# Patient Record
Sex: MALE | Race: WHITE | NOT HISPANIC OR LATINO | URBAN - METROPOLITAN AREA
[De-identification: names, ages, dates, MRNs, and addresses within clinical notes are randomized per-mention and may not be internally consistent; named-entity substitution may affect disease eponyms.]

---

## 2017-03-24 ENCOUNTER — INPATIENT (INPATIENT)
Facility: HOSPITAL | Age: 47
LOS: 3 days | Discharge: HOME | End: 2017-03-28
Attending: INTERNAL MEDICINE

## 2017-06-27 DIAGNOSIS — Z98.818 OTHER DENTAL PROCEDURE STATUS: ICD-10-CM

## 2017-06-27 DIAGNOSIS — I95.9 HYPOTENSION, UNSPECIFIED: ICD-10-CM

## 2017-06-27 DIAGNOSIS — N17.9 ACUTE KIDNEY FAILURE, UNSPECIFIED: ICD-10-CM

## 2017-06-27 DIAGNOSIS — Z79.84 LONG TERM (CURRENT) USE OF ORAL HYPOGLYCEMIC DRUGS: ICD-10-CM

## 2017-06-27 DIAGNOSIS — I10 ESSENTIAL (PRIMARY) HYPERTENSION: ICD-10-CM

## 2017-06-27 DIAGNOSIS — Z88.0 ALLERGY STATUS TO PENICILLIN: ICD-10-CM

## 2017-06-27 DIAGNOSIS — K04.7 PERIAPICAL ABSCESS WITHOUT SINUS: ICD-10-CM

## 2017-06-27 DIAGNOSIS — F41.9 ANXIETY DISORDER, UNSPECIFIED: ICD-10-CM

## 2017-06-27 DIAGNOSIS — I25.10 ATHEROSCLEROTIC HEART DISEASE OF NATIVE CORONARY ARTERY WITHOUT ANGINA PECTORIS: ICD-10-CM

## 2017-06-27 DIAGNOSIS — G61.81 CHRONIC INFLAMMATORY DEMYELINATING POLYNEURITIS: ICD-10-CM

## 2017-06-27 DIAGNOSIS — E11.9 TYPE 2 DIABETES MELLITUS WITHOUT COMPLICATIONS: ICD-10-CM

## 2017-06-27 DIAGNOSIS — G93.41 METABOLIC ENCEPHALOPATHY: ICD-10-CM

## 2017-06-27 DIAGNOSIS — R50.82 POSTPROCEDURAL FEVER: ICD-10-CM

## 2017-06-27 DIAGNOSIS — D80.2 SELECTIVE DEFICIENCY OF IMMUNOGLOBULIN A [IGA]: ICD-10-CM

## 2017-06-27 DIAGNOSIS — Z79.899 OTHER LONG TERM (CURRENT) DRUG THERAPY: ICD-10-CM

## 2017-06-27 DIAGNOSIS — Z53.29 PROCEDURE AND TREATMENT NOT CARRIED OUT BECAUSE OF PATIENT'S DECISION FOR OTHER REASONS: ICD-10-CM

## 2017-06-27 DIAGNOSIS — Z74.01 BED CONFINEMENT STATUS: ICD-10-CM

## 2017-06-27 DIAGNOSIS — Z96.643 PRESENCE OF ARTIFICIAL HIP JOINT, BILATERAL: ICD-10-CM

## 2017-06-27 DIAGNOSIS — Z95.1 PRESENCE OF AORTOCORONARY BYPASS GRAFT: ICD-10-CM

## 2017-06-27 DIAGNOSIS — R65.10 SYSTEMIC INFLAMMATORY RESPONSE SYNDROME (SIRS) OF NON-INFECTIOUS ORIGIN WITHOUT ACUTE ORGAN DYSFUNCTION: ICD-10-CM

## 2018-05-14 ENCOUNTER — OUTPATIENT (OUTPATIENT)
Dept: OUTPATIENT SERVICES | Facility: HOSPITAL | Age: 48
LOS: 1 days | Discharge: HOME | End: 2018-05-14

## 2018-05-14 DIAGNOSIS — E11.65 TYPE 2 DIABETES MELLITUS WITH HYPERGLYCEMIA: ICD-10-CM

## 2018-05-14 DIAGNOSIS — E03.9 HYPOTHYROIDISM, UNSPECIFIED: ICD-10-CM

## 2018-05-14 DIAGNOSIS — D64.9 ANEMIA, UNSPECIFIED: ICD-10-CM

## 2018-05-14 DIAGNOSIS — R79.89 OTHER SPECIFIED ABNORMAL FINDINGS OF BLOOD CHEMISTRY: ICD-10-CM

## 2018-05-14 DIAGNOSIS — E78.2 MIXED HYPERLIPIDEMIA: ICD-10-CM

## 2018-05-14 DIAGNOSIS — E55.9 VITAMIN D DEFICIENCY, UNSPECIFIED: ICD-10-CM

## 2018-09-14 ENCOUNTER — APPOINTMENT (OUTPATIENT)
Dept: GERIATRICS | Facility: HOME HEALTH | Age: 48
End: 2018-09-14

## 2018-10-10 ENCOUNTER — RX RENEWAL (OUTPATIENT)
Age: 48
End: 2018-10-10

## 2018-10-19 ENCOUNTER — APPOINTMENT (OUTPATIENT)
Dept: GERIATRICS | Facility: HOME HEALTH | Age: 48
End: 2018-10-19

## 2018-10-24 VITALS — HEART RATE: 88 BPM | DIASTOLIC BLOOD PRESSURE: 80 MMHG | RESPIRATION RATE: 12 BRPM | SYSTOLIC BLOOD PRESSURE: 130 MMHG

## 2018-10-29 ENCOUNTER — LABORATORY RESULT (OUTPATIENT)
Age: 48
End: 2018-10-29

## 2018-10-29 ENCOUNTER — OUTPATIENT (OUTPATIENT)
Dept: OUTPATIENT SERVICES | Facility: HOSPITAL | Age: 48
LOS: 1 days | Discharge: HOME | End: 2018-10-29

## 2018-10-29 DIAGNOSIS — G47.00 INSOMNIA, UNSPECIFIED: ICD-10-CM

## 2018-10-29 DIAGNOSIS — Z00.00 ENCOUNTER FOR GENERAL ADULT MEDICAL EXAMINATION WITHOUT ABNORMAL FINDINGS: ICD-10-CM

## 2018-10-29 DIAGNOSIS — K59.09 OTHER CONSTIPATION: ICD-10-CM

## 2018-10-29 DIAGNOSIS — G61.81 CHRONIC INFLAMMATORY DEMYELINATING POLYNEURITIS: ICD-10-CM

## 2018-10-29 DIAGNOSIS — E11.9 TYPE 2 DIABETES MELLITUS WITHOUT COMPLICATIONS: ICD-10-CM

## 2018-10-30 ENCOUNTER — RX RENEWAL (OUTPATIENT)
Age: 48
End: 2018-10-30

## 2018-10-30 RX ORDER — ALPRAZOLAM 0.25 MG
1 TABLET ORAL
Qty: 90 | Refills: 0
Start: 2018-10-30 | End: 2018-11-28

## 2018-10-30 RX ORDER — HYDROMORPHONE HYDROCHLORIDE 2 MG/ML
1 INJECTION INTRAMUSCULAR; INTRAVENOUS; SUBCUTANEOUS
Qty: 60 | Refills: 0
Start: 2018-10-30 | End: 2018-11-28

## 2018-10-30 RX ORDER — HYDROMORPHONE HYDROCHLORIDE 2 MG/ML
1 INJECTION INTRAMUSCULAR; INTRAVENOUS; SUBCUTANEOUS
Qty: 120 | Refills: 0
Start: 2018-10-30 | End: 2018-11-28

## 2018-10-31 ENCOUNTER — RESULT REVIEW (OUTPATIENT)
Age: 48
End: 2018-10-31

## 2018-11-21 ENCOUNTER — RX RENEWAL (OUTPATIENT)
Age: 48
End: 2018-11-21

## 2019-01-01 ENCOUNTER — LABORATORY RESULT (OUTPATIENT)
Age: 49
End: 2019-01-01

## 2019-01-01 ENCOUNTER — OTHER (OUTPATIENT)
Age: 49
End: 2019-01-01

## 2019-01-01 ENCOUNTER — OUTPATIENT (OUTPATIENT)
Dept: OUTPATIENT SERVICES | Facility: HOSPITAL | Age: 49
LOS: 1 days | Discharge: HOME | End: 2019-01-01

## 2019-01-01 ENCOUNTER — MOBILE ON CALL (OUTPATIENT)
Age: 49
End: 2019-01-01

## 2019-01-01 ENCOUNTER — TRANSCRIPTION ENCOUNTER (OUTPATIENT)
Age: 49
End: 2019-01-01

## 2019-01-01 ENCOUNTER — APPOINTMENT (OUTPATIENT)
Dept: GERIATRICS | Facility: HOME HEALTH | Age: 49
End: 2019-01-01
Payer: MEDICARE

## 2019-01-01 ENCOUNTER — FORM ENCOUNTER (OUTPATIENT)
Age: 49
End: 2019-01-01

## 2019-01-01 ENCOUNTER — OUTPATIENT (OUTPATIENT)
Dept: OUTPATIENT SERVICES | Facility: HOSPITAL | Age: 49
LOS: 1 days | Discharge: HOME | End: 2019-01-01
Payer: COMMERCIAL

## 2019-01-01 ENCOUNTER — OUTPATIENT (OUTPATIENT)
Dept: OUTPATIENT SERVICES | Facility: HOSPITAL | Age: 49
LOS: 1 days | Discharge: HOME | End: 2019-01-01
Payer: MEDICARE

## 2019-01-01 ENCOUNTER — APPOINTMENT (OUTPATIENT)
Dept: CARDIOLOGY | Facility: CLINIC | Age: 49
End: 2019-01-01

## 2019-01-01 ENCOUNTER — APPOINTMENT (OUTPATIENT)
Dept: NEUROLOGY | Facility: CLINIC | Age: 49
End: 2019-01-01
Payer: MEDICARE

## 2019-01-01 ENCOUNTER — INPATIENT (INPATIENT)
Facility: HOSPITAL | Age: 49
LOS: 8 days | Discharge: NURSING, REHAB OR LONG-TERM CARE | End: 2019-11-21
Attending: INTERNAL MEDICINE | Admitting: INTERNAL MEDICINE
Payer: MEDICARE

## 2019-01-01 ENCOUNTER — RX RENEWAL (OUTPATIENT)
Age: 49
End: 2019-01-01

## 2019-01-01 VITALS
TEMPERATURE: 99 F | RESPIRATION RATE: 20 BRPM | DIASTOLIC BLOOD PRESSURE: 82 MMHG | HEART RATE: 102 BPM | SYSTOLIC BLOOD PRESSURE: 119 MMHG | WEIGHT: 214.95 LBS | HEIGHT: 69 IN

## 2019-01-01 VITALS
DIASTOLIC BLOOD PRESSURE: 56 MMHG | RESPIRATION RATE: 18 BRPM | SYSTOLIC BLOOD PRESSURE: 114 MMHG | HEART RATE: 92 BPM | TEMPERATURE: 99 F

## 2019-01-01 VITALS — RESPIRATION RATE: 18 BRPM | HEART RATE: 83 BPM | DIASTOLIC BLOOD PRESSURE: 69 MMHG | SYSTOLIC BLOOD PRESSURE: 145 MMHG

## 2019-01-01 VITALS
TEMPERATURE: 97.6 F | SYSTOLIC BLOOD PRESSURE: 110 MMHG | OXYGEN SATURATION: 97 % | HEART RATE: 78 BPM | DIASTOLIC BLOOD PRESSURE: 64 MMHG | RESPIRATION RATE: 16 BRPM

## 2019-01-01 VITALS
HEART RATE: 70 BPM | OXYGEN SATURATION: 98 % | DIASTOLIC BLOOD PRESSURE: 64 MMHG | RESPIRATION RATE: 16 BRPM | SYSTOLIC BLOOD PRESSURE: 158 MMHG | TEMPERATURE: 99 F

## 2019-01-01 VITALS
DIASTOLIC BLOOD PRESSURE: 73 MMHG | HEIGHT: 71 IN | TEMPERATURE: 98 F | RESPIRATION RATE: 18 BRPM | WEIGHT: 199.96 LBS | SYSTOLIC BLOOD PRESSURE: 133 MMHG | HEART RATE: 80 BPM

## 2019-01-01 VITALS
DIASTOLIC BLOOD PRESSURE: 70 MMHG | RESPIRATION RATE: 18 BRPM | HEART RATE: 80 BPM | SYSTOLIC BLOOD PRESSURE: 110 MMHG | TEMPERATURE: 98.2 F

## 2019-01-01 VITALS
SYSTOLIC BLOOD PRESSURE: 140 MMHG | TEMPERATURE: 98 F | HEART RATE: 80 BPM | HEIGHT: 71 IN | WEIGHT: 195.99 LBS | RESPIRATION RATE: 17 BRPM | OXYGEN SATURATION: 96 % | DIASTOLIC BLOOD PRESSURE: 86 MMHG

## 2019-01-01 VITALS — HEART RATE: 74 BPM | SYSTOLIC BLOOD PRESSURE: 120 MMHG | RESPIRATION RATE: 17 BRPM | DIASTOLIC BLOOD PRESSURE: 76 MMHG

## 2019-01-01 VITALS
TEMPERATURE: 99 F | DIASTOLIC BLOOD PRESSURE: 77 MMHG | HEART RATE: 77 BPM | SYSTOLIC BLOOD PRESSURE: 135 MMHG | RESPIRATION RATE: 18 BRPM

## 2019-01-01 VITALS — DIASTOLIC BLOOD PRESSURE: 74 MMHG | RESPIRATION RATE: 18 BRPM | HEART RATE: 81 BPM | SYSTOLIC BLOOD PRESSURE: 116 MMHG

## 2019-01-01 VITALS — SYSTOLIC BLOOD PRESSURE: 128 MMHG | RESPIRATION RATE: 18 BRPM | DIASTOLIC BLOOD PRESSURE: 64 MMHG | HEART RATE: 80 BPM

## 2019-01-01 VITALS — HEIGHT: 71 IN

## 2019-01-01 VITALS — SYSTOLIC BLOOD PRESSURE: 97 MMHG | HEART RATE: 80 BPM | DIASTOLIC BLOOD PRESSURE: 67 MMHG

## 2019-01-01 DIAGNOSIS — S82.409A UNSPECIFIED FRACTURE OF SHAFT OF UNSPECIFIED FIBULA, INITIAL ENCOUNTER FOR CLOSED FRACTURE: ICD-10-CM

## 2019-01-01 DIAGNOSIS — Z96.642 PRESENCE OF LEFT ARTIFICIAL HIP JOINT: Chronic | ICD-10-CM

## 2019-01-01 DIAGNOSIS — B96.89 OTHER SPECIFIED BACTERIAL AGENTS AS THE CAUSE OF DISEASES CLASSIFIED ELSEWHERE: ICD-10-CM

## 2019-01-01 DIAGNOSIS — Z90.49 ACQUIRED ABSENCE OF OTHER SPECIFIED PARTS OF DIGESTIVE TRACT: Chronic | ICD-10-CM

## 2019-01-01 DIAGNOSIS — T50.Z15A ADVERSE EFFECT OF IMMUNOGLOBULIN, INITIAL ENCOUNTER: ICD-10-CM

## 2019-01-01 DIAGNOSIS — S72.402A UNSPECIFIED FRACTURE OF LOWER END OF LEFT FEMUR, INITIAL ENCOUNTER FOR CLOSED FRACTURE: ICD-10-CM

## 2019-01-01 DIAGNOSIS — Z96.641 PRESENCE OF RIGHT ARTIFICIAL HIP JOINT: Chronic | ICD-10-CM

## 2019-01-01 DIAGNOSIS — G61.81 CHRONIC INFLAMMATORY DEMYELINATING POLYNEURITIS: ICD-10-CM

## 2019-01-01 DIAGNOSIS — I25.10 ATHEROSCLEROTIC HEART DISEASE OF NATIVE CORONARY ARTERY WITHOUT ANGINA PECTORIS: ICD-10-CM
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
## 2019-01-01 DIAGNOSIS — M00.9 PYOGENIC ARTHRITIS, UNSPECIFIED: ICD-10-CM

## 2019-01-01 DIAGNOSIS — S82.202A UNSPECIFIED FRACTURE OF SHAFT OF LEFT TIBIA, INITIAL ENCOUNTER FOR CLOSED FRACTURE: ICD-10-CM

## 2019-01-01 DIAGNOSIS — R82.998 OTHER ABNORMAL FINDINGS IN URINE: ICD-10-CM

## 2019-01-01 DIAGNOSIS — Z00.00 ENCOUNTER FOR GENERAL ADULT MEDICAL EXAMINATION W/OUT ABNORMAL FINDINGS: ICD-10-CM

## 2019-01-01 DIAGNOSIS — Z96.643 PRESENCE OF ARTIFICIAL HIP JOINT, BILATERAL: ICD-10-CM

## 2019-01-01 DIAGNOSIS — L03.114 CELLULITIS OF LEFT UPPER LIMB: ICD-10-CM

## 2019-01-01 DIAGNOSIS — R79.89 OTHER SPECIFIED ABNORMAL FINDINGS OF BLOOD CHEMISTRY: ICD-10-CM

## 2019-01-01 DIAGNOSIS — M00.852 ARTHRITIS DUE TO OTHER BACTERIA, LEFT HIP: ICD-10-CM

## 2019-01-01 DIAGNOSIS — Z95.1 PRESENCE OF AORTOCORONARY BYPASS GRAFT: Chronic | ICD-10-CM

## 2019-01-01 DIAGNOSIS — K21.9 GASTRO-ESOPHAGEAL REFLUX DISEASE W/OUT ESOPHAGITIS: ICD-10-CM

## 2019-01-01 DIAGNOSIS — Z91.018 ALLERGY TO OTHER FOODS: ICD-10-CM

## 2019-01-01 DIAGNOSIS — I10 ESSENTIAL (PRIMARY) HYPERTENSION: ICD-10-CM

## 2019-01-01 DIAGNOSIS — F41.9 ANXIETY DISORDER, UNSPECIFIED: ICD-10-CM

## 2019-01-01 DIAGNOSIS — M48.54XA COLLAPSED VERTEBRA, NOT ELSEWHERE CLASSIFIED, THORACIC REGION, INITIAL ENCOUNTER FOR FRACTURE: ICD-10-CM

## 2019-01-01 DIAGNOSIS — E55.9 VITAMIN D DEFICIENCY, UNSPECIFIED: ICD-10-CM

## 2019-01-01 DIAGNOSIS — M01.X0 DIRECT INFECTION OF UNSPECIFIED JOINT IN INFECTIOUS AND PARASITIC DISEASES CLASSIFIED ELSEWHERE: ICD-10-CM

## 2019-01-01 DIAGNOSIS — K21.9 GASTRO-ESOPHAGEAL REFLUX DISEASE WITHOUT ESOPHAGITIS: ICD-10-CM

## 2019-01-01 DIAGNOSIS — M79.605 PAIN IN LEFT LEG: ICD-10-CM

## 2019-01-01 DIAGNOSIS — I25.10 ATHEROSCLEROTIC HEART DISEASE OF NATIVE CORONARY ARTERY W/OUT ANGINA PECTORIS: ICD-10-CM

## 2019-01-01 DIAGNOSIS — Z79.82 LONG TERM (CURRENT) USE OF ASPIRIN: ICD-10-CM

## 2019-01-01 DIAGNOSIS — Z45.2 ENCOUNTER FOR ADJUSTMENT AND MANAGEMENT OF VASCULAR ACCESS DEVICE: ICD-10-CM

## 2019-01-01 DIAGNOSIS — Z95.1 PRESENCE OF AORTOCORONARY BYPASS GRAFT: ICD-10-CM

## 2019-01-01 DIAGNOSIS — E11.9 TYPE 2 DIABETES MELLITUS WITHOUT COMPLICATIONS: ICD-10-CM

## 2019-01-01 DIAGNOSIS — T84.52XA INFECTION AND INFLAMMATORY REACTION DUE TO INTERNAL LEFT HIP PROSTHESIS, INITIAL ENCOUNTER: ICD-10-CM

## 2019-01-01 DIAGNOSIS — F32.9 MAJOR DEPRESSIVE DISORDER, SINGLE EPISODE, UNSPECIFIED: ICD-10-CM

## 2019-01-01 DIAGNOSIS — D62 ACUTE POSTHEMORRHAGIC ANEMIA: ICD-10-CM

## 2019-01-01 DIAGNOSIS — M25.552 PAIN IN LEFT HIP: ICD-10-CM

## 2019-01-01 DIAGNOSIS — K59.09 OTHER CONSTIPATION: ICD-10-CM

## 2019-01-01 DIAGNOSIS — E03.9 HYPOTHYROIDISM, UNSPECIFIED: ICD-10-CM

## 2019-01-01 DIAGNOSIS — I67.82 CEREBRAL ISCHEMIA: ICD-10-CM

## 2019-01-01 DIAGNOSIS — M86.60 OTHER CHRONIC OSTEOMYELITIS, UNSPECIFIED SITE: ICD-10-CM
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
## 2019-01-01 DIAGNOSIS — E11.9 TYPE 2 DIABETES MELLITUS W/OUT COMPLICATIONS: ICD-10-CM

## 2019-01-01 DIAGNOSIS — S82.402G: ICD-10-CM

## 2019-01-01 DIAGNOSIS — Z02.9 ENCOUNTER FOR ADMINISTRATIVE EXAMINATIONS, UNSPECIFIED: ICD-10-CM

## 2019-01-01 DIAGNOSIS — I25.2 OLD MYOCARDIAL INFARCTION: ICD-10-CM

## 2019-01-01 DIAGNOSIS — Z87.891 PERSONAL HISTORY OF NICOTINE DEPENDENCE: ICD-10-CM

## 2019-01-01 DIAGNOSIS — M21.371 FOOT DROP, RIGHT FOOT: ICD-10-CM

## 2019-01-01 DIAGNOSIS — N40.0 BENIGN PROSTATIC HYPERPLASIA WITHOUT LOWER URINARY TRACT SYMPTOMS: ICD-10-CM

## 2019-01-01 DIAGNOSIS — I21.A1 MYOCARDIAL INFARCTION TYPE 2: ICD-10-CM

## 2019-01-01 DIAGNOSIS — K59.00 CONSTIPATION, UNSPECIFIED: ICD-10-CM

## 2019-01-01 DIAGNOSIS — Z96.641 PRESENCE OF RIGHT ARTIFICIAL HIP JOINT: ICD-10-CM

## 2019-01-01 DIAGNOSIS — E11.51 TYPE 2 DIABETES MELLITUS WITH DIABETIC PERIPHERAL ANGIOPATHY WITHOUT GANGRENE: ICD-10-CM

## 2019-01-01 DIAGNOSIS — M85.88 OTHER SPECIFIED DISORDERS OF BONE DENSITY AND STRUCTURE, OTHER SITE: ICD-10-CM

## 2019-01-01 DIAGNOSIS — G82.50 QUADRIPLEGIA, UNSPECIFIED: ICD-10-CM

## 2019-01-01 DIAGNOSIS — Z74.01 BED CONFINEMENT STATUS: ICD-10-CM

## 2019-01-01 DIAGNOSIS — M21.372 FOOT DROP, LEFT FOOT: ICD-10-CM
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
## 2019-01-01 DIAGNOSIS — Z88.0 ALLERGY STATUS TO PENICILLIN: ICD-10-CM

## 2019-01-01 DIAGNOSIS — F32.9 ANXIETY DISORDER, UNSPECIFIED: ICD-10-CM

## 2019-01-01 DIAGNOSIS — M96.662 FRACTURE OF FEMUR FOLLOWING INSERTION OF ORTHOPEDIC IMPLANT, JOINT PROSTHESIS, OR BONE PLATE, LEFT LEG: ICD-10-CM

## 2019-01-01 DIAGNOSIS — I70.201 UNSPECIFIED ATHEROSCLEROSIS OF NATIVE ARTERIES OF EXTREMITIES, RIGHT LEG: ICD-10-CM

## 2019-01-01 DIAGNOSIS — E87.1 HYPO-OSMOLALITY AND HYPONATREMIA: ICD-10-CM

## 2019-01-01 DIAGNOSIS — R79.82 ELEVATED C-REACTIVE PROTEIN (CRP): ICD-10-CM

## 2019-01-01 DIAGNOSIS — S73.005A UNSPECIFIED DISLOCATION OF LEFT HIP, INITIAL ENCOUNTER: ICD-10-CM

## 2019-01-01 DIAGNOSIS — M00.859: ICD-10-CM

## 2019-01-01 DIAGNOSIS — Z13.29 ENCOUNTER FOR SCREENING FOR OTHER SUSPECTED ENDOCRINE DISORDER: ICD-10-CM

## 2019-01-01 DIAGNOSIS — R79.9 ABNORMAL FINDING OF BLOOD CHEMISTRY, UNSPECIFIED: ICD-10-CM

## 2019-01-01 DIAGNOSIS — Z96.642 PRESENCE OF LEFT ARTIFICIAL HIP JOINT: ICD-10-CM

## 2019-01-01 DIAGNOSIS — A41.9 SEPSIS, UNSPECIFIED ORGANISM: ICD-10-CM

## 2019-01-01 DIAGNOSIS — Z51.81 ENCOUNTER FOR THERAPEUTIC DRUG LEVEL MONITORING: ICD-10-CM

## 2019-01-01 DIAGNOSIS — R33.9 RETENTION OF URINE, UNSPECIFIED: ICD-10-CM

## 2019-01-01 DIAGNOSIS — B96.5 PSEUDOMONAS (AERUGINOSA) (MALLEI) (PSEUDOMALLEI) AS THE CAUSE OF DISEASES CLASSIFIED ELSEWHERE: ICD-10-CM

## 2019-01-01 DIAGNOSIS — E78.00 PURE HYPERCHOLESTEROLEMIA, UNSPECIFIED: ICD-10-CM

## 2019-01-01 DIAGNOSIS — G47.00 INSOMNIA, UNSPECIFIED: ICD-10-CM

## 2019-01-01 DIAGNOSIS — T84.091A OTHER MECHANICAL COMPLICATION OF INTERNAL LEFT HIP PROSTHESIS, INITIAL ENCOUNTER: ICD-10-CM

## 2019-01-01 DIAGNOSIS — R05 COUGH: ICD-10-CM

## 2019-01-01 LAB
-  AMIKACIN: SIGNIFICANT CHANGE UP
-  AMPICILLIN/SULBACTAM: SIGNIFICANT CHANGE UP
-  AMPICILLIN/SULBACTAM: SIGNIFICANT CHANGE UP
-  AZTREONAM: SIGNIFICANT CHANGE UP
-  CEFAZOLIN: SIGNIFICANT CHANGE UP
-  CEFAZOLIN: SIGNIFICANT CHANGE UP
-  CEFEPIME: SIGNIFICANT CHANGE UP
-  CEFTAZIDIME: SIGNIFICANT CHANGE UP
-  CIPROFLOXACIN: SIGNIFICANT CHANGE UP
-  CLINDAMYCIN: SIGNIFICANT CHANGE UP
-  CLINDAMYCIN: SIGNIFICANT CHANGE UP
-  ERYTHROMYCIN: SIGNIFICANT CHANGE UP
-  ERYTHROMYCIN: SIGNIFICANT CHANGE UP
-  GENTAMICIN: SIGNIFICANT CHANGE UP
-  IMIPENEM: SIGNIFICANT CHANGE UP
-  LEVOFLOXACIN: SIGNIFICANT CHANGE UP
-  MEROPENEM: SIGNIFICANT CHANGE UP
-  OXACILLIN: SIGNIFICANT CHANGE UP
-  OXACILLIN: SIGNIFICANT CHANGE UP
-  PENICILLIN: SIGNIFICANT CHANGE UP
-  PENICILLIN: SIGNIFICANT CHANGE UP
-  PIPERACILLIN/TAZOBACTAM: SIGNIFICANT CHANGE UP
-  RIFAMPIN: SIGNIFICANT CHANGE UP
-  RIFAMPIN: SIGNIFICANT CHANGE UP
-  TETRACYCLINE: SIGNIFICANT CHANGE UP
-  TETRACYCLINE: SIGNIFICANT CHANGE UP
-  TOBRAMYCIN: SIGNIFICANT CHANGE UP
-  TRIMETHOPRIM/SULFAMETHOXAZOLE: SIGNIFICANT CHANGE UP
-  VANCOMYCIN: SIGNIFICANT CHANGE UP
-  VANCOMYCIN: SIGNIFICANT CHANGE UP
24R-OH-CALCIDIOL SERPL-MCNC: 19 NG/ML — LOW (ref 30–80)
ALBUMIN SERPL ELPH-MCNC: 3.1 G/DL — LOW (ref 3.5–5.2)
ALBUMIN SERPL ELPH-MCNC: 3.4 G/DL — LOW (ref 3.5–5.2)
ALBUMIN SERPL ELPH-MCNC: 3.4 G/DL — LOW (ref 3.5–5.2)
ALBUMIN SERPL ELPH-MCNC: 3.7 G/DL — SIGNIFICANT CHANGE UP (ref 3.5–5.2)
ALBUMIN SERPL ELPH-MCNC: 3.8 G/DL — SIGNIFICANT CHANGE UP (ref 3.5–5.2)
ALBUMIN SERPL ELPH-MCNC: 4 G/DL — SIGNIFICANT CHANGE UP (ref 3.5–5.2)
ALP SERPL-CCNC: 111 U/L — SIGNIFICANT CHANGE UP (ref 30–115)
ALP SERPL-CCNC: 118 U/L — HIGH (ref 30–115)
ALP SERPL-CCNC: 121 U/L — HIGH (ref 30–115)
ALP SERPL-CCNC: 221 U/L — HIGH (ref 30–115)
ALP SERPL-CCNC: 291 U/L — HIGH (ref 30–115)
ALP SERPL-CCNC: 314 U/L — HIGH (ref 30–115)
ALT FLD-CCNC: 13 U/L — SIGNIFICANT CHANGE UP (ref 0–41)
ALT FLD-CCNC: 14 U/L — SIGNIFICANT CHANGE UP (ref 0–41)
ALT FLD-CCNC: 16 U/L — SIGNIFICANT CHANGE UP (ref 0–41)
ALT FLD-CCNC: 22 U/L — SIGNIFICANT CHANGE UP (ref 0–41)
ALT FLD-CCNC: 24 U/L — SIGNIFICANT CHANGE UP (ref 0–41)
ALT FLD-CCNC: 24 U/L — SIGNIFICANT CHANGE UP (ref 0–41)
ANION GAP SERPL CALC-SCNC: 10 MMOL/L — SIGNIFICANT CHANGE UP (ref 7–14)
ANION GAP SERPL CALC-SCNC: 11 MMOL/L — SIGNIFICANT CHANGE UP (ref 7–14)
ANION GAP SERPL CALC-SCNC: 11 MMOL/L — SIGNIFICANT CHANGE UP (ref 7–14)
ANION GAP SERPL CALC-SCNC: 12 MMOL/L — SIGNIFICANT CHANGE UP (ref 7–14)
ANION GAP SERPL CALC-SCNC: 13 MMOL/L — SIGNIFICANT CHANGE UP (ref 7–14)
ANION GAP SERPL CALC-SCNC: 13 MMOL/L — SIGNIFICANT CHANGE UP (ref 7–14)
ANION GAP SERPL CALC-SCNC: 14 MMOL/L — SIGNIFICANT CHANGE UP (ref 7–14)
ANION GAP SERPL CALC-SCNC: 15 MMOL/L — HIGH (ref 7–14)
ANION GAP SERPL CALC-SCNC: 7 MMOL/L — SIGNIFICANT CHANGE UP (ref 7–14)
ANION GAP SERPL CALC-SCNC: 8 MMOL/L — SIGNIFICANT CHANGE UP (ref 7–14)
ANION GAP SERPL CALC-SCNC: 8 MMOL/L — SIGNIFICANT CHANGE UP (ref 7–14)
ANION GAP SERPL CALC-SCNC: 9 MMOL/L — SIGNIFICANT CHANGE UP (ref 7–14)
APTT BLD: 34.8 SEC — SIGNIFICANT CHANGE UP (ref 27–39.2)
APTT BLD: 57.4 SEC — HIGH (ref 27–39.2)
APTT BLD: 60.4 SEC — HIGH (ref 27–39.2)
AST SERPL-CCNC: 24 U/L — SIGNIFICANT CHANGE UP (ref 0–41)
AST SERPL-CCNC: 25 U/L — SIGNIFICANT CHANGE UP (ref 0–41)
AST SERPL-CCNC: 25 U/L — SIGNIFICANT CHANGE UP (ref 0–41)
AST SERPL-CCNC: 26 U/L — SIGNIFICANT CHANGE UP (ref 0–41)
AST SERPL-CCNC: 30 U/L — SIGNIFICANT CHANGE UP (ref 0–41)
AST SERPL-CCNC: 45 U/L — HIGH (ref 0–41)
B BURGDOR C6 AB SER-ACNC: NEGATIVE — SIGNIFICANT CHANGE UP
B BURGDOR IGG+IGM SER-ACNC: 0.26 INDEX — SIGNIFICANT CHANGE UP (ref 0.01–0.89)
B PERT IGG+IGM PNL SER: ABNORMAL
BASOPHILS # BLD AUTO: 0.03 K/UL — SIGNIFICANT CHANGE UP (ref 0–0.2)
BASOPHILS # BLD AUTO: 0.04 K/UL — SIGNIFICANT CHANGE UP (ref 0–0.2)
BASOPHILS # BLD AUTO: 0.05 K/UL — SIGNIFICANT CHANGE UP (ref 0–0.2)
BASOPHILS # BLD AUTO: 0.05 K/UL — SIGNIFICANT CHANGE UP (ref 0–0.2)
BASOPHILS # BLD AUTO: 0.06 K/UL — SIGNIFICANT CHANGE UP (ref 0–0.2)
BASOPHILS # BLD AUTO: 0.06 K/UL — SIGNIFICANT CHANGE UP (ref 0–0.2)
BASOPHILS # BLD AUTO: 0.07 K/UL — SIGNIFICANT CHANGE UP (ref 0–0.2)
BASOPHILS # BLD AUTO: 0.07 K/UL — SIGNIFICANT CHANGE UP (ref 0–0.2)
BASOPHILS # BLD AUTO: 0.08 K/UL — SIGNIFICANT CHANGE UP (ref 0–0.2)
BASOPHILS # BLD AUTO: 0.09 K/UL — SIGNIFICANT CHANGE UP (ref 0–0.2)
BASOPHILS NFR BLD AUTO: 0.5 % — SIGNIFICANT CHANGE UP (ref 0–1)
BASOPHILS NFR BLD AUTO: 0.6 % — SIGNIFICANT CHANGE UP (ref 0–1)
BASOPHILS NFR BLD AUTO: 0.7 % — SIGNIFICANT CHANGE UP (ref 0–1)
BASOPHILS NFR BLD AUTO: 0.7 % — SIGNIFICANT CHANGE UP (ref 0–1)
BASOPHILS NFR BLD AUTO: 0.8 % — SIGNIFICANT CHANGE UP (ref 0–1)
BASOPHILS NFR BLD AUTO: 0.8 % — SIGNIFICANT CHANGE UP (ref 0–1)
BASOPHILS NFR BLD AUTO: 0.9 % — SIGNIFICANT CHANGE UP (ref 0–1)
BASOPHILS NFR BLD AUTO: 1 % — SIGNIFICANT CHANGE UP (ref 0–1)
BASOPHILS NFR BLD AUTO: 1 % — SIGNIFICANT CHANGE UP (ref 0–1)
BASOPHILS NFR BLD AUTO: 1.1 % — HIGH (ref 0–1)
BASOPHILS NFR BLD AUTO: 1.2 % — HIGH (ref 0–1)
BASOPHILS NFR BLD AUTO: 1.3 % — HIGH (ref 0–1)
BILIRUB SERPL-MCNC: 0.3 MG/DL — SIGNIFICANT CHANGE UP (ref 0.2–1.2)
BILIRUB SERPL-MCNC: 0.6 MG/DL — SIGNIFICANT CHANGE UP (ref 0.2–1.2)
BLD GP AB SCN SERPL QL: SIGNIFICANT CHANGE UP
BUN SERPL-MCNC: 10 MG/DL — SIGNIFICANT CHANGE UP (ref 10–20)
BUN SERPL-MCNC: 11 MG/DL — SIGNIFICANT CHANGE UP (ref 10–20)
BUN SERPL-MCNC: 11 MG/DL — SIGNIFICANT CHANGE UP (ref 10–20)
BUN SERPL-MCNC: 13 MG/DL — SIGNIFICANT CHANGE UP (ref 10–20)
BUN SERPL-MCNC: 14 MG/DL — SIGNIFICANT CHANGE UP (ref 10–20)
BUN SERPL-MCNC: 15 MG/DL — SIGNIFICANT CHANGE UP (ref 10–20)
BUN SERPL-MCNC: 15 MG/DL — SIGNIFICANT CHANGE UP (ref 10–20)
BUN SERPL-MCNC: 17 MG/DL — SIGNIFICANT CHANGE UP (ref 10–20)
BUN SERPL-MCNC: 17 MG/DL — SIGNIFICANT CHANGE UP (ref 10–20)
BUN SERPL-MCNC: 19 MG/DL — SIGNIFICANT CHANGE UP (ref 10–20)
BUN SERPL-MCNC: 20 MG/DL — SIGNIFICANT CHANGE UP (ref 10–20)
BUN SERPL-MCNC: 22 MG/DL — HIGH (ref 10–20)
BUN SERPL-MCNC: 23 MG/DL — HIGH (ref 10–20)
BUN SERPL-MCNC: 24 MG/DL — HIGH (ref 10–20)
BUN SERPL-MCNC: 26 MG/DL — HIGH (ref 10–20)
BUN SERPL-MCNC: 7 MG/DL — LOW (ref 10–20)
BUN SERPL-MCNC: 8 MG/DL — LOW (ref 10–20)
BUN SERPL-MCNC: 9 MG/DL — LOW (ref 10–20)
BUN SERPL-MCNC: 9 MG/DL — LOW (ref 10–20)
CALCIUM SERPL-MCNC: 7.9 MG/DL — LOW (ref 8.5–10.1)
CALCIUM SERPL-MCNC: 8.2 MG/DL — LOW (ref 8.5–10.1)
CALCIUM SERPL-MCNC: 8.2 MG/DL — LOW (ref 8.5–10.1)
CALCIUM SERPL-MCNC: 8.4 MG/DL — LOW (ref 8.5–10.1)
CALCIUM SERPL-MCNC: 8.5 MG/DL — SIGNIFICANT CHANGE UP (ref 8.5–10.1)
CALCIUM SERPL-MCNC: 8.6 MG/DL — SIGNIFICANT CHANGE UP (ref 8.5–10.1)
CALCIUM SERPL-MCNC: 8.7 MG/DL — SIGNIFICANT CHANGE UP (ref 8.5–10.1)
CALCIUM SERPL-MCNC: 8.8 MG/DL — SIGNIFICANT CHANGE UP (ref 8.5–10.1)
CALCIUM SERPL-MCNC: 8.9 MG/DL — SIGNIFICANT CHANGE UP (ref 8.5–10.1)
CALCIUM SERPL-MCNC: 9 MG/DL — SIGNIFICANT CHANGE UP (ref 8.5–10.1)
CALCIUM SERPL-MCNC: 9.1 MG/DL — SIGNIFICANT CHANGE UP (ref 8.5–10.1)
CALCIUM SERPL-MCNC: 9.2 MG/DL — SIGNIFICANT CHANGE UP (ref 8.5–10.1)
CALCIUM SERPL-MCNC: 9.5 MG/DL — SIGNIFICANT CHANGE UP (ref 8.5–10.1)
CHLORIDE SERPL-SCNC: 100 MMOL/L — SIGNIFICANT CHANGE UP (ref 98–110)
CHLORIDE SERPL-SCNC: 101 MMOL/L — SIGNIFICANT CHANGE UP (ref 98–110)
CHLORIDE SERPL-SCNC: 91 MMOL/L — LOW (ref 98–110)
CHLORIDE SERPL-SCNC: 92 MMOL/L — LOW (ref 98–110)
CHLORIDE SERPL-SCNC: 93 MMOL/L — LOW (ref 98–110)
CHLORIDE SERPL-SCNC: 94 MMOL/L — LOW (ref 98–110)
CHLORIDE SERPL-SCNC: 95 MMOL/L — LOW (ref 98–110)
CHLORIDE SERPL-SCNC: 96 MMOL/L — LOW (ref 98–110)
CHLORIDE SERPL-SCNC: 97 MMOL/L — LOW (ref 98–110)
CHLORIDE SERPL-SCNC: 97 MMOL/L — LOW (ref 98–110)
CHLORIDE SERPL-SCNC: 98 MMOL/L — SIGNIFICANT CHANGE UP (ref 98–110)
CHLORIDE SERPL-SCNC: 98 MMOL/L — SIGNIFICANT CHANGE UP (ref 98–110)
CHLORIDE SERPL-SCNC: 99 MMOL/L — SIGNIFICANT CHANGE UP (ref 98–110)
CHOLEST SERPL-MCNC: 117 MG/DL — SIGNIFICANT CHANGE UP (ref 100–200)
CO2 SERPL-SCNC: 24 MMOL/L — SIGNIFICANT CHANGE UP (ref 17–32)
CO2 SERPL-SCNC: 26 MMOL/L — SIGNIFICANT CHANGE UP (ref 17–32)
CO2 SERPL-SCNC: 27 MMOL/L — SIGNIFICANT CHANGE UP (ref 17–32)
CO2 SERPL-SCNC: 27 MMOL/L — SIGNIFICANT CHANGE UP (ref 17–32)
CO2 SERPL-SCNC: 28 MMOL/L — SIGNIFICANT CHANGE UP (ref 17–32)
CO2 SERPL-SCNC: 28 MMOL/L — SIGNIFICANT CHANGE UP (ref 17–32)
CO2 SERPL-SCNC: 29 MMOL/L — SIGNIFICANT CHANGE UP (ref 17–32)
CO2 SERPL-SCNC: 30 MMOL/L — SIGNIFICANT CHANGE UP (ref 17–32)
CO2 SERPL-SCNC: 31 MMOL/L — SIGNIFICANT CHANGE UP (ref 17–32)
CO2 SERPL-SCNC: 32 MMOL/L — SIGNIFICANT CHANGE UP (ref 17–32)
CO2 SERPL-SCNC: 32 MMOL/L — SIGNIFICANT CHANGE UP (ref 17–32)
CO2 SERPL-SCNC: 34 MMOL/L — HIGH (ref 17–32)
COLOR FLD: YELLOW — SIGNIFICANT CHANGE UP
CREAT SERPL-MCNC: 0.7 MG/DL — SIGNIFICANT CHANGE UP (ref 0.7–1.5)
CREAT SERPL-MCNC: 0.8 MG/DL — SIGNIFICANT CHANGE UP (ref 0.7–1.5)
CREAT SERPL-MCNC: 0.9 MG/DL — SIGNIFICANT CHANGE UP (ref 0.7–1.5)
CREAT SERPL-MCNC: 0.9 MG/DL — SIGNIFICANT CHANGE UP (ref 0.7–1.5)
CRP SERPL-MCNC: 0.96 MG/DL — HIGH (ref 0–0.4)
CRP SERPL-MCNC: 1.29 MG/DL — HIGH (ref 0–0.4)
CRP SERPL-MCNC: 2.15 MG/DL — HIGH (ref 0–0.4)
CULTURE RESULTS: NO GROWTH — SIGNIFICANT CHANGE UP
CULTURE RESULTS: SIGNIFICANT CHANGE UP
EOSINOPHIL # BLD AUTO: 0.29 K/UL — SIGNIFICANT CHANGE UP (ref 0–0.7)
EOSINOPHIL # BLD AUTO: 0.3 K/UL — SIGNIFICANT CHANGE UP (ref 0–0.7)
EOSINOPHIL # BLD AUTO: 0.3 K/UL — SIGNIFICANT CHANGE UP (ref 0–0.7)
EOSINOPHIL # BLD AUTO: 0.33 K/UL — SIGNIFICANT CHANGE UP (ref 0–0.7)
EOSINOPHIL # BLD AUTO: 0.36 K/UL — SIGNIFICANT CHANGE UP (ref 0–0.7)
EOSINOPHIL # BLD AUTO: 0.36 K/UL — SIGNIFICANT CHANGE UP (ref 0–0.7)
EOSINOPHIL # BLD AUTO: 0.37 K/UL — SIGNIFICANT CHANGE UP (ref 0–0.7)
EOSINOPHIL # BLD AUTO: 0.44 K/UL — SIGNIFICANT CHANGE UP (ref 0–0.7)
EOSINOPHIL # BLD AUTO: 0.45 K/UL — SIGNIFICANT CHANGE UP (ref 0–0.7)
EOSINOPHIL # BLD AUTO: 0.46 K/UL — SIGNIFICANT CHANGE UP (ref 0–0.7)
EOSINOPHIL # BLD AUTO: 0.55 K/UL — SIGNIFICANT CHANGE UP (ref 0–0.7)
EOSINOPHIL # BLD AUTO: 0.86 K/UL — HIGH (ref 0–0.7)
EOSINOPHIL NFR BLD AUTO: 3.5 % — SIGNIFICANT CHANGE UP (ref 0–8)
EOSINOPHIL NFR BLD AUTO: 3.8 % — SIGNIFICANT CHANGE UP (ref 0–8)
EOSINOPHIL NFR BLD AUTO: 4.3 % — SIGNIFICANT CHANGE UP (ref 0–8)
EOSINOPHIL NFR BLD AUTO: 4.7 % — SIGNIFICANT CHANGE UP (ref 0–8)
EOSINOPHIL NFR BLD AUTO: 4.7 % — SIGNIFICANT CHANGE UP (ref 0–8)
EOSINOPHIL NFR BLD AUTO: 5.7 % — SIGNIFICANT CHANGE UP (ref 0–8)
EOSINOPHIL NFR BLD AUTO: 6 % — SIGNIFICANT CHANGE UP (ref 0–8)
EOSINOPHIL NFR BLD AUTO: 6.4 % — SIGNIFICANT CHANGE UP (ref 0–8)
EOSINOPHIL NFR BLD AUTO: 6.5 % — SIGNIFICANT CHANGE UP (ref 0–8)
EOSINOPHIL NFR BLD AUTO: 6.7 % — SIGNIFICANT CHANGE UP (ref 0–8)
EOSINOPHIL NFR BLD AUTO: 7.4 % — SIGNIFICANT CHANGE UP (ref 0–8)
EOSINOPHIL NFR BLD AUTO: 8.5 % — HIGH (ref 0–8)
ERYTHROCYTE [SEDIMENTATION RATE] IN BLOOD: 60 MM/HR — HIGH (ref 0–10)
ERYTHROCYTE [SEDIMENTATION RATE] IN BLOOD: 64 MM/HR — HIGH (ref 0–10)
ERYTHROCYTE [SEDIMENTATION RATE] IN BLOOD: 93 MM/HR — HIGH (ref 0–10)
ESTIMATED AVERAGE GLUCOSE: 137 MG/DL — HIGH (ref 68–114)
FLUID INTAKE SUBSTANCE CLASS: SIGNIFICANT CHANGE UP
FLUID SEGMENTED GRANULOCYTES: 1 % — SIGNIFICANT CHANGE UP
GAS PNL BLDA: SIGNIFICANT CHANGE UP
GLUCOSE BLDC GLUCOMTR-MCNC: 103 MG/DL — HIGH (ref 70–99)
GLUCOSE BLDC GLUCOMTR-MCNC: 104 MG/DL — HIGH (ref 70–99)
GLUCOSE BLDC GLUCOMTR-MCNC: 105 MG/DL — HIGH (ref 70–99)
GLUCOSE BLDC GLUCOMTR-MCNC: 107 MG/DL — HIGH (ref 70–99)
GLUCOSE BLDC GLUCOMTR-MCNC: 108 MG/DL — HIGH (ref 70–99)
GLUCOSE BLDC GLUCOMTR-MCNC: 109 MG/DL — HIGH (ref 70–99)
GLUCOSE BLDC GLUCOMTR-MCNC: 110 MG/DL — HIGH (ref 70–99)
GLUCOSE BLDC GLUCOMTR-MCNC: 112 MG/DL — HIGH (ref 70–99)
GLUCOSE BLDC GLUCOMTR-MCNC: 113 MG/DL — HIGH (ref 70–99)
GLUCOSE BLDC GLUCOMTR-MCNC: 115 MG/DL — HIGH (ref 70–99)
GLUCOSE BLDC GLUCOMTR-MCNC: 115 MG/DL — HIGH (ref 70–99)
GLUCOSE BLDC GLUCOMTR-MCNC: 119 MG/DL — HIGH (ref 70–99)
GLUCOSE BLDC GLUCOMTR-MCNC: 119 MG/DL — HIGH (ref 70–99)
GLUCOSE BLDC GLUCOMTR-MCNC: 120 MG/DL — HIGH (ref 70–99)
GLUCOSE BLDC GLUCOMTR-MCNC: 121 MG/DL — HIGH (ref 70–99)
GLUCOSE BLDC GLUCOMTR-MCNC: 122 MG/DL — HIGH (ref 70–99)
GLUCOSE BLDC GLUCOMTR-MCNC: 123 MG/DL — HIGH (ref 70–99)
GLUCOSE BLDC GLUCOMTR-MCNC: 125 MG/DL — HIGH (ref 70–99)
GLUCOSE BLDC GLUCOMTR-MCNC: 125 MG/DL — HIGH (ref 70–99)
GLUCOSE BLDC GLUCOMTR-MCNC: 128 MG/DL — HIGH (ref 70–99)
GLUCOSE BLDC GLUCOMTR-MCNC: 128 MG/DL — HIGH (ref 70–99)
GLUCOSE BLDC GLUCOMTR-MCNC: 130 MG/DL — HIGH (ref 70–99)
GLUCOSE BLDC GLUCOMTR-MCNC: 131 MG/DL — HIGH (ref 70–99)
GLUCOSE BLDC GLUCOMTR-MCNC: 131 MG/DL — HIGH (ref 70–99)
GLUCOSE BLDC GLUCOMTR-MCNC: 132 MG/DL — HIGH (ref 70–99)
GLUCOSE BLDC GLUCOMTR-MCNC: 134 MG/DL — HIGH (ref 70–99)
GLUCOSE BLDC GLUCOMTR-MCNC: 135 MG/DL — HIGH (ref 70–99)
GLUCOSE BLDC GLUCOMTR-MCNC: 136 MG/DL — HIGH (ref 70–99)
GLUCOSE BLDC GLUCOMTR-MCNC: 137 MG/DL — HIGH (ref 70–99)
GLUCOSE BLDC GLUCOMTR-MCNC: 141 MG/DL — HIGH (ref 70–99)
GLUCOSE BLDC GLUCOMTR-MCNC: 141 MG/DL — HIGH (ref 70–99)
GLUCOSE BLDC GLUCOMTR-MCNC: 143 MG/DL — HIGH (ref 70–99)
GLUCOSE BLDC GLUCOMTR-MCNC: 145 MG/DL — HIGH (ref 70–99)
GLUCOSE BLDC GLUCOMTR-MCNC: 147 MG/DL — HIGH (ref 70–99)
GLUCOSE BLDC GLUCOMTR-MCNC: 149 MG/DL — HIGH (ref 70–99)
GLUCOSE BLDC GLUCOMTR-MCNC: 150 MG/DL — HIGH (ref 70–99)
GLUCOSE BLDC GLUCOMTR-MCNC: 152 MG/DL — HIGH (ref 70–99)
GLUCOSE BLDC GLUCOMTR-MCNC: 153 MG/DL — HIGH (ref 70–99)
GLUCOSE BLDC GLUCOMTR-MCNC: 154 MG/DL — HIGH (ref 70–99)
GLUCOSE BLDC GLUCOMTR-MCNC: 154 MG/DL — HIGH (ref 70–99)
GLUCOSE BLDC GLUCOMTR-MCNC: 155 MG/DL — HIGH (ref 70–99)
GLUCOSE BLDC GLUCOMTR-MCNC: 155 MG/DL — HIGH (ref 70–99)
GLUCOSE BLDC GLUCOMTR-MCNC: 157 MG/DL — HIGH (ref 70–99)
GLUCOSE BLDC GLUCOMTR-MCNC: 159 MG/DL — HIGH (ref 70–99)
GLUCOSE BLDC GLUCOMTR-MCNC: 160 MG/DL — HIGH (ref 70–99)
GLUCOSE BLDC GLUCOMTR-MCNC: 162 MG/DL — HIGH (ref 70–99)
GLUCOSE BLDC GLUCOMTR-MCNC: 166 MG/DL — HIGH (ref 70–99)
GLUCOSE BLDC GLUCOMTR-MCNC: 167 MG/DL — HIGH (ref 70–99)
GLUCOSE BLDC GLUCOMTR-MCNC: 167 MG/DL — HIGH (ref 70–99)
GLUCOSE BLDC GLUCOMTR-MCNC: 168 MG/DL — HIGH (ref 70–99)
GLUCOSE BLDC GLUCOMTR-MCNC: 169 MG/DL — HIGH (ref 70–99)
GLUCOSE BLDC GLUCOMTR-MCNC: 170 MG/DL — HIGH (ref 70–99)
GLUCOSE BLDC GLUCOMTR-MCNC: 172 MG/DL — HIGH (ref 70–99)
GLUCOSE BLDC GLUCOMTR-MCNC: 174 MG/DL — HIGH (ref 70–99)
GLUCOSE BLDC GLUCOMTR-MCNC: 174 MG/DL — HIGH (ref 70–99)
GLUCOSE BLDC GLUCOMTR-MCNC: 176 MG/DL — HIGH (ref 70–99)
GLUCOSE BLDC GLUCOMTR-MCNC: 179 MG/DL — HIGH (ref 70–99)
GLUCOSE BLDC GLUCOMTR-MCNC: 182 MG/DL — HIGH (ref 70–99)
GLUCOSE BLDC GLUCOMTR-MCNC: 183 MG/DL — HIGH (ref 70–99)
GLUCOSE BLDC GLUCOMTR-MCNC: 187 MG/DL — HIGH (ref 70–99)
GLUCOSE BLDC GLUCOMTR-MCNC: 188 MG/DL — HIGH (ref 70–99)
GLUCOSE BLDC GLUCOMTR-MCNC: 190 MG/DL — HIGH (ref 70–99)
GLUCOSE BLDC GLUCOMTR-MCNC: 191 MG/DL — HIGH (ref 70–99)
GLUCOSE BLDC GLUCOMTR-MCNC: 195 MG/DL — HIGH (ref 70–99)
GLUCOSE BLDC GLUCOMTR-MCNC: 195 MG/DL — HIGH (ref 70–99)
GLUCOSE BLDC GLUCOMTR-MCNC: 199 MG/DL — HIGH (ref 70–99)
GLUCOSE BLDC GLUCOMTR-MCNC: 203 MG/DL — HIGH (ref 70–99)
GLUCOSE BLDC GLUCOMTR-MCNC: 207 MG/DL — HIGH (ref 70–99)
GLUCOSE BLDC GLUCOMTR-MCNC: 208 MG/DL — HIGH (ref 70–99)
GLUCOSE BLDC GLUCOMTR-MCNC: 209 MG/DL — HIGH (ref 70–99)
GLUCOSE BLDC GLUCOMTR-MCNC: 214 MG/DL — HIGH (ref 70–99)
GLUCOSE BLDC GLUCOMTR-MCNC: 214 MG/DL — HIGH (ref 70–99)
GLUCOSE BLDC GLUCOMTR-MCNC: 219 MG/DL — HIGH (ref 70–99)
GLUCOSE BLDC GLUCOMTR-MCNC: 228 MG/DL — HIGH (ref 70–99)
GLUCOSE BLDC GLUCOMTR-MCNC: 235 MG/DL — HIGH (ref 70–99)
GLUCOSE BLDC GLUCOMTR-MCNC: 235 MG/DL — HIGH (ref 70–99)
GLUCOSE BLDC GLUCOMTR-MCNC: 238 MG/DL — HIGH (ref 70–99)
GLUCOSE BLDC GLUCOMTR-MCNC: 238 MG/DL — HIGH (ref 70–99)
GLUCOSE BLDC GLUCOMTR-MCNC: 241 MG/DL — HIGH (ref 70–99)
GLUCOSE BLDC GLUCOMTR-MCNC: 249 MG/DL — HIGH (ref 70–99)
GLUCOSE BLDC GLUCOMTR-MCNC: 255 MG/DL — HIGH (ref 70–99)
GLUCOSE BLDC GLUCOMTR-MCNC: 308 MG/DL — HIGH (ref 70–99)
GLUCOSE BLDC GLUCOMTR-MCNC: 308 MG/DL — HIGH (ref 70–99)
GLUCOSE BLDC GLUCOMTR-MCNC: 65 MG/DL — LOW (ref 70–99)
GLUCOSE BLDC GLUCOMTR-MCNC: 71 MG/DL — SIGNIFICANT CHANGE UP (ref 70–99)
GLUCOSE BLDC GLUCOMTR-MCNC: 79 MG/DL — SIGNIFICANT CHANGE UP (ref 70–99)
GLUCOSE BLDC GLUCOMTR-MCNC: 85 MG/DL — SIGNIFICANT CHANGE UP (ref 70–99)
GLUCOSE BLDC GLUCOMTR-MCNC: 87 MG/DL — SIGNIFICANT CHANGE UP (ref 70–99)
GLUCOSE BLDC GLUCOMTR-MCNC: 90 MG/DL — SIGNIFICANT CHANGE UP (ref 70–99)
GLUCOSE BLDC GLUCOMTR-MCNC: 91 MG/DL — SIGNIFICANT CHANGE UP (ref 70–99)
GLUCOSE BLDC GLUCOMTR-MCNC: 92 MG/DL — SIGNIFICANT CHANGE UP (ref 70–99)
GLUCOSE BLDC GLUCOMTR-MCNC: 95 MG/DL — SIGNIFICANT CHANGE UP (ref 70–99)
GLUCOSE BLDC GLUCOMTR-MCNC: 97 MG/DL — SIGNIFICANT CHANGE UP (ref 70–99)
GLUCOSE FLD-MCNC: 171 MG/DL — SIGNIFICANT CHANGE UP
GLUCOSE SERPL-MCNC: 105 MG/DL — HIGH (ref 70–99)
GLUCOSE SERPL-MCNC: 111 MG/DL — HIGH (ref 70–99)
GLUCOSE SERPL-MCNC: 120 MG/DL — HIGH (ref 70–99)
GLUCOSE SERPL-MCNC: 137 MG/DL — HIGH (ref 70–99)
GLUCOSE SERPL-MCNC: 149 MG/DL — HIGH (ref 70–99)
GLUCOSE SERPL-MCNC: 150 MG/DL — HIGH (ref 70–99)
GLUCOSE SERPL-MCNC: 151 MG/DL — HIGH (ref 70–99)
GLUCOSE SERPL-MCNC: 154 MG/DL — HIGH (ref 70–99)
GLUCOSE SERPL-MCNC: 161 MG/DL — HIGH (ref 70–99)
GLUCOSE SERPL-MCNC: 169 MG/DL — HIGH (ref 70–99)
GLUCOSE SERPL-MCNC: 171 MG/DL — HIGH (ref 70–99)
GLUCOSE SERPL-MCNC: 171 MG/DL — HIGH (ref 70–99)
GLUCOSE SERPL-MCNC: 178 MG/DL — HIGH (ref 70–99)
GLUCOSE SERPL-MCNC: 178 MG/DL — HIGH (ref 70–99)
GLUCOSE SERPL-MCNC: 184 MG/DL — HIGH (ref 70–99)
GLUCOSE SERPL-MCNC: 219 MG/DL — HIGH (ref 70–99)
GLUCOSE SERPL-MCNC: 224 MG/DL — HIGH (ref 70–99)
GLUCOSE SERPL-MCNC: 79 MG/DL — SIGNIFICANT CHANGE UP (ref 70–99)
GLUCOSE SERPL-MCNC: 92 MG/DL — SIGNIFICANT CHANGE UP (ref 70–99)
GRAM STN FLD: SIGNIFICANT CHANGE UP
HBA1C BLD-MCNC: 6.4 % — HIGH (ref 4–5.6)
HCT VFR BLD CALC: 19.8 % — LOW (ref 42–52)
HCT VFR BLD CALC: 26.6 % — LOW (ref 42–52)
HCT VFR BLD CALC: 28.3 % — LOW (ref 42–52)
HCT VFR BLD CALC: 28.4 % — LOW (ref 42–52)
HCT VFR BLD CALC: 29.2 % — LOW (ref 42–52)
HCT VFR BLD CALC: 30.5 % — LOW (ref 42–52)
HCT VFR BLD CALC: 31.3 % — LOW (ref 42–52)
HCT VFR BLD CALC: 32.2 % — LOW (ref 42–52)
HCT VFR BLD CALC: 32.3 % — LOW (ref 42–52)
HCT VFR BLD CALC: 32.5 % — LOW (ref 42–52)
HCT VFR BLD CALC: 33.2 % — LOW (ref 42–52)
HCT VFR BLD CALC: 33.4 % — LOW (ref 42–52)
HCT VFR BLD CALC: 33.5 % — LOW (ref 42–52)
HCT VFR BLD CALC: 33.7 % — LOW (ref 42–52)
HCT VFR BLD CALC: 33.7 % — LOW (ref 42–52)
HCT VFR BLD CALC: 35 % — LOW (ref 42–52)
HCT VFR BLD CALC: 35.1 % — LOW (ref 42–52)
HCT VFR BLD CALC: 35.8 % — LOW (ref 42–52)
HCT VFR BLD CALC: 37.6 % — LOW (ref 42–52)
HDLC SERPL-MCNC: 33 MG/DL — LOW
HGB BLD-MCNC: 10.1 G/DL — LOW (ref 14–18)
HGB BLD-MCNC: 10.1 G/DL — LOW (ref 14–18)
HGB BLD-MCNC: 10.3 G/DL — LOW (ref 14–18)
HGB BLD-MCNC: 10.4 G/DL — LOW (ref 14–18)
HGB BLD-MCNC: 10.7 G/DL — LOW (ref 14–18)
HGB BLD-MCNC: 10.8 G/DL — LOW (ref 14–18)
HGB BLD-MCNC: 11 G/DL — LOW (ref 14–18)
HGB BLD-MCNC: 11 G/DL — LOW (ref 14–18)
HGB BLD-MCNC: 11.1 G/DL — LOW (ref 14–18)
HGB BLD-MCNC: 11.1 G/DL — LOW (ref 14–18)
HGB BLD-MCNC: 11.3 G/DL — LOW (ref 14–18)
HGB BLD-MCNC: 11.3 G/DL — LOW (ref 14–18)
HGB BLD-MCNC: 12.2 G/DL — LOW (ref 14–18)
HGB BLD-MCNC: 6.4 G/DL — CRITICAL LOW (ref 14–18)
HGB BLD-MCNC: 8.5 G/DL — LOW (ref 14–18)
HGB BLD-MCNC: 9.1 G/DL — LOW (ref 14–18)
HGB BLD-MCNC: 9.2 G/DL — LOW (ref 14–18)
HGB BLD-MCNC: 9.6 G/DL — LOW (ref 14–18)
HGB BLD-MCNC: 9.9 G/DL — LOW (ref 14–18)
IMM GRANULOCYTES NFR BLD AUTO: 0.3 % — SIGNIFICANT CHANGE UP (ref 0.1–0.3)
IMM GRANULOCYTES NFR BLD AUTO: 0.4 % — HIGH (ref 0.1–0.3)
IMM GRANULOCYTES NFR BLD AUTO: 0.5 % — HIGH (ref 0.1–0.3)
IMM GRANULOCYTES NFR BLD AUTO: 0.5 % — HIGH (ref 0.1–0.3)
IMM GRANULOCYTES NFR BLD AUTO: 0.7 % — HIGH (ref 0.1–0.3)
IMM GRANULOCYTES NFR BLD AUTO: 0.9 % — HIGH (ref 0.1–0.3)
INR BLD: 1.12 RATIO — SIGNIFICANT CHANGE UP (ref 0.65–1.3)
INR BLD: 1.18 RATIO — SIGNIFICANT CHANGE UP (ref 0.65–1.3)
INR BLD: 1.18 RATIO — SIGNIFICANT CHANGE UP (ref 0.65–1.3)
LACTATE SERPL-SCNC: 1.3 MMOL/L — SIGNIFICANT CHANGE UP (ref 0.5–2.2)
LACTATE SERPL-SCNC: 2.8 MMOL/L — HIGH (ref 0.5–2.2)
LIPID PNL WITH DIRECT LDL SERPL: 62 MG/DL — SIGNIFICANT CHANGE UP (ref 4–129)
LYMPHOCYTES # BLD AUTO: 1.07 K/UL — LOW (ref 1.2–3.4)
LYMPHOCYTES # BLD AUTO: 1.26 K/UL — SIGNIFICANT CHANGE UP (ref 1.2–3.4)
LYMPHOCYTES # BLD AUTO: 1.27 K/UL — SIGNIFICANT CHANGE UP (ref 1.2–3.4)
LYMPHOCYTES # BLD AUTO: 1.45 K/UL — SIGNIFICANT CHANGE UP (ref 1.2–3.4)
LYMPHOCYTES # BLD AUTO: 1.53 K/UL — SIGNIFICANT CHANGE UP (ref 1.2–3.4)
LYMPHOCYTES # BLD AUTO: 1.57 K/UL — SIGNIFICANT CHANGE UP (ref 1.2–3.4)
LYMPHOCYTES # BLD AUTO: 1.71 K/UL — SIGNIFICANT CHANGE UP (ref 1.2–3.4)
LYMPHOCYTES # BLD AUTO: 1.95 K/UL — SIGNIFICANT CHANGE UP (ref 1.2–3.4)
LYMPHOCYTES # BLD AUTO: 17.7 % — LOW (ref 20.5–51.1)
LYMPHOCYTES # BLD AUTO: 19.5 % — LOW (ref 20.5–51.1)
LYMPHOCYTES # BLD AUTO: 19.6 % — LOW (ref 20.5–51.1)
LYMPHOCYTES # BLD AUTO: 2.05 K/UL — SIGNIFICANT CHANGE UP (ref 1.2–3.4)
LYMPHOCYTES # BLD AUTO: 2.07 K/UL — SIGNIFICANT CHANGE UP (ref 1.2–3.4)
LYMPHOCYTES # BLD AUTO: 2.39 K/UL — SIGNIFICANT CHANGE UP (ref 1.2–3.4)
LYMPHOCYTES # BLD AUTO: 2.59 K/UL — SIGNIFICANT CHANGE UP (ref 1.2–3.4)
LYMPHOCYTES # BLD AUTO: 20 % — LOW (ref 20.5–51.1)
LYMPHOCYTES # BLD AUTO: 20.2 % — LOW (ref 20.5–51.1)
LYMPHOCYTES # BLD AUTO: 21.5 % — SIGNIFICANT CHANGE UP (ref 20.5–51.1)
LYMPHOCYTES # BLD AUTO: 21.7 % — SIGNIFICANT CHANGE UP (ref 20.5–51.1)
LYMPHOCYTES # BLD AUTO: 22.7 % — SIGNIFICANT CHANGE UP (ref 20.5–51.1)
LYMPHOCYTES # BLD AUTO: 27.6 % — SIGNIFICANT CHANGE UP (ref 20.5–51.1)
LYMPHOCYTES # BLD AUTO: 29.3 % — SIGNIFICANT CHANGE UP (ref 20.5–51.1)
LYMPHOCYTES # BLD AUTO: 31 % — SIGNIFICANT CHANGE UP (ref 20.5–51.1)
LYMPHOCYTES # BLD AUTO: 31.4 % — SIGNIFICANT CHANGE UP (ref 20.5–51.1)
LYMPHOCYTES # FLD: 96 — SIGNIFICANT CHANGE UP
MAGNESIUM SERPL-MCNC: 1.6 MG/DL — LOW (ref 1.8–2.4)
MAGNESIUM SERPL-MCNC: 1.7 MG/DL — LOW (ref 1.8–2.4)
MAGNESIUM SERPL-MCNC: 1.7 MG/DL — LOW (ref 1.8–2.4)
MAGNESIUM SERPL-MCNC: 1.8 MG/DL — SIGNIFICANT CHANGE UP (ref 1.8–2.4)
MAGNESIUM SERPL-MCNC: 1.9 MG/DL — SIGNIFICANT CHANGE UP (ref 1.8–2.4)
MAGNESIUM SERPL-MCNC: 1.9 MG/DL — SIGNIFICANT CHANGE UP (ref 1.8–2.4)
MAGNESIUM SERPL-MCNC: 2 MG/DL — SIGNIFICANT CHANGE UP (ref 1.8–2.4)
MCHC RBC-ENTMCNC: 25.9 PG — LOW (ref 27–31)
MCHC RBC-ENTMCNC: 26.1 PG — LOW (ref 27–31)
MCHC RBC-ENTMCNC: 26.2 PG — LOW (ref 27–31)
MCHC RBC-ENTMCNC: 26.3 PG — LOW (ref 27–31)
MCHC RBC-ENTMCNC: 26.4 PG — LOW (ref 27–31)
MCHC RBC-ENTMCNC: 26.4 PG — LOW (ref 27–31)
MCHC RBC-ENTMCNC: 26.5 PG — LOW (ref 27–31)
MCHC RBC-ENTMCNC: 26.6 PG — LOW (ref 27–31)
MCHC RBC-ENTMCNC: 26.7 PG — LOW (ref 27–31)
MCHC RBC-ENTMCNC: 26.7 PG — LOW (ref 27–31)
MCHC RBC-ENTMCNC: 26.8 PG — LOW (ref 27–31)
MCHC RBC-ENTMCNC: 26.9 PG — LOW (ref 27–31)
MCHC RBC-ENTMCNC: 26.9 PG — LOW (ref 27–31)
MCHC RBC-ENTMCNC: 27 PG — SIGNIFICANT CHANGE UP (ref 27–31)
MCHC RBC-ENTMCNC: 31.4 G/DL — LOW (ref 32–37)
MCHC RBC-ENTMCNC: 31.6 G/DL — LOW (ref 32–37)
MCHC RBC-ENTMCNC: 31.7 G/DL — LOW (ref 32–37)
MCHC RBC-ENTMCNC: 31.7 G/DL — LOW (ref 32–37)
MCHC RBC-ENTMCNC: 32 G/DL — SIGNIFICANT CHANGE UP (ref 32–37)
MCHC RBC-ENTMCNC: 32 G/DL — SIGNIFICANT CHANGE UP (ref 32–37)
MCHC RBC-ENTMCNC: 32.2 G/DL — SIGNIFICANT CHANGE UP (ref 32–37)
MCHC RBC-ENTMCNC: 32.3 G/DL — SIGNIFICANT CHANGE UP (ref 32–37)
MCHC RBC-ENTMCNC: 32.3 G/DL — SIGNIFICANT CHANGE UP (ref 32–37)
MCHC RBC-ENTMCNC: 32.4 G/DL — SIGNIFICANT CHANGE UP (ref 32–37)
MCHC RBC-ENTMCNC: 32.4 G/DL — SIGNIFICANT CHANGE UP (ref 32–37)
MCHC RBC-ENTMCNC: 32.5 G/DL — SIGNIFICANT CHANGE UP (ref 32–37)
MCHC RBC-ENTMCNC: 32.8 G/DL — SIGNIFICANT CHANGE UP (ref 32–37)
MCHC RBC-ENTMCNC: 32.9 G/DL — SIGNIFICANT CHANGE UP (ref 32–37)
MCV RBC AUTO: 79.9 FL — LOW (ref 80–94)
MCV RBC AUTO: 81.1 FL — SIGNIFICANT CHANGE UP (ref 80–94)
MCV RBC AUTO: 81.3 FL — SIGNIFICANT CHANGE UP (ref 80–94)
MCV RBC AUTO: 81.6 FL — SIGNIFICANT CHANGE UP (ref 80–94)
MCV RBC AUTO: 81.7 FL — SIGNIFICANT CHANGE UP (ref 80–94)
MCV RBC AUTO: 81.8 FL — SIGNIFICANT CHANGE UP (ref 80–94)
MCV RBC AUTO: 82.2 FL — SIGNIFICANT CHANGE UP (ref 80–94)
MCV RBC AUTO: 82.3 FL — SIGNIFICANT CHANGE UP (ref 80–94)
MCV RBC AUTO: 82.3 FL — SIGNIFICANT CHANGE UP (ref 80–94)
MCV RBC AUTO: 82.4 FL — SIGNIFICANT CHANGE UP (ref 80–94)
MCV RBC AUTO: 82.4 FL — SIGNIFICANT CHANGE UP (ref 80–94)
MCV RBC AUTO: 82.5 FL — SIGNIFICANT CHANGE UP (ref 80–94)
MCV RBC AUTO: 82.9 FL — SIGNIFICANT CHANGE UP (ref 80–94)
MCV RBC AUTO: 84 FL — SIGNIFICANT CHANGE UP (ref 80–94)
MCV RBC AUTO: 84.3 FL — SIGNIFICANT CHANGE UP (ref 80–94)
MCV RBC AUTO: 84.6 FL — SIGNIFICANT CHANGE UP (ref 80–94)
METHOD TYPE: SIGNIFICANT CHANGE UP
MONOCYTES # BLD AUTO: 0.35 K/UL — SIGNIFICANT CHANGE UP (ref 0.1–0.6)
MONOCYTES # BLD AUTO: 0.39 K/UL — SIGNIFICANT CHANGE UP (ref 0.1–0.6)
MONOCYTES # BLD AUTO: 0.53 K/UL — SIGNIFICANT CHANGE UP (ref 0.1–0.6)
MONOCYTES # BLD AUTO: 0.56 K/UL — SIGNIFICANT CHANGE UP (ref 0.1–0.6)
MONOCYTES # BLD AUTO: 0.56 K/UL — SIGNIFICANT CHANGE UP (ref 0.1–0.6)
MONOCYTES # BLD AUTO: 0.57 K/UL — SIGNIFICANT CHANGE UP (ref 0.1–0.6)
MONOCYTES # BLD AUTO: 0.6 K/UL — SIGNIFICANT CHANGE UP (ref 0.1–0.6)
MONOCYTES # BLD AUTO: 0.63 K/UL — HIGH (ref 0.1–0.6)
MONOCYTES # BLD AUTO: 0.64 K/UL — HIGH (ref 0.1–0.6)
MONOCYTES # BLD AUTO: 0.64 K/UL — HIGH (ref 0.1–0.6)
MONOCYTES # BLD AUTO: 0.66 K/UL — HIGH (ref 0.1–0.6)
MONOCYTES # BLD AUTO: 0.68 K/UL — HIGH (ref 0.1–0.6)
MONOCYTES NFR BLD AUTO: 6.4 % — SIGNIFICANT CHANGE UP (ref 1.7–9.3)
MONOCYTES NFR BLD AUTO: 6.6 % — SIGNIFICANT CHANGE UP (ref 1.7–9.3)
MONOCYTES NFR BLD AUTO: 6.7 % — SIGNIFICANT CHANGE UP (ref 1.7–9.3)
MONOCYTES NFR BLD AUTO: 7 % — SIGNIFICANT CHANGE UP (ref 1.7–9.3)
MONOCYTES NFR BLD AUTO: 7.3 % — SIGNIFICANT CHANGE UP (ref 1.7–9.3)
MONOCYTES NFR BLD AUTO: 7.7 % — SIGNIFICANT CHANGE UP (ref 1.7–9.3)
MONOCYTES NFR BLD AUTO: 8 % — SIGNIFICANT CHANGE UP (ref 1.7–9.3)
MONOCYTES NFR BLD AUTO: 8.1 % — SIGNIFICANT CHANGE UP (ref 1.7–9.3)
MONOCYTES NFR BLD AUTO: 8.1 % — SIGNIFICANT CHANGE UP (ref 1.7–9.3)
MONOCYTES NFR BLD AUTO: 8.4 % — SIGNIFICANT CHANGE UP (ref 1.7–9.3)
MONOCYTES NFR BLD AUTO: 8.9 % — SIGNIFICANT CHANGE UP (ref 1.7–9.3)
MONOCYTES NFR BLD AUTO: 9.1 % — SIGNIFICANT CHANGE UP (ref 1.7–9.3)
MONOS+MACROS # FLD: 3 % — SIGNIFICANT CHANGE UP
NEUTROPHILS # BLD AUTO: 3.66 K/UL — SIGNIFICANT CHANGE UP (ref 1.4–6.5)
NEUTROPHILS # BLD AUTO: 3.76 K/UL — SIGNIFICANT CHANGE UP (ref 1.4–6.5)
NEUTROPHILS # BLD AUTO: 3.82 K/UL — SIGNIFICANT CHANGE UP (ref 1.4–6.5)
NEUTROPHILS # BLD AUTO: 3.92 K/UL — SIGNIFICANT CHANGE UP (ref 1.4–6.5)
NEUTROPHILS # BLD AUTO: 4.04 K/UL — SIGNIFICANT CHANGE UP (ref 1.4–6.5)
NEUTROPHILS # BLD AUTO: 4.21 K/UL — SIGNIFICANT CHANGE UP (ref 1.4–6.5)
NEUTROPHILS # BLD AUTO: 4.42 K/UL — SIGNIFICANT CHANGE UP (ref 1.4–6.5)
NEUTROPHILS # BLD AUTO: 4.7 K/UL — SIGNIFICANT CHANGE UP (ref 1.4–6.5)
NEUTROPHILS # BLD AUTO: 5.07 K/UL — SIGNIFICANT CHANGE UP (ref 1.4–6.5)
NEUTROPHILS # BLD AUTO: 5.32 K/UL — SIGNIFICANT CHANGE UP (ref 1.4–6.5)
NEUTROPHILS # BLD AUTO: 5.49 K/UL — SIGNIFICANT CHANGE UP (ref 1.4–6.5)
NEUTROPHILS # BLD AUTO: 6.4 K/UL — SIGNIFICANT CHANGE UP (ref 1.4–6.5)
NEUTROPHILS NFR BLD AUTO: 54.1 % — SIGNIFICANT CHANGE UP (ref 42.2–75.2)
NEUTROPHILS NFR BLD AUTO: 55.2 % — SIGNIFICANT CHANGE UP (ref 42.2–75.2)
NEUTROPHILS NFR BLD AUTO: 55.5 % — SIGNIFICANT CHANGE UP (ref 42.2–75.2)
NEUTROPHILS NFR BLD AUTO: 56.2 % — SIGNIFICANT CHANGE UP (ref 42.2–75.2)
NEUTROPHILS NFR BLD AUTO: 62.9 % — SIGNIFICANT CHANGE UP (ref 42.2–75.2)
NEUTROPHILS NFR BLD AUTO: 63.5 % — SIGNIFICANT CHANGE UP (ref 42.2–75.2)
NEUTROPHILS NFR BLD AUTO: 63.9 % — SIGNIFICANT CHANGE UP (ref 42.2–75.2)
NEUTROPHILS NFR BLD AUTO: 64.1 % — SIGNIFICANT CHANGE UP (ref 42.2–75.2)
NEUTROPHILS NFR BLD AUTO: 64.1 % — SIGNIFICANT CHANGE UP (ref 42.2–75.2)
NEUTROPHILS NFR BLD AUTO: 67 % — SIGNIFICANT CHANGE UP (ref 42.2–75.2)
NEUTROPHILS NFR BLD AUTO: 67.1 % — SIGNIFICANT CHANGE UP (ref 42.2–75.2)
NEUTROPHILS NFR BLD AUTO: 67.7 % — SIGNIFICANT CHANGE UP (ref 42.2–75.2)
NRBC # BLD: 0 /100 WBCS — SIGNIFICANT CHANGE UP (ref 0–0)
ORGANISM # SPEC MICROSCOPIC CNT: SIGNIFICANT CHANGE UP
PHOSPHATE SERPL-MCNC: 3.3 MG/DL — SIGNIFICANT CHANGE UP (ref 2.1–4.9)
PHOSPHATE SERPL-MCNC: 3.9 MG/DL — SIGNIFICANT CHANGE UP (ref 2.1–4.9)
PHOSPHATE SERPL-MCNC: 3.9 MG/DL — SIGNIFICANT CHANGE UP (ref 2.1–4.9)
PLATELET # BLD AUTO: 109 K/UL — LOW (ref 130–400)
PLATELET # BLD AUTO: 158 K/UL — SIGNIFICANT CHANGE UP (ref 130–400)
PLATELET # BLD AUTO: 166 K/UL — SIGNIFICANT CHANGE UP (ref 130–400)
PLATELET # BLD AUTO: 174 K/UL — SIGNIFICANT CHANGE UP (ref 130–400)
PLATELET # BLD AUTO: 175 K/UL — SIGNIFICANT CHANGE UP (ref 130–400)
PLATELET # BLD AUTO: 176 K/UL — SIGNIFICANT CHANGE UP (ref 130–400)
PLATELET # BLD AUTO: 178 K/UL — SIGNIFICANT CHANGE UP (ref 130–400)
PLATELET # BLD AUTO: 180 K/UL — SIGNIFICANT CHANGE UP (ref 130–400)
PLATELET # BLD AUTO: 184 K/UL — SIGNIFICANT CHANGE UP (ref 130–400)
PLATELET # BLD AUTO: 186 K/UL — SIGNIFICANT CHANGE UP (ref 130–400)
PLATELET # BLD AUTO: 207 K/UL — SIGNIFICANT CHANGE UP (ref 130–400)
PLATELET # BLD AUTO: 212 K/UL — SIGNIFICANT CHANGE UP (ref 130–400)
PLATELET # BLD AUTO: 217 K/UL — SIGNIFICANT CHANGE UP (ref 130–400)
PLATELET # BLD AUTO: 221 K/UL — SIGNIFICANT CHANGE UP (ref 130–400)
PLATELET # BLD AUTO: 230 K/UL — SIGNIFICANT CHANGE UP (ref 130–400)
PLATELET # BLD AUTO: 242 K/UL — SIGNIFICANT CHANGE UP (ref 130–400)
PLATELET # BLD AUTO: 252 K/UL — SIGNIFICANT CHANGE UP (ref 130–400)
PLATELET # BLD AUTO: 255 K/UL — SIGNIFICANT CHANGE UP (ref 130–400)
PLATELET # BLD AUTO: 313 K/UL — SIGNIFICANT CHANGE UP (ref 130–400)
POTASSIUM SERPL-MCNC: 3.7 MMOL/L — SIGNIFICANT CHANGE UP (ref 3.5–5)
POTASSIUM SERPL-MCNC: 3.7 MMOL/L — SIGNIFICANT CHANGE UP (ref 3.5–5)
POTASSIUM SERPL-MCNC: 4 MMOL/L — SIGNIFICANT CHANGE UP (ref 3.5–5)
POTASSIUM SERPL-MCNC: 4 MMOL/L — SIGNIFICANT CHANGE UP (ref 3.5–5)
POTASSIUM SERPL-MCNC: 4.1 MMOL/L — SIGNIFICANT CHANGE UP (ref 3.5–5)
POTASSIUM SERPL-MCNC: 4.2 MMOL/L — SIGNIFICANT CHANGE UP (ref 3.5–5)
POTASSIUM SERPL-MCNC: 4.3 MMOL/L — SIGNIFICANT CHANGE UP (ref 3.5–5)
POTASSIUM SERPL-MCNC: 4.3 MMOL/L — SIGNIFICANT CHANGE UP (ref 3.5–5)
POTASSIUM SERPL-MCNC: 4.5 MMOL/L — SIGNIFICANT CHANGE UP (ref 3.5–5)
POTASSIUM SERPL-MCNC: 4.6 MMOL/L — SIGNIFICANT CHANGE UP (ref 3.5–5)
POTASSIUM SERPL-MCNC: 4.7 MMOL/L — SIGNIFICANT CHANGE UP (ref 3.5–5)
POTASSIUM SERPL-MCNC: 4.8 MMOL/L — SIGNIFICANT CHANGE UP (ref 3.5–5)
POTASSIUM SERPL-MCNC: 4.8 MMOL/L — SIGNIFICANT CHANGE UP (ref 3.5–5)
POTASSIUM SERPL-SCNC: 3.7 MMOL/L — SIGNIFICANT CHANGE UP (ref 3.5–5)
POTASSIUM SERPL-SCNC: 3.7 MMOL/L — SIGNIFICANT CHANGE UP (ref 3.5–5)
POTASSIUM SERPL-SCNC: 4 MMOL/L — SIGNIFICANT CHANGE UP (ref 3.5–5)
POTASSIUM SERPL-SCNC: 4 MMOL/L — SIGNIFICANT CHANGE UP (ref 3.5–5)
POTASSIUM SERPL-SCNC: 4.1 MMOL/L — SIGNIFICANT CHANGE UP (ref 3.5–5)
POTASSIUM SERPL-SCNC: 4.2 MMOL/L — SIGNIFICANT CHANGE UP (ref 3.5–5)
POTASSIUM SERPL-SCNC: 4.3 MMOL/L — SIGNIFICANT CHANGE UP (ref 3.5–5)
POTASSIUM SERPL-SCNC: 4.3 MMOL/L — SIGNIFICANT CHANGE UP (ref 3.5–5)
POTASSIUM SERPL-SCNC: 4.5 MMOL/L — SIGNIFICANT CHANGE UP (ref 3.5–5)
POTASSIUM SERPL-SCNC: 4.6 MMOL/L — SIGNIFICANT CHANGE UP (ref 3.5–5)
POTASSIUM SERPL-SCNC: 4.7 MMOL/L — SIGNIFICANT CHANGE UP (ref 3.5–5)
POTASSIUM SERPL-SCNC: 4.8 MMOL/L — SIGNIFICANT CHANGE UP (ref 3.5–5)
POTASSIUM SERPL-SCNC: 4.8 MMOL/L — SIGNIFICANT CHANGE UP (ref 3.5–5)
PROT FLD-MCNC: 4.6 G/DL — SIGNIFICANT CHANGE UP
PROT SERPL-MCNC: 6.5 G/DL — SIGNIFICANT CHANGE UP (ref 6–8)
PROT SERPL-MCNC: 6.8 G/DL — SIGNIFICANT CHANGE UP (ref 6–8)
PROT SERPL-MCNC: 7 G/DL — SIGNIFICANT CHANGE UP (ref 6–8)
PROT SERPL-MCNC: 7.1 G/DL — SIGNIFICANT CHANGE UP (ref 6–8)
PROTHROM AB SERPL-ACNC: 12.9 SEC — HIGH (ref 9.95–12.87)
PROTHROM AB SERPL-ACNC: 13.5 SEC — HIGH (ref 9.95–12.87)
PROTHROM AB SERPL-ACNC: 13.6 SEC — HIGH (ref 9.95–12.87)
RBC # BLD: 2.42 M/UL — LOW (ref 4.7–6.1)
RBC # BLD: 3.23 M/UL — LOW (ref 4.7–6.1)
RBC # BLD: 3.43 M/UL — LOW (ref 4.7–6.1)
RBC # BLD: 3.45 M/UL — LOW (ref 4.7–6.1)
RBC # BLD: 3.55 M/UL — LOW (ref 4.7–6.1)
RBC # BLD: 3.68 M/UL — LOW (ref 4.7–6.1)
RBC # BLD: 3.8 M/UL — LOW (ref 4.7–6.1)
RBC # BLD: 3.82 M/UL — LOW (ref 4.7–6.1)
RBC # BLD: 3.84 M/UL — LOW (ref 4.7–6.1)
RBC # BLD: 3.95 M/UL — LOW (ref 4.7–6.1)
RBC # BLD: 4.04 M/UL — LOW (ref 4.7–6.1)
RBC # BLD: 4.12 M/UL — LOW (ref 4.7–6.1)
RBC # BLD: 4.12 M/UL — LOW (ref 4.7–6.1)
RBC # BLD: 4.13 M/UL — LOW (ref 4.7–6.1)
RBC # BLD: 4.18 M/UL — LOW (ref 4.7–6.1)
RBC # BLD: 4.26 M/UL — LOW (ref 4.7–6.1)
RBC # BLD: 4.28 M/UL — LOW (ref 4.7–6.1)
RBC # BLD: 4.33 M/UL — LOW (ref 4.7–6.1)
RBC # BLD: 4.6 M/UL — LOW (ref 4.7–6.1)
RBC # FLD: 13.3 % — SIGNIFICANT CHANGE UP (ref 11.5–14.5)
RBC # FLD: 13.5 % — SIGNIFICANT CHANGE UP (ref 11.5–14.5)
RBC # FLD: 13.5 % — SIGNIFICANT CHANGE UP (ref 11.5–14.5)
RBC # FLD: 13.7 % — SIGNIFICANT CHANGE UP (ref 11.5–14.5)
RBC # FLD: 13.8 % — SIGNIFICANT CHANGE UP (ref 11.5–14.5)
RBC # FLD: 13.8 % — SIGNIFICANT CHANGE UP (ref 11.5–14.5)
RBC # FLD: 13.9 % — SIGNIFICANT CHANGE UP (ref 11.5–14.5)
RBC # FLD: 14.2 % — SIGNIFICANT CHANGE UP (ref 11.5–14.5)
RBC # FLD: 14.3 % — SIGNIFICANT CHANGE UP (ref 11.5–14.5)
RBC # FLD: 14.4 % — SIGNIFICANT CHANGE UP (ref 11.5–14.5)
RBC # FLD: 14.6 % — HIGH (ref 11.5–14.5)
RBC # FLD: 14.6 % — HIGH (ref 11.5–14.5)
RBC # FLD: 14.7 % — HIGH (ref 11.5–14.5)
RBC # FLD: 15 % — HIGH (ref 11.5–14.5)
RBC # FLD: 15.5 % — HIGH (ref 11.5–14.5)
RBC # FLD: 15.8 % — HIGH (ref 11.5–14.5)
RBC # FLD: 15.9 % — HIGH (ref 11.5–14.5)
RCV VOL RI: 2000 /UL — HIGH (ref 0–5)
SODIUM SERPL-SCNC: 133 MMOL/L — LOW (ref 135–146)
SODIUM SERPL-SCNC: 135 MMOL/L — SIGNIFICANT CHANGE UP (ref 135–146)
SODIUM SERPL-SCNC: 136 MMOL/L — SIGNIFICANT CHANGE UP (ref 135–146)
SODIUM SERPL-SCNC: 136 MMOL/L — SIGNIFICANT CHANGE UP (ref 135–146)
SODIUM SERPL-SCNC: 137 MMOL/L — SIGNIFICANT CHANGE UP (ref 135–146)
SODIUM SERPL-SCNC: 138 MMOL/L — SIGNIFICANT CHANGE UP (ref 135–146)
SODIUM SERPL-SCNC: 138 MMOL/L — SIGNIFICANT CHANGE UP (ref 135–146)
SODIUM SERPL-SCNC: 139 MMOL/L — SIGNIFICANT CHANGE UP (ref 135–146)
SODIUM SERPL-SCNC: 140 MMOL/L — SIGNIFICANT CHANGE UP (ref 135–146)
SODIUM SERPL-SCNC: 140 MMOL/L — SIGNIFICANT CHANGE UP (ref 135–146)
SPECIMEN SOURCE: SIGNIFICANT CHANGE UP
TOTAL CHOLESTEROL/HDL RATIO MEASUREMENT: 3.5 RATIO — LOW (ref 4–5.5)
TOTAL NUCLEATED CELL COUNT, BODY FLUID: 342 /UL — HIGH (ref 0–5)
TRIGL SERPL-MCNC: 124 MG/DL — SIGNIFICANT CHANGE UP (ref 10–149)
TUBE TYPE: SIGNIFICANT CHANGE UP
VANCOMYCIN TROUGH SERPL-MCNC: 18.9 UG/ML — HIGH (ref 5–10)
WBC # BLD: 10.09 K/UL — SIGNIFICANT CHANGE UP (ref 4.8–10.8)
WBC # BLD: 10.15 K/UL — SIGNIFICANT CHANGE UP (ref 4.8–10.8)
WBC # BLD: 10.16 K/UL — SIGNIFICANT CHANGE UP (ref 4.8–10.8)
WBC # BLD: 13.43 K/UL — HIGH (ref 4.8–10.8)
WBC # BLD: 5.46 K/UL — SIGNIFICANT CHANGE UP (ref 4.8–10.8)
WBC # BLD: 5.92 K/UL — SIGNIFICANT CHANGE UP (ref 4.8–10.8)
WBC # BLD: 6.3 K/UL — SIGNIFICANT CHANGE UP (ref 4.8–10.8)
WBC # BLD: 6.92 K/UL — SIGNIFICANT CHANGE UP (ref 4.8–10.8)
WBC # BLD: 6.95 K/UL — SIGNIFICANT CHANGE UP (ref 4.8–10.8)
WBC # BLD: 7.06 K/UL — SIGNIFICANT CHANGE UP (ref 4.8–10.8)
WBC # BLD: 7.06 K/UL — SIGNIFICANT CHANGE UP (ref 4.8–10.8)
WBC # BLD: 7.5 K/UL — SIGNIFICANT CHANGE UP (ref 4.8–10.8)
WBC # BLD: 7.62 K/UL — SIGNIFICANT CHANGE UP (ref 4.8–10.8)
WBC # BLD: 7.73 K/UL — SIGNIFICANT CHANGE UP (ref 4.8–10.8)
WBC # BLD: 7.86 K/UL — SIGNIFICANT CHANGE UP (ref 4.8–10.8)
WBC # BLD: 7.89 K/UL — SIGNIFICANT CHANGE UP (ref 4.8–10.8)
WBC # BLD: 8.19 K/UL — SIGNIFICANT CHANGE UP (ref 4.8–10.8)
WBC # BLD: 8.28 K/UL — SIGNIFICANT CHANGE UP (ref 4.8–10.8)
WBC # BLD: 8.35 K/UL — SIGNIFICANT CHANGE UP (ref 4.8–10.8)
WBC # FLD AUTO: 10.09 K/UL — SIGNIFICANT CHANGE UP (ref 4.8–10.8)
WBC # FLD AUTO: 10.15 K/UL — SIGNIFICANT CHANGE UP (ref 4.8–10.8)
WBC # FLD AUTO: 10.16 K/UL — SIGNIFICANT CHANGE UP (ref 4.8–10.8)
WBC # FLD AUTO: 13.43 K/UL — HIGH (ref 4.8–10.8)
WBC # FLD AUTO: 5.46 K/UL — SIGNIFICANT CHANGE UP (ref 4.8–10.8)
WBC # FLD AUTO: 5.92 K/UL — SIGNIFICANT CHANGE UP (ref 4.8–10.8)
WBC # FLD AUTO: 6.3 K/UL — SIGNIFICANT CHANGE UP (ref 4.8–10.8)
WBC # FLD AUTO: 6.92 K/UL — SIGNIFICANT CHANGE UP (ref 4.8–10.8)
WBC # FLD AUTO: 6.95 K/UL — SIGNIFICANT CHANGE UP (ref 4.8–10.8)
WBC # FLD AUTO: 7.06 K/UL — SIGNIFICANT CHANGE UP (ref 4.8–10.8)
WBC # FLD AUTO: 7.06 K/UL — SIGNIFICANT CHANGE UP (ref 4.8–10.8)
WBC # FLD AUTO: 7.5 K/UL — SIGNIFICANT CHANGE UP (ref 4.8–10.8)
WBC # FLD AUTO: 7.62 K/UL — SIGNIFICANT CHANGE UP (ref 4.8–10.8)
WBC # FLD AUTO: 7.73 K/UL — SIGNIFICANT CHANGE UP (ref 4.8–10.8)
WBC # FLD AUTO: 7.86 K/UL — SIGNIFICANT CHANGE UP (ref 4.8–10.8)
WBC # FLD AUTO: 7.89 K/UL — SIGNIFICANT CHANGE UP (ref 4.8–10.8)
WBC # FLD AUTO: 8.19 K/UL — SIGNIFICANT CHANGE UP (ref 4.8–10.8)
WBC # FLD AUTO: 8.28 K/UL — SIGNIFICANT CHANGE UP (ref 4.8–10.8)
WBC # FLD AUTO: 8.35 K/UL — SIGNIFICANT CHANGE UP (ref 4.8–10.8)

## 2019-01-01 PROCEDURE — 99239 HOSP IP/OBS DSCHRG MGMT >30: CPT

## 2019-01-01 PROCEDURE — 93970 EXTREMITY STUDY: CPT | Mod: 26

## 2019-01-01 PROCEDURE — 99233 SBSQ HOSP IP/OBS HIGH 50: CPT

## 2019-01-01 PROCEDURE — 99213 OFFICE O/P EST LOW 20 MIN: CPT

## 2019-01-01 PROCEDURE — 99495 TRANSJ CARE MGMT MOD F2F 14D: CPT

## 2019-01-01 PROCEDURE — 99221 1ST HOSP IP/OBS SF/LOW 40: CPT

## 2019-01-01 PROCEDURE — 73552 X-RAY EXAM OF FEMUR 2/>: CPT | Mod: 26,50

## 2019-01-01 PROCEDURE — 20611 DRAIN/INJ JOINT/BURSA W/US: CPT | Mod: LT

## 2019-01-01 PROCEDURE — 71045 X-RAY EXAM CHEST 1 VIEW: CPT | Mod: 26

## 2019-01-01 PROCEDURE — 93010 ELECTROCARDIOGRAM REPORT: CPT

## 2019-01-01 PROCEDURE — 99232 SBSQ HOSP IP/OBS MODERATE 35: CPT

## 2019-01-01 PROCEDURE — 73723 MRI JOINT LWR EXTR W/O&W/DYE: CPT | Mod: 26,LT

## 2019-01-01 PROCEDURE — 99223 1ST HOSP IP/OBS HIGH 75: CPT | Mod: AI

## 2019-01-01 PROCEDURE — 73552 X-RAY EXAM OF FEMUR 2/>: CPT | Mod: 26,LT

## 2019-01-01 PROCEDURE — 99215 OFFICE O/P EST HI 40 MIN: CPT

## 2019-01-01 PROCEDURE — 73560 X-RAY EXAM OF KNEE 1 OR 2: CPT | Mod: 26,RT

## 2019-01-01 PROCEDURE — 73600 X-RAY EXAM OF ANKLE: CPT | Mod: 26,RT

## 2019-01-01 PROCEDURE — 73590 X-RAY EXAM OF LOWER LEG: CPT | Mod: 26,LT

## 2019-01-01 PROCEDURE — 99285 EMERGENCY DEPT VISIT HI MDM: CPT | Mod: GC

## 2019-01-01 PROCEDURE — 36598 INJ W/FLUOR EVAL CV DEVICE: CPT

## 2019-01-01 PROCEDURE — 73590 X-RAY EXAM OF LOWER LEG: CPT | Mod: 26,50

## 2019-01-01 PROCEDURE — 99231 SBSQ HOSP IP/OBS SF/LOW 25: CPT

## 2019-01-01 PROCEDURE — 73700 CT LOWER EXTREMITY W/O DYE: CPT | Mod: 26,LT

## 2019-01-01 PROCEDURE — 73700 CT LOWER EXTREMITY W/O DYE: CPT | Mod: 26,50

## 2019-01-01 PROCEDURE — 73560 X-RAY EXAM OF KNEE 1 OR 2: CPT | Mod: 26,50

## 2019-01-01 PROCEDURE — 36573 INSJ PICC RS&I 5 YR+: CPT

## 2019-01-01 PROCEDURE — 73560 X-RAY EXAM OF KNEE 1 OR 2: CPT | Mod: 26,LT

## 2019-01-01 PROCEDURE — 74176 CT ABD & PELVIS W/O CONTRAST: CPT | Mod: 26

## 2019-01-01 PROCEDURE — 73502 X-RAY EXAM HIP UNI 2-3 VIEWS: CPT | Mod: 26,LT

## 2019-01-01 PROCEDURE — 99349 HOME/RES VST EST MOD MDM 40: CPT

## 2019-01-01 RX ORDER — VANCOMYCIN HCL 1 G
1 VIAL (EA) INTRAVENOUS
Qty: 0 | Refills: 0 | DISCHARGE
Start: 2019-01-01

## 2019-01-01 RX ORDER — GABAPENTIN 400 MG/1
800 CAPSULE ORAL ONCE
Refills: 0 | Status: COMPLETED | OUTPATIENT
Start: 2019-01-01 | End: 2019-01-01

## 2019-01-01 RX ORDER — HYDROXYZINE HCL 10 MG
50 TABLET ORAL EVERY 6 HOURS
Refills: 0 | Status: DISCONTINUED | OUTPATIENT
Start: 2019-01-01 | End: 2019-01-01

## 2019-01-01 RX ORDER — VORTIOXETINE 5 MG/1
0.5 TABLET, FILM COATED ORAL
Qty: 0 | Refills: 0 | DISCHARGE

## 2019-01-01 RX ORDER — PRAMIPEXOLE DIHYDROCHLORIDE 0.12 MG/1
0.5 TABLET ORAL EVERY 12 HOURS
Refills: 0 | Status: DISCONTINUED | OUTPATIENT
Start: 2019-01-01 | End: 2019-01-01

## 2019-01-01 RX ORDER — DULOXETINE HYDROCHLORIDE 30 MG/1
1 CAPSULE, DELAYED RELEASE ORAL
Qty: 0 | Refills: 0 | DISCHARGE

## 2019-01-01 RX ORDER — IMMUNE GLOBULIN (HUMAN) 10 G/100ML
90 INJECTION INTRAVENOUS; SUBCUTANEOUS ONCE
Refills: 0 | Status: DISCONTINUED | OUTPATIENT
Start: 2019-01-01 | End: 2019-01-01

## 2019-01-01 RX ORDER — ACETAMINOPHEN 500 MG
2 TABLET ORAL
Qty: 0 | Refills: 0 | DISCHARGE
Start: 2019-01-01

## 2019-01-01 RX ORDER — CEFEPIME 1 G/1
2 INJECTION, POWDER, FOR SOLUTION INTRAMUSCULAR; INTRAVENOUS
Qty: 0 | Refills: 0 | DISCHARGE
Start: 2019-01-01 | End: 2019-01-01

## 2019-01-01 RX ORDER — MAGNESIUM SULFATE 500 MG/ML
2 VIAL (ML) INJECTION ONCE
Refills: 0 | Status: COMPLETED | OUTPATIENT
Start: 2019-01-01 | End: 2019-01-01

## 2019-01-01 RX ORDER — MORPHINE SULFATE 50 MG/1
60 CAPSULE, EXTENDED RELEASE ORAL
Refills: 0 | Status: DISCONTINUED | OUTPATIENT
Start: 2019-01-01 | End: 2019-01-01

## 2019-01-01 RX ORDER — GABAPENTIN 400 MG/1
400 CAPSULE ORAL 3 TIMES DAILY
Qty: 270 | Refills: 3 | Status: DISCONTINUED | COMMUNITY
Start: 2018-08-27 | End: 2019-01-01

## 2019-01-01 RX ORDER — DULOXETINE HYDROCHLORIDE 30 MG/1
30 CAPSULE, DELAYED RELEASE PELLETS ORAL
Qty: 30 | Refills: 5 | Status: DISCONTINUED | COMMUNITY
Start: 2018-10-10 | End: 2019-01-01

## 2019-01-01 RX ORDER — DEXTROSE 50 % IN WATER 50 %
12.5 SYRINGE (ML) INTRAVENOUS ONCE
Refills: 0 | Status: DISCONTINUED | OUTPATIENT
Start: 2019-01-01 | End: 2019-01-01

## 2019-01-01 RX ORDER — LACTULOSE 10 G/15ML
15 SOLUTION ORAL EVERY 12 HOURS
Refills: 0 | Status: DISCONTINUED | OUTPATIENT
Start: 2019-01-01 | End: 2019-01-01

## 2019-01-01 RX ORDER — ALPRAZOLAM 0.25 MG
1 TABLET ORAL
Qty: 0 | Refills: 0 | DISCHARGE
Start: 2019-01-01

## 2019-01-01 RX ORDER — HYDROXYZINE HCL 10 MG
1 TABLET ORAL
Qty: 0 | Refills: 0 | DISCHARGE
Start: 2019-01-01

## 2019-01-01 RX ORDER — MORPHINE SULFATE 50 MG/1
45 CAPSULE, EXTENDED RELEASE ORAL EVERY 12 HOURS
Refills: 0 | Status: DISCONTINUED | OUTPATIENT
Start: 2019-01-01 | End: 2019-01-01

## 2019-01-01 RX ORDER — QUETIAPINE FUMARATE 200 MG/1
3 TABLET, FILM COATED ORAL
Qty: 0 | Refills: 0 | DISCHARGE
Start: 2019-01-01

## 2019-01-01 RX ORDER — CLONAZEPAM 1 MG
1 TABLET ORAL
Qty: 0 | Refills: 0 | DISCHARGE
Start: 2019-01-01

## 2019-01-01 RX ORDER — OXYBUTYNIN CHLORIDE 5 MG
5 TABLET ORAL ONCE
Refills: 0 | Status: DISCONTINUED | OUTPATIENT
Start: 2019-01-01 | End: 2019-01-01

## 2019-01-01 RX ORDER — MAGNESIUM OXIDE 400 MG ORAL TABLET 241.3 MG
400 TABLET ORAL
Refills: 0 | Status: COMPLETED | OUTPATIENT
Start: 2019-01-01 | End: 2019-01-01

## 2019-01-01 RX ORDER — LACTULOSE 10 G/15ML
15 SOLUTION ORAL
Refills: 0 | Status: DISCONTINUED | OUTPATIENT
Start: 2019-01-01 | End: 2019-01-01

## 2019-01-01 RX ORDER — DEXTROSE 50 % IN WATER 50 %
15 SYRINGE (ML) INTRAVENOUS ONCE
Refills: 0 | Status: DISCONTINUED | OUTPATIENT
Start: 2019-01-01 | End: 2019-01-01

## 2019-01-01 RX ORDER — MORPHINE SULFATE 50 MG/1
15 CAPSULE, EXTENDED RELEASE ORAL ONCE
Refills: 0 | Status: DISCONTINUED | OUTPATIENT
Start: 2019-01-01 | End: 2019-01-01

## 2019-01-01 RX ORDER — GABAPENTIN 400 MG/1
800 CAPSULE ORAL THREE TIMES A DAY
Refills: 0 | Status: DISCONTINUED | OUTPATIENT
Start: 2019-01-01 | End: 2019-01-01

## 2019-01-01 RX ORDER — POLYETHYLENE GLYCOL 3350 17 G/17G
17 POWDER, FOR SOLUTION ORAL ONCE
Refills: 0 | Status: COMPLETED | OUTPATIENT
Start: 2019-01-01 | End: 2019-01-01

## 2019-01-01 RX ORDER — MORPHINE SULFATE 15 MG/1
15 TABLET ORAL EVERY 4 HOURS
Refills: 0 | Status: ACTIVE | COMMUNITY
Start: 2019-01-01

## 2019-01-01 RX ORDER — ONDANSETRON 8 MG/1
4 TABLET, FILM COATED ORAL ONCE
Refills: 0 | Status: COMPLETED | OUTPATIENT
Start: 2019-01-01 | End: 2019-01-01

## 2019-01-01 RX ORDER — ATORVASTATIN CALCIUM 80 MG/1
80 TABLET, FILM COATED ORAL AT BEDTIME
Refills: 0 | Status: DISCONTINUED | OUTPATIENT
Start: 2019-01-01 | End: 2019-01-01

## 2019-01-01 RX ORDER — DULOXETINE HYDROCHLORIDE 30 MG/1
3 CAPSULE, DELAYED RELEASE ORAL
Qty: 0 | Refills: 0 | DISCHARGE
Start: 2019-01-01

## 2019-01-01 RX ORDER — MORPHINE SULFATE 50 MG/1
2 CAPSULE, EXTENDED RELEASE ORAL EVERY 4 HOURS
Refills: 0 | Status: DISCONTINUED | OUTPATIENT
Start: 2019-01-01 | End: 2019-01-01

## 2019-01-01 RX ORDER — MORPHINE SULFATE 50 MG/1
1 CAPSULE, EXTENDED RELEASE ORAL
Qty: 0 | Refills: 0 | DISCHARGE

## 2019-01-01 RX ORDER — IMMUNE GLOBULIN (HUMAN) 10 G/100ML
90 INJECTION INTRAVENOUS; SUBCUTANEOUS ONCE
Refills: 0 | Status: COMPLETED | OUTPATIENT
Start: 2019-01-01 | End: 2019-01-01

## 2019-01-01 RX ORDER — OXYCODONE AND ACETAMINOPHEN 5; 325 MG/1; MG/1
2 TABLET ORAL ONCE
Refills: 0 | Status: DISCONTINUED | OUTPATIENT
Start: 2019-01-01 | End: 2019-01-01

## 2019-01-01 RX ORDER — ACETAMINOPHEN 500 MG
650 TABLET ORAL EVERY 6 HOURS
Refills: 0 | Status: DISCONTINUED | OUTPATIENT
Start: 2019-01-01 | End: 2019-01-01

## 2019-01-01 RX ORDER — POLYETHYLENE GLYCOL 3350 17 G/17G
17 POWDER, FOR SOLUTION ORAL DAILY
Refills: 0 | Status: DISCONTINUED | OUTPATIENT
Start: 2019-01-01 | End: 2019-01-01

## 2019-01-01 RX ORDER — INSULIN GLARGINE 100 [IU]/ML
27 INJECTION, SOLUTION SUBCUTANEOUS AT BEDTIME
Refills: 0 | Status: DISCONTINUED | OUTPATIENT
Start: 2019-01-01 | End: 2019-01-01

## 2019-01-01 RX ORDER — CLOPIDOGREL BISULFATE 75 MG/1
75 TABLET, FILM COATED ORAL DAILY
Refills: 0 | Status: DISCONTINUED | OUTPATIENT
Start: 2019-01-01 | End: 2019-01-01

## 2019-01-01 RX ORDER — DOCUSATE SODIUM 100 MG
1 CAPSULE ORAL
Qty: 0 | Refills: 0 | DISCHARGE
Start: 2019-01-01

## 2019-01-01 RX ORDER — CLONAZEPAM 1 MG
1 TABLET ORAL THREE TIMES A DAY
Refills: 0 | Status: DISCONTINUED | OUTPATIENT
Start: 2019-01-01 | End: 2019-01-01

## 2019-01-01 RX ORDER — BACLOFEN 100 %
10 POWDER (GRAM) MISCELLANEOUS
Refills: 0 | Status: DISCONTINUED | OUTPATIENT
Start: 2019-01-01 | End: 2019-01-01

## 2019-01-01 RX ORDER — DIPHENHYDRAMINE HCL 50 MG
25 CAPSULE ORAL ONCE
Refills: 0 | Status: COMPLETED | OUTPATIENT
Start: 2019-01-01 | End: 2019-01-01

## 2019-01-01 RX ORDER — INSULIN GLARGINE 100 [IU]/ML
27 INJECTION, SOLUTION SUBCUTANEOUS EVERY MORNING
Refills: 0 | Status: DISCONTINUED | OUTPATIENT
Start: 2019-01-01 | End: 2019-01-01

## 2019-01-01 RX ORDER — MORPHINE SULFATE 45 MG/1
45 CAPSULE, EXTENDED RELEASE ORAL
Qty: 60 | Refills: 0 | Status: DISCONTINUED | COMMUNITY
Start: 2019-05-28 | End: 2019-01-01

## 2019-01-01 RX ORDER — GLUCAGON INJECTION, SOLUTION 0.5 MG/.1ML
1 INJECTION, SOLUTION SUBCUTANEOUS ONCE
Refills: 0 | Status: DISCONTINUED | OUTPATIENT
Start: 2019-01-01 | End: 2019-01-01

## 2019-01-01 RX ORDER — PRAMIPEXOLE DIHYDROCHLORIDE 0.12 MG/1
1 TABLET ORAL
Qty: 0 | Refills: 0 | DISCHARGE
Start: 2019-01-01

## 2019-01-01 RX ORDER — OXYCODONE HYDROCHLORIDE 5 MG/1
15 TABLET ORAL EVERY 12 HOURS
Refills: 0 | Status: DISCONTINUED | OUTPATIENT
Start: 2019-01-01 | End: 2019-01-01

## 2019-01-01 RX ORDER — INSULIN LISPRO 100/ML
9 VIAL (ML) SUBCUTANEOUS
Qty: 0 | Refills: 0 | DISCHARGE

## 2019-01-01 RX ORDER — ENOXAPARIN SODIUM 100 MG/ML
0 INJECTION SUBCUTANEOUS
Qty: 0 | Refills: 0 | DISCHARGE
Start: 2019-01-01

## 2019-01-01 RX ORDER — CLOPIDOGREL BISULFATE 75 MG/1
1 TABLET, FILM COATED ORAL
Qty: 0 | Refills: 0 | DISCHARGE

## 2019-01-01 RX ORDER — CLONAZEPAM 1 MG
1 TABLET ORAL
Refills: 0 | Status: DISCONTINUED | OUTPATIENT
Start: 2019-01-01 | End: 2019-01-01

## 2019-01-01 RX ORDER — LANOLIN ALCOHOL/MO/W.PET/CERES
5 CREAM (GRAM) TOPICAL AT BEDTIME
Refills: 0 | Status: DISCONTINUED | OUTPATIENT
Start: 2019-01-01 | End: 2019-01-01

## 2019-01-01 RX ORDER — BACLOFEN 100 %
1 POWDER (GRAM) MISCELLANEOUS
Qty: 0 | Refills: 0 | DISCHARGE

## 2019-01-01 RX ORDER — BACLOFEN 100 %
1 POWDER (GRAM) MISCELLANEOUS
Qty: 0 | Refills: 0 | DISCHARGE
Start: 2019-01-01

## 2019-01-01 RX ORDER — PANTOPRAZOLE SODIUM 20 MG/1
40 TABLET, DELAYED RELEASE ORAL
Refills: 0 | Status: DISCONTINUED | OUTPATIENT
Start: 2019-01-01 | End: 2019-01-01

## 2019-01-01 RX ORDER — NALOXONE HYDROCHLORIDE 4 MG/.1ML
0.4 SPRAY NASAL ONCE
Refills: 0 | Status: COMPLETED | OUTPATIENT
Start: 2019-01-01 | End: 2019-01-01

## 2019-01-01 RX ORDER — MEROPENEM 1 G/30ML
1000 INJECTION INTRAVENOUS EVERY 8 HOURS
Refills: 0 | Status: COMPLETED | OUTPATIENT
Start: 2019-01-01 | End: 2019-01-01

## 2019-01-01 RX ORDER — OXYCODONE HYDROCHLORIDE 5 MG/1
1 TABLET ORAL
Qty: 0 | Refills: 0 | DISCHARGE
Start: 2019-01-01

## 2019-01-01 RX ORDER — INSULIN LISPRO 100/ML
VIAL (ML) SUBCUTANEOUS
Refills: 0 | Status: DISCONTINUED | OUTPATIENT
Start: 2019-01-01 | End: 2019-01-01

## 2019-01-01 RX ORDER — ALPRAZOLAM 0.25 MG
1 TABLET ORAL EVERY 6 HOURS
Refills: 0 | Status: DISCONTINUED | OUTPATIENT
Start: 2019-01-01 | End: 2019-01-01

## 2019-01-01 RX ORDER — AMLODIPINE BESYLATE 10 MG/1
10 TABLET ORAL DAILY
Qty: 90 | Refills: 3 | Status: DISCONTINUED | COMMUNITY
Start: 2018-08-27 | End: 2019-01-01

## 2019-01-01 RX ORDER — DIPHENHYDRAMINE HCL 50 MG
1 CAPSULE ORAL
Qty: 0 | Refills: 0 | DISCHARGE
Start: 2019-01-01

## 2019-01-01 RX ORDER — ERGOCALCIFEROL 1.25 MG/1
1 CAPSULE ORAL
Qty: 4 | Refills: 0
Start: 2019-01-01

## 2019-01-01 RX ORDER — TAMSULOSIN HYDROCHLORIDE 0.4 MG/1
0.4 CAPSULE ORAL AT BEDTIME
Refills: 0 | Status: DISCONTINUED | OUTPATIENT
Start: 2019-01-01 | End: 2019-01-01

## 2019-01-01 RX ORDER — MORPHINE SULFATE 50 MG/1
5 CAPSULE, EXTENDED RELEASE ORAL
Qty: 0 | Refills: 0 | DISCHARGE

## 2019-01-01 RX ORDER — ENOXAPARIN SODIUM 100 MG/ML
40 INJECTION SUBCUTANEOUS
Qty: 0 | Refills: 0 | DISCHARGE
Start: 2019-01-01

## 2019-01-01 RX ORDER — MORPHINE SULFATE 50 MG/1
30 CAPSULE, EXTENDED RELEASE ORAL EVERY 12 HOURS
Refills: 0 | Status: DISCONTINUED | OUTPATIENT
Start: 2019-01-01 | End: 2019-01-01

## 2019-01-01 RX ORDER — QUETIAPINE FUMARATE 100 MG/1
100 TABLET ORAL
Refills: 0 | Status: ACTIVE | COMMUNITY

## 2019-01-01 RX ORDER — METFORMIN HYDROCHLORIDE 1000 MG/1
1000 TABLET, COATED ORAL
Qty: 180 | Refills: 3 | Status: DISCONTINUED | COMMUNITY
Start: 2018-08-27 | End: 2019-01-01

## 2019-01-01 RX ORDER — SENNA PLUS 8.6 MG/1
2 TABLET ORAL AT BEDTIME
Refills: 0 | Status: DISCONTINUED | OUTPATIENT
Start: 2019-01-01 | End: 2019-01-01

## 2019-01-01 RX ORDER — INSULIN LISPRO 100/ML
9 VIAL (ML) SUBCUTANEOUS
Refills: 0 | Status: DISCONTINUED | OUTPATIENT
Start: 2019-01-01 | End: 2019-01-01

## 2019-01-01 RX ORDER — MORPHINE SULFATE 15 MG/1
15 TABLET, FILM COATED, EXTENDED RELEASE ORAL
Refills: 0 | Status: ACTIVE | COMMUNITY
Start: 2019-01-01

## 2019-01-01 RX ORDER — HYDROXYZINE HCL 10 MG
50 TABLET ORAL AT BEDTIME
Refills: 0 | Status: DISCONTINUED | OUTPATIENT
Start: 2019-01-01 | End: 2019-01-01

## 2019-01-01 RX ORDER — ALPRAZOLAM 0.25 MG
1 TABLET ORAL
Qty: 0 | Refills: 0 | DISCHARGE

## 2019-01-01 RX ORDER — CEFEPIME 1 G/1
2000 INJECTION, POWDER, FOR SOLUTION INTRAMUSCULAR; INTRAVENOUS EVERY 8 HOURS
Refills: 0 | Status: DISCONTINUED | OUTPATIENT
Start: 2019-01-01 | End: 2019-01-01

## 2019-01-01 RX ORDER — CARVEDILOL PHOSPHATE 80 MG/1
1 CAPSULE, EXTENDED RELEASE ORAL
Qty: 0 | Refills: 0 | DISCHARGE
Start: 2019-01-01

## 2019-01-01 RX ORDER — QUETIAPINE FUMARATE 25 MG/1
25 TABLET ORAL
Qty: 30 | Refills: 5 | Status: DISCONTINUED | COMMUNITY
Start: 2018-10-10 | End: 2019-01-01

## 2019-01-01 RX ORDER — MODAFINIL 200 MG/1
200 TABLET ORAL
Qty: 14 | Refills: 0 | Status: DISCONTINUED | COMMUNITY
Start: 2019-05-03 | End: 2019-01-01

## 2019-01-01 RX ORDER — DULOXETINE HYDROCHLORIDE 30 MG/1
60 CAPSULE, DELAYED RELEASE ORAL
Refills: 0 | Status: DISCONTINUED | OUTPATIENT
Start: 2019-01-01 | End: 2019-01-01

## 2019-01-01 RX ORDER — CHLORHEXIDINE GLUCONATE 213 G/1000ML
1 SOLUTION TOPICAL
Refills: 0 | Status: DISCONTINUED | OUTPATIENT
Start: 2019-01-01 | End: 2019-01-01

## 2019-01-01 RX ORDER — SODIUM CHLORIDE 9 MG/ML
1000 INJECTION, SOLUTION INTRAVENOUS
Refills: 0 | Status: DISCONTINUED | OUTPATIENT
Start: 2019-01-01 | End: 2019-01-01

## 2019-01-01 RX ORDER — CARVEDILOL PHOSPHATE 80 MG/1
3.12 CAPSULE, EXTENDED RELEASE ORAL EVERY 12 HOURS
Refills: 0 | Status: DISCONTINUED | OUTPATIENT
Start: 2019-01-01 | End: 2019-01-01

## 2019-01-01 RX ORDER — OXYCODONE HYDROCHLORIDE 5 MG/1
10 TABLET ORAL EVERY 4 HOURS
Refills: 0 | Status: DISCONTINUED | OUTPATIENT
Start: 2019-01-01 | End: 2019-01-01

## 2019-01-01 RX ORDER — DEXTROSE 50 % IN WATER 50 %
25 SYRINGE (ML) INTRAVENOUS ONCE
Refills: 0 | Status: DISCONTINUED | OUTPATIENT
Start: 2019-01-01 | End: 2019-01-01

## 2019-01-01 RX ORDER — MORPHINE SULFATE 50 MG/1
2 CAPSULE, EXTENDED RELEASE ORAL ONCE
Refills: 0 | Status: DISCONTINUED | OUTPATIENT
Start: 2019-01-01 | End: 2019-01-01

## 2019-01-01 RX ORDER — LACTULOSE 10 G/15ML
15 SOLUTION ORAL EVERY 4 HOURS
Refills: 0 | Status: DISCONTINUED | OUTPATIENT
Start: 2019-01-01 | End: 2019-01-01

## 2019-01-01 RX ORDER — LACTULOSE 10 G/15ML
22.5 SOLUTION ORAL
Qty: 0 | Refills: 0 | DISCHARGE
Start: 2019-01-01

## 2019-01-01 RX ORDER — NYSTATIN CREAM 100000 [USP'U]/G
1 CREAM TOPICAL
Refills: 0 | Status: DISCONTINUED | OUTPATIENT
Start: 2019-01-01 | End: 2019-01-01

## 2019-01-01 RX ORDER — HYDROXYZINE HCL 10 MG
1 TABLET ORAL
Qty: 0 | Refills: 0 | DISCHARGE

## 2019-01-01 RX ORDER — TRAZODONE HCL 50 MG
50 TABLET ORAL AT BEDTIME
Refills: 0 | Status: DISCONTINUED | OUTPATIENT
Start: 2019-01-01 | End: 2019-01-01

## 2019-01-01 RX ORDER — ACETAMINOPHEN 500 MG
650 TABLET ORAL ONCE
Refills: 0 | Status: COMPLETED | OUTPATIENT
Start: 2019-01-01 | End: 2019-01-01

## 2019-01-01 RX ORDER — DOCUSATE SODIUM 100 MG
100 CAPSULE ORAL THREE TIMES A DAY
Refills: 0 | Status: DISCONTINUED | OUTPATIENT
Start: 2019-01-01 | End: 2019-01-01

## 2019-01-01 RX ORDER — GLUCOSAMINE/MSM/CHONDROIT SULF 500-166.6
10 TABLET ORAL AT BEDTIME
Refills: 0 | Status: ACTIVE | COMMUNITY
Start: 2019-01-01

## 2019-01-01 RX ORDER — INSULIN GLARGINE 100 [IU]/ML
27 INJECTION, SOLUTION SUBCUTANEOUS
Qty: 0 | Refills: 0 | DISCHARGE
Start: 2019-01-01

## 2019-01-01 RX ORDER — GABAPENTIN 400 MG/1
1 CAPSULE ORAL
Qty: 0 | Refills: 0 | DISCHARGE

## 2019-01-01 RX ORDER — QUETIAPINE FUMARATE 200 MG/1
1 TABLET, FILM COATED ORAL
Qty: 0 | Refills: 0 | DISCHARGE

## 2019-01-01 RX ORDER — POLYETHYLENE GLYCOL 3350 17 G/17G
17 POWDER, FOR SOLUTION ORAL
Qty: 0 | Refills: 0 | DISCHARGE
Start: 2019-01-01

## 2019-01-01 RX ORDER — INSULIN GLARGINE 100 [IU]/ML
30 INJECTION, SOLUTION SUBCUTANEOUS
Qty: 0 | Refills: 0 | DISCHARGE
Start: 2019-01-01

## 2019-01-01 RX ORDER — MORPHINE SULFATE 50 MG/1
5 CAPSULE, EXTENDED RELEASE ORAL EVERY 4 HOURS
Refills: 0 | Status: DISCONTINUED | OUTPATIENT
Start: 2019-01-01 | End: 2019-01-01

## 2019-01-01 RX ORDER — ENOXAPARIN SODIUM 100 MG/ML
40 INJECTION SUBCUTANEOUS DAILY
Refills: 0 | Status: DISCONTINUED | OUTPATIENT
Start: 2019-01-01 | End: 2019-01-01

## 2019-01-01 RX ORDER — FAMOTIDINE 40 MG/1
40 TABLET, FILM COATED ORAL TWICE DAILY
Qty: 180 | Refills: 3 | Status: DISCONTINUED | COMMUNITY
Start: 2018-08-27 | End: 2019-01-01

## 2019-01-01 RX ORDER — PANTOPRAZOLE 40 MG/1
40 TABLET, DELAYED RELEASE ORAL
Qty: 90 | Refills: 3 | Status: ACTIVE | COMMUNITY
Start: 2019-01-01

## 2019-01-01 RX ORDER — ASPIRIN/CALCIUM CARB/MAGNESIUM 324 MG
1 TABLET ORAL
Qty: 0 | Refills: 0 | DISCHARGE
Start: 2019-01-01

## 2019-01-01 RX ORDER — MORPHINE SULFATE 50 MG/1
3 CAPSULE, EXTENDED RELEASE ORAL
Qty: 0 | Refills: 0 | DISCHARGE
Start: 2019-01-01

## 2019-01-01 RX ORDER — BENZOCAINE AND MENTHOL 5; 1 G/100ML; G/100ML
0 LIQUID ORAL
Qty: 0 | Refills: 0 | DISCHARGE
Start: 2019-01-01

## 2019-01-01 RX ORDER — NYSTATIN 100000 [USP'U]/G
100000 CREAM TOPICAL 3 TIMES DAILY
Qty: 2 | Refills: 3 | Status: ACTIVE | COMMUNITY
Start: 2019-01-01 | End: 1900-01-01

## 2019-01-01 RX ORDER — MEROPENEM 1 G/30ML
1000 INJECTION INTRAVENOUS ONCE
Refills: 0 | Status: COMPLETED | OUTPATIENT
Start: 2019-01-01 | End: 2019-01-01

## 2019-01-01 RX ORDER — SODIUM CHLORIDE 9 MG/ML
500 INJECTION, SOLUTION INTRAVENOUS ONCE
Refills: 0 | Status: COMPLETED | OUTPATIENT
Start: 2019-01-01 | End: 2019-01-01

## 2019-01-01 RX ORDER — MEROPENEM 1 G/30ML
1000 INJECTION INTRAVENOUS
Qty: 0 | Refills: 0 | DISCHARGE
Start: 2019-01-01

## 2019-01-01 RX ORDER — ALPRAZOLAM 0.25 MG
0.5 TABLET ORAL ONCE
Refills: 0 | Status: DISCONTINUED | OUTPATIENT
Start: 2019-01-01 | End: 2019-01-01

## 2019-01-01 RX ORDER — QUETIAPINE FUMARATE 100 MG/1
100 TABLET ORAL
Qty: 90 | Refills: 3 | Status: DISCONTINUED | COMMUNITY
Start: 2018-08-27 | End: 2019-01-01

## 2019-01-01 RX ORDER — OXYCODONE HYDROCHLORIDE 5 MG/1
10 TABLET ORAL EVERY 6 HOURS
Refills: 0 | Status: DISCONTINUED | OUTPATIENT
Start: 2019-01-01 | End: 2019-01-01

## 2019-01-01 RX ORDER — NYSTATIN CREAM 100000 [USP'U]/G
0 CREAM TOPICAL
Qty: 0 | Refills: 0 | DISCHARGE
Start: 2019-01-01

## 2019-01-01 RX ORDER — MEROPENEM 1 G/30ML
INJECTION INTRAVENOUS
Refills: 0 | Status: DISCONTINUED | OUTPATIENT
Start: 2019-01-01 | End: 2019-01-01

## 2019-01-01 RX ORDER — VANCOMYCIN HCL 1 G
2000 VIAL (EA) INTRAVENOUS ONCE
Refills: 0 | Status: COMPLETED | OUTPATIENT
Start: 2019-01-01 | End: 2019-01-01

## 2019-01-01 RX ORDER — DULOXETINE HYDROCHLORIDE 30 MG/1
2 CAPSULE, DELAYED RELEASE ORAL
Qty: 0 | Refills: 0 | DISCHARGE
Start: 2019-01-01

## 2019-01-01 RX ORDER — LACTULOSE 10 G/15ML
10 SOLUTION ORAL; RECTAL 3 TIMES DAILY
Refills: 0 | Status: DISCONTINUED | COMMUNITY
Start: 2019-01-01 | End: 2019-01-01

## 2019-01-01 RX ORDER — MORPHINE SULFATE 50 MG/1
15 CAPSULE, EXTENDED RELEASE ORAL EVERY 6 HOURS
Refills: 0 | Status: DISCONTINUED | OUTPATIENT
Start: 2019-01-01 | End: 2019-01-01

## 2019-01-01 RX ORDER — NALOXEGOL OXALATE 25 MG/1
25 TABLET, FILM COATED ORAL
Refills: 0 | Status: ACTIVE | COMMUNITY

## 2019-01-01 RX ORDER — BISACODYL 5 MG/1
5 TABLET ORAL
Refills: 0 | Status: ACTIVE | COMMUNITY
Start: 2019-01-01

## 2019-01-01 RX ORDER — DIPHENHYDRAMINE HCL 50 MG
25 CAPSULE ORAL EVERY 4 HOURS
Refills: 0 | Status: DISCONTINUED | OUTPATIENT
Start: 2019-01-01 | End: 2020-01-01

## 2019-01-01 RX ORDER — BENZOCAINE AND MENTHOL 5; 1 G/100ML; G/100ML
1 LIQUID ORAL
Refills: 0 | Status: DISCONTINUED | OUTPATIENT
Start: 2019-01-01 | End: 2019-01-01

## 2019-01-01 RX ORDER — MEROPENEM 1 G/30ML
1000 INJECTION INTRAVENOUS EVERY 8 HOURS
Refills: 0 | Status: DISCONTINUED | OUTPATIENT
Start: 2019-01-01 | End: 2019-01-01

## 2019-01-01 RX ORDER — VANCOMYCIN HCL 1 G
1 VIAL (EA) INTRAVENOUS
Qty: 0 | Refills: 0 | DISCHARGE

## 2019-01-01 RX ORDER — NALOXONE HYDROCHLORIDE 4 MG/.1ML
0.8 SPRAY NASAL ONCE
Refills: 0 | Status: COMPLETED | OUTPATIENT
Start: 2019-01-01 | End: 2019-01-01

## 2019-01-01 RX ORDER — POTASSIUM CHLORIDE 20 MEQ
20 PACKET (EA) ORAL
Refills: 0 | Status: DISCONTINUED | OUTPATIENT
Start: 2019-01-01 | End: 2019-01-01

## 2019-01-01 RX ORDER — ONDANSETRON 8 MG/1
4 TABLET, FILM COATED ORAL DAILY
Refills: 0 | Status: DISCONTINUED | OUTPATIENT
Start: 2019-01-01 | End: 2019-01-01

## 2019-01-01 RX ORDER — ATORVASTATIN CALCIUM 80 MG/1
1 TABLET, FILM COATED ORAL
Qty: 0 | Refills: 0 | DISCHARGE
Start: 2019-01-01

## 2019-01-01 RX ORDER — INSULIN LISPRO 100 U/ML
100 INJECTION, SOLUTION INTRAVENOUS; SUBCUTANEOUS
Refills: 0 | Status: ACTIVE | COMMUNITY

## 2019-01-01 RX ORDER — MORPHINE SULFATE 50 MG/1
15 CAPSULE, EXTENDED RELEASE ORAL ONCE
Refills: 0 | Status: COMPLETED | OUTPATIENT
Start: 2019-01-01 | End: 2019-01-01

## 2019-01-01 RX ORDER — NALOXONE HYDROCHLORIDE 4 MG/.1ML
2 SPRAY NASAL ONCE
Refills: 0 | Status: DISCONTINUED | OUTPATIENT
Start: 2019-01-01 | End: 2019-01-01

## 2019-01-01 RX ORDER — SENNA PLUS 8.6 MG/1
2 TABLET ORAL
Qty: 0 | Refills: 0 | DISCHARGE
Start: 2019-01-01

## 2019-01-01 RX ORDER — CEFEPIME 1 G/1
2000 INJECTION, POWDER, FOR SOLUTION INTRAMUSCULAR; INTRAVENOUS ONCE
Refills: 0 | Status: COMPLETED | OUTPATIENT
Start: 2019-01-01 | End: 2019-01-01

## 2019-01-01 RX ORDER — SOD,AMMONIUM,POTASSIUM LACTATE
1 CREAM (GRAM) TOPICAL
Refills: 0 | Status: DISCONTINUED | OUTPATIENT
Start: 2019-01-01 | End: 2019-01-01

## 2019-01-01 RX ORDER — CLOPIDOGREL BISULFATE 75 MG/1
1 TABLET, FILM COATED ORAL
Qty: 0 | Refills: 0 | DISCHARGE
Start: 2019-01-01

## 2019-01-01 RX ORDER — MORPHINE SULFATE 50 MG/1
6 CAPSULE, EXTENDED RELEASE ORAL ONCE
Refills: 0 | Status: DISCONTINUED | OUTPATIENT
Start: 2019-01-01 | End: 2019-01-01

## 2019-01-01 RX ORDER — LACTULOSE 10 G/15ML
20 SOLUTION ORAL
Refills: 0 | Status: DISCONTINUED | OUTPATIENT
Start: 2019-01-01 | End: 2019-01-01

## 2019-01-01 RX ORDER — PANTOPRAZOLE SODIUM 20 MG/1
1 TABLET, DELAYED RELEASE ORAL
Qty: 0 | Refills: 0 | DISCHARGE
Start: 2019-01-01

## 2019-01-01 RX ORDER — DICLOFENAC SODIUM 75 MG/1
75 TABLET, DELAYED RELEASE ORAL
Refills: 0 | Status: DISCONTINUED | OUTPATIENT
Start: 2019-01-01 | End: 2019-01-01

## 2019-01-01 RX ORDER — GABAPENTIN 400 MG/1
1 CAPSULE ORAL
Qty: 0 | Refills: 0 | DISCHARGE
Start: 2019-01-01

## 2019-01-01 RX ORDER — HYDROXYZINE HCL 10 MG
25 TABLET ORAL EVERY 6 HOURS
Refills: 0 | Status: DISCONTINUED | OUTPATIENT
Start: 2019-01-01 | End: 2019-01-01

## 2019-01-01 RX ORDER — ASPIRIN/CALCIUM CARB/MAGNESIUM 324 MG
81 TABLET ORAL DAILY
Refills: 0 | Status: DISCONTINUED | OUTPATIENT
Start: 2019-01-01 | End: 2019-01-01

## 2019-01-01 RX ORDER — LACTULOSE 10 G/15ML
15 SOLUTION ORAL EVERY 6 HOURS
Refills: 0 | Status: DISCONTINUED | OUTPATIENT
Start: 2019-01-01 | End: 2019-01-01

## 2019-01-01 RX ORDER — TUBERCULIN PURIFIED PROTEIN DERIVATIVE 5 [IU]/.1ML
0 INJECTION, SOLUTION INTRADERMAL
Qty: 0 | Refills: 0 | DISCHARGE

## 2019-01-01 RX ORDER — VANCOMYCIN HCL 1 G
1000 VIAL (EA) INTRAVENOUS EVERY 12 HOURS
Refills: 0 | Status: DISCONTINUED | OUTPATIENT
Start: 2019-01-01 | End: 2019-01-01

## 2019-01-01 RX ORDER — VORTIOXETINE 5 MG/1
1 TABLET, FILM COATED ORAL
Qty: 30 | Refills: 0
Start: 2019-01-01 | End: 2019-01-01

## 2019-01-01 RX ORDER — ERGOCALCIFEROL 1.25 MG/1
50000 CAPSULE ORAL
Refills: 0 | Status: DISCONTINUED | OUTPATIENT
Start: 2019-01-01 | End: 2019-01-01

## 2019-01-01 RX ORDER — DICLOFENAC SODIUM 75 MG/1
1 TABLET, DELAYED RELEASE ORAL
Qty: 0 | Refills: 0 | DISCHARGE
Start: 2019-01-01

## 2019-01-01 RX ORDER — CEFEPIME 1 G/1
INJECTION, POWDER, FOR SOLUTION INTRAMUSCULAR; INTRAVENOUS
Refills: 0 | Status: DISCONTINUED | OUTPATIENT
Start: 2019-01-01 | End: 2019-01-01

## 2019-01-01 RX ORDER — DULOXETINE HYDROCHLORIDE 60 MG/1
60 CAPSULE, DELAYED RELEASE PELLETS ORAL
Qty: 90 | Refills: 3 | Status: DISCONTINUED | COMMUNITY
Start: 2018-08-27 | End: 2019-01-01

## 2019-01-01 RX ORDER — INSULIN GLARGINE 100 [IU]/ML
100 INJECTION, SOLUTION SUBCUTANEOUS AT BEDTIME
Refills: 0 | Status: ACTIVE | COMMUNITY
Start: 2019-01-01

## 2019-01-01 RX ADMIN — CEFEPIME 100 MILLIGRAM(S): 1 INJECTION, POWDER, FOR SOLUTION INTRAMUSCULAR; INTRAVENOUS at 21:56

## 2019-01-01 RX ADMIN — SENNA PLUS 2 TABLET(S): 8.6 TABLET ORAL at 21:25

## 2019-01-01 RX ADMIN — DULOXETINE HYDROCHLORIDE 90 MILLIGRAM(S): 30 CAPSULE, DELAYED RELEASE ORAL at 12:30

## 2019-01-01 RX ADMIN — CHLORHEXIDINE GLUCONATE 1 APPLICATION(S): 213 SOLUTION TOPICAL at 05:42

## 2019-01-01 RX ADMIN — Medication 5 MILLIGRAM(S): at 05:33

## 2019-01-01 RX ADMIN — Medication 650 MILLIGRAM(S): at 06:34

## 2019-01-01 RX ADMIN — GABAPENTIN 800 MILLIGRAM(S): 400 CAPSULE ORAL at 11:37

## 2019-01-01 RX ADMIN — INSULIN GLARGINE 36 UNIT(S): 100 INJECTION, SOLUTION SUBCUTANEOUS at 21:41

## 2019-01-01 RX ADMIN — GABAPENTIN 800 MILLIGRAM(S): 400 CAPSULE ORAL at 13:16

## 2019-01-01 RX ADMIN — Medication 9 UNIT(S): at 12:01

## 2019-01-01 RX ADMIN — Medication 10 MILLIGRAM(S): at 05:28

## 2019-01-01 RX ADMIN — QUETIAPINE FUMARATE 150 MILLIGRAM(S): 200 TABLET, FILM COATED ORAL at 22:42

## 2019-01-01 RX ADMIN — Medication 5 MILLIGRAM(S): at 22:02

## 2019-01-01 RX ADMIN — PANTOPRAZOLE SODIUM 40 MILLIGRAM(S): 20 TABLET, DELAYED RELEASE ORAL at 06:46

## 2019-01-01 RX ADMIN — Medication 9 UNIT(S): at 16:56

## 2019-01-01 RX ADMIN — Medication 1: at 16:56

## 2019-01-01 RX ADMIN — Medication 250 MILLIGRAM(S): at 06:04

## 2019-01-01 RX ADMIN — SENNA PLUS 2 TABLET(S): 8.6 TABLET ORAL at 22:29

## 2019-01-01 RX ADMIN — Medication 10 MILLIGRAM(S): at 17:16

## 2019-01-01 RX ADMIN — Medication 5: at 17:13

## 2019-01-01 RX ADMIN — OXYCODONE HYDROCHLORIDE 10 MILLIGRAM(S): 5 TABLET ORAL at 22:41

## 2019-01-01 RX ADMIN — Medication 5 MILLIGRAM(S): at 17:49

## 2019-01-01 RX ADMIN — MORPHINE SULFATE 60 MILLIGRAM(S): 50 CAPSULE, EXTENDED RELEASE ORAL at 18:30

## 2019-01-01 RX ADMIN — MEROPENEM 100 MILLIGRAM(S): 1 INJECTION INTRAVENOUS at 14:24

## 2019-01-01 RX ADMIN — Medication 100 MILLIGRAM(S): at 06:46

## 2019-01-01 RX ADMIN — MEROPENEM 100 MILLIGRAM(S): 1 INJECTION INTRAVENOUS at 21:58

## 2019-01-01 RX ADMIN — QUETIAPINE FUMARATE 150 MILLIGRAM(S): 200 TABLET, FILM COATED ORAL at 21:48

## 2019-01-01 RX ADMIN — Medication 12 UNIT(S): at 07:52

## 2019-01-01 RX ADMIN — OXYCODONE HYDROCHLORIDE 10 MILLIGRAM(S): 5 TABLET ORAL at 09:01

## 2019-01-01 RX ADMIN — Medication 10 MILLIGRAM(S): at 05:07

## 2019-01-01 RX ADMIN — GABAPENTIN 800 MILLIGRAM(S): 400 CAPSULE ORAL at 13:15

## 2019-01-01 RX ADMIN — Medication 650 MILLIGRAM(S): at 13:21

## 2019-01-01 RX ADMIN — QUETIAPINE FUMARATE 150 MILLIGRAM(S): 200 TABLET, FILM COATED ORAL at 21:51

## 2019-01-01 RX ADMIN — GABAPENTIN 800 MILLIGRAM(S): 400 CAPSULE ORAL at 13:55

## 2019-01-01 RX ADMIN — PRAMIPEXOLE DIHYDROCHLORIDE 0.5 MILLIGRAM(S): 0.12 TABLET ORAL at 17:30

## 2019-01-01 RX ADMIN — GABAPENTIN 800 MILLIGRAM(S): 400 CAPSULE ORAL at 07:25

## 2019-01-01 RX ADMIN — PRAMIPEXOLE DIHYDROCHLORIDE 0.5 MILLIGRAM(S): 0.12 TABLET ORAL at 06:19

## 2019-01-01 RX ADMIN — CEFEPIME 100 MILLIGRAM(S): 1 INJECTION, POWDER, FOR SOLUTION INTRAMUSCULAR; INTRAVENOUS at 13:51

## 2019-01-01 RX ADMIN — Medication 10 MILLIGRAM(S): at 05:33

## 2019-01-01 RX ADMIN — ERGOCALCIFEROL 50000 UNIT(S): 1.25 CAPSULE ORAL at 07:51

## 2019-01-01 RX ADMIN — GABAPENTIN 800 MILLIGRAM(S): 400 CAPSULE ORAL at 06:35

## 2019-01-01 RX ADMIN — GABAPENTIN 800 MILLIGRAM(S): 400 CAPSULE ORAL at 06:20

## 2019-01-01 RX ADMIN — Medication 5 MILLIGRAM(S): at 21:32

## 2019-01-01 RX ADMIN — MEROPENEM 100 MILLIGRAM(S): 1 INJECTION INTRAVENOUS at 13:32

## 2019-01-01 RX ADMIN — Medication 100 MILLIGRAM(S): at 21:19

## 2019-01-01 RX ADMIN — Medication 5 MILLIGRAM(S): at 17:25

## 2019-01-01 RX ADMIN — Medication 1 MILLIGRAM(S): at 22:41

## 2019-01-01 RX ADMIN — CARVEDILOL PHOSPHATE 3.12 MILLIGRAM(S): 80 CAPSULE, EXTENDED RELEASE ORAL at 18:35

## 2019-01-01 RX ADMIN — Medication 5 MILLIGRAM(S): at 21:41

## 2019-01-01 RX ADMIN — CEFEPIME 100 MILLIGRAM(S): 1 INJECTION, POWDER, FOR SOLUTION INTRAMUSCULAR; INTRAVENOUS at 21:42

## 2019-01-01 RX ADMIN — Medication 81 MILLIGRAM(S): at 11:27

## 2019-01-01 RX ADMIN — Medication 9 UNIT(S): at 08:01

## 2019-01-01 RX ADMIN — OXYCODONE HYDROCHLORIDE 10 MILLIGRAM(S): 5 TABLET ORAL at 03:41

## 2019-01-01 RX ADMIN — Medication 5 MILLIGRAM(S): at 05:30

## 2019-01-01 RX ADMIN — Medication 5 MILLIGRAM(S): at 18:36

## 2019-01-01 RX ADMIN — INSULIN GLARGINE 36 UNIT(S): 100 INJECTION, SOLUTION SUBCUTANEOUS at 21:57

## 2019-01-01 RX ADMIN — PRAMIPEXOLE DIHYDROCHLORIDE 0.5 MILLIGRAM(S): 0.12 TABLET ORAL at 17:06

## 2019-01-01 RX ADMIN — Medication 10 MILLIGRAM(S): at 17:49

## 2019-01-01 RX ADMIN — Medication 9 UNIT(S): at 16:33

## 2019-01-01 RX ADMIN — Medication 650 MILLIGRAM(S): at 05:37

## 2019-01-01 RX ADMIN — BENZOCAINE AND MENTHOL 1 LOZENGE: 5; 1 LIQUID ORAL at 13:49

## 2019-01-01 RX ADMIN — Medication 9 UNIT(S): at 08:22

## 2019-01-01 RX ADMIN — INSULIN GLARGINE 27 UNIT(S): 100 INJECTION, SOLUTION SUBCUTANEOUS at 21:20

## 2019-01-01 RX ADMIN — CARVEDILOL PHOSPHATE 3.12 MILLIGRAM(S): 80 CAPSULE, EXTENDED RELEASE ORAL at 05:07

## 2019-01-01 RX ADMIN — MORPHINE SULFATE 15 MILLIGRAM(S): 50 CAPSULE, EXTENDED RELEASE ORAL at 12:17

## 2019-01-01 RX ADMIN — MUPIROCIN 1 APPLICATION(S): 20 OINTMENT TOPICAL at 05:17

## 2019-01-01 RX ADMIN — MORPHINE SULFATE 60 MILLIGRAM(S): 50 CAPSULE, EXTENDED RELEASE ORAL at 06:02

## 2019-01-01 RX ADMIN — Medication 50 MILLIGRAM(S): at 21:44

## 2019-01-01 RX ADMIN — PANTOPRAZOLE SODIUM 40 MILLIGRAM(S): 20 TABLET, DELAYED RELEASE ORAL at 06:54

## 2019-01-01 RX ADMIN — MUPIROCIN 1 APPLICATION(S): 20 OINTMENT TOPICAL at 06:13

## 2019-01-01 RX ADMIN — CEFEPIME 100 MILLIGRAM(S): 1 INJECTION, POWDER, FOR SOLUTION INTRAMUSCULAR; INTRAVENOUS at 05:51

## 2019-01-01 RX ADMIN — Medication 0.5 MILLIGRAM(S): at 15:13

## 2019-01-01 RX ADMIN — Medication 100 MILLIGRAM(S): at 06:06

## 2019-01-01 RX ADMIN — HYDROMORPHONE HYDROCHLORIDE 1 MILLIGRAM(S): 2 INJECTION INTRAMUSCULAR; INTRAVENOUS; SUBCUTANEOUS at 04:51

## 2019-01-01 RX ADMIN — SENNA PLUS 2 TABLET(S): 8.6 TABLET ORAL at 21:31

## 2019-01-01 RX ADMIN — Medication 0.5 MILLIGRAM(S): at 16:44

## 2019-01-01 RX ADMIN — CHLORHEXIDINE GLUCONATE 1 APPLICATION(S): 213 SOLUTION TOPICAL at 06:43

## 2019-01-01 RX ADMIN — LACTULOSE 15 GRAM(S): 10 SOLUTION ORAL at 23:27

## 2019-01-01 RX ADMIN — PRAMIPEXOLE DIHYDROCHLORIDE 0.5 MILLIGRAM(S): 0.12 TABLET ORAL at 06:30

## 2019-01-01 RX ADMIN — GABAPENTIN 800 MILLIGRAM(S): 400 CAPSULE ORAL at 21:18

## 2019-01-01 RX ADMIN — Medication 3: at 17:51

## 2019-01-01 RX ADMIN — ERGOCALCIFEROL 50000 UNIT(S): 1.25 CAPSULE ORAL at 18:18

## 2019-01-01 RX ADMIN — GABAPENTIN 800 MILLIGRAM(S): 400 CAPSULE ORAL at 21:57

## 2019-01-01 RX ADMIN — ATORVASTATIN CALCIUM 80 MILLIGRAM(S): 80 TABLET, FILM COATED ORAL at 21:44

## 2019-01-01 RX ADMIN — Medication 1 APPLICATION(S): at 17:26

## 2019-01-01 RX ADMIN — HYDROMORPHONE HYDROCHLORIDE 0.5 MILLIGRAM(S): 2 INJECTION INTRAMUSCULAR; INTRAVENOUS; SUBCUTANEOUS at 02:42

## 2019-01-01 RX ADMIN — CEFEPIME 100 MILLIGRAM(S): 1 INJECTION, POWDER, FOR SOLUTION INTRAMUSCULAR; INTRAVENOUS at 05:37

## 2019-01-01 RX ADMIN — Medication 10 MILLIGRAM(S): at 05:40

## 2019-01-01 RX ADMIN — OXYCODONE HYDROCHLORIDE 10 MILLIGRAM(S): 5 TABLET ORAL at 08:59

## 2019-01-01 RX ADMIN — Medication 100 MILLIGRAM(S): at 05:40

## 2019-01-01 RX ADMIN — BENZOCAINE AND MENTHOL 1 LOZENGE: 5; 1 LIQUID ORAL at 21:28

## 2019-01-01 RX ADMIN — CEFEPIME 100 MILLIGRAM(S): 1 INJECTION, POWDER, FOR SOLUTION INTRAMUSCULAR; INTRAVENOUS at 13:28

## 2019-01-01 RX ADMIN — DULOXETINE HYDROCHLORIDE 90 MILLIGRAM(S): 30 CAPSULE, DELAYED RELEASE ORAL at 11:33

## 2019-01-01 RX ADMIN — Medication 1 MILLIGRAM(S): at 21:56

## 2019-01-01 RX ADMIN — SENNA PLUS 2 TABLET(S): 8.6 TABLET ORAL at 22:43

## 2019-01-01 RX ADMIN — Medication 9 UNIT(S): at 07:50

## 2019-01-01 RX ADMIN — MORPHINE SULFATE 2 MILLIGRAM(S): 50 CAPSULE, EXTENDED RELEASE ORAL at 00:30

## 2019-01-01 RX ADMIN — Medication 5 MILLIGRAM(S): at 21:43

## 2019-01-01 RX ADMIN — MEROPENEM 100 MILLIGRAM(S): 1 INJECTION INTRAVENOUS at 14:39

## 2019-01-01 RX ADMIN — MORPHINE SULFATE 45 MILLIGRAM(S): 50 CAPSULE, EXTENDED RELEASE ORAL at 05:33

## 2019-01-01 RX ADMIN — GABAPENTIN 800 MILLIGRAM(S): 400 CAPSULE ORAL at 14:18

## 2019-01-01 RX ADMIN — MORPHINE SULFATE 60 MILLIGRAM(S): 50 CAPSULE, EXTENDED RELEASE ORAL at 09:15

## 2019-01-01 RX ADMIN — OXYCODONE HYDROCHLORIDE 10 MILLIGRAM(S): 5 TABLET ORAL at 01:34

## 2019-01-01 RX ADMIN — Medication 250 MILLIGRAM(S): at 17:35

## 2019-01-01 RX ADMIN — MORPHINE SULFATE 60 MILLIGRAM(S): 50 CAPSULE, EXTENDED RELEASE ORAL at 17:25

## 2019-01-01 RX ADMIN — INSULIN GLARGINE 27 UNIT(S): 100 INJECTION, SOLUTION SUBCUTANEOUS at 08:33

## 2019-01-01 RX ADMIN — HYDROMORPHONE HYDROCHLORIDE 1 MILLIGRAM(S): 2 INJECTION INTRAMUSCULAR; INTRAVENOUS; SUBCUTANEOUS at 16:00

## 2019-01-01 RX ADMIN — MORPHINE SULFATE 60 MILLIGRAM(S): 50 CAPSULE, EXTENDED RELEASE ORAL at 06:34

## 2019-01-01 RX ADMIN — BENZOCAINE AND MENTHOL 1 LOZENGE: 5; 1 LIQUID ORAL at 13:23

## 2019-01-01 RX ADMIN — Medication 1 APPLICATION(S): at 06:31

## 2019-01-01 RX ADMIN — Medication 5 MILLIGRAM(S): at 17:18

## 2019-01-01 RX ADMIN — Medication 250 MILLIGRAM(S): at 17:25

## 2019-01-01 RX ADMIN — Medication 10 MILLIGRAM(S): at 17:54

## 2019-01-01 RX ADMIN — MORPHINE SULFATE 60 MILLIGRAM(S): 50 CAPSULE, EXTENDED RELEASE ORAL at 18:43

## 2019-01-01 RX ADMIN — Medication 1: at 07:56

## 2019-01-01 RX ADMIN — Medication 50 MILLIGRAM(S): at 21:57

## 2019-01-01 RX ADMIN — CEFEPIME 100 MILLIGRAM(S): 1 INJECTION, POWDER, FOR SOLUTION INTRAMUSCULAR; INTRAVENOUS at 13:09

## 2019-01-01 RX ADMIN — CARVEDILOL PHOSPHATE 3.12 MILLIGRAM(S): 80 CAPSULE, EXTENDED RELEASE ORAL at 17:26

## 2019-01-01 RX ADMIN — MORPHINE SULFATE 6 MILLIGRAM(S): 50 CAPSULE, EXTENDED RELEASE ORAL at 22:45

## 2019-01-01 RX ADMIN — Medication 250 MILLIGRAM(S): at 06:43

## 2019-01-01 RX ADMIN — Medication 650 MILLIGRAM(S): at 05:33

## 2019-01-01 RX ADMIN — OXYCODONE HYDROCHLORIDE 10 MILLIGRAM(S): 5 TABLET ORAL at 12:26

## 2019-01-01 RX ADMIN — Medication 3: at 06:09

## 2019-01-01 RX ADMIN — MORPHINE SULFATE 30 MILLIGRAM(S): 50 CAPSULE, EXTENDED RELEASE ORAL at 05:35

## 2019-01-01 RX ADMIN — CARVEDILOL PHOSPHATE 3.12 MILLIGRAM(S): 80 CAPSULE, EXTENDED RELEASE ORAL at 17:36

## 2019-01-01 RX ADMIN — Medication 650 MILLIGRAM(S): at 11:42

## 2019-01-01 RX ADMIN — Medication 1 MILLIGRAM(S): at 12:02

## 2019-01-01 RX ADMIN — Medication 100 MILLIGRAM(S): at 22:57

## 2019-01-01 RX ADMIN — OXYCODONE HYDROCHLORIDE 10 MILLIGRAM(S): 5 TABLET ORAL at 04:54

## 2019-01-01 RX ADMIN — Medication 1 APPLICATION(S): at 05:39

## 2019-01-01 RX ADMIN — GABAPENTIN 800 MILLIGRAM(S): 400 CAPSULE ORAL at 05:40

## 2019-01-01 RX ADMIN — INSULIN GLARGINE 27 UNIT(S): 100 INJECTION, SOLUTION SUBCUTANEOUS at 21:22

## 2019-01-01 RX ADMIN — Medication 100 MILLIGRAM(S): at 21:57

## 2019-01-01 RX ADMIN — Medication 10 MILLIGRAM(S): at 17:18

## 2019-01-01 RX ADMIN — CEFEPIME 100 MILLIGRAM(S): 1 INJECTION, POWDER, FOR SOLUTION INTRAMUSCULAR; INTRAVENOUS at 06:34

## 2019-01-01 RX ADMIN — TAMSULOSIN HYDROCHLORIDE 0.4 MILLIGRAM(S): 0.4 CAPSULE ORAL at 22:59

## 2019-01-01 RX ADMIN — Medication 50 MILLIGRAM(S): at 22:23

## 2019-01-01 RX ADMIN — Medication 100 MILLIGRAM(S): at 21:51

## 2019-01-01 RX ADMIN — CARVEDILOL PHOSPHATE 3.12 MILLIGRAM(S): 80 CAPSULE, EXTENDED RELEASE ORAL at 05:51

## 2019-01-01 RX ADMIN — PRAMIPEXOLE DIHYDROCHLORIDE 0.5 MILLIGRAM(S): 0.12 TABLET ORAL at 06:05

## 2019-01-01 RX ADMIN — PANTOPRAZOLE SODIUM 40 MILLIGRAM(S): 20 TABLET, DELAYED RELEASE ORAL at 07:18

## 2019-01-01 RX ADMIN — Medication 5 MILLIGRAM(S): at 22:41

## 2019-01-01 RX ADMIN — Medication 100 MILLIGRAM(S): at 21:32

## 2019-01-01 RX ADMIN — GABAPENTIN 800 MILLIGRAM(S): 400 CAPSULE ORAL at 05:34

## 2019-01-01 RX ADMIN — BENZOCAINE AND MENTHOL 1 LOZENGE: 5; 1 LIQUID ORAL at 21:09

## 2019-01-01 RX ADMIN — Medication 5 MILLIGRAM(S): at 21:26

## 2019-01-01 RX ADMIN — PRAMIPEXOLE DIHYDROCHLORIDE 0.5 MILLIGRAM(S): 0.12 TABLET ORAL at 17:36

## 2019-01-01 RX ADMIN — Medication 50 MILLIGRAM(S): at 21:40

## 2019-01-01 RX ADMIN — MUPIROCIN 1 APPLICATION(S): 20 OINTMENT TOPICAL at 19:30

## 2019-01-01 RX ADMIN — SENNA PLUS 2 TABLET(S): 8.6 TABLET ORAL at 21:58

## 2019-01-01 RX ADMIN — PANTOPRAZOLE SODIUM 40 MILLIGRAM(S): 20 TABLET, DELAYED RELEASE ORAL at 06:13

## 2019-01-01 RX ADMIN — Medication 81 MILLIGRAM(S): at 11:30

## 2019-01-01 RX ADMIN — Medication 81 MILLIGRAM(S): at 11:36

## 2019-01-01 RX ADMIN — MORPHINE SULFATE 60 MILLIGRAM(S): 50 CAPSULE, EXTENDED RELEASE ORAL at 18:45

## 2019-01-01 RX ADMIN — TAMSULOSIN HYDROCHLORIDE 0.4 MILLIGRAM(S): 0.4 CAPSULE ORAL at 22:42

## 2019-01-01 RX ADMIN — NYSTATIN CREAM 1 APPLICATION(S): 100000 CREAM TOPICAL at 18:28

## 2019-01-01 RX ADMIN — CEFEPIME 100 MILLIGRAM(S): 1 INJECTION, POWDER, FOR SOLUTION INTRAMUSCULAR; INTRAVENOUS at 21:58

## 2019-01-01 RX ADMIN — QUETIAPINE FUMARATE 150 MILLIGRAM(S): 200 TABLET, FILM COATED ORAL at 21:42

## 2019-01-01 RX ADMIN — BENZOCAINE AND MENTHOL 1 LOZENGE: 5; 1 LIQUID ORAL at 15:46

## 2019-01-01 RX ADMIN — MORPHINE SULFATE 45 MILLIGRAM(S): 50 CAPSULE, EXTENDED RELEASE ORAL at 17:55

## 2019-01-01 RX ADMIN — Medication 12 UNIT(S): at 11:53

## 2019-01-01 RX ADMIN — GABAPENTIN 800 MILLIGRAM(S): 400 CAPSULE ORAL at 22:49

## 2019-01-01 RX ADMIN — Medication 650 MILLIGRAM(S): at 23:07

## 2019-01-01 RX ADMIN — GABAPENTIN 800 MILLIGRAM(S): 400 CAPSULE ORAL at 05:07

## 2019-01-01 RX ADMIN — Medication 5: at 16:11

## 2019-01-01 RX ADMIN — BENZOCAINE AND MENTHOL 1 LOZENGE: 5; 1 LIQUID ORAL at 05:31

## 2019-01-01 RX ADMIN — CARVEDILOL PHOSPHATE 3.12 MILLIGRAM(S): 80 CAPSULE, EXTENDED RELEASE ORAL at 17:23

## 2019-01-01 RX ADMIN — CEFEPIME 100 MILLIGRAM(S): 1 INJECTION, POWDER, FOR SOLUTION INTRAMUSCULAR; INTRAVENOUS at 06:47

## 2019-01-01 RX ADMIN — Medication 650 MILLIGRAM(S): at 11:40

## 2019-01-01 RX ADMIN — ATORVASTATIN CALCIUM 80 MILLIGRAM(S): 80 TABLET, FILM COATED ORAL at 22:57

## 2019-01-01 RX ADMIN — Medication 1 MILLIGRAM(S): at 17:28

## 2019-01-01 RX ADMIN — PANTOPRAZOLE SODIUM 40 MILLIGRAM(S): 20 TABLET, DELAYED RELEASE ORAL at 08:12

## 2019-01-01 RX ADMIN — Medication 9 UNIT(S): at 17:24

## 2019-01-01 RX ADMIN — MEROPENEM 100 MILLIGRAM(S): 1 INJECTION INTRAVENOUS at 05:11

## 2019-01-01 RX ADMIN — MEROPENEM 100 MILLIGRAM(S): 1 INJECTION INTRAVENOUS at 06:43

## 2019-01-01 RX ADMIN — OXYCODONE HYDROCHLORIDE 10 MILLIGRAM(S): 5 TABLET ORAL at 21:56

## 2019-01-01 RX ADMIN — Medication 650 MILLIGRAM(S): at 11:18

## 2019-01-01 RX ADMIN — OXYCODONE HYDROCHLORIDE 10 MILLIGRAM(S): 5 TABLET ORAL at 12:33

## 2019-01-01 RX ADMIN — SENNA PLUS 2 TABLET(S): 8.6 TABLET ORAL at 22:59

## 2019-01-01 RX ADMIN — Medication 650 MILLIGRAM(S): at 11:47

## 2019-01-01 RX ADMIN — INSULIN GLARGINE 27 UNIT(S): 100 INJECTION, SOLUTION SUBCUTANEOUS at 07:56

## 2019-01-01 RX ADMIN — Medication 650 MILLIGRAM(S): at 17:48

## 2019-01-01 RX ADMIN — MORPHINE SULFATE 60 MILLIGRAM(S): 50 CAPSULE, EXTENDED RELEASE ORAL at 05:28

## 2019-01-01 RX ADMIN — Medication 650 MILLIGRAM(S): at 17:15

## 2019-01-01 RX ADMIN — NYSTATIN CREAM 1 APPLICATION(S): 100000 CREAM TOPICAL at 17:28

## 2019-01-01 RX ADMIN — CARVEDILOL PHOSPHATE 3.12 MILLIGRAM(S): 80 CAPSULE, EXTENDED RELEASE ORAL at 06:20

## 2019-01-01 RX ADMIN — CEFEPIME 100 MILLIGRAM(S): 1 INJECTION, POWDER, FOR SOLUTION INTRAMUSCULAR; INTRAVENOUS at 05:29

## 2019-01-01 RX ADMIN — MEROPENEM 100 MILLIGRAM(S): 1 INJECTION INTRAVENOUS at 05:27

## 2019-01-01 RX ADMIN — PRAMIPEXOLE DIHYDROCHLORIDE 0.5 MILLIGRAM(S): 0.12 TABLET ORAL at 05:52

## 2019-01-01 RX ADMIN — DULOXETINE HYDROCHLORIDE 60 MILLIGRAM(S): 30 CAPSULE, DELAYED RELEASE ORAL at 17:22

## 2019-01-01 RX ADMIN — Medication 1 MILLIGRAM(S): at 21:25

## 2019-01-01 RX ADMIN — MORPHINE SULFATE 45 MILLIGRAM(S): 50 CAPSULE, EXTENDED RELEASE ORAL at 05:11

## 2019-01-01 RX ADMIN — MORPHINE SULFATE 15 MILLIGRAM(S): 50 CAPSULE, EXTENDED RELEASE ORAL at 13:32

## 2019-01-01 RX ADMIN — MEROPENEM 100 MILLIGRAM(S): 1 INJECTION INTRAVENOUS at 21:39

## 2019-01-01 RX ADMIN — Medication 9 UNIT(S): at 17:20

## 2019-01-01 RX ADMIN — IMMUNE GLOBULIN (HUMAN) 150 GRAM(S): 10 INJECTION INTRAVENOUS; SUBCUTANEOUS at 11:10

## 2019-01-01 RX ADMIN — MORPHINE SULFATE 60 MILLIGRAM(S): 50 CAPSULE, EXTENDED RELEASE ORAL at 17:46

## 2019-01-01 RX ADMIN — CHLORHEXIDINE GLUCONATE 1 APPLICATION(S): 213 SOLUTION TOPICAL at 05:07

## 2019-01-01 RX ADMIN — NALOXONE HYDROCHLORIDE 0.8 MILLIGRAM(S): 4 SPRAY NASAL at 17:48

## 2019-01-01 RX ADMIN — CARVEDILOL PHOSPHATE 3.12 MILLIGRAM(S): 80 CAPSULE, EXTENDED RELEASE ORAL at 17:18

## 2019-01-01 RX ADMIN — MORPHINE SULFATE 60 MILLIGRAM(S): 50 CAPSULE, EXTENDED RELEASE ORAL at 06:03

## 2019-01-01 RX ADMIN — CARVEDILOL PHOSPHATE 3.12 MILLIGRAM(S): 80 CAPSULE, EXTENDED RELEASE ORAL at 06:49

## 2019-01-01 RX ADMIN — PANTOPRAZOLE SODIUM 40 MILLIGRAM(S): 20 TABLET, DELAYED RELEASE ORAL at 06:30

## 2019-01-01 RX ADMIN — ENOXAPARIN SODIUM 40 MILLIGRAM(S): 100 INJECTION SUBCUTANEOUS at 11:35

## 2019-01-01 RX ADMIN — TAMSULOSIN HYDROCHLORIDE 0.4 MILLIGRAM(S): 0.4 CAPSULE ORAL at 22:23

## 2019-01-01 RX ADMIN — Medication 9 UNIT(S): at 11:54

## 2019-01-01 RX ADMIN — Medication 10 MILLIGRAM(S): at 06:06

## 2019-01-01 RX ADMIN — OXYCODONE HYDROCHLORIDE 10 MILLIGRAM(S): 5 TABLET ORAL at 22:34

## 2019-01-01 RX ADMIN — Medication 1 APPLICATION(S): at 06:05

## 2019-01-01 RX ADMIN — Medication 9: at 17:03

## 2019-01-01 RX ADMIN — ATORVASTATIN CALCIUM 80 MILLIGRAM(S): 80 TABLET, FILM COATED ORAL at 21:09

## 2019-01-01 RX ADMIN — Medication 650 MILLIGRAM(S): at 05:35

## 2019-01-01 RX ADMIN — GABAPENTIN 800 MILLIGRAM(S): 400 CAPSULE ORAL at 13:07

## 2019-01-01 RX ADMIN — MORPHINE SULFATE 60 MILLIGRAM(S): 50 CAPSULE, EXTENDED RELEASE ORAL at 06:42

## 2019-01-01 RX ADMIN — MORPHINE SULFATE 2 MILLIGRAM(S): 50 CAPSULE, EXTENDED RELEASE ORAL at 03:39

## 2019-01-01 RX ADMIN — MORPHINE SULFATE 30 MILLIGRAM(S): 50 CAPSULE, EXTENDED RELEASE ORAL at 06:30

## 2019-01-01 RX ADMIN — INSULIN GLARGINE 27 UNIT(S): 100 INJECTION, SOLUTION SUBCUTANEOUS at 21:50

## 2019-01-01 RX ADMIN — Medication 81 MILLIGRAM(S): at 12:20

## 2019-01-01 RX ADMIN — Medication 650 MILLIGRAM(S): at 17:25

## 2019-01-01 RX ADMIN — CLOPIDOGREL BISULFATE 75 MILLIGRAM(S): 75 TABLET, FILM COATED ORAL at 11:36

## 2019-01-01 RX ADMIN — NYSTATIN CREAM 1 APPLICATION(S): 100000 CREAM TOPICAL at 17:54

## 2019-01-01 RX ADMIN — NALOXONE HYDROCHLORIDE 0.4 MILLIGRAM(S): 4 SPRAY NASAL at 14:51

## 2019-01-01 RX ADMIN — PANTOPRAZOLE SODIUM 40 MILLIGRAM(S): 20 TABLET, DELAYED RELEASE ORAL at 07:54

## 2019-01-01 RX ADMIN — MORPHINE SULFATE 60 MILLIGRAM(S): 50 CAPSULE, EXTENDED RELEASE ORAL at 06:08

## 2019-01-01 RX ADMIN — CARVEDILOL PHOSPHATE 3.12 MILLIGRAM(S): 80 CAPSULE, EXTENDED RELEASE ORAL at 17:09

## 2019-01-01 RX ADMIN — Medication 5 MILLIGRAM(S): at 17:23

## 2019-01-01 RX ADMIN — Medication 1 TABLET(S): at 12:03

## 2019-01-01 RX ADMIN — PRAMIPEXOLE DIHYDROCHLORIDE 0.5 MILLIGRAM(S): 0.12 TABLET ORAL at 05:40

## 2019-01-01 RX ADMIN — CLOPIDOGREL BISULFATE 75 MILLIGRAM(S): 75 TABLET, FILM COATED ORAL at 11:30

## 2019-01-01 RX ADMIN — Medication 100 MILLIGRAM(S): at 21:09

## 2019-01-01 RX ADMIN — Medication 650 MILLIGRAM(S): at 17:49

## 2019-01-01 RX ADMIN — ATORVASTATIN CALCIUM 80 MILLIGRAM(S): 80 TABLET, FILM COATED ORAL at 21:57

## 2019-01-01 RX ADMIN — PANTOPRAZOLE SODIUM 40 MILLIGRAM(S): 20 TABLET, DELAYED RELEASE ORAL at 05:07

## 2019-01-01 RX ADMIN — Medication 166.67 MILLIGRAM(S): at 05:16

## 2019-01-01 RX ADMIN — Medication 1 MILLIGRAM(S): at 22:02

## 2019-01-01 RX ADMIN — GABAPENTIN 800 MILLIGRAM(S): 400 CAPSULE ORAL at 05:31

## 2019-01-01 RX ADMIN — CARVEDILOL PHOSPHATE 3.12 MILLIGRAM(S): 80 CAPSULE, EXTENDED RELEASE ORAL at 05:12

## 2019-01-01 RX ADMIN — PRAMIPEXOLE DIHYDROCHLORIDE 0.5 MILLIGRAM(S): 0.12 TABLET ORAL at 17:49

## 2019-01-01 RX ADMIN — GABAPENTIN 800 MILLIGRAM(S): 400 CAPSULE ORAL at 05:41

## 2019-01-01 RX ADMIN — MORPHINE SULFATE 45 MILLIGRAM(S): 50 CAPSULE, EXTENDED RELEASE ORAL at 18:25

## 2019-01-01 RX ADMIN — Medication 1: at 12:04

## 2019-01-01 RX ADMIN — Medication 1: at 11:55

## 2019-01-01 RX ADMIN — Medication 10 MILLIGRAM(S): at 06:09

## 2019-01-01 RX ADMIN — Medication 10 MILLIGRAM(S): at 17:25

## 2019-01-01 RX ADMIN — ATORVASTATIN CALCIUM 80 MILLIGRAM(S): 80 TABLET, FILM COATED ORAL at 21:25

## 2019-01-01 RX ADMIN — MORPHINE SULFATE 15 MILLIGRAM(S): 50 CAPSULE, EXTENDED RELEASE ORAL at 11:15

## 2019-01-01 RX ADMIN — Medication 10 MILLIGRAM(S): at 17:57

## 2019-01-01 RX ADMIN — Medication 50 MILLIGRAM(S): at 23:12

## 2019-01-01 RX ADMIN — Medication 250 MILLIGRAM(S): at 17:31

## 2019-01-01 RX ADMIN — CHLORHEXIDINE GLUCONATE 1 APPLICATION(S): 213 SOLUTION TOPICAL at 05:06

## 2019-01-01 RX ADMIN — MEROPENEM 100 MILLIGRAM(S): 1 INJECTION INTRAVENOUS at 21:49

## 2019-01-01 RX ADMIN — MUPIROCIN 1 APPLICATION(S): 20 OINTMENT TOPICAL at 06:46

## 2019-01-01 RX ADMIN — OXYCODONE HYDROCHLORIDE 10 MILLIGRAM(S): 5 TABLET ORAL at 09:00

## 2019-01-01 RX ADMIN — Medication 5 MILLIGRAM(S): at 21:48

## 2019-01-01 RX ADMIN — Medication 81 MILLIGRAM(S): at 11:57

## 2019-01-01 RX ADMIN — Medication 81 MILLIGRAM(S): at 12:21

## 2019-01-01 RX ADMIN — Medication 5: at 16:39

## 2019-01-01 RX ADMIN — MEROPENEM 100 MILLIGRAM(S): 1 INJECTION INTRAVENOUS at 21:43

## 2019-01-01 RX ADMIN — ATORVASTATIN CALCIUM 80 MILLIGRAM(S): 80 TABLET, FILM COATED ORAL at 21:16

## 2019-01-01 RX ADMIN — OXYCODONE HYDROCHLORIDE 10 MILLIGRAM(S): 5 TABLET ORAL at 15:36

## 2019-01-01 RX ADMIN — OXYCODONE HYDROCHLORIDE 10 MILLIGRAM(S): 5 TABLET ORAL at 10:30

## 2019-01-01 RX ADMIN — LACTULOSE 15 GRAM(S): 10 SOLUTION ORAL at 06:07

## 2019-01-01 RX ADMIN — Medication 100 MILLIGRAM(S): at 05:36

## 2019-01-01 RX ADMIN — OXYCODONE HYDROCHLORIDE 10 MILLIGRAM(S): 5 TABLET ORAL at 20:36

## 2019-01-01 RX ADMIN — MEROPENEM 100 MILLIGRAM(S): 1 INJECTION INTRAVENOUS at 05:40

## 2019-01-01 RX ADMIN — CARVEDILOL PHOSPHATE 3.12 MILLIGRAM(S): 80 CAPSULE, EXTENDED RELEASE ORAL at 18:07

## 2019-01-01 RX ADMIN — INSULIN GLARGINE 27 UNIT(S): 100 INJECTION, SOLUTION SUBCUTANEOUS at 23:28

## 2019-01-01 RX ADMIN — Medication 25 MILLIGRAM(S): at 11:08

## 2019-01-01 RX ADMIN — Medication 9 UNIT(S): at 16:57

## 2019-01-01 RX ADMIN — Medication 81 MILLIGRAM(S): at 11:47

## 2019-01-01 RX ADMIN — Medication 1 MILLIGRAM(S): at 12:13

## 2019-01-01 RX ADMIN — DULOXETINE HYDROCHLORIDE 90 MILLIGRAM(S): 30 CAPSULE, DELAYED RELEASE ORAL at 13:21

## 2019-01-01 RX ADMIN — DULOXETINE HYDROCHLORIDE 90 MILLIGRAM(S): 30 CAPSULE, DELAYED RELEASE ORAL at 11:36

## 2019-01-01 RX ADMIN — MORPHINE SULFATE 60 MILLIGRAM(S): 50 CAPSULE, EXTENDED RELEASE ORAL at 01:12

## 2019-01-01 RX ADMIN — INSULIN GLARGINE 36 UNIT(S): 100 INJECTION, SOLUTION SUBCUTANEOUS at 23:11

## 2019-01-01 RX ADMIN — Medication 9 UNIT(S): at 08:06

## 2019-01-01 RX ADMIN — MORPHINE SULFATE 45 MILLIGRAM(S): 50 CAPSULE, EXTENDED RELEASE ORAL at 06:50

## 2019-01-01 RX ADMIN — Medication 5 MILLIGRAM(S): at 22:38

## 2019-01-01 RX ADMIN — Medication 50 MILLIGRAM(S): at 01:13

## 2019-01-01 RX ADMIN — GABAPENTIN 800 MILLIGRAM(S): 400 CAPSULE ORAL at 14:31

## 2019-01-01 RX ADMIN — MORPHINE SULFATE 45 MILLIGRAM(S): 50 CAPSULE, EXTENDED RELEASE ORAL at 18:22

## 2019-01-01 RX ADMIN — MEROPENEM 100 MILLIGRAM(S): 1 INJECTION INTRAVENOUS at 06:53

## 2019-01-01 RX ADMIN — Medication 10 MILLIGRAM(S): at 17:36

## 2019-01-01 RX ADMIN — Medication 50 MILLIGRAM(S): at 21:51

## 2019-01-01 RX ADMIN — CHLORHEXIDINE GLUCONATE 1 APPLICATION(S): 213 SOLUTION TOPICAL at 06:50

## 2019-01-01 RX ADMIN — TAMSULOSIN HYDROCHLORIDE 0.4 MILLIGRAM(S): 0.4 CAPSULE ORAL at 21:31

## 2019-01-01 RX ADMIN — Medication 5 MILLIGRAM(S): at 21:20

## 2019-01-01 RX ADMIN — MORPHINE SULFATE 60 MILLIGRAM(S): 50 CAPSULE, EXTENDED RELEASE ORAL at 19:58

## 2019-01-01 RX ADMIN — CHLORHEXIDINE GLUCONATE 1 APPLICATION(S): 213 SOLUTION TOPICAL at 02:01

## 2019-01-01 RX ADMIN — Medication 9 UNIT(S): at 17:03

## 2019-01-01 RX ADMIN — Medication 12 UNIT(S): at 08:11

## 2019-01-01 RX ADMIN — Medication 5 MILLIGRAM(S): at 21:19

## 2019-01-01 RX ADMIN — Medication 5 MILLIGRAM(S): at 21:58

## 2019-01-01 RX ADMIN — Medication 1 TABLET(S): at 12:02

## 2019-01-01 RX ADMIN — GABAPENTIN 800 MILLIGRAM(S): 400 CAPSULE ORAL at 13:28

## 2019-01-01 RX ADMIN — Medication 9 UNIT(S): at 12:25

## 2019-01-01 RX ADMIN — MEROPENEM 100 MILLIGRAM(S): 1 INJECTION INTRAVENOUS at 21:44

## 2019-01-01 RX ADMIN — INSULIN GLARGINE 36 UNIT(S): 100 INJECTION, SOLUTION SUBCUTANEOUS at 21:15

## 2019-01-01 RX ADMIN — GABAPENTIN 800 MILLIGRAM(S): 400 CAPSULE ORAL at 21:44

## 2019-01-01 RX ADMIN — Medication 1: at 17:24

## 2019-01-01 RX ADMIN — Medication 5 MILLIGRAM(S): at 19:29

## 2019-01-01 RX ADMIN — Medication 0.5 MILLIGRAM(S): at 12:31

## 2019-01-01 RX ADMIN — Medication 12 UNIT(S): at 06:09

## 2019-01-01 RX ADMIN — POLYETHYLENE GLYCOL 3350 17 GRAM(S): 17 POWDER, FOR SOLUTION ORAL at 02:19

## 2019-01-01 RX ADMIN — CARVEDILOL PHOSPHATE 3.12 MILLIGRAM(S): 80 CAPSULE, EXTENDED RELEASE ORAL at 17:25

## 2019-01-01 RX ADMIN — MORPHINE SULFATE 15 MILLIGRAM(S): 50 CAPSULE, EXTENDED RELEASE ORAL at 13:00

## 2019-01-01 RX ADMIN — DULOXETINE HYDROCHLORIDE 90 MILLIGRAM(S): 30 CAPSULE, DELAYED RELEASE ORAL at 13:11

## 2019-01-01 RX ADMIN — MORPHINE SULFATE 15 MILLIGRAM(S): 50 CAPSULE, EXTENDED RELEASE ORAL at 07:10

## 2019-01-01 RX ADMIN — Medication 2 MILLIGRAM(S): at 07:36

## 2019-01-01 RX ADMIN — Medication 1 APPLICATION(S): at 05:12

## 2019-01-01 RX ADMIN — LACTULOSE 15 GRAM(S): 10 SOLUTION ORAL at 06:17

## 2019-01-01 RX ADMIN — Medication 1: at 08:31

## 2019-01-01 RX ADMIN — Medication 10 MILLIGRAM(S): at 06:04

## 2019-01-01 RX ADMIN — Medication 10 MILLIGRAM(S): at 18:35

## 2019-01-01 RX ADMIN — TAMSULOSIN HYDROCHLORIDE 0.4 MILLIGRAM(S): 0.4 CAPSULE ORAL at 21:51

## 2019-01-01 RX ADMIN — CLOPIDOGREL BISULFATE 75 MILLIGRAM(S): 75 TABLET, FILM COATED ORAL at 12:29

## 2019-01-01 RX ADMIN — Medication 12 UNIT(S): at 07:59

## 2019-01-01 RX ADMIN — Medication 81 MILLIGRAM(S): at 11:35

## 2019-01-01 RX ADMIN — Medication 50 MILLIGRAM(S): at 21:48

## 2019-01-01 RX ADMIN — CEFEPIME 100 MILLIGRAM(S): 1 INJECTION, POWDER, FOR SOLUTION INTRAMUSCULAR; INTRAVENOUS at 13:13

## 2019-01-01 RX ADMIN — CEFEPIME 100 MILLIGRAM(S): 1 INJECTION, POWDER, FOR SOLUTION INTRAMUSCULAR; INTRAVENOUS at 14:17

## 2019-01-01 RX ADMIN — Medication 5 MILLIGRAM(S): at 05:40

## 2019-01-01 RX ADMIN — MORPHINE SULFATE 60 MILLIGRAM(S): 50 CAPSULE, EXTENDED RELEASE ORAL at 06:46

## 2019-01-01 RX ADMIN — Medication 1 MILLIGRAM(S): at 13:21

## 2019-01-01 RX ADMIN — GABAPENTIN 800 MILLIGRAM(S): 400 CAPSULE ORAL at 21:48

## 2019-01-01 RX ADMIN — Medication 1: at 07:49

## 2019-01-01 RX ADMIN — TAMSULOSIN HYDROCHLORIDE 0.4 MILLIGRAM(S): 0.4 CAPSULE ORAL at 21:09

## 2019-01-01 RX ADMIN — HYDROMORPHONE HYDROCHLORIDE 1 MILLIGRAM(S): 2 INJECTION INTRAMUSCULAR; INTRAVENOUS; SUBCUTANEOUS at 04:52

## 2019-01-01 RX ADMIN — Medication 650 MILLIGRAM(S): at 17:14

## 2019-01-01 RX ADMIN — MORPHINE SULFATE 60 MILLIGRAM(S): 50 CAPSULE, EXTENDED RELEASE ORAL at 06:56

## 2019-01-01 RX ADMIN — PRAMIPEXOLE DIHYDROCHLORIDE 0.5 MILLIGRAM(S): 0.12 TABLET ORAL at 17:26

## 2019-01-01 RX ADMIN — Medication 650 MILLIGRAM(S): at 17:19

## 2019-01-01 RX ADMIN — CLOPIDOGREL BISULFATE 75 MILLIGRAM(S): 75 TABLET, FILM COATED ORAL at 12:19

## 2019-01-01 RX ADMIN — Medication 9 UNIT(S): at 12:17

## 2019-01-01 RX ADMIN — TAMSULOSIN HYDROCHLORIDE 0.4 MILLIGRAM(S): 0.4 CAPSULE ORAL at 22:27

## 2019-01-01 RX ADMIN — CARVEDILOL PHOSPHATE 3.12 MILLIGRAM(S): 80 CAPSULE, EXTENDED RELEASE ORAL at 17:06

## 2019-01-01 RX ADMIN — Medication 1 MILLIGRAM(S): at 05:12

## 2019-01-01 RX ADMIN — GABAPENTIN 800 MILLIGRAM(S): 400 CAPSULE ORAL at 06:04

## 2019-01-01 RX ADMIN — IMMUNE GLOBULIN (HUMAN) 150 GRAM(S): 10 INJECTION INTRAVENOUS; SUBCUTANEOUS at 09:29

## 2019-01-01 RX ADMIN — Medication 9 UNIT(S): at 11:52

## 2019-01-01 RX ADMIN — CARVEDILOL PHOSPHATE 3.12 MILLIGRAM(S): 80 CAPSULE, EXTENDED RELEASE ORAL at 05:33

## 2019-01-01 RX ADMIN — MORPHINE SULFATE 60 MILLIGRAM(S): 50 CAPSULE, EXTENDED RELEASE ORAL at 07:25

## 2019-01-01 RX ADMIN — DULOXETINE HYDROCHLORIDE 90 MILLIGRAM(S): 30 CAPSULE, DELAYED RELEASE ORAL at 11:40

## 2019-01-01 RX ADMIN — CEFEPIME 100 MILLIGRAM(S): 1 INJECTION, POWDER, FOR SOLUTION INTRAMUSCULAR; INTRAVENOUS at 21:07

## 2019-01-01 RX ADMIN — Medication 25 MILLIGRAM(S): at 06:19

## 2019-01-01 RX ADMIN — Medication 1 MILLIGRAM(S): at 13:09

## 2019-01-01 RX ADMIN — Medication 100 MILLIGRAM(S): at 21:48

## 2019-01-01 RX ADMIN — MORPHINE SULFATE 60 MILLIGRAM(S): 50 CAPSULE, EXTENDED RELEASE ORAL at 12:56

## 2019-01-01 RX ADMIN — Medication 9 UNIT(S): at 11:34

## 2019-01-01 RX ADMIN — Medication 50 MILLIGRAM(S): at 21:43

## 2019-01-01 RX ADMIN — PRAMIPEXOLE DIHYDROCHLORIDE 0.5 MILLIGRAM(S): 0.12 TABLET ORAL at 05:30

## 2019-01-01 RX ADMIN — MEROPENEM 100 MILLIGRAM(S): 1 INJECTION INTRAVENOUS at 05:05

## 2019-01-01 RX ADMIN — LACTULOSE 15 GRAM(S): 10 SOLUTION ORAL at 05:33

## 2019-01-01 RX ADMIN — DULOXETINE HYDROCHLORIDE 60 MILLIGRAM(S): 30 CAPSULE, DELAYED RELEASE ORAL at 17:03

## 2019-01-01 RX ADMIN — INSULIN GLARGINE 36 UNIT(S): 100 INJECTION, SOLUTION SUBCUTANEOUS at 22:02

## 2019-01-01 RX ADMIN — Medication 1 APPLICATION(S): at 06:49

## 2019-01-01 RX ADMIN — MEROPENEM 100 MILLIGRAM(S): 1 INJECTION INTRAVENOUS at 06:04

## 2019-01-01 RX ADMIN — Medication 1 TABLET(S): at 11:29

## 2019-01-01 RX ADMIN — CARVEDILOL PHOSPHATE 3.12 MILLIGRAM(S): 80 CAPSULE, EXTENDED RELEASE ORAL at 17:16

## 2019-01-01 RX ADMIN — MORPHINE SULFATE 15 MILLIGRAM(S): 50 CAPSULE, EXTENDED RELEASE ORAL at 21:36

## 2019-01-01 RX ADMIN — CHLORHEXIDINE GLUCONATE 1 APPLICATION(S): 213 SOLUTION TOPICAL at 05:32

## 2019-01-01 RX ADMIN — Medication 650 MILLIGRAM(S): at 12:50

## 2019-01-01 RX ADMIN — Medication 50 MILLIGRAM(S): at 21:19

## 2019-01-01 RX ADMIN — CEFEPIME 100 MILLIGRAM(S): 1 INJECTION, POWDER, FOR SOLUTION INTRAMUSCULAR; INTRAVENOUS at 22:01

## 2019-01-01 RX ADMIN — INSULIN GLARGINE 27 UNIT(S): 100 INJECTION, SOLUTION SUBCUTANEOUS at 08:31

## 2019-01-01 RX ADMIN — PANTOPRAZOLE SODIUM 40 MILLIGRAM(S): 20 TABLET, DELAYED RELEASE ORAL at 06:06

## 2019-01-01 RX ADMIN — Medication 50 MILLIGRAM(S): at 21:55

## 2019-01-01 RX ADMIN — Medication 1 MILLIGRAM(S): at 21:18

## 2019-01-01 RX ADMIN — Medication 10 MILLIGRAM(S): at 06:46

## 2019-01-01 RX ADMIN — OXYCODONE HYDROCHLORIDE 10 MILLIGRAM(S): 5 TABLET ORAL at 16:13

## 2019-01-01 RX ADMIN — MORPHINE SULFATE 30 MILLIGRAM(S): 50 CAPSULE, EXTENDED RELEASE ORAL at 17:36

## 2019-01-01 RX ADMIN — Medication 5 MILLIGRAM(S): at 17:16

## 2019-01-01 RX ADMIN — DULOXETINE HYDROCHLORIDE 90 MILLIGRAM(S): 30 CAPSULE, DELAYED RELEASE ORAL at 11:17

## 2019-01-01 RX ADMIN — PANTOPRAZOLE SODIUM 40 MILLIGRAM(S): 20 TABLET, DELAYED RELEASE ORAL at 09:01

## 2019-01-01 RX ADMIN — Medication 650 MILLIGRAM(S): at 00:07

## 2019-01-01 RX ADMIN — CARVEDILOL PHOSPHATE 3.12 MILLIGRAM(S): 80 CAPSULE, EXTENDED RELEASE ORAL at 17:57

## 2019-01-01 RX ADMIN — OXYCODONE HYDROCHLORIDE 10 MILLIGRAM(S): 5 TABLET ORAL at 08:36

## 2019-01-01 RX ADMIN — GABAPENTIN 800 MILLIGRAM(S): 400 CAPSULE ORAL at 13:53

## 2019-01-01 RX ADMIN — DULOXETINE HYDROCHLORIDE 90 MILLIGRAM(S): 30 CAPSULE, DELAYED RELEASE ORAL at 12:19

## 2019-01-01 RX ADMIN — Medication 1 MILLIGRAM(S): at 23:11

## 2019-01-01 RX ADMIN — QUETIAPINE FUMARATE 150 MILLIGRAM(S): 200 TABLET, FILM COATED ORAL at 21:09

## 2019-01-01 RX ADMIN — CARVEDILOL PHOSPHATE 3.12 MILLIGRAM(S): 80 CAPSULE, EXTENDED RELEASE ORAL at 06:13

## 2019-01-01 RX ADMIN — MORPHINE SULFATE 2 MILLIGRAM(S): 50 CAPSULE, EXTENDED RELEASE ORAL at 17:17

## 2019-01-01 RX ADMIN — DULOXETINE HYDROCHLORIDE 60 MILLIGRAM(S): 30 CAPSULE, DELAYED RELEASE ORAL at 17:56

## 2019-01-01 RX ADMIN — Medication 5 MILLIGRAM(S): at 06:30

## 2019-01-01 RX ADMIN — PRAMIPEXOLE DIHYDROCHLORIDE 0.5 MILLIGRAM(S): 0.12 TABLET ORAL at 05:29

## 2019-01-01 RX ADMIN — MORPHINE SULFATE 60 MILLIGRAM(S): 50 CAPSULE, EXTENDED RELEASE ORAL at 05:06

## 2019-01-01 RX ADMIN — NYSTATIN CREAM 1 APPLICATION(S): 100000 CREAM TOPICAL at 05:35

## 2019-01-01 RX ADMIN — CEFEPIME 100 MILLIGRAM(S): 1 INJECTION, POWDER, FOR SOLUTION INTRAMUSCULAR; INTRAVENOUS at 21:32

## 2019-01-01 RX ADMIN — Medication 9 UNIT(S): at 11:40

## 2019-01-01 RX ADMIN — OXYCODONE HYDROCHLORIDE 10 MILLIGRAM(S): 5 TABLET ORAL at 11:41

## 2019-01-01 RX ADMIN — Medication 1 MILLIGRAM(S): at 18:39

## 2019-01-01 RX ADMIN — Medication 100 MILLIGRAM(S): at 12:19

## 2019-01-01 RX ADMIN — MEROPENEM 100 MILLIGRAM(S): 1 INJECTION INTRAVENOUS at 13:16

## 2019-01-01 RX ADMIN — OXYCODONE HYDROCHLORIDE 10 MILLIGRAM(S): 5 TABLET ORAL at 21:20

## 2019-01-01 RX ADMIN — Medication 1 TABLET(S): at 12:00

## 2019-01-01 RX ADMIN — Medication 1 MILLIGRAM(S): at 11:34

## 2019-01-01 RX ADMIN — INSULIN GLARGINE 27 UNIT(S): 100 INJECTION, SOLUTION SUBCUTANEOUS at 21:58

## 2019-01-01 RX ADMIN — GABAPENTIN 800 MILLIGRAM(S): 400 CAPSULE ORAL at 21:43

## 2019-01-01 RX ADMIN — MORPHINE SULFATE 15 MILLIGRAM(S): 50 CAPSULE, EXTENDED RELEASE ORAL at 14:48

## 2019-01-01 RX ADMIN — MORPHINE SULFATE 45 MILLIGRAM(S): 50 CAPSULE, EXTENDED RELEASE ORAL at 17:28

## 2019-01-01 RX ADMIN — Medication 5 MILLIGRAM(S): at 21:40

## 2019-01-01 RX ADMIN — MORPHINE SULFATE 15 MILLIGRAM(S): 50 CAPSULE, EXTENDED RELEASE ORAL at 06:18

## 2019-01-01 RX ADMIN — Medication 100 MILLIGRAM(S): at 05:34

## 2019-01-01 RX ADMIN — Medication 9 UNIT(S): at 12:30

## 2019-01-01 RX ADMIN — GABAPENTIN 800 MILLIGRAM(S): 400 CAPSULE ORAL at 13:40

## 2019-01-01 RX ADMIN — Medication 5 MILLIGRAM(S): at 06:06

## 2019-01-01 RX ADMIN — Medication 650 MILLIGRAM(S): at 00:28

## 2019-01-01 RX ADMIN — SENNA PLUS 2 TABLET(S): 8.6 TABLET ORAL at 21:43

## 2019-01-01 RX ADMIN — PRAMIPEXOLE DIHYDROCHLORIDE 0.5 MILLIGRAM(S): 0.12 TABLET ORAL at 17:21

## 2019-01-01 RX ADMIN — MORPHINE SULFATE 45 MILLIGRAM(S): 50 CAPSULE, EXTENDED RELEASE ORAL at 17:15

## 2019-01-01 RX ADMIN — Medication 1 APPLICATION(S): at 05:08

## 2019-01-01 RX ADMIN — Medication 100 MILLIGRAM(S): at 06:35

## 2019-01-01 RX ADMIN — LACTULOSE 15 GRAM(S): 10 SOLUTION ORAL at 05:30

## 2019-01-01 RX ADMIN — LACTULOSE 15 GRAM(S): 10 SOLUTION ORAL at 19:29

## 2019-01-01 RX ADMIN — NYSTATIN CREAM 1 APPLICATION(S): 100000 CREAM TOPICAL at 17:27

## 2019-01-01 RX ADMIN — Medication 5 MILLIGRAM(S): at 05:36

## 2019-01-01 RX ADMIN — MEROPENEM 100 MILLIGRAM(S): 1 INJECTION INTRAVENOUS at 14:18

## 2019-01-01 RX ADMIN — GABAPENTIN 800 MILLIGRAM(S): 400 CAPSULE ORAL at 21:47

## 2019-01-01 RX ADMIN — Medication 5 MILLIGRAM(S): at 21:44

## 2019-01-01 RX ADMIN — Medication 1 MILLIGRAM(S): at 21:11

## 2019-01-01 RX ADMIN — Medication 650 MILLIGRAM(S): at 05:31

## 2019-01-01 RX ADMIN — PRAMIPEXOLE DIHYDROCHLORIDE 0.5 MILLIGRAM(S): 0.12 TABLET ORAL at 05:37

## 2019-01-01 RX ADMIN — GABAPENTIN 800 MILLIGRAM(S): 400 CAPSULE ORAL at 21:32

## 2019-01-01 RX ADMIN — NYSTATIN CREAM 1 APPLICATION(S): 100000 CREAM TOPICAL at 05:42

## 2019-01-01 RX ADMIN — TAMSULOSIN HYDROCHLORIDE 0.4 MILLIGRAM(S): 0.4 CAPSULE ORAL at 21:43

## 2019-01-01 RX ADMIN — DULOXETINE HYDROCHLORIDE 90 MILLIGRAM(S): 30 CAPSULE, DELAYED RELEASE ORAL at 12:20

## 2019-01-01 RX ADMIN — Medication 650 MILLIGRAM(S): at 17:06

## 2019-01-01 RX ADMIN — MORPHINE SULFATE 60 MILLIGRAM(S): 50 CAPSULE, EXTENDED RELEASE ORAL at 18:24

## 2019-01-01 RX ADMIN — Medication 100 MILLIGRAM(S): at 05:30

## 2019-01-01 RX ADMIN — GABAPENTIN 800 MILLIGRAM(S): 400 CAPSULE ORAL at 21:40

## 2019-01-01 RX ADMIN — MEROPENEM 100 MILLIGRAM(S): 1 INJECTION INTRAVENOUS at 14:21

## 2019-01-01 RX ADMIN — Medication 5: at 11:33

## 2019-01-01 RX ADMIN — CARVEDILOL PHOSPHATE 3.12 MILLIGRAM(S): 80 CAPSULE, EXTENDED RELEASE ORAL at 17:53

## 2019-01-01 RX ADMIN — CHLORHEXIDINE GLUCONATE 1 APPLICATION(S): 213 SOLUTION TOPICAL at 05:30

## 2019-01-01 RX ADMIN — INSULIN GLARGINE 27 UNIT(S): 100 INJECTION, SOLUTION SUBCUTANEOUS at 07:53

## 2019-01-01 RX ADMIN — MEROPENEM 100 MILLIGRAM(S): 1 INJECTION INTRAVENOUS at 06:11

## 2019-01-01 RX ADMIN — Medication 650 MILLIGRAM(S): at 12:40

## 2019-01-01 RX ADMIN — ATORVASTATIN CALCIUM 80 MILLIGRAM(S): 80 TABLET, FILM COATED ORAL at 21:43

## 2019-01-01 RX ADMIN — CHLORHEXIDINE GLUCONATE 1 APPLICATION(S): 213 SOLUTION TOPICAL at 05:18

## 2019-01-01 RX ADMIN — LACTULOSE 15 GRAM(S): 10 SOLUTION ORAL at 19:00

## 2019-01-01 RX ADMIN — Medication 50 MILLIGRAM(S): at 21:59

## 2019-01-01 RX ADMIN — DULOXETINE HYDROCHLORIDE 60 MILLIGRAM(S): 30 CAPSULE, DELAYED RELEASE ORAL at 05:12

## 2019-01-01 RX ADMIN — GABAPENTIN 800 MILLIGRAM(S): 400 CAPSULE ORAL at 06:47

## 2019-01-01 RX ADMIN — SENNA PLUS 2 TABLET(S): 8.6 TABLET ORAL at 21:51

## 2019-01-01 RX ADMIN — Medication 10 MILLIGRAM(S): at 05:41

## 2019-01-01 RX ADMIN — OXYCODONE HYDROCHLORIDE 10 MILLIGRAM(S): 5 TABLET ORAL at 15:13

## 2019-01-01 RX ADMIN — PRAMIPEXOLE DIHYDROCHLORIDE 0.5 MILLIGRAM(S): 0.12 TABLET ORAL at 17:16

## 2019-01-01 RX ADMIN — MORPHINE SULFATE 2 MILLIGRAM(S): 50 CAPSULE, EXTENDED RELEASE ORAL at 09:10

## 2019-01-01 RX ADMIN — MORPHINE SULFATE 2 MILLIGRAM(S): 50 CAPSULE, EXTENDED RELEASE ORAL at 09:21

## 2019-01-01 RX ADMIN — MORPHINE SULFATE 60 MILLIGRAM(S): 50 CAPSULE, EXTENDED RELEASE ORAL at 06:28

## 2019-01-01 RX ADMIN — BENZOCAINE AND MENTHOL 1 LOZENGE: 5; 1 LIQUID ORAL at 22:03

## 2019-01-01 RX ADMIN — Medication 50 MILLIGRAM(S): at 21:24

## 2019-01-01 RX ADMIN — Medication 650 MILLIGRAM(S): at 06:35

## 2019-01-01 RX ADMIN — OXYCODONE HYDROCHLORIDE 10 MILLIGRAM(S): 5 TABLET ORAL at 09:30

## 2019-01-01 RX ADMIN — OXYCODONE HYDROCHLORIDE 10 MILLIGRAM(S): 5 TABLET ORAL at 05:43

## 2019-01-01 RX ADMIN — Medication 9 UNIT(S): at 16:38

## 2019-01-01 RX ADMIN — Medication 1 MILLIGRAM(S): at 21:48

## 2019-01-01 RX ADMIN — SENNA PLUS 2 TABLET(S): 8.6 TABLET ORAL at 22:02

## 2019-01-01 RX ADMIN — MORPHINE SULFATE 60 MILLIGRAM(S): 50 CAPSULE, EXTENDED RELEASE ORAL at 21:49

## 2019-01-01 RX ADMIN — MORPHINE SULFATE 2 MILLIGRAM(S): 50 CAPSULE, EXTENDED RELEASE ORAL at 04:24

## 2019-01-01 RX ADMIN — LACTULOSE 15 GRAM(S): 10 SOLUTION ORAL at 22:58

## 2019-01-01 RX ADMIN — Medication 5 MILLIGRAM(S): at 17:06

## 2019-01-01 RX ADMIN — SENNA PLUS 2 TABLET(S): 8.6 TABLET ORAL at 22:23

## 2019-01-01 RX ADMIN — Medication 12 UNIT(S): at 11:34

## 2019-01-01 RX ADMIN — Medication 1: at 08:22

## 2019-01-01 RX ADMIN — Medication 1: at 12:11

## 2019-01-01 RX ADMIN — CARVEDILOL PHOSPHATE 3.12 MILLIGRAM(S): 80 CAPSULE, EXTENDED RELEASE ORAL at 06:08

## 2019-01-01 RX ADMIN — MEROPENEM 100 MILLIGRAM(S): 1 INJECTION INTRAVENOUS at 06:20

## 2019-01-01 RX ADMIN — ATORVASTATIN CALCIUM 80 MILLIGRAM(S): 80 TABLET, FILM COATED ORAL at 22:28

## 2019-01-01 RX ADMIN — ATORVASTATIN CALCIUM 80 MILLIGRAM(S): 80 TABLET, FILM COATED ORAL at 22:02

## 2019-01-01 RX ADMIN — NYSTATIN CREAM 1 APPLICATION(S): 100000 CREAM TOPICAL at 17:03

## 2019-01-01 RX ADMIN — ATORVASTATIN CALCIUM 80 MILLIGRAM(S): 80 TABLET, FILM COATED ORAL at 21:48

## 2019-01-01 RX ADMIN — Medication 1 MILLIGRAM(S): at 05:33

## 2019-01-01 RX ADMIN — CEFEPIME 100 MILLIGRAM(S): 1 INJECTION, POWDER, FOR SOLUTION INTRAMUSCULAR; INTRAVENOUS at 13:18

## 2019-01-01 RX ADMIN — GABAPENTIN 800 MILLIGRAM(S): 400 CAPSULE ORAL at 13:19

## 2019-01-01 RX ADMIN — BENZOCAINE AND MENTHOL 1 LOZENGE: 5; 1 LIQUID ORAL at 05:36

## 2019-01-01 RX ADMIN — CEFEPIME 100 MILLIGRAM(S): 1 INJECTION, POWDER, FOR SOLUTION INTRAMUSCULAR; INTRAVENOUS at 12:58

## 2019-01-01 RX ADMIN — Medication 50 MILLIGRAM(S): at 21:31

## 2019-01-01 RX ADMIN — GABAPENTIN 800 MILLIGRAM(S): 400 CAPSULE ORAL at 13:32

## 2019-01-01 RX ADMIN — Medication 250 MILLIGRAM(S): at 17:03

## 2019-01-01 RX ADMIN — Medication 100 MILLIGRAM(S): at 21:25

## 2019-01-01 RX ADMIN — CEFEPIME 100 MILLIGRAM(S): 1 INJECTION, POWDER, FOR SOLUTION INTRAMUSCULAR; INTRAVENOUS at 13:19

## 2019-01-01 RX ADMIN — TAMSULOSIN HYDROCHLORIDE 0.4 MILLIGRAM(S): 0.4 CAPSULE ORAL at 21:48

## 2019-01-01 RX ADMIN — CARVEDILOL PHOSPHATE 3.12 MILLIGRAM(S): 80 CAPSULE, EXTENDED RELEASE ORAL at 17:49

## 2019-01-01 RX ADMIN — GABAPENTIN 800 MILLIGRAM(S): 400 CAPSULE ORAL at 22:38

## 2019-01-01 RX ADMIN — DULOXETINE HYDROCHLORIDE 90 MILLIGRAM(S): 30 CAPSULE, DELAYED RELEASE ORAL at 13:47

## 2019-01-01 RX ADMIN — CARVEDILOL PHOSPHATE 3.12 MILLIGRAM(S): 80 CAPSULE, EXTENDED RELEASE ORAL at 05:40

## 2019-01-01 RX ADMIN — MORPHINE SULFATE 2 MILLIGRAM(S): 50 CAPSULE, EXTENDED RELEASE ORAL at 22:51

## 2019-01-01 RX ADMIN — ONDANSETRON 4 MILLIGRAM(S): 8 TABLET, FILM COATED ORAL at 16:23

## 2019-01-01 RX ADMIN — OXYCODONE HYDROCHLORIDE 10 MILLIGRAM(S): 5 TABLET ORAL at 22:26

## 2019-01-01 RX ADMIN — Medication 9 UNIT(S): at 07:56

## 2019-01-01 RX ADMIN — GABAPENTIN 800 MILLIGRAM(S): 400 CAPSULE ORAL at 06:13

## 2019-01-01 RX ADMIN — Medication 250 MILLIGRAM(S): at 05:28

## 2019-01-01 RX ADMIN — Medication 2: at 07:53

## 2019-01-01 RX ADMIN — Medication 1 TABLET(S): at 12:19

## 2019-01-01 RX ADMIN — MUPIROCIN 1 APPLICATION(S): 20 OINTMENT TOPICAL at 18:37

## 2019-01-01 RX ADMIN — INSULIN GLARGINE 27 UNIT(S): 100 INJECTION, SOLUTION SUBCUTANEOUS at 08:05

## 2019-01-01 RX ADMIN — CLOPIDOGREL BISULFATE 75 MILLIGRAM(S): 75 TABLET, FILM COATED ORAL at 12:03

## 2019-01-01 RX ADMIN — Medication 81 MILLIGRAM(S): at 12:03

## 2019-01-01 RX ADMIN — Medication 5 MILLIGRAM(S): at 07:03

## 2019-01-01 RX ADMIN — PRAMIPEXOLE DIHYDROCHLORIDE 0.5 MILLIGRAM(S): 0.12 TABLET ORAL at 05:31

## 2019-01-01 RX ADMIN — MORPHINE SULFATE 15 MILLIGRAM(S): 50 CAPSULE, EXTENDED RELEASE ORAL at 21:30

## 2019-01-01 RX ADMIN — Medication 81 MILLIGRAM(S): at 11:33

## 2019-01-01 RX ADMIN — Medication 650 MILLIGRAM(S): at 05:54

## 2019-01-01 RX ADMIN — ATORVASTATIN CALCIUM 80 MILLIGRAM(S): 80 TABLET, FILM COATED ORAL at 22:41

## 2019-01-01 RX ADMIN — Medication 5 MILLIGRAM(S): at 17:30

## 2019-01-01 RX ADMIN — Medication 10 MILLIGRAM(S): at 05:34

## 2019-01-01 RX ADMIN — BENZOCAINE AND MENTHOL 1 LOZENGE: 5; 1 LIQUID ORAL at 13:54

## 2019-01-01 RX ADMIN — Medication 9 UNIT(S): at 17:19

## 2019-01-01 RX ADMIN — Medication 100 MILLIGRAM(S): at 13:40

## 2019-01-01 RX ADMIN — BENZOCAINE AND MENTHOL 1 LOZENGE: 5; 1 LIQUID ORAL at 05:44

## 2019-01-01 RX ADMIN — Medication 3: at 11:39

## 2019-01-01 RX ADMIN — Medication 650 MILLIGRAM(S): at 11:24

## 2019-01-01 RX ADMIN — HYDROMORPHONE HYDROCHLORIDE 1 MILLIGRAM(S): 2 INJECTION INTRAMUSCULAR; INTRAVENOUS; SUBCUTANEOUS at 04:38

## 2019-01-01 RX ADMIN — LACTULOSE 15 GRAM(S): 10 SOLUTION ORAL at 13:49

## 2019-01-01 RX ADMIN — Medication 5 MILLIGRAM(S): at 06:04

## 2019-01-01 RX ADMIN — Medication 100 MILLIGRAM(S): at 13:06

## 2019-01-01 RX ADMIN — OXYCODONE HYDROCHLORIDE 10 MILLIGRAM(S): 5 TABLET ORAL at 13:15

## 2019-01-01 RX ADMIN — OXYCODONE HYDROCHLORIDE 10 MILLIGRAM(S): 5 TABLET ORAL at 15:54

## 2019-01-01 RX ADMIN — MAGNESIUM OXIDE 400 MG ORAL TABLET 400 MILLIGRAM(S): 241.3 TABLET ORAL at 17:28

## 2019-01-01 RX ADMIN — CARVEDILOL PHOSPHATE 3.12 MILLIGRAM(S): 80 CAPSULE, EXTENDED RELEASE ORAL at 06:35

## 2019-01-01 RX ADMIN — QUETIAPINE FUMARATE 150 MILLIGRAM(S): 200 TABLET, FILM COATED ORAL at 22:00

## 2019-01-01 RX ADMIN — Medication 1 APPLICATION(S): at 17:34

## 2019-01-01 RX ADMIN — MORPHINE SULFATE 15 MILLIGRAM(S): 50 CAPSULE, EXTENDED RELEASE ORAL at 16:22

## 2019-01-01 RX ADMIN — NYSTATIN CREAM 1 APPLICATION(S): 100000 CREAM TOPICAL at 06:53

## 2019-01-01 RX ADMIN — POLYETHYLENE GLYCOL 3350 17 GRAM(S): 17 POWDER, FOR SOLUTION ORAL at 11:36

## 2019-01-01 RX ADMIN — SENNA PLUS 2 TABLET(S): 8.6 TABLET ORAL at 22:37

## 2019-01-01 RX ADMIN — PRAMIPEXOLE DIHYDROCHLORIDE 0.5 MILLIGRAM(S): 0.12 TABLET ORAL at 17:08

## 2019-01-01 RX ADMIN — QUETIAPINE FUMARATE 150 MILLIGRAM(S): 200 TABLET, FILM COATED ORAL at 22:27

## 2019-01-01 RX ADMIN — PRAMIPEXOLE DIHYDROCHLORIDE 0.5 MILLIGRAM(S): 0.12 TABLET ORAL at 17:25

## 2019-01-01 RX ADMIN — NYSTATIN CREAM 1 APPLICATION(S): 100000 CREAM TOPICAL at 17:19

## 2019-01-01 RX ADMIN — CEFEPIME 100 MILLIGRAM(S): 1 INJECTION, POWDER, FOR SOLUTION INTRAMUSCULAR; INTRAVENOUS at 05:45

## 2019-01-01 RX ADMIN — GABAPENTIN 800 MILLIGRAM(S): 400 CAPSULE ORAL at 06:16

## 2019-01-01 RX ADMIN — MORPHINE SULFATE 5 MILLIGRAM(S): 50 CAPSULE, EXTENDED RELEASE ORAL at 01:00

## 2019-01-01 RX ADMIN — Medication 81 MILLIGRAM(S): at 13:11

## 2019-01-01 RX ADMIN — OXYCODONE HYDROCHLORIDE 10 MILLIGRAM(S): 5 TABLET ORAL at 07:58

## 2019-01-01 RX ADMIN — OXYCODONE HYDROCHLORIDE 10 MILLIGRAM(S): 5 TABLET ORAL at 11:07

## 2019-01-01 RX ADMIN — CARVEDILOL PHOSPHATE 3.12 MILLIGRAM(S): 80 CAPSULE, EXTENDED RELEASE ORAL at 06:04

## 2019-01-01 RX ADMIN — Medication 1 MILLIGRAM(S): at 14:17

## 2019-01-01 RX ADMIN — MORPHINE SULFATE 45 MILLIGRAM(S): 50 CAPSULE, EXTENDED RELEASE ORAL at 17:05

## 2019-01-01 RX ADMIN — Medication 81 MILLIGRAM(S): at 12:56

## 2019-01-01 RX ADMIN — Medication 10 MILLIGRAM(S): at 17:22

## 2019-01-01 RX ADMIN — Medication 250 MILLIGRAM(S): at 05:54

## 2019-01-01 RX ADMIN — Medication 10 MILLIGRAM(S): at 17:33

## 2019-01-01 RX ADMIN — MORPHINE SULFATE 2 MILLIGRAM(S): 50 CAPSULE, EXTENDED RELEASE ORAL at 00:07

## 2019-01-01 RX ADMIN — QUETIAPINE FUMARATE 150 MILLIGRAM(S): 200 TABLET, FILM COATED ORAL at 21:19

## 2019-01-01 RX ADMIN — MORPHINE SULFATE 2 MILLIGRAM(S): 50 CAPSULE, EXTENDED RELEASE ORAL at 11:29

## 2019-01-01 RX ADMIN — DULOXETINE HYDROCHLORIDE 60 MILLIGRAM(S): 30 CAPSULE, DELAYED RELEASE ORAL at 17:26

## 2019-01-01 RX ADMIN — CHLORHEXIDINE GLUCONATE 1 APPLICATION(S): 213 SOLUTION TOPICAL at 05:56

## 2019-01-01 RX ADMIN — INSULIN GLARGINE 27 UNIT(S): 100 INJECTION, SOLUTION SUBCUTANEOUS at 07:49

## 2019-01-01 RX ADMIN — Medication 12 UNIT(S): at 12:25

## 2019-01-01 RX ADMIN — INSULIN GLARGINE 36 UNIT(S): 100 INJECTION, SOLUTION SUBCUTANEOUS at 22:56

## 2019-01-01 RX ADMIN — BENZOCAINE AND MENTHOL 1 LOZENGE: 5; 1 LIQUID ORAL at 21:22

## 2019-01-01 RX ADMIN — Medication 250 MILLIGRAM(S): at 17:21

## 2019-01-01 RX ADMIN — MORPHINE SULFATE 15 MILLIGRAM(S): 50 CAPSULE, EXTENDED RELEASE ORAL at 20:23

## 2019-01-01 RX ADMIN — MEROPENEM 100 MILLIGRAM(S): 1 INJECTION INTRAVENOUS at 13:53

## 2019-01-01 RX ADMIN — Medication 10 MILLIGRAM(S): at 17:09

## 2019-01-01 RX ADMIN — MORPHINE SULFATE 30 MILLIGRAM(S): 50 CAPSULE, EXTENDED RELEASE ORAL at 18:29

## 2019-01-01 RX ADMIN — Medication 250 MILLIGRAM(S): at 06:53

## 2019-01-01 RX ADMIN — Medication 650 MILLIGRAM(S): at 05:38

## 2019-01-01 RX ADMIN — BENZOCAINE AND MENTHOL 1 LOZENGE: 5; 1 LIQUID ORAL at 22:00

## 2019-01-01 RX ADMIN — ATORVASTATIN CALCIUM 80 MILLIGRAM(S): 80 TABLET, FILM COATED ORAL at 22:23

## 2019-01-01 RX ADMIN — GABAPENTIN 800 MILLIGRAM(S): 400 CAPSULE ORAL at 22:27

## 2019-01-01 RX ADMIN — Medication 81 MILLIGRAM(S): at 12:13

## 2019-01-01 RX ADMIN — PRAMIPEXOLE DIHYDROCHLORIDE 0.5 MILLIGRAM(S): 0.12 TABLET ORAL at 05:41

## 2019-01-01 RX ADMIN — MORPHINE SULFATE 45 MILLIGRAM(S): 50 CAPSULE, EXTENDED RELEASE ORAL at 05:37

## 2019-01-01 RX ADMIN — PRAMIPEXOLE DIHYDROCHLORIDE 0.5 MILLIGRAM(S): 0.12 TABLET ORAL at 06:35

## 2019-01-01 RX ADMIN — Medication 1 MILLIGRAM(S): at 15:21

## 2019-01-01 RX ADMIN — Medication 1 MILLIGRAM(S): at 06:08

## 2019-01-01 RX ADMIN — Medication 650 MILLIGRAM(S): at 05:48

## 2019-01-01 RX ADMIN — Medication 650 MILLIGRAM(S): at 09:36

## 2019-01-01 RX ADMIN — NYSTATIN CREAM 1 APPLICATION(S): 100000 CREAM TOPICAL at 05:07

## 2019-01-01 RX ADMIN — ATORVASTATIN CALCIUM 80 MILLIGRAM(S): 80 TABLET, FILM COATED ORAL at 21:40

## 2019-01-01 RX ADMIN — MORPHINE SULFATE 2 MILLIGRAM(S): 50 CAPSULE, EXTENDED RELEASE ORAL at 13:42

## 2019-01-01 RX ADMIN — MEROPENEM 100 MILLIGRAM(S): 1 INJECTION INTRAVENOUS at 21:40

## 2019-01-01 RX ADMIN — Medication 10 MILLIGRAM(S): at 18:36

## 2019-01-01 RX ADMIN — Medication 10 MILLIGRAM(S): at 06:20

## 2019-01-01 RX ADMIN — Medication 1: at 16:34

## 2019-01-01 RX ADMIN — Medication 5 MILLIGRAM(S): at 22:27

## 2019-01-01 RX ADMIN — Medication 650 MILLIGRAM(S): at 11:33

## 2019-01-01 RX ADMIN — CLOPIDOGREL BISULFATE 75 MILLIGRAM(S): 75 TABLET, FILM COATED ORAL at 12:13

## 2019-01-01 RX ADMIN — CLOPIDOGREL BISULFATE 75 MILLIGRAM(S): 75 TABLET, FILM COATED ORAL at 11:35

## 2019-01-01 RX ADMIN — OXYCODONE HYDROCHLORIDE 10 MILLIGRAM(S): 5 TABLET ORAL at 04:15

## 2019-01-01 RX ADMIN — Medication 10 MILLIGRAM(S): at 05:12

## 2019-01-01 RX ADMIN — GABAPENTIN 800 MILLIGRAM(S): 400 CAPSULE ORAL at 21:27

## 2019-01-01 RX ADMIN — Medication 650 MILLIGRAM(S): at 12:34

## 2019-01-01 RX ADMIN — MORPHINE SULFATE 60 MILLIGRAM(S): 50 CAPSULE, EXTENDED RELEASE ORAL at 06:17

## 2019-01-01 RX ADMIN — DULOXETINE HYDROCHLORIDE 90 MILLIGRAM(S): 30 CAPSULE, DELAYED RELEASE ORAL at 11:47

## 2019-01-01 RX ADMIN — Medication 1: at 12:24

## 2019-01-01 RX ADMIN — BENZOCAINE AND MENTHOL 1 LOZENGE: 5; 1 LIQUID ORAL at 13:06

## 2019-01-01 RX ADMIN — Medication 25 MILLIGRAM(S): at 18:39

## 2019-01-01 RX ADMIN — PRAMIPEXOLE DIHYDROCHLORIDE 0.5 MILLIGRAM(S): 0.12 TABLET ORAL at 07:25

## 2019-01-01 RX ADMIN — CEFEPIME 100 MILLIGRAM(S): 1 INJECTION, POWDER, FOR SOLUTION INTRAMUSCULAR; INTRAVENOUS at 22:41

## 2019-01-01 RX ADMIN — Medication 12 UNIT(S): at 17:50

## 2019-01-01 RX ADMIN — Medication 9 UNIT(S): at 12:20

## 2019-01-01 RX ADMIN — CEFEPIME 100 MILLIGRAM(S): 1 INJECTION, POWDER, FOR SOLUTION INTRAMUSCULAR; INTRAVENOUS at 21:49

## 2019-01-01 RX ADMIN — TAMSULOSIN HYDROCHLORIDE 0.4 MILLIGRAM(S): 0.4 CAPSULE ORAL at 21:57

## 2019-01-01 RX ADMIN — MORPHINE SULFATE 45 MILLIGRAM(S): 50 CAPSULE, EXTENDED RELEASE ORAL at 18:19

## 2019-01-01 RX ADMIN — CEFEPIME 100 MILLIGRAM(S): 1 INJECTION, POWDER, FOR SOLUTION INTRAMUSCULAR; INTRAVENOUS at 14:32

## 2019-01-01 RX ADMIN — Medication 50 MILLIGRAM(S): at 21:27

## 2019-01-01 RX ADMIN — Medication 50 MILLIGRAM(S): at 14:38

## 2019-01-01 RX ADMIN — MORPHINE SULFATE 60 MILLIGRAM(S): 50 CAPSULE, EXTENDED RELEASE ORAL at 18:07

## 2019-01-01 RX ADMIN — OXYCODONE HYDROCHLORIDE 10 MILLIGRAM(S): 5 TABLET ORAL at 18:36

## 2019-01-01 RX ADMIN — Medication 5 MILLIGRAM(S): at 05:12

## 2019-01-01 RX ADMIN — CHLORHEXIDINE GLUCONATE 1 APPLICATION(S): 213 SOLUTION TOPICAL at 05:44

## 2019-01-01 RX ADMIN — Medication 0.5 MILLIGRAM(S): at 21:36

## 2019-01-01 RX ADMIN — GABAPENTIN 800 MILLIGRAM(S): 400 CAPSULE ORAL at 21:58

## 2019-01-01 RX ADMIN — DICLOFENAC SODIUM 75 MILLIGRAM(S): 75 TABLET, DELAYED RELEASE ORAL at 10:30

## 2019-01-01 RX ADMIN — MORPHINE SULFATE 45 MILLIGRAM(S): 50 CAPSULE, EXTENDED RELEASE ORAL at 17:47

## 2019-01-01 RX ADMIN — MORPHINE SULFATE 60 MILLIGRAM(S): 50 CAPSULE, EXTENDED RELEASE ORAL at 20:26

## 2019-01-01 RX ADMIN — BENZOCAINE AND MENTHOL 1 LOZENGE: 5; 1 LIQUID ORAL at 22:30

## 2019-01-01 RX ADMIN — MAGNESIUM OXIDE 400 MG ORAL TABLET 400 MILLIGRAM(S): 241.3 TABLET ORAL at 17:24

## 2019-01-01 RX ADMIN — Medication 10 MILLIGRAM(S): at 05:51

## 2019-01-01 RX ADMIN — Medication 650 MILLIGRAM(S): at 17:51

## 2019-01-01 RX ADMIN — MORPHINE SULFATE 15 MILLIGRAM(S): 50 CAPSULE, EXTENDED RELEASE ORAL at 10:32

## 2019-01-01 RX ADMIN — Medication 650 MILLIGRAM(S): at 11:35

## 2019-01-01 RX ADMIN — Medication 10 MILLIGRAM(S): at 06:30

## 2019-01-01 RX ADMIN — NYSTATIN CREAM 1 APPLICATION(S): 100000 CREAM TOPICAL at 05:13

## 2019-01-01 RX ADMIN — Medication 100 MILLIGRAM(S): at 13:48

## 2019-01-01 RX ADMIN — MORPHINE SULFATE 15 MILLIGRAM(S): 50 CAPSULE, EXTENDED RELEASE ORAL at 19:55

## 2019-01-01 RX ADMIN — DULOXETINE HYDROCHLORIDE 60 MILLIGRAM(S): 30 CAPSULE, DELAYED RELEASE ORAL at 05:06

## 2019-01-01 RX ADMIN — Medication 1 MILLIGRAM(S): at 13:15

## 2019-01-01 RX ADMIN — PANTOPRAZOLE SODIUM 40 MILLIGRAM(S): 20 TABLET, DELAYED RELEASE ORAL at 05:33

## 2019-01-01 RX ADMIN — MORPHINE SULFATE 45 MILLIGRAM(S): 50 CAPSULE, EXTENDED RELEASE ORAL at 05:39

## 2019-01-01 RX ADMIN — Medication 12 UNIT(S): at 09:11

## 2019-01-01 RX ADMIN — MORPHINE SULFATE 60 MILLIGRAM(S): 50 CAPSULE, EXTENDED RELEASE ORAL at 19:15

## 2019-01-01 RX ADMIN — ATORVASTATIN CALCIUM 80 MILLIGRAM(S): 80 TABLET, FILM COATED ORAL at 22:38

## 2019-01-01 RX ADMIN — Medication 5 MILLIGRAM(S): at 17:09

## 2019-01-01 RX ADMIN — Medication 5 MILLIGRAM(S): at 06:09

## 2019-01-01 RX ADMIN — DULOXETINE HYDROCHLORIDE 60 MILLIGRAM(S): 30 CAPSULE, DELAYED RELEASE ORAL at 06:20

## 2019-01-01 RX ADMIN — GABAPENTIN 800 MILLIGRAM(S): 400 CAPSULE ORAL at 06:09

## 2019-01-01 RX ADMIN — Medication 5 MILLIGRAM(S): at 05:07

## 2019-01-01 RX ADMIN — CARVEDILOL PHOSPHATE 3.12 MILLIGRAM(S): 80 CAPSULE, EXTENDED RELEASE ORAL at 18:36

## 2019-01-01 RX ADMIN — CARVEDILOL PHOSPHATE 3.12 MILLIGRAM(S): 80 CAPSULE, EXTENDED RELEASE ORAL at 05:13

## 2019-01-01 RX ADMIN — GABAPENTIN 800 MILLIGRAM(S): 400 CAPSULE ORAL at 06:00

## 2019-01-01 RX ADMIN — MORPHINE SULFATE 15 MILLIGRAM(S): 50 CAPSULE, EXTENDED RELEASE ORAL at 10:05

## 2019-01-01 RX ADMIN — Medication 650 MILLIGRAM(S): at 11:57

## 2019-01-01 RX ADMIN — Medication 650 MILLIGRAM(S): at 06:46

## 2019-01-01 RX ADMIN — SENNA PLUS 2 TABLET(S): 8.6 TABLET ORAL at 21:57

## 2019-01-01 RX ADMIN — OXYCODONE HYDROCHLORIDE 10 MILLIGRAM(S): 5 TABLET ORAL at 11:30

## 2019-01-01 RX ADMIN — Medication 25 MILLIGRAM(S): at 09:08

## 2019-01-01 RX ADMIN — NALOXONE HYDROCHLORIDE 0.4 MILLIGRAM(S): 4 SPRAY NASAL at 14:57

## 2019-01-01 RX ADMIN — MORPHINE SULFATE 6 MILLIGRAM(S): 50 CAPSULE, EXTENDED RELEASE ORAL at 21:36

## 2019-01-01 RX ADMIN — Medication 250 MILLIGRAM(S): at 05:05

## 2019-01-01 RX ADMIN — NYSTATIN CREAM 1 APPLICATION(S): 100000 CREAM TOPICAL at 05:41

## 2019-01-01 RX ADMIN — Medication 10 MILLIGRAM(S): at 17:03

## 2019-01-01 RX ADMIN — GABAPENTIN 800 MILLIGRAM(S): 400 CAPSULE ORAL at 05:33

## 2019-01-01 RX ADMIN — MORPHINE SULFATE 15 MILLIGRAM(S): 50 CAPSULE, EXTENDED RELEASE ORAL at 06:19

## 2019-01-01 RX ADMIN — Medication 10 MILLIGRAM(S): at 06:49

## 2019-01-01 RX ADMIN — Medication 100 MILLIGRAM(S): at 05:12

## 2019-01-01 RX ADMIN — Medication 10 MILLIGRAM(S): at 06:13

## 2019-01-01 RX ADMIN — PANTOPRAZOLE SODIUM 40 MILLIGRAM(S): 20 TABLET, DELAYED RELEASE ORAL at 06:56

## 2019-01-01 RX ADMIN — HYDROMORPHONE HYDROCHLORIDE 1 MILLIGRAM(S): 2 INJECTION INTRAMUSCULAR; INTRAVENOUS; SUBCUTANEOUS at 10:52

## 2019-01-01 RX ADMIN — MEROPENEM 100 MILLIGRAM(S): 1 INJECTION INTRAVENOUS at 13:55

## 2019-01-01 RX ADMIN — OXYCODONE HYDROCHLORIDE 10 MILLIGRAM(S): 5 TABLET ORAL at 13:09

## 2019-01-01 RX ADMIN — GABAPENTIN 800 MILLIGRAM(S): 400 CAPSULE ORAL at 06:06

## 2019-01-01 RX ADMIN — Medication 50 MILLIGRAM(S): at 21:16

## 2019-01-01 RX ADMIN — CEFEPIME 100 MILLIGRAM(S): 1 INJECTION, POWDER, FOR SOLUTION INTRAMUSCULAR; INTRAVENOUS at 15:19

## 2019-01-01 RX ADMIN — MORPHINE SULFATE 60 MILLIGRAM(S): 50 CAPSULE, EXTENDED RELEASE ORAL at 20:00

## 2019-01-01 RX ADMIN — BENZOCAINE AND MENTHOL 1 LOZENGE: 5; 1 LIQUID ORAL at 22:28

## 2019-01-01 RX ADMIN — Medication 100 MILLIGRAM(S): at 13:12

## 2019-01-01 RX ADMIN — CEFEPIME 100 MILLIGRAM(S): 1 INJECTION, POWDER, FOR SOLUTION INTRAMUSCULAR; INTRAVENOUS at 05:31

## 2019-01-01 RX ADMIN — DULOXETINE HYDROCHLORIDE 60 MILLIGRAM(S): 30 CAPSULE, DELAYED RELEASE ORAL at 17:19

## 2019-01-01 RX ADMIN — PANTOPRAZOLE SODIUM 40 MILLIGRAM(S): 20 TABLET, DELAYED RELEASE ORAL at 06:20

## 2019-01-01 RX ADMIN — Medication 2: at 12:17

## 2019-01-01 RX ADMIN — Medication 1 MILLIGRAM(S): at 14:01

## 2019-01-01 RX ADMIN — HYDROMORPHONE HYDROCHLORIDE 0.5 MILLIGRAM(S): 2 INJECTION INTRAMUSCULAR; INTRAVENOUS; SUBCUTANEOUS at 22:07

## 2019-01-01 RX ADMIN — INSULIN GLARGINE 36 UNIT(S): 100 INJECTION, SOLUTION SUBCUTANEOUS at 21:31

## 2019-01-01 RX ADMIN — SENNA PLUS 2 TABLET(S): 8.6 TABLET ORAL at 21:40

## 2019-01-01 RX ADMIN — Medication 100 MILLIGRAM(S): at 14:18

## 2019-01-01 RX ADMIN — Medication 1 MILLIGRAM(S): at 05:32

## 2019-01-01 RX ADMIN — Medication 50 GRAM(S): at 15:23

## 2019-01-01 RX ADMIN — Medication 1 TABLET(S): at 11:06

## 2019-01-01 RX ADMIN — MAGNESIUM OXIDE 400 MG ORAL TABLET 400 MILLIGRAM(S): 241.3 TABLET ORAL at 07:52

## 2019-01-01 RX ADMIN — ATORVASTATIN CALCIUM 80 MILLIGRAM(S): 80 TABLET, FILM COATED ORAL at 21:51

## 2019-01-01 RX ADMIN — MUPIROCIN 1 APPLICATION(S): 20 OINTMENT TOPICAL at 18:36

## 2019-01-01 RX ADMIN — Medication 9 UNIT(S): at 12:02

## 2019-01-01 RX ADMIN — TAMSULOSIN HYDROCHLORIDE 0.4 MILLIGRAM(S): 0.4 CAPSULE ORAL at 22:38

## 2019-01-01 RX ADMIN — Medication 10 MILLIGRAM(S): at 06:15

## 2019-01-01 RX ADMIN — MAGNESIUM OXIDE 400 MG ORAL TABLET 400 MILLIGRAM(S): 241.3 TABLET ORAL at 12:00

## 2019-01-01 RX ADMIN — Medication 5 MILLIGRAM(S): at 05:06

## 2019-01-01 RX ADMIN — MORPHINE SULFATE 45 MILLIGRAM(S): 50 CAPSULE, EXTENDED RELEASE ORAL at 06:46

## 2019-01-01 RX ADMIN — Medication 3: at 07:49

## 2019-01-01 RX ADMIN — Medication 50 MILLIGRAM(S): at 22:27

## 2019-01-01 RX ADMIN — TAMSULOSIN HYDROCHLORIDE 0.4 MILLIGRAM(S): 0.4 CAPSULE ORAL at 21:44

## 2019-01-01 RX ADMIN — Medication 5 MILLIGRAM(S): at 06:46

## 2019-01-01 RX ADMIN — Medication 650 MILLIGRAM(S): at 18:19

## 2019-01-01 RX ADMIN — Medication 10 MILLIGRAM(S): at 19:29

## 2019-01-01 RX ADMIN — Medication 1 MILLIGRAM(S): at 21:46

## 2019-01-01 RX ADMIN — SENNA PLUS 2 TABLET(S): 8.6 TABLET ORAL at 21:18

## 2019-01-01 RX ADMIN — Medication 100 MILLIGRAM(S): at 05:55

## 2019-01-01 RX ADMIN — Medication 10 MILLIGRAM(S): at 17:06

## 2019-01-01 RX ADMIN — GABAPENTIN 800 MILLIGRAM(S): 400 CAPSULE ORAL at 21:25

## 2019-01-01 RX ADMIN — Medication 10 MILLIGRAM(S): at 06:35

## 2019-01-01 RX ADMIN — GABAPENTIN 800 MILLIGRAM(S): 400 CAPSULE ORAL at 09:08

## 2019-01-01 RX ADMIN — PRAMIPEXOLE DIHYDROCHLORIDE 0.5 MILLIGRAM(S): 0.12 TABLET ORAL at 18:07

## 2019-01-01 RX ADMIN — GABAPENTIN 800 MILLIGRAM(S): 400 CAPSULE ORAL at 14:21

## 2019-01-01 RX ADMIN — NYSTATIN CREAM 1 APPLICATION(S): 100000 CREAM TOPICAL at 17:34

## 2019-01-01 RX ADMIN — Medication 1 MILLIGRAM(S): at 17:55

## 2019-01-01 RX ADMIN — OXYCODONE HYDROCHLORIDE 10 MILLIGRAM(S): 5 TABLET ORAL at 13:45

## 2019-01-01 RX ADMIN — OXYCODONE HYDROCHLORIDE 10 MILLIGRAM(S): 5 TABLET ORAL at 02:28

## 2019-01-01 RX ADMIN — MORPHINE SULFATE 2 MILLIGRAM(S): 50 CAPSULE, EXTENDED RELEASE ORAL at 09:34

## 2019-01-01 RX ADMIN — QUETIAPINE FUMARATE 150 MILLIGRAM(S): 200 TABLET, FILM COATED ORAL at 21:24

## 2019-01-01 RX ADMIN — Medication 5 MILLIGRAM(S): at 17:53

## 2019-01-01 RX ADMIN — Medication 12 UNIT(S): at 07:44

## 2019-01-01 RX ADMIN — GABAPENTIN 800 MILLIGRAM(S): 400 CAPSULE ORAL at 21:19

## 2019-01-01 RX ADMIN — Medication 1: at 18:03

## 2019-01-01 RX ADMIN — PANTOPRAZOLE SODIUM 40 MILLIGRAM(S): 20 TABLET, DELAYED RELEASE ORAL at 05:55

## 2019-01-01 RX ADMIN — CARVEDILOL PHOSPHATE 3.12 MILLIGRAM(S): 80 CAPSULE, EXTENDED RELEASE ORAL at 06:06

## 2019-01-01 RX ADMIN — Medication 50 MILLIGRAM(S): at 22:02

## 2019-01-01 RX ADMIN — LACTULOSE 15 GRAM(S): 10 SOLUTION ORAL at 13:13

## 2019-01-01 RX ADMIN — CLOPIDOGREL BISULFATE 75 MILLIGRAM(S): 75 TABLET, FILM COATED ORAL at 11:24

## 2019-01-01 RX ADMIN — Medication 12 UNIT(S): at 12:54

## 2019-01-01 RX ADMIN — Medication 9 UNIT(S): at 08:30

## 2019-01-01 RX ADMIN — Medication 10 MILLIGRAM(S): at 18:19

## 2019-01-01 RX ADMIN — OXYCODONE HYDROCHLORIDE 10 MILLIGRAM(S): 5 TABLET ORAL at 20:35

## 2019-01-01 RX ADMIN — MORPHINE SULFATE 15 MILLIGRAM(S): 50 CAPSULE, EXTENDED RELEASE ORAL at 22:36

## 2019-01-01 RX ADMIN — Medication 50 MILLIGRAM(S): at 22:56

## 2019-01-01 RX ADMIN — DULOXETINE HYDROCHLORIDE 60 MILLIGRAM(S): 30 CAPSULE, DELAYED RELEASE ORAL at 05:41

## 2019-01-01 RX ADMIN — CARVEDILOL PHOSPHATE 3.12 MILLIGRAM(S): 80 CAPSULE, EXTENDED RELEASE ORAL at 17:30

## 2019-01-01 RX ADMIN — TAMSULOSIN HYDROCHLORIDE 0.4 MILLIGRAM(S): 0.4 CAPSULE ORAL at 22:28

## 2019-01-01 RX ADMIN — Medication 650 MILLIGRAM(S): at 09:10

## 2019-01-01 RX ADMIN — MORPHINE SULFATE 2 MILLIGRAM(S): 50 CAPSULE, EXTENDED RELEASE ORAL at 02:57

## 2019-01-01 RX ADMIN — CEFEPIME 100 MILLIGRAM(S): 1 INJECTION, POWDER, FOR SOLUTION INTRAMUSCULAR; INTRAVENOUS at 19:28

## 2019-01-01 RX ADMIN — Medication 5 MILLIGRAM(S): at 22:23

## 2019-01-01 RX ADMIN — Medication 650 MILLIGRAM(S): at 23:08

## 2019-01-01 RX ADMIN — Medication 5 MILLIGRAM(S): at 06:49

## 2019-01-01 RX ADMIN — Medication 25 MILLIGRAM(S): at 09:36

## 2019-01-01 RX ADMIN — Medication 9 UNIT(S): at 16:11

## 2019-01-01 RX ADMIN — NYSTATIN CREAM 1 APPLICATION(S): 100000 CREAM TOPICAL at 05:08

## 2019-01-01 RX ADMIN — DICLOFENAC SODIUM 75 MILLIGRAM(S): 75 TABLET, DELAYED RELEASE ORAL at 09:30

## 2019-01-01 RX ADMIN — OXYCODONE HYDROCHLORIDE 10 MILLIGRAM(S): 5 TABLET ORAL at 16:14

## 2019-01-01 RX ADMIN — GABAPENTIN 800 MILLIGRAM(S): 400 CAPSULE ORAL at 15:02

## 2019-01-01 RX ADMIN — ENOXAPARIN SODIUM 40 MILLIGRAM(S): 100 INJECTION SUBCUTANEOUS at 12:20

## 2019-01-01 RX ADMIN — Medication 5 MILLIGRAM(S): at 06:13

## 2019-01-01 RX ADMIN — Medication 9 UNIT(S): at 07:42

## 2019-01-01 RX ADMIN — Medication 1 MILLIGRAM(S): at 06:46

## 2019-01-01 RX ADMIN — GABAPENTIN 800 MILLIGRAM(S): 400 CAPSULE ORAL at 22:03

## 2019-01-01 RX ADMIN — Medication 10 MILLIGRAM(S): at 17:48

## 2019-01-01 RX ADMIN — MORPHINE SULFATE 15 MILLIGRAM(S): 50 CAPSULE, EXTENDED RELEASE ORAL at 12:06

## 2019-01-01 RX ADMIN — NYSTATIN CREAM 1 APPLICATION(S): 100000 CREAM TOPICAL at 06:52

## 2019-01-01 RX ADMIN — Medication 250 MILLIGRAM(S): at 17:29

## 2019-01-01 RX ADMIN — Medication 10 MILLIGRAM(S): at 05:37

## 2019-01-01 RX ADMIN — DULOXETINE HYDROCHLORIDE 90 MILLIGRAM(S): 30 CAPSULE, DELAYED RELEASE ORAL at 11:29

## 2019-01-01 RX ADMIN — OXYCODONE HYDROCHLORIDE 10 MILLIGRAM(S): 5 TABLET ORAL at 21:00

## 2019-01-01 RX ADMIN — Medication 1: at 16:58

## 2019-01-01 RX ADMIN — GABAPENTIN 800 MILLIGRAM(S): 400 CAPSULE ORAL at 15:20

## 2019-01-01 RX ADMIN — DULOXETINE HYDROCHLORIDE 60 MILLIGRAM(S): 30 CAPSULE, DELAYED RELEASE ORAL at 06:30

## 2019-01-01 RX ADMIN — Medication 50 MILLIGRAM(S): at 21:28

## 2019-01-01 RX ADMIN — HYDROMORPHONE HYDROCHLORIDE 1 MILLIGRAM(S): 2 INJECTION INTRAMUSCULAR; INTRAVENOUS; SUBCUTANEOUS at 04:01

## 2019-01-01 RX ADMIN — ENOXAPARIN SODIUM 40 MILLIGRAM(S): 100 INJECTION SUBCUTANEOUS at 13:49

## 2019-01-01 RX ADMIN — NYSTATIN CREAM 1 APPLICATION(S): 100000 CREAM TOPICAL at 05:36

## 2019-01-01 RX ADMIN — DULOXETINE HYDROCHLORIDE 60 MILLIGRAM(S): 30 CAPSULE, DELAYED RELEASE ORAL at 17:30

## 2019-01-01 RX ADMIN — Medication 5 MILLIGRAM(S): at 17:03

## 2019-01-01 RX ADMIN — DULOXETINE HYDROCHLORIDE 60 MILLIGRAM(S): 30 CAPSULE, DELAYED RELEASE ORAL at 05:07

## 2019-01-01 RX ADMIN — Medication 5 MILLIGRAM(S): at 06:20

## 2019-01-01 RX ADMIN — ATORVASTATIN CALCIUM 80 MILLIGRAM(S): 80 TABLET, FILM COATED ORAL at 21:32

## 2019-01-01 RX ADMIN — Medication 1 MILLIGRAM(S): at 17:25

## 2019-01-01 RX ADMIN — Medication 1 MILLIGRAM(S): at 13:23

## 2019-01-01 RX ADMIN — HYDROMORPHONE HYDROCHLORIDE 1 MILLIGRAM(S): 2 INJECTION INTRAMUSCULAR; INTRAVENOUS; SUBCUTANEOUS at 15:17

## 2019-01-01 RX ADMIN — CARVEDILOL PHOSPHATE 3.12 MILLIGRAM(S): 80 CAPSULE, EXTENDED RELEASE ORAL at 18:21

## 2019-01-01 RX ADMIN — GABAPENTIN 800 MILLIGRAM(S): 400 CAPSULE ORAL at 12:59

## 2019-01-01 RX ADMIN — DULOXETINE HYDROCHLORIDE 90 MILLIGRAM(S): 30 CAPSULE, DELAYED RELEASE ORAL at 12:12

## 2019-01-01 RX ADMIN — Medication 5 MILLIGRAM(S): at 05:29

## 2019-01-01 RX ADMIN — CARVEDILOL PHOSPHATE 3.12 MILLIGRAM(S): 80 CAPSULE, EXTENDED RELEASE ORAL at 17:15

## 2019-01-01 RX ADMIN — OXYCODONE HYDROCHLORIDE 10 MILLIGRAM(S): 5 TABLET ORAL at 07:59

## 2019-01-01 RX ADMIN — PANTOPRAZOLE SODIUM 40 MILLIGRAM(S): 20 TABLET, DELAYED RELEASE ORAL at 05:06

## 2019-01-01 RX ADMIN — CARVEDILOL PHOSPHATE 3.12 MILLIGRAM(S): 80 CAPSULE, EXTENDED RELEASE ORAL at 06:46

## 2019-01-01 RX ADMIN — Medication 5: at 12:01

## 2019-01-01 RX ADMIN — OXYCODONE HYDROCHLORIDE 10 MILLIGRAM(S): 5 TABLET ORAL at 20:04

## 2019-01-01 RX ADMIN — SENNA PLUS 2 TABLET(S): 8.6 TABLET ORAL at 21:48

## 2019-01-01 RX ADMIN — QUETIAPINE FUMARATE 150 MILLIGRAM(S): 200 TABLET, FILM COATED ORAL at 22:48

## 2019-01-01 RX ADMIN — HYDROMORPHONE HYDROCHLORIDE 1 MILLIGRAM(S): 2 INJECTION INTRAMUSCULAR; INTRAVENOUS; SUBCUTANEOUS at 04:42

## 2019-01-01 RX ADMIN — NYSTATIN CREAM 1 APPLICATION(S): 100000 CREAM TOPICAL at 05:29

## 2019-01-01 RX ADMIN — DULOXETINE HYDROCHLORIDE 90 MILLIGRAM(S): 30 CAPSULE, DELAYED RELEASE ORAL at 12:56

## 2019-01-01 RX ADMIN — PRAMIPEXOLE DIHYDROCHLORIDE 0.5 MILLIGRAM(S): 0.12 TABLET ORAL at 17:02

## 2019-01-01 RX ADMIN — MORPHINE SULFATE 60 MILLIGRAM(S): 50 CAPSULE, EXTENDED RELEASE ORAL at 17:29

## 2019-01-01 RX ADMIN — GABAPENTIN 800 MILLIGRAM(S): 400 CAPSULE ORAL at 05:12

## 2019-01-01 RX ADMIN — GABAPENTIN 800 MILLIGRAM(S): 400 CAPSULE ORAL at 05:36

## 2019-01-01 RX ADMIN — CEFEPIME 100 MILLIGRAM(S): 1 INJECTION, POWDER, FOR SOLUTION INTRAMUSCULAR; INTRAVENOUS at 18:21

## 2019-01-01 RX ADMIN — LACTULOSE 15 GRAM(S): 10 SOLUTION ORAL at 06:13

## 2019-01-01 RX ADMIN — OXYCODONE HYDROCHLORIDE 10 MILLIGRAM(S): 5 TABLET ORAL at 13:13

## 2019-01-01 RX ADMIN — GABAPENTIN 800 MILLIGRAM(S): 400 CAPSULE ORAL at 14:37

## 2019-01-01 RX ADMIN — MORPHINE SULFATE 5 MILLIGRAM(S): 50 CAPSULE, EXTENDED RELEASE ORAL at 12:00

## 2019-01-01 RX ADMIN — Medication 5 MILLIGRAM(S): at 21:57

## 2019-01-01 RX ADMIN — Medication 1 MILLIGRAM(S): at 06:19

## 2019-01-01 RX ADMIN — DULOXETINE HYDROCHLORIDE 90 MILLIGRAM(S): 30 CAPSULE, DELAYED RELEASE ORAL at 11:27

## 2019-01-01 RX ADMIN — CARVEDILOL PHOSPHATE 3.12 MILLIGRAM(S): 80 CAPSULE, EXTENDED RELEASE ORAL at 05:31

## 2019-01-01 RX ADMIN — Medication 81 MILLIGRAM(S): at 11:06

## 2019-01-01 RX ADMIN — SENNA PLUS 1 TABLET(S): 8.6 TABLET ORAL at 11:29

## 2019-01-01 RX ADMIN — MORPHINE SULFATE 45 MILLIGRAM(S): 50 CAPSULE, EXTENDED RELEASE ORAL at 05:31

## 2019-01-01 RX ADMIN — Medication 3: at 08:02

## 2019-01-01 RX ADMIN — CARVEDILOL PHOSPHATE 3.12 MILLIGRAM(S): 80 CAPSULE, EXTENDED RELEASE ORAL at 05:30

## 2019-01-01 RX ADMIN — Medication 1: at 13:10

## 2019-01-01 RX ADMIN — GABAPENTIN 800 MILLIGRAM(S): 400 CAPSULE ORAL at 09:31

## 2019-01-01 RX ADMIN — CARVEDILOL PHOSPHATE 3.12 MILLIGRAM(S): 80 CAPSULE, EXTENDED RELEASE ORAL at 06:31

## 2019-01-01 RX ADMIN — DULOXETINE HYDROCHLORIDE 60 MILLIGRAM(S): 30 CAPSULE, DELAYED RELEASE ORAL at 17:25

## 2019-01-01 RX ADMIN — OXYCODONE HYDROCHLORIDE 10 MILLIGRAM(S): 5 TABLET ORAL at 21:25

## 2019-01-01 RX ADMIN — Medication 12 UNIT(S): at 11:52

## 2019-01-01 RX ADMIN — Medication 1 MILLIGRAM(S): at 05:37

## 2019-01-01 RX ADMIN — Medication 650 MILLIGRAM(S): at 17:08

## 2019-01-01 RX ADMIN — CHLORHEXIDINE GLUCONATE 1 APPLICATION(S): 213 SOLUTION TOPICAL at 06:06

## 2019-01-01 RX ADMIN — Medication 1 MILLIGRAM(S): at 23:01

## 2019-01-01 RX ADMIN — QUETIAPINE FUMARATE 150 MILLIGRAM(S): 200 TABLET, FILM COATED ORAL at 21:16

## 2019-01-01 RX ADMIN — Medication 10 MILLIGRAM(S): at 18:07

## 2019-01-01 RX ADMIN — OXYCODONE HYDROCHLORIDE 10 MILLIGRAM(S): 5 TABLET ORAL at 12:31

## 2019-01-01 RX ADMIN — Medication 5 MILLIGRAM(S): at 05:34

## 2019-01-01 RX ADMIN — CARVEDILOL PHOSPHATE 3.12 MILLIGRAM(S): 80 CAPSULE, EXTENDED RELEASE ORAL at 17:03

## 2019-01-01 RX ADMIN — MEROPENEM 100 MILLIGRAM(S): 1 INJECTION INTRAVENOUS at 21:30

## 2019-01-01 RX ADMIN — ATORVASTATIN CALCIUM 80 MILLIGRAM(S): 80 TABLET, FILM COATED ORAL at 22:27

## 2019-01-01 RX ADMIN — CEFEPIME 100 MILLIGRAM(S): 1 INJECTION, POWDER, FOR SOLUTION INTRAMUSCULAR; INTRAVENOUS at 06:46

## 2019-01-01 RX ADMIN — IMMUNE GLOBULIN (HUMAN) 150 GRAM(S): 10 INJECTION INTRAVENOUS; SUBCUTANEOUS at 09:30

## 2019-01-01 RX ADMIN — Medication 1: at 08:30

## 2019-01-01 RX ADMIN — Medication 650 MILLIGRAM(S): at 12:19

## 2019-01-01 RX ADMIN — Medication 5 MILLIGRAM(S): at 05:55

## 2019-01-01 RX ADMIN — CARVEDILOL PHOSPHATE 3.12 MILLIGRAM(S): 80 CAPSULE, EXTENDED RELEASE ORAL at 05:34

## 2019-01-01 RX ADMIN — Medication 1 MILLIGRAM(S): at 21:57

## 2019-01-01 RX ADMIN — Medication 650 MILLIGRAM(S): at 11:08

## 2019-01-01 RX ADMIN — PRAMIPEXOLE DIHYDROCHLORIDE 0.5 MILLIGRAM(S): 0.12 TABLET ORAL at 18:19

## 2019-01-01 RX ADMIN — SENNA PLUS 2 TABLET(S): 8.6 TABLET ORAL at 21:08

## 2019-01-01 RX ADMIN — Medication 9 UNIT(S): at 16:52

## 2019-01-01 RX ADMIN — Medication 25 MILLIGRAM(S): at 06:22

## 2019-01-01 RX ADMIN — Medication 100 MILLIGRAM(S): at 22:29

## 2019-01-01 RX ADMIN — Medication 100 MILLIGRAM(S): at 12:59

## 2019-01-01 RX ADMIN — Medication 1 TABLET(S): at 12:13

## 2019-01-01 RX ADMIN — INSULIN GLARGINE 36 UNIT(S): 100 INJECTION, SOLUTION SUBCUTANEOUS at 22:43

## 2019-01-01 RX ADMIN — Medication 1 MILLIGRAM(S): at 06:12

## 2019-01-01 RX ADMIN — GABAPENTIN 800 MILLIGRAM(S): 400 CAPSULE ORAL at 06:30

## 2019-01-01 RX ADMIN — MORPHINE SULFATE 45 MILLIGRAM(S): 50 CAPSULE, EXTENDED RELEASE ORAL at 05:51

## 2019-01-01 RX ADMIN — Medication 9 UNIT(S): at 07:49

## 2019-01-01 RX ADMIN — Medication 5: at 11:34

## 2019-01-01 RX ADMIN — NYSTATIN CREAM 1 APPLICATION(S): 100000 CREAM TOPICAL at 06:31

## 2019-01-01 RX ADMIN — MORPHINE SULFATE 15 MILLIGRAM(S): 50 CAPSULE, EXTENDED RELEASE ORAL at 17:04

## 2019-01-01 RX ADMIN — Medication 3: at 07:41

## 2019-01-01 RX ADMIN — CEFEPIME 100 MILLIGRAM(S): 1 INJECTION, POWDER, FOR SOLUTION INTRAMUSCULAR; INTRAVENOUS at 05:33

## 2019-01-01 RX ADMIN — OXYCODONE HYDROCHLORIDE 10 MILLIGRAM(S): 5 TABLET ORAL at 15:35

## 2019-01-01 RX ADMIN — Medication 25 MILLIGRAM(S): at 14:31

## 2019-01-01 RX ADMIN — CEFEPIME 100 MILLIGRAM(S): 1 INJECTION, POWDER, FOR SOLUTION INTRAMUSCULAR; INTRAVENOUS at 13:27

## 2019-01-01 RX ADMIN — Medication 50 GRAM(S): at 01:45

## 2019-01-01 RX ADMIN — Medication 1 MILLIGRAM(S): at 06:30

## 2019-01-01 RX ADMIN — Medication 5 MILLIGRAM(S): at 18:07

## 2019-01-01 RX ADMIN — NYSTATIN CREAM 1 APPLICATION(S): 100000 CREAM TOPICAL at 06:21

## 2019-01-01 RX ADMIN — ATORVASTATIN CALCIUM 80 MILLIGRAM(S): 80 TABLET, FILM COATED ORAL at 21:27

## 2019-01-01 RX ADMIN — Medication 1 MILLIGRAM(S): at 22:26

## 2019-01-01 RX ADMIN — Medication 10 MILLIGRAM(S): at 05:31

## 2019-01-01 RX ADMIN — Medication 1 MILLIGRAM(S): at 06:50

## 2019-01-01 RX ADMIN — CLOPIDOGREL BISULFATE 75 MILLIGRAM(S): 75 TABLET, FILM COATED ORAL at 13:21

## 2019-01-01 RX ADMIN — Medication 650 MILLIGRAM(S): at 18:07

## 2019-01-01 RX ADMIN — Medication 81 MILLIGRAM(S): at 11:16

## 2019-01-01 RX ADMIN — CLOPIDOGREL BISULFATE 75 MILLIGRAM(S): 75 TABLET, FILM COATED ORAL at 12:00

## 2019-01-01 RX ADMIN — Medication 650 MILLIGRAM(S): at 12:35

## 2019-01-01 RX ADMIN — MORPHINE SULFATE 45 MILLIGRAM(S): 50 CAPSULE, EXTENDED RELEASE ORAL at 17:04

## 2019-01-01 RX ADMIN — GABAPENTIN 800 MILLIGRAM(S): 400 CAPSULE ORAL at 05:30

## 2019-01-01 RX ADMIN — Medication 3: at 07:56

## 2019-01-01 RX ADMIN — Medication 12 UNIT(S): at 16:56

## 2019-01-01 RX ADMIN — CEFEPIME 100 MILLIGRAM(S): 1 INJECTION, POWDER, FOR SOLUTION INTRAMUSCULAR; INTRAVENOUS at 22:23

## 2019-01-01 RX ADMIN — Medication 1: at 12:01

## 2019-01-01 RX ADMIN — Medication 50 MILLIGRAM(S): at 22:41

## 2019-01-01 RX ADMIN — Medication 1 MILLIGRAM(S): at 05:40

## 2019-01-01 RX ADMIN — MORPHINE SULFATE 5 MILLIGRAM(S): 50 CAPSULE, EXTENDED RELEASE ORAL at 10:51

## 2019-01-01 RX ADMIN — MORPHINE SULFATE 15 MILLIGRAM(S): 50 CAPSULE, EXTENDED RELEASE ORAL at 12:13

## 2019-01-01 RX ADMIN — MORPHINE SULFATE 60 MILLIGRAM(S): 50 CAPSULE, EXTENDED RELEASE ORAL at 06:10

## 2019-01-01 RX ADMIN — Medication 9 UNIT(S): at 13:10

## 2019-01-01 RX ADMIN — CEFEPIME 100 MILLIGRAM(S): 1 INJECTION, POWDER, FOR SOLUTION INTRAMUSCULAR; INTRAVENOUS at 07:12

## 2019-01-01 RX ADMIN — HYDROMORPHONE HYDROCHLORIDE 1 MILLIGRAM(S): 2 INJECTION INTRAMUSCULAR; INTRAVENOUS; SUBCUTANEOUS at 04:30

## 2019-01-01 RX ADMIN — Medication 3: at 17:19

## 2019-01-01 RX ADMIN — Medication 650 MILLIGRAM(S): at 12:11

## 2019-01-01 RX ADMIN — HYDROMORPHONE HYDROCHLORIDE 1 MILLIGRAM(S): 2 INJECTION INTRAMUSCULAR; INTRAVENOUS; SUBCUTANEOUS at 11:32

## 2019-01-01 RX ADMIN — SENNA PLUS 2 TABLET(S): 8.6 TABLET ORAL at 22:27

## 2019-01-01 RX ADMIN — MORPHINE SULFATE 60 MILLIGRAM(S): 50 CAPSULE, EXTENDED RELEASE ORAL at 07:57

## 2019-01-01 RX ADMIN — CARVEDILOL PHOSPHATE 3.12 MILLIGRAM(S): 80 CAPSULE, EXTENDED RELEASE ORAL at 05:28

## 2019-01-01 RX ADMIN — OXYCODONE HYDROCHLORIDE 10 MILLIGRAM(S): 5 TABLET ORAL at 00:04

## 2019-01-01 RX ADMIN — MEROPENEM 100 MILLIGRAM(S): 1 INJECTION INTRAVENOUS at 12:27

## 2019-01-01 RX ADMIN — Medication 9 UNIT(S): at 08:31

## 2019-01-01 RX ADMIN — CEFEPIME 100 MILLIGRAM(S): 1 INJECTION, POWDER, FOR SOLUTION INTRAMUSCULAR; INTRAVENOUS at 21:17

## 2019-01-01 RX ADMIN — Medication 81 MILLIGRAM(S): at 13:21

## 2019-01-01 RX ADMIN — Medication 1 MILLIGRAM(S): at 13:56

## 2019-01-01 RX ADMIN — CEFEPIME 100 MILLIGRAM(S): 1 INJECTION, POWDER, FOR SOLUTION INTRAMUSCULAR; INTRAVENOUS at 05:11

## 2019-01-01 RX ADMIN — Medication 1 APPLICATION(S): at 05:06

## 2019-01-01 RX ADMIN — Medication 81 MILLIGRAM(S): at 12:02

## 2019-01-01 RX ADMIN — NYSTATIN CREAM 1 APPLICATION(S): 100000 CREAM TOPICAL at 17:57

## 2019-01-01 RX ADMIN — Medication 5 MILLIGRAM(S): at 17:57

## 2019-01-01 RX ADMIN — HYDROMORPHONE HYDROCHLORIDE 0.5 MILLIGRAM(S): 2 INJECTION INTRAMUSCULAR; INTRAVENOUS; SUBCUTANEOUS at 21:49

## 2019-01-01 RX ADMIN — OXYCODONE HYDROCHLORIDE 10 MILLIGRAM(S): 5 TABLET ORAL at 21:28

## 2019-01-01 RX ADMIN — MAGNESIUM OXIDE 400 MG ORAL TABLET 400 MILLIGRAM(S): 241.3 TABLET ORAL at 08:33

## 2019-01-01 RX ADMIN — Medication 250 MILLIGRAM(S): at 05:40

## 2019-01-01 RX ADMIN — DULOXETINE HYDROCHLORIDE 60 MILLIGRAM(S): 30 CAPSULE, DELAYED RELEASE ORAL at 06:04

## 2019-01-01 RX ADMIN — GABAPENTIN 800 MILLIGRAM(S): 400 CAPSULE ORAL at 05:29

## 2019-01-01 RX ADMIN — MAGNESIUM OXIDE 400 MG ORAL TABLET 400 MILLIGRAM(S): 241.3 TABLET ORAL at 12:19

## 2019-01-01 RX ADMIN — Medication 9 UNIT(S): at 18:03

## 2019-01-01 RX ADMIN — TAMSULOSIN HYDROCHLORIDE 0.4 MILLIGRAM(S): 0.4 CAPSULE ORAL at 21:58

## 2019-01-01 RX ADMIN — PRAMIPEXOLE DIHYDROCHLORIDE 0.5 MILLIGRAM(S): 0.12 TABLET ORAL at 17:57

## 2019-01-01 RX ADMIN — Medication 3: at 11:52

## 2019-01-01 RX ADMIN — MORPHINE SULFATE 60 MILLIGRAM(S): 50 CAPSULE, EXTENDED RELEASE ORAL at 18:39

## 2019-01-01 RX ADMIN — Medication 1 APPLICATION(S): at 17:55

## 2019-01-01 RX ADMIN — Medication 5 MILLIGRAM(S): at 17:36

## 2019-01-01 RX ADMIN — OXYCODONE HYDROCHLORIDE 10 MILLIGRAM(S): 5 TABLET ORAL at 12:34

## 2019-01-01 RX ADMIN — Medication 1 MILLIGRAM(S): at 06:09

## 2019-01-01 RX ADMIN — QUETIAPINE FUMARATE 150 MILLIGRAM(S): 200 TABLET, FILM COATED ORAL at 22:59

## 2019-01-01 RX ADMIN — MORPHINE SULFATE 15 MILLIGRAM(S): 50 CAPSULE, EXTENDED RELEASE ORAL at 11:07

## 2019-01-01 RX ADMIN — Medication 9 UNIT(S): at 12:04

## 2019-01-01 RX ADMIN — QUETIAPINE FUMARATE 150 MILLIGRAM(S): 200 TABLET, FILM COATED ORAL at 21:57

## 2019-01-01 RX ADMIN — SENNA PLUS 1 TABLET(S): 8.6 TABLET ORAL at 12:21

## 2019-01-01 RX ADMIN — Medication 650 MILLIGRAM(S): at 23:55

## 2019-01-01 RX ADMIN — MORPHINE SULFATE 60 MILLIGRAM(S): 50 CAPSULE, EXTENDED RELEASE ORAL at 11:34

## 2019-01-01 RX ADMIN — Medication 100 MILLIGRAM(S): at 13:21

## 2019-01-01 RX ADMIN — Medication 1 MILLIGRAM(S): at 15:02

## 2019-01-01 RX ADMIN — Medication 5 MILLIGRAM(S): at 18:35

## 2019-01-01 RX ADMIN — LACTULOSE 15 GRAM(S): 10 SOLUTION ORAL at 18:36

## 2019-01-01 RX ADMIN — Medication 81 MILLIGRAM(S): at 11:41

## 2019-01-01 RX ADMIN — Medication 650 MILLIGRAM(S): at 11:53

## 2019-01-01 RX ADMIN — PRAMIPEXOLE DIHYDROCHLORIDE 0.5 MILLIGRAM(S): 0.12 TABLET ORAL at 17:53

## 2019-01-01 RX ADMIN — Medication 10 MILLIGRAM(S): at 05:36

## 2019-01-01 RX ADMIN — CLOPIDOGREL BISULFATE 75 MILLIGRAM(S): 75 TABLET, FILM COATED ORAL at 12:02

## 2019-01-01 RX ADMIN — GABAPENTIN 800 MILLIGRAM(S): 400 CAPSULE ORAL at 21:51

## 2019-01-01 RX ADMIN — CEFEPIME 100 MILLIGRAM(S): 1 INJECTION, POWDER, FOR SOLUTION INTRAMUSCULAR; INTRAVENOUS at 21:55

## 2019-01-01 RX ADMIN — ONDANSETRON 4 MILLIGRAM(S): 8 TABLET, FILM COATED ORAL at 16:29

## 2019-01-01 RX ADMIN — PANTOPRAZOLE SODIUM 40 MILLIGRAM(S): 20 TABLET, DELAYED RELEASE ORAL at 05:38

## 2019-01-01 RX ADMIN — Medication 100 MILLIGRAM(S): at 11:30

## 2019-01-01 RX ADMIN — MORPHINE SULFATE 60 MILLIGRAM(S): 50 CAPSULE, EXTENDED RELEASE ORAL at 02:10

## 2019-01-01 RX ADMIN — Medication 1 MILLIGRAM(S): at 01:12

## 2019-01-01 RX ADMIN — TAMSULOSIN HYDROCHLORIDE 0.4 MILLIGRAM(S): 0.4 CAPSULE ORAL at 21:27

## 2019-01-01 RX ADMIN — CEFEPIME 100 MILLIGRAM(S): 1 INJECTION, POWDER, FOR SOLUTION INTRAMUSCULAR; INTRAVENOUS at 21:26

## 2019-01-01 RX ADMIN — Medication 1 APPLICATION(S): at 05:29

## 2019-01-01 RX ADMIN — Medication 650 MILLIGRAM(S): at 05:36

## 2019-01-01 RX ADMIN — Medication 1 APPLICATION(S): at 17:16

## 2019-01-01 RX ADMIN — Medication 325 MILLIGRAM(S): at 11:30

## 2019-01-01 RX ADMIN — MORPHINE SULFATE 45 MILLIGRAM(S): 50 CAPSULE, EXTENDED RELEASE ORAL at 05:32

## 2019-01-01 RX ADMIN — Medication 5 MILLIGRAM(S): at 01:12

## 2019-01-01 RX ADMIN — MORPHINE SULFATE 60 MILLIGRAM(S): 50 CAPSULE, EXTENDED RELEASE ORAL at 18:46

## 2019-01-01 RX ADMIN — Medication 81 MILLIGRAM(S): at 15:01

## 2019-01-01 RX ADMIN — BENZOCAINE AND MENTHOL 1 LOZENGE: 5; 1 LIQUID ORAL at 13:41

## 2019-01-01 RX ADMIN — PANTOPRAZOLE SODIUM 40 MILLIGRAM(S): 20 TABLET, DELAYED RELEASE ORAL at 06:35

## 2019-01-01 RX ADMIN — Medication 3: at 12:54

## 2019-01-01 RX ADMIN — Medication 1 APPLICATION(S): at 06:20

## 2019-01-01 RX ADMIN — OXYCODONE HYDROCHLORIDE 10 MILLIGRAM(S): 5 TABLET ORAL at 18:14

## 2019-01-01 RX ADMIN — Medication 9 UNIT(S): at 17:14

## 2019-01-01 RX ADMIN — HYDROMORPHONE HYDROCHLORIDE 1 MILLIGRAM(S): 2 INJECTION INTRAMUSCULAR; INTRAVENOUS; SUBCUTANEOUS at 12:30

## 2019-01-01 RX ADMIN — Medication 250 MILLIGRAM(S): at 17:55

## 2019-01-01 RX ADMIN — QUETIAPINE FUMARATE 150 MILLIGRAM(S): 200 TABLET, FILM COATED ORAL at 22:02

## 2019-01-01 RX ADMIN — HYDROMORPHONE HYDROCHLORIDE 1 MILLIGRAM(S): 2 INJECTION INTRAMUSCULAR; INTRAVENOUS; SUBCUTANEOUS at 14:01

## 2019-01-01 RX ADMIN — MORPHINE SULFATE 60 MILLIGRAM(S): 50 CAPSULE, EXTENDED RELEASE ORAL at 17:52

## 2019-01-01 RX ADMIN — CLOPIDOGREL BISULFATE 75 MILLIGRAM(S): 75 TABLET, FILM COATED ORAL at 11:01

## 2019-01-01 RX ADMIN — MORPHINE SULFATE 15 MILLIGRAM(S): 50 CAPSULE, EXTENDED RELEASE ORAL at 12:03

## 2019-01-01 RX ADMIN — CEFEPIME 100 MILLIGRAM(S): 1 INJECTION, POWDER, FOR SOLUTION INTRAMUSCULAR; INTRAVENOUS at 23:12

## 2019-01-01 RX ADMIN — CEFEPIME 100 MILLIGRAM(S): 1 INJECTION, POWDER, FOR SOLUTION INTRAMUSCULAR; INTRAVENOUS at 21:18

## 2019-01-01 RX ADMIN — PANTOPRAZOLE SODIUM 40 MILLIGRAM(S): 20 TABLET, DELAYED RELEASE ORAL at 07:51

## 2019-01-01 RX ADMIN — CEFEPIME 100 MILLIGRAM(S): 1 INJECTION, POWDER, FOR SOLUTION INTRAMUSCULAR; INTRAVENOUS at 13:40

## 2019-01-01 RX ADMIN — Medication 1 MILLIGRAM(S): at 13:18

## 2019-01-01 RX ADMIN — NYSTATIN CREAM 1 APPLICATION(S): 100000 CREAM TOPICAL at 06:05

## 2019-01-01 RX ADMIN — MEROPENEM 100 MILLIGRAM(S): 1 INJECTION INTRAVENOUS at 22:38

## 2019-01-01 RX ADMIN — Medication 5 MILLIGRAM(S): at 21:27

## 2019-01-01 RX ADMIN — Medication 650 MILLIGRAM(S): at 05:12

## 2019-01-01 RX ADMIN — CEFEPIME 100 MILLIGRAM(S): 1 INJECTION, POWDER, FOR SOLUTION INTRAMUSCULAR; INTRAVENOUS at 06:28

## 2019-01-01 RX ADMIN — Medication 1 TABLET(S): at 11:36

## 2019-01-01 RX ADMIN — Medication 100 MILLIGRAM(S): at 22:02

## 2019-01-01 RX ADMIN — HYDROMORPHONE HYDROCHLORIDE 1 MILLIGRAM(S): 2 INJECTION INTRAMUSCULAR; INTRAVENOUS; SUBCUTANEOUS at 12:00

## 2019-01-01 RX ADMIN — Medication 7: at 11:52

## 2019-01-01 RX ADMIN — MEROPENEM 100 MILLIGRAM(S): 1 INJECTION INTRAVENOUS at 13:58

## 2019-01-01 RX ADMIN — MORPHINE SULFATE 60 MILLIGRAM(S): 50 CAPSULE, EXTENDED RELEASE ORAL at 06:52

## 2019-01-01 RX ADMIN — NYSTATIN CREAM 1 APPLICATION(S): 100000 CREAM TOPICAL at 17:17

## 2019-01-01 RX ADMIN — DULOXETINE HYDROCHLORIDE 60 MILLIGRAM(S): 30 CAPSULE, DELAYED RELEASE ORAL at 07:25

## 2019-01-01 RX ADMIN — MORPHINE SULFATE 60 MILLIGRAM(S): 50 CAPSULE, EXTENDED RELEASE ORAL at 18:15

## 2019-01-01 RX ADMIN — MORPHINE SULFATE 60 MILLIGRAM(S): 50 CAPSULE, EXTENDED RELEASE ORAL at 07:18

## 2019-01-01 RX ADMIN — BENZOCAINE AND MENTHOL 1 LOZENGE: 5; 1 LIQUID ORAL at 21:18

## 2019-01-01 RX ADMIN — IMMUNE GLOBULIN (HUMAN) 150 GRAM(S): 10 INJECTION INTRAVENOUS; SUBCUTANEOUS at 09:11

## 2019-01-01 RX ADMIN — PRAMIPEXOLE DIHYDROCHLORIDE 0.5 MILLIGRAM(S): 0.12 TABLET ORAL at 06:46

## 2019-01-01 RX ADMIN — DULOXETINE HYDROCHLORIDE 90 MILLIGRAM(S): 30 CAPSULE, DELAYED RELEASE ORAL at 11:24

## 2019-01-01 RX ADMIN — Medication 0: at 12:21

## 2019-01-01 RX ADMIN — BENZOCAINE AND MENTHOL 1 LOZENGE: 5; 1 LIQUID ORAL at 06:47

## 2019-01-01 RX ADMIN — TAMSULOSIN HYDROCHLORIDE 0.4 MILLIGRAM(S): 0.4 CAPSULE ORAL at 21:25

## 2019-01-01 RX ADMIN — IMMUNE GLOBULIN (HUMAN) 150 GRAM(S): 10 INJECTION INTRAVENOUS; SUBCUTANEOUS at 17:24

## 2019-01-01 RX ADMIN — BENZOCAINE AND MENTHOL 1 LOZENGE: 5; 1 LIQUID ORAL at 05:12

## 2019-01-01 RX ADMIN — MORPHINE SULFATE 15 MILLIGRAM(S): 50 CAPSULE, EXTENDED RELEASE ORAL at 18:20

## 2019-01-01 RX ADMIN — MORPHINE SULFATE 45 MILLIGRAM(S): 50 CAPSULE, EXTENDED RELEASE ORAL at 05:36

## 2019-01-01 RX ADMIN — Medication 100 MILLIGRAM(S): at 05:33

## 2019-01-01 RX ADMIN — MORPHINE SULFATE 2 MILLIGRAM(S): 50 CAPSULE, EXTENDED RELEASE ORAL at 05:00

## 2019-01-01 RX ADMIN — Medication 50 MILLIGRAM(S): at 22:38

## 2019-01-01 RX ADMIN — Medication 1 MILLIGRAM(S): at 05:36

## 2019-01-01 RX ADMIN — SENNA PLUS 2 TABLET(S): 8.6 TABLET ORAL at 21:27

## 2019-01-01 RX ADMIN — Medication 100 MILLIGRAM(S): at 22:23

## 2019-01-01 RX ADMIN — BENZOCAINE AND MENTHOL 1 LOZENGE: 5; 1 LIQUID ORAL at 06:16

## 2019-01-01 RX ADMIN — OXYCODONE AND ACETAMINOPHEN 2 TABLET(S): 5; 325 TABLET ORAL at 14:33

## 2019-01-01 RX ADMIN — MORPHINE SULFATE 15 MILLIGRAM(S): 50 CAPSULE, EXTENDED RELEASE ORAL at 14:37

## 2019-01-01 RX ADMIN — CHLORHEXIDINE GLUCONATE 1 APPLICATION(S): 213 SOLUTION TOPICAL at 06:47

## 2019-01-01 RX ADMIN — ATORVASTATIN CALCIUM 80 MILLIGRAM(S): 80 TABLET, FILM COATED ORAL at 21:58

## 2019-01-01 RX ADMIN — MEROPENEM 100 MILLIGRAM(S): 1 INJECTION INTRAVENOUS at 22:27

## 2019-01-01 RX ADMIN — BENZOCAINE AND MENTHOL 1 LOZENGE: 5; 1 LIQUID ORAL at 21:47

## 2019-01-01 RX ADMIN — OXYCODONE HYDROCHLORIDE 10 MILLIGRAM(S): 5 TABLET ORAL at 17:47

## 2019-01-01 RX ADMIN — MORPHINE SULFATE 15 MILLIGRAM(S): 50 CAPSULE, EXTENDED RELEASE ORAL at 09:23

## 2019-01-01 RX ADMIN — CLOPIDOGREL BISULFATE 75 MILLIGRAM(S): 75 TABLET, FILM COATED ORAL at 11:41

## 2019-01-01 RX ADMIN — GABAPENTIN 800 MILLIGRAM(S): 400 CAPSULE ORAL at 13:21

## 2019-01-01 RX ADMIN — MORPHINE SULFATE 60 MILLIGRAM(S): 50 CAPSULE, EXTENDED RELEASE ORAL at 06:11

## 2019-01-01 RX ADMIN — Medication 10 MILLIGRAM(S): at 17:30

## 2019-01-01 RX ADMIN — GABAPENTIN 800 MILLIGRAM(S): 400 CAPSULE ORAL at 21:08

## 2019-01-01 RX ADMIN — CHLORHEXIDINE GLUCONATE 1 APPLICATION(S): 213 SOLUTION TOPICAL at 06:21

## 2019-01-01 RX ADMIN — MORPHINE SULFATE 45 MILLIGRAM(S): 50 CAPSULE, EXTENDED RELEASE ORAL at 17:18

## 2019-01-01 RX ADMIN — CEFEPIME 100 MILLIGRAM(S): 1 INJECTION, POWDER, FOR SOLUTION INTRAMUSCULAR; INTRAVENOUS at 06:13

## 2019-01-01 RX ADMIN — SODIUM CHLORIDE 500 MILLILITER(S): 9 INJECTION, SOLUTION INTRAVENOUS at 21:36

## 2019-01-01 RX ADMIN — TAMSULOSIN HYDROCHLORIDE 0.4 MILLIGRAM(S): 0.4 CAPSULE ORAL at 21:16

## 2019-01-01 RX ADMIN — Medication 9 UNIT(S): at 12:12

## 2019-01-01 RX ADMIN — SENNA PLUS 2 TABLET(S): 8.6 TABLET ORAL at 21:44

## 2019-01-01 RX ADMIN — Medication 81 MILLIGRAM(S): at 12:19

## 2019-01-01 RX ADMIN — PRAMIPEXOLE DIHYDROCHLORIDE 0.5 MILLIGRAM(S): 0.12 TABLET ORAL at 17:23

## 2019-01-01 RX ADMIN — Medication 250 MILLIGRAM(S): at 20:06

## 2019-01-01 RX ADMIN — CLOPIDOGREL BISULFATE 75 MILLIGRAM(S): 75 TABLET, FILM COATED ORAL at 11:16

## 2019-01-01 RX ADMIN — MORPHINE SULFATE 2 MILLIGRAM(S): 50 CAPSULE, EXTENDED RELEASE ORAL at 23:15

## 2019-01-01 RX ADMIN — Medication 650 MILLIGRAM(S): at 05:40

## 2019-01-01 RX ADMIN — QUETIAPINE FUMARATE 150 MILLIGRAM(S): 200 TABLET, FILM COATED ORAL at 21:31

## 2019-01-01 RX ADMIN — PRAMIPEXOLE DIHYDROCHLORIDE 0.5 MILLIGRAM(S): 0.12 TABLET ORAL at 05:07

## 2019-01-01 RX ADMIN — Medication 1 MILLIGRAM(S): at 06:47

## 2019-01-01 RX ADMIN — PRAMIPEXOLE DIHYDROCHLORIDE 0.5 MILLIGRAM(S): 0.12 TABLET ORAL at 05:36

## 2019-01-01 RX ADMIN — Medication 100 MILLIGRAM(S): at 22:41

## 2019-01-01 RX ADMIN — MORPHINE SULFATE 45 MILLIGRAM(S): 50 CAPSULE, EXTENDED RELEASE ORAL at 06:34

## 2019-01-01 RX ADMIN — DULOXETINE HYDROCHLORIDE 60 MILLIGRAM(S): 30 CAPSULE, DELAYED RELEASE ORAL at 05:28

## 2019-01-01 RX ADMIN — PANTOPRAZOLE SODIUM 40 MILLIGRAM(S): 20 TABLET, DELAYED RELEASE ORAL at 06:09

## 2019-01-01 RX ADMIN — Medication 50 MILLIGRAM(S): at 21:49

## 2019-01-01 RX ADMIN — CLOPIDOGREL BISULFATE 75 MILLIGRAM(S): 75 TABLET, FILM COATED ORAL at 15:13

## 2019-01-01 RX ADMIN — OXYCODONE HYDROCHLORIDE 10 MILLIGRAM(S): 5 TABLET ORAL at 17:56

## 2019-01-01 RX ADMIN — OXYCODONE HYDROCHLORIDE 10 MILLIGRAM(S): 5 TABLET ORAL at 13:40

## 2019-01-01 RX ADMIN — Medication 250 MILLIGRAM(S): at 21:43

## 2019-01-01 RX ADMIN — PRAMIPEXOLE DIHYDROCHLORIDE 0.5 MILLIGRAM(S): 0.12 TABLET ORAL at 05:12

## 2019-01-01 RX ADMIN — GABAPENTIN 800 MILLIGRAM(S): 400 CAPSULE ORAL at 13:56

## 2019-01-01 RX ADMIN — MUPIROCIN 1 APPLICATION(S): 20 OINTMENT TOPICAL at 17:50

## 2019-01-01 RX ADMIN — Medication 12 UNIT(S): at 11:27

## 2019-01-01 RX ADMIN — Medication 100 MILLIGRAM(S): at 06:13

## 2019-01-01 RX ADMIN — Medication 9 UNIT(S): at 11:15

## 2019-01-01 RX ADMIN — HYDROMORPHONE HYDROCHLORIDE 1 MILLIGRAM(S): 2 INJECTION INTRAMUSCULAR; INTRAVENOUS; SUBCUTANEOUS at 13:37

## 2019-01-01 RX ADMIN — TAMSULOSIN HYDROCHLORIDE 0.4 MILLIGRAM(S): 0.4 CAPSULE ORAL at 22:02

## 2019-01-01 RX ADMIN — Medication 300 MILLIGRAM(S): at 17:48

## 2019-01-01 RX ADMIN — Medication 81 MILLIGRAM(S): at 13:48

## 2019-01-01 RX ADMIN — Medication 5 MILLIGRAM(S): at 06:35

## 2019-01-01 RX ADMIN — GABAPENTIN 800 MILLIGRAM(S): 400 CAPSULE ORAL at 14:22

## 2019-01-01 RX ADMIN — Medication 50 MILLIGRAM(S): at 21:08

## 2019-01-01 RX ADMIN — OXYCODONE HYDROCHLORIDE 10 MILLIGRAM(S): 5 TABLET ORAL at 09:59

## 2019-01-01 RX ADMIN — Medication 650 MILLIGRAM(S): at 23:27

## 2019-01-01 RX ADMIN — Medication 81 MILLIGRAM(S): at 12:29

## 2019-01-01 RX ADMIN — OXYCODONE HYDROCHLORIDE 10 MILLIGRAM(S): 5 TABLET ORAL at 17:13

## 2019-01-01 RX ADMIN — Medication 5: at 11:15

## 2019-01-01 RX ADMIN — TAMSULOSIN HYDROCHLORIDE 0.4 MILLIGRAM(S): 0.4 CAPSULE ORAL at 21:40

## 2019-01-01 RX ADMIN — Medication 12 UNIT(S): at 18:36

## 2019-01-01 RX ADMIN — PANTOPRAZOLE SODIUM 40 MILLIGRAM(S): 20 TABLET, DELAYED RELEASE ORAL at 05:41

## 2019-01-01 RX ADMIN — Medication 5 MILLIGRAM(S): at 18:19

## 2019-01-01 RX ADMIN — PRAMIPEXOLE DIHYDROCHLORIDE 0.5 MILLIGRAM(S): 0.12 TABLET ORAL at 05:08

## 2019-01-01 RX ADMIN — MORPHINE SULFATE 15 MILLIGRAM(S): 50 CAPSULE, EXTENDED RELEASE ORAL at 11:36

## 2019-01-01 RX ADMIN — ATORVASTATIN CALCIUM 80 MILLIGRAM(S): 80 TABLET, FILM COATED ORAL at 21:19

## 2019-01-01 RX ADMIN — MORPHINE SULFATE 15 MILLIGRAM(S): 50 CAPSULE, EXTENDED RELEASE ORAL at 07:58

## 2019-01-01 RX ADMIN — MORPHINE SULFATE 5 MILLIGRAM(S): 50 CAPSULE, EXTENDED RELEASE ORAL at 00:24

## 2019-01-01 RX ADMIN — Medication 9 UNIT(S): at 07:53

## 2019-01-01 RX ADMIN — GABAPENTIN 800 MILLIGRAM(S): 400 CAPSULE ORAL at 13:49

## 2019-01-01 RX ADMIN — CEFEPIME 100 MILLIGRAM(S): 1 INJECTION, POWDER, FOR SOLUTION INTRAMUSCULAR; INTRAVENOUS at 22:56

## 2019-01-01 RX ADMIN — PRAMIPEXOLE DIHYDROCHLORIDE 0.5 MILLIGRAM(S): 0.12 TABLET ORAL at 05:33

## 2019-01-01 RX ADMIN — MORPHINE SULFATE 2 MILLIGRAM(S): 50 CAPSULE, EXTENDED RELEASE ORAL at 20:15

## 2019-01-01 RX ADMIN — Medication 5 MILLIGRAM(S): at 22:57

## 2019-01-01 RX ADMIN — GABAPENTIN 800 MILLIGRAM(S): 400 CAPSULE ORAL at 22:28

## 2019-01-01 RX ADMIN — CARVEDILOL PHOSPHATE 3.12 MILLIGRAM(S): 80 CAPSULE, EXTENDED RELEASE ORAL at 05:41

## 2019-01-01 RX ADMIN — Medication 1 TABLET(S): at 11:35

## 2019-01-01 RX ADMIN — MORPHINE SULFATE 15 MILLIGRAM(S): 50 CAPSULE, EXTENDED RELEASE ORAL at 06:47

## 2019-01-01 RX ADMIN — Medication 25 MILLIGRAM(S): at 12:02

## 2019-01-01 RX ADMIN — GABAPENTIN 800 MILLIGRAM(S): 400 CAPSULE ORAL at 13:20

## 2019-01-01 RX ADMIN — Medication 50 MILLIGRAM(S): at 21:58

## 2019-01-01 RX ADMIN — MORPHINE SULFATE 2 MILLIGRAM(S): 50 CAPSULE, EXTENDED RELEASE ORAL at 19:46

## 2019-01-01 RX ADMIN — TAMSULOSIN HYDROCHLORIDE 0.4 MILLIGRAM(S): 0.4 CAPSULE ORAL at 21:19

## 2019-01-01 RX ADMIN — OXYCODONE HYDROCHLORIDE 10 MILLIGRAM(S): 5 TABLET ORAL at 16:51

## 2019-01-01 RX ADMIN — INSULIN GLARGINE 27 UNIT(S): 100 INJECTION, SOLUTION SUBCUTANEOUS at 08:23

## 2019-01-01 RX ADMIN — CHLORHEXIDINE GLUCONATE 1 APPLICATION(S): 213 SOLUTION TOPICAL at 05:38

## 2019-01-01 RX ADMIN — Medication 2 MILLIGRAM(S): at 08:15

## 2019-01-01 RX ADMIN — Medication 250 MILLIGRAM(S): at 06:20

## 2019-01-01 RX ADMIN — Medication 5 MILLIGRAM(S): at 05:41

## 2019-01-01 RX ADMIN — GABAPENTIN 800 MILLIGRAM(S): 400 CAPSULE ORAL at 06:46

## 2019-01-01 RX ADMIN — MORPHINE SULFATE 15 MILLIGRAM(S): 50 CAPSULE, EXTENDED RELEASE ORAL at 17:28

## 2019-01-01 RX ADMIN — Medication 250 MILLIGRAM(S): at 05:08

## 2019-01-01 RX ADMIN — NYSTATIN CREAM 1 APPLICATION(S): 100000 CREAM TOPICAL at 05:45

## 2019-01-01 RX ADMIN — OXYCODONE HYDROCHLORIDE 10 MILLIGRAM(S): 5 TABLET ORAL at 17:17

## 2019-01-01 RX ADMIN — Medication 650 MILLIGRAM(S): at 12:05

## 2019-01-01 RX ADMIN — Medication 650 MILLIGRAM(S): at 09:31

## 2019-01-01 RX ADMIN — GABAPENTIN 800 MILLIGRAM(S): 400 CAPSULE ORAL at 22:57

## 2019-01-01 RX ADMIN — PRAMIPEXOLE DIHYDROCHLORIDE 0.5 MILLIGRAM(S): 0.12 TABLET ORAL at 06:04

## 2019-01-01 RX ADMIN — MEROPENEM 100 MILLIGRAM(S): 1 INJECTION INTRAVENOUS at 13:10

## 2019-01-01 RX ADMIN — Medication 5 MILLIGRAM(S): at 05:28

## 2019-01-01 RX ADMIN — MUPIROCIN 1 APPLICATION(S): 20 OINTMENT TOPICAL at 06:17

## 2019-01-01 RX ADMIN — MORPHINE SULFATE 15 MILLIGRAM(S): 50 CAPSULE, EXTENDED RELEASE ORAL at 18:39

## 2019-02-09 ENCOUNTER — APPOINTMENT (OUTPATIENT)
Dept: GERIATRICS | Facility: HOME HEALTH | Age: 49
End: 2019-02-09

## 2019-02-09 NOTE — REVIEW OF SYSTEMS
[Feeling Tired] : feeling tired [Constipation] : constipation [Joint Pain] : joint pain [Limb Weakness] : limb weakness [Anxiety] : anxiety [Emotional Problems] : emotional problems [Negative] : Integumentary [FreeTextEntry2] : generalized body aches

## 2019-02-09 NOTE — PHYSICAL EXAM
[Sclera] : the sclera and conjunctiva were normal [PERRL With Normal Accommodation] : pupils were equal in size, round, and reactive to light [Extraocular Movements] : extraocular movements were intact [Outer Ear] : the ears and nose were normal in appearance [Oropharynx] : the oropharynx was normal [Neck Appearance] : the appearance of the neck was normal [Neck Cervical Mass (___cm)] : no neck mass was observed [Jugular Venous Distention Increased] : there was no jugular-venous distention [Thyroid Diffuse Enlargement] : the thyroid was not enlarged [Thyroid Nodule] : there were no palpable thyroid nodules [Auscultation Breath Sounds / Voice Sounds] : lungs were clear to auscultation bilaterally [Heart Rate And Rhythm] : heart rate was normal and rhythm regular [Heart Sounds] : normal S1 and S2 [Heart Sounds Gallop] : no gallops [Murmurs] : no murmurs [Heart Sounds Pericardial Friction Rub] : no pericardial rub [Bowel Sounds] : normal bowel sounds [Abdomen Soft] : soft [Abdomen Tenderness] : non-tender [Abdomen Mass (___ Cm)] : no abdominal mass palpated [Skin Color & Pigmentation] : normal skin color and pigmentation [Skin Turgor] : normal skin turgor [] : no rash [FreeTextEntry1] : fross motor and sensory deficit

## 2019-02-09 NOTE — HISTORY OF PRESENT ILLNESS
[FreeTextEntry1] : pt c/o persistent pain while on pain meds. also states of increasing anxiety and shaking of hands.>Requests to increase pain meds [1] : 2) Feeling down, depressed, or hopeless for several days

## 2019-05-03 ENCOUNTER — APPOINTMENT (OUTPATIENT)
Dept: GERIATRICS | Facility: HOME HEALTH | Age: 49
End: 2019-05-03

## 2019-05-03 ENCOUNTER — RX CHANGE (OUTPATIENT)
Age: 49
End: 2019-05-03

## 2019-07-19 ENCOUNTER — RX RENEWAL (OUTPATIENT)
Age: 49
End: 2019-07-19

## 2019-07-20 VITALS
TEMPERATURE: 98.6 F | HEART RATE: 78 BPM | SYSTOLIC BLOOD PRESSURE: 132 MMHG | RESPIRATION RATE: 18 BRPM | DIASTOLIC BLOOD PRESSURE: 88 MMHG | OXYGEN SATURATION: 98 %

## 2019-07-20 NOTE — ASSESSMENT
[FreeTextEntry1] : Plan as per attedning as follows:\par \par - trial of Provigil 200 mg PO q am to aim to revers the sleep/wake cycle, with Seroquel to be given about 3 pm instead of 9 pm, reduce amlodipine to 5 mg with aim of SBP of 120-130mmHg\par - will attempt to set up psychiatry consultation via telemedicine, possible careful increase in buspirone and duloxetine dose if delay in obtaining the consult, patient reports intermittent and still infrequent (last episode last week) tremors of UEs,usually associated with anxiety, possibly due to either developing tolerance to alprazolam or Seroquel side effect, if recur patient instructed to take alprazolam\par -will contact [facility] to clarify issue with Hydromorphone ER12 mg\par - shin ulceration: unable to stage, possibly underlying abrasions rather than pressure induced: will send for Medihoney dressing to enhance healing\par -patient awaits PT/OT/Speech therapy\par

## 2019-07-20 NOTE — HISTORY OF PRESENT ILLNESS
[FreeTextEntry1] : Patient seen and examined at home.  Patient is homebound.  Wife present during visit.  Telemedicine visit took place.  Patient with reports of continued pain, having difficulty getting medications.  Also with complaints of daytime lethargy and insomnia with possible day/night reversal.  No complaints of CP/SOB.  No abdominal complaints of pain.  No urinary complaints.

## 2019-07-20 NOTE — PHYSICAL EXAM
[General Appearance - Alert] : alert [General Appearance - In No Acute Distress] : in no acute distress [General Appearance - Well Nourished] : well nourished [General Appearance - Well Developed] : well developed [Sclera] : the sclera and conjunctiva were normal [Normal Oral Mucosa] : normal oral mucosa [No Oral Pallor] : no oral pallor [No Oral Cyanosis] : no oral cyanosis [Outer Ear] : the ears and nose were normal in appearance [Hearing Threshold Finger Rub Not Cottle] : hearing was normal [Examination Of The Oral Cavity] : the lips and gums were normal [] : no respiratory distress [Respiration, Rhythm And Depth] : normal respiratory rhythm and effort [Exaggerated Use Of Accessory Muscles For Inspiration] : no accessory muscle use [Auscultation Breath Sounds / Voice Sounds] : lungs were clear to auscultation bilaterally [Heart Rate And Rhythm] : heart rate was normal and rhythm regular [Heart Sounds] : normal S1 and S2 [Edema] : there was no peripheral edema [Abdomen Soft] : soft [Abdomen Tenderness] : non-tender [FreeTextEntry1] : abrasion noted to LLE, no erythema/discharge [Cranial Nerves] : cranial nerves 2-12 were intact [No Focal Deficits] : no focal deficits [Oriented To Time, Place, And Person] : oriented to person, place, and time

## 2019-08-09 ENCOUNTER — CLINICAL ADVICE (OUTPATIENT)
Age: 49
End: 2019-08-09

## 2019-08-12 ENCOUNTER — LABORATORY RESULT (OUTPATIENT)
Age: 49
End: 2019-08-12

## 2019-08-12 ENCOUNTER — APPOINTMENT (OUTPATIENT)
Dept: GERIATRICS | Facility: HOME HEALTH | Age: 49
End: 2019-08-12
Payer: MEDICARE

## 2019-08-12 ENCOUNTER — OUTPATIENT (OUTPATIENT)
Dept: OUTPATIENT SERVICES | Facility: HOSPITAL | Age: 49
LOS: 1 days | Discharge: HOME | End: 2019-08-12

## 2019-08-12 DIAGNOSIS — I25.10 ATHEROSCLEROTIC HEART DISEASE OF NATIVE CORONARY ARTERY WITHOUT ANGINA PECTORIS: ICD-10-CM

## 2019-08-12 DIAGNOSIS — R33.9 RETENTION OF URINE, UNSPECIFIED: ICD-10-CM

## 2019-08-12 DIAGNOSIS — G61.81 CHRONIC INFLAMMATORY DEMYELINATING POLYNEURITIS: ICD-10-CM

## 2019-08-16 ENCOUNTER — APPOINTMENT (OUTPATIENT)
Dept: GERIATRICS | Facility: HOME HEALTH | Age: 49
End: 2019-08-16
Payer: MEDICARE

## 2019-08-16 PROCEDURE — ZZZZZ: CPT

## 2019-08-27 ENCOUNTER — APPOINTMENT (OUTPATIENT)
Dept: GERIATRICS | Facility: HOME HEALTH | Age: 49
End: 2019-08-27
Payer: MEDICARE

## 2019-08-27 PROCEDURE — ZZZZZ: CPT

## 2019-09-03 ENCOUNTER — INPATIENT (INPATIENT)
Facility: HOSPITAL | Age: 49
LOS: 27 days | Discharge: NURSING, REHAB OR LONG-TERM CARE | End: 2019-10-01
Attending: INTERNAL MEDICINE | Admitting: INTERNAL MEDICINE
Payer: MEDICARE

## 2019-09-03 VITALS
TEMPERATURE: 99 F | DIASTOLIC BLOOD PRESSURE: 84 MMHG | HEART RATE: 98 BPM | RESPIRATION RATE: 20 BRPM | SYSTOLIC BLOOD PRESSURE: 160 MMHG | OXYGEN SATURATION: 99 %

## 2019-09-03 DIAGNOSIS — Z96.642 PRESENCE OF LEFT ARTIFICIAL HIP JOINT: Chronic | ICD-10-CM

## 2019-09-03 DIAGNOSIS — F32.9 MAJOR DEPRESSIVE DISORDER, SINGLE EPISODE, UNSPECIFIED: ICD-10-CM

## 2019-09-03 DIAGNOSIS — F41.9 ANXIETY DISORDER, UNSPECIFIED: ICD-10-CM

## 2019-09-03 DIAGNOSIS — Z90.49 ACQUIRED ABSENCE OF OTHER SPECIFIED PARTS OF DIGESTIVE TRACT: Chronic | ICD-10-CM

## 2019-09-03 DIAGNOSIS — Z95.1 PRESENCE OF AORTOCORONARY BYPASS GRAFT: Chronic | ICD-10-CM

## 2019-09-03 DIAGNOSIS — Z96.641 PRESENCE OF RIGHT ARTIFICIAL HIP JOINT: Chronic | ICD-10-CM

## 2019-09-03 LAB
ALBUMIN SERPL ELPH-MCNC: 4.2 G/DL — SIGNIFICANT CHANGE UP (ref 3.5–5.2)
ALP SERPL-CCNC: 109 U/L — SIGNIFICANT CHANGE UP (ref 30–115)
ALT FLD-CCNC: 17 U/L — SIGNIFICANT CHANGE UP (ref 0–41)
ANION GAP SERPL CALC-SCNC: 10 MMOL/L — SIGNIFICANT CHANGE UP (ref 7–14)
AST SERPL-CCNC: 15 U/L — SIGNIFICANT CHANGE UP (ref 0–41)
BASOPHILS # BLD AUTO: 0.06 K/UL — SIGNIFICANT CHANGE UP (ref 0–0.2)
BASOPHILS NFR BLD AUTO: 0.7 % — SIGNIFICANT CHANGE UP (ref 0–1)
BILIRUB SERPL-MCNC: 0.4 MG/DL — SIGNIFICANT CHANGE UP (ref 0.2–1.2)
BUN SERPL-MCNC: 15 MG/DL — SIGNIFICANT CHANGE UP (ref 10–20)
CALCIUM SERPL-MCNC: 9.2 MG/DL — SIGNIFICANT CHANGE UP (ref 8.5–10.1)
CHLORIDE SERPL-SCNC: 90 MMOL/L — LOW (ref 98–110)
CO2 SERPL-SCNC: 32 MMOL/L — SIGNIFICANT CHANGE UP (ref 17–32)
CREAT SERPL-MCNC: 0.9 MG/DL — SIGNIFICANT CHANGE UP (ref 0.7–1.5)
EOSINOPHIL # BLD AUTO: 0.13 K/UL — SIGNIFICANT CHANGE UP (ref 0–0.7)
EOSINOPHIL NFR BLD AUTO: 1.6 % — SIGNIFICANT CHANGE UP (ref 0–8)
GLUCOSE SERPL-MCNC: 388 MG/DL — HIGH (ref 70–99)
HCT VFR BLD CALC: 39.4 % — LOW (ref 42–52)
HGB BLD-MCNC: 12.9 G/DL — LOW (ref 14–18)
IMM GRANULOCYTES NFR BLD AUTO: 0.6 % — HIGH (ref 0.1–0.3)
LYMPHOCYTES # BLD AUTO: 1.9 K/UL — SIGNIFICANT CHANGE UP (ref 1.2–3.4)
LYMPHOCYTES # BLD AUTO: 23.1 % — SIGNIFICANT CHANGE UP (ref 20.5–51.1)
MCHC RBC-ENTMCNC: 25.4 PG — LOW (ref 27–31)
MCHC RBC-ENTMCNC: 32.7 G/DL — SIGNIFICANT CHANGE UP (ref 32–37)
MCV RBC AUTO: 77.6 FL — LOW (ref 80–94)
MONOCYTES # BLD AUTO: 0.49 K/UL — SIGNIFICANT CHANGE UP (ref 0.1–0.6)
MONOCYTES NFR BLD AUTO: 5.9 % — SIGNIFICANT CHANGE UP (ref 1.7–9.3)
NEUTROPHILS # BLD AUTO: 5.61 K/UL — SIGNIFICANT CHANGE UP (ref 1.4–6.5)
NEUTROPHILS NFR BLD AUTO: 68.1 % — SIGNIFICANT CHANGE UP (ref 42.2–75.2)
NRBC # BLD: 0 /100 WBCS — SIGNIFICANT CHANGE UP (ref 0–0)
NT-PROBNP SERPL-SCNC: 71 PG/ML — SIGNIFICANT CHANGE UP (ref 0–300)
PLATELET # BLD AUTO: 170 K/UL — SIGNIFICANT CHANGE UP (ref 130–400)
POTASSIUM SERPL-MCNC: 4.5 MMOL/L — SIGNIFICANT CHANGE UP (ref 3.5–5)
POTASSIUM SERPL-SCNC: 4.5 MMOL/L — SIGNIFICANT CHANGE UP (ref 3.5–5)
PROT SERPL-MCNC: 6.6 G/DL — SIGNIFICANT CHANGE UP (ref 6–8)
RBC # BLD: 5.08 M/UL — SIGNIFICANT CHANGE UP (ref 4.7–6.1)
RBC # FLD: 13 % — SIGNIFICANT CHANGE UP (ref 11.5–14.5)
SODIUM SERPL-SCNC: 132 MMOL/L — LOW (ref 135–146)
TROPONIN T SERPL-MCNC: <0.01 NG/ML — SIGNIFICANT CHANGE UP
WBC # BLD: 8.24 K/UL — SIGNIFICANT CHANGE UP (ref 4.8–10.8)
WBC # FLD AUTO: 8.24 K/UL — SIGNIFICANT CHANGE UP (ref 4.8–10.8)

## 2019-09-03 PROCEDURE — 70450 CT HEAD/BRAIN W/O DYE: CPT | Mod: 26

## 2019-09-03 PROCEDURE — 71045 X-RAY EXAM CHEST 1 VIEW: CPT | Mod: 26

## 2019-09-03 PROCEDURE — 73522 X-RAY EXAM HIPS BI 3-4 VIEWS: CPT | Mod: 26

## 2019-09-03 PROCEDURE — 99285 EMERGENCY DEPT VISIT HI MDM: CPT | Mod: GC

## 2019-09-03 PROCEDURE — 93925 LOWER EXTREMITY STUDY: CPT | Mod: 26

## 2019-09-03 PROCEDURE — 93010 ELECTROCARDIOGRAM REPORT: CPT

## 2019-09-03 RX ORDER — DULOXETINE HYDROCHLORIDE 30 MG/1
60 CAPSULE, DELAYED RELEASE ORAL DAILY
Refills: 0 | Status: DISCONTINUED | OUTPATIENT
Start: 2019-09-03 | End: 2019-09-13

## 2019-09-03 RX ORDER — MORPHINE SULFATE 50 MG/1
5 CAPSULE, EXTENDED RELEASE ORAL
Refills: 0 | Status: DISCONTINUED | OUTPATIENT
Start: 2019-09-03 | End: 2019-09-03

## 2019-09-03 RX ORDER — QUETIAPINE FUMARATE 200 MG/1
50 TABLET, FILM COATED ORAL AT BEDTIME
Refills: 0 | Status: DISCONTINUED | OUTPATIENT
Start: 2019-09-03 | End: 2019-09-08

## 2019-09-03 RX ORDER — CLOPIDOGREL BISULFATE 75 MG/1
75 TABLET, FILM COATED ORAL DAILY
Refills: 0 | Status: DISCONTINUED | OUTPATIENT
Start: 2019-09-03 | End: 2019-09-05

## 2019-09-03 RX ORDER — GABAPENTIN 400 MG/1
400 CAPSULE ORAL THREE TIMES A DAY
Refills: 0 | Status: DISCONTINUED | OUTPATIENT
Start: 2019-09-03 | End: 2019-09-03

## 2019-09-03 RX ORDER — MORPHINE SULFATE 50 MG/1
10 CAPSULE, EXTENDED RELEASE ORAL ONCE
Refills: 0 | Status: DISCONTINUED | OUTPATIENT
Start: 2019-09-03 | End: 2019-09-03

## 2019-09-03 RX ORDER — GABAPENTIN 400 MG/1
400 CAPSULE ORAL EVERY 8 HOURS
Refills: 0 | Status: DISCONTINUED | OUTPATIENT
Start: 2019-09-03 | End: 2019-09-05

## 2019-09-03 RX ORDER — CHLORHEXIDINE GLUCONATE 213 G/1000ML
1 SOLUTION TOPICAL
Refills: 0 | Status: DISCONTINUED | OUTPATIENT
Start: 2019-09-03 | End: 2019-09-13

## 2019-09-03 RX ORDER — ALPRAZOLAM 0.25 MG
1 TABLET ORAL EVERY 8 HOURS
Refills: 0 | Status: DISCONTINUED | OUTPATIENT
Start: 2019-09-03 | End: 2019-09-08

## 2019-09-03 RX ORDER — ALPRAZOLAM 0.25 MG
1 TABLET ORAL THREE TIMES A DAY
Refills: 0 | Status: DISCONTINUED | OUTPATIENT
Start: 2019-09-03 | End: 2019-09-03

## 2019-09-03 RX ORDER — MORPHINE SULFATE 50 MG/1
10 CAPSULE, EXTENDED RELEASE ORAL EVERY 4 HOURS
Refills: 0 | Status: DISCONTINUED | OUTPATIENT
Start: 2019-09-03 | End: 2019-09-04

## 2019-09-03 RX ORDER — HYDROXYZINE HCL 10 MG
50 TABLET ORAL AT BEDTIME
Refills: 0 | Status: DISCONTINUED | OUTPATIENT
Start: 2019-09-03 | End: 2019-09-10

## 2019-09-03 RX ORDER — TAMSULOSIN HYDROCHLORIDE 0.4 MG/1
0.4 CAPSULE ORAL AT BEDTIME
Refills: 0 | Status: DISCONTINUED | OUTPATIENT
Start: 2019-09-03 | End: 2019-09-13

## 2019-09-03 RX ORDER — PANTOPRAZOLE SODIUM 20 MG/1
40 TABLET, DELAYED RELEASE ORAL
Refills: 0 | Status: DISCONTINUED | OUTPATIENT
Start: 2019-09-03 | End: 2019-09-13

## 2019-09-03 RX ORDER — MORPHINE SULFATE 50 MG/1
60 CAPSULE, EXTENDED RELEASE ORAL EVERY 12 HOURS
Refills: 0 | Status: DISCONTINUED | OUTPATIENT
Start: 2019-09-03 | End: 2019-09-10

## 2019-09-03 RX ORDER — ALPRAZOLAM 0.25 MG
1 TABLET ORAL ONCE
Refills: 0 | Status: DISCONTINUED | OUTPATIENT
Start: 2019-09-03 | End: 2019-09-03

## 2019-09-03 RX ORDER — ACETAMINOPHEN 500 MG
650 TABLET ORAL EVERY 6 HOURS
Refills: 0 | Status: DISCONTINUED | OUTPATIENT
Start: 2019-09-03 | End: 2019-09-04

## 2019-09-03 RX ORDER — BACLOFEN 100 %
10 POWDER (GRAM) MISCELLANEOUS
Refills: 0 | Status: DISCONTINUED | OUTPATIENT
Start: 2019-09-03 | End: 2019-09-13

## 2019-09-03 RX ORDER — ENOXAPARIN SODIUM 100 MG/ML
40 INJECTION SUBCUTANEOUS DAILY
Refills: 0 | Status: DISCONTINUED | OUTPATIENT
Start: 2019-09-03 | End: 2019-09-13

## 2019-09-03 RX ADMIN — MORPHINE SULFATE 10 MILLIGRAM(S): 50 CAPSULE, EXTENDED RELEASE ORAL at 22:12

## 2019-09-03 RX ADMIN — QUETIAPINE FUMARATE 50 MILLIGRAM(S): 200 TABLET, FILM COATED ORAL at 22:49

## 2019-09-03 RX ADMIN — Medication 1 MILLIGRAM(S): at 12:56

## 2019-09-03 RX ADMIN — Medication 10 MILLIGRAM(S): at 18:33

## 2019-09-03 RX ADMIN — MORPHINE SULFATE 10 MILLIGRAM(S): 50 CAPSULE, EXTENDED RELEASE ORAL at 12:05

## 2019-09-03 RX ADMIN — MORPHINE SULFATE 5 MILLIGRAM(S): 50 CAPSULE, EXTENDED RELEASE ORAL at 17:22

## 2019-09-03 RX ADMIN — GABAPENTIN 400 MILLIGRAM(S): 400 CAPSULE ORAL at 22:49

## 2019-09-03 RX ADMIN — Medication 5 MILLIGRAM(S): at 18:33

## 2019-09-03 RX ADMIN — MORPHINE SULFATE 10 MILLIGRAM(S): 50 CAPSULE, EXTENDED RELEASE ORAL at 11:35

## 2019-09-03 RX ADMIN — MORPHINE SULFATE 60 MILLIGRAM(S): 50 CAPSULE, EXTENDED RELEASE ORAL at 18:32

## 2019-09-03 RX ADMIN — Medication 1 MILLIGRAM(S): at 17:05

## 2019-09-03 RX ADMIN — TAMSULOSIN HYDROCHLORIDE 0.4 MILLIGRAM(S): 0.4 CAPSULE ORAL at 22:49

## 2019-09-03 RX ADMIN — CLOPIDOGREL BISULFATE 75 MILLIGRAM(S): 75 TABLET, FILM COATED ORAL at 17:06

## 2019-09-03 NOTE — BEHAVIORAL HEALTH ASSESSMENT NOTE - PAST PSYCHOTROPIC MEDICATION
Xanax 1mg TID  Duloxetine 60mg Q Daily  Buspar 5mg BID  Gabapentin 400mg TID  Seroquel 150mg Q daily

## 2019-09-03 NOTE — ED PROVIDER NOTE - CLINICAL SUMMARY MEDICAL DECISION MAKING FREE TEXT BOX
pw pains worsening throughout his body, worsening CIDP vs TIA symptoms, and inability to seek f/u as need, sent by Dr. Camarena for multispecialty evaluation and admission for pain control and social work evaluation.

## 2019-09-03 NOTE — H&P ADULT - NSHPLABSRESULTS_GEN_ALL_CORE
12.9   8.24  )-----------( 170      ( 03 Sep 2019 10:33 )             39.4       09-03    132<L>  |  90<L>  |  15  ----------------------------<  388<H>  4.5   |  32  |  0.9    Ca    9.2      03 Sep 2019 10:33    TPro  6.6  /  Alb  4.2  /  TBili  0.4  /  DBili  x   /  AST  15  /  ALT  17  /  AlkPhos  109  09-03                      Lactate Trend      CARDIAC MARKERS ( 03 Sep 2019 10:33 )  x     / <0.01 ng/mL / x     / x     / x            CAPILLARY BLOOD GLUCOSE

## 2019-09-03 NOTE — ED PROVIDER NOTE - CARE PLAN
Principal Discharge DX:	Pain  Secondary Diagnosis:	H/O bilateral hip replacements  Secondary Diagnosis:	CIDP (chronic inflammatory demyelinating polyneuropathy)  Secondary Diagnosis:	Slurred speech

## 2019-09-03 NOTE — ED ADULT NURSE REASSESSMENT NOTE - NS ED NURSE REASSESS COMMENT FT1
pt assessed A/O times 4 Vs stable comfort provide on the bed , dinners tray provide did eat 75% C/O generalized pain Morphine Sulfate 60 mg po ER is given ,assistance provide safetty precaution on progress on going nursing observation .

## 2019-09-03 NOTE — BEHAVIORAL HEALTH ASSESSMENT NOTE - NSBHCONSULTFOLLOWAFTERCARE_PSY_A_CORE FT
Please refer patient to [facility] for oupatient psychotherapy: [phone] Please refer patient to [facility] for outpatient psychotherapy: [phone]

## 2019-09-03 NOTE — BEHAVIORAL HEALTH ASSESSMENT NOTE - NSBHCHARTREVIEWVS_PSY_A_CORE FT
Vital Signs Last 24 Hrs  T(C): 37.1 (03 Sep 2019 09:37), Max: 37.1 (03 Sep 2019 09:37)  T(F): 98.7 (03 Sep 2019 09:37), Max: 98.7 (03 Sep 2019 09:37)  HR: 98 (03 Sep 2019 09:37) (98 - 98)  BP: 160/84 (03 Sep 2019 09:37) (160/84 - 160/84)  BP(mean): --  RR: 20 (03 Sep 2019 09:37) (20 - 20)  SpO2: 99% (03 Sep 2019 09:37) (99% - 99%)

## 2019-09-03 NOTE — BEHAVIORAL HEALTH ASSESSMENT NOTE - SUMMARY
50 y/o M , domiciled at home with family, unemployed on disability, with PMHx of CAD s/p CABG, BPH, CIDP with significant motor and sensory deficits and extreme pain, b/l total hip replacement, with pphx of anxiety and depression without IPP admissions presents to the ED for extreme left hip pain and medication management. He mentions that this has been going since 2013 and it was well managed through telemedicine, but it has been harder to control the pain recently. Patient is bedbound and is unable to visit doctors and his condition has been managed by telemetry. Psychiatry was consulted for evaluation of worsening depression and anxiety.  On exam patient expresses symtpoms of depression - hopelessness, loss of interest, guilt, low energy, inability to concentrate, and loss of appetite - that he attributes to his uncontrolled pain and feelings of demoralization. His symptoms of anxiety is also worsened by his uncontrolled pain, that appears to be well-controlled with administration of Xanax as needed. Pt meets criteria for Major depressive disorder with anxiety and will likely benefit from outpatient therapy to help develop coping mechanisms for his current debilitating illness and pain.   It is also recommended that continued medical care be optimized for pain control as this will likely help with decreasing the pt's depressive and anxiety symptoms. He is currently taking Xanax for his anxiety and he and wife were educated on the effects of xanax with morphine - not limited to respiratory depression.  At this time, it is recommended that Xanax be administered cautiously for anxiety, with emphasis on patient engaging in psychotherapy following discharge. Further psychotropic medication recommendations may be considered when patient's pain is better controlled.

## 2019-09-03 NOTE — BEHAVIORAL HEALTH ASSESSMENT NOTE - HPI (INCLUDE ILLNESS QUALITY, SEVERITY, DURATION, TIMING, CONTEXT, MODIFYING FACTORS, ASSOCIATED SIGNS AND SYMPTOMS)
48 y/o M , domiciled at home with family, unemployed on disability, with PMHx of CAD s/p CABG, BPH, CIDP with significant motor and sensory deficits and extreme pain, b/l total hip replacement, with pphx of anxiety and depression without IPP admissions, suicide attempts or current substance use. Pt presents to the ED for extreme left hip pain and medication management. He mentions that this has been going since 2013 and it was well managed through telemedicine, but it has been harder to control the pain recently. Patient is bedbound and is unable to visit doctors and his condition has been managed by telemetry. Psychiatry was consulted for evaluation of worsening depression and anxiety.    On interview pt was calm, cooperative, and AOx4. 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 He expresses loss of interest of traveling due to his inability to ambulate. He states that his energy has been much lower lately, and has not been able to concentrate enough to read a newspaper. He expresses guilt for his inability to help his daughter move to college 2 weeks ago as well as his inability to attend to other functions and also endorses shame related to the financial burden that his medical care may have on his family. The pt also complains of a loss of appetite, but no recent weight loss. He also complains of hopelessness and wanting to "not wake up", but adamantly denies any suicidal ideation. Pt also reports situations of worsening anxiety, for example "moving down the stairs to come to the hospital", and stated he took a xanax to help calm his nerves with wife reporting that she administers the Xanax once or twice a day as needed. He currently attends therapy with a  (Jono De Los Santos) once per week for 6 months. Pt states he last spoke to a psychiatrist via telepsych for therapy 1 week ago, and states that psychotropic medications were not altered due to his plan of coming into the hospital for medication management. Pt denies suicidal ideation, suicidal attempts,  homicidal ideation, auditory hallucinations, or manic episodes.    Collateral information taken from wife (Ekta Nicole [phone]). She states that she is takes care of dispensing the pt's medication and follows instructions as prescribed. She expresses no acute concerns for pt's safety.

## 2019-09-03 NOTE — CONSULT NOTE ADULT - SUBJECTIVE AND OBJECTIVE BOX
Pt Name: NAPOLEON CAST  MRN: [id number]      HPI: 49yMale presents with chronic pain in his L hip. Patient is nonambulatory for >5 years. The patient states that due to worsening of his demyelinating polyneuropathy and worsening in his L hip pain the patient has been nonambulatory. He denies any recent trauma. He states that his LLE has continued to migrate and externally rotate over the last several years. Due to his condition he has been unable to follow up with his orthopedic doctors for several years. Patient has positive paresthesias to his bilateral upper and lower extremities related to his CIDP. Patient also has a foot drop on his bilateral LE with equinus contractures. The patient states that his quality of life is poor in this condition and he wants to ambulate and sit up in a chair. Denies bowel or bladder incontinence.       PAST MEDICAL & SURGICAL HISTORY:  CIDP (chronic inflammatory demyelinating polyneuropathy)  History of cholecystectomy  History of hip replacement: bilateral  S/P CABG x 5      Allergies: penicillins (Unknown)      Medications: acetaminophen   Tablet .. 650 milliGRAM(s) Oral every 6 hours  ALPRAZolam 1 milliGRAM(s) Oral every 8 hours PRN  baclofen 10 milliGRAM(s) Oral two times a day  busPIRone 5 milliGRAM(s) Oral two times a day  chlorhexidine 4% Liquid 1 Application(s) Topical <User Schedule>  clopidogrel Tablet 75 milliGRAM(s) Oral daily  DULoxetine 60 milliGRAM(s) Oral daily  enoxaparin Injectable 40 milliGRAM(s) SubCutaneous daily  gabapentin 400 milliGRAM(s) Oral every 8 hours  hydrOXYzine  Oral Tab/Cap - Peds 50 milliGRAM(s) Oral at bedtime PRN  morphine Concentrate 10 milliGRAM(s) Oral every 4 hours PRN  morphine ER Tablet 60 milliGRAM(s) Oral every 12 hours  pantoprazole    Tablet 40 milliGRAM(s) Oral before breakfast  QUEtiapine 50 milliGRAM(s) Oral at bedtime  tamsulosin 0.4 milliGRAM(s) Oral at bedtime        PHYSICAL EXAM:    Vital Signs Last 24 Hrs  T(C): 35.9 (03 Sep 2019 21:26), Max: 37.1 (03 Sep 2019 09:37)  T(F): 96.6 (03 Sep 2019 21:26), Max: 98.7 (03 Sep 2019 09:37)  HR: 68 (03 Sep 2019 21:26) (68 - 98)  BP: 120/65 (03 Sep 2019 21:26) (120/65 - 160/84)  BP(mean): --  RR: 18 (03 Sep 2019 21:26) (18 - 20)  SpO2: 98% (03 Sep 2019 21:26) (97% - 99%)    Physical Exam:  General: NAD, Alert, Awake and oriented  Neurologic: No gross deficits, moving all 4s.  Head: NCAT without abrasions, lacerations, or ecchymosis to head, face, or scalp  Respiratory: Unlabored breathing   Abdomen: Soft, Nontender, no guarding.  Musculoskeletal:      PELVIS:No open skin or wounds. Pelvis stable to AP and Lat compression. Able to actively SLR bilaterally.     RUE:  No open skin or wounds  NTTP shoulder, upper arm, elbow, forearm, wrist or hand  Full baseline painless ROM at shoulder, elbow, wrist, and   SILT in axillary, musculocutaneous,  radial, median, and ulnar distributions.   AIN/PIN/U motor intact  2+ radial pulse with brisk cap refill at distal finger tips.   Compartments soft and compressible.    LUE:  No open skin or wounds  NTTP shoulder, upper arm, elbow, forearm, wrist or hand  Full baseline painless ROM at shoulder, elbow, wrist, and   SILT in axillary, musculocutaneous,  radial, median, and ulnar distributions.   AIN/PIN/U motor intact  2+ radial pulse with brisk cap refill at distal finger tips.   Compartments soft and compressible.    RLE:  No open skin or wounds  NTTP hip, thigh, knee, leg, ankle or foot.   Full baseline painless ROM at hip, knee, ankle and toes   No pain with log-roll or axial compression  Able to actively SLR.  SILT DP/SP/T/Borja/Sa.   EHL/FHL/TA/Gs motor intact.  2+ DP/PT pulses with brisk cap refill distally.  Compartments soft and compressible.   No pain on passive stretch.    LLE:   No open skin or wounds  NTTP hip, thigh, knee, leg, ankle or foot.   Full baseline painless ROM at hip, knee, ankle and toes   No pain with log-roll or axial compression  Able to actively SLR.  SILT DP/SP/T/Borja/Sa.   EHL/FHL/TA/Gs motor intact.  2+ DP/PT pulses with brisk cap refill distally.  Compartments soft and compressible.   No pain on passive stretch.    Labs:                        12.9   8.24  )-----------( 170      ( 03 Sep 2019 10:33 )             39.4     09-03    132<L>  |  90<L>  |  15  ----------------------------<  388<H>  4.5   |  32  |  0.9    Ca    9.2      03 Sep 2019 10:33    TPro  6.6  /  Alb  4.2  /  TBili  0.4  /  DBili  x   /  AST  15  /  ALT  17  /  AlkPhos  109  09-03        Imaging: No obvious fracture or dislocation.     A/P:    49y Male with     - Procedure: Under sterile conditions, patient was given a hematoma block. Patient was closed reduced and casted. Patient tolerated procedure well. Post reduction XR shows improved alignment. Post reduction exam unchanged from prio   -Pain control  -*** NWB/WBAT/TTWB  -Keep Cast C/D/I. Cast care explained  -Strict Extremity icing/elevation  -Patient instructed to return to ED if any worsening pain, numbness, tingling, fevers, chills or any other concerning symptoms  - F/U with  *** in 1 week. Please call [phone] Pt Name: NAPOLEON CAST  MRN: [id number]      HPI: 49yMale presents with chronic pain in his L hip. Patient is nonambulatory for >5 years. The patient states that due to worsening of his demyelinating polyneuropathy and worsening in his L hip pain the patient has been nonambulatory. He denies any recent trauma. He states that his LLE has continued to migrate and externally rotate over the last several years. Due to his condition he has been unable to follow up with his orthopedic doctors for several years. Patient has positive paresthesias to his bilateral upper and lower extremities related to his CIDP. Patient also has a foot drop on his bilateral LE with equinus contractures. The patient states that his quality of life is poor in this condition and he wants to ambulate and sit up in a chair. Denies bowel or bladder incontinence.       PAST MEDICAL & SURGICAL HISTORY:  CIDP (chronic inflammatory demyelinating polyneuropathy)  History of cholecystectomy  History of hip replacement: bilateral  S/P CABG x 5      Allergies: penicillins (Unknown)      Medications: acetaminophen   Tablet .. 650 milliGRAM(s) Oral every 6 hours  ALPRAZolam 1 milliGRAM(s) Oral every 8 hours PRN  baclofen 10 milliGRAM(s) Oral two times a day  busPIRone 5 milliGRAM(s) Oral two times a day  chlorhexidine 4% Liquid 1 Application(s) Topical <User Schedule>  clopidogrel Tablet 75 milliGRAM(s) Oral daily  DULoxetine 60 milliGRAM(s) Oral daily  enoxaparin Injectable 40 milliGRAM(s) SubCutaneous daily  gabapentin 400 milliGRAM(s) Oral every 8 hours  hydrOXYzine  Oral Tab/Cap - Peds 50 milliGRAM(s) Oral at bedtime PRN  morphine Concentrate 10 milliGRAM(s) Oral every 4 hours PRN  morphine ER Tablet 60 milliGRAM(s) Oral every 12 hours  pantoprazole    Tablet 40 milliGRAM(s) Oral before breakfast  QUEtiapine 50 milliGRAM(s) Oral at bedtime  tamsulosin 0.4 milliGRAM(s) Oral at bedtime        PHYSICAL EXAM:    Vital Signs Last 24 Hrs  T(C): 35.9 (03 Sep 2019 21:26), Max: 37.1 (03 Sep 2019 09:37)  T(F): 96.6 (03 Sep 2019 21:26), Max: 98.7 (03 Sep 2019 09:37)  HR: 68 (03 Sep 2019 21:26) (68 - 98)  BP: 120/65 (03 Sep 2019 21:26) (120/65 - 160/84)  BP(mean): --  RR: 18 (03 Sep 2019 21:26) (18 - 20)  SpO2: 98% (03 Sep 2019 21:26) (97% - 99%)    Physical Exam:  General: NAD, Alert, Awake and oriented  Head: NCAT without abrasions, lacerations, or ecchymosis to head, face, or scalp  Respiratory: Unlabored breathing   Abdomen: Soft, Nontender, no guarding.  Musculoskeletal:      PELVIS:No open skin or wounds. Pelvis stable to AP and Lat compression    BLUE:  Claw hand deformities to both hands  Weakness to  strength  Decreased sensation to light touch m/r/u  CR<2sec  Palp Rad pulse    RLE:  No open skin or wounds  NTTP hip, thigh, knee, leg, ankle or foot.   ROM 20-40 deg at knee  Minimal ROM at hip 2/2 pain in contralateral hip.  No SLT in glove and stocking distribution equal to contralateral side   Equinus contracture  Palpable pulses with brisk cap refill distally.  Edema to leg and thigh  Compartments soft and compressible.   No pain on passive stretch.    LLE:   No open skin or wounds  Leg externally rotated  No ROM at hip knee ankle  No SLT in glove and stocking distribution equal to contralateral side   Equinus contracture  Palpable pulses with brisk cap refill distally.  Edema to leg and thigh  Compartments soft and compressible.   No pain on passive stretch    Labs:                        12.9   8.24  )-----------( 170      ( 03 Sep 2019 10:33 )             39.4     09-03    132<L>  |  90<L>  |  15  ----------------------------<  388<H>  4.5   |  32  |  0.9    Ca    9.2      03 Sep 2019 10:33    TPro  6.6  /  Alb  4.2  /  TBili  0.4  /  DBili  x   /  AST  15  /  ALT  17  /  AlkPhos  109  09-03        Imaging: Dislocation of L hip prosthesis with Acetabular erosion and chronic changes    A/P:    49y Male nonambulatory with chronic L hip hemiarthroplasty dislocation    -CT pelvis without contrast  -XR L femur  -ESR/CRP  -Pain control  -PT rehab  -Discussed with patient goals of care and options of treatment. Patient expressed understanding.  -Further plans pending Labs/ CT

## 2019-09-03 NOTE — BEHAVIORAL HEALTH ASSESSMENT NOTE - NSBHMEDSOTHERFT_PSY_A_CORE
Home Medications:  Atarax 50 mg oral tablet: 1 tab(s) orally once a day (at bedtime) (03 Sep 2019 16:43)  baclofen 10 mg oral tablet: 1 tab(s) orally 2 times a day (03 Sep 2019 16:43)  busPIRone 5 mg oral tablet: 1 tab(s) orally 2 times a day (03 Sep 2019 16:43)  DULoxetine 60 mg oral delayed release capsule: 1 cap(s) orally once a day (03 Sep 2019 16:43)  Flomax 0.4 mg oral capsule: 1 cap(s) orally once a day (03 Sep 2019 16:43)  gabapentin 400 mg oral tablet: 1 tab(s) orally 3 times a day (before meals) (03 Sep 2019 16:43)  morphine 10 mg/5 mL oral solution: 5 milligram(s) orally every 3 hours, As Needed (03 Sep 2019 16:43)  morphine 60 mg oral capsule, extended release: 1 cap(s) orally every 12 hours (03 Sep 2019 16:43)  Plavix 75 mg oral tablet: 1 tab(s) orally once a day (03 Sep 2019 16:43)  SEROquel  mg oral tablet, extended release: 1 tab(s) orally once a day (in the evening) (03 Sep 2019 16:43)  Xanax 1 mg oral tablet: 1 tab(s) orally 3 times a day (03 Sep 2019 16:43)

## 2019-09-03 NOTE — H&P ADULT - NSHPPHYSICALEXAM_GEN_ALL_CORE
GEN: No acute distress, NC/AT, anicteric, slow speech and mentation  LUNGS: Clear to auscultation bilaterally, no wheezes  HEART: S1/S2 present. RRR. No murmurs. Sternotomy scar  ABD: Soft, mildly tender, non-distended. Bowel sounds present  EXT: LE cold feet b/l, pedal pulses not felt and patient not feeling light touch ( he mentions that this is chronic). Scar of quintuple bypass. Left LE externally rotated and shortened  NEURO: AAOX3, cannot move LE, no sensation in the LE    Intravenous access: peripheral acces  NG tube: None  Carter Catheter: None, instead patient has diapers

## 2019-09-03 NOTE — ED ADULT TRIAGE NOTE - CHIEF COMPLAINT QUOTE
c/o left hip pain and swelling top the leg. Denies trauma to the leg. Denies fall.  Hx left hip replacement in 2012. Patient is bed bound living at home with wife.

## 2019-09-03 NOTE — ED ADULT NURSE NOTE - OBJECTIVE STATEMENT
Pt A&Ox3, bedbound & incontinent, able to make needs known and presents with C/O L hip pain. Pt states left hip knee replacement "rotated 180 degrees".

## 2019-09-03 NOTE — ED ADULT NURSE NOTE - NSIMPLEMENTINTERV_GEN_ALL_ED
Implemented All Fall with Harm Risk Interventions:  [place] to call system. Call bell, personal items and telephone within reach. Instruct patient to call for assistance. Room bathroom lighting operational. Non-slip footwear when patient is off stretcher. Physically safe environment: no spills, clutter or unnecessary equipment. Stretcher in lowest position, wheels locked, appropriate side rails in place. Provide visual cue, wrist band, yellow gown, etc. Monitor gait and stability. Monitor for mental status changes and reorient to person, place, and time. Review medications for side effects contributing to fall risk. Reinforce activity limits and safety measures with patient and family. Provide visual clues: red socks.

## 2019-09-03 NOTE — ED PROVIDER NOTE - SECONDARY DIAGNOSIS.
H/O bilateral hip replacements CIDP (chronic inflammatory demyelinating polyneuropathy) Slurred speech

## 2019-09-03 NOTE — BEHAVIORAL HEALTH ASSESSMENT NOTE - OTHER PAST PSYCHIATRIC HISTORY (INCLUDE DETAILS REGARDING ONSET, COURSE OF ILLNESS, INPATIENT/OUTPATIENT TREATMENT)
Depression was diagnosed and treated with outpatient medications at [facility] roughly 6 years ago after diagnosis of CIDP. He receives Duloxetine 60mg QID. Pt was also diagnosed with anxiety disorder due to pain and claustrophobia. He receives Buspar 5mg BID, Xanax 1mg TID.

## 2019-09-03 NOTE — BEHAVIORAL HEALTH ASSESSMENT NOTE - NSBHCONSULTFOLLOWDETAILS_PSY_A_CORE FT
Please recall once patient's pain is better controlled. Please recall as needed once patient's pain is better controlled.

## 2019-09-03 NOTE — ED PROVIDER NOTE - DISCUSSED CASE WITH MULTISELECT OPTIONS
Quality 110: Preventive Care And Screening: Influenza Immunization: Influenza Immunization Administered during Influenza season Quality 111:Pneumonia Vaccination Status For Older Adults: Pneumococcal Vaccination not Administered or Previously Received, Reason not Otherwise Specified Detail Level: Detailed Consultant/PCP/Admitting MD

## 2019-09-03 NOTE — H&P ADULT - ASSESSMENT
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 and extreme pain, b/l total hip replacement, anxiety and depression admitted for left hip pain     # Increasing left hip pain:  - CXR showing superolateral dislocation of the left total hip arthroplasty since the prior exam in 2016.  - Continue with home meds: Morphine 60 mg PO ER q12h and morphine liquid q3h PRN.  - F/up Pain management to adjust pain medications  - F/up orthopedics consult regarding the dislocation of the left hip arthroplasty    # Depression and anxiety:  - Patient on Buspar at home and states it has not been changed since a while  - F/up Psychiatry consult    # Cold b/l feet, no pulses palpable, decreased sensation:  - Per patient this is chronic  - F/up VA duplex arterial to r/o significant PAD    # CIDP:  - C/w duloxetine and gabapentin  - F/up neurology consult    # Diet: DASH  # DVT ppx: Lovenox 40 mg qd  # GI ppx: Protonix 40 mg PO qd  # Activity: OOBTC. F/up PT/rehab  # Dispo: From home, was in nursing home before  # Code status: FULL 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 and extreme pain, b/l total hip replacement, anxiety and depression admitted for left hip pain     # Increasing left hip pain:  - CXR showing superolateral dislocation of the left total hip arthroplasty since the prior exam in 2016.  - Continue with home meds: Morphine 60 mg PO ER q12h and morphine solution 5 mg q3h PRN.  - F/up Pain management to adjust pain medications  - F/up orthopedics consult regarding the dislocation of the left hip arthroplasty    # Depression and anxiety:  - c/w Buspirone 5 mg q12h, Seroquel 150 mg qd and Xanax 1 mg q8h PO  - F/up Psychiatry consult    # Cold b/l feet, no pulses palpable, decreased sensation:  - Per patient this is chronic  - F/up VA duplex arterial to r/o significant PAD    # CIDP:  - C/w duloxetine 60 mg qd, baclofen 5 mg q12h and gabapentin 400 mg tid  - F/up neurology consult    # CAD s/p CABG:  - C/w Plavix 75 mg qd, patient not on statin    # BPH:  - C/w FLomax 0.4 mg qd    # Diet: DASH  # DVT ppx: Lovenox 40 mg qd  # GI ppx: Protonix 40 mg PO qd  # Activity: OOBTC. F/up PT/rehab  # Dispo: From home, was in nursing home before  # Code status: FULL 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 and extreme pain, b/l total hip replacement, anxiety and depression admitted for left hip pain     # Increasing left hip pain:  xray showing superolateral dislocation of the left total hip arthroplasty  Continue with home meds: Morphine 60 mg PO ER q12h and morphine solution 5 mg q3h PRN.  F/up Pain management to adjust pain medications  F/up orthopedics consult regarding the dislocation of the left hip arthroplasty  CT pelvis    # b/l dropped feet  prob not much more that can be done (aside from mechanical bracing)  will ask neuro/ortho opinions  perhaps OT (rehab) could asst w/ a brace    # CIDP / chr body pain and neuropathic pain  C/w duloxetine 60 mg qd, baclofen 5 mg q12h and gabapentin 400 mg tid  F/up neurology consult and pain mngmt - likely very limited options (IVIG and plasmapheresis of not much help in past)    # chr lacunar infarct of rt caudate - see CT head  on asa  check lipids  f/u w/ neuro    # CAD s/p CABG:  C/w Plavix 75 mg qd, patient not on statin  check lipid profile  check 2d Echo  cardio eval - Dr Eboni Broderick - at least for pre-op assessment of risk    # PAD: Cold b/l feet, no pulses palpable, decreased sensation:  seems chronic  duplex arterial: mod-sev occ Rt SFA; mild-mod occ Lt tib art  f/u w/ vasc opinion  for edema in legs - can     # Depression and anxiety: severe, not controlled; adjustment disorder  c/w Buspirone 5 mg q12h, Seroquel 150 mg qd and Xanax 1 mg q8h PO  F/up Psychiatry consult - Dr Curran    # T2 DM - seems not controlled in BMP; uses oral meds at home  FS qac/hs and keep btw 100-180  check A1c  Diet: Diabetic and DASH  will likely need insulin here for FS > 180 - f/u FS    # BPH:  - C/w Flomax 0.4 mg qd    # DVT ppx: Lovenox 40 mg qd    # GI ppx: Protonix 40 mg PO qd    # Code status: FULL    # Pt is being seen by multidisciplinary team, will need careful coordination:  - ortho  - neuro  - rehab  - pain  - cardio  - psych  - social serv    # Dispo: most pressing issue is lt hip pain w/ dislocation - team should focus on fixing this, followed by subacute rehab for OT and PT and pain control    there is prob limited need to intervene from vasc standpoint as pain not vasc in origin  there is prob limited options for CDIP; b/l dropped feet  aside from small med adjustments, there is prob not much to add for DM, CAD, DLD and HTN  psych issues will require long, on-going f/u and cannot be simply "fixed" on this hospitalization 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 and extreme pain, b/l total hip replacement, anxiety and depression admitted for left hip pain     # Increasing left hip pain:  xray showing superolateral dislocation of the left total hip arthroplasty  Continue with home meds: Morphine 60 mg PO ER q12h and morphine solution 5 mg q3h PRN.  F/up Pain management to adjust pain medications  F/up orthopedics consult regarding the dislocation of the left hip arthroplasty  CT pelvis    # b/l dropped feet  prob not much more that can be done (aside from mechanical bracing)  will ask neuro/ortho opinions  perhaps OT (rehab) could asst w/ a brace    # CIDP / chr body pain and neuropathic pain  C/w duloxetine 60 mg qd, baclofen 5 mg q12h and gabapentin 400 mg tid  F/up neurology consult and pain mngmt - likely very limited options (steroids and plasmapheresis of not much help in past)  never tried IVIG (? if IGA vlv was low in past) - check IVIG level  case d/w neuro: need MRI w/ anesth - first do C/T spine w/ and w/out gado, then follow w/ MRI - B (no extra gado needed) -> will have to be careful moving pt to MRI gantry given his hip pain and poss dislocation  neuro might start IVIG as inpt after eval of above w/u    # chr lacunar infarct of rt caudate - see CT head  on asa  check lipids  f/u w/ neuro    # CAD s/p CABG:  C/w Plavix 75 mg qd, patient not on statin  check lipid profile  check 2d Echo  cardio eval - Dr Eboni Broderick - at least for pre-op assessment of risk    # PAD: Cold b/l feet, no pulses palpable, decreased sensation:  seems chronic  duplex arterial: mod-sev occ Rt SFA; mild-mod occ Lt tib art  f/u w/ vasc opinion  for edema in legs - can     # Depression and anxiety: severe, not controlled; adjustment disorder  c/w Buspirone 5 mg q12h, Seroquel 150 mg qd and Xanax 1 mg q8h PO  F/up Psychiatry consult - Dr Curran    # T2 DM - seems not controlled in BMP; uses oral meds at home  FS qac/hs and keep btw 100-180  check A1c  Diet: Diabetic and DASH  will likely need insulin here for FS > 180 - f/u FS    # BPH:  - C/w Flomax 0.4 mg qd    # DVT ppx: Lovenox 40 mg qd    # GI ppx: Protonix 40 mg PO qd    # Code status: FULL    # Pt is being seen by multidisciplinary team, will need careful coordination:  - ortho  - neuro  - rehab  - pain  - cardio  - psych  - social serv    # Dispo: most pressing issue is lt hip pain w/ dislocation - team should focus on fixing this, followed by subacute rehab for OT and PT and pain control    there is prob limited need to intervene from vasc standpoint as pain not vasc in origin  there is prob limited options for CDIP (? IVIG); b/l dropped feet  aside from small med adjustments, there is prob not much to add for DM, CAD, DLD and HTN  psych issues will require long, on-going f/u and cannot be simply "fixed" on this hospitalization

## 2019-09-03 NOTE — H&P ADULT - NSHPREVIEWOFSYSTEMS_GEN_ALL_CORE
CONSTITUTIONAL: No weakness, fevers or chills  EYES/ENT: No visual changes;  No vertigo or throat pain   RESPIRATORY: No cough, wheezing, hemoptysis; No shortness of breath  CARDIOVASCULAR: No chest pain or palpitations  GASTROINTESTINAL: No abdominal or epigastric pain. No nausea, vomiting, or hematemesis; No diarrhea or constipation. No melena or hematochezia.  GENITOURINARY: No dysuria, frequency or hematuria  NEUROLOGICAL: LE weakness  MSK: LEft hip pain  PSYCH: Anxiety and depression  SKIN: No itching, rashes

## 2019-09-03 NOTE — ED PROVIDER NOTE - PROGRESS NOTE DETAILS
ATTENDING NOTE:   50 y/o M PMH CIDP, CAD s/p CABG, multiple TIAS, b/l hip replacement and chronic pain, p/w increase in pain which is now not able to be managed by Dr. Camarena. Pt was sent in by Dr. Camarena for pain management and evaluation by specialists he has been unable to follow up due to home bound state. C/o one month worsening chronic pain and progressive slurred speech, word finding difficulties, and right eye droop. Also to be evaluated by psychiatric for worsening depression and anxious feelings brought on by condition. Exam: Bedbound, NAD, NCAT, HEENT: mmm, EOMI, PERRLA, Neck: supple, nontender, nl ROM, Heart: RRR, no murmur, Extremities: b/l LE weakness per family. LLE relative for shortening and external rotation pt states stable since surgery. Pulses 1+ on b/l DP and radial. Lungs: BCTA, no signs of increased WOB, Abd: NTND, no guarding or rebound, no hernia palpated, no CVAT. MSK: chest, back, and ext nontender, nl rom. Neuro: 3/5 weakness b/l LE throughout, sensation decreased to LLE, unchanged per pt. Slowed speech, obvious word finding difficulty, right lid lag, otherwise CN II-XII intact. A&P consult psychiatric, neuro, spoke with Dr. Camarena and d/w tori. Will get pain control, imagining for acute surgery issues, in the absence of these will admit for p/c and . Spoke to Orthopedics regarding imaging findings on xray of hips. Comparison to prior CT as last available imaging shows acetabular cup prosthesis migration is new since 3 years ago, however no access to the xray performed outpt 3+ wks ago by Dr. Camarena. Contacted Dr. Camarnea again to discuss the chronicity of these findings, left message and pending callback. Signed out to Dr. Grover.

## 2019-09-03 NOTE — BEHAVIORAL HEALTH ASSESSMENT NOTE - RISK ASSESSMENT
Patient is not an imminent suicide risk: no h/o suicide attempts, no suicidal ideation, social support, stable housing, dependents.

## 2019-09-03 NOTE — ED PROVIDER NOTE - PHYSICAL EXAMINATION
CONSTITUTIONAL: well developed male, NAD  SKIN: warm, dry  HEAD: Normocephalic; atraumatic.  EYES: no conjunctival injection. PERRL.   ENT: No nasal discharge; airway clear.  NECK: Supple; non tender.  CARD: S1, S2 normal; no murmurs, gallops, or rubs. Regular rate and rhythm.   RESP: No wheezes, rales or rhonchi.  ABD: soft ntnd  EXT: not able to move either leg, right hip pain and left hip pain, left hip externally rotated, no concern of cellulitis, edema B/L, graft surgery scars noted  NEURO: Alert, oriented, grossly unremarkable  PSYCH: Cooperative, appropriate.

## 2019-09-03 NOTE — H&P ADULT - HISTORY OF PRESENT ILLNESS
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 and extreme pain, b/l total hip replacement, anxiety and depression presents to the ED for extreme left hip pain. He mentions that this has been going for some time and it was well managed by Dr. Camarena but it has been harder to control the pain recently and he was sent by her for pain management and further management. Patient is bedbound and is unable to visit doctors and his condition has been managed by telemetry. He also complains of worsening depression and anxiety, he is also claustrophobic and is complaining of staying in small confined places. He denies suicidal ideation.   In ED, VS wnl, CT head negative for intracranial bleed. Xray of left femur showed superolateral dislocation of the left total hip arthroplasty since the prior exam in 2016. 50 y/o M with PMHx of CAD s/p CABG, BPH, CIDP with significant motor and sensory deficits and extreme pain, b/l total hip replacement, anxiety and depression presents to the ED for extreme left hip pain. He mentions that this has been going for some time and it was well managed by Dr. Camarena but it has been harder to control the pain recently and he was sent by her for pain management and further management. Patient is bedbound and is unable to visit doctors and his condition has been managed by telemetry. He also complains of worsening depression and anxiety, he is also claustrophobic and is complaining of staying in small confined places. He denies suicidal ideation.   In ED, VS wnl, CT head negative for intracranial bleed. Xray of left femur showed superolateral dislocation of the left total hip arthroplasty since the prior exam in 2016. 48 y/o M with PMHx of CAD s/p CABG, BPH, CIDP with significant motor and sensory deficits and extreme pain, b/l total hip replacement, anxiety and depression presents to the ED for extreme left hip pain. He mentions that this has been going for some time and it was well managed by Dr. Camarena but it has been harder to control the pain recently and he was sent by her for pain management and further management. Patient is bedbound and is unable to visit doctors and his condition has been managed by telemetry. He also complains of worsening depression and anxiety, he is also claustrophobic and is complaining of staying in small confined places. He denies suicidal ideation.   In ED, VS wnl, CT head negative for intracranial bleed. Xray of left femur showed superolateral dislocation of the left total hip arthroplasty since the prior exam in 2016.    Attd: very complex and long, chr hx; pt had DM, HTN, DLD as young man coupled w/ high stress jobs ( in [employer] [place] [employer] - lots of travel); had CAD and CABG in 2011; shortly after developed odd neuro conditions and finally dx'd in 2013 w/ CDIP by [facility]; He had become weaker over time and fell and broke rt hip - s/p ORIF and shortly after fell again on lt hip and had ORIF on lt; pt was doing STR, but pain kept getting worse in lt hip; pt eventually became homebound and bedbound (2/2 pain and CIDP); overtime; lt leg seemed shorter and externally rotated, pain kept increasing; pt presents now w/ a myriad of unaddressed complaints from yrs of poor f/u:  1) has extreme pain lt hip w/ deformity; meds are not working - asked for ortho eval  2) CDIP is causing neuropathic pains all over and he has not followed up w/ neuro - asked for pain mngmt eval  3) he thinks he may have had TIA w/ numbness in face and arm, but he is not sure how it relates to CIDP - asked for neuro eval  4) pt not walking well at all and developed b/l dropped feet - pt asking for neuro and ortho for options - I told him that if he is not strong enough to walk, it is likely not a good idea to operate on his dropped feet (luc given his operative risk) - he has zero mvmt in his legs currently  5) pt has not been to cardio in yrs; has hx of DM and CABG; denies CP per se - pt asked for cardio eval (will need anyway if lt hip to be surgically fixed for pain control)  6) pt is considering rehab again to increase mobility, even if just to assist in transfers so that he can get out of house to see MDs - pt asked for rehab eval  7) pt is every depressed and anxious given all his ailments at such a young age; he is missing his dtrs grow up and can no longer work or even function at an independent level - pt asked for a psych eval to adjust meds  8) pt having trouble trying to get to MD offices - pt asked for social serv eval for asst. Pt has [facility] for 12 hrs/day (10 am to 10 pm).

## 2019-09-03 NOTE — ED PROVIDER NOTE - OBJECTIVE STATEMENT
49Y M with PMH of quintuple bypass surgery 2011, HTN, CIDP since 2012, left hip and right hip partial replacement 2014, RDS, depression, panic attack sent in by his PMD Dr. Camarena for admission of management of poorly controlled pain, chronic hip pain, and TIA workup. He endorses that he lives at home where rehab comes home but has not been able to participate in the rehab because he cannot move due to pain. Dr. Camarena spoken to and reports that she is not able to control his pain and is already on high dose morphine, as well as other medications for management of his chronic conditions. Patient has no active new complaints at this time: denies headache, fevers, chills, chest pain, SOB, abdominal pain, N/V/D. 49Y M with PMH of quintuple bypass surgery 2011, HTN, CIDP since 2012, left hip and right hip total arthroplasty 2014, RDS, depression, panic attack sent in by his PMD Dr. Camarena for admission of management of poorly controlled pain, chronic hip pain, and TIA workup. He endorses that he lives at home where rehab comes home but has not been able to participate in the rehab because he cannot move due to pain. Dr. Camarena spoken to and reports that she is not able to control his pain and is already on high dose morphine, as well as other medications for management of his chronic conditions. Patient has no active new complaints at this time: denies headache, fevers, chills, chest pain, SOB, abdominal pain, N/V/D.

## 2019-09-03 NOTE — H&P ADULT - NSHPOUTPATIENTPROVIDERS_GEN_ALL_CORE
PCP: Dr. Camarena PCP: Dr. Camarena (home visit program) - sometimes just telemedicine visits  ortho: Dr Lim and Dr Reese; cardio - not seen in yrs; Neuro: was trying to see Dr Mitchell; used to f/u in [place]

## 2019-09-03 NOTE — ED ADULT NURSE NOTE - PSH
S/P CABG x 5 History of cholecystectomy    History of total left hip replacement    History of total right hip replacement  bilateral  S/P CABG x 5

## 2019-09-03 NOTE — BEHAVIORAL HEALTH ASSESSMENT NOTE - NSBHCHARTREVIEWLAB_PSY_A_CORE FT
CBC Full  -  ( 03 Sep 2019 10:33 )  WBC Count : 8.24 K/uL  RBC Count : 5.08 M/uL  Hemoglobin : 12.9 g/dL  Hematocrit : 39.4 %  Platelet Count - Automated : 170 K/uL  Mean Cell Volume : 77.6 fL  Mean Cell Hemoglobin : 25.4 pg  Mean Cell Hemoglobin Concentration : 32.7 g/dL  Auto Neutrophil # : 5.61 K/uL  Auto Lymphocyte # : 1.90 K/uL  Auto Monocyte # : 0.49 K/uL  Auto Eosinophil # : 0.13 K/uL  Auto Basophil # : 0.06 K/uL  Auto Neutrophil % : 68.1 %  Auto Lymphocyte % : 23.1 %  Auto Monocyte % : 5.9 %  Auto Eosinophil % : 1.6 %  Auto Basophil % : 0.7 %

## 2019-09-03 NOTE — H&P ADULT - NSHPSOCIALHISTORY_GEN_ALL_CORE
- Non smoker  - No alcohol use  - Bedridden - Non smoker  - No alcohol use  - no drug abuse  - Bedridden

## 2019-09-03 NOTE — BEHAVIORAL HEALTH ASSESSMENT NOTE - NSBHSOCIALHXDETAILSFT_PSY_A_CORE
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

## 2019-09-03 NOTE — PATIENT PROFILE ADULT - PSYCHOSOCIAL CONCERNS
[facility] Orthopedic Discharge Instructions For Carpal Tunnel Surgery    Call the Bone and Joint Service Line for after-surgery issues:    [phone]  Pain Control: New home prescriptions Oxycodone 5 mg tablet:  Take one or two tablets every 4-6 hours as needed for pain.  Senna 8.5:  Take one or two tablets twice daily to prevent constipation from narctotic pain medication.    Take your pain medications as prescribed. These medications may make you sleepy. Do not drive, operate equipment, or drink alcohol when taking these.  You may take Tylenol (Generic name is acetaminophen) as directed on the bottle for additional relief or in place of the prescribed pain medications as your pain gets better.  Ibuprofen or Aleve can be used in supplement to the pain prescription and Tylenol if you need. If the medications cause a reaction such as nausea or skin rash, stop taking them and contact your doctor.  Please plan accordingly, pain medications will not be re-filled on the weekends or at night.  Call the office during the day if you need more medications.   Wound Care/Dressings Try to keep the bandage on until office recheck.  You must keep the bandage dry.    Do not immerse your hand in water until the sutures are removed.  Call the office if you have any problems with the dressings.   Activity You may use your hand for light activity.   I encourage you to move your fingers.    Do not lift anything heavy.  Keep your hand higher than your heart.  It will minimize swelling.   Diet Regular.   When to Call the Office Temperature greater than 101.5 degrees Fahrenheit.  Increasing pain not relieved by medicine, elevation and icing.  Any issues with your dressing.   Follow up Appointment This should have already been med for you.  If not call the office and make an appointment for 7-10 days after surgery.         Based on the surgery/procedure that you had today, we do not anticipate that you will have  any problems.  However, given the various responses that patients can have to the surgical experience, we want to ensure that you have information available to manage pain or nausea and what to do if you observe bleeding or you develop any signs and symptoms of infection:  Methods to control pain include:  Prescription pain medication or over the counter medications as prescribed or suggested by your physician.  In addition, ice packs and periods of rest are often helpful.  If your pain is not managed with the above methods, contact your physician.  Methods to control nausea include:  Anti-nausea medication approved by your physician.  Drink clear liquids such as apple juice, ginger ale, broth or 7-Up. Be sure to drink enough fluids.  Move to a regular diet as you feel able.  Rest may also help.  Bleeding:  It's not uncommon to see a little blood staining on the dressing, about the size of a quarter in the first 24 hours; if you see this, there is no reason to be alarmed.  However, should this continue to increase in size, apply pressure if able, ant notify your physician.  Infection:  We do not anticipate that you will acquire an infection, but if you should experience any of the following symptoms:  redness, swelling, heat, increasing pain or abnormal drainage at your surgery site, fever or chills, please notify your physician.    Nurse advice line: [phone]       none

## 2019-09-04 ENCOUNTER — RESULT REVIEW (OUTPATIENT)
Age: 49
End: 2019-09-04

## 2019-09-04 LAB
25(OH)D3 SERPL-MCNC: 21 NG/ML
ALBUMIN SERPL ELPH-MCNC: 4.1 G/DL
ALP BLD-CCNC: 97 U/L
ALT SERPL-CCNC: 12 U/L
ANION GAP SERPL CALC-SCNC: 10 MMOL/L
ANION GAP SERPL CALC-SCNC: 10 MMOL/L — SIGNIFICANT CHANGE UP (ref 7–14)
AST SERPL-CCNC: 16 U/L
BASOPHILS # BLD AUTO: 0.04 K/UL — SIGNIFICANT CHANGE UP (ref 0–0.2)
BASOPHILS # BLD AUTO: 0.05 K/UL
BASOPHILS NFR BLD AUTO: 0.5 % — SIGNIFICANT CHANGE UP (ref 0–1)
BASOPHILS NFR BLD AUTO: 0.8 %
BILIRUB SERPL-MCNC: 0.4 MG/DL
BUN SERPL-MCNC: 14 MG/DL — SIGNIFICANT CHANGE UP (ref 10–20)
BUN SERPL-MCNC: 17 MG/DL
CALCIUM SERPL-MCNC: 10 MG/DL — SIGNIFICANT CHANGE UP (ref 8.5–10.1)
CALCIUM SERPL-MCNC: 9.5 MG/DL
CHLORIDE SERPL-SCNC: 91 MMOL/L
CHLORIDE SERPL-SCNC: 92 MMOL/L — LOW (ref 98–110)
CHOLEST SERPL-MCNC: 199 MG/DL
CHOLEST/HDLC SERPL: 4.4 RATIO
CK SERPL-CCNC: 41 U/L
CO2 SERPL-SCNC: 35 MMOL/L
CO2 SERPL-SCNC: 35 MMOL/L — HIGH (ref 17–32)
CREAT SERPL-MCNC: 0.8 MG/DL — SIGNIFICANT CHANGE UP (ref 0.7–1.5)
CREAT SERPL-MCNC: 0.9 MG/DL
EOSINOPHIL # BLD AUTO: 0.13 K/UL — SIGNIFICANT CHANGE UP (ref 0–0.7)
EOSINOPHIL # BLD AUTO: 0.15 K/UL
EOSINOPHIL NFR BLD AUTO: 1.6 % — SIGNIFICANT CHANGE UP (ref 0–8)
EOSINOPHIL NFR BLD AUTO: 2.4 %
ERYTHROCYTE [SEDIMENTATION RATE] IN BLOOD BY WESTERGREN METHOD: 21 MM/HR
ESTIMATED AVERAGE GLUCOSE: 263 MG/DL
GLUCOSE SERPL-MCNC: 255 MG/DL — HIGH (ref 70–99)
GLUCOSE SERPL-MCNC: 261 MG/DL
HBA1C MFR BLD HPLC: 10.8 %
HCT VFR BLD CALC: 39.5 % — LOW (ref 42–52)
HCT VFR BLD CALC: 39.7 %
HDLC SERPL-MCNC: 45 MG/DL
HGB BLD-MCNC: 12.9 G/DL
HGB BLD-MCNC: 13.1 G/DL — LOW (ref 14–18)
IMM GRANULOCYTES NFR BLD AUTO: 0.2 % — SIGNIFICANT CHANGE UP (ref 0.1–0.3)
IMM GRANULOCYTES NFR BLD AUTO: 0.5 %
IRON SATN MFR SERPL: 20 % — SIGNIFICANT CHANGE UP (ref 15–50)
IRON SATN MFR SERPL: 51 UG/DL — SIGNIFICANT CHANGE UP (ref 35–150)
LDLC SERPL CALC-MCNC: 132 MG/DL
LYMPHOCYTES # BLD AUTO: 2.3 K/UL
LYMPHOCYTES # BLD AUTO: 2.44 K/UL — SIGNIFICANT CHANGE UP (ref 1.2–3.4)
LYMPHOCYTES # BLD AUTO: 30.3 % — SIGNIFICANT CHANGE UP (ref 20.5–51.1)
LYMPHOCYTES NFR BLD AUTO: 36.7 %
MAGNESIUM SERPL-MCNC: 1.7 MG/DL — LOW (ref 1.8–2.4)
MAN DIFF?: NORMAL
MCHC RBC-ENTMCNC: 25.6 PG
MCHC RBC-ENTMCNC: 25.9 PG — LOW (ref 27–31)
MCHC RBC-ENTMCNC: 32.5 G/DL
MCHC RBC-ENTMCNC: 33.2 G/DL — SIGNIFICANT CHANGE UP (ref 32–37)
MCV RBC AUTO: 78.2 FL — LOW (ref 80–94)
MCV RBC AUTO: 78.8 FL
MONOCYTES # BLD AUTO: 0.38 K/UL
MONOCYTES # BLD AUTO: 0.53 K/UL — SIGNIFICANT CHANGE UP (ref 0.1–0.6)
MONOCYTES NFR BLD AUTO: 6.1 %
MONOCYTES NFR BLD AUTO: 6.6 % — SIGNIFICANT CHANGE UP (ref 1.7–9.3)
NEUTROPHILS # BLD AUTO: 3.36 K/UL
NEUTROPHILS # BLD AUTO: 4.9 K/UL — SIGNIFICANT CHANGE UP (ref 1.4–6.5)
NEUTROPHILS NFR BLD AUTO: 53.5 %
NEUTROPHILS NFR BLD AUTO: 60.8 % — SIGNIFICANT CHANGE UP (ref 42.2–75.2)
NRBC # BLD: 0 /100 WBCS — SIGNIFICANT CHANGE UP (ref 0–0)
PLATELET # BLD AUTO: 185 K/UL — SIGNIFICANT CHANGE UP (ref 130–400)
PLATELET # BLD AUTO: 204 K/UL
POTASSIUM SERPL-MCNC: 4.5 MMOL/L — SIGNIFICANT CHANGE UP (ref 3.5–5)
POTASSIUM SERPL-SCNC: 4.5 MMOL/L — SIGNIFICANT CHANGE UP (ref 3.5–5)
POTASSIUM SERPL-SCNC: 4.8 MMOL/L
PROT SERPL-MCNC: 6.7 G/DL
RBC # BLD: 5.04 M/UL
RBC # BLD: 5.05 M/UL — SIGNIFICANT CHANGE UP (ref 4.7–6.1)
RBC # FLD: 13.2 % — SIGNIFICANT CHANGE UP (ref 11.5–14.5)
RBC # FLD: 13.5 %
SODIUM SERPL-SCNC: 136 MMOL/L
SODIUM SERPL-SCNC: 137 MMOL/L — SIGNIFICANT CHANGE UP (ref 135–146)
TIBC SERPL-MCNC: 261 UG/DL — SIGNIFICANT CHANGE UP (ref 220–430)
TRIGL SERPL-MCNC: 191 MG/DL
UIBC SERPL-MCNC: 210 UG/DL — SIGNIFICANT CHANGE UP (ref 110–370)
WBC # BLD: 8.06 K/UL — SIGNIFICANT CHANGE UP (ref 4.8–10.8)
WBC # FLD AUTO: 6.27 K/UL
WBC # FLD AUTO: 8.06 K/UL — SIGNIFICANT CHANGE UP (ref 4.8–10.8)

## 2019-09-04 PROCEDURE — 99223 1ST HOSP IP/OBS HIGH 75: CPT

## 2019-09-04 PROCEDURE — 99222 1ST HOSP IP/OBS MODERATE 55: CPT

## 2019-09-04 PROCEDURE — 73552 X-RAY EXAM OF FEMUR 2/>: CPT | Mod: 26,LT

## 2019-09-04 PROCEDURE — 99223 1ST HOSP IP/OBS HIGH 75: CPT | Mod: AI

## 2019-09-04 RX ORDER — ATORVASTATIN CALCIUM 80 MG/1
40 TABLET, FILM COATED ORAL AT BEDTIME
Refills: 0 | Status: DISCONTINUED | OUTPATIENT
Start: 2019-09-04 | End: 2019-09-06

## 2019-09-04 RX ORDER — KETOROLAC TROMETHAMINE 30 MG/ML
30 SYRINGE (ML) INJECTION EVERY 6 HOURS
Refills: 0 | Status: DISCONTINUED | OUTPATIENT
Start: 2019-09-04 | End: 2019-09-05

## 2019-09-04 RX ADMIN — Medication 10 MILLIGRAM(S): at 06:00

## 2019-09-04 RX ADMIN — Medication 10 MILLIGRAM(S): at 17:28

## 2019-09-04 RX ADMIN — MORPHINE SULFATE 10 MILLIGRAM(S): 50 CAPSULE, EXTENDED RELEASE ORAL at 04:25

## 2019-09-04 RX ADMIN — MORPHINE SULFATE 10 MILLIGRAM(S): 50 CAPSULE, EXTENDED RELEASE ORAL at 14:10

## 2019-09-04 RX ADMIN — MORPHINE SULFATE 10 MILLIGRAM(S): 50 CAPSULE, EXTENDED RELEASE ORAL at 08:34

## 2019-09-04 RX ADMIN — CLOPIDOGREL BISULFATE 75 MILLIGRAM(S): 75 TABLET, FILM COATED ORAL at 12:11

## 2019-09-04 RX ADMIN — MORPHINE SULFATE 10 MILLIGRAM(S): 50 CAPSULE, EXTENDED RELEASE ORAL at 09:30

## 2019-09-04 RX ADMIN — TAMSULOSIN HYDROCHLORIDE 0.4 MILLIGRAM(S): 0.4 CAPSULE ORAL at 22:15

## 2019-09-04 RX ADMIN — GABAPENTIN 400 MILLIGRAM(S): 400 CAPSULE ORAL at 22:17

## 2019-09-04 RX ADMIN — MORPHINE SULFATE 60 MILLIGRAM(S): 50 CAPSULE, EXTENDED RELEASE ORAL at 18:35

## 2019-09-04 RX ADMIN — Medication 650 MILLIGRAM(S): at 05:59

## 2019-09-04 RX ADMIN — Medication 5 MILLIGRAM(S): at 17:27

## 2019-09-04 RX ADMIN — GABAPENTIN 400 MILLIGRAM(S): 400 CAPSULE ORAL at 06:00

## 2019-09-04 RX ADMIN — MORPHINE SULFATE 60 MILLIGRAM(S): 50 CAPSULE, EXTENDED RELEASE ORAL at 17:27

## 2019-09-04 RX ADMIN — MORPHINE SULFATE 60 MILLIGRAM(S): 50 CAPSULE, EXTENDED RELEASE ORAL at 06:00

## 2019-09-04 RX ADMIN — PANTOPRAZOLE SODIUM 40 MILLIGRAM(S): 20 TABLET, DELAYED RELEASE ORAL at 06:01

## 2019-09-04 RX ADMIN — CHLORHEXIDINE GLUCONATE 1 APPLICATION(S): 213 SOLUTION TOPICAL at 06:00

## 2019-09-04 RX ADMIN — Medication 30 MILLIGRAM(S): at 23:57

## 2019-09-04 RX ADMIN — GABAPENTIN 400 MILLIGRAM(S): 400 CAPSULE ORAL at 13:45

## 2019-09-04 RX ADMIN — MORPHINE SULFATE 10 MILLIGRAM(S): 50 CAPSULE, EXTENDED RELEASE ORAL at 13:43

## 2019-09-04 RX ADMIN — Medication 5 MILLIGRAM(S): at 06:00

## 2019-09-04 RX ADMIN — Medication 1 MILLIGRAM(S): at 10:44

## 2019-09-04 RX ADMIN — Medication 650 MILLIGRAM(S): at 12:35

## 2019-09-04 RX ADMIN — ATORVASTATIN CALCIUM 40 MILLIGRAM(S): 80 TABLET, FILM COATED ORAL at 22:17

## 2019-09-04 RX ADMIN — Medication 30 MILLIGRAM(S): at 18:36

## 2019-09-04 RX ADMIN — QUETIAPINE FUMARATE 50 MILLIGRAM(S): 200 TABLET, FILM COATED ORAL at 22:16

## 2019-09-04 RX ADMIN — Medication 30 MILLIGRAM(S): at 17:35

## 2019-09-04 RX ADMIN — MORPHINE SULFATE 10 MILLIGRAM(S): 50 CAPSULE, EXTENDED RELEASE ORAL at 03:38

## 2019-09-04 RX ADMIN — DULOXETINE HYDROCHLORIDE 60 MILLIGRAM(S): 30 CAPSULE, DELAYED RELEASE ORAL at 12:10

## 2019-09-04 RX ADMIN — ENOXAPARIN SODIUM 40 MILLIGRAM(S): 100 INJECTION SUBCUTANEOUS at 12:11

## 2019-09-04 RX ADMIN — Medication 650 MILLIGRAM(S): at 12:09

## 2019-09-04 NOTE — CONSULT NOTE ADULT - SUBJECTIVE AND OBJECTIVE BOX
Neurology Consult    Patient is a 49y old  Male who presents with a chief complaint of Left hip pain (04 Sep 2019 12:43)    HPI:  48 y/o M with PMHx of CAD s/p CABG, BPH, CIDP with significant motor and sensory deficits and extreme pain, b/l total hip replacement, anxiety and depression presents to the ED for extreme left hip pain. He mentions that this has been going for some time and it was well managed by Dr. Camarena but it has been harder to control the pain recently and he was sent by her for pain management and further management. Patient is bedbound and is unable to visit doctors and his condition has been managed by telemetry. He also complains of worsening depression and anxiety, he is also claustrophobic and is complaining of staying in small confined places. He denies suicidal ideation.   In ED, VS wnl, CT head negative for intracranial bleed. Xray of left femur showed superolateral dislocation of the left total hip arthroplasty since the prior exam in 2016.    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); had CAD and CABG in 2011; shortly after developed odd neuro conditions and finally dx'd in 2013 w/ CDIP by [place]; He had become weaker over time and fell and broke rt hip - s/p ORIF and shortly after fell again on lt hip and had ORIF on lt; pt was doing STR, but pain kept getting worse in lt hip; pt eventually became homebound and bedbound (2/2 pain and CIDP); overtime; lt leg seemed shorter and externally rotated, pain kept increasing; pt presents now w/ a myriad of unaddressed complaints from yrs of poor f/u:  1) has extreme pain lt hip w/ deformity; meds are not working - asked for ortho eval  2) CDIP is causing neuropathic pains all over and he has not followed up w/ neuro - asked for pain mngmt eval  3) he thinks he may have had TIA w/ numbness in face and arm, but he is not sure how it relates to CIDP - asked for neuro eval  4) pt not walking well at all and developed b/l dropped feet - pt asking for neuro and ortho for options - I told him that if he is not strong enough to walk, it is likely not a good idea to operate on his dropped feet (luc given his operative risk) - he has zero mvmt in his legs currently  5) pt has not been to cardio in yrs; has hx of DM and CABG; denies CP per se - pt asked for cardio eval (will need anyway if lt hip to be surgically fixed for pain control)  6) pt is considering rehab again to increase mobility, even if just to assist in transfers so that he can get out of house to see MDs - pt asked for rehab eval  7) pt is every depressed and anxious given all his ailments at such a young age; he is missing his dtrs grow up and can no longer work or even function at an independent level - pt asked for a psych eval to adjust meds  8) pt having trouble trying to get to MD offices - pt asked for social serv eval for asst. Pt has HHA for 12 hrs/day (10 am to 10 pm). (03 Sep 2019 15:38)      PAST MEDICAL & SURGICAL HISTORY:  CIDP (chronic inflammatory demyelinating polyneuropathy)  History of cholecystectomy  History of total left hip replacement  History of total right hip replacement: bilateral  S/P CABG x 5      FAMILY HISTORY:    Social History: (-) x 3    Allergies  penicillins (Unknown)    Intolerances  MEDICATIONS  (STANDING):  acetaminophen   Tablet .. 650 milliGRAM(s) Oral every 6 hours  baclofen 10 milliGRAM(s) Oral two times a day  busPIRone 5 milliGRAM(s) Oral two times a day  chlorhexidine 4% Liquid 1 Application(s) Topical <User Schedule>  clopidogrel Tablet 75 milliGRAM(s) Oral daily  DULoxetine 60 milliGRAM(s) Oral daily  enoxaparin Injectable 40 milliGRAM(s) SubCutaneous daily  gabapentin 400 milliGRAM(s) Oral every 8 hours  morphine ER Tablet 60 milliGRAM(s) Oral every 12 hours  pantoprazole    Tablet 40 milliGRAM(s) Oral before breakfast  QUEtiapine 50 milliGRAM(s) Oral at bedtime  tamsulosin 0.4 milliGRAM(s) Oral at bedtime    MEDICATIONS  (PRN):  ALPRAZolam 1 milliGRAM(s) Oral every 8 hours PRN anxiety  hydrOXYzine  Oral Tab/Cap - Peds 50 milliGRAM(s) Oral at bedtime PRN Itching  morphine Concentrate 10 milliGRAM(s) Oral every 4 hours PRN Severe Pain (7 - 10)      Review of systems:    Constitutional: endorses memory loss, both short-term and long-term   Eyes: No eye pain or discharge, weakness in opening eye lids  ENMT: endorses slow speech  Neck: No pain or stiffness  Respiratory: No cough, wheezing, chills or hemoptysis  Cardiovascular: No chest pain, palpitations, shortness of breath, dyspnea on exertion  Gastrointestinal: No abdominal pain, nausea, vomiting or hematemesis; No diarrhea or constipation.   Genitourinary: No dysuria, frequency, hematuria or incontinence  Neurological: endorses weakness of upper and lower extremities, endorses numbness of face, endorses inability to smile   Skin: No rashes or lesions   Endocrine: No heat or cold intolerance; No hair loss  Musculoskeletal: No joint pain or swelling  Psychiatric: c/o depressed mood  Heme/Lymph: No easy bruising or bleeding gums    Vital Signs Last 24 Hrs  T(C): 36.6 (04 Sep 2019 04:54), Max: 36.6 (04 Sep 2019 04:54)  T(F): 97.8 (04 Sep 2019 04:54), Max: 97.8 (04 Sep 2019 04:54)  HR: 72 (04 Sep 2019 04:54) (68 - 76)  BP: 115/61 (04 Sep 2019 04:54) (115/61 - 123/70)  RR: 18 (04 Sep 2019 04:54) (18 - 18)  SpO2: 98% (04 Sep 2019 08:43) (97% - 98%)    Examination:  General: Pt is non ambulatory, Gross skin survey within normal limits. Pt has slow speech   Cognitive/Language:  The patient is oriented to person, place, time and date.  Recent and remote memory intact.  Fund of knowledge is intact and normal.  Language with normal repetition, comprehension and naming. Slow tempo of speech  Eyes: intact VA, VFF.  EOMI w/o nystagmus, skew or reported double vision.  PERRL.  No ptosis/weakness of eyelid closure.    Face:  Facial sensation normal V1 - 3, no facial asymmetry.    Ears/Nose/Throat:  Hearing grossly intact b/l.  Palate elevates midline.  Tongue and uvula midline.   Motor examination:  Clonus of right lower extremity Normal tone, bulk and range of motion. Normal ROM of UE, absent ROM LE (patient unable to move)  Formal Muscle Strength Testing: diminished motor strength both LE bilaterally (1/5). Upper extremity motor strength is 5/5 bilaterally.   Reflexes: 1+ reflexes UE bilaterally, absent reflexes LLE, clonus of RLE. No plantar response bilaterally   Sensory examination:  decrease sensation in LE bilaterally, sensation intact upper extremities   Cerebellum:   FTN/HKS intact with normal YOLANDA in upper arms.  No dysmetria or dysdiadokinesia.  Gait not assessed, pt non-ambulatory  Respiratory:  no audible wheezing or inspiratory stridor.  no use of accessory muscles.   Cardiac: diminished pulse on LLE  Abdomen: supple, no guarding, no TTP    Labs:   CBC Full  -  ( 04 Sep 2019 07:31 )  WBC Count : 8.06 K/uL  RBC Count : 5.05 M/uL  Hemoglobin : 13.1 g/dL  Hematocrit : 39.5 %  Platelet Count - Automated : 185 K/uL  Mean Cell Volume : 78.2 fL  Mean Cell Hemoglobin : 25.9 pg  Mean Cell Hemoglobin Concentration : 33.2 g/dL  Auto Neutrophil # : 4.90 K/uL  Auto Lymphocyte # : 2.44 K/uL  Auto Monocyte # : 0.53 K/uL  Auto Eosinophil # : 0.13 K/uL  Auto Basophil # : 0.04 K/uL  Auto Neutrophil % : 60.8 %  Auto Lymphocyte % : 30.3 %  Auto Monocyte % : 6.6 %  Auto Eosinophil % : 1.6 %  Auto Basophil % : 0.5 %    09-04    137  |  92<L>  |  14  ----------------------------<  255<H>  4.5   |  35<H>  |  0.8    Ca    10.0      04 Sep 2019 07:31  Mg     1.7     09-04    TPro  6.6  /  Alb  4.2  /  TBili  0.4  /  DBili  x   /  AST  15  /  ALT  17  /  AlkPhos  109  09-03    LIVER FUNCTIONS - ( 03 Sep 2019 10:33 )  Alb: 4.2 g/dL / Pro: 6.6 g/dL / ALK PHOS: 109 U/L / ALT: 17 U/L / AST: 15 U/L / GGT: x           Neuroimaging:  NCT:     09-04-19 @ 13:29      ASSESSMENT: 48 y/o male with a PMHx of CAD, BPD, CIDP, claustrophobia, B/L hip replacements presents for memory difficulties, motor and sensory deficits, and extreme pain. 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)    PLAN:  CIDP  -Patients UMN signs on RLE are new, would do MRI brain, neck and back; with and without contrast  -Anesthesia may need to be consulted for MRI as pt has a h/o claustrophobia  -IgA levels to establish possible IVIG management of CIDP  -pt may be candidate for motorized wheelchair Neurology Consult    Patient is a 49y old  Male who presents with a chief complaint of Left hip pain (04 Sep 2019 12:43)    HPI:  48 y/o M with PMHx of CAD s/p CABG, BPH, CIDP with significant motor and sensory deficits and extreme pain, b/l total hip replacement, anxiety and depression presents to the ED for extreme left hip pain. He mentions that this has been going for some time and it was well managed by Dr. Camarena but it has been harder to control the pain recently and he was sent by her for pain management and further management. Patient is bedbound and is unable to visit doctors and his condition has been managed by telemetry. He also complains of worsening depression and anxiety, he is also claustrophobic and is complaining of staying in small confined places. He denies suicidal ideation.   In ED, VS wnl, CT head negative for intracranial bleed. Xray of left femur showed superolateral dislocation of the left total hip arthroplasty since the prior exam in 2016.    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); had CAD and CABG in 2011; shortly after developed odd neuro conditions and finally dx'd in 2013 w/ CDIP by [place]; He had become weaker over time and fell and broke rt hip - s/p ORIF and shortly after fell again on lt hip and had ORIF on lt; pt was doing STR, but pain kept getting worse in lt hip; pt eventually became homebound and bedbound (2/2 pain and CIDP); overtime; lt leg seemed shorter and externally rotated, pain kept increasing; pt presents now w/ a myriad of unaddressed complaints from yrs of poor f/u:  1) has extreme pain lt hip w/ deformity; meds are not working - asked for ortho eval  2) CDIP is causing neuropathic pains all over and he has not followed up w/ neuro - asked for pain mngmt eval  3) he thinks he may have had TIA w/ numbness in face and arm, but he is not sure how it relates to CIDP - asked for neuro eval  4) pt not walking well at all and developed b/l dropped feet - pt asking for neuro and ortho for options - I told him that if he is not strong enough to walk, it is likely not a good idea to operate on his dropped feet (luc given his operative risk) - he has zero mvmt in his legs currently  5) pt has not been to cardio in yrs; has hx of DM and CABG; denies CP per se - pt asked for cardio eval (will need anyway if lt hip to be surgically fixed for pain control)  6) pt is considering rehab again to increase mobility, even if just to assist in transfers so that he can get out of house to see MDs - pt asked for rehab eval  7) pt is every depressed and anxious given all his ailments at such a young age; he is missing his dtrs grow up and can no longer work or even function at an independent level - pt asked for a psych eval to adjust meds  8) pt having trouble trying to get to MD offices - pt asked for social serv eval for asst. Pt has HHA for 12 hrs/day (10 am to 10 pm). (03 Sep 2019 15:38)      PAST MEDICAL & SURGICAL HISTORY:  CIDP (chronic inflammatory demyelinating polyneuropathy)  History of cholecystectomy  History of total left hip replacement  History of total right hip replacement: bilateral  S/P CABG x 5      FAMILY HISTORY:    Social History: (-) x 3    Allergies  penicillins (Unknown)    Intolerances  MEDICATIONS  (STANDING):  acetaminophen   Tablet .. 650 milliGRAM(s) Oral every 6 hours  baclofen 10 milliGRAM(s) Oral two times a day  busPIRone 5 milliGRAM(s) Oral two times a day  chlorhexidine 4% Liquid 1 Application(s) Topical <User Schedule>  clopidogrel Tablet 75 milliGRAM(s) Oral daily  DULoxetine 60 milliGRAM(s) Oral daily  enoxaparin Injectable 40 milliGRAM(s) SubCutaneous daily  gabapentin 400 milliGRAM(s) Oral every 8 hours  morphine ER Tablet 60 milliGRAM(s) Oral every 12 hours  pantoprazole    Tablet 40 milliGRAM(s) Oral before breakfast  QUEtiapine 50 milliGRAM(s) Oral at bedtime  tamsulosin 0.4 milliGRAM(s) Oral at bedtime    MEDICATIONS  (PRN):  ALPRAZolam 1 milliGRAM(s) Oral every 8 hours PRN anxiety  hydrOXYzine  Oral Tab/Cap - Peds 50 milliGRAM(s) Oral at bedtime PRN Itching  morphine Concentrate 10 milliGRAM(s) Oral every 4 hours PRN Severe Pain (7 - 10)      Review of systems:    Constitutional: endorses memory loss, both short-term and long-term   Eyes: No eye pain or discharge, weakness in opening eye lids  ENMT: endorses slow speech  Neck: No pain or stiffness  Respiratory: No cough, wheezing, chills or hemoptysis  Cardiovascular: No chest pain, palpitations, shortness of breath, dyspnea on exertion  Gastrointestinal: No abdominal pain, nausea, vomiting or hematemesis; No diarrhea or constipation.   Genitourinary: No dysuria, frequency, hematuria or incontinence  Neurological: endorses weakness of upper and lower extremities, endorses numbness of face, endorses inability to smile   Skin: No rashes or lesions   Endocrine: No heat or cold intolerance; No hair loss  Musculoskeletal: No joint pain or swelling  Psychiatric: c/o depressed mood  Heme/Lymph: No easy bruising or bleeding gums    Vital Signs Last 24 Hrs  T(C): 36.6 (04 Sep 2019 04:54), Max: 36.6 (04 Sep 2019 04:54)  T(F): 97.8 (04 Sep 2019 04:54), Max: 97.8 (04 Sep 2019 04:54)  HR: 72 (04 Sep 2019 04:54) (68 - 76)  BP: 115/61 (04 Sep 2019 04:54) (115/61 - 123/70)  RR: 18 (04 Sep 2019 04:54) (18 - 18)  SpO2: 98% (04 Sep 2019 08:43) (97% - 98%)    Examination:  General: Pt is non ambulatory, Gross skin survey within normal limits. Pt has slow speech   Cognitive/Language:  The patient is oriented to person, place, time and date.  Recent and remote memory intact.  Fund of knowledge is intact and normal.  Language with normal repetition, comprehension and naming. Slow tempo of speech  Eyes: intact VA, VFF.  EOMI w/o nystagmus, skew or reported double vision.  PERRL.  No ptosis/weakness of eyelid closure.    Face:  Facial sensation normal V1 - 3, no facial asymmetry.    Ears/Nose/Throat:  Hearing grossly intact b/l.  Palate elevates midline.  Tongue and uvula midline.   Motor examination:  Clonus of right lower extremity Normal ROM of UE, absent ROM LE with increased tone RLE > LLE with hyperextension RLE.    Formal Muscle Strength Testing: diminished motor strength both LE bilaterally (1/5). Upper extremity motor strength is 5/5 bilaterally.   Reflexes: 1+ reflexes UE bilaterally, absent reflexes LLE, clonus of RLE. No plantar response bilaterally   Sensory examination:  decrease sensation in LE bilaterally, sensation intact upper extremities   Cerebellum:   FTN/HKS intact with normal YOLANDA in upper arms.  No dysmetria or dysdiadokinesia.  Gait not assessed, pt non-ambulatory  Respiratory:  no audible wheezing or inspiratory stridor.  no use of accessory muscles.   Cardiac: diminished pulse on LLE  Abdomen: supple, no guarding, no TTP    Labs:   CBC Full  -  ( 04 Sep 2019 07:31 )  WBC Count : 8.06 K/uL  RBC Count : 5.05 M/uL  Hemoglobin : 13.1 g/dL  Hematocrit : 39.5 %  Platelet Count - Automated : 185 K/uL  Mean Cell Volume : 78.2 fL  Mean Cell Hemoglobin : 25.9 pg  Mean Cell Hemoglobin Concentration : 33.2 g/dL  Auto Neutrophil # : 4.90 K/uL  Auto Lymphocyte # : 2.44 K/uL  Auto Monocyte # : 0.53 K/uL  Auto Eosinophil # : 0.13 K/uL  Auto Basophil # : 0.04 K/uL  Auto Neutrophil % : 60.8 %  Auto Lymphocyte % : 30.3 %  Auto Monocyte % : 6.6 %  Auto Eosinophil % : 1.6 %  Auto Basophil % : 0.5 %    09-04    137  |  92<L>  |  14  ----------------------------<  255<H>  4.5   |  35<H>  |  0.8    Ca    10.0      04 Sep 2019 07:31  Mg     1.7     09-04    TPro  6.6  /  Alb  4.2  /  TBili  0.4  /  DBili  x   /  AST  15  /  ALT  17  /  AlkPhos  109  09-03    LIVER FUNCTIONS - ( 03 Sep 2019 10:33 )  Alb: 4.2 g/dL / Pro: 6.6 g/dL / ALK PHOS: 109 U/L / ALT: 17 U/L / AST: 15 U/L / GGT: x           Neuroimaging:  NCHCT:     09-04-19 @ 13:29

## 2019-09-04 NOTE — CONSULT NOTE ADULT - SUBJECTIVE AND OBJECTIVE BOX
T(C): 36.6 (09-04-19 @ 04:54), Max: 36.6 (09-04-19 @ 04:54)  HR: 72 (09-04-19 @ 04:54) (68 - 76)  BP: 115/61 (09-04-19 @ 04:54) (115/61 - 123/70)  RR: 18 (09-04-19 @ 04:54) (18 - 18)  SpO2: 98% (09-04-19 @ 08:43) (97% - 98%)    MOTOR EXAM      Physical Medicine And Rehabilitation Services are not indicated in this patient for the following Reason(s):    [    ] Patient is medically unstable      [    ]  Patient does not have appropriate activity orders      [     ] Patient has no weight bearing status for:      [     ] Patient is independently ambulating      [     ]  Patient is from Skilled Nursing Facility and is appropriate to return      [  x   ] Patient was non-ambulatory prior to admission - prom per nursing staff      [     ]  Other      WILL CANCEL PM&R / PT request

## 2019-09-04 NOTE — CONSULT NOTE ADULT - SUBJECTIVE AND OBJECTIVE BOX
Chief Complaint:    HPI:  50 y/o M with PMHx of CAD s/p CABG, BPH, CIDP with significant motor and sensory deficits and extreme pain, b/l total hip replacement, anxiety and depression presents to the ED for extreme left hip pain. He mentions that this has been going for some time and it was well managed by PMD but that pain recently been worsening and he was sent by her for pain management and further management. Patient is bedbound and is unable to visit doctors and his condition has been managed by telemetry.  Patient complains of 10+/10 pain all over body especially over the left hip.  At this time the patient is being evaluated for left hip displacement by orthopaedics.     PAST MEDICAL & SURGICAL HISTORY:  CIDP (chronic inflammatory demyelinating polyneuropathy)  History of cholecystectomy  History of total left hip replacement  History of total right hip replacement: bilateral  S/P CABG x 5      FAMILY HISTORY:      SOCIAL HISTORY:  [x] Denies Smoking, Alcohol, or Drug Use    Allergies    penicillins (Unknown)    Intolerances        PAIN MEDICATIONS:  ALPRAZolam 1 milliGRAM(s) Oral every 8 hours PRN  baclofen 10 milliGRAM(s) Oral two times a day  busPIRone 5 milliGRAM(s) Oral two times a day  DULoxetine 60 milliGRAM(s) Oral daily  gabapentin 400 milliGRAM(s) Oral every 8 hours  hydrOXYzine  Oral Tab/Cap - Peds 50 milliGRAM(s) Oral at bedtime PRN  morphine ER Tablet 60 milliGRAM(s) Oral every 12 hours  QUEtiapine 50 milliGRAM(s) Oral at bedtime    Heme:  clopidogrel Tablet 75 milliGRAM(s) Oral daily  enoxaparin Injectable 40 milliGRAM(s) SubCutaneous daily    Antibiotics:    Cardiovascular:  tamsulosin 0.4 milliGRAM(s) Oral at bedtime    GI:  pantoprazole    Tablet 40 milliGRAM(s) Oral before breakfast    Endocrine:    All Other Medications:  chlorhexidine 4% Liquid 1 Application(s) Topical <User Schedule>      REVIEW OF SYSTEMS:    CONSTITUTIONAL: No fever, weight loss, or fatigue  EYES: No eye pain, visual disturbances, or discharge  ENMT:  No difficulty hearing, tinnitus, vertigo; No sinus or throat pain  NECK: No pain or stiffness  BREASTS: No pain, masses, or nipple discharge  RESPIRATORY: No cough, wheezing, chills or hemoptysis; No shortness of breath  CARDIOVASCULAR: No chest pain, palpitations, dizziness, or leg swelling  GASTROINTESTINAL: No abdominal or epigastric pain. No nausea, vomiting, or hematemesis; No diarrhea or constipation. No melena or hematochezia.  GENITOURINARY: No dysuria, frequency, hematuria, or incontinence  NEUROLOGICAL: As per HPI  SKIN: No itching, burning, rashes, or lesions   LYMPH NODES: No enlarged glands  ENDOCRINE: No heat or cold intolerance; No hair loss  MUSCULOSKELETAL: As per HPI  PSYCHIATRIC: Depression  HEME/LYMPH: No easy bruising, or bleeding gums  ALLERY AND IMMUNOLOGIC: No hives or eczema      Vital Signs Last 24 Hrs  T(C): 36.6 (04 Sep 2019 04:54), Max: 36.6 (04 Sep 2019 04:54)  T(F): 97.8 (04 Sep 2019 04:54), Max: 97.8 (04 Sep 2019 04:54)  HR: 72 (04 Sep 2019 04:54) (68 - 76)  BP: 115/61 (04 Sep 2019 04:54) (115/61 - 123/70)  BP(mean): --  RR: 18 (04 Sep 2019 04:54) (18 - 18)  SpO2: 98% (04 Sep 2019 08:43) (97% - 98%)    PAIN SCORE:  10       SCALE USED: (1-10 VNRS)             PHYSICAL EXAM:    GENERAL: NAD, well-groomed, well-developed, lying in bed in NAD  HEAD:  Atraumatic, Normocephalic  EYES: EOMI, PERRLA, conjunctiva and sclera clear  ENMT: No tonsillar erythema, exudates, or enlargement; Moist mucous membranes, Good dentition, No lesions  NECK: Supple, No JVD, Normal thyroid  NERVOUS SYSTEM:  Alert & Oriented X3, Good concentration; grossly decreased lower extremity muscle strength and sensory   CHEST/LUNG: Clear to percussion bilaterally; No rales, rhonchi, wheezing, or rubs  HEART: Regular rate and rhythm; No murmurs, rubs, or gallops  ABDOMEN: Soft, Nontender, Nondistended; Bowel sounds present  EXTREMITIES:  +edema   LYMPH: No lymphadenopathy noted  SKIN: No rashes or lesions        LABS:                          13.1   8.06  )-----------( 185      ( 04 Sep 2019 07:31 )             39.5     09-04    137  |  92<L>  |  14  ----------------------------<  255<H>  4.5   |  35<H>  |  0.8    Ca    10.0      04 Sep 2019 07:31  Mg     1.7     09-04    TPro  6.6  /  Alb  4.2  /  TBili  0.4  /  DBili  x   /  AST  15  /  ALT  17  /  AlkPhos  109  09-03    RADIOLOGY:  EXAM:  XR HIPS INCL PELV 3-4V BI        PROCEDURE DATE:  09/03/2019    INTERPRETATION:  Clinical History / Reason for exam: Left hip pain  AP view of the pelvis, one view of the left hip and one view of the right   hip.  COMPARISON:May 2, 2014  FINDINGS:  There has been interval superolateral dislocation of the left total hip   arthroplasty since the prior exam.  There is diffuse osteopenia.  Degenerative changes of the sacroiliac joints, lower lumbar spine and   pubic symphysis are noted.  Post right total hip arthroplasty, unchanged.  IMPRESSION:  Superolateral dislocation of the left total hip arthroplasty since the   prior exam in 2016.  Right total hip arthroplasty, stable.

## 2019-09-04 NOTE — PROGRESS NOTE ADULT - SUBJECTIVE AND OBJECTIVE BOX
SUBJECTIVE:    Patient is a 49y old Male who presents with a chief complaint of Left hip pain (03 Sep 2019 22:11)    Currently admitted to medicine with the primary diagnosis of Pain     Today is hospital day 1d. This morning he is resting comfortably in bed and reports no new issues or overnight events.     ROS:   CONSTITUTIONAL: No weakness, fevers or chills ; pain in the whole body and worse in left hip  EYES/ENT: No visual changes; No vertigo or throat pain   NECK: No pain or stiffness   RESPIRATORY: No cough, wheezing, hemoptysis; No shortness of breath   CARDIOVASCULAR: No chest pain or palpitations   GASTROINTESTINAL: No abdominal or epigastric pain. No nausea, vomiting, or hematemesis; No diarrhea or constipation. No melena or hematochezia.  GENITOURINARY: No dysuria, frequency or hematuria  NEUROLOGICAL: No numbness or weakness, anxiety   SKIN: No itching, rashes      PAST MEDICAL & SURGICAL HISTORY  CIDP (chronic inflammatory demyelinating polyneuropathy)  History of cholecystectomy  History of total left hip replacement  History of total right hip replacement: bilateral  S/P CABG x 5    SOCIAL HISTORY:    ALLERGIES:  penicillins (Unknown)    MEDICATIONS:  STANDING MEDICATIONS  acetaminophen   Tablet .. 650 milliGRAM(s) Oral every 6 hours  baclofen 10 milliGRAM(s) Oral two times a day  busPIRone 5 milliGRAM(s) Oral two times a day  chlorhexidine 4% Liquid 1 Application(s) Topical <User Schedule>  clopidogrel Tablet 75 milliGRAM(s) Oral daily  DULoxetine 60 milliGRAM(s) Oral daily  enoxaparin Injectable 40 milliGRAM(s) SubCutaneous daily  gabapentin 400 milliGRAM(s) Oral every 8 hours  morphine ER Tablet 60 milliGRAM(s) Oral every 12 hours  pantoprazole    Tablet 40 milliGRAM(s) Oral before breakfast  QUEtiapine 50 milliGRAM(s) Oral at bedtime  tamsulosin 0.4 milliGRAM(s) Oral at bedtime    PRN MEDICATIONS  ALPRAZolam 1 milliGRAM(s) Oral every 8 hours PRN  hydrOXYzine  Oral Tab/Cap - Peds 50 milliGRAM(s) Oral at bedtime PRN  morphine Concentrate 10 milliGRAM(s) Oral every 4 hours PRN    VITALS:   T(F): 97.8  HR: 72  BP: 115/61  RR: 18  SpO2: 98%    LABS:  Negative for smoking/alcohol/drug use.                         13.1   8.06  )-----------( 185      ( 04 Sep 2019 07:31 )             39.5     09-04    137  |  92<L>  |  14  ----------------------------<  255<H>  4.5   |  35<H>  |  0.8    Ca    10.0      04 Sep 2019 07:31  Mg     1.7     09-04    TPro  6.6  /  Alb  4.2  /  TBili  0.4  /  DBili  x   /  AST  15  /  ALT  17  /  AlkPhos  109  09-03              CARDIAC MARKERS ( 03 Sep 2019 10:33 )  x     / <0.01 ng/mL / x     / x     / x          RADIOLOGY:  < from: Xray Femur 2 Views, Left (09.04.19 @ 09:13) >  IMPRESSION:    Diffuse osteopenia. Redemonstrated is superolateral dislocation of the   left total hip arthroplasty.No evidence of acute displaced fracture.      Degenerative changes of the pubic symphysis and left knee are also noted.    Vascular calcifications.    < end of copied text >      < from: Xray Hip w/ Pelvis 3-4 Views, Bilateral (09.03.19 @ 12:31) >  IMPRESSION:    Superolateral dislocation of the left total hip arthroplasty since the   prior exam in 2016.    Right total hip arthroplasty, stable.    < end of copied text >    < from: VA Duplex Lower Extrem Arterial, Bilat (09.03.19 @ 20:48) >  Impression:    Moderate to severe stenosis of the right superficial femoral artery with   distal reconstitution of diminished flow.     Mild to moderate atherosclerotic occlusive disease in the left tibial   vessels.    < end of copied text >    PHYSICAL EXAM:  GEN: No acute distress  HEENT: normocephalic, atraumatic, aniceteric  LUNGS: Clear to auscultation bilaterally, no rales/wheezing/ rhonchi  HEART: S1/S2 present. RRR, no murmurs  ABD: Soft, non-tender, non-distended. Bowel sounds present  EXT: b/l lower extremity edema, pulses not palpable, no sensation ; pain in left hip upon movement; unable to move legs , left leg rotated out  NEURO: AAOX3, normal affect      ASSESSMENT AND PLAN:    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 and extreme pain, b/l total hip replacement, anxiety and depression admitted for left hip pain     # Superolateral dislocation of the left total hip arthroplasty  - patient bedbound at home x3 years   - Ortho following - f/u CT scan pelvis and b/l hips; will make plan when imaging done  - Pain home meds: Morphine 60 mg PO ER q12h and morphine solution 5 mg q3h PRN.  - Pain management (Duqmaq): Morphine 10 mg PO q4 prn severe pain   - F/up orthopedics consult regarding the dislocation of the left hip arthroplasty    # Depression and anxiety:  - c/w Buspirone 5 mg q12h, Seroquel 150 mg qd and Xanax 1 mg q8h PO  - Psychiatry consult: xanax prn tid     # Cold b/l feet, no pulses palpable, decreased sensation:  - Per patient this is chronic  - VA duplex arterial : mod-severe stenosis R superficial femoral artery and mild L tibial vessel   - Vascular consult f/u   - patient not on statin ???    # CIDP:  - C/w duloxetine 60 mg qd, baclofen 5 mg q12h and gabapentin 400 mg tid  - F/up neurology consult    # CAD s/p CABG:  - C/w Plavix 75 mg qd, patient not on statin    # BPH:  - C/w FLomax 0.4 mg qd    # Diet: DASH  # DVT ppx: Lovenox 40 mg qd  # GI ppx: Protonix 40 mg PO qd  # Activity: OOBTC. F/up PT/rehab  # Dispo: From home, was in nursing home before  # Code status: FULL

## 2019-09-04 NOTE — CONSULT NOTE ADULT - SUBJECTIVE AND OBJECTIVE BOX
HPI: 50 y/o M with PMHx of CAD s/p CABG in 2011; BPH, CIDP with significant motor and sensory deficits and extreme pain, b/l total hip replacement, anxiety and depression presents to the ED for extreme left hip pain. He mentions that this has been going for some time and it was well managed by Dr. Camarena but it has been harder to control the pain recently and he was sent by her for pain management and further management. Patient is bedbound and is unable to visit doctors and his condition has been managed by telemetry. He also complains of worsening depression and anxiety, he is also claustrophobic and is complaining of staying in small confined places. He denies suicidal ideation.   In ED, VS wnl, CT head negative for intracranial bleed. X-ray of left femur showed superolateral dislocation of the left total hip arthroplasty since the prior exam in 2016.  pt has not been to cardio in yrs; has hx of DM and CABG; denies CP per se - pt asked for cardio eval.       PAST MEDICAL & SURGICAL HISTORY  CIDP (chronic inflammatory demyelinating polyneuropathy)  History of cholecystectomy  History of total left hip replacement  History of total right hip replacement: bilateral  S/P CABG x 5      FAMILY HISTORY:  FAMILY HISTORY:      SOCIAL HISTORY:  []smoker  []Alcohol  []Drug  Bedbound     ALLERGIES:  penicillins (Unknown)    MEDICATIONS:  MEDICATIONS  (STANDING):  acetaminophen   Tablet .. 650 milliGRAM(s) Oral every 6 hours  baclofen 10 milliGRAM(s) Oral two times a day  busPIRone 5 milliGRAM(s) Oral two times a day  chlorhexidine 4% Liquid 1 Application(s) Topical <User Schedule>  clopidogrel Tablet 75 milliGRAM(s) Oral daily  DULoxetine 60 milliGRAM(s) Oral daily  enoxaparin Injectable 40 milliGRAM(s) SubCutaneous daily  gabapentin 400 milliGRAM(s) Oral every 8 hours  morphine ER Tablet 60 milliGRAM(s) Oral every 12 hours  pantoprazole    Tablet 40 milliGRAM(s) Oral before breakfast  QUEtiapine 50 milliGRAM(s) Oral at bedtime  tamsulosin 0.4 milliGRAM(s) Oral at bedtime    MEDICATIONS  (PRN):  ALPRAZolam 1 milliGRAM(s) Oral every 8 hours PRN anxiety  hydrOXYzine  Oral Tab/Cap - Peds 50 milliGRAM(s) Oral at bedtime PRN Itching  morphine Concentrate 10 milliGRAM(s) Oral every 4 hours PRN Severe Pain (7 - 10)      HOME MEDICATIONS:  Home Medications:  Atarax 50 mg oral tablet: 1 tab(s) orally once a day (at bedtime) (03 Sep 2019 16:43)  baclofen 10 mg oral tablet: 1 tab(s) orally 2 times a day (03 Sep 2019 16:43)  busPIRone 5 mg oral tablet: 1 tab(s) orally 2 times a day (03 Sep 2019 16:43)  DULoxetine 60 mg oral delayed release capsule: 1 cap(s) orally once a day (03 Sep 2019 16:43)  Flomax 0.4 mg oral capsule: 1 cap(s) orally once a day (03 Sep 2019 16:43)  gabapentin 400 mg oral tablet: 1 tab(s) orally 3 times a day (before meals) (03 Sep 2019 16:43)  morphine 10 mg/5 mL oral solution: 5 milligram(s) orally every 3 hours, As Needed (03 Sep 2019 16:43)  morphine 60 mg oral capsule, extended release: 1 cap(s) orally every 12 hours (03 Sep 2019 16:43)  Plavix 75 mg oral tablet: 1 tab(s) orally once a day (03 Sep 2019 16:43)  SEROquel  mg oral tablet, extended release: 1 tab(s) orally once a day (in the evening) (03 Sep 2019 16:43)  Xanax 1 mg oral tablet: 1 tab(s) orally 3 times a day (03 Sep 2019 16:43)      VITALS:   T(F): 97.8 (09-04 @ 04:54), Max: 98.7 (09-03 @ 09:37)  HR: 72 (09-04 @ 04:54) (68 - 98)  BP: 115/61 (09-04 @ 04:54) (115/61 - 160/84)  BP(mean): --  RR: 18 (09-04 @ 04:54) (18 - 20)  SpO2: 98% (09-04 @ 08:43) (97% - 99%)    I&O's Summary    REVIEW OF SYSTEMS:  CONSTITUTIONAL: No weakness, fevers or chills  EYES: No visual changes  ENT: No vertigo or throat pain   NECK: No pain or stiffness  RESPIRATORY: No cough, wheezing, hemoptysis; No shortness of breath  CARDIOVASCULAR: No chest pain or palpitations  GASTROINTESTINAL: No abdominal or epigastric pain. No nausea, vomiting, or hematemesis; No diarrhea or constipation. No melena or hematochezia.  GENITOURINARY: No dysuria, frequency or hematuria  NEUROLOGICAL: No numbness or weakness  SKIN: No itching, no rashes  MSK: no    PHYSICAL EXAM:  NEURO: patient is awake , alert and oriented, No sensation or motor function in LE B/L (chronic)  GEN: Not in acute distress  NECK: no thyroid enlargement, no JVD  LUNGS: Clear to auscultation bilaterally   CARDIOVASCULAR: S1/S2 present, RRR , no murmurs or rubs, no carotid bruits,  + PP bilaterally  ABD: Soft, non-tender, non-distended, +BS  EXT: B/L LE cold feet, , pedal pulses not felt and patient not feeling light touch,  Left LE externally rotated and shortened  SKIN: Intact    LABS:                        13.1   8.06  )-----------( 185      ( 04 Sep 2019 07:31 )             39.5     09-04    137  |  92<L>  |  14  ----------------------------<  255<H>  4.5   |  35<H>  |  0.8    Ca    10.0      04 Sep 2019 07:31  Mg     1.7     09-04    TPro  6.6  /  Alb  4.2  /  TBili  0.4  /  DBili  x   /  AST  15  /  ALT  17  /  AlkPhos  109  09-03    CARDIAC MARKERS ( 03 Sep 2019 10:33 )  x     / <0.01 ng/mL / x     / x     / x        Troponin trend:    Serum Pro-Brain Natriuretic Peptide: 71 pg/mL (09-03-19 @ 10:33)    RADIOLOGY:  -CXR: No radiographic evidence of acute cardiopulmonary disease.  -TTE:  Last echo 05/16-EF 55-60%, Trivial TR, mod asymmetric hypertrophy of LV apex/mid cavity.    -CCTA: None  -STRESS TEST: None  -CATHETERIZATION: 08/2011, significant multi- vessel disease, mLAD, LCx, Prox RCA-    VA Duplex Lower Extrem Arterial, Bilat (09.03.19 @ 20:48)     Moderate to severe stenosis of the right superficial femoral artery with   distal reconstitution of diminished flow.     Mild to moderate atherosclerotic occlusive disease in the left tibial   vessels.    ECG:    Diagnosis Line Normal sinus rhythm  Nonspecific ST and T wave abnormality  Abnormal ECG HPI: 48 y/o M with PMHx of CAD s/p CABG in 2011; BPH, CIDP with significant motor and sensory deficits and extreme pain, b/l total hip replacement, anxiety and depression presents to the ED for extreme left hip pain. He mentions that this has been going for some time and it was well managed by Dr. Camarena but it has been harder to control the pain recently and he was sent by her for pain management and further management. Patient is bedbound and is unable to visit doctors and his condition has been managed by telemetry. He also complains of worsening depression and anxiety, he is also claustrophobic and is complaining of staying in small confined places. He denies suicidal ideation.   In ED, VS wnl, CT head negative for intracranial bleed. X-ray of left femur showed superolateral dislocation of the left total hip arthroplasty since the prior exam in 2016.  pt has not been to cardio in yrs; has hx of DM and CABG; denies CP per se - pt asked for cardio eval.       PAST MEDICAL & SURGICAL HISTORY  CIDP (chronic inflammatory demyelinating polyneuropathy)  History of cholecystectomy  History of total left hip replacement  History of total right hip replacement: bilateral  S/P CABG x 5      FAMILY HISTORY:  FAMILY HISTORY: no fhx of cad      SOCIAL HISTORY:  non ]smoker  no Alcohol  []Drug  Bedbound     ALLERGIES:  penicillins (Unknown)    MEDICATIONS:  MEDICATIONS  (STANDING):  acetaminophen   Tablet .. 650 milliGRAM(s) Oral every 6 hours  baclofen 10 milliGRAM(s) Oral two times a day  busPIRone 5 milliGRAM(s) Oral two times a day  chlorhexidine 4% Liquid 1 Application(s) Topical <User Schedule>  clopidogrel Tablet 75 milliGRAM(s) Oral daily  DULoxetine 60 milliGRAM(s) Oral daily  enoxaparin Injectable 40 milliGRAM(s) SubCutaneous daily  gabapentin 400 milliGRAM(s) Oral every 8 hours  morphine ER Tablet 60 milliGRAM(s) Oral every 12 hours  pantoprazole    Tablet 40 milliGRAM(s) Oral before breakfast  QUEtiapine 50 milliGRAM(s) Oral at bedtime  tamsulosin 0.4 milliGRAM(s) Oral at bedtime    MEDICATIONS  (PRN):  ALPRAZolam 1 milliGRAM(s) Oral every 8 hours PRN anxiety  hydrOXYzine  Oral Tab/Cap - Peds 50 milliGRAM(s) Oral at bedtime PRN Itching  morphine Concentrate 10 milliGRAM(s) Oral every 4 hours PRN Severe Pain (7 - 10)      HOME MEDICATIONS:  Home Medications:  Atarax 50 mg oral tablet: 1 tab(s) orally once a day (at bedtime) (03 Sep 2019 16:43)  baclofen 10 mg oral tablet: 1 tab(s) orally 2 times a day (03 Sep 2019 16:43)  busPIRone 5 mg oral tablet: 1 tab(s) orally 2 times a day (03 Sep 2019 16:43)  DULoxetine 60 mg oral delayed release capsule: 1 cap(s) orally once a day (03 Sep 2019 16:43)  Flomax 0.4 mg oral capsule: 1 cap(s) orally once a day (03 Sep 2019 16:43)  gabapentin 400 mg oral tablet: 1 tab(s) orally 3 times a day (before meals) (03 Sep 2019 16:43)  morphine 10 mg/5 mL oral solution: 5 milligram(s) orally every 3 hours, As Needed (03 Sep 2019 16:43)  morphine 60 mg oral capsule, extended release: 1 cap(s) orally every 12 hours (03 Sep 2019 16:43)  Plavix 75 mg oral tablet: 1 tab(s) orally once a day (03 Sep 2019 16:43)  SEROquel  mg oral tablet, extended release: 1 tab(s) orally once a day (in the evening) (03 Sep 2019 16:43)  Xanax 1 mg oral tablet: 1 tab(s) orally 3 times a day (03 Sep 2019 16:43)      VITALS:   T(F): 97.8 (09-04 @ 04:54), Max: 98.7 (09-03 @ 09:37)  HR: 72 (09-04 @ 04:54) (68 - 98)  BP: 115/61 (09-04 @ 04:54) (115/61 - 160/84)  BP(mean): --  RR: 18 (09-04 @ 04:54) (18 - 20)  SpO2: 98% (09-04 @ 08:43) (97% - 99%)    I&O's Summary    REVIEW OF SYSTEMS:  CONSTITUTIONAL: No weakness, fevers or chills  EYES: No visual changes  ENT: No vertigo or throat pain   NECK: No pain or stiffness  RESPIRATORY: No cough, wheezing, hemoptysis; No shortness of breath  CARDIOVASCULAR: No chest pain or palpitations  GASTROINTESTINAL: No abdominal or epigastric pain. No nausea, vomiting, or hematemesis; No diarrhea or constipation. No melena or hematochezia.  GENITOURINARY: No dysuria, frequency or hematuria  NEUROLOGICAL: No numbness or weakness  SKIN: No itching, no rashes  MSK: no    PHYSICAL EXAM:  NEURO: patient is awake , alert and oriented, No sensation or motor function in LE B/L (chronic)  GEN: Not in acute distress  NECK: no thyroid enlargement, no JVD  LUNGS: Clear to auscultation bilaterally   CARDIOVASCULAR: S1/S2 present, RRR , no murmurs or rubs, no carotid bruits,  + PP bilaterally  ABD: Soft, non-tender, non-distended, +BS  EXT/MS: B/L LE cold feet, , pedal pulses not felt and patient not feeling light touch,  Left LE externally rotated and shortened  SKIN: Intact    LABS:                        13.1   8.06  )-----------( 185      ( 04 Sep 2019 07:31 )             39.5     09-04    137  |  92<L>  |  14  ----------------------------<  255<H>  4.5   |  35<H>  |  0.8    Ca    10.0      04 Sep 2019 07:31  Mg     1.7     09-04    TPro  6.6  /  Alb  4.2  /  TBili  0.4  /  DBili  x   /  AST  15  /  ALT  17  /  AlkPhos  109  09-03    CARDIAC MARKERS ( 03 Sep 2019 10:33 )  x     / <0.01 ng/mL / x     / x     / x        Troponin trend:    Serum Pro-Brain Natriuretic Peptide: 71 pg/mL (09-03-19 @ 10:33)    RADIOLOGY:  -CXR: No radiographic evidence of acute cardiopulmonary disease.  -TTE:  Last echo 05/16-EF 55-60%, Trivial TR, mod asymmetric hypertrophy of LV apex/mid cavity.    -CCTA: None  -STRESS TEST: None  -CATHETERIZATION: 08/2011, significant multi- vessel disease, mLAD, LCx, Prox RCA-    VA Duplex Lower Extrem Arterial, Bilat (09.03.19 @ 20:48)     Moderate to severe stenosis of the right superficial femoral artery with   distal reconstitution of diminished flow.     Mild to moderate atherosclerotic occlusive disease in the left tibial   vessels.    ECG:    Diagnosis Line Normal sinus rhythm  Nonspecific ST and T wave abnormality  Abnormal ECG

## 2019-09-04 NOTE — CONSULT NOTE ADULT - ASSESSMENT
50 y/o M with PMHx of CAD s/p CABG in 2011; BPH, CIDP, DM with significant motor and sensory deficits and extreme pain, b/l total hip replacement, anxiety and depression being consulted for pre-op cardiology risk stratification for L hip hemiarthroplasty dislocation repair.      IMPRESSION:     CAD s/p CABG 2011 (Quintuple coronary artery bypass)  PVD      RECOMMENDATIONS:  Continue Plavix, start beta-blocker, start high intensity statin given CAD/CABG, PVD, DM history   Bedbound patient, RCRI- Class III Risk-10.1 % 30-day risk of death, MI, or cardiac arrest,  no routine postoperative cardiac monitoring is warranted given normal BNP   High cardiac risk for moderate risk procedure 48 y/o M with PMHx of CAD s/p CABG in 2011; BPH, CIDP, DM with significant motor and sensory deficits and extreme pain, b/l total hip replacement, anxiety and depression being consulted for pre-op cardiology risk stratification for L hip hemiarthroplasty dislocation repair.      IMPRESSION:     CAD s/p CABG 2011 (Quintuple coronary artery bypass)  PVD      RECOMMENDATIONS:  Continue Plavix, needs high intensity statin given CAD/CABG, PVD, DM history   Bedbound patient, RCRI- Class III Risk-10.1 % 30-day risk of death, MI, or cardiac arrest,  no routine postoperative cardiac monitoring is warranted given normal BNP   High cardiac risk for moderate risk procedure      ATTENDING NOTE TO FOLLOW 48 y/o M with PMHx of CAD s/p CABG in 2011; BPH, CIDP, DM with significant motor and sensory deficits and extreme pain, b/l total hip replacement, anxiety and depression being consulted for pre-op cardiology risk stratification for L hip hemiarthroplasty dislocation repair.      IMPRESSION:     CAD s/p CABG 2011 (Quintuple coronary artery bypass)  PVD      RECOMMENDATIONS:  Continue Plavix, needs high intensity statin given CAD/CABG, PVD, DM history   Low dose bb  Bedbound patient, RCRI- Class III Risk-10.1 % 30-day risk of death, MI, or cardiac arrest   High cardiac risk for moderate risk sx  Proceed as planned

## 2019-09-04 NOTE — CONSULT NOTE ADULT - ASSESSMENT
49 year old male with chronic pain syndrome secondary to CIDP and displacement of left THR.  Patient on high dose chronic opioids as out patient who was referred by PMD due to inability to control symptoms as out patient.    1.  OIH must be considered as possibility for lack of pain control and increased opioid need.  If patient willing can consider taper of medications to see if pain is increased, decreased or unchanged.  Ultimately if patient can have better follow up can consider IT pump for opioid delivery.  Would not increaser opioids past current home doses.      2.  Increase Gabapentin to 600mg TID and 800mg TID as tolerated and given renal function allows.    3.  Psych follow up and evaluation given depression and anxiety, especially with catastrophizing may cause patient to complain of increased pain.      4.  Toradol 30mg Q6hrs PRN for additional pain coverage.  Can also consider Tylenol 1000mg TID as well either standing or PRN to help with pain reduction.  Continue to monitor renal and liver function.     5.  Follow up with orthopaedics for possible left hip reconstruction.

## 2019-09-04 NOTE — CONSULT NOTE ADULT - ASSESSMENT
ASSESSMENT: 50 y/o male with a PMHx of CAD, BPD, CIDP, claustrophobia, B/L hip replacements after recurrent falls well known to our service previously treated for CIDP with PLEX x 6 months, long-term steroids and steroid-sparing agents with minimal efficacy secondary to combination of disease progression, deconditioning and inability to participate with PT off DMT for the last several years now presenting with memory difficulties, motor and sensory deficits, b/l LE pain found with spasticity and clonus of the RLE which is inconsistent with longstanding CIDP.  Recommend r/o central etiology as potential cause for worsening LE weakness.  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  Has not followed with neurology in > 3 years.    PLAN:  - Recommend MRI brain NC, MRI Cervical and thoracic spine w/w/o sandra (will need to do under anesthesia)  - check SPEP, UPEP, serum immunofixation, ganglioside autoantibody panel, myelin associated glycoprotein, HgbA1c, SANDRA abs, ACE level, IgA level, b12, folate, HIV, heavy metal screen  - will consider IVIG as DMT pending IgA level and neuroimaging  - OT eval/wheelchair clinic eval  - f/u pain management recommendations  - f/u orthopedics recommendations  - may be candidate for neuromuscular clinic once discharged

## 2019-09-05 LAB
ALBUMIN SERPL ELPH-MCNC: 3.8 G/DL — SIGNIFICANT CHANGE UP (ref 3.5–5.2)
ALP SERPL-CCNC: 103 U/L — SIGNIFICANT CHANGE UP (ref 30–115)
ALT FLD-CCNC: 14 U/L — SIGNIFICANT CHANGE UP (ref 0–41)
ANION GAP SERPL CALC-SCNC: 11 MMOL/L — SIGNIFICANT CHANGE UP (ref 7–14)
AST SERPL-CCNC: 15 U/L — SIGNIFICANT CHANGE UP (ref 0–41)
BASOPHILS # BLD AUTO: 0.05 K/UL — SIGNIFICANT CHANGE UP (ref 0–0.2)
BASOPHILS NFR BLD AUTO: 0.6 % — SIGNIFICANT CHANGE UP (ref 0–1)
BILIRUB SERPL-MCNC: 0.4 MG/DL — SIGNIFICANT CHANGE UP (ref 0.2–1.2)
BUN SERPL-MCNC: 19 MG/DL — SIGNIFICANT CHANGE UP (ref 10–20)
CALCIUM SERPL-MCNC: 8.8 MG/DL — SIGNIFICANT CHANGE UP (ref 8.5–10.1)
CHLORIDE SERPL-SCNC: 94 MMOL/L — LOW (ref 98–110)
CHOLEST SERPL-MCNC: 184 MG/DL — SIGNIFICANT CHANGE UP (ref 100–200)
CO2 SERPL-SCNC: 31 MMOL/L — SIGNIFICANT CHANGE UP (ref 17–32)
CREAT SERPL-MCNC: 0.8 MG/DL — SIGNIFICANT CHANGE UP (ref 0.7–1.5)
EOSINOPHIL # BLD AUTO: 0.15 K/UL — SIGNIFICANT CHANGE UP (ref 0–0.7)
EOSINOPHIL NFR BLD AUTO: 1.7 % — SIGNIFICANT CHANGE UP (ref 0–8)
ESTIMATED AVERAGE GLUCOSE: 280 MG/DL — HIGH (ref 68–114)
FERRITIN SERPL-MCNC: 126 NG/ML — SIGNIFICANT CHANGE UP (ref 30–400)
GLUCOSE BLDC GLUCOMTR-MCNC: 247 MG/DL — HIGH (ref 70–99)
GLUCOSE BLDC GLUCOMTR-MCNC: 258 MG/DL — HIGH (ref 70–99)
GLUCOSE SERPL-MCNC: 375 MG/DL — HIGH (ref 70–99)
HBA1C BLD-MCNC: 11.4 % — HIGH (ref 4–5.6)
HCT VFR BLD CALC: 37 % — LOW (ref 42–52)
HDLC SERPL-MCNC: 36 MG/DL — LOW
HGB BLD-MCNC: 12.4 G/DL — LOW (ref 14–18)
IGA FLD-MCNC: 86 MG/DL — SIGNIFICANT CHANGE UP (ref 84–499)
IGG FLD-MCNC: 950 MG/DL — SIGNIFICANT CHANGE UP (ref 610–1660)
IGM SERPL-MCNC: 67 MG/DL — SIGNIFICANT CHANGE UP (ref 35–242)
IMM GRANULOCYTES NFR BLD AUTO: 0.3 % — SIGNIFICANT CHANGE UP (ref 0.1–0.3)
KAPPA LC SER QL IFE: 3.55 MG/DL — HIGH (ref 0.33–1.94)
KAPPA/LAMBDA FREE LIGHT CHAIN RATIO, SERUM: 1.3 RATIO — SIGNIFICANT CHANGE UP (ref 0.26–1.65)
LAMBDA LC SER QL IFE: 2.73 MG/DL — HIGH (ref 0.57–2.63)
LDLC SERPL DIRECT ASSAY-MCNC: 123 MG/DL — SIGNIFICANT CHANGE UP
LYMPHOCYTES # BLD AUTO: 1.57 K/UL — SIGNIFICANT CHANGE UP (ref 1.2–3.4)
LYMPHOCYTES # BLD AUTO: 17.4 % — LOW (ref 20.5–51.1)
MCHC RBC-ENTMCNC: 26.1 PG — LOW (ref 27–31)
MCHC RBC-ENTMCNC: 33.5 G/DL — SIGNIFICANT CHANGE UP (ref 32–37)
MCV RBC AUTO: 77.7 FL — LOW (ref 80–94)
MONOCYTES # BLD AUTO: 0.56 K/UL — SIGNIFICANT CHANGE UP (ref 0.1–0.6)
MONOCYTES NFR BLD AUTO: 6.2 % — SIGNIFICANT CHANGE UP (ref 1.7–9.3)
NEUTROPHILS # BLD AUTO: 6.67 K/UL — HIGH (ref 1.4–6.5)
NEUTROPHILS NFR BLD AUTO: 73.8 % — SIGNIFICANT CHANGE UP (ref 42.2–75.2)
NRBC # BLD: 0 /100 WBCS — SIGNIFICANT CHANGE UP (ref 0–0)
PLATELET # BLD AUTO: 168 K/UL — SIGNIFICANT CHANGE UP (ref 130–400)
POTASSIUM SERPL-MCNC: 4.5 MMOL/L — SIGNIFICANT CHANGE UP (ref 3.5–5)
POTASSIUM SERPL-SCNC: 4.5 MMOL/L — SIGNIFICANT CHANGE UP (ref 3.5–5)
PROT SERPL-MCNC: 6.2 G/DL — SIGNIFICANT CHANGE UP (ref 6–8)
RBC # BLD: 4.76 M/UL — SIGNIFICANT CHANGE UP (ref 4.7–6.1)
RBC # FLD: 12.8 % — SIGNIFICANT CHANGE UP (ref 11.5–14.5)
SODIUM SERPL-SCNC: 136 MMOL/L — SIGNIFICANT CHANGE UP (ref 135–146)
TRIGL SERPL-MCNC: 178 MG/DL — HIGH (ref 10–149)
WBC # BLD: 9.03 K/UL — SIGNIFICANT CHANGE UP (ref 4.8–10.8)
WBC # FLD AUTO: 9.03 K/UL — SIGNIFICANT CHANGE UP (ref 4.8–10.8)

## 2019-09-05 PROCEDURE — 72192 CT PELVIS W/O DYE: CPT | Mod: 26

## 2019-09-05 PROCEDURE — 72157 MRI CHEST SPINE W/O & W/DYE: CPT | Mod: 26

## 2019-09-05 PROCEDURE — 99233 SBSQ HOSP IP/OBS HIGH 50: CPT

## 2019-09-05 PROCEDURE — 72156 MRI NECK SPINE W/O & W/DYE: CPT | Mod: 26

## 2019-09-05 PROCEDURE — 93880 EXTRACRANIAL BILAT STUDY: CPT | Mod: 26

## 2019-09-05 PROCEDURE — 70551 MRI BRAIN STEM W/O DYE: CPT | Mod: 26

## 2019-09-05 PROCEDURE — 73700 CT LOWER EXTREMITY W/O DYE: CPT | Mod: 26,50

## 2019-09-05 RX ORDER — INSULIN GLARGINE 100 [IU]/ML
10 INJECTION, SOLUTION SUBCUTANEOUS AT BEDTIME
Refills: 0 | Status: DISCONTINUED | OUTPATIENT
Start: 2019-09-05 | End: 2019-09-06

## 2019-09-05 RX ORDER — KETOROLAC TROMETHAMINE 30 MG/ML
30 SYRINGE (ML) INJECTION EVERY 6 HOURS
Refills: 0 | Status: DISCONTINUED | OUTPATIENT
Start: 2019-09-05 | End: 2019-09-09

## 2019-09-05 RX ORDER — DEXTROSE 50 % IN WATER 50 %
25 SYRINGE (ML) INTRAVENOUS ONCE
Refills: 0 | Status: DISCONTINUED | OUTPATIENT
Start: 2019-09-05 | End: 2019-09-13

## 2019-09-05 RX ORDER — GABAPENTIN 400 MG/1
600 CAPSULE ORAL EVERY 8 HOURS
Refills: 0 | Status: DISCONTINUED | OUTPATIENT
Start: 2019-09-05 | End: 2019-09-10

## 2019-09-05 RX ORDER — INSULIN LISPRO 100/ML
3 VIAL (ML) SUBCUTANEOUS
Refills: 0 | Status: DISCONTINUED | OUTPATIENT
Start: 2019-09-05 | End: 2019-09-06

## 2019-09-05 RX ORDER — ASPIRIN/CALCIUM CARB/MAGNESIUM 324 MG
81 TABLET ORAL DAILY
Refills: 0 | Status: DISCONTINUED | OUTPATIENT
Start: 2019-09-05 | End: 2019-09-13

## 2019-09-05 RX ORDER — ACETAMINOPHEN 500 MG
650 TABLET ORAL EVERY 6 HOURS
Refills: 0 | Status: DISCONTINUED | OUTPATIENT
Start: 2019-09-05 | End: 2019-09-06

## 2019-09-05 RX ORDER — GLUCAGON INJECTION, SOLUTION 0.5 MG/.1ML
1 INJECTION, SOLUTION SUBCUTANEOUS ONCE
Refills: 0 | Status: DISCONTINUED | OUTPATIENT
Start: 2019-09-05 | End: 2019-09-13

## 2019-09-05 RX ORDER — SODIUM CHLORIDE 9 MG/ML
1000 INJECTION, SOLUTION INTRAVENOUS
Refills: 0 | Status: DISCONTINUED | OUTPATIENT
Start: 2019-09-05 | End: 2019-09-13

## 2019-09-05 RX ORDER — DEXTROSE 50 % IN WATER 50 %
15 SYRINGE (ML) INTRAVENOUS ONCE
Refills: 0 | Status: DISCONTINUED | OUTPATIENT
Start: 2019-09-05 | End: 2019-09-13

## 2019-09-05 RX ORDER — DEXTROSE 50 % IN WATER 50 %
12.5 SYRINGE (ML) INTRAVENOUS ONCE
Refills: 0 | Status: DISCONTINUED | OUTPATIENT
Start: 2019-09-05 | End: 2019-09-13

## 2019-09-05 RX ORDER — INSULIN LISPRO 100/ML
VIAL (ML) SUBCUTANEOUS
Refills: 0 | Status: DISCONTINUED | OUTPATIENT
Start: 2019-09-05 | End: 2019-09-08

## 2019-09-05 RX ADMIN — Medication 30 MILLIGRAM(S): at 21:24

## 2019-09-05 RX ADMIN — MORPHINE SULFATE 60 MILLIGRAM(S): 50 CAPSULE, EXTENDED RELEASE ORAL at 20:35

## 2019-09-05 RX ADMIN — Medication 30 MILLIGRAM(S): at 08:15

## 2019-09-05 RX ADMIN — ENOXAPARIN SODIUM 40 MILLIGRAM(S): 100 INJECTION SUBCUTANEOUS at 11:10

## 2019-09-05 RX ADMIN — TAMSULOSIN HYDROCHLORIDE 0.4 MILLIGRAM(S): 0.4 CAPSULE ORAL at 21:31

## 2019-09-05 RX ADMIN — Medication 30 MILLIGRAM(S): at 20:35

## 2019-09-05 RX ADMIN — GABAPENTIN 600 MILLIGRAM(S): 400 CAPSULE ORAL at 21:31

## 2019-09-05 RX ADMIN — Medication 30 MILLIGRAM(S): at 14:19

## 2019-09-05 RX ADMIN — Medication 5 MILLIGRAM(S): at 20:33

## 2019-09-05 RX ADMIN — ATORVASTATIN CALCIUM 40 MILLIGRAM(S): 80 TABLET, FILM COATED ORAL at 21:31

## 2019-09-05 RX ADMIN — DULOXETINE HYDROCHLORIDE 60 MILLIGRAM(S): 30 CAPSULE, DELAYED RELEASE ORAL at 11:10

## 2019-09-05 RX ADMIN — Medication 1 MILLIGRAM(S): at 08:31

## 2019-09-05 RX ADMIN — GABAPENTIN 400 MILLIGRAM(S): 400 CAPSULE ORAL at 06:20

## 2019-09-05 RX ADMIN — Medication 10 MILLIGRAM(S): at 06:20

## 2019-09-05 RX ADMIN — Medication 30 MILLIGRAM(S): at 07:59

## 2019-09-05 RX ADMIN — CHLORHEXIDINE GLUCONATE 1 APPLICATION(S): 213 SOLUTION TOPICAL at 06:19

## 2019-09-05 RX ADMIN — Medication 10 MILLIGRAM(S): at 20:33

## 2019-09-05 RX ADMIN — Medication 5 MILLIGRAM(S): at 06:20

## 2019-09-05 RX ADMIN — MORPHINE SULFATE 60 MILLIGRAM(S): 50 CAPSULE, EXTENDED RELEASE ORAL at 06:22

## 2019-09-05 RX ADMIN — PANTOPRAZOLE SODIUM 40 MILLIGRAM(S): 20 TABLET, DELAYED RELEASE ORAL at 06:20

## 2019-09-05 RX ADMIN — MORPHINE SULFATE 60 MILLIGRAM(S): 50 CAPSULE, EXTENDED RELEASE ORAL at 21:24

## 2019-09-05 RX ADMIN — GABAPENTIN 400 MILLIGRAM(S): 400 CAPSULE ORAL at 14:19

## 2019-09-05 RX ADMIN — QUETIAPINE FUMARATE 50 MILLIGRAM(S): 200 TABLET, FILM COATED ORAL at 21:31

## 2019-09-05 RX ADMIN — INSULIN GLARGINE 10 UNIT(S): 100 INJECTION, SOLUTION SUBCUTANEOUS at 21:31

## 2019-09-05 NOTE — CHART NOTE - NSCHARTNOTEFT_GEN_A_CORE
PACU ANESTHESIA ADMISSION NOTE      Procedure: MRI of brain, c spine and T spine  Post op diagnosis:      ____  Intubated  TV:______       Rate: ______      FiO2: ______    _x___  Patent Airway    _x___  Full return of protective reflexes    _x___  Full recovery from anesthesia / back to baseline status    Vitals:  see anesthesia record    Mental Status:  _x___ Awake   _____ Alert   _____ Drowsy   _____ Sedated    Nausea/Vomiting:  _x___  NO       ______Yes,   See Post - Op Orders         Pain Scale (0-10):  _____    Treatment: ____ None    __x__ See Post - Op/PCA Orders    Post - Operative Fluids:   ____ Oral   ___x_ See Post - Op Orders    Plan: Discharge:   ____Home       __x___Floor     _____Critical Care    _____  Other:_________________    Comments:  No anesthesia issues or complications noted.  Discharge when criteria met.

## 2019-09-05 NOTE — PROGRESS NOTE ADULT - SUBJECTIVE AND OBJECTIVE BOX
SERENENAPOLEON  49y  Male  ***My note supersedes ALL resident notes that I sign.  My corrections for their notes are in my note.***    I can be reached directly on [employer] [id number]. My office number is [phone]. My personal cell number is [phone].    INTERVAL EVENTS: Here for f/u of pain. Pt has a lot of pain w/ mvmt of either leg even an inch or two. Pt has no active mvmt in his legs at all. Pt wants to be aggressive w/ his care in the hopes of getting a little better and to reduce pain.    T(F): 97.9 (09-05-19 @ 14:13), Max: 97.9 (09-05-19 @ 14:13)  HR: 86 (09-05-19 @ 14:13) (81 - 86)  BP: 107/65 (09-05-19 @ 14:13) (107/65 - 126/64)  RR: 18 (09-05-19 @ 14:13) (18 - 18)  SpO2: 96% (09-05-19 @ 07:57) (96% - 96%)    Gen: NAD; mild pain at rest  lung: clr  hrt s1 s2 rrr  abd soft NT/ND  ext: 1+ edema in lower legs, no c/c, no skin breakdown on feet, feet seem perfused  neuro: has decent mvmt and mildly decr str in his arms; legs are paralyzed and stiff; CN intact aa ox3    LABS:                        12.4    (    77.7   9.03  )-----------( ---------      168      ( 05 Sep 2019 05:19 )             37.0    (    12.8     136   (   94   (   375      09-05-19 @ 05:19  ----------------------               4.5   (   31   (   19                             -----                        0.8  Ca  8.8   Mg  --    P   --     LFT  6.2  (  0.4  (  15       09-05-19 @ 05:19  -------------------------  3.8  (  103  (  14    RADIOLOGY & ADDITIONAL TESTS:      MEDICATIONS:    ALPRAZolam 1 milliGRAM(s) Oral every 8 hours PRN  atorvastatin 40 milliGRAM(s) Oral at bedtime  baclofen 10 milliGRAM(s) Oral two times a day  busPIRone 5 milliGRAM(s) Oral two times a day  chlorhexidine 4% Liquid 1 Application(s) Topical <User Schedule>  clopidogrel Tablet 75 milliGRAM(s) Oral daily  DULoxetine 60 milliGRAM(s) Oral daily  enoxaparin Injectable 40 milliGRAM(s) SubCutaneous daily  gabapentin 400 milliGRAM(s) Oral every 8 hours  hydrOXYzine  Oral Tab/Cap - Peds 50 milliGRAM(s) Oral at bedtime PRN  ketorolac   Injectable 30 milliGRAM(s) IV Push every 6 hours PRN  morphine ER Tablet 60 milliGRAM(s) Oral every 12 hours  pantoprazole    Tablet 40 milliGRAM(s) Oral before breakfast  QUEtiapine 50 milliGRAM(s) Oral at bedtime  tamsulosin 0.4 milliGRAM(s) Oral at bedtime

## 2019-09-05 NOTE — PROGRESS NOTE ADULT - SUBJECTIVE AND OBJECTIVE BOX
SUBJECTIVE:    Patient is a 49y old Male who presents with a chief complaint of Left hip pain (04 Sep 2019 14:16)    Currently admitted to medicine with the primary diagnosis of Pain     Today is hospital day 2d. This morning he is resting comfortably in bed and reports no new issues or overnight events.     ROS:   CONSTITUTIONAL: No weakness, fevers or chills , improved anxiety  EYES/ENT: No visual changes; No vertigo or throat pain   NECK: No pain or stiffness   RESPIRATORY: No cough, wheezing, hemoptysis; No shortness of breath   CARDIOVASCULAR: No chest pain or palpitations   GASTROINTESTINAL: No abdominal or epigastric pain. No nausea, vomiting, or hematemesis; No diarrhea or constipation. No melena or hematochezia.  GENITOURINARY: No dysuria, frequency or hematuria  NEUROLOGICAL: No numbness or weakness, pain has improved   SKIN: No itching, rashes      PAST MEDICAL & SURGICAL HISTORY  CIDP (chronic inflammatory demyelinating polyneuropathy)  History of cholecystectomy  History of total left hip replacement  History of total right hip replacement: bilateral  S/P CABG x 5    SOCIAL HISTORY:    ALLERGIES:  penicillins (Unknown)    MEDICATIONS:  STANDING MEDICATIONS  atorvastatin 40 milliGRAM(s) Oral at bedtime  baclofen 10 milliGRAM(s) Oral two times a day  busPIRone 5 milliGRAM(s) Oral two times a day  chlorhexidine 4% Liquid 1 Application(s) Topical <User Schedule>  clopidogrel Tablet 75 milliGRAM(s) Oral daily  DULoxetine 60 milliGRAM(s) Oral daily  enoxaparin Injectable 40 milliGRAM(s) SubCutaneous daily  gabapentin 400 milliGRAM(s) Oral every 8 hours  morphine ER Tablet 60 milliGRAM(s) Oral every 12 hours  pantoprazole    Tablet 40 milliGRAM(s) Oral before breakfast  QUEtiapine 50 milliGRAM(s) Oral at bedtime  tamsulosin 0.4 milliGRAM(s) Oral at bedtime    PRN MEDICATIONS  ALPRAZolam 1 milliGRAM(s) Oral every 8 hours PRN  hydrOXYzine  Oral Tab/Cap - Peds 50 milliGRAM(s) Oral at bedtime PRN  ketorolac   Injectable 30 milliGRAM(s) IV Push every 6 hours PRN    VITALS:   T(F): 96.6  HR: 81  BP: 126/64  RR: 18  SpO2: 96%    LABS:  Negative for smoking/alcohol/drug use.                         12.4   9.03  )-----------( 168      ( 05 Sep 2019 05:19 )             37.0     09-05    136  |  94<L>  |  19  ----------------------------<  375<H>  4.5   |  31  |  0.8    Ca    8.8      05 Sep 2019 05:19  Mg     1.7     09-04    TPro  6.2  /  Alb  3.8  /  TBili  0.4  /  DBili  x   /  AST  15  /  ALT  14  /  AlkPhos  103  09-05      RADIOLOGY:    PHYSICAL EXAM:  GEN: No acute distress  HEENT: normocephalic, atraumatic, aniceteric  LUNGS: Clear to auscultation bilaterally, no rales/wheezing/ rhonchi  HEART: S1/S2 present. RRR, no murmurs  ABD: Soft, non-tender, non-distended. Bowel sounds present  EXT: b/l lower extremity edema, pulses not palpable, no sensation ; mild pain in left hip upon movement; unable to move legs , left leg rotated out  NEURO: AAOX3, not anxious      ASSESSMENT AND PLAN:  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 and extreme pain, b/l total hip replacement, anxiety and depression admitted for left hip pain     # Superolateral dislocation of the left total hip arthroplasty  - patient bedbound at home x3 years   - Ortho following - f/u CT scan pelvis and b/l hips; will make plan when imaging done  - Pain management (Duqmaq): Morphine 10 mg PO q4 prn severe pain , IV Toradol (5 days max; this is day 2 now), if need can increase gabapentin to 600 mg TID   - F/up orthopedics consult regarding the dislocation of the left hip arthroplasty    # Depression and anxiety:  - c/w Buspirone 5 mg q12h, Seroquel 150 mg qd and Xanax 1 mg q8h PO  - Psychiatry consult: xanax prn tid     # Peripheral Vascular Disease  -Cold b/l feet, no pulses palpable, decreased sensation  -Per patient the patient's symptoms are chronic  - VA duplex arterial : mod-severe stenosis R superficial femoral artery and mild L tibial vessel   - Vascular consult f/u   - stated atorvastatin 40 mg qhs    # CIDP:  - C/w duloxetine 60 mg qd, baclofen 5 mg q12h and gabapentin 400 mg tid  - Neurology consult: rec's appreciated  - MRI cervical and thoracic spine w/wo contrast followed by MRI brain - patient will need to be sedated for MRI (claustrophobic) - anesthesia consult placed  -f/u IgA Levels - patient might need IVIG treatment      # CAD s/p CABG:  - C/w Plavix 75 mg qd, patient not on statin    # BPH:  - C/w Flomax 0.4 mg qd  # Diet: DASH  # DVT ppx: Lovenox 40 mg qd  # GI ppx: Protonix 40 mg PO qd  # Activity: OOBTC. F/up PT/rehab  # Dispo: From home, was in nursing home before  # Code status: FULL

## 2019-09-05 NOTE — PHYSICAL THERAPY INITIAL EVALUATION ADULT - SPECIFY REASON(S)
Chart reviewed. PT not indicated per physiatry consult. Please reconsult physiatry if patient status changes and PT services are warranted.

## 2019-09-05 NOTE — PROGRESS NOTE ADULT - SUBJECTIVE AND OBJECTIVE BOX
Pain f/u    Patient at diag testing at this time. Noted Gabapentin not changed as recommended. Patient is also not taking the pain meds as often as he can. Will f/u tomorrow.

## 2019-09-05 NOTE — PROGRESS NOTE ADULT - ASSESSMENT
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 and extreme pain, b/l total hip replacement, anxiety and depression admitted for left hip pain     # Increasing left hip pain:  xray showing superolateral dislocation of the left total hip arthroplasty  Pain management noted:  d/c morphine IR  cont MS contin 60 q12  incr neurontin 600mg po q8 (further as needed/tolerated)  add tylenol 1gm po q8  add toradol - limit to 5 days  CT pelvis done - f/u w/ ortho team for plan    # b/l dropped feet  prob not much more that can be done (aside from mechanical bracing)  will ask neuro/ortho opinions  perhaps OT (rehab) could asst w/ a brace    # CIDP / chr body pain and neuropathic pain  C/w duloxetine 60 mg qd, baclofen 10 mg q12h and incr gabapentin 600 mg tid  F/up neurology consult and pain mngmt - likely very limited options (steroids and plasmapheresis of not much help in past)  never tried IVIG (? if IGA vlv was low in past) - check IVIG level  case d/w neuro: need MRI w/ anesth - first do C/T spine w/ and w/out gado, then follow w/ MRI - B (no extra gado needed) -> will have to be careful moving pt to MRI gantry given his hip pain and poss dislocation  neuro might start IVIG as inpt after eval of above w/u    # chr lacunar infarct of rt caudate - see CT head  on asa  check lipids  f/u w/ neuro    # CAD s/p CABG:  C/w Plavix 75 mg qd, patient not on statin  check lipid profile  check 2d Echo  cardio eval - Dr Eboni Broderick - at least for pre-op assessment of risk    # PAD: Cold b/l feet, no pulses palpable, decreased sensation:  seems chronic  duplex arterial: mod-sev occ Rt SFA; mild-mod occ Lt tib art  f/u w/ vasc opinion  for edema in legs - can     # Depression and anxiety: severe, not controlled; adjustment disorder  c/w Buspirone 5 mg q12h, Seroquel 150 mg qd and Xanax 1 mg q8h PO  F/up Psychiatry consult - Dr Curran    # T2 DM - seems not controlled in BMP; uses oral meds at home  FS qac/hs and keep btw 100-180  check A1c  Diet: Diabetic and DASH  will likely need insulin here for FS > 180 - f/u FS    # BPH:  - C/w Flomax 0.4 mg qd    # DVT ppx: Lovenox 40 mg qd    # GI ppx: Protonix 40 mg PO qd    # Code status: FULL    # Pt is being seen by multidisciplinary team, will need careful coordination:  - ortho  - neuro  - rehab  - pain  - cardio  - psych  - social serv    # Dispo: most pressing issue is lt hip pain w/ dislocation - team should focus on fixing this, followed by subacute rehab for OT and PT and pain control    there is prob limited need to intervene from vasc standpoint as pain not vasc in origin  there is prob limited options for CDIP (? IVIG); b/l dropped feet  aside from small med adjustments, there is prob not much to add for DM, CAD, DLD and HTN  psych issues will require long, on-going f/u and cannot be simply "fixed" on this hospitalization 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 and extreme pain, b/l total hip replacement, anxiety and depression admitted for left hip pain     # Increasing left hip pain:  xray showing superolateral dislocation of the left total hip arthroplasty  Pain management noted:  d/c morphine IR  cont MS contin 60 q12  incr neurontin 600mg po q8 (further as needed/tolerated)  add tylenol 650mg po q6 ATC  add toradol IV sev pain- limit to 5 days  CT pelvis done - f/u w/ ortho team for plan (spoke w/ ortho attd - poss IR aspir of hip jt to r/o infection vs surg inter on Mon - hold plavix)    # b/l dropped feet  prob not much more that can be done (aside from mechanical bracing)  will ask neuro/ortho opinions  perhaps OT (rehab) could asst w/ a brace    # CIDP / chr body pain and neuropathic pain  C/w duloxetine 60 mg qd, baclofen 10 mg q12h and incr gabapentin 600 mg tid  F/up neurology consult and pain mngmt - likely very limited options (steroids and plasmapheresis of not much help in past)  never tried IVIG (? if IgA vlv was low in past) - check IgA level  case d/w neuro: need MRI w/ anesth - first do C/T spine w/ and w/out gado, then follow w/ MRI - B (no extra gado needed) -> will have to be careful moving pt to MRI gantry given his hip pain and poss dislocation  neuro might start IVIG as inpt after eval of above w/u    # chr lacunar infarct of rt caudate - see CT head  on asa  HDL low; TG fair; await LDL - p (c/w statin)  f/u w/ neuro    # CAD s/p CABG:  hold Plavix 75 mg qd for poss ortho intervent on Mon, begin asa 81mg po q24  f/u LDL level - statin started  check 2d Echo  cardio eval - noted - high risk for surg procedure (pt willing to accept risk to fix hip)    # PAD: Cold b/l feet, no pulses palpable, decreased sensation:  seems chronic  duplex arterial: mod-sev occ Rt SFA; mild-mod occ Lt tib art  f/u w/ vasc opinion  for edema in legs - can use limited amt of lasix prn, can ace wrap legs (but pt has pain w/ mvmt - might not tres)    # Depression and anxiety: severe, not controlled; adjustment disorder  c/w Buspirone 5 mg q12h, Seroquel 50 mg qd and Xanax 1 mg q8h PO prn  atarax prn added HS  F/up Psychiatry consult - Dr Curran    # T2 DM - seems not controlled in BMP; uses oral meds at home  FS qac/hs and keep btw 100-180  check A1c  Diet: Diabetic and DASH  begin lant 10 and lisp 3 and adjust from there    # BPH:  - C/w Flomax 0.4 mg qd    # DVT ppx: Lovenox 40 mg qd    # GI ppx: Protonix 40 mg PO qd    # Code status: FULL    # Pt is being seen by multidisciplinary team, will need careful coordination:  - ortho  - neuro  - rehab  - pain  - cardio  - psych  - social serv    # Dispo: most pressing issue is lt hip pain w/ dislocation - team should focus on fixing this, followed by subacute rehab for OT and PT and pain control    there is prob limited need to intervene from vasc standpoint as pain not vasc in origin  there is prob limited options for CDIP (? IVIG); b/l dropped feet  aside from small med adjustments, there is prob not much to add for DM, CAD, DLD and HTN  psych issues will require long, on-going f/u and cannot be simply "fixed" on this hospitalization

## 2019-09-06 LAB
GLUCOSE BLDC GLUCOMTR-MCNC: 193 MG/DL — HIGH (ref 70–99)
GLUCOSE BLDC GLUCOMTR-MCNC: 221 MG/DL — HIGH (ref 70–99)
GLUCOSE BLDC GLUCOMTR-MCNC: 289 MG/DL — HIGH (ref 70–99)
GLUCOSE BLDC GLUCOMTR-MCNC: 320 MG/DL — HIGH (ref 70–99)
GLUCOSE BLDC GLUCOMTR-MCNC: 333 MG/DL — HIGH (ref 70–99)

## 2019-09-06 PROCEDURE — 99233 SBSQ HOSP IP/OBS HIGH 50: CPT

## 2019-09-06 PROCEDURE — 99232 SBSQ HOSP IP/OBS MODERATE 35: CPT

## 2019-09-06 PROCEDURE — 93306 TTE W/DOPPLER COMPLETE: CPT | Mod: 26

## 2019-09-06 RX ORDER — FUROSEMIDE 40 MG
20 TABLET ORAL DAILY
Refills: 0 | Status: COMPLETED | OUTPATIENT
Start: 2019-09-06 | End: 2019-09-07

## 2019-09-06 RX ORDER — INSULIN GLARGINE 100 [IU]/ML
25 INJECTION, SOLUTION SUBCUTANEOUS AT BEDTIME
Refills: 0 | Status: DISCONTINUED | OUTPATIENT
Start: 2019-09-06 | End: 2019-09-07

## 2019-09-06 RX ORDER — MAGNESIUM SULFATE 500 MG/ML
2 VIAL (ML) INJECTION ONCE
Refills: 0 | Status: COMPLETED | OUTPATIENT
Start: 2019-09-06 | End: 2019-09-06

## 2019-09-06 RX ORDER — INSULIN GLARGINE 100 [IU]/ML
21 INJECTION, SOLUTION SUBCUTANEOUS AT BEDTIME
Refills: 0 | Status: DISCONTINUED | OUTPATIENT
Start: 2019-09-06 | End: 2019-09-06

## 2019-09-06 RX ORDER — INSULIN LISPRO 100/ML
7 VIAL (ML) SUBCUTANEOUS ONCE
Refills: 0 | Status: COMPLETED | OUTPATIENT
Start: 2019-09-06 | End: 2019-09-06

## 2019-09-06 RX ORDER — INSULIN LISPRO 100/ML
7 VIAL (ML) SUBCUTANEOUS
Refills: 0 | Status: DISCONTINUED | OUTPATIENT
Start: 2019-09-06 | End: 2019-09-06

## 2019-09-06 RX ORDER — INSULIN LISPRO 100/ML
10 VIAL (ML) SUBCUTANEOUS
Refills: 0 | Status: DISCONTINUED | OUTPATIENT
Start: 2019-09-06 | End: 2019-09-08

## 2019-09-06 RX ORDER — IMMUNE GLOBULIN (HUMAN) 10 G/100ML
40 INJECTION INTRAVENOUS; SUBCUTANEOUS DAILY
Refills: 0 | Status: DISCONTINUED | OUTPATIENT
Start: 2019-09-06 | End: 2019-09-06

## 2019-09-06 RX ORDER — ACETAMINOPHEN 500 MG
1000 TABLET ORAL EVERY 8 HOURS
Refills: 0 | Status: DISCONTINUED | OUTPATIENT
Start: 2019-09-06 | End: 2019-09-06

## 2019-09-06 RX ORDER — DIPHENHYDRAMINE HCL 50 MG
25 CAPSULE ORAL DAILY
Refills: 0 | Status: COMPLETED | OUTPATIENT
Start: 2019-09-06 | End: 2019-09-11

## 2019-09-06 RX ORDER — ACETAMINOPHEN 500 MG
650 TABLET ORAL EVERY 6 HOURS
Refills: 0 | Status: DISCONTINUED | OUTPATIENT
Start: 2019-09-06 | End: 2019-09-11

## 2019-09-06 RX ORDER — ATORVASTATIN CALCIUM 80 MG/1
80 TABLET, FILM COATED ORAL AT BEDTIME
Refills: 0 | Status: DISCONTINUED | OUTPATIENT
Start: 2019-09-06 | End: 2019-09-13

## 2019-09-06 RX ADMIN — PANTOPRAZOLE SODIUM 40 MILLIGRAM(S): 20 TABLET, DELAYED RELEASE ORAL at 05:19

## 2019-09-06 RX ADMIN — Medication 81 MILLIGRAM(S): at 12:29

## 2019-09-06 RX ADMIN — ATORVASTATIN CALCIUM 80 MILLIGRAM(S): 80 TABLET, FILM COATED ORAL at 22:13

## 2019-09-06 RX ADMIN — Medication 7 UNIT(S): at 17:00

## 2019-09-06 RX ADMIN — Medication 7 UNIT(S): at 22:16

## 2019-09-06 RX ADMIN — Medication 10 MILLIGRAM(S): at 18:55

## 2019-09-06 RX ADMIN — Medication 650 MILLIGRAM(S): at 19:20

## 2019-09-06 RX ADMIN — QUETIAPINE FUMARATE 50 MILLIGRAM(S): 200 TABLET, FILM COATED ORAL at 22:13

## 2019-09-06 RX ADMIN — GABAPENTIN 600 MILLIGRAM(S): 400 CAPSULE ORAL at 22:12

## 2019-09-06 RX ADMIN — Medication 4: at 08:36

## 2019-09-06 RX ADMIN — Medication 2: at 12:30

## 2019-09-06 RX ADMIN — MORPHINE SULFATE 60 MILLIGRAM(S): 50 CAPSULE, EXTENDED RELEASE ORAL at 05:19

## 2019-09-06 RX ADMIN — Medication 30 MILLIGRAM(S): at 12:15

## 2019-09-06 RX ADMIN — CHLORHEXIDINE GLUCONATE 1 APPLICATION(S): 213 SOLUTION TOPICAL at 05:17

## 2019-09-06 RX ADMIN — Medication 650 MILLIGRAM(S): at 18:55

## 2019-09-06 RX ADMIN — Medication 3 UNIT(S): at 08:37

## 2019-09-06 RX ADMIN — GABAPENTIN 600 MILLIGRAM(S): 400 CAPSULE ORAL at 12:29

## 2019-09-06 RX ADMIN — INSULIN GLARGINE 25 UNIT(S): 100 INJECTION, SOLUTION SUBCUTANEOUS at 23:19

## 2019-09-06 RX ADMIN — ENOXAPARIN SODIUM 40 MILLIGRAM(S): 100 INJECTION SUBCUTANEOUS at 12:29

## 2019-09-06 RX ADMIN — Medication 30 MILLIGRAM(S): at 11:33

## 2019-09-06 RX ADMIN — Medication 650 MILLIGRAM(S): at 12:29

## 2019-09-06 RX ADMIN — Medication 50 GRAM(S): at 15:10

## 2019-09-06 RX ADMIN — MORPHINE SULFATE 60 MILLIGRAM(S): 50 CAPSULE, EXTENDED RELEASE ORAL at 18:59

## 2019-09-06 RX ADMIN — Medication 650 MILLIGRAM(S): at 05:19

## 2019-09-06 RX ADMIN — Medication 30 MILLIGRAM(S): at 20:05

## 2019-09-06 RX ADMIN — Medication 10 MILLIGRAM(S): at 05:19

## 2019-09-06 RX ADMIN — Medication 5 MILLIGRAM(S): at 05:19

## 2019-09-06 RX ADMIN — Medication 5 MILLIGRAM(S): at 18:55

## 2019-09-06 RX ADMIN — Medication 1: at 17:00

## 2019-09-06 RX ADMIN — Medication 30 MILLIGRAM(S): at 19:46

## 2019-09-06 RX ADMIN — GABAPENTIN 600 MILLIGRAM(S): 400 CAPSULE ORAL at 05:19

## 2019-09-06 RX ADMIN — Medication 30 MILLIGRAM(S): at 04:04

## 2019-09-06 RX ADMIN — DULOXETINE HYDROCHLORIDE 60 MILLIGRAM(S): 30 CAPSULE, DELAYED RELEASE ORAL at 12:29

## 2019-09-06 RX ADMIN — TAMSULOSIN HYDROCHLORIDE 0.4 MILLIGRAM(S): 0.4 CAPSULE ORAL at 22:12

## 2019-09-06 RX ADMIN — Medication 7 UNIT(S): at 12:30

## 2019-09-06 NOTE — CONSULT NOTE ADULT - SUBJECTIVE AND OBJECTIVE BOX
NAPOLEON CAST [id number]  49y Male  3d    HPI:  50 y/o M with PMHx of CAD s/p CABG, BPH, CIDP with significant motor and sensory deficits and extreme pain, b/l total hip replacement, anxiety and depression presents to the ED for extreme left hip pain. Xray of left femur showed superolateral dislocation of the left total hip arthroplasty since the prior exam in 2016. Patient is planned for possible OR with ortho on Monday for revision of L hip arthroplasty. Vascular was consulted for mod to severe stenosis of R SFA with distal reconstitution diminished flow and mild to moderate disease in left tibial artery. Patient endorses neuropathic pain in all 4 extremities, bed bound status for 3 years due to CIDP, unable to assess if patient has claudication or rest pain due to chronic neuropathic pain.    PAST MEDICAL & SURGICAL HISTORY:  CIDP (chronic inflammatory demyelinating polyneuropathy)  History of cholecystectomy  History of total left hip replacement  History of total right hip replacement: bilateral  S/P CABG x 5    MEDICATIONS  (STANDING):  acetaminophen   Tablet .. 650 milliGRAM(s) Oral every 6 hours  aspirin  chewable 81 milliGRAM(s) Oral daily  atorvastatin 80 milliGRAM(s) Oral at bedtime  baclofen 10 milliGRAM(s) Oral two times a day  busPIRone 5 milliGRAM(s) Oral two times a day  chlorhexidine 4% Liquid 1 Application(s) Topical <User Schedule>  dextrose 5%. 1000 milliLiter(s) (50 mL/Hr) IV Continuous <Continuous>  dextrose 50% Injectable 12.5 Gram(s) IV Push once  dextrose 50% Injectable 25 Gram(s) IV Push once  dextrose 50% Injectable 25 Gram(s) IV Push once  diphenhydrAMINE 25 milliGRAM(s) Oral daily  DULoxetine 60 milliGRAM(s) Oral daily  enoxaparin Injectable 40 milliGRAM(s) SubCutaneous daily  furosemide    Tablet 20 milliGRAM(s) Oral daily  gabapentin 600 milliGRAM(s) Oral every 8 hours  insulin glargine Injectable (LANTUS) 21 Unit(s) SubCutaneous at bedtime  insulin lispro (HumaLOG) corrective regimen sliding scale   SubCutaneous three times a day before meals  insulin lispro Injectable (HumaLOG) 7 Unit(s) SubCutaneous three times a day before meals  morphine ER Tablet 60 milliGRAM(s) Oral every 12 hours  pantoprazole    Tablet 40 milliGRAM(s) Oral before breakfast  QUEtiapine 50 milliGRAM(s) Oral at bedtime  tamsulosin 0.4 milliGRAM(s) Oral at bedtime    MEDICATIONS  (PRN):  ALPRAZolam 1 milliGRAM(s) Oral every 8 hours PRN anxiety  dextrose 40% Gel 15 Gram(s) Oral once PRN Blood Glucose LESS THAN 70 milliGRAM(s)/deciliter  glucagon  Injectable 1 milliGRAM(s) IntraMuscular once PRN Glucose LESS THAN 70 milligrams/deciliter  hydrOXYzine  Oral Tab/Cap - Peds 50 milliGRAM(s) Oral at bedtime PRN Itching  ketorolac   Injectable 30 milliGRAM(s) IV Push every 6 hours PRN Severe Pain (7 - 10)      Allergies    penicillins (Unknown)    Intolerances        REVIEW OF SYSTEMS    [X ] A ten-point review of systems was otherwise negative except as noted.  [ ] Due to altered mental status/intubation, subjective information were not able to be obtained from the patient. History was obtained, to the extent possible, from review of the chart and collateral sources of information.      Vital Signs Last 24 Hrs  T(C): 37.1 (06 Sep 2019 20:36), Max: 37.3 (06 Sep 2019 05:33)  T(F): 98.7 (06 Sep 2019 20:36), Max: 99.1 (06 Sep 2019 05:33)  HR: 83 (06 Sep 2019 20:36) (83 - 115)  BP: 119/61 (06 Sep 2019 20:36) (111/57 - 119/61)  BP(mean): --  RR: 18 (06 Sep 2019 20:36) (18 - 18)  SpO2: 98% (06 Sep 2019 19:45) (98% - 98%)    PHYSICAL EXAM:  GENERAL: NAD, well-appearing  CHEST/LUNG: Clear to auscultation bilaterally  HEART: Regular rate and rhythm  ABDOMEN: Soft, Nontender, Nondistended;   EXTREMITIES:  palpable DP and PT b/l, normal cap refill    LABS:  Labs:  CAPILLARY BLOOD GLUCOSE    POCT Blood Glucose.: 320 mg/dL (06 Sep 2019 21:13)  POCT Blood Glucose.: 193 mg/dL (06 Sep 2019 16:34)  POCT Blood Glucose.: 221 mg/dL (06 Sep 2019 11:52)  POCT Blood Glucose.: 333 mg/dL (06 Sep 2019 07:59)                       12.4   9.03  )-----------( 168      ( 05 Sep 2019 05:19 )             37.0         09-05    136  |  94<L>  |  19  ----------------------------<  375<H>  4.5   |  31  |  0.8    LFTs:             6.2  | 0.4  | 15       ------------------[103     ( 05 Sep 2019 05:19 )  3.8  | x    | 14            RADIOLOGY & ADDITIONAL STUDIES:  < from: VA Duplex Lower Extrem Arterial, Bilat (09.03.19 @ 20:48) >  Impression:    Moderate to severe stenosis of the right superficial femoral artery with   distal reconstitution of diminished flow.     Mild to moderate atherosclerotic occlusive disease in the left tibial   vessels.    < end of copied text > NAPOLEON CAST [id number]  49y Male  3d    HPI:  50 y/o M with PMHx of CAD s/p CABG, BPH, CIDP with significant motor and sensory deficits and extreme pain, b/l total hip replacement, anxiety and depression presents to the ED for extreme left hip pain. Xray of left femur showed superolateral dislocation of the left total hip arthroplasty since the prior exam in 2016. Patient is planned for possible OR with ortho on Monday for revision of L hip arthroplasty. Vascular was consulted for mod to severe stenosis of R SFA with distal reconstitution diminished flow and mild to moderate disease in left tibial artery. Patient endorses neuropathic pain in all 4 extremities, bed bound status for 3 years due to CIDP, unable to assess if patient has claudication or rest pain due to chronic neuropathic pain.    PAST MEDICAL & SURGICAL HISTORY:  CIDP (chronic inflammatory demyelinating polyneuropathy)  History of cholecystectomy  History of total left hip replacement  History of total right hip replacement: bilateral  S/P CABG x 5    MEDICATIONS  (STANDING):  acetaminophen   Tablet .. 650 milliGRAM(s) Oral every 6 hours  aspirin  chewable 81 milliGRAM(s) Oral daily  atorvastatin 80 milliGRAM(s) Oral at bedtime  baclofen 10 milliGRAM(s) Oral two times a day  busPIRone 5 milliGRAM(s) Oral two times a day  chlorhexidine 4% Liquid 1 Application(s) Topical <User Schedule>  dextrose 5%. 1000 milliLiter(s) (50 mL/Hr) IV Continuous <Continuous>  dextrose 50% Injectable 12.5 Gram(s) IV Push once  dextrose 50% Injectable 25 Gram(s) IV Push once  dextrose 50% Injectable 25 Gram(s) IV Push once  diphenhydrAMINE 25 milliGRAM(s) Oral daily  DULoxetine 60 milliGRAM(s) Oral daily  enoxaparin Injectable 40 milliGRAM(s) SubCutaneous daily  furosemide    Tablet 20 milliGRAM(s) Oral daily  gabapentin 600 milliGRAM(s) Oral every 8 hours  insulin glargine Injectable (LANTUS) 21 Unit(s) SubCutaneous at bedtime   insulin lispro (HumaLOG) corrective regimen sliding scale   SubCutaneous three times a day before meals  insulin lispro Injectable (HumaLOG) 7 Unit(s) SubCutaneous three times a day before meals  morphine ER Tablet 60 milliGRAM(s) Oral every 12 hours  pantoprazole    Tablet 40 milliGRAM(s) Oral before breakfast  QUEtiapine 50 milliGRAM(s) Oral at bedtime  tamsulosin 0.4 milliGRAM(s) Oral at bedtime    MEDICATIONS  (PRN):  ALPRAZolam 1 milliGRAM(s) Oral every 8 hours PRN anxiety  dextrose 40% Gel 15 Gram(s) Oral once PRN Blood Glucose LESS THAN 70 milliGRAM(s)/deciliter  glucagon  Injectable 1 milliGRAM(s) IntraMuscular once PRN Glucose LESS THAN 70 milligrams/deciliter  hydrOXYzine  Oral Tab/Cap - Peds 50 milliGRAM(s) Oral at bedtime PRN Itching  ketorolac   Injectable 30 milliGRAM(s) IV Push every 6 hours PRN Severe Pain (7 - 10)      Allergies    penicillins (Unknown)    Intolerances        REVIEW OF SYSTEMS    [X ] A ten-point review of systems was otherwise negative except as noted.  [ ] Due to altered mental status/intubation, subjective information were not able to be obtained from the patient. History was obtained, to the extent possible, from review of the chart and collateral sources of information.      Vital Signs Last 24 Hrs  T(C): 37.1 (06 Sep 2019 20:36), Max: 37.3 (06 Sep 2019 05:33)  T(F): 98.7 (06 Sep 2019 20:36), Max: 99.1 (06 Sep 2019 05:33)  HR: 83 (06 Sep 2019 20:36) (83 - 115)  BP: 119/61 (06 Sep 2019 20:36) (111/57 - 119/61)  BP(mean): --  RR: 18 (06 Sep 2019 20:36) (18 - 18)  SpO2: 98% (06 Sep 2019 19:45) (98% - 98%)    PHYSICAL EXAM:  GENERAL: NAD, well-appearing  CHEST/LUNG: Clear to auscultation bilaterally  HEART: Regular rate and rhythm  ABDOMEN: Soft, Nontender, Nondistended;   EXTREMITIES:  palpable DP and PT b/l, normal cap refill    LABS:  Labs:  CAPILLARY BLOOD GLUCOSE    POCT Blood Glucose.: 320 mg/dL (06 Sep 2019 21:13)  POCT Blood Glucose.: 193 mg/dL (06 Sep 2019 16:34)  POCT Blood Glucose.: 221 mg/dL (06 Sep 2019 11:52)  POCT Blood Glucose.: 333 mg/dL (06 Sep 2019 07:59)                       12.4   9.03  )-----------( 168      ( 05 Sep 2019 05:19 )             37.0         09-05    136  |  94<L>  |  19  ----------------------------<  375<H>  4.5   |  31  |  0.8    LFTs:             6.2  | 0.4  | 15       ------------------[103     ( 05 Sep 2019 05:19 )  3.8  | x    | 14            RADIOLOGY & ADDITIONAL STUDIES:  < from: VA Duplex Lower Extrem Arterial, Bilat (09.03.19 @ 20:48) >  Impression:    Moderate to severe stenosis of the right superficial femoral artery with   distal reconstitution of diminished flow.     Mild to moderate atherosclerotic occlusive disease in the left tibial   vessels.    < end of copied text >

## 2019-09-06 NOTE — CDI QUERY NOTE - [id number]_GEN_ALL_CORE_HH
The Physician’s or Provider’s documentation of the patient’s presentation, evaluation and  medical management, as identified below, may support a diagnosis that is not documented in the medical record.  In order to accurately capture all diagnoses to the greatest degree of specificity reflecting the patient’s actual severity of illness, the documentation in this patient’s medical record requires additional clarification.  Please include more specific documentation, either known or suspected, of a corresponding diagnosis associated with the clinical information described below in your Progress Note and/or Discharge Summary.      CLINICAL INDICATORS:    Pt presents with severe left hip pain. Pt has hx of chronic inf  lammatory demyelinating polyneuropathy with significant motor and sensory deficits, bilateral total hip replacement.  X-ray of left femur showed superolateral dislocation of the left total hip arthroplasty.    9/3 ED Provider: “Exam: Bedbound”  9/3 H&P: “Patient is bedbound and is unable to visit doctors and his condition has been managed by telemetry - pt not walking well at all and developed b/l dropped feet, lt leg seemed shorter and externally rotated”  9/4 Internal Medicine Resident: “pt was doing STR, but pain kept getting worse in lt hip; pt eventually became homebound and bedbound (2/2 pain and CIDP)”  9/5 Internal Medicine Attending: “patient bedbound at home x3 years”    Elvis Score:  Activity (1) bedfast  Mobility (2) very limited    QUERY:    Based on the clinical indicators and your professional judgment, please complete by selecting one of the options below if the patient’s activity/mobility status can be further clarified.    *Functional Quadriplegia  *Complete immobility due to physical debility  *Other (please specify)  *Unable to clinically determine (please comment)

## 2019-09-06 NOTE — PROGRESS NOTE ADULT - SUBJECTIVE AND OBJECTIVE BOX
SUBJECTIVE:    Patient is a 49y old Male who presents with a chief complaint of Left hip pain (05 Sep 2019 14:52)    Currently admitted to medicine with the primary diagnosis of Pain     Today is hospital day 3d. This morning he is resting comfortably in bed and reports no new issues or overnight events.     ROS:   CONSTITUTIONAL: No weakness, fevers or chills   EYES/ENT: No visual changes; No vertigo or throat pain   NECK: No pain or stiffness   RESPIRATORY: No cough, wheezing, hemoptysis; No shortness of breath   CARDIOVASCULAR: No chest pain or palpitations   GASTROINTESTINAL: No abdominal or epigastric pain. No nausea, vomiting, or hematemesis; No diarrhea or constipation. No melena or hematochezia.  GENITOURINARY: No dysuria, frequency or hematuria  NEUROLOGICAL: numbness b/l lower extremities , pain in L hip  SKIN: No itching, rashes      PAST MEDICAL & SURGICAL HISTORY  CIDP (chronic inflammatory demyelinating polyneuropathy)  History of cholecystectomy  History of total left hip replacement  History of total right hip replacement: bilateral  S/P CABG x 5    SOCIAL HISTORY:    ALLERGIES:  penicillins (Unknown)    MEDICATIONS:  STANDING MEDICATIONS  acetaminophen   Tablet .. 650 milliGRAM(s) Oral every 6 hours  aspirin  chewable 81 milliGRAM(s) Oral daily  atorvastatin 40 milliGRAM(s) Oral at bedtime  baclofen 10 milliGRAM(s) Oral two times a day  busPIRone 5 milliGRAM(s) Oral two times a day  chlorhexidine 4% Liquid 1 Application(s) Topical <User Schedule>  dextrose 5%. 1000 milliLiter(s) IV Continuous <Continuous>  dextrose 50% Injectable 12.5 Gram(s) IV Push once  dextrose 50% Injectable 25 Gram(s) IV Push once  dextrose 50% Injectable 25 Gram(s) IV Push once  DULoxetine 60 milliGRAM(s) Oral daily  enoxaparin Injectable 40 milliGRAM(s) SubCutaneous daily  gabapentin 600 milliGRAM(s) Oral every 8 hours  insulin glargine Injectable (LANTUS) 21 Unit(s) SubCutaneous at bedtime  insulin lispro (HumaLOG) corrective regimen sliding scale   SubCutaneous three times a day before meals  insulin lispro Injectable (HumaLOG) 7 Unit(s) SubCutaneous three times a day before meals  morphine ER Tablet 60 milliGRAM(s) Oral every 12 hours  pantoprazole    Tablet 40 milliGRAM(s) Oral before breakfast  QUEtiapine 50 milliGRAM(s) Oral at bedtime  tamsulosin 0.4 milliGRAM(s) Oral at bedtime    PRN MEDICATIONS  ALPRAZolam 1 milliGRAM(s) Oral every 8 hours PRN  dextrose 40% Gel 15 Gram(s) Oral once PRN  glucagon  Injectable 1 milliGRAM(s) IntraMuscular once PRN  hydrOXYzine  Oral Tab/Cap - Peds 50 milliGRAM(s) Oral at bedtime PRN  ketorolac   Injectable 30 milliGRAM(s) IV Push every 6 hours PRN    VITALS:   T(F): 99.1  HR: 115  BP: 112/56  RR: 18  SpO2: --    LABS:  Negative for smoking/alcohol/drug use.                         12.4   9.03  )-----------( 168      ( 05 Sep 2019 05:19 )             37.0     09-05    136  |  94<L>  |  19  ----------------------------<  375<H>  4.5   |  31  |  0.8    Ca    8.8      05 Sep 2019 05:19    TPro  6.2  /  Alb  3.8  /  TBili  0.4  /  DBili  x   /  AST  15  /  ALT  14  /  AlkPhos  103  09-05    RADIOLOGY:  < from: CT Pelvis No Cont (09.05.19 @ 13:07) >    IMPRESSION:    1.  Superolateral dislocation of the left acetabular cup/femoral head   from the native acetabulum. Adjacent periosteal reaction indicativeof a   subacute etiology.  2.  Chronic/degenerative change as above.    < end of copied text >      < from: CT Hip No Cont, Bilateral (09.05.19 @ 12:56) >  IMPRESSION:    1.  Superolateral dislocation of the left acetabular cup/femoral head   from the native acetabulum. Adjacent periosteal reaction indicativeof a   subacute etiology.  2.  Chronic/degenerative change as above.    < end of copied text >    < from: VA Duplex Carotid, Bilat (09.05.19 @ 14:50) >  Impression:    20-39% stenosis of the right internal carotid artery.  20-39% stenosis of the left internal carotid artery.    < end of copied text >    PHYSICAL EXAM:    GEN: No acute distress  HEENT: normocephalic, atraumatic, aniceteric  LUNGS: Clear to auscultation bilaterally, no rales/wheezing/ rhonchi  HEART: S1/S2 present. RRR, no murmurs  ABD: Soft, non-tender, non-distended. 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 Henderson    # Depression and anxiety:  - c/w Buspirone 5 mg q12h, Seroquel 150 mg qd and Xanax 1 mg q8h PO  - Psychiatry consult: xanax prn tid     # Peripheral Vascular Disease  -Cold b/l feet, no pulses palpable, decreased sensation  -Per patient the patient's symptoms are chronic  - VA duplex arterial : mod-severe stenosis R superficial femoral artery and mild L tibial vessel   - Vascular consult f/u   - c/w atorvastatin 40 mg qhs    # CIDP:  - C/w duloxetine 60 mg qd, baclofen 5 mg q12h and gabapentin 400 mg tid  - Neurology consult: rec's appreciated  - MRI cervical and thoracic spine w/wo contrast followed by MRI brain - patient will need to be sedated for MRI (claustrophobic) - anesthesia consult placed  - IgA Levels : 86 , might be a candidate for IVIG - will f/u with neurology    # CAD s/p CABG:  - hold Plavix 75 mg qd - OR on Monday with ortho  - c/w statin   # BPH:  - C/w Flomax 0.4 mg qd  # Diet: DASH  # DVT ppx: Lovenox 40 mg qd  # GI ppx: Protonix 40 mg PO qd  # Activity: OOBTC. F/up PT/rehab  # Dispo: From home, was in nursing home before  # Code status: FULL    Handoff: OR on Monday with ORTHO ; continue to hold plavix

## 2019-09-06 NOTE — PROGRESS NOTE ADULT - ASSESSMENT
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 and extreme pain, b/l total hip replacement, anxiety and depression admitted for left hip pain     # Increasing left hip pain:  xray showing superolateral dislocation of the left total hip arthroplasty  Pain management noted:  d/c morphine IR  cont MS contin 60 q12  cont neurontin 600mg po q8 (further as needed/tolerated)  cont tylenol 650mg po q6 ATC  cont toradol IV sev pain - limit to 5 days  CT pelvis done - ortho planning on hip explant (Girdlestone procedure) on Mon  pre-op done by cardio - high risk w/ favorable echo  NPO p MN Sun  plavix on hold for procedure    # b/l dropped feet  prob not much more that can be done (aside from mechanical bracing)  will ask neuro/ortho opinions after IVIG and hip sx  perhaps OT (rehab) could asst w/ a brace    # CIDP / chr body pain and neuropathic pain;  (IV steroids and plasmapheresis of not much help in past)  C/w duloxetine 60 mg qd, baclofen 10 mg q12h and gabapentin 600 mg tid  IgA lvl is normal  per neuro, begin IVIG 0.4gm/kg = 40 gm over 6 hrs q24 x5 doses, pre-med w/ benadryl 25mg po x1 and tylenol already getting around the clock  MRIs noted    # chr lacunar infarct of rt caudate - see CT head and MRI-B  on asa   - incr atorvastatin to 80mg po qhs  carotid u/s only mild b/l dz    # CAD s/p CABG:  hold Plavix 75 mg qd for poss ortho intervent on Mon, cont asa 81mg po q24  incr statin  2d Echo - normal  cardio eval - noted - high risk for surg procedure (pt willing to accept risk to fix hip)    # PAD: no pulses palpable, decreased sensation:  seems chronic  duplex arterial: mod-sev occ Rt SFA; mild-mod occ Lt tib art  spoke w/ vasc resid  for edema in legs - can use lasix 20mg q24 x 2 days and reassess, ace wrap legs, if tolerated    # Depression and anxiety: severe, not controlled; adjustment disorder  c/w Buspirone 5 mg q12h, Seroquel 50 mg qd and Xanax 1 mg q8h PO prn  atarax prn added HS  F/up Psychiatry consult - Dr Curran    # T2 DM - not controlled in BMP; uses oral meds at home  FS qac/hs and keep btw 100-180  A1c 11.4  Diet: Diabetic and DASH  incr lant 21 and lisp 7 and adjust til FS < 180    # BPH:  C/w Flomax 0.4 mg qd    # DVT ppx: Lovenox 40 mg qd    # GI ppx: Protonix 40 mg PO qd    # Code status: FULL    # Pt is being seen by multidisciplinary team, will need careful coordination:  - ortho  - neuro  - rehab  - pain  - cardio  - vasc  - psych  - social serv    # Dispo: most pressing issue is lt hip pain w/ dislocation - team should focus on fixing this, followed by subacute rehab for OT and PT and pain control    there is prob limited need to intervene from vasc standpoint as pain not vasc in origin  there is prob limited options for CDIP: will try IVIG  there is limited options for b/l dropped feet - f/u w/ ortho and rehab  aside from small med adjustments, there is prob not much to add for DM, CAD, DLD and HTN  psych issues will require long, on-going f/u and cannot be simply "fixed" on this hospitalization

## 2019-09-06 NOTE — PROGRESS NOTE ADULT - SUBJECTIVE AND OBJECTIVE BOX
Neurology Progress Note  Interval History:      HPI:  48 y/o M with PMHx of CAD s/p CABG, BPH, CIDP with significant motor and sensory deficits and extreme pain, b/l total hip replacement, anxiety and depression presents to the ED for extreme left hip pain. He mentions that this has been going for some time and it was well managed by Dr. Camarena but it has been harder to control the pain recently and he was sent by her for pain management and further management. Patient is bedbound and is unable to visit doctors and his condition has been managed by telemetry. He also complains of worsening depression and anxiety, he is also claustrophobic and is complaining of staying in small confined places. He denies suicidal ideation.   In ED, VS wnl, CT head negative for intracranial bleed. Xray of left femur showed superolateral dislocation of the left total hip arthroplasty since the prior exam in 2016.    Attd: very complex and long, chr hx; pt had DM, HTN, DLD as young man coupled w/ high stress jobs ( in [employer] - lots of travel); had CAD and CABG in 2011; shortly after developed odd neuro conditions and finally dx'd in 2013 w/ CDIP by [facility]; He had become weaker over time and fell and broke rt hip - s/p ORIF and shortly after fell again on lt hip and had ORIF on lt; pt was doing STR, but pain kept getting worse in lt hip; pt eventually became homebound and bedbound (2/2 pain and CIDP); overtime; lt leg seemed shorter and externally rotated, pain kept increasing; pt presents now w/ a myriad of unaddressed complaints from yrs of poor f/u:  1) has extreme pain lt hip w/ deformity; meds are not working - asked for ortho eval  2) CDIP is causing neuropathic pains all over and he has not followed up w/ neuro - asked for pain mngmt eval  3) he thinks he may have had TIA w/ numbness in face and arm, but he is not sure how it relates to CIDP - asked for neuro eval  4) pt not walking well at all and developed b/l dropped feet - pt asking for neuro and ortho for options - I told him that if he is not strong enough to walk, it is likely not a good idea to operate on his dropped feet (luc given his operative risk) - he has zero mvmt in his legs currently  5) pt has not been to cardio in yrs; has hx of DM and CABG; denies CP per se - pt asked for cardio eval (will need anyway if lt hip to be surgically fixed for pain control)  6) pt is considering rehab again to increase mobility, even if just to assist in transfers so that he can get out of house to see MDs - pt asked for rehab eval  7) pt is every depressed and anxious given all his ailments at such a young age; he is missing his dtrs grow up and can no longer work or even function at an independent level - pt asked for a psych eval to adjust meds  8) pt having trouble trying to get to MD offices - pt asked for social serv eval for asst. Pt has HHA for 12 hrs/day (10 am to 10 pm). (03 Sep 2019 15:38)        PAST MEDICAL & SURGICAL HISTORY:  CIDP (chronic inflammatory demyelinating polyneuropathy)  History of cholecystectomy  History of total left hip replacement  History of total right hip replacement: bilateral  S/P CABG x 5      Medications:  acetaminophen   Tablet .. 650 milliGRAM(s) Oral every 6 hours  ALPRAZolam 1 milliGRAM(s) Oral every 8 hours PRN  aspirin  chewable 81 milliGRAM(s) Oral daily  atorvastatin 40 milliGRAM(s) Oral at bedtime  baclofen 10 milliGRAM(s) Oral two times a day  busPIRone 5 milliGRAM(s) Oral two times a day  chlorhexidine 4% Liquid 1 Application(s) Topical <User Schedule>  dextrose 40% Gel 15 Gram(s) Oral once PRN  dextrose 5%. 1000 milliLiter(s) IV Continuous <Continuous>  dextrose 50% Injectable 12.5 Gram(s) IV Push once  dextrose 50% Injectable 25 Gram(s) IV Push once  dextrose 50% Injectable 25 Gram(s) IV Push once  DULoxetine 60 milliGRAM(s) Oral daily  enoxaparin Injectable 40 milliGRAM(s) SubCutaneous daily  gabapentin 600 milliGRAM(s) Oral every 8 hours  glucagon  Injectable 1 milliGRAM(s) IntraMuscular once PRN  hydrOXYzine  Oral Tab/Cap - Peds 50 milliGRAM(s) Oral at bedtime PRN  insulin glargine Injectable (LANTUS) 21 Unit(s) SubCutaneous at bedtime  insulin lispro (HumaLOG) corrective regimen sliding scale   SubCutaneous three times a day before meals  insulin lispro Injectable (HumaLOG) 7 Unit(s) SubCutaneous three times a day before meals  ketorolac   Injectable 30 milliGRAM(s) IV Push every 6 hours PRN  magnesium sulfate  IVPB 2 Gram(s) IV Intermittent once  morphine ER Tablet 60 milliGRAM(s) Oral every 12 hours  pantoprazole    Tablet 40 milliGRAM(s) Oral before breakfast  QUEtiapine 50 milliGRAM(s) Oral at bedtime  tamsulosin 0.4 milliGRAM(s) Oral at bedtime      Vital Signs Last 24 Hrs  T(C): 37.3 (06 Sep 2019 05:33), Max: 37.3 (06 Sep 2019 05:33)  T(F): 99.1 (06 Sep 2019 05:33), Max: 99.1 (06 Sep 2019 05:33)  HR: 115 (06 Sep 2019 05:33) (81 - 115)  BP: 112/56 (06 Sep 2019 05:33) (107/65 - 121/71)  BP(mean): --  RR: 18 (06 Sep 2019 05:33) (18 - 18)  SpO2: --    Neurological Exam:   Mental status: Awake, alert and oriented x3.  confused, poor attention.   Naming, repetition and comprehension intact. dysarthria,  Fund of knowledge appropriate.    Cranial nerves: Pupils equally round and reactive to light, visual fields full, no nystagmus, extraocular muscles intact, V1 through V3 intact bilaterally and symmetric, face symmetric, hearing intact to finger rub, palate elevation symmetric, tongue was midline.  Motor:  MRC grading 4/5 UE b/l, 1/5 LE b/l.   strength 5/5.  Normal tone and bulk.  No abnormal movements.    Sensation: Intact to light touch, proprioception, and pinprick.   Coordination: No dysmetria on finger-to-nose.  No dysdiadokinesia.  Reflexes: clonus on RLE, no reflexes elicited LLE. 2+ reflex UE b/l (biceps tendon)  Gait: Non-ambulatory, LLE externally rotated.    Labs:  CBC Full  -  ( 05 Sep 2019 05:19 )  WBC Count : 9.03 K/uL  RBC Count : 4.76 M/uL  Hemoglobin : 12.4 g/dL  Hematocrit : 37.0 %  Platelet Count - Automated : 168 K/uL  Mean Cell Volume : 77.7 fL  Mean Cell Hemoglobin : 26.1 pg  Mean Cell Hemoglobin Concentration : 33.5 g/dL  Auto Neutrophil # : 6.67 K/uL  Auto Lymphocyte # : 1.57 K/uL  Auto Monocyte # : 0.56 K/uL  Auto Eosinophil # : 0.15 K/uL  Auto Basophil # : 0.05 K/uL  Auto Neutrophil % : 73.8 %  Auto Lymphocyte % : 17.4 %  Auto Monocyte % : 6.2 %  Auto Eosinophil % : 1.7 %  Auto Basophil % : 0.6 %    09-05    136  |  94<L>  |  19  ----------------------------<  375<H>  4.5   |  31  |  0.8    Ca    8.8      05 Sep 2019 05:19    TPro  6.2  /  Alb  3.8  /  TBili  0.4  /  DBili  x   /  AST  15  /  ALT  14  /  AlkPhos  103  09-05    LIVER FUNCTIONS - ( 05 Sep 2019 05:19 )  Alb: 3.8 g/dL / Pro: 6.2 g/dL / ALK PHOS: 103 U/L / ALT: 14 U/L / AST: 15 U/L / GGT: x             NEUROIMAGING   < from: MR Thoracic Spine w/wo IV Cont (09.05.19 @ 18:53) >    EXAM:  MR SPINE THORACIC WAW IC          PROCEDURE DATE:  09/05/2019      INTERPRETATION:  Clinical History / Reason for exam: Worsening leg   function and recurrent falls. History of CIDP.    Technique: MRI thoracic spine with and without contrast. Multiplanar   multi sequential MRI of the thoracic spine was performed before and   following the intravenous administration of 10 cc of Gadavist (0 cc   discarded) on a 1.5 Mana magnet.    Sedation was provided by the department of anesthesia.    Study is limited due to respiratory motion and body habitus. Repeat   studies were unable to be obtained due to issues due to inability to   continue with anesthesia.    Correlation is made with a lumbar spine MRI dated 12/18/2013.    Findings:    There are mild chronic compression fractures of the T11, T12 and L1   vertebral bodies, new at T12 and L1 as the lumbar spine MRI from 2013.   There is no significant osseous retropulsion.    The remaining thoracic vertebral bodies maintain normal height. Alignment   is maintained.    Bone marrow signal demonstrates T1 and T2 hyperintense lesions within the   T1 and T5 vertebral bodies compatible with hemangiomas.    The thoracic spinal cord is normal in signal morphology.    At T5-6 there austin small right central disc protrusion without cord   compression.    At T7-8 there is a small left central disc protrusion without cord   compression.    At T8-9 there is a small left central disc protrusion without cord   compression.    At T9-10 there is a trace right central disc protrusion without cord   compression.    The remaining disc levels are unremarkable.  There is suggestion of a punctate focus of enhancement at the level of   the conus medullaris/proximal cauda equina at the the T12-L1 level on   series 16 image 6 and series 17 image 49.    IMPRESSION:    1.  Suggestion of a punctate focus of enhancement at the level of the   conus medullaris/proximal cauda equina at T12-L1 (series 16 image 6),   likely nerve root enhancement given history of CIDP.    2.  No other areas of abnormal enhancement are demonstrated.    3.  Mild degenerative changes as described.    4.  Mild chronic compression fractures of T11, T12 and L1. The T12 and L1   fractures are new when correlated withthe lumbar spine MRI 12/18/2013.   No osseous retropulsion.    MARC GARCIA M.D., ATTENDING RADIOLOGIST  This document has been electronically signed. Sep  6 2019  9:36AM    < end of copied text >    MR BRAIN    < from: MR Head No Cont (09.05.19 @ 18:53) >  FINDINGS:    The ventricles and cortical sulci demonstrate stable mild atrophic   changes for age.    Stable small chronic lacunar infarct in the right caudate nucleus.    There is no evidence of acute intracranial hemorrhage, extra-axial fluid   collection or midline shift.       There is no diffusion abnormality to suggest acute/subacute infarct.   There is normal flow void present within the major vascular structures.      Stable left sphenoid sinus polyp or retention cyst.    IMPRESSION:     1.  No evidence of recent infarct or acute intracranial hemorrhage.   Stable exam since 5/31/2015.    2.  Stable chronic lacunar infarct in the right caudate nucleus.      < from: MR Cervical Spine w/wo IV Cont (09.05.19 @ 18:53) >    IMPRESSION:    1.  Motion limited study demonstrates no definite cord signal abnormality   or abnormal enhancement.    2.  Mild progression of degenerative changes since the previous MRI   12/18/2013:    3.  C4-5 moderate spinal stenosis due to disc osteophyte with   superimposed right central disc protrusion slightly abutting the of the   right ventral spinal cord. Ligamentum flavum infolding contributes.    4.  Additional multilevel mild spinal stenosis and mild to moderate   foraminal narrowing.      MARC GARCIA M.D., ATTENDING RADIOLOGIST  This document hasbeen electronically signed. Sep  6 2019  9:36AM        Assessment:  48 y/o male with a PMHx of CAD, BPD, CIDP, claustrophobia, B/L hip replacements after recurrent falls, known to service previously treated for CIDP with PLEX for 6 months, unable to participate with PT off DMT, presents with memory difficulties, motor and sensory deficits, b/l LE pain found with spasticity and clonus of the RLE which is inconsistent with longstanding CIDP.  Recommend r/o central etiology as potential cause for worsening LE weakness.  Has not followed with neurology in > 3 years. MRI showing signigiant nerve root enhancements in conus medullaris, possibly secondary to CIDP, and cervical stenosis.       Plan: Neurology Progress Note  Interval History:      HPI:  48 y/o M with PMHx of CAD s/p CABG, BPH, CIDP with significant motor and sensory deficits and extreme pain, b/l total hip replacement, anxiety and depression presents to the ED for extreme left hip pain. He mentions that this has been going for some time and it was well managed by Dr. Camarena but it has been harder to control the pain recently and he was sent by her for pain management and further management. Patient is bedbound and is unable to visit doctors and his condition has been managed by telemetry. He also complains of worsening depression and anxiety, he is also claustrophobic and is complaining of staying in small confined places. He denies suicidal ideation.   In ED, VS wnl, CT head negative for intracranial bleed. Xray of left femur showed superolateral dislocation of the left total hip arthroplasty since the prior exam in 2016.    Attd: very complex and long, chr hx; pt had DM, HTN, DLD as young man coupled w/ high stress jobs ( in [employer] - lots of travel); had CAD and CABG in 2011; shortly after developed odd neuro conditions and finally dx'd in 2013 w/ CDIP by [facility]; He had become weaker over time and fell and broke rt hip - s/p ORIF and shortly after fell again on lt hip and had ORIF on lt; pt was doing STR, but pain kept getting worse in lt hip; pt eventually became homebound and bedbound (2/2 pain and CIDP); overtime; lt leg seemed shorter and externally rotated, pain kept increasing; pt presents now w/ a myriad of unaddressed complaints from yrs of poor f/u:  1) has extreme pain lt hip w/ deformity; meds are not working - asked for ortho eval  2) CDIP is causing neuropathic pains all over and he has not followed up w/ neuro - asked for pain mngmt eval  3) he thinks he may have had TIA w/ numbness in face and arm, but he is not sure how it relates to CIDP - asked for neuro eval  4) pt not walking well at all and developed b/l dropped feet - pt asking for neuro and ortho for options - I told him that if he is not strong enough to walk, it is likely not a good idea to operate on his dropped feet (luc given his operative risk) - he has zero mvmt in his legs currently  5) pt has not been to cardio in yrs; has hx of DM and CABG; denies CP per se - pt asked for cardio eval (will need anyway if lt hip to be surgically fixed for pain control)  6) pt is considering rehab again to increase mobility, even if just to assist in transfers so that he can get out of house to see MDs - pt asked for rehab eval  7) pt is every depressed and anxious given all his ailments at such a young age; he is missing his dtrs grow up and can no longer work or even function at an independent level - pt asked for a psych eval to adjust meds  8) pt having trouble trying to get to MD offices - pt asked for social serv eval for asst. Pt has HHA for 12 hrs/day (10 am to 10 pm). (03 Sep 2019 15:38)        PAST MEDICAL & SURGICAL HISTORY:  CIDP (chronic inflammatory demyelinating polyneuropathy)  History of cholecystectomy  History of total left hip replacement  History of total right hip replacement: bilateral  S/P CABG x 5      Medications:  acetaminophen   Tablet .. 650 milliGRAM(s) Oral every 6 hours  ALPRAZolam 1 milliGRAM(s) Oral every 8 hours PRN  aspirin  chewable 81 milliGRAM(s) Oral daily  atorvastatin 40 milliGRAM(s) Oral at bedtime  baclofen 10 milliGRAM(s) Oral two times a day  busPIRone 5 milliGRAM(s) Oral two times a day  chlorhexidine 4% Liquid 1 Application(s) Topical <User Schedule>  dextrose 40% Gel 15 Gram(s) Oral once PRN  dextrose 5%. 1000 milliLiter(s) IV Continuous <Continuous>  dextrose 50% Injectable 12.5 Gram(s) IV Push once  dextrose 50% Injectable 25 Gram(s) IV Push once  dextrose 50% Injectable 25 Gram(s) IV Push once  DULoxetine 60 milliGRAM(s) Oral daily  enoxaparin Injectable 40 milliGRAM(s) SubCutaneous daily  gabapentin 600 milliGRAM(s) Oral every 8 hours  glucagon  Injectable 1 milliGRAM(s) IntraMuscular once PRN  hydrOXYzine  Oral Tab/Cap - Peds 50 milliGRAM(s) Oral at bedtime PRN  insulin glargine Injectable (LANTUS) 21 Unit(s) SubCutaneous at bedtime  insulin lispro (HumaLOG) corrective regimen sliding scale   SubCutaneous three times a day before meals  insulin lispro Injectable (HumaLOG) 7 Unit(s) SubCutaneous three times a day before meals  ketorolac   Injectable 30 milliGRAM(s) IV Push every 6 hours PRN  magnesium sulfate  IVPB 2 Gram(s) IV Intermittent once  morphine ER Tablet 60 milliGRAM(s) Oral every 12 hours  pantoprazole    Tablet 40 milliGRAM(s) Oral before breakfast  QUEtiapine 50 milliGRAM(s) Oral at bedtime  tamsulosin 0.4 milliGRAM(s) Oral at bedtime      Vital Signs Last 24 Hrs  T(C): 37.3 (06 Sep 2019 05:33), Max: 37.3 (06 Sep 2019 05:33)  T(F): 99.1 (06 Sep 2019 05:33), Max: 99.1 (06 Sep 2019 05:33)  HR: 115 (06 Sep 2019 05:33) (81 - 115)  BP: 112/56 (06 Sep 2019 05:33) (107/65 - 121/71)  BP(mean): --  RR: 18 (06 Sep 2019 05:33) (18 - 18)  SpO2: --    Neurological Exam:   Mental status: Awake, alert and oriented x3.  confused, poor attention.   Naming, repetition and comprehension intact. dysarthria,  Fund of knowledge appropriate.    Cranial nerves: Pupils equally round and reactive to light, visual fields full, no nystagmus, extraocular muscles intact, V1 through V3 intact bilaterally and symmetric, face symmetric, hearing intact to finger rub, palate elevation symmetric, tongue was midline.  Motor:  MRC grading 4/5 UE b/l, 1/5 LE b/l.   strength 5/5.  Normal tone and bulk.  No abnormal movements.    Sensation: Intact to light touch, proprioception, and pinprick.   Coordination: No dysmetria on finger-to-nose.  No dysdiadokinesia.  Reflexes: clonus on RLE, no reflexes elicited LLE. 2+ reflex UE b/l (biceps tendon)  Gait: Non-ambulatory, LLE externally rotated.    Labs:  CBC Full  -  ( 05 Sep 2019 05:19 )  WBC Count : 9.03 K/uL  RBC Count : 4.76 M/uL  Hemoglobin : 12.4 g/dL  Hematocrit : 37.0 %  Platelet Count - Automated : 168 K/uL  Mean Cell Volume : 77.7 fL  Mean Cell Hemoglobin : 26.1 pg  Mean Cell Hemoglobin Concentration : 33.5 g/dL  Auto Neutrophil # : 6.67 K/uL  Auto Lymphocyte # : 1.57 K/uL  Auto Monocyte # : 0.56 K/uL  Auto Eosinophil # : 0.15 K/uL  Auto Basophil # : 0.05 K/uL  Auto Neutrophil % : 73.8 %  Auto Lymphocyte % : 17.4 %  Auto Monocyte % : 6.2 %  Auto Eosinophil % : 1.7 %  Auto Basophil % : 0.6 %    09-05    136  |  94<L>  |  19  ----------------------------<  375<H>  4.5   |  31  |  0.8    Ca    8.8      05 Sep 2019 05:19    TPro  6.2  /  Alb  3.8  /  TBili  0.4  /  DBili  x   /  AST  15  /  ALT  14  /  AlkPhos  103  09-05    LIVER FUNCTIONS - ( 05 Sep 2019 05:19 )  Alb: 3.8 g/dL / Pro: 6.2 g/dL / ALK PHOS: 103 U/L / ALT: 14 U/L / AST: 15 U/L / GGT: x             NEUROIMAGING   < from: MR Thoracic Spine w/wo IV Cont (09.05.19 @ 18:53) >    EXAM:  MR SPINE THORACIC WAW IC          PROCEDURE DATE:  09/05/2019      INTERPRETATION:  Clinical History / Reason for exam: Worsening leg   function and recurrent falls. History of CIDP.    Technique: MRI thoracic spine with and without contrast. Multiplanar   multi sequential MRI of the thoracic spine was performed before and   following the intravenous administration of 10 cc of Gadavist (0 cc   discarded) on a 1.5 Mana magnet.    Sedation was provided by the department of anesthesia.    Study is limited due to respiratory motion and body habitus. Repeat   studies were unable to be obtained due to issues due to inability to   continue with anesthesia.    Correlation is made with a lumbar spine MRI dated 12/18/2013.    Findings:    There are mild chronic compression fractures of the T11, T12 and L1   vertebral bodies, new at T12 and L1 as the lumbar spine MRI from 2013.   There is no significant osseous retropulsion.    The remaining thoracic vertebral bodies maintain normal height. Alignment   is maintained.    Bone marrow signal demonstrates T1 and T2 hyperintense lesions within the   T1 and T5 vertebral bodies compatible with hemangiomas.    The thoracic spinal cord is normal in signal morphology.    At T5-6 there austin small right central disc protrusion without cord   compression.    At T7-8 there is a small left central disc protrusion without cord   compression.    At T8-9 there is a small left central disc protrusion without cord   compression.    At T9-10 there is a trace right central disc protrusion without cord   compression.    The remaining disc levels are unremarkable.  There is suggestion of a punctate focus of enhancement at the level of   the conus medullaris/proximal cauda equina at the the T12-L1 level on   series 16 image 6 and series 17 image 49.    IMPRESSION:    1.  Suggestion of a punctate focus of enhancement at the level of the   conus medullaris/proximal cauda equina at T12-L1 (series 16 image 6),   likely nerve root enhancement given history of CIDP.    2.  No other areas of abnormal enhancement are demonstrated.    3.  Mild degenerative changes as described.    4.  Mild chronic compression fractures of T11, T12 and L1. The T12 and L1   fractures are new when correlated withthe lumbar spine MRI 12/18/2013.   No osseous retropulsion.    MARC GARCIA M.D., ATTENDING RADIOLOGIST  This document has been electronically signed. Sep  6 2019  9:36AM    < end of copied text >    MR BRAIN    < from: MR Head No Cont (09.05.19 @ 18:53) >  FINDINGS:    The ventricles and cortical sulci demonstrate stable mild atrophic   changes for age.    Stable small chronic lacunar infarct in the right caudate nucleus.    There is no evidence of acute intracranial hemorrhage, extra-axial fluid   collection or midline shift.       There is no diffusion abnormality to suggest acute/subacute infarct.   There is normal flow void present within the major vascular structures.      Stable left sphenoid sinus polyp or retention cyst.    IMPRESSION:     1.  No evidence of recent infarct or acute intracranial hemorrhage.   Stable exam since 5/31/2015.    2.  Stable chronic lacunar infarct in the right caudate nucleus.      < from: MR Cervical Spine w/wo IV Cont (09.05.19 @ 18:53) >    IMPRESSION:    1.  Motion limited study demonstrates no definite cord signal abnormality   or abnormal enhancement.    2.  Mild progression of degenerative changes since the previous MRI   12/18/2013:    3.  C4-5 moderate spinal stenosis due to disc osteophyte with   superimposed right central disc protrusion slightly abutting the of the   right ventral spinal cord. Ligamentum flavum infolding contributes.    4.  Additional multilevel mild spinal stenosis and mild to moderate   foraminal narrowing.      MARC GARCIA M.D., ATTENDING RADIOLOGIST  This document hasbeen electronically signed. Sep  6 2019  9:36AM        Assessment:  48 y/o male with a PMHx of CAD, BPD, CIDP, claustrophobia, B/L hip replacements after recurrent falls, known to service previously treated for CIDP with PLEX for 6 months, unable to participate with PT off DMT, presents with memory difficulties, motor and sensory deficits, b/l LE pain found with spasticity and clonus of the RLE which is inconsistent with longstanding CIDP.  Recommend r/o central etiology as potential cause for worsening LE weakness.  Has not followed with neurology in > 3 years. MRI showing sig nerve root enhancements in conus medullaris, possibly secondary to CIDP, and cervical stenosis. No cord compression on MRI     Plan:   CIDP  -IgA levels WNL  -IVIG for 0.4mg/kg for 5 days  -premedicate with 25mg Benadryl, 325mg Tylenol   -f/u SPEP    Low Mg  -replete (1.7 today)  -keep >2 Neurology Progress Note  Interval History:      HPI:  50 y/o M with PMHx of CAD s/p CABG, BPH, CIDP with significant motor and sensory deficits and extreme pain, b/l total hip replacement, anxiety and depression presents to the ED for extreme left hip pain. He mentions that this has been going for some time and it was well managed by Dr. Camarena but it has been harder to control the pain recently and he was sent by her for pain management and further management. Patient is bedbound and is unable to visit doctors and his condition has been managed by telemetry. He also complains of worsening depression and anxiety, he is also claustrophobic and is complaining of staying in small confined places. He denies suicidal ideation.   In ED, VS wnl, CT head negative for intracranial bleed. Xray of left femur showed superolateral dislocation of the left total hip arthroplasty since the prior exam in 2016.    Attd: very complex and long, chr hx; pt had DM, HTN, DLD as young man coupled w/ high stress jobs ( in [employer] - lots of travel); had CAD and CABG in 2011; shortly after developed odd neuro conditions and finally dx'd in 2013 w/ CDIP by [facility]; He had become weaker over time and fell and broke rt hip - s/p ORIF and shortly after fell again on lt hip and had ORIF on lt; pt was doing STR, but pain kept getting worse in lt hip; pt eventually became homebound and bedbound (2/2 pain and CIDP); overtime; lt leg seemed shorter and externally rotated, pain kept increasing; pt presents now w/ a myriad of unaddressed complaints from yrs of poor f/u:  1) has extreme pain lt hip w/ deformity; meds are not working - asked for ortho eval  2) CDIP is causing neuropathic pains all over and he has not followed up w/ neuro - asked for pain mngmt eval  3) he thinks he may have had TIA w/ numbness in face and arm, but he is not sure how it relates to CIDP - asked for neuro eval  4) pt not walking well at all and developed b/l dropped feet - pt asking for neuro and ortho for options - I told him that if he is not strong enough to walk, it is likely not a good idea to operate on his dropped feet (luc given his operative risk) - he has zero mvmt in his legs currently  5) pt has not been to cardio in yrs; has hx of DM and CABG; denies CP per se - pt asked for cardio eval (will need anyway if lt hip to be surgically fixed for pain control)  6) pt is considering rehab again to increase mobility, even if just to assist in transfers so that he can get out of house to see MDs - pt asked for rehab eval  7) pt is every depressed and anxious given all his ailments at such a young age; he is missing his dtrs grow up and can no longer work or even function at an independent level - pt asked for a psych eval to adjust meds  8) pt having trouble trying to get to MD offices - pt asked for social serv eval for asst. Pt has HHA for 12 hrs/day (10 am to 10 pm). (03 Sep 2019 15:38)    PAST MEDICAL & SURGICAL HISTORY:  CIDP (chronic inflammatory demyelinating polyneuropathy)  History of cholecystectomy  History of total left hip replacement  History of total right hip replacement: bilateral  S/P CABG x 5    Medications:  acetaminophen   Tablet .. 650 milliGRAM(s) Oral every 6 hours  ALPRAZolam 1 milliGRAM(s) Oral every 8 hours PRN  aspirin  chewable 81 milliGRAM(s) Oral daily  atorvastatin 40 milliGRAM(s) Oral at bedtime  baclofen 10 milliGRAM(s) Oral two times a day  busPIRone 5 milliGRAM(s) Oral two times a day  chlorhexidine 4% Liquid 1 Application(s) Topical <User Schedule>  dextrose 40% Gel 15 Gram(s) Oral once PRN  dextrose 5%. 1000 milliLiter(s) IV Continuous <Continuous>  dextrose 50% Injectable 12.5 Gram(s) IV Push once  dextrose 50% Injectable 25 Gram(s) IV Push once  dextrose 50% Injectable 25 Gram(s) IV Push once  DULoxetine 60 milliGRAM(s) Oral daily  enoxaparin Injectable 40 milliGRAM(s) SubCutaneous daily  gabapentin 600 milliGRAM(s) Oral every 8 hours  glucagon  Injectable 1 milliGRAM(s) IntraMuscular once PRN  hydrOXYzine  Oral Tab/Cap - Peds 50 milliGRAM(s) Oral at bedtime PRN  insulin glargine Injectable (LANTUS) 21 Unit(s) SubCutaneous at bedtime  insulin lispro (HumaLOG) corrective regimen sliding scale   SubCutaneous three times a day before meals  insulin lispro Injectable (HumaLOG) 7 Unit(s) SubCutaneous three times a day before meals  ketorolac   Injectable 30 milliGRAM(s) IV Push every 6 hours PRN  magnesium sulfate  IVPB 2 Gram(s) IV Intermittent once  morphine ER Tablet 60 milliGRAM(s) Oral every 12 hours  pantoprazole    Tablet 40 milliGRAM(s) Oral before breakfast  QUEtiapine 50 milliGRAM(s) Oral at bedtime  tamsulosin 0.4 milliGRAM(s) Oral at bedtime    Vital Signs Last 24 Hrs  T(C): 37.3 (06 Sep 2019 05:33), Max: 37.3 (06 Sep 2019 05:33)  T(F): 99.1 (06 Sep 2019 05:33), Max: 99.1 (06 Sep 2019 05:33)  HR: 115 (06 Sep 2019 05:33) (81 - 115)  BP: 112/56 (06 Sep 2019 05:33) (107/65 - 121/71)  BP(mean): --  RR: 18 (06 Sep 2019 05:33) (18 - 18)  SpO2: --    Neurological Exam:   Mental status: Awake, alert and oriented x3.  confused, poor attention.   Naming, repetition and comprehension intact. dysarthria,  Fund of knowledge appropriate.    Cranial nerves: Pupils equally round and reactive to light, visual fields full, no nystagmus, extraocular muscles intact, V1 through V3 intact bilaterally and symmetric, face symmetric, hearing intact to finger rub, palate elevation symmetric, tongue was midline.  Motor:  MRC grading 4/5 UE b/l, 1/5 LE b/l.   strength 5/5.  Normal tone and bulk.  No abnormal movements.    Sensation: Intact to light touch, proprioception, and pinprick.   Coordination: No dysmetria on finger-to-nose.  No dysdiadokinesia.  Reflexes: clonus on RLE, no reflexes elicited LLE. 2+ reflex UE b/l (biceps tendon)  Gait: Non-ambulatory, LLE externally rotated.    Labs:  CBC Full  -  ( 05 Sep 2019 05:19 )  WBC Count : 9.03 K/uL  RBC Count : 4.76 M/uL  Hemoglobin : 12.4 g/dL  Hematocrit : 37.0 %  Platelet Count - Automated : 168 K/uL  Mean Cell Volume : 77.7 fL  Mean Cell Hemoglobin : 26.1 pg  Mean Cell Hemoglobin Concentration : 33.5 g/dL  Auto Neutrophil # : 6.67 K/uL  Auto Lymphocyte # : 1.57 K/uL  Auto Monocyte # : 0.56 K/uL  Auto Eosinophil # : 0.15 K/uL  Auto Basophil # : 0.05 K/uL  Auto Neutrophil % : 73.8 %  Auto Lymphocyte % : 17.4 %  Auto Monocyte % : 6.2 %  Auto Eosinophil % : 1.7 %  Auto Basophil % : 0.6 %  09-05    136  |  94<L>  |  19  ----------------------------<  375<H>  4.5   |  31  |  0.8    Ca    8.8      05 Sep 2019 05:19    TPro  6.2  /  Alb  3.8  /  TBili  0.4  /  DBili  x   /  AST  15  /  ALT  14  /  AlkPhos  103  09-05    LIVER FUNCTIONS - ( 05 Sep 2019 05:19 )  Alb: 3.8 g/dL / Pro: 6.2 g/dL / ALK PHOS: 103 U/L / ALT: 14 U/L / AST: 15 U/L / GGT: x           NEUROIMAGING   < from: MR Thoracic Spine w/wo IV Cont (09.05.19 @ 18:53) >    EXAM:  MR SPINE THORACIC WAW IC          PROCEDURE DATE:  09/05/2019      INTERPRETATION:  Clinical History / Reason for exam: Worsening leg   function and recurrent falls. History of CIDP.    Technique: MRI thoracic spine with and without contrast. Multiplanar   multi sequential MRI of the thoracic spine was performed before and   following the intravenous administration of 10 cc of Gadavist (0 cc   discarded) on a 1.5 Mana magnet.    Sedation was provided by the department of anesthesia.    Study is limited due to respiratory motion and body habitus. Repeat   studies were unable to be obtained due to issues due to inability to   continue with anesthesia.    Correlation is made with a lumbar spine MRI dated 12/18/2013.    Findings:    There are mild chronic compression fractures of the T11, T12 and L1   vertebral bodies, new at T12 and L1 as the lumbar spine MRI from 2013.   There is no significant osseous retropulsion.    The remaining thoracic vertebral bodies maintain normal height. Alignment   is maintained.    Bone marrow signal demonstrates T1 and T2 hyperintense lesions within the   T1 and T5 vertebral bodies compatible with hemangiomas.    The thoracic spinal cord is normal in signal morphology.    At T5-6 there [place] small right central disc protrusion without cord   compression.    At T7-8 there is a small left central disc protrusion without cord   compression.    At T8-9 there is a small left central disc protrusion without cord   compression.    At T9-10 there is a trace right central disc protrusion without cord   compression.    The remaining disc levels are unremarkable.  There is suggestion of a punctate focus of enhancement at the level of   the conus medullaris/proximal cauda equina at the the T12-L1 level on   series 16 image 6 and series 17 image 49.    IMPRESSION:    1.  Suggestion of a punctate focus of enhancement at the level of the   conus medullaris/proximal cauda equina at T12-L1 (series 16 image 6),   likely nerve root enhancement given history of CIDP.    2.  No other areas of abnormal enhancement are demonstrated.    3.  Mild degenerative changes as described.    4.  Mild chronic compression fractures of T11, T12 and L1. The T12 and L1   fractures are new when correlated withthe lumbar spine MRI 12/18/2013.   No osseous retropulsion.    MARC GARCIA M.D., ATTENDING RADIOLOGIST  This document has been electronically signed. Sep  6 2019  9:36AM    < end of copied text >    MR BRAIN    < from: MR Head No Cont (09.05.19 @ 18:53) >  FINDINGS:    The ventricles and cortical sulci demonstrate stable mild atrophic   changes for age.    Stable small chronic lacunar infarct in the right caudate nucleus.    There is no evidence of acute intracranial hemorrhage, extra-axial fluid   collection or midline shift.       There is no diffusion abnormality to suggest acute/subacute infarct.   There is normal flow void present within the major vascular structures.      Stable left sphenoid sinus polyp or retention cyst.    IMPRESSION:     1.  No evidence of recent infarct or acute intracranial hemorrhage.   Stable exam since 5/31/2015.    2.  Stable chronic lacunar infarct in the right caudate nucleus.      < from: MR Cervical Spine w/wo IV Cont (09.05.19 @ 18:53) >    IMPRESSION:    1.  Motion limited study demonstrates no definite cord signal abnormality   or abnormal enhancement.    2.  Mild progression of degenerative changes since the previous MRI   12/18/2013:    3.  C4-5 moderate spinal stenosis due to disc osteophyte with   superimposed right central disc protrusion slightly abutting the of the   right ventral spinal cord. Ligamentum flavum infolding contributes.    4.  Additional multilevel mild spinal stenosis and mild to moderate   foraminal narrowing.      MARC GARCIA M.D., ATTENDING RADIOLOGIST  This document hasbeen electronically signed. Sep  6 2019  9:36AM

## 2019-09-06 NOTE — PROGRESS NOTE ADULT - SUBJECTIVE AND OBJECTIVE BOX
KATHY CASTY  49y  Male  ***My note supersedes ALL resident notes that I sign.  My corrections for their notes are in my note.***    I can be reached directly on [id number]. My office number is [phone]. My personal cell number is [phone].    INTERVAL EVENTS: Here for f/u of hip pain. Pain is still present, but slightly better. There is a little more ability to passively move the rt leg, a tiny bit. The left leg hurts too much to move. No CP or SOB. Had long d/w pt, wife and ortho re pros/cons and risks for surgery.    T(F): 99 (09-06-19 @ 14:32), Max: 99.1 (09-06-19 @ 05:33)  HR: 102 (09-06-19 @ 14:32) (81 - 115)  BP: 111/57 (09-06-19 @ 14:32) (111/57 - 121/71)  RR: 18 (09-06-19 @ 05:33) (18 - 18)  SpO2: --    Gen: NAD; mild pain at rest; pain is worse in both legs w/ passive mvmt Lt>>Rt  lung: clr  hrt s1 s2 rrr  abd soft NT/ND  ext: 1+ edema in lower legs, no c/c, no skin breakdown on feet, feet seem perfused  neuro: has decent mvmt and mildly decr str in his arms; legs are paralyzed and stiff; CN intact aa ox3    LABS:                        12.4    (    77.7   9.03  )-----------( ---------      168      ( 05 Sep 2019 05:19 )             37.0    (    12.8     CAPILLARY BLOOD GLUCOSE  POCT Blood Glucose.: 193 (09-06-19 @ 16:34)  POCT Blood Glucose.: 221 (09-06-19 @ 11:52)  POCT Blood Glucose.: 333 (09-06-19 @ 07:59)  POCT Blood Glucose.: 247 (09-05-19 @ 19:49)  POCT Blood Glucose.: 258 (09-05-19 @ 15:06)    LDL Cholesterol, Direct (09.05.19 @ 05:19)    LDL Cholesterol, Direct: 123:    RADIOLOGY & ADDITIONAL TESTS:  < from: MR Thoracic Spine w/wo IV Cont (09.05.19 @ 18:53) >  IMPRESSION:    1.  Suggestion of a punctate focus of enhancement at the level of the   conus medullaris/proximal cauda equina at T12-L1 (series 16 image 6),   likely nerve root enhancement given history of CIDP.    2.  No other areas of abnormal enhancement are demonstrated.    3.  Mild degenerative changes as described.    4.  Mild chronic compression fractures of T11, T12 and L1. The T12 and L1   fractures are new when correlated withthe lumbar spine MRI 12/18/2013.   No osseous retropulsion.    < end of copied text >    < from: MR Head No Cont (09.05.19 @ 18:53) >  IMPRESSION:     1.  No evidence of recent infarct or acute intracranial hemorrhage.   Stable exam since 5/31/2015.    2.  Stable chronic lacunar infarct in the right caudate nucleus.    < end of copied text >    < from: MR Cervical Spine w/wo IV Cont (09.05.19 @ 18:53) >  IMPRESSION:    1.  Motion limited study demonstrates no definite cord signal abnormality   or abnormal enhancement.    2.  Mild progression of degenerative changes since the previous MRI   12/18/2013:    3.  C4-5 moderate spinal stenosis due to disc osteophyte with   superimposed right central disc protrusion slightly abutting the of the   right ventral spinal cord. Ligamentum flavum infolding contributes.    4.  Additional multilevel mild spinal stenosis and mild to moderate   foraminal narrowing.    < end of copied text >    < from: VA Duplex Carotid, Bilat (09.05.19 @ 14:50) >  Impression:    20-39% stenosis of the right internal carotid artery.  20-39% stenosis of the left internal carotid artery.    < end of copied text >    < from: CT Pelvis No Cont (09.05.19 @ 13:07) >    IMPRESSION:    1.  Superolateral dislocation of the left acetabular cup/femoral head   from the native acetabulum. Adjacent periosteal reaction indicativeof a   subacute etiology.  2.  Chronic/degenerative change as above.    < end of copied text >    < from: Transthoracic Echocardiogram (09.06.19 @ 13:32) >  Summary:   1. LV Ejection Fraction by Ruiz's Method with a biplane EF of 67 %.   2. Normal left ventricular size and wall thicknesses, with normal   systolic and diastolic function.   3. LA volume Index is 7.4 ml/m² ml/m2.    < end of copied text >      MEDICATIONS:    acetaminophen   Tablet .. 650 milliGRAM(s) Oral every 6 hours  ALPRAZolam 1 milliGRAM(s) Oral every 8 hours PRN  aspirin  chewable 81 milliGRAM(s) Oral daily  atorvastatin 40 milliGRAM(s) Oral at bedtime  baclofen 10 milliGRAM(s) Oral two times a day  busPIRone 5 milliGRAM(s) Oral two times a day  chlorhexidine 4% Liquid 1 Application(s) Topical <User Schedule>  DULoxetine 60 milliGRAM(s) Oral daily  enoxaparin Injectable 40 milliGRAM(s) SubCutaneous daily  gabapentin 600 milliGRAM(s) Oral every 8 hours  hydrOXYzine  Oral Tab/Cap - Peds 50 milliGRAM(s) Oral at bedtime PRN  insulin glargine Injectable (LANTUS) 21 Unit(s) SubCutaneous at bedtime  insulin lispro (HumaLOG) corrective regimen sliding scale   SubCutaneous three times a day before meals  insulin lispro Injectable (HumaLOG) 7 Unit(s) SubCutaneous three times a day before meals  ketorolac   Injectable 30 milliGRAM(s) IV Push every 6 hours PRN  morphine ER Tablet 60 milliGRAM(s) Oral every 12 hours  pantoprazole    Tablet 40 milliGRAM(s) Oral before breakfast  QUEtiapine 50 milliGRAM(s) Oral at bedtime  tamsulosin 0.4 milliGRAM(s) Oral at bedtime

## 2019-09-06 NOTE — PROGRESS NOTE ADULT - ASSESSMENT
Assessment:  48 y/o male with a PMHx of CAD, BPD, CIDP, claustrophobia, B/L hip replacements after recurrent falls, known to service previously treated for CIDP with PLEX for 6 months, unable to participate with PT off DMT, presents with memory difficulties, motor and sensory deficits, b/l LE pain found with spasticity and clonus of the RLE which is inconsistent with longstanding CIDP.  Recommend r/o central etiology as potential cause for worsening LE weakness.  Has not followed with neurology in > 3 years. MRI showing sig nerve root enhancements in conus medullaris, possibly secondary to CIDP, and cervical stenosis. No cord compression on MRI     Plan:   -IgA levels WNL  -IVIG for 0.4mg/kg for 5 days  -premedicate with 25mg Benadryl, 325mg Tylenol   -f/u SPEP, ganglioside abs  -replete Mg (1.7 today)  -pain management f/u per pt request  -orthopedic f/u for L hip dislocation  -PT pending L hip intervention

## 2019-09-07 LAB
ANION GAP SERPL CALC-SCNC: 11 MMOL/L — SIGNIFICANT CHANGE UP (ref 7–14)
BASOPHILS # BLD AUTO: 0.04 K/UL — SIGNIFICANT CHANGE UP (ref 0–0.2)
BASOPHILS NFR BLD AUTO: 0.5 % — SIGNIFICANT CHANGE UP (ref 0–1)
BUN SERPL-MCNC: 29 MG/DL — HIGH (ref 10–20)
CALCIUM SERPL-MCNC: 8.8 MG/DL — SIGNIFICANT CHANGE UP (ref 8.5–10.1)
CHLORIDE SERPL-SCNC: 97 MMOL/L — LOW (ref 98–110)
CO2 SERPL-SCNC: 29 MMOL/L — SIGNIFICANT CHANGE UP (ref 17–32)
CREAT SERPL-MCNC: 0.9 MG/DL — SIGNIFICANT CHANGE UP (ref 0.7–1.5)
EOSINOPHIL # BLD AUTO: 0.3 K/UL — SIGNIFICANT CHANGE UP (ref 0–0.7)
EOSINOPHIL NFR BLD AUTO: 3.5 % — SIGNIFICANT CHANGE UP (ref 0–8)
GLUCOSE BLDC GLUCOMTR-MCNC: 133 MG/DL — HIGH (ref 70–99)
GLUCOSE BLDC GLUCOMTR-MCNC: 212 MG/DL — HIGH (ref 70–99)
GLUCOSE BLDC GLUCOMTR-MCNC: 217 MG/DL — HIGH (ref 70–99)
GLUCOSE BLDC GLUCOMTR-MCNC: 251 MG/DL — HIGH (ref 70–99)
GLUCOSE BLDC GLUCOMTR-MCNC: 259 MG/DL — HIGH (ref 70–99)
GLUCOSE BLDC GLUCOMTR-MCNC: 336 MG/DL — HIGH (ref 70–99)
GLUCOSE BLDC GLUCOMTR-MCNC: 362 MG/DL — HIGH (ref 70–99)
GLUCOSE SERPL-MCNC: 271 MG/DL — HIGH (ref 70–99)
HCT VFR BLD CALC: 35.6 % — LOW (ref 42–52)
HGB BLD-MCNC: 11.7 G/DL — LOW (ref 14–18)
IMM GRANULOCYTES NFR BLD AUTO: 0.3 % — SIGNIFICANT CHANGE UP (ref 0.1–0.3)
LDH SERPL L TO P-CCNC: 228 U/L — SIGNIFICANT CHANGE UP (ref 50–242)
LYMPHOCYTES # BLD AUTO: 1.16 K/UL — LOW (ref 1.2–3.4)
LYMPHOCYTES # BLD AUTO: 13.4 % — LOW (ref 20.5–51.1)
MAGNESIUM SERPL-MCNC: 2.3 MG/DL — SIGNIFICANT CHANGE UP (ref 1.8–2.4)
MCHC RBC-ENTMCNC: 25.7 PG — LOW (ref 27–31)
MCHC RBC-ENTMCNC: 32.9 G/DL — SIGNIFICANT CHANGE UP (ref 32–37)
MCV RBC AUTO: 78.1 FL — LOW (ref 80–94)
MONOCYTES # BLD AUTO: 0.51 K/UL — SIGNIFICANT CHANGE UP (ref 0.1–0.6)
MONOCYTES NFR BLD AUTO: 5.9 % — SIGNIFICANT CHANGE UP (ref 1.7–9.3)
NEUTROPHILS # BLD AUTO: 6.64 K/UL — HIGH (ref 1.4–6.5)
NEUTROPHILS NFR BLD AUTO: 76.4 % — HIGH (ref 42.2–75.2)
NRBC # BLD: 0 /100 WBCS — SIGNIFICANT CHANGE UP (ref 0–0)
PHOSPHATE SERPL-MCNC: 3.6 MG/DL — SIGNIFICANT CHANGE UP (ref 2.1–4.9)
PLATELET # BLD AUTO: 150 K/UL — SIGNIFICANT CHANGE UP (ref 130–400)
POTASSIUM SERPL-MCNC: 4.5 MMOL/L — SIGNIFICANT CHANGE UP (ref 3.5–5)
POTASSIUM SERPL-SCNC: 4.5 MMOL/L — SIGNIFICANT CHANGE UP (ref 3.5–5)
RBC # BLD: 4.56 M/UL — LOW (ref 4.7–6.1)
RBC # FLD: 12.9 % — SIGNIFICANT CHANGE UP (ref 11.5–14.5)
SODIUM SERPL-SCNC: 137 MMOL/L — SIGNIFICANT CHANGE UP (ref 135–146)
URATE SERPL-MCNC: 4.8 MG/DL — SIGNIFICANT CHANGE UP (ref 3.4–8.8)
WBC # BLD: 8.68 K/UL — SIGNIFICANT CHANGE UP (ref 4.8–10.8)
WBC # FLD AUTO: 8.68 K/UL — SIGNIFICANT CHANGE UP (ref 4.8–10.8)

## 2019-09-07 PROCEDURE — 99232 SBSQ HOSP IP/OBS MODERATE 35: CPT

## 2019-09-07 PROCEDURE — 71045 X-RAY EXAM CHEST 1 VIEW: CPT | Mod: 26

## 2019-09-07 PROCEDURE — 93010 ELECTROCARDIOGRAM REPORT: CPT

## 2019-09-07 RX ORDER — IMMUNE GLOBULIN (HUMAN) 10 G/100ML
40 INJECTION INTRAVENOUS; SUBCUTANEOUS DAILY
Refills: 0 | Status: DISCONTINUED | OUTPATIENT
Start: 2019-09-07 | End: 2019-09-07

## 2019-09-07 RX ORDER — INSULIN GLARGINE 100 [IU]/ML
30 INJECTION, SOLUTION SUBCUTANEOUS AT BEDTIME
Refills: 0 | Status: DISCONTINUED | OUTPATIENT
Start: 2019-09-07 | End: 2019-09-08

## 2019-09-07 RX ORDER — IBUPROFEN 200 MG
400 TABLET ORAL ONCE
Refills: 0 | Status: COMPLETED | OUTPATIENT
Start: 2019-09-07 | End: 2019-09-07

## 2019-09-07 RX ORDER — SODIUM CHLORIDE 9 MG/ML
1000 INJECTION INTRAMUSCULAR; INTRAVENOUS; SUBCUTANEOUS ONCE
Refills: 0 | Status: COMPLETED | OUTPATIENT
Start: 2019-09-07 | End: 2019-09-07

## 2019-09-07 RX ORDER — DOCUSATE SODIUM 100 MG
100 CAPSULE ORAL DAILY
Refills: 0 | Status: DISCONTINUED | OUTPATIENT
Start: 2019-09-07 | End: 2019-09-13

## 2019-09-07 RX ORDER — DIPHENHYDRAMINE HCL 50 MG
50 CAPSULE ORAL ONCE
Refills: 0 | Status: COMPLETED | OUTPATIENT
Start: 2019-09-07 | End: 2019-09-07

## 2019-09-07 RX ORDER — FUROSEMIDE 40 MG
20 TABLET ORAL ONCE
Refills: 0 | Status: COMPLETED | OUTPATIENT
Start: 2019-09-07 | End: 2019-09-07

## 2019-09-07 RX ORDER — POLYETHYLENE GLYCOL 3350 17 G/17G
17 POWDER, FOR SOLUTION ORAL DAILY
Refills: 0 | Status: DISCONTINUED | OUTPATIENT
Start: 2019-09-07 | End: 2019-09-13

## 2019-09-07 RX ORDER — INSULIN LISPRO 100/ML
7 VIAL (ML) SUBCUTANEOUS ONCE
Refills: 0 | Status: COMPLETED | OUTPATIENT
Start: 2019-09-07 | End: 2019-09-07

## 2019-09-07 RX ORDER — SENNA PLUS 8.6 MG/1
1 TABLET ORAL DAILY
Refills: 0 | Status: DISCONTINUED | OUTPATIENT
Start: 2019-09-07 | End: 2019-09-13

## 2019-09-07 RX ORDER — INSULIN GLARGINE 100 [IU]/ML
10 INJECTION, SOLUTION SUBCUTANEOUS ONCE
Refills: 0 | Status: COMPLETED | OUTPATIENT
Start: 2019-09-07 | End: 2019-09-07

## 2019-09-07 RX ORDER — DIPHENHYDRAMINE HCL 50 MG
25 CAPSULE ORAL ONCE
Refills: 0 | Status: COMPLETED | OUTPATIENT
Start: 2019-09-07 | End: 2019-09-07

## 2019-09-07 RX ADMIN — Medication 650 MILLIGRAM(S): at 13:30

## 2019-09-07 RX ADMIN — TAMSULOSIN HYDROCHLORIDE 0.4 MILLIGRAM(S): 0.4 CAPSULE ORAL at 21:51

## 2019-09-07 RX ADMIN — Medication 125 MILLIGRAM(S): at 17:38

## 2019-09-07 RX ADMIN — SENNA PLUS 1 TABLET(S): 8.6 TABLET ORAL at 21:50

## 2019-09-07 RX ADMIN — INSULIN GLARGINE 30 UNIT(S): 100 INJECTION, SOLUTION SUBCUTANEOUS at 21:49

## 2019-09-07 RX ADMIN — Medication 25 MILLIGRAM(S): at 11:59

## 2019-09-07 RX ADMIN — ATORVASTATIN CALCIUM 80 MILLIGRAM(S): 80 TABLET, FILM COATED ORAL at 21:50

## 2019-09-07 RX ADMIN — Medication 7 UNIT(S): at 23:18

## 2019-09-07 RX ADMIN — Medication 650 MILLIGRAM(S): at 12:45

## 2019-09-07 RX ADMIN — MORPHINE SULFATE 60 MILLIGRAM(S): 50 CAPSULE, EXTENDED RELEASE ORAL at 05:47

## 2019-09-07 RX ADMIN — Medication 1 MILLIGRAM(S): at 10:37

## 2019-09-07 RX ADMIN — DULOXETINE HYDROCHLORIDE 60 MILLIGRAM(S): 30 CAPSULE, DELAYED RELEASE ORAL at 11:59

## 2019-09-07 RX ADMIN — INSULIN GLARGINE 10 UNIT(S): 100 INJECTION, SOLUTION SUBCUTANEOUS at 09:54

## 2019-09-07 RX ADMIN — Medication 650 MILLIGRAM(S): at 19:00

## 2019-09-07 RX ADMIN — Medication 20 MILLIGRAM(S): at 09:48

## 2019-09-07 RX ADMIN — MORPHINE SULFATE 60 MILLIGRAM(S): 50 CAPSULE, EXTENDED RELEASE ORAL at 17:40

## 2019-09-07 RX ADMIN — GABAPENTIN 600 MILLIGRAM(S): 400 CAPSULE ORAL at 05:48

## 2019-09-07 RX ADMIN — Medication 650 MILLIGRAM(S): at 17:38

## 2019-09-07 RX ADMIN — IMMUNE GLOBULIN (HUMAN) 66.67 GRAM(S): 10 INJECTION INTRAVENOUS; SUBCUTANEOUS at 12:47

## 2019-09-07 RX ADMIN — Medication 650 MILLIGRAM(S): at 11:59

## 2019-09-07 RX ADMIN — Medication 5 MILLIGRAM(S): at 05:48

## 2019-09-07 RX ADMIN — Medication 20 MILLIGRAM(S): at 05:48

## 2019-09-07 RX ADMIN — Medication 30 MILLIGRAM(S): at 07:53

## 2019-09-07 RX ADMIN — Medication 50 MILLIGRAM(S): at 17:38

## 2019-09-07 RX ADMIN — SODIUM CHLORIDE 1000 MILLILITER(S): 9 INJECTION INTRAMUSCULAR; INTRAVENOUS; SUBCUTANEOUS at 16:43

## 2019-09-07 RX ADMIN — Medication 10 MILLIGRAM(S): at 17:38

## 2019-09-07 RX ADMIN — Medication 2: at 12:16

## 2019-09-07 RX ADMIN — Medication 5 MILLIGRAM(S): at 17:38

## 2019-09-07 RX ADMIN — PANTOPRAZOLE SODIUM 40 MILLIGRAM(S): 20 TABLET, DELAYED RELEASE ORAL at 09:48

## 2019-09-07 RX ADMIN — ENOXAPARIN SODIUM 40 MILLIGRAM(S): 100 INJECTION SUBCUTANEOUS at 11:58

## 2019-09-07 RX ADMIN — Medication 30 MILLIGRAM(S): at 08:30

## 2019-09-07 RX ADMIN — Medication 30 MILLIGRAM(S): at 19:00

## 2019-09-07 RX ADMIN — GABAPENTIN 600 MILLIGRAM(S): 400 CAPSULE ORAL at 21:50

## 2019-09-07 RX ADMIN — QUETIAPINE FUMARATE 50 MILLIGRAM(S): 200 TABLET, FILM COATED ORAL at 21:50

## 2019-09-07 RX ADMIN — Medication 400 MILLIGRAM(S): at 19:59

## 2019-09-07 RX ADMIN — Medication 10 UNIT(S): at 12:16

## 2019-09-07 RX ADMIN — Medication 650 MILLIGRAM(S): at 06:30

## 2019-09-07 RX ADMIN — Medication 4: at 09:54

## 2019-09-07 RX ADMIN — GABAPENTIN 600 MILLIGRAM(S): 400 CAPSULE ORAL at 12:16

## 2019-09-07 RX ADMIN — Medication 10 UNIT(S): at 09:54

## 2019-09-07 RX ADMIN — MORPHINE SULFATE 60 MILLIGRAM(S): 50 CAPSULE, EXTENDED RELEASE ORAL at 19:40

## 2019-09-07 RX ADMIN — Medication 650 MILLIGRAM(S): at 05:47

## 2019-09-07 RX ADMIN — Medication 10 MILLIGRAM(S): at 05:48

## 2019-09-07 RX ADMIN — MORPHINE SULFATE 60 MILLIGRAM(S): 50 CAPSULE, EXTENDED RELEASE ORAL at 19:00

## 2019-09-07 RX ADMIN — Medication 30 MILLIGRAM(S): at 17:38

## 2019-09-07 RX ADMIN — Medication 25 MILLIGRAM(S): at 19:59

## 2019-09-07 RX ADMIN — Medication 400 MILLIGRAM(S): at 20:30

## 2019-09-07 RX ADMIN — Medication 81 MILLIGRAM(S): at 11:59

## 2019-09-07 RX ADMIN — Medication 100 MILLIGRAM(S): at 21:50

## 2019-09-07 NOTE — PROGRESS NOTE BEHAVIORAL HEALTH - SUMMARY
48 y/o  male with PMHx of CAD s/p CABG, BPH, CIDP with significant motor and sensory deficits and poorly controlled pain, b/l total hip replacement, with pphx of anxiety and depression admitted for management of hip pain and CIDP.     Pt endorses symptoms of depression including hopelessness, loss of interest, guilt, low energy, inability to concentrate, and loss of appetite in the context of poorly controlled pain and poor medical prognosis. Pt appears anxious and on edge in context of poor sleep and environmental disruptions. Pt may benefit from outpatient therapy to help develop coping mechanisms for his current debilitating illness and pain, and sees therapist Garrett Smith who does home visits.     Given pt's multiple medical comorbidities and ongoing consultation from several specialists, pt's care will require careful coordination of recs. Pt may benefit from optimizing Cymbalta for depression and anxiety. Xanax was modified to prn on admission given concerns of side effects in combination with morphine, including respiratory depression. Recommend switching Xanax to longer acting benzo such as Klonopin if no medical contraindications, which pt is amenable to. Pt's home dose of Seroquel is 150mg qhs, but pt is receiving 50mg qhs in the hospital. Recommend resuming home dose unless there is a medical contraindication.

## 2019-09-07 NOTE — PROGRESS NOTE ADULT - ASSESSMENT
48 y/o male with a PMHx of CAD, BPD, CIDP, claustrophobia, B/L hip replacements after recurrent falls, known to service previously treated for CIDP with PLEX for 6 months, unable to participate with PT off DMT, presents with memory difficulties, motor and sensory deficits, b/l LE weakness, bed bound over the past 3 years.  Has not followed with neurology in > 3 years. MRI showing sig nerve root enhancements in conus medullaris, possibly secondary to CIDP, and cervical stenosis. No cord compression on MRI   Was started on first dose of IVIG and became tachycardic and tremulous, infusion was stopped. As per resident infusion might have been given too fast.    Plan:   -IgA levels WNL  -IVIG for 0.4mg/kg with rate of 6.6 mg/hour (6 hours)  -premedicate with 25mg Benadryl, 650 mg Tylenol   -f/u SPEP, ganglioside abs  -pain management f/u per pt request  -orthopedic f/u for L hip dislocation  -PT pending L hip intervention      neuroattending note will follow

## 2019-09-07 NOTE — PROGRESS NOTE ADULT - SUBJECTIVE AND OBJECTIVE BOX
Neurology Progress Note    Interval History:      HPI:  48 y/o M with PMHx of CAD s/p CABG, BPH, CIDP with significant motor and sensory deficits and extreme pain, b/l total hip replacement, anxiety and depression presents to the ED for extreme left hip pain. He mentions that this has been going for some time and it was well managed by Dr. Camarena but it has been harder to control the pain recently and he was sent by her for pain management and further management. Patient is bedbound and is unable to visit doctors and his condition has been managed by telemetry. He also complains of worsening depression and anxiety, he is also claustrophobic and is complaining of staying in small confined places. He denies suicidal ideation.   In ED, VS wnl, CT head negative for intracranial bleed. Xray of left femur showed superolateral dislocation of the left total hip arthroplasty since the prior exam in 2016.    Attd: very complex and long, chr hx; pt had DM, HTN, DLD as young man coupled w/ high stress jobs ( in [employer] - lots of travel); had CAD and CABG in 2011; shortly after developed odd neuro conditions and finally dx'd in 2013 w/ CDIP by My[facility]le; He had become weaker over time and fell and broke rt hip - s/p ORIF and shortly after fell again on lt hip and had ORIF on lt; pt was doing STR, but pain kept getting worse in lt hip; pt eventually became homebound and bedbound (2/2 pain and CIDP); overtime; lt leg seemed shorter and externally rotated, pain kept increasing; pt presents now w/ a myriad of unaddressed complaints from yrs of poor f/u:  1) has extreme pain lt hip w/ deformity; meds are not working - asked for ortho eval  2) CDIP is causing neuropathic pains all over and he has not followed up w/ neuro - asked for pain mngmt eval  3) he thinks he may have had TIA w/ numbness in face and arm, but he is not sure how it relates to CIDP - asked for neuro eval  4) pt not walking well at all and developed b/l dropped feet - pt asking for neuro and ortho for options - I told him that if he is not strong enough to walk, it is likely not a good idea to operate on his dropped feet (luc given his operative risk) - he has zero mvmt in his legs currently  5) pt has not been to cardio in yrs; has hx of DM and CABG; denies CP per se - pt asked for cardio eval (will need anyway if lt hip to be surgically fixed for pain control)  6) pt is considering rehab again to increase mobility, even if just to assist in transfers so that he can get out of house to see MDs - pt asked for rehab eval  7) pt is every depressed and anxious given all his ailments at such a young age; he is missing his dtrs grow up and can no longer work or even function at an independent level - pt asked for a psych eval to adjust meds  8) pt having trouble trying to get to MD offices - pt asked for social serv eval for asst. Pt has HHA for 12 hrs/day (10 am to 10 pm). (03 Sep 2019 15:38)    Patient was started on IVIG today, was called by resident stating patient had a reaction to IVIG became tachycardic and tremulous. Infusion was stopped, patient was given IV Benadryl and Solumedrol. Vital signs are stable.  MRI thoracic spine shows hyperintense signal at T12 and L1 level c/w history of CIDP. MRI cervical and brain are unremarkable.      PAST MEDICAL & SURGICAL HISTORY:  CIDP (chronic inflammatory demyelinating polyneuropathy)  History of cholecystectomy  History of total left hip replacement  History of total right hip replacement: bilateral  S/P CABG x 5          Medications:  acetaminophen   Tablet .. 650 milliGRAM(s) Oral every 6 hours  ALPRAZolam 1 milliGRAM(s) Oral every 8 hours PRN  aspirin  chewable 81 milliGRAM(s) Oral daily  atorvastatin 80 milliGRAM(s) Oral at bedtime  baclofen 10 milliGRAM(s) Oral two times a day  busPIRone 5 milliGRAM(s) Oral two times a day  chlorhexidine 4% Liquid 1 Application(s) Topical <User Schedule>  dextrose 40% Gel 15 Gram(s) Oral once PRN  dextrose 5%. 1000 milliLiter(s) IV Continuous <Continuous>  dextrose 50% Injectable 12.5 Gram(s) IV Push once  dextrose 50% Injectable 25 Gram(s) IV Push once  dextrose 50% Injectable 25 Gram(s) IV Push once  diphenhydrAMINE 25 milliGRAM(s) Oral daily  diphenhydrAMINE   Injectable 50 milliGRAM(s) IV Push once  docusate sodium 100 milliGRAM(s) Oral daily  DULoxetine 60 milliGRAM(s) Oral daily  enoxaparin Injectable 40 milliGRAM(s) SubCutaneous daily  gabapentin 600 milliGRAM(s) Oral every 8 hours  glucagon  Injectable 1 milliGRAM(s) IntraMuscular once PRN  hydrOXYzine  Oral Tab/Cap - Peds 50 milliGRAM(s) Oral at bedtime PRN  immune   globulin 10% (GAMMAGARD) IVPB 40 Gram(s) IV Intermittent daily  insulin glargine Injectable (LANTUS) 30 Unit(s) SubCutaneous at bedtime  insulin lispro (HumaLOG) corrective regimen sliding scale   SubCutaneous three times a day before meals  insulin lispro Injectable (HumaLOG) 10 Unit(s) SubCutaneous three times a day before meals  ketorolac   Injectable 30 milliGRAM(s) IV Push every 6 hours PRN  methylPREDNISolone sodium succinate Injectable 125 milliGRAM(s) IV Push once  morphine ER Tablet 60 milliGRAM(s) Oral every 12 hours  pantoprazole    Tablet 40 milliGRAM(s) Oral before breakfast  polyethylene glycol 3350 17 Gram(s) Oral daily PRN  QUEtiapine 50 milliGRAM(s) Oral at bedtime  senna 1 Tablet(s) Oral daily  tamsulosin 0.4 milliGRAM(s) Oral at bedtime      Vital Signs Last 24 Hrs  T(C): 36.3 (07 Sep 2019 14:56), Max: 37.1 (06 Sep 2019 20:36)  T(F): 97.4 (07 Sep 2019 14:56), Max: 98.7 (06 Sep 2019 20:36)  HR: 84 (07 Sep 2019 14:56) (80 - 84)  BP: 103/54 (07 Sep 2019 14:56) (103/54 - 135/71)  BP(mean): --  RR: 84 (07 Sep 2019 14:56) (18 - 84)  SpO2: 99% (07 Sep 2019 08:00) (98% - 99%)    Neurological Exam:   General: in distress, SOB on rebreather, complains of pain all over  Mental status: Awake, alert and oriented x3.  Recent and remote memory intact.  Naming, repetition and comprehension intact.  Attention/concentration intact.  No dysarthria, no aphasia.  Fund of knowledge appropriate.    Cranial nerves: Pupils equally round and reactive to light, visual fields full, no nystagmus, extraocular muscles intact, V1 through V3 intact bilaterally and symmetric, face symmetric, hearing intact to finger rub, palate elevation symmetric, tongue was midline.  Motor:  MRC grading 5/5 b/l UE  1/0 /LE. no effort against gravity   strength 5/5.  Normal tone and bulk.  No abnormal movements.    Sensation: diminished to light touch in BL LE  Coordination: No dysmetria on finger-to-nose and heel-to-shin.  No dysdiadokinesia.  Reflexes: deffered      Labs:  CBC Full  -  ( 07 Sep 2019 07:53 )  WBC Count : 8.68 K/uL  RBC Count : 4.56 M/uL  Hemoglobin : 11.7 g/dL  Hematocrit : 35.6 %  Platelet Count - Automated : 150 K/uL  Mean Cell Volume : 78.1 fL  Mean Cell Hemoglobin : 25.7 pg  Mean Cell Hemoglobin Concentration : 32.9 g/dL  Auto Neutrophil # : 6.64 K/uL  Auto Lymphocyte # : 1.16 K/uL  Auto Monocyte # : 0.51 K/uL  Auto Eosinophil # : 0.30 K/uL  Auto Basophil # : 0.04 K/uL  Auto Neutrophil % : 76.4 %  Auto Lymphocyte % : 13.4 %  Auto Monocyte % : 5.9 %  Auto Eosinophil % : 3.5 %  Auto Basophil % : 0.5 %    09-07    137  |  97<L>  |  29<H>  ----------------------------<  271<H>  4.5   |  29  |  0.9    Ca    8.8      07 Sep 2019 07:53  Phos  3.6     09-07  Mg     2.3     09-07              Assessment:  This is a 49y Male w/ h/o     Plan:

## 2019-09-07 NOTE — PROGRESS NOTE ADULT - ASSESSMENT
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 and extreme pain, b/l total hip replacement, anxiety and depression admitted for left hip pain     # left hip pain and left knee pain:  xray showing superolateral dislocation of the left total hip arthroplasty  Pain management noted:  d/c morphine IR  cont MS contin 60 q12  cont neurontin 600mg po q8 (incr further as needed/tolerated - prob after sx)  cont tylenol 650mg po q6 ATC  cont toradol IV sev pain - limit to 5 days  CT pelvis done - ortho planning on hip explant (Girdlestone procedure) on Mon - pt agrees  pre-op done by cardio - high risk w/ favorable echo  NPO p MN Sun  plavix on hold for procedure    # b/l dropped feet  prob not much more that can be done (aside from mechanical bracing)  will ask neuro/ortho opinions after IVIG and hip sx  perhaps OT (rehab) could asst w/ a brace    # CIDP / chr body pain and neuropathic pain;  (IV steroids and plasmapheresis of not much help in past)  C/w duloxetine 60 mg qd, baclofen 10 mg q12h and gabapentin 600 mg q8  IgA lvl is normal  per neuro, begin IVIG 0.4gm/kg = 40 gm over 6 hrs q24 x5 doses, pre-med w/ benadryl 25mg po x1 and tylenol already getting around the clock  MRIs noted    # chr lacunar infarct of rt caudate - see CT head and MRI-B  on asa   - incr atorvastatin to 80mg po qhs  carotid u/s: only mild b/l dz    # CAD s/p CABG:  hold Plavix 75 mg qd for poss ortho intervent on Mon, cont asa 81mg po q24  incr statin  2d Echo - normal  cardio eval - noted - high risk for surg procedure (pt willing to accept risk to fix hip)    # PAD: no pulses palpable, decreased sensation:  seems chronic  duplex arterial: mod-sev occ Rt SFA; mild-mod occ Lt tib art  spoke w/ vasc resid - consult in progress  for edema in legs - can use lasix 20mg q24 x 2 days and reassess (no lasix on day of sx), ace wrap legs, if tolerated    # Depression and anxiety: severe, not controlled; adjustment disorder  c/w Buspirone 5 mg q12h, Seroquel 50 mg qd and Xanax 1 mg q8h PO prn  atarax prn added HS  F/up Psychiatry consult - Dr Curran    # T2 DM - not controlled in BMP; uses oral meds at home  FS qac/hs and keep btw 100-180  A1c 11.4  Diet: Diabetic and DASH  incr lant 30 and lisp 10 and adjust til FS < 180    # BPH:  C/w Flomax 0.4 mg qd    # DVT ppx: Lovenox 40 mg qd    # GI ppx: Protonix 40 mg PO qd    # Code status: FULL    # Pt is being seen by multidisciplinary team, will need careful coordination:  - ortho  - neuro  - rehab  - pain  - cardio  - vasc  - psych  - social serv    # Dispo: most pressing issue is lt hip pain w/ dislocation - team should focus on fixing this, followed by subacute rehab for OT and PT and pain control    there is prob limited need to intervene from vasc standpoint as pain not vasc in origin  there is prob limited options for CDIP: will try IVIG  there is limited options for b/l dropped feet - f/u w/ ortho and rehab  aside from small med adjustments, there is prob not much to add for DM, CAD, DLD and HTN  psych issues will require long, on-going f/u and cannot be simply "fixed" on this hospitalization 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 and extreme pain, b/l total hip replacement, anxiety and depression admitted for left hip pain     # left hip pain and left knee pain:  xray showing superolateral dislocation of the left total hip arthroplasty  Pain management noted:  d/c morphine IR  cont MS contin 60 q12  cont neurontin 600mg po q8 (incr further as needed/tolerated - prob after sx)  cont tylenol 650mg po q6 ATC  cont toradol IV sev pain - limit to 5 days  CT pelvis done - ortho planning on hip explant (Girdlestone procedure) on Mon - pt agrees  pre-op done by cardio - high risk w/ favorable echo  NPO p MN Sun  plavix on hold for procedure    # b/l dropped feet  prob not much more that can be done (aside from mechanical bracing)  will ask neuro/ortho opinions after IVIG and hip sx  perhaps OT (rehab) could asst w/ a brace    # CIDP / chr body pain and neuropathic pain;  (IV steroids and plasmapheresis of not much help in past)  C/w duloxetine 60 mg qd, baclofen 10 mg q12h and gabapentin 600 mg q8  IgA lvl is normal  per neuro, begin IVIG 0.4gm/kg = 40 gm over 6 hrs q24 x5 doses, pre-med w/ benadryl 25mg po x1 and tylenol already getting around the clock  MRIs noted    # chr lacunar infarct of rt caudate - see CT head and MRI-B  on asa   - incr atorvastatin to 80mg po qhs  carotid u/s: only mild b/l dz    # CAD s/p CABG:  hold Plavix 75 mg qd for poss ortho intervent on Mon, cont asa 81mg po q24  incr statin  2d Echo - normal  cardio eval - noted - high risk for surg procedure (pt willing to accept risk to fix hip)    # PAD: no pulses palpable, decreased sensation:  seems chronic  duplex arterial: mod-sev occ Rt SFA; mild-mod occ Lt tib art  spoke w/ vasc resid - consult in progress  for edema in legs - can use lasix 20mg q24 x 2 days and reassess (no lasix on day of sx), ace wrap legs, if tolerated    # Depression and anxiety: severe, not controlled; adjustment disorder  c/w Buspirone 5 mg q12h, Seroquel 50 mg qd and Xanax 1 mg q8h PO prn  atarax prn added HS  F/up Psychiatry consult - Dr Curran    # micro anemia  Fe studies NOT c/w STANLEY  RBC higher than expected  could have thal trait - no need to w/u  doubt lead poison or siderblastic anemia (anemia not severe)  guaiac stool x1    # T2 DM - not controlled in BMP; uses oral meds at home  FS qac/hs and keep btw 100-180  A1c 11.4  Diet: Diabetic and DASH  incr lant 30 and lisp 10 and adjust til FS < 180    # BPH:  C/w Flomax 0.4 mg qd    # DVT ppx: Lovenox 40 mg qd    # GI ppx: Protonix 40 mg PO qd    # Code status: FULL    # Pt is being seen by multidisciplinary team, will need careful coordination:  - ortho  - neuro  - rehab  - pain  - cardio  - vasc  - psych  - social serv    # Dispo: most pressing issue is lt hip pain w/ dislocation - team should focus on fixing this, followed by subacute rehab for OT and PT and pain control    there is prob limited need to intervene from vasc standpoint as pain not vasc in origin  there is prob limited options for CDIP: will try IVIG  there is limited options for b/l dropped feet - f/u w/ ortho and rehab  aside from small med adjustments, there is prob not much to add for DM, CAD, DLD and HTN  psych issues will require long, on-going f/u and cannot be simply "fixed" on this hospitalization

## 2019-09-07 NOTE — CHART NOTE - NSCHARTNOTEFT_GEN_A_CORE
Patient is a 49y old M with PMH CAD s/p CABG, CIDP with significant motor deficits and extreme pain, b/l total hip replacement, anxiety and depression admitted for left hip pain awaiting surgery on Monday for left hip with surgery.     Rapid response team was called because the patient was having chills, tremors and developed SOB ~2 hours into IVIG treatment. The patient was premedicated with 25 mg PO Benadryl prior to starting IVIG infusion. Patient's vital signs were 147/58, the HR is 169. O2 Sat was 91% and FS was 133.  IV Benadryl 50 mg x2 doses was given and 1 STAT dose of methylprednisolone 125 mg IV. The patient's breathing improved and tremors resolved.     Patient was seen and examined at the bedside by the rapid response team.    Allergies: penicillins (Unknown)      PAST MEDICAL & SURGICAL HISTORY:  CIDP (chronic inflammatory demyelinating polyneuropathy)  History of cholecystectomy  History of total left hip replacement  History of total right hip replacement: bilateral  S/P CABG x 5      Vital Signs Last 24 Hrs  T(C): 36.3 (07 Sep 2019 14:56), Max: 37.1 (06 Sep 2019 20:36)  T(F): 97.4 (07 Sep 2019 14:56), Max: 98.7 (06 Sep 2019 20:36)  HR: 84 (07 Sep 2019 14:56) (80 - 84)  BP: 103/54 (07 Sep 2019 14:56) (103/54 - 135/71)  BP(mean): --  RR: 84 (07 Sep 2019 14:56) (18 - 84)  SpO2: 99% (07 Sep 2019 08:00) (98% - 99%)      GENERAL: The patient is awake and alert in no apparent distress.   HEENT: Head is normocephalic and atraumatic. Extraocular muscles are intact. Mucous membranes are moist. No throat erythema/exudates no lymphadenopathy, no JVD,   NECK: Supple.  LUNGS: Clear to auscultation BL without wheezing, rales or rhonchi; respirations unlabored  HEART: Regular rate and rhythm ,+S1/+S2, no murmurs, rubs, gallops  ABDOMEN: Soft, nontender, and nondistended, no rebound, guarding rigidity, bowel sounds in all 4 quadrants  EXTREMITIES: Without any cyanosis, clubbing, rash, lesions or edema.  SKIN: No new rashes or lesions.  MSK: strength equal BL  VASCULAR: Radial and Dorsal pedal pulses palpable BL  NEUROLOGIC: Grossly intact.  PSYCH: No new changes.    09-07 @ 07:01  -  09-07 @ 17:06  --------------------------------------------------------  IN: 0 mL / OUT: 425 mL / NET: -425 mL                              11.7   8.68  )-----------( 150      ( 07 Sep 2019 07:53 )             35.6     09-07    137  |  97<L>  |  29<H>  ----------------------------<  271<H>  4.5   |  29  |  0.9    Ca    8.8      07 Sep 2019 07:53  Phos  3.6     09-07  Mg     2.3     09-07    Vital Signs Last 24 Hrs*       Assessment- Rapid Response called for 49y year old Male with a past medical history of     Plan- Patient is a 49y old M with PMH CAD s/p CABG, CIDP with significant motor deficits and extreme pain, b/l total hip replacement, anxiety and depression admitted for left hip pain awaiting surgery on Monday for left hip intervention with ortho.     Rapid response team was called because the patient was having chills, tremors and developed SOB ~2 hours into IVIG treatment. The patient was premedicated with 25 mg PO Benadryl prior to starting IVIG infusion. Patient's vital signs were 147/58, the HR is 169. O2 Sat was 91% and FS was 133.  IV Benadryl 50 mg x2 doses was given and 1 STAT dose of methylprednisolone 125 mg IV. The patient's breathing improved and tremors resolved.     Allergies: penicillins (Unknown)    PAST MEDICAL & SURGICAL HISTORY:  CIDP (chronic inflammatory demyelinating polyneuropathy)  History of cholecystectomy  History of total left hip replacement  History of total right hip replacement: bilateral  S/P CABG x 5      Vital Signs Last 24 Hrs  T(C): 36.3 (07 Sep 2019 14:56), Max: 37.1 (06 Sep 2019 20:36)  T(F): 97.4 (07 Sep 2019 14:56), Max: 98.7 (06 Sep 2019 20:36)  HR: 84 (07 Sep 2019 14:56) (80 - 84)  BP: 103/54 (07 Sep 2019 14:56) (103/54 - 135/71)  BP(mean): --  RR: 84 (07 Sep 2019 14:56) (18 - 84)  SpO2: 99% (07 Sep 2019 08:00) (98% - 99%)      GENERAL: The patient is awake and alert and no apparent distress; mild tremors present  HEENT: Head is normocephalic and atraumatic. Extraocular muscles are intact. Mucous membranes are moist. No throat erythema/exudates no lymphadenopathy, no JVD,   NECK: Supple.  LUNGS: Clear to auscultation BL without wheezing, rales or rhonchi; respirations unlabored; no stidor present  HEART: tachycardic, regular rhythm ,+S1/+S2, no murmurs, rubs, gallops  ABDOMEN: Soft, nontender, and nondistended, no rebound, guarding rigidity, bowel sounds in all 4 quadrants  EXTREMITIES: Without any cyanosis, clubbing, rash, lesions; lower extremity edema 1+ present; LLE externally rotated   SKIN: No new rashes or lesions.  MSK: strength 1/5 b/l LE,  strength 3/5 b/l UE  VASCULAR: dorsal pedal pulses absent b/l  NEUROLOGIC: sensation absent b/l lower extremities  PSYCH: No new changes.    09-07 @ 07:01  -  09-07 @ 17:06  --------------------------------------------------------  IN: 0 mL / OUT: 425 mL / NET: -425 mL                              11.7   8.68  )-----------( 150      ( 07 Sep 2019 07:53 )             35.6     09-07    137  |  97<L>  |  29<H>  ----------------------------<  271<H>  4.5   |  29  |  0.9    Ca    8.8      07 Sep 2019 07:53  Phos  3.6     09-07  Mg     2.3     09-07    Vital Signs Last 24 Hrs*       Assessment- Rapid Response called for 49y year old Male with a past medical history of CAD s/p CABG, CIDP with significant motor deficits and extreme pain, b/l total hip replacement, anxiety and depression admitted for left hip pain awaiting surgery on Monday for left hip intervention with ortho. Rapid reponse called due to IVIG reaction. Patient is s/p 25 mg IV Benadryl x2 and IV methylprednisolone 125 mg x1.      Plan-   f/u Neurology recommendations about IVIG infusion  f/u Repeat EKG in 1 hour  0.9% NS Bolus IVF

## 2019-09-07 NOTE — PROGRESS NOTE ADULT - SUBJECTIVE AND OBJECTIVE BOX
NAPOLEON CAST  49y  Male  ***My note supersedes ALL resident notes that I sign.  My corrections for their notes are in my note.***    I can be reached directly on [employer] [id number]. My office number is [phone]. My personal cell number is [phone].    INTERVAL EVENTS: Here for f/u of leg pain. Pain is present and stable at rest. Pt taking pain meds. Pt also has left knee pain, prob related to hip issue.     T(F): 97.3 (09-07-19 @ 04:47), Max: 99 (09-06-19 @ 14:32)  HR: 80 (09-07-19 @ 04:47) (80 - 102)  BP: 135/71 (09-07-19 @ 04:47) (111/57 - 135/71)  RR: 18 (09-07-19 @ 04:47) (18 - 18)  SpO2: 98% (09-06-19 @ 19:45) (98% - 98%)    Gen: NAD; mild pain at rest; pain is worse in both legs w/ passive mvmt Lt>>Rt  lung: clr  hrt s1 s2 rrr  abd soft NT/ND  ext: 1+ edema in lower legs, no c/c, no skin breakdown on feet, feet seem perfused  neuro: has decent mvmt and mildly decr str in his arms; legs are paralyzed and stiff; CN intact aa ox3    LABS:                        11.7    (    78.1   8.68  )-----------( ---------      150      ( 07 Sep 2019 07:53 )             35.6    (    12.9     Hemoglobin: 11.7 g/dL (09-07 @ 07:53)  Hemoglobin: 12.4 g/dL (09-05 @ 05:19)  Hemoglobin: 13.1 g/dL (09-04 @ 07:31)  Hemoglobin: 12.9 g/dL (09-03 @ 10:33)    137   (   97   (   271      09-07-19 @ 07:53  ----------------------               4.5   (   29   (   29                             -----                        0.9  Ca  8.8   Mg  --    P   --   --   (   --   (   --      09-07-19 @ 00:32  ----------------------               --   (   --   (   --                             -----                        --  Ca  --   Mg  2.3    P   3.6     CAPILLARY BLOOD GLUCOSE  POCT Blood Glucose.: 336 (09-07-19 @ 09:39)  POCT Blood Glucose.: 259 (09-07-19 @ 08:01)  POCT Blood Glucose.: 217 (09-07-19 @ 00:39)  POCT Blood Glucose.: 289 (09-06-19 @ 23:17)  POCT Blood Glucose.: 320 (09-06-19 @ 21:13)  POCT Blood Glucose.: 193 (09-06-19 @ 16:34)  POCT Blood Glucose.: 221 (09-06-19 @ 11:52)  POCT Blood Glucose.: 333 (09-06-19 @ 07:59)    RADIOLOGY & ADDITIONAL TESTS:      MEDICATIONS:    acetaminophen   Tablet .. 650 milliGRAM(s) Oral every 6 hours  ALPRAZolam 1 milliGRAM(s) Oral every 8 hours PRN  aspirin  chewable 81 milliGRAM(s) Oral daily  atorvastatin 80 milliGRAM(s) Oral at bedtime  baclofen 10 milliGRAM(s) Oral two times a day  busPIRone 5 milliGRAM(s) Oral two times a day  chlorhexidine 4% Liquid 1 Application(s) Topical <User Schedule>  diphenhydrAMINE 25 milliGRAM(s) Oral daily  DULoxetine 60 milliGRAM(s) Oral daily  enoxaparin Injectable 40 milliGRAM(s) SubCutaneous daily  gabapentin 600 milliGRAM(s) Oral every 8 hours  hydrOXYzine  Oral Tab/Cap - Peds 50 milliGRAM(s) Oral at bedtime PRN  immune   globulin 10% (GAMMAGARD) IVPB 40 Gram(s) IV Intermittent daily  insulin glargine Injectable (LANTUS) 30 Unit(s) SubCutaneous at bedtime  insulin lispro (HumaLOG) corrective regimen sliding scale   SubCutaneous three times a day before meals  insulin lispro Injectable (HumaLOG) 10 Unit(s) SubCutaneous three times a day before meals  ketorolac   Injectable 30 milliGRAM(s) IV Push every 6 hours PRN  morphine ER Tablet 60 milliGRAM(s) Oral every 12 hours  pantoprazole    Tablet 40 milliGRAM(s) Oral before breakfast  QUEtiapine 50 milliGRAM(s) Oral at bedtime  tamsulosin 0.4 milliGRAM(s) Oral at bedtime NAPOLEON CAST  49y  Male  ***My note supersedes ALL resident notes that I sign.  My corrections for their notes are in my note.***    I can be reached directly on [employer] [id number]. My office number is [phone]. My personal cell number is [phone].    INTERVAL EVENTS: Here for f/u of leg pain. Pain is present and stable at rest. Pt taking pain meds. Pt also has left knee pain, prob related to hip issue.     T(F): 97.3 (09-07-19 @ 04:47), Max: 99 (09-06-19 @ 14:32)  HR: 80 (09-07-19 @ 04:47) (80 - 102)  BP: 135/71 (09-07-19 @ 04:47) (111/57 - 135/71)  RR: 18 (09-07-19 @ 04:47) (18 - 18)  SpO2: 98% (09-06-19 @ 19:45) (98% - 98%)    Gen: NAD; mild pain at rest; pain is worse in both legs w/ passive mvmt Lt>>Rt  lung: clr  hrt s1 s2 rrr  abd soft NT/ND  ext: 1+ edema in lower legs, no c/c, no skin breakdown on feet, feet seem perfused  neuro: has decent mvmt and mildly decr str in his arms; legs are paralyzed and stiff; CN intact aa ox3    LABS:                        11.7    (    78.1   8.68  )-----------( ---------      150      ( 07 Sep 2019 07:53 )             35.6    (    12.9     Hemoglobin: 11.7 g/dL (09-07 @ 07:53)  Hemoglobin: 12.4 g/dL (09-05 @ 05:19)  Hemoglobin: 13.1 g/dL (09-04 @ 07:31)  Hemoglobin: 12.9 g/dL (09-03 @ 10:33)    137   (   97   (   271      09-07-19 @ 07:53  ----------------------               4.5   (   29   (   29                             -----                        0.9  Ca  8.8   Mg  --    P   --   --   (   --   (   --      09-07-19 @ 00:32  ----------------------               --   (   --   (   --                             -----                        --  Ca  --   Mg  2.3    P   3.6     CAPILLARY BLOOD GLUCOSE  POCT Blood Glucose.: 336 (09-07-19 @ 09:39)  POCT Blood Glucose.: 259 (09-07-19 @ 08:01)  POCT Blood Glucose.: 217 (09-07-19 @ 00:39)  POCT Blood Glucose.: 289 (09-06-19 @ 23:17)  POCT Blood Glucose.: 320 (09-06-19 @ 21:13)  POCT Blood Glucose.: 193 (09-06-19 @ 16:34)  POCT Blood Glucose.: 221 (09-06-19 @ 11:52)  POCT Blood Glucose.: 333 (09-06-19 @ 07:59)    RADIOLOGY & ADDITIONAL TESTS:      MEDICATIONS:    acetaminophen   Tablet .. 650 milliGRAM(s) Oral every 6 hours  ALPRAZolam 1 milliGRAM(s) Oral every 8 hours PRN  aspirin  chewable 81 milliGRAM(s) Oral daily  atorvastatin 80 milliGRAM(s) Oral at bedtime  baclofen 10 milliGRAM(s) Oral two times a day  busPIRone 5 milliGRAM(s) Oral two times a day  chlorhexidine 4% Liquid 1 Application(s) Topical <User Schedule>  diphenhydrAMINE 25 milliGRAM(s) Oral daily  DULoxetine 60 milliGRAM(s) Oral daily  enoxaparin Injectable 40 milliGRAM(s) SubCutaneous daily  gabapentin 600 milliGRAM(s) Oral every 8 hours  hydrOXYzine  Oral Tab/Cap - Peds 50 milliGRAM(s) Oral at bedtime PRN  immune   globulin 10% (GAMMAGARD) IVPB 40 Gram(s) IV Intermittent daily  insulin glargine Injectable (LANTUS) 30 Unit(s) SubCutaneous at bedtime  insulin lispro (HumaLOG) corrective regimen sliding scale   SubCutaneous three times a day before meals  insulin lispro Injectable (HumaLOG) 10 Unit(s) SubCutaneous three times a day before meals  ketorolac   Injectable 30 milliGRAM(s) IV Push every 6 hours PRN  morphine ER Tablet 60 milliGRAM(s) Oral every 12 hours  pantoprazole    Tablet 40 milliGRAM(s) Oral before breakfast  QUEtiapine 50 milliGRAM(s) Oral at bedtime  tamsulosin 0.4 milliGRAM(s) Oral at bedtime

## 2019-09-07 NOTE — PROGRESS NOTE ADULT - SUBJECTIVE AND OBJECTIVE BOX
SUBJECTIVE:    Patient is a 49y old Male who presents with a chief complaint of Left hip pain (07 Sep 2019 12:00)    Currently admitted to medicine with the primary diagnosis of Pain     Today is hospital day 4d. This morning he is resting comfortably in bed and reports no new issues or overnight events.         ROS:   CONSTITUTIONAL: No weakness, fevers or chills   EYES/ENT: No visual changes; No vertigo or throat pain   NECK: No pain or stiffness   RESPIRATORY: No cough, wheezing, hemoptysis; No shortness of breath   CARDIOVASCULAR: No chest pain or palpitations   GASTROINTESTINAL: No abdominal or epigastric pain. No nausea, vomiting, or hematemesis; No diarrhea or constipation. No melena or hematochezia.  GENITOURINARY: No dysuria, frequency or hematuria  NEUROLOGICAL: No numbness or weakness; patient notes moderate pain which is not well controlled  SKIN: No itching, rashes      PAST MEDICAL & SURGICAL HISTORY  CIDP (chronic inflammatory demyelinating polyneuropathy)  History of cholecystectomy  History of total left hip replacement  History of total right hip replacement: bilateral  S/P CABG x 5    SOCIAL HISTORY:    ALLERGIES:  penicillins (Unknown)    MEDICATIONS:  STANDING MEDICATIONS  acetaminophen   Tablet .. 650 milliGRAM(s) Oral every 6 hours  aspirin  chewable 81 milliGRAM(s) Oral daily  atorvastatin 80 milliGRAM(s) Oral at bedtime  baclofen 10 milliGRAM(s) Oral two times a day  busPIRone 5 milliGRAM(s) Oral two times a day  chlorhexidine 4% Liquid 1 Application(s) Topical <User Schedule>  dextrose 5%. 1000 milliLiter(s) IV Continuous <Continuous>  dextrose 50% Injectable 12.5 Gram(s) IV Push once  dextrose 50% Injectable 25 Gram(s) IV Push once  dextrose 50% Injectable 25 Gram(s) IV Push once  diphenhydrAMINE 25 milliGRAM(s) Oral daily  DULoxetine 60 milliGRAM(s) Oral daily  enoxaparin Injectable 40 milliGRAM(s) SubCutaneous daily  gabapentin 600 milliGRAM(s) Oral every 8 hours  immune   globulin 10% (GAMMAGARD) IVPB 40 Gram(s) IV Intermittent daily  insulin glargine Injectable (LANTUS) 30 Unit(s) SubCutaneous at bedtime  insulin lispro (HumaLOG) corrective regimen sliding scale   SubCutaneous three times a day before meals  insulin lispro Injectable (HumaLOG) 10 Unit(s) SubCutaneous three times a day before meals  morphine ER Tablet 60 milliGRAM(s) Oral every 12 hours  pantoprazole    Tablet 40 milliGRAM(s) Oral before breakfast  QUEtiapine 50 milliGRAM(s) Oral at bedtime  tamsulosin 0.4 milliGRAM(s) Oral at bedtime    PRN MEDICATIONS  ALPRAZolam 1 milliGRAM(s) Oral every 8 hours PRN  dextrose 40% Gel 15 Gram(s) Oral once PRN  glucagon  Injectable 1 milliGRAM(s) IntraMuscular once PRN  hydrOXYzine  Oral Tab/Cap - Peds 50 milliGRAM(s) Oral at bedtime PRN  ketorolac   Injectable 30 milliGRAM(s) IV Push every 6 hours PRN    VITALS:   T(F): 97.3  HR: 80  BP: 135/71  RR: 18  SpO2: 98%    LABS:  Negative for smoking/alcohol/drug use.                         11.7   8.68  )-----------( 150      ( 07 Sep 2019 07:53 )             35.6     09-07    137  |  97<L>  |  29<H>  ----------------------------<  271<H>  4.5   |  29  |  0.9    Ca    8.8      07 Sep 2019 07:53  Phos  3.6     09-07  Mg     2.3     09-07    RADIOLOGY:  no new today  PHYSICAL EXAM:    GEN: No acute distress  HEENT: normocephalic, atraumatic, aniceteric  LUNGS: Clear to auscultation bilaterally, no rales/wheezing/ rhonchi  HEART: S1/S2 present. RRR, no murmurs  ABD: Soft, non-tender, non-distended. Bowel sounds present  EXT: b/l lower extremity edema, pulses not palpable, no sensation ; mild pain in left hip upon movement; unable to move legs , left leg rotated out  NEURO: AAOX3, not anxious    ASSESSMENT AND PLAN:    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 and extreme pain, b/l total hip replacement, anxiety and depression admitted for left hip pain     # Superolateral dislocation of the left total hip arthroplasty  - patient bedbound at home x3 years ( quadraplegic)  - Ortho following - surgery on Monday (L hip removal)   - Pain management (Duqmaq): Morphine 10 mg PO q4 prn severe pain , IV Toradol (5 days max;   this is day 2 now), if need can increase gabapentin to 600 mg TID   - CT hip and pelvis: superolateral hip disclocation L  - clearance for OR - dr. Henderson, cardiology clearance done    # Depression and anxiety:  - c/w Buspirone 5 mg q12h, Seroquel 150 mg qd and Xanax 1 mg q8h PO  - Psychiatry consult: xanax prn tid     # Peripheral Vascular Disease  -Cold b/l feet, no pulses palpable, decreased sensation  -Per patient the patient's symptoms are chronic  - VA duplex arterial : mod-severe stenosis R superficial femoral artery and mild L tibial vessel   - Vascular consult f/u   - c/w atorvastatin 80 mg qhs    # CIDP:  - C/w duloxetine 60 mg qd, baclofen 5 mg q12h and gabapentin 400 mg tid  - Neurology consult: rec's appreciated  - MRI cervical and thoracic spine w/wo contrast followed by MRI brain - patient will need to be sedated for MRI (claustrophobic) - anesthesia consult placed  - IgA Levels : 86  - IVIG started today 9/7/2019 x5 days  - Premedicate with benadryl 25 mg , tylenol already around the clock    # CAD s/p CABG:  - hold Plavix 75 mg qd - OR on Monday with ortho  - c/w statin   # BPH:  - C/w Flomax 0.4 mg qd  # Diet: DASH  # DVT ppx: Lovenox 40 mg qd  # GI ppx: Protonix 40 mg PO qd  # Activity: OOBTC. F/up PT/rehab  # Dispo: From home, was in nursing home before  # Code status: FULL    Handoff: OR on Monday with ORTHO ; continue to hold plavix

## 2019-09-07 NOTE — PROGRESS NOTE BEHAVIORAL HEALTH - NSBHCHARTREVIEWVS_PSY_A_CORE FT
Vital Signs Last 24 Hrs  T(C): 36.3 (07 Sep 2019 04:47), Max: 37.1 (06 Sep 2019 20:36)  T(F): 97.3 (07 Sep 2019 04:47), Max: 98.7 (06 Sep 2019 20:36)  HR: 80 (07 Sep 2019 04:47) (80 - 83)  BP: 135/71 (07 Sep 2019 04:47) (119/61 - 135/71)  BP(mean): --  RR: 18 (07 Sep 2019 04:47) (18 - 18)  SpO2: 98% (06 Sep 2019 19:45) (98% - 98%)

## 2019-09-07 NOTE — PROGRESS NOTE BEHAVIORAL HEALTH - NSBHFUPINTERVALHXFT_PSY_A_CORE
50 y/o M , domiciled at home with family, unemployed on disability, with PMHx of CAD s/p CABG, BPH, CIDP with significant motor and sensory deficits and extreme pain, b/l total hip replacement, with pphx of anxiety and depression without IPP admissions, suicide attempts or current substance use. Pt is known to writer from telemedicine consultation on 8/27. Pt met criteria for MDD and Anxiety disorder without suicidal ideation or plan. Medication adjustments were deferred as pt was planned to be admitted to medicine for management per neuro and pain management.     Today, pt reports feeling depressed as he reports that the Ortho team does not think he is a candidate for surgery and that the likelihood of him walking again are unlikely. Pt reports being in extreme pain and discomfort. Pt reports that he has slept poorly since admission due to environmental noises, interruptions, and poor pain control. Pt endorses symptoms of depression including low mood, low energy, anhedonia, demoralization. Pt denies current suicidal ideation but reports that he is fearful of his future and doesn't "want to have bad thoughts I'm not having now". Pt reports feeling anxious, on edge, jumping at the sound of the "suction in the bed next door".

## 2019-09-07 NOTE — PROGRESS NOTE BEHAVIORAL HEALTH - NSBHCONSULTRECOMMENDOTHER_PSY_A_CORE FT
1. Rec increasing Cymbalta to 90mg q24 if no medical contraindications/interactions  2. Recommend resuming home dose of Seroquel 150mg qhs unless there is a medical contraindication  3. Rec continuing Gabapentin and Buspar at current doses

## 2019-09-07 NOTE — PROGRESS NOTE BEHAVIORAL HEALTH - NSBHCONSULTFOLLOWAFTERCARE_PSY_A_CORE FT
1. Medical followup per primary team  2. Rec resuming home based therapy with current therapist Garrett Smith

## 2019-09-07 NOTE — CHART NOTE - NSCHARTNOTEFT_GEN_A_CORE
The patient has expressed that he does not want the IVIG treatment going forward due to the allergic reaction he experienced earlier today.

## 2019-09-07 NOTE — PROGRESS NOTE BEHAVIORAL HEALTH - NSBHCHARTREVIEWLAB_PSY_A_CORE FT
09-07    137  |  97<L>  |  29<H>  ----------------------------<  271<H>  4.5   |  29  |  0.9    Ca    8.8      07 Sep 2019 07:53  Phos  3.6     09-07  Mg     2.3     09-07

## 2019-09-07 NOTE — PROGRESS NOTE BEHAVIORAL HEALTH - RISK ASSESSMENT
Pt is at moderate chronic risk due to chronic pain, medical complexities, isolation. Risk is mitigated by pt denying SI/HI, no history of SI, basim for safety.

## 2019-09-07 NOTE — PROGRESS NOTE BEHAVIORAL HEALTH - NSBHCHARTREVIEWINVESTIGATE_PSY_A_CORE FT
< from: 12 Lead ECG (09.03.19 @ 10:41) >      Ventricular Rate 91 BPM    Atrial Rate 91 BPM    P-R Interval 136 ms    QRS Duration 82 ms    Q-T Interval 368 ms    QTC Calculation(Bezet) 452 ms    P Axis 30 degrees    R Axis 22 degrees    T Axis 41 degrees    Diagnosis Line Normal sinus rhythm  Nonspecific ST and T wave abnormality  Abnormal ECG    Confirmed by DEBRA CORCORAN MD ([id number]) on 9/3/2019 11:07:56 AM    < end of copied text >

## 2019-09-08 LAB
ANION GAP SERPL CALC-SCNC: 10 MMOL/L — SIGNIFICANT CHANGE UP (ref 7–14)
BASOPHILS # BLD AUTO: 0.02 K/UL — SIGNIFICANT CHANGE UP (ref 0–0.2)
BASOPHILS NFR BLD AUTO: 0.2 % — SIGNIFICANT CHANGE UP (ref 0–1)
BUN SERPL-MCNC: 27 MG/DL — HIGH (ref 10–20)
CALCIUM SERPL-MCNC: 8.9 MG/DL — SIGNIFICANT CHANGE UP (ref 8.5–10.1)
CHLORIDE SERPL-SCNC: 95 MMOL/L — LOW (ref 98–110)
CO2 SERPL-SCNC: 29 MMOL/L — SIGNIFICANT CHANGE UP (ref 17–32)
CREAT SERPL-MCNC: 1 MG/DL — SIGNIFICANT CHANGE UP (ref 0.7–1.5)
EOSINOPHIL # BLD AUTO: 0.01 K/UL — SIGNIFICANT CHANGE UP (ref 0–0.7)
EOSINOPHIL NFR BLD AUTO: 0.1 % — SIGNIFICANT CHANGE UP (ref 0–8)
GLUCOSE BLDC GLUCOMTR-MCNC: 262 MG/DL — HIGH (ref 70–99)
GLUCOSE BLDC GLUCOMTR-MCNC: 299 MG/DL — HIGH (ref 70–99)
GLUCOSE BLDC GLUCOMTR-MCNC: 348 MG/DL — HIGH (ref 70–99)
GLUCOSE BLDC GLUCOMTR-MCNC: 348 MG/DL — HIGH (ref 70–99)
GLUCOSE BLDC GLUCOMTR-MCNC: 358 MG/DL — HIGH (ref 70–99)
GLUCOSE BLDC GLUCOMTR-MCNC: 406 MG/DL — HIGH (ref 70–99)
GLUCOSE BLDC GLUCOMTR-MCNC: 500 MG/DL — CRITICAL HIGH (ref 70–99)
GLUCOSE SERPL-MCNC: 381 MG/DL — HIGH (ref 70–99)
HCT VFR BLD CALC: 35.7 % — LOW (ref 42–52)
HGB BLD-MCNC: 11.8 G/DL — LOW (ref 14–18)
IMM GRANULOCYTES NFR BLD AUTO: 0.7 % — HIGH (ref 0.1–0.3)
LYMPHOCYTES # BLD AUTO: 0.81 K/UL — LOW (ref 1.2–3.4)
LYMPHOCYTES # BLD AUTO: 10 % — LOW (ref 20.5–51.1)
MCHC RBC-ENTMCNC: 25.9 PG — LOW (ref 27–31)
MCHC RBC-ENTMCNC: 33.1 G/DL — SIGNIFICANT CHANGE UP (ref 32–37)
MCV RBC AUTO: 78.3 FL — LOW (ref 80–94)
MONOCYTES # BLD AUTO: 0.15 K/UL — SIGNIFICANT CHANGE UP (ref 0.1–0.6)
MONOCYTES NFR BLD AUTO: 1.8 % — SIGNIFICANT CHANGE UP (ref 1.7–9.3)
NEUTROPHILS # BLD AUTO: 7.08 K/UL — HIGH (ref 1.4–6.5)
NEUTROPHILS NFR BLD AUTO: 87.2 % — HIGH (ref 42.2–75.2)
NRBC # BLD: 0 /100 WBCS — SIGNIFICANT CHANGE UP (ref 0–0)
PLATELET # BLD AUTO: 169 K/UL — SIGNIFICANT CHANGE UP (ref 130–400)
POTASSIUM SERPL-MCNC: 5.7 MMOL/L — HIGH (ref 3.5–5)
POTASSIUM SERPL-SCNC: 5.7 MMOL/L — HIGH (ref 3.5–5)
RBC # BLD: 4.56 M/UL — LOW (ref 4.7–6.1)
RBC # FLD: 12.8 % — SIGNIFICANT CHANGE UP (ref 11.5–14.5)
SODIUM SERPL-SCNC: 134 MMOL/L — LOW (ref 135–146)
WBC # BLD: 8.13 K/UL — SIGNIFICANT CHANGE UP (ref 4.8–10.8)
WBC # FLD AUTO: 8.13 K/UL — SIGNIFICANT CHANGE UP (ref 4.8–10.8)

## 2019-09-08 PROCEDURE — 99233 SBSQ HOSP IP/OBS HIGH 50: CPT

## 2019-09-08 PROCEDURE — 99231 SBSQ HOSP IP/OBS SF/LOW 25: CPT

## 2019-09-08 RX ORDER — INSULIN LISPRO 100/ML
VIAL (ML) SUBCUTANEOUS
Refills: 0 | Status: DISCONTINUED | OUTPATIENT
Start: 2019-09-08 | End: 2019-09-13

## 2019-09-08 RX ORDER — QUETIAPINE FUMARATE 200 MG/1
150 TABLET, FILM COATED ORAL AT BEDTIME
Refills: 0 | Status: DISCONTINUED | OUTPATIENT
Start: 2019-09-08 | End: 2019-09-13

## 2019-09-08 RX ORDER — CLONAZEPAM 1 MG
1 TABLET ORAL EVERY 12 HOURS
Refills: 0 | Status: DISCONTINUED | OUTPATIENT
Start: 2019-09-08 | End: 2019-09-10

## 2019-09-08 RX ORDER — INSULIN GLARGINE 100 [IU]/ML
36 INJECTION, SOLUTION SUBCUTANEOUS AT BEDTIME
Refills: 0 | Status: DISCONTINUED | OUTPATIENT
Start: 2019-09-08 | End: 2019-09-13

## 2019-09-08 RX ORDER — INSULIN LISPRO 100/ML
12 VIAL (ML) SUBCUTANEOUS
Refills: 0 | Status: DISCONTINUED | OUTPATIENT
Start: 2019-09-08 | End: 2019-09-13

## 2019-09-08 RX ORDER — INSULIN LISPRO 100/ML
7 VIAL (ML) SUBCUTANEOUS ONCE
Refills: 0 | Status: COMPLETED | OUTPATIENT
Start: 2019-09-08 | End: 2019-09-08

## 2019-09-08 RX ORDER — CLONAZEPAM 1 MG
1 TABLET ORAL EVERY 12 HOURS
Refills: 0 | Status: DISCONTINUED | OUTPATIENT
Start: 2019-09-08 | End: 2019-09-08

## 2019-09-08 RX ORDER — IMMUNE GLOBULIN (HUMAN) 10 G/100ML
40 INJECTION INTRAVENOUS; SUBCUTANEOUS DAILY
Refills: 0 | Status: COMPLETED | OUTPATIENT
Start: 2019-09-08 | End: 2019-09-11

## 2019-09-08 RX ORDER — INSULIN GLARGINE 100 [IU]/ML
10 INJECTION, SOLUTION SUBCUTANEOUS ONCE
Refills: 0 | Status: COMPLETED | OUTPATIENT
Start: 2019-09-08 | End: 2019-09-08

## 2019-09-08 RX ADMIN — QUETIAPINE FUMARATE 150 MILLIGRAM(S): 200 TABLET, FILM COATED ORAL at 22:11

## 2019-09-08 RX ADMIN — Medication 650 MILLIGRAM(S): at 13:08

## 2019-09-08 RX ADMIN — Medication 10 MILLIGRAM(S): at 18:12

## 2019-09-08 RX ADMIN — Medication 650 MILLIGRAM(S): at 06:17

## 2019-09-08 RX ADMIN — Medication 12 UNIT(S): at 18:13

## 2019-09-08 RX ADMIN — Medication 10 UNIT(S): at 08:24

## 2019-09-08 RX ADMIN — IMMUNE GLOBULIN (HUMAN) 33.33 GRAM(S): 10 INJECTION INTRAVENOUS; SUBCUTANEOUS at 11:58

## 2019-09-08 RX ADMIN — MORPHINE SULFATE 60 MILLIGRAM(S): 50 CAPSULE, EXTENDED RELEASE ORAL at 18:12

## 2019-09-08 RX ADMIN — Medication 5 MILLIGRAM(S): at 18:12

## 2019-09-08 RX ADMIN — Medication 1 MILLIGRAM(S): at 15:46

## 2019-09-08 RX ADMIN — Medication 7 UNIT(S): at 00:45

## 2019-09-08 RX ADMIN — Medication 6: at 18:13

## 2019-09-08 RX ADMIN — GABAPENTIN 600 MILLIGRAM(S): 400 CAPSULE ORAL at 22:10

## 2019-09-08 RX ADMIN — Medication 30 MILLIGRAM(S): at 08:25

## 2019-09-08 RX ADMIN — Medication 650 MILLIGRAM(S): at 18:15

## 2019-09-08 RX ADMIN — ATORVASTATIN CALCIUM 80 MILLIGRAM(S): 80 TABLET, FILM COATED ORAL at 22:10

## 2019-09-08 RX ADMIN — Medication 650 MILLIGRAM(S): at 06:50

## 2019-09-08 RX ADMIN — Medication 650 MILLIGRAM(S): at 11:56

## 2019-09-08 RX ADMIN — Medication 81 MILLIGRAM(S): at 12:30

## 2019-09-08 RX ADMIN — Medication 1 MILLIGRAM(S): at 08:25

## 2019-09-08 RX ADMIN — Medication 5 MILLIGRAM(S): at 06:17

## 2019-09-08 RX ADMIN — Medication 30 MILLIGRAM(S): at 18:23

## 2019-09-08 RX ADMIN — TAMSULOSIN HYDROCHLORIDE 0.4 MILLIGRAM(S): 0.4 CAPSULE ORAL at 22:10

## 2019-09-08 RX ADMIN — PANTOPRAZOLE SODIUM 40 MILLIGRAM(S): 20 TABLET, DELAYED RELEASE ORAL at 06:17

## 2019-09-08 RX ADMIN — GABAPENTIN 600 MILLIGRAM(S): 400 CAPSULE ORAL at 13:10

## 2019-09-08 RX ADMIN — Medication 4: at 08:25

## 2019-09-08 RX ADMIN — MORPHINE SULFATE 60 MILLIGRAM(S): 50 CAPSULE, EXTENDED RELEASE ORAL at 06:18

## 2019-09-08 RX ADMIN — Medication 12: at 11:56

## 2019-09-08 RX ADMIN — INSULIN GLARGINE 10 UNIT(S): 100 INJECTION, SOLUTION SUBCUTANEOUS at 13:10

## 2019-09-08 RX ADMIN — Medication 10 MILLIGRAM(S): at 06:17

## 2019-09-08 RX ADMIN — Medication 25 MILLIGRAM(S): at 11:56

## 2019-09-08 RX ADMIN — Medication 12 UNIT(S): at 11:56

## 2019-09-08 RX ADMIN — DULOXETINE HYDROCHLORIDE 60 MILLIGRAM(S): 30 CAPSULE, DELAYED RELEASE ORAL at 11:56

## 2019-09-08 RX ADMIN — INSULIN GLARGINE 36 UNIT(S): 100 INJECTION, SOLUTION SUBCUTANEOUS at 22:10

## 2019-09-08 RX ADMIN — Medication 30 MILLIGRAM(S): at 08:49

## 2019-09-08 RX ADMIN — MORPHINE SULFATE 60 MILLIGRAM(S): 50 CAPSULE, EXTENDED RELEASE ORAL at 18:18

## 2019-09-08 RX ADMIN — GABAPENTIN 600 MILLIGRAM(S): 400 CAPSULE ORAL at 06:17

## 2019-09-08 NOTE — PROGRESS NOTE ADULT - ASSESSMENT
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 and extreme pain, b/l total hip replacement, anxiety and depression admitted for left hip pain     # rigors and minor fever 2/2 rxn to IVIG at too fast a rate  will give IVIG over 12 hrs today and if tres, will move up to over 6-8 hrs for next dose (after surgery)  cont tylenol  premed w/ benadryl    # left hip pain and left knee pain:  xray showing superolateral dislocation of the left total hip arthroplasty  Pain management noted:  d/c morphine IR  cont MS contin 60 q12  cont neurontin 600mg po q8 (incr further as needed/tolerated - prob after sx)  cont tylenol 650mg po q6 ATC  cont toradol IV sev pain - limit to 5 days (up to 9/10)  CT pelvis done - ortho planning on hip explant ([place] procedure) on Mon - pt agrees  pre-op done by cardio - high risk w/ favorable echo  NPO p MN Sun  plavix on hold for procedure    # b/l dropped feet  prob not much more that can be done (aside from mechanical bracing)  will ask neuro/ortho opinions after IVIG and hip sx  perhaps OT (rehab) could asst w/ a brace    # CIDP / chr body pain and neuropathic pain;  (IV steroids and plasmapheresis of not much help in past)  C/w duloxetine 60 mg qd, baclofen 10 mg q12h and gabapentin 600 mg q8  IgA lvl is normal  per neuro, begin IVIG 0.4gm/kg = 40 gm over 12 hrs (for today) q24 x5 doses, pre-med w/ benadryl 25mg po x1 and tylenol already getting around the clock  MRIs noted    # chr lacunar infarct of rt caudate - see CT head and MRI-B  on asa   - incr atorvastatin to 80mg po qhs  carotid u/s: only mild b/l dz    # CAD s/p CABG:  hold Plavix 75 mg qd for poss ortho intervent on Mon, cont asa 81mg po q24  incr statin  2d Echo - normal  cardio eval - noted - high risk for surg procedure (pt willing to accept risk to fix hip)    # PAD: no pulses palpable, decreased sensation:  seems chronic  duplex arterial: mod-sev occ Rt SFA; mild-mod occ Lt tib art  spoke w/ vasc resid - consult in progress  for edema in legs - can use lasix 20mg q24 x 2 days and reassess (no lasix on day of sx), ace wrap legs, if tolerated    # Depression and anxiety: severe, not controlled; adjustment disorder  c/w Buspirone 5 mg q12h, incr Seroquel 150 mg qd and change Xanax to klonopin 1 mg q12h PO prn anxiety  atarax prn added HS  Psychiatry f/u noted    # micro anemia  Fe studies NOT c/w STANLEY  RBC higher than expected  could have thal trait - no need to w/u  doubt lead poison or siderblastic anemia (anemia not severe)  guaiac stool x1    # T2 DM - not controlled in BMP; uses oral meds at home  higher values yest are likely related to steroids given for rigors  FS qac/hs and keep btw 100-180  A1c 11.4  Diet: Diabetic and DASH  incr lant 36 and lisp 12 and adjust til FS < 180    # BPH:  C/w Flomax 0.4 mg qd    # hyperkalemia - ? from rigors  rpt BMP tonight at 8pm  no tx for now (giving lantus now, pt also getting lasix 20mg)  there is no hyponatremia if corrected for glucose    # DVT ppx: Lovenox 40 mg qd    # GI ppx: Protonix 40 mg PO qd    # Code status: FULL    # Pt is being seen by multidisciplinary team, will need careful coordination:  - ortho  - neuro  - rehab  - pain  - cardio  - vasc  - psych  - social serv    # Dispo: most pressing issue is lt hip pain w/ dislocation - team should focus on fixing this, followed by subacute rehab for OT and PT and pain control    there is prob limited need to intervene from vasc standpoint as pain not vasc in origin  there is prob limited options for CDIP: will try IVIG  there is limited options for b/l dropped feet - f/u w/ ortho and rehab  aside from small med adjustments, there is prob not much to add for DM, CAD, DLD and HTN  psych issues will require long, on-going f/u and cannot be simply "fixed" on this hospitalization

## 2019-09-08 NOTE — PROGRESS NOTE ADULT - SUBJECTIVE AND OBJECTIVE BOX
SERENENAPOLEON  49y  Male  ***My note supersedes ALL resident notes that I sign.  My corrections for their notes are in my note.***    I can be reached directly on [employer] [id number]. My office number is [phone]. My personal cell number is [phone].    INTERVAL EVENTS: Here for f/u of hip pain. Pt had RRT called for severe rigors yesterday when IVIG was given over 2.5 hrs (instead of over 6 hrs as I had written).  Spoke w/ hospitalist who responded to RRT. Pt received almost all of the IVIG before this happened. He improved w/ benadryl and 2 doses of steroids. Pt would like to cont to try to take IVIG, which I think is fine. Pain is fair. Responded to lasix w/ good urine output.    T(F): 96.5 (09-08-19 @ 04:59), Max: 100.6 (09-07-19 @ 21:49)  HR: 75 (09-08-19 @ 04:59) (75 - 110)  BP: 150/76 (09-08-19 @ 04:59) (103/54 - 150/76)  RR: 18 (09-08-19 @ 04:59) (18 - 84)  SpO2: 95% (09-08-19 @ 07:28) (84% - 97%)    Gen: NAD; mild pain at rest; pain is worse in both legs w/ passive mvmt Lt>>Rt  lung: clr  hrt s1 s2 rrr  abd soft NT/ND  ext:  tr edema in lower legs, no c/c, no skin breakdown on feet, feet seem perfused  neuro: has decent mvmt and mildly decr str in his arms; legs are paralyzed and stiff; CN intact aa ox3    LABS:                        11.8    (    78.3   8.13  )-----------( ---------      169      ( 08 Sep 2019 07:34 )             35.7    (    12.8     134   (   95   (   381      09-08-19 @ 07:34  ----------------------               5.7   (   29   (   27                             -----                        1.0  Ca  8.9   Mg  --    P   --     CAPILLARY BLOOD GLUCOSE  POCT Blood Glucose.: 348 (09-08-19 @ 07:58)  POCT Blood Glucose.: 348 (09-08-19 @ 01:45)  POCT Blood Glucose.: 406 (09-08-19 @ 00:03)  POCT Blood Glucose.: 362 (09-07-19 @ 21:41)  POCT Blood Glucose.: 251 (09-07-19 @ 18:48)  - got steroids after rxn to IVIG  POCT Blood Glucose.: 133 (09-07-19 @ 16:14)  POCT Blood Glucose.: 212 (09-07-19 @ 12:08)  POCT Blood Glucose.: 336 (09-07-19 @ 09:39)    RADIOLOGY & ADDITIONAL TESTS:      MEDICATIONS:    acetaminophen   Tablet .. 650 milliGRAM(s) Oral every 6 hours  ALPRAZolam 1 milliGRAM(s) Oral every 8 hours PRN  aspirin  chewable 81 milliGRAM(s) Oral daily  atorvastatin 80 milliGRAM(s) Oral at bedtime  baclofen 10 milliGRAM(s) Oral two times a day  busPIRone 5 milliGRAM(s) Oral two times a day  chlorhexidine 4% Liquid 1 Application(s) Topical <User Schedule>  diphenhydrAMINE 25 milliGRAM(s) Oral daily  docusate sodium 100 milliGRAM(s) Oral daily  DULoxetine 60 milliGRAM(s) Oral daily  enoxaparin Injectable 40 milliGRAM(s) SubCutaneous daily  gabapentin 600 milliGRAM(s) Oral every 8 hours  hydrOXYzine  Oral Tab/Cap - Peds 50 milliGRAM(s) Oral at bedtime PRN  immune   globulin 10% (GAMMAGARD) IVPB 40 Gram(s) IV Intermittent daily  insulin glargine Injectable (LANTUS) 30 Unit(s) SubCutaneous at bedtime  insulin lispro (HumaLOG) corrective regimen sliding scale   SubCutaneous three times a day before meals  insulin lispro Injectable (HumaLOG) 10 Unit(s) SubCutaneous three times a day before meals  ketorolac   Injectable 30 milliGRAM(s) IV Push every 6 hours PRN  morphine ER Tablet 60 milliGRAM(s) Oral every 12 hours  pantoprazole    Tablet 40 milliGRAM(s) Oral before breakfast  polyethylene glycol 3350 17 Gram(s) Oral daily PRN  QUEtiapine 50 milliGRAM(s) Oral at bedtime  senna 1 Tablet(s) Oral daily  tamsulosin 0.4 milliGRAM(s) Oral at bedtime

## 2019-09-09 LAB
ANION GAP SERPL CALC-SCNC: 10 MMOL/L — SIGNIFICANT CHANGE UP (ref 7–14)
ANION GAP SERPL CALC-SCNC: 8 MMOL/L — SIGNIFICANT CHANGE UP (ref 7–14)
BASOPHILS # BLD AUTO: 0.03 K/UL — SIGNIFICANT CHANGE UP (ref 0–0.2)
BASOPHILS NFR BLD AUTO: 0.4 % — SIGNIFICANT CHANGE UP (ref 0–1)
BUN SERPL-MCNC: 39 MG/DL — HIGH (ref 10–20)
BUN SERPL-MCNC: 40 MG/DL — HIGH (ref 10–20)
CALCIUM SERPL-MCNC: 8.7 MG/DL — SIGNIFICANT CHANGE UP (ref 8.5–10.1)
CALCIUM SERPL-MCNC: 8.9 MG/DL — SIGNIFICANT CHANGE UP (ref 8.5–10.1)
CHLORIDE SERPL-SCNC: 94 MMOL/L — LOW (ref 98–110)
CHLORIDE SERPL-SCNC: 97 MMOL/L — LOW (ref 98–110)
CO2 SERPL-SCNC: 30 MMOL/L — SIGNIFICANT CHANGE UP (ref 17–32)
CO2 SERPL-SCNC: 31 MMOL/L — SIGNIFICANT CHANGE UP (ref 17–32)
CREAT SERPL-MCNC: 1.2 MG/DL — SIGNIFICANT CHANGE UP (ref 0.7–1.5)
CREAT SERPL-MCNC: 1.2 MG/DL — SIGNIFICANT CHANGE UP (ref 0.7–1.5)
EOSINOPHIL # BLD AUTO: 0.35 K/UL — SIGNIFICANT CHANGE UP (ref 0–0.7)
EOSINOPHIL NFR BLD AUTO: 4.2 % — SIGNIFICANT CHANGE UP (ref 0–8)
GLUCOSE BLDC GLUCOMTR-MCNC: 133 MG/DL — HIGH (ref 70–99)
GLUCOSE BLDC GLUCOMTR-MCNC: 174 MG/DL — HIGH (ref 70–99)
GLUCOSE BLDC GLUCOMTR-MCNC: 183 MG/DL — HIGH (ref 70–99)
GLUCOSE BLDC GLUCOMTR-MCNC: 99 MG/DL — SIGNIFICANT CHANGE UP (ref 70–99)
GLUCOSE SERPL-MCNC: 178 MG/DL — HIGH (ref 70–99)
GLUCOSE SERPL-MCNC: 269 MG/DL — HIGH (ref 70–99)
HCT VFR BLD CALC: 31.2 % — LOW (ref 42–52)
HGB BLD-MCNC: 10.2 G/DL — LOW (ref 14–18)
IMM GRANULOCYTES NFR BLD AUTO: 0.8 % — HIGH (ref 0.1–0.3)
LYMPHOCYTES # BLD AUTO: 1.86 K/UL — SIGNIFICANT CHANGE UP (ref 1.2–3.4)
LYMPHOCYTES # BLD AUTO: 22.5 % — SIGNIFICANT CHANGE UP (ref 20.5–51.1)
MCHC RBC-ENTMCNC: 25.9 PG — LOW (ref 27–31)
MCHC RBC-ENTMCNC: 32.7 G/DL — SIGNIFICANT CHANGE UP (ref 32–37)
MCV RBC AUTO: 79.2 FL — LOW (ref 80–94)
MONOCYTES # BLD AUTO: 0.67 K/UL — HIGH (ref 0.1–0.6)
MONOCYTES NFR BLD AUTO: 8.1 % — SIGNIFICANT CHANGE UP (ref 1.7–9.3)
NEUTROPHILS # BLD AUTO: 5.27 K/UL — SIGNIFICANT CHANGE UP (ref 1.4–6.5)
NEUTROPHILS NFR BLD AUTO: 64 % — SIGNIFICANT CHANGE UP (ref 42.2–75.2)
NRBC # BLD: 0 /100 WBCS — SIGNIFICANT CHANGE UP (ref 0–0)
PLATELET # BLD AUTO: 173 K/UL — SIGNIFICANT CHANGE UP (ref 130–400)
POTASSIUM SERPL-MCNC: 5.1 MMOL/L — HIGH (ref 3.5–5)
POTASSIUM SERPL-MCNC: 5.4 MMOL/L — HIGH (ref 3.5–5)
POTASSIUM SERPL-SCNC: 5.1 MMOL/L — HIGH (ref 3.5–5)
POTASSIUM SERPL-SCNC: 5.4 MMOL/L — HIGH (ref 3.5–5)
RBC # BLD: 3.94 M/UL — LOW (ref 4.7–6.1)
RBC # FLD: 13 % — SIGNIFICANT CHANGE UP (ref 11.5–14.5)
SODIUM SERPL-SCNC: 133 MMOL/L — LOW (ref 135–146)
SODIUM SERPL-SCNC: 137 MMOL/L — SIGNIFICANT CHANGE UP (ref 135–146)
WBC # BLD: 8.25 K/UL — SIGNIFICANT CHANGE UP (ref 4.8–10.8)
WBC # FLD AUTO: 8.25 K/UL — SIGNIFICANT CHANGE UP (ref 4.8–10.8)

## 2019-09-09 PROCEDURE — 99233 SBSQ HOSP IP/OBS HIGH 50: CPT

## 2019-09-09 RX ORDER — CLONAZEPAM 1 MG
0.5 TABLET ORAL ONCE
Refills: 0 | Status: DISCONTINUED | OUTPATIENT
Start: 2019-09-09 | End: 2019-09-09

## 2019-09-09 RX ORDER — SODIUM CHLORIDE 9 MG/ML
1000 INJECTION INTRAMUSCULAR; INTRAVENOUS; SUBCUTANEOUS
Refills: 0 | Status: DISCONTINUED | OUTPATIENT
Start: 2019-09-09 | End: 2019-09-10

## 2019-09-09 RX ORDER — MORPHINE SULFATE 50 MG/1
15 CAPSULE, EXTENDED RELEASE ORAL THREE TIMES A DAY
Refills: 0 | Status: DISCONTINUED | OUTPATIENT
Start: 2019-09-09 | End: 2019-09-10

## 2019-09-09 RX ORDER — MORPHINE SULFATE 50 MG/1
10 CAPSULE, EXTENDED RELEASE ORAL THREE TIMES A DAY
Refills: 0 | Status: DISCONTINUED | OUTPATIENT
Start: 2019-09-09 | End: 2019-09-09

## 2019-09-09 RX ORDER — FUROSEMIDE 40 MG
20 TABLET ORAL DAILY
Refills: 0 | Status: COMPLETED | OUTPATIENT
Start: 2019-09-09 | End: 2019-09-10

## 2019-09-09 RX ADMIN — Medication 5 MILLIGRAM(S): at 05:44

## 2019-09-09 RX ADMIN — GABAPENTIN 600 MILLIGRAM(S): 400 CAPSULE ORAL at 05:44

## 2019-09-09 RX ADMIN — Medication 1 MILLIGRAM(S): at 09:13

## 2019-09-09 RX ADMIN — MORPHINE SULFATE 15 MILLIGRAM(S): 50 CAPSULE, EXTENDED RELEASE ORAL at 11:12

## 2019-09-09 RX ADMIN — PANTOPRAZOLE SODIUM 40 MILLIGRAM(S): 20 TABLET, DELAYED RELEASE ORAL at 05:44

## 2019-09-09 RX ADMIN — Medication 650 MILLIGRAM(S): at 01:06

## 2019-09-09 RX ADMIN — Medication 1 MILLIGRAM(S): at 21:45

## 2019-09-09 RX ADMIN — MORPHINE SULFATE 15 MILLIGRAM(S): 50 CAPSULE, EXTENDED RELEASE ORAL at 20:15

## 2019-09-09 RX ADMIN — Medication 10 MILLIGRAM(S): at 18:53

## 2019-09-09 RX ADMIN — Medication 20 MILLIGRAM(S): at 18:54

## 2019-09-09 RX ADMIN — INSULIN GLARGINE 36 UNIT(S): 100 INJECTION, SOLUTION SUBCUTANEOUS at 21:45

## 2019-09-09 RX ADMIN — MORPHINE SULFATE 15 MILLIGRAM(S): 50 CAPSULE, EXTENDED RELEASE ORAL at 21:00

## 2019-09-09 RX ADMIN — Medication 0.5 MILLIGRAM(S): at 15:43

## 2019-09-09 RX ADMIN — GABAPENTIN 600 MILLIGRAM(S): 400 CAPSULE ORAL at 20:15

## 2019-09-09 RX ADMIN — Medication 650 MILLIGRAM(S): at 18:53

## 2019-09-09 RX ADMIN — MORPHINE SULFATE 15 MILLIGRAM(S): 50 CAPSULE, EXTENDED RELEASE ORAL at 12:19

## 2019-09-09 RX ADMIN — QUETIAPINE FUMARATE 50 MILLIGRAM(S): 200 TABLET, FILM COATED ORAL at 21:49

## 2019-09-09 RX ADMIN — MORPHINE SULFATE 60 MILLIGRAM(S): 50 CAPSULE, EXTENDED RELEASE ORAL at 18:18

## 2019-09-09 RX ADMIN — Medication 10 MILLIGRAM(S): at 05:44

## 2019-09-09 RX ADMIN — IMMUNE GLOBULIN (HUMAN) 33.33 GRAM(S): 10 INJECTION INTRAVENOUS; SUBCUTANEOUS at 18:54

## 2019-09-09 RX ADMIN — MORPHINE SULFATE 60 MILLIGRAM(S): 50 CAPSULE, EXTENDED RELEASE ORAL at 17:14

## 2019-09-09 RX ADMIN — GABAPENTIN 600 MILLIGRAM(S): 400 CAPSULE ORAL at 21:45

## 2019-09-09 RX ADMIN — Medication 650 MILLIGRAM(S): at 18:54

## 2019-09-09 RX ADMIN — DULOXETINE HYDROCHLORIDE 60 MILLIGRAM(S): 30 CAPSULE, DELAYED RELEASE ORAL at 18:54

## 2019-09-09 RX ADMIN — Medication 25 MILLIGRAM(S): at 18:53

## 2019-09-09 RX ADMIN — SODIUM CHLORIDE 75 MILLILITER(S): 9 INJECTION INTRAMUSCULAR; INTRAVENOUS; SUBCUTANEOUS at 09:13

## 2019-09-09 RX ADMIN — Medication 650 MILLIGRAM(S): at 05:44

## 2019-09-09 RX ADMIN — MORPHINE SULFATE 60 MILLIGRAM(S): 50 CAPSULE, EXTENDED RELEASE ORAL at 06:03

## 2019-09-09 RX ADMIN — ATORVASTATIN CALCIUM 80 MILLIGRAM(S): 80 TABLET, FILM COATED ORAL at 21:45

## 2019-09-09 RX ADMIN — Medication 5 MILLIGRAM(S): at 18:53

## 2019-09-09 RX ADMIN — Medication 30 MILLIGRAM(S): at 00:52

## 2019-09-09 RX ADMIN — CHLORHEXIDINE GLUCONATE 1 APPLICATION(S): 213 SOLUTION TOPICAL at 00:15

## 2019-09-09 RX ADMIN — TAMSULOSIN HYDROCHLORIDE 0.4 MILLIGRAM(S): 0.4 CAPSULE ORAL at 21:45

## 2019-09-09 RX ADMIN — Medication 81 MILLIGRAM(S): at 18:53

## 2019-09-09 NOTE — PROGRESS NOTE ADULT - SUBJECTIVE AND OBJECTIVE BOX
NAPOLEON CAST 49y Male  MRN#: [id number]   CODE STATUS: FULL      SUBJECTIVE  Patient is a 49y old Male who presents with a chief complaint of Left hip pain (09 Sep 2019 12:16)    Currently admitted to medicine with the primary diagnosis of Pain     Today is hospital day 6d, and this morning he is laying in bed comfortably and reports no overnight events.       OBJECTIVE  PAST MEDICAL & SURGICAL HISTORY  CIDP (chronic inflammatory demyelinating polyneuropathy)  History of cholecystectomy  History of total left hip replacement  History of total right hip replacement: bilateral  S/P CABG x 5    ALLERGIES:  penicillins (Unknown)    MEDICATIONS:  STANDING MEDICATIONS  acetaminophen   Tablet .. 650 milliGRAM(s) Oral every 6 hours  aspirin  chewable 81 milliGRAM(s) Oral daily  atorvastatin 80 milliGRAM(s) Oral at bedtime  baclofen 10 milliGRAM(s) Oral two times a day  busPIRone 5 milliGRAM(s) Oral two times a day  chlorhexidine 4% Liquid 1 Application(s) Topical <User Schedule>  clonazePAM  Tablet 0.5 milliGRAM(s) Oral once  dextrose 5%. 1000 milliLiter(s) (50 mL/Hr) IV Continuous <Continuous>  dextrose 50% Injectable 12.5 Gram(s) IV Push once  dextrose 50% Injectable 25 Gram(s) IV Push once  dextrose 50% Injectable 25 Gram(s) IV Push once  diphenhydrAMINE 25 milliGRAM(s) Oral daily  docusate sodium 100 milliGRAM(s) Oral daily  DULoxetine 60 milliGRAM(s) Oral daily  enoxaparin Injectable 40 milliGRAM(s) SubCutaneous daily  furosemide    Tablet 20 milliGRAM(s) Oral daily  gabapentin 600 milliGRAM(s) Oral every 8 hours  immune   globulin 10% (GAMMAGARD) IVPB 40 Gram(s) IV Intermittent daily  insulin glargine Injectable (LANTUS) 36 Unit(s) SubCutaneous at bedtime  insulin lispro (HumaLOG) corrective regimen sliding scale   SubCutaneous three times a day before meals  insulin lispro Injectable (HumaLOG) 12 Unit(s) SubCutaneous three times a day before meals  morphine ER Tablet 60 milliGRAM(s) Oral every 12 hours  pantoprazole    Tablet 40 milliGRAM(s) Oral before breakfast  QUEtiapine 150 milliGRAM(s) Oral at bedtime  senna 1 Tablet(s) Oral daily  sodium chloride 0.9%. 1000 milliLiter(s) (75 mL/Hr) IV Continuous <Continuous>  tamsulosin 0.4 milliGRAM(s) Oral at bedtime    PRN MEDICATIONS  clonazePAM  Tablet 1 milliGRAM(s) Oral every 12 hours PRN  dextrose 40% Gel 15 Gram(s) Oral once PRN  glucagon  Injectable 1 milliGRAM(s) IntraMuscular once PRN  hydrOXYzine  Oral Tab/Cap - Peds 50 milliGRAM(s) Oral at bedtime PRN  morphine  IR 15 milliGRAM(s) Oral three times a day PRN  polyethylene glycol 3350 17 Gram(s) Oral daily PRN      VITAL SIGNS: Last 24 Hours  T(C): 36.3 (09 Sep 2019 13:00), Max: 36.6 (08 Sep 2019 20:00)  T(F): 97.4 (09 Sep 2019 13:00), Max: 97.8 (08 Sep 2019 20:00)  HR: 66 (09 Sep 2019 13:00) (66 - 75)  BP: 128/68 (09 Sep 2019 13:00) (111/58 - 144/77)  BP(mean): --  RR: 18 (09 Sep 2019 13:00) (18 - 18)  SpO2: 96% (09 Sep 2019 08:11) (96% - 97%)    LABS:                        10.2   8.25  )-----------( 173      ( 09 Sep 2019 07:26 )             31.2     09-09    137  |  97<L>  |  40<H>  ----------------------------<  178<H>  5.4<H>   |  30  |  1.2    Ca    8.9      09 Sep 2019 07:26      RADIOLOGY:    EXAM:  CT HIP ONLY BI        EXAM:  CT PELVIS ONLY        PROCEDURE DATE:  09/05/2019      IMPRESSION:  1.  Superolateral dislocation of the left acetabular cup/femoral head   from the native acetabulum. Adjacent periosteal reaction indicativeof a   subacute etiology.  2.  Chronic/degenerative change as above.    PHYSICAL EXAM:    GENERAL: NAD, well-developed, AAOx3  HEENT:  Atraumatic, Normocephalic.   PULMONARY: Clear to auscultation bilaterally; No wheeze  CARDIOVASCULAR: Regular rate and rhythm; No murmurs, rubs, or gallops  GASTROINTESTINAL: Soft, Nontender, Nondistended; Bowel sounds present  MUSCULOSKELETAL:   No cyanosis or edema  NEUROLOGY: non-focal  SKIN: No rashes or lesions      ASSESSMENT & PLAN    50 y/o M with PMHx of CAD s/p CABG, BPH, CIDP with significant motor and sensory deficits and extreme pain, b/l total hip replacement, anxiety and depression presents to the ED for extreme left hip pain.     # Superolateral dislocation of the left total hip arthroplasty  - Ortho following - surgery planned for today  - patient bedbound at home x3 years (quadraplegic)  - toradol discontinued, continue MS contin 60 q12 and morphine IR 15mg po q3 prn for breakthrough pain   - on gabapentin 600 mg TID, may increase to 800 after surgery if needed   - plavix on hold    # Depression and anxiety:  - c/w Buspirone 5 mg q12h, Seroquel 150 mg qd and klonipin 1 mg q12 PO as per psych    # Peripheral Vascular Disease  -Cold b/l feet, no pulses palpable, decreased sensation; chronic as per pt  - VA duplex arterial : mod-severe stenosis R superficial femoral artery and mild L tibial vessel   - Vascular f/u appreciated; no acute intervention   - c/w atorvastatin 80 mg qhs    # CIDP:  - IVIG started 9/7/2019 x5 days 40 gm over 8 hrs (for today), will switch to 6 hours tomorrow  - Premedicate with benadryl 25 mg , tylenol already around the clock  - C/w duloxetine 60 mg qd, baclofen 5 mg q12h and gabapentin 600mg TID  - Neurology consult: rec's appreciated  - MRI cervical and thoracic spine and brain noted  - IgA Levels wnl  - awaiting ganglioside abs    # DM II   - higher values were likely related to steroids given for rigors  - FS qac/hs and keep btw 100-180  - Hb A1c 11.4  - c/w [id number] and lisp 12 and adjust til FS < 180      # CAD s/p CABG:  - hold Plavix 75 mg qd - OR today with ortho  - c/w statin     # BPH:  - C/w Flomax 0.4 mg qd    # Diet: DASH  # DVT ppx: Lovenox 40 mg qd  # GI ppx: Protonix 40 mg PO qd  # Activity: OOBTC.  Physiatry: not candidate for rehab  # Dispo: From home, was in nursing home before  # Code status: FULL    Handoff: OR on Monday with ORTHO ; continue to hold plavix NAPOLEON CAST 49y Male  MRN#: [id number]   CODE STATUS: FULL      SUBJECTIVE  Patient is a 49y old Male who presents with a chief complaint of Left hip pain (09 Sep 2019 12:16)    Currently admitted to medicine with the primary diagnosis of Pain     Today is hospital day 6d, and this morning he is laying in bed comfortably and reports no overnight events.       OBJECTIVE  PAST MEDICAL & SURGICAL HISTORY  CIDP (chronic inflammatory demyelinating polyneuropathy)  History of cholecystectomy  History of total left hip replacement  History of total right hip replacement: bilateral  S/P CABG x 5    ALLERGIES:  penicillins (Unknown)    MEDICATIONS:  STANDING MEDICATIONS  acetaminophen   Tablet .. 650 milliGRAM(s) Oral every 6 hours  aspirin  chewable 81 milliGRAM(s) Oral daily  atorvastatin 80 milliGRAM(s) Oral at bedtime  baclofen 10 milliGRAM(s) Oral two times a day  busPIRone 5 milliGRAM(s) Oral two times a day  chlorhexidine 4% Liquid 1 Application(s) Topical <User Schedule>  clonazePAM  Tablet 0.5 milliGRAM(s) Oral once  dextrose 5%. 1000 milliLiter(s) (50 mL/Hr) IV Continuous <Continuous>  dextrose 50% Injectable 12.5 Gram(s) IV Push once  dextrose 50% Injectable 25 Gram(s) IV Push once  dextrose 50% Injectable 25 Gram(s) IV Push once  diphenhydrAMINE 25 milliGRAM(s) Oral daily  docusate sodium 100 milliGRAM(s) Oral daily  DULoxetine 60 milliGRAM(s) Oral daily  enoxaparin Injectable 40 milliGRAM(s) SubCutaneous daily  furosemide    Tablet 20 milliGRAM(s) Oral daily  gabapentin 600 milliGRAM(s) Oral every 8 hours  immune   globulin 10% (GAMMAGARD) IVPB 40 Gram(s) IV Intermittent daily  insulin glargine Injectable (LANTUS) 36 Unit(s) SubCutaneous at bedtime  insulin lispro (HumaLOG) corrective regimen sliding scale   SubCutaneous three times a day before meals  insulin lispro Injectable (HumaLOG) 12 Unit(s) SubCutaneous three times a day before meals  morphine ER Tablet 60 milliGRAM(s) Oral every 12 hours  pantoprazole    Tablet 40 milliGRAM(s) Oral before breakfast  QUEtiapine 150 milliGRAM(s) Oral at bedtime  senna 1 Tablet(s) Oral daily  sodium chloride 0.9%. 1000 milliLiter(s) (75 mL/Hr) IV Continuous <Continuous>  tamsulosin 0.4 milliGRAM(s) Oral at bedtime    PRN MEDICATIONS  clonazePAM  Tablet 1 milliGRAM(s) Oral every 12 hours PRN  dextrose 40% Gel 15 Gram(s) Oral once PRN  glucagon  Injectable 1 milliGRAM(s) IntraMuscular once PRN  hydrOXYzine  Oral Tab/Cap - Peds 50 milliGRAM(s) Oral at bedtime PRN  morphine  IR 15 milliGRAM(s) Oral three times a day PRN  polyethylene glycol 3350 17 Gram(s) Oral daily PRN      VITAL SIGNS: Last 24 Hours  T(C): 36.3 (09 Sep 2019 13:00), Max: 36.6 (08 Sep 2019 20:00)  T(F): 97.4 (09 Sep 2019 13:00), Max: 97.8 (08 Sep 2019 20:00)  HR: 66 (09 Sep 2019 13:00) (66 - 75)  BP: 128/68 (09 Sep 2019 13:00) (111/58 - 144/77)  BP(mean): --  RR: 18 (09 Sep 2019 13:00) (18 - 18)  SpO2: 96% (09 Sep 2019 08:11) (96% - 97%)    LABS:                        10.2   8.25  )-----------( 173      ( 09 Sep 2019 07:26 )             31.2     09-09    137  |  97<L>  |  40<H>  ----------------------------<  178<H>  5.4<H>   |  30  |  1.2    Ca    8.9      09 Sep 2019 07:26      RADIOLOGY:    EXAM:  CT HIP ONLY BI        EXAM:  CT PELVIS ONLY        PROCEDURE DATE:  09/05/2019      IMPRESSION:  1.  Superolateral dislocation of the left acetabular cup/femoral head   from the native acetabulum. Adjacent periosteal reaction indicativeof a   subacute etiology.  2.  Chronic/degenerative change as above.    PHYSICAL EXAM:    GENERAL: NAD, well-developed, AAOx3  HEENT:  Atraumatic, Normocephalic.   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     # Superolateral dislocation of the left total hip arthroplasty  - Ortho following - surgery planned for today  - patient bedbound at home x3 years (quadraplegic)  - toradol discontinued, continue MS contin 60 q12 and morphine IR 15mg po q3 prn for breakthrough pain   - on gabapentin 600 mg TID, may increase to 800 after surgery if needed   - plavix on hold    # Depression and anxiety:  - c/w Buspirone 5 mg q12h, Seroquel 150 mg qd and klonipin 1 mg q12 PO as per psych    # Peripheral Vascular Disease  -Cold b/l feet, no pulses palpable, decreased sensation; chronic as per pt  - VA duplex arterial : mod-severe stenosis R superficial femoral artery and mild L tibial vessel   - Vascular f/u appreciated; no acute intervention   - c/w atorvastatin 80 mg qhs    # CIDP:  - IVIG started 9/7/2019 x5 days 40 gm over 8 hrs (for today), will switch to 6 hours tomorrow  - possibly today's dose after surgery, if not resume day 3 tomorrow  - Premedicate with benadryl 25 mg , tylenol already around the clock  - C/w duloxetine 60 mg qd, baclofen 5 mg q12h and gabapentin 600mg TID  - Neurology consult: rec's appreciated  - MRI cervical and thoracic spine and brain noted  - IgA Levels wnl  - awaiting ganglioside abs    # DM II   - higher values were likely related to steroids given for rigors  - FS qac/hs and keep btw 100-180  - Hb A1c 11.4  - c/w [id number] and lisp 12 and adjust til FS < 180      # CAD s/p CABG:  - hold Plavix 75 mg qd - OR today with ortho  - c/w statin     # BPH:  - C/w Flomax 0.4 mg qd    # Diet: DASH  # DVT ppx: Lovenox 40 mg qd  # GI ppx: Protonix 40 mg PO qd  # Activity: OOBTC.  Physiatry: not candidate for rehab  # Dispo: From home, was in nursing home before  # Code status: FULL    Handoff: OR on Monday with ORTHO ; continue to hold plavix

## 2019-09-09 NOTE — PHARMACOTHERAPY INTERVENTION NOTE - COMMENTS
Morphine IR 10mg. The covering resident was contacted to change to 15mg since 10mg was not available in the hospital.

## 2019-09-09 NOTE — PROGRESS NOTE ADULT - SUBJECTIVE AND OBJECTIVE BOX
Orthopedics    Plan: Explant of left hip hemiarthroplasty    No acute issues overnight. Pain controlled.    Vital Signs Last 24 Hrs  T(C): 36.2 (09 Sep 2019 05:50), Max: 36.6 (08 Sep 2019 20:00)  T(F): 97.1 (09 Sep 2019 05:50), Max: 97.8 (08 Sep 2019 20:00)  HR: 75 (09 Sep 2019 05:50) (69 - 75)  BP: 111/58 (09 Sep 2019 05:50) (109/60 - 144/77)  BP(mean): --  RR: 18 (09 Sep 2019 05:50) (18 - 18)  SpO2: 97% (08 Sep 2019 19:40) (95% - 97%)    PE  NAD  Respirations non labored  Appropriate mood and affect    LLE  Skin intact  No motor function distally  2+ DP pulse    A/P  Plan for Explant left hip hemiarthroplasty today  -2 units prbc on hold  -NPO  -Hold anticoagulation

## 2019-09-09 NOTE — PROGRESS NOTE ADULT - ASSESSMENT
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 and extreme pain, b/l total hip replacement, anxiety and depression admitted for left hip pain     # no further rigors or fever w/ IVIG   will give IVIG over 8 hrs today (after sx) and if tres, will move up to over 6 hrs for next doses  cont tylenol  premed w/ benadryl    # left hip pain and left knee pain:  xray showing superolateral dislocation of the left total hip arthroplasty  Pain management noted  pain reg:  restart morphine IR 10mg po q3 prn breakthru pain (post-op use)  cont MS contin 60 q12  cont neurontin 600mg po q8 (prob after sx will incr to 800mg q8)  cont tylenol 650mg po q6 ATC  d/c toradol IV - use limited to 3-5 days; getting rise in CR and K+ likely from this; going for sx (can inhib plts)  CT pelvis done - ortho planning on hip explant ([place] procedure) today - pt agrees  pre-op done by cardio - high risk w/ favorable echo  NPO, IVFs  plavix on hold for procedure    # b/l dropped feet  prob not much more that can be done (aside from mechanical bracing)  will ask neuro/ortho opinions after IVIG and hip sx  perhaps OT (rehab) could asst w/ a brace    # CIDP / chr body pain and neuropathic pain;  (IV steroids and plasmapheresis of not much help in past)  C/w duloxetine 60 mg qd, baclofen 10 mg q12h and gabapentin 600 mg q8  IgA lvl is normal  per neuro, begin IVIG 0.4gm/kg = 40 gm over 8 hrs (for today) q24 x5 doses, pre-med w/ benadryl 25mg po x1 and tylenol already getting around the clock  MRIs noted    # chr lacunar infarct of rt caudate - see CT head and MRI-B  on asa   - incr atorvastatin to 80mg po qhs  carotid u/s: only mild b/l dz    # CAD s/p CABG:  hold Plavix 75 mg qd for poss ortho intervent on Mon, cont asa 81mg po q24  statin  2d Echo - normal  cardio eval - noted - high risk for surg procedure (pt willing to accept risk to fix hip)    # PAD: no pulses palpable, decreased sensation:  seems chronic  duplex arterial: mod-sev occ Rt SFA; mild-mod occ Lt tib art  vasc noted: no acute intervention; angio can be done at later date (like in months, not now)  for edema in legs - gave lasix 20mg q24 x 2 days, ace wrap legs, elevated as tolerated - will reassess post-op    # Depression and anxiety: severe, not controlled; adjustment disorder  c/w Buspirone 5 mg q12h, incr Seroquel 150 mg qd and change Xanax to klonopin 1 mg q12h PO prn anxiety (seems a little tired, might need to decr to 0.5mg, luc w/ morphine)  atarax prn added HS  Psychiatry f/u noted    # micro anemia  Fe studies NOT c/w STANLEY  RBC higher than expected  could have thal trait - no need to w/u  doubt lead poison or siderblastic anemia (anemia not severe)  guaiac stool x1    # T2 DM - not controlled in BMP; uses oral meds at home  higher values were likely related to steroids given for rigors  FS qac/hs and keep btw 100-180  A1c 11.4  Diet: Diabetic and DASH  cont lant 36 and lisp 12 and adjust til FS < 180    # BPH:  C/w Flomax 0.4 mg qd    # hyperkalemia - ? from rigors vs rise in CR  BMP stable - OK for OR  no tx for now (giving lantus now, pt also getting lasix 20mg), except d/c toradol    # DVT ppx: Lovenox 40 mg qd - hold for OR    # GI ppx: Protonix 40 mg PO qd    # Code status: FULL    # Pt is being seen by multidisciplinary team, will need careful coordination:  - ortho  - neuro  - rehab  - pain  - cardio  - vasc  - psych  - social serv    # Dispo: most pressing issue is lt hip pain w/ dislocation - team should focus on fixing this, followed by subacute rehab for OT and PT and pain control    there is prob limited need to intervene from vasc standpoint as pain not vasc in origin  there is prob limited options for CDIP: trying IVIG  there is limited options for b/l dropped feet - f/u w/ ortho and rehab  aside from small med adjustments, there is prob not much to add for DM, CAD, DLD and HTN  psych issues will require long, on-going f/u and cannot be simply "fixed" on this hospitalization

## 2019-09-09 NOTE — PROGRESS NOTE ADULT - SUBJECTIVE AND OBJECTIVE BOX
NAPOLEON CAST  49y  Male  ***My note supersedes ALL resident notes that I sign.  My corrections for their notes are in my note.***    I can be reached directly on [employer] [id number]. My office number is [phone]. My personal cell number is [phone].    INTERVAL EVENTS: Here for f/u of pain. Pt going for hip sx today. Hopefully will be better after that. Pt had some questions about DM control after d/c, which we will review closer to d/c (it will be insulin at NH). Pt tres IVIG #2 over 12 hrs w/out issue. Pt can very slightly move medial toes on rt, no mvmt in left leg. Pt still interested in rehab post-op. Pt asked about pain control post-op, which I will work out w/ pain mngmt.    T(F): 97.1 (09-09-19 @ 05:50), Max: 97.8 (09-08-19 @ 20:00)  HR: 75 (09-09-19 @ 05:50) (69 - 75)  BP: 111/58 (09-09-19 @ 05:50) (109/60 - 144/77)  RR: 18 (09-09-19 @ 05:50) (18 - 18)  SpO2: 96% (09-09-19 @ 08:11) (96% - 97%)    Gen: NAD; mild pain at rest; pain is worse in both legs w/ passive mvmt Lt>>Rt  lung: clr  hrt s1 s2 rrr  abd soft NT/ND  ext:  tr edema in lower legs, no c/c, no skin breakdown on feet, feet seem perfused  neuro: has decent mvmt and mildly decr str in his arms; legs are paralyzed and stiff (there is very slight twitching in rt toes); CN intact aa ox3    LABS:                        10.2    (    79.2   8.25  )-----------( ---------      173      ( 09 Sep 2019 07:26 )             31.2    (    13.0     137   (   97   (   178      09-09-19 @ 07:26  ----------------------               5.4   (   30   (   40                             -----                        1.2  Ca  8.9   Mg  --    P   --     133   (   94   (   269      09-08-19 @ 22:53  ----------------------               5.1   (   31   (   39                             -----                        1.2  Ca  8.7   Mg  --    P   --     CAPILLARY BLOOD GLUCOSE  POCT Blood Glucose.: 133 (09-09-19 @ 11:01)  POCT Blood Glucose.: 174 (09-09-19 @ 07:52)  POCT Blood Glucose.: 262 (09-08-19 @ 22:09)  POCT Blood Glucose.: 299 (09-08-19 @ 15:47)  POCT Blood Glucose.: 358 (09-08-19 @ 14:21)  POCT Blood Glucose.: 500 (09-08-19 @ 11:43)  POCT Blood Glucose.: 348 (09-08-19 @ 07:58)  POCT Blood Glucose.: 348 (09-08-19 @ 01:45)    RADIOLOGY & ADDITIONAL TESTS:      MEDICATIONS:    acetaminophen   Tablet .. 650 milliGRAM(s) Oral every 6 hours  aspirin  chewable 81 milliGRAM(s) Oral daily  atorvastatin 80 milliGRAM(s) Oral at bedtime  baclofen 10 milliGRAM(s) Oral two times a day  busPIRone 5 milliGRAM(s) Oral two times a day  chlorhexidine 4% Liquid 1 Application(s) Topical <User Schedule>  clonazePAM  Tablet 1 milliGRAM(s) Oral every 12 hours PRN  diphenhydrAMINE 25 milliGRAM(s) Oral daily  docusate sodium 100 milliGRAM(s) Oral daily  DULoxetine 60 milliGRAM(s) Oral daily  enoxaparin Injectable 40 milliGRAM(s) SubCutaneous daily  gabapentin 600 milliGRAM(s) Oral every 8 hours  hydrOXYzine  Oral Tab/Cap - Peds 50 milliGRAM(s) Oral at bedtime PRN  immune   globulin 10% (GAMMAGARD) IVPB 40 Gram(s) IV Intermittent daily  insulin glargine Injectable (LANTUS) 36 Unit(s) SubCutaneous at bedtime  insulin lispro (HumaLOG) corrective regimen sliding scale   SubCutaneous three times a day before meals  insulin lispro Injectable (HumaLOG) 12 Unit(s) SubCutaneous three times a day before meals  morphine  IR 15 milliGRAM(s) Oral three times a day PRN  morphine ER Tablet 60 milliGRAM(s) Oral every 12 hours  pantoprazole    Tablet 40 milliGRAM(s) Oral before breakfast  polyethylene glycol 3350 17 Gram(s) Oral daily PRN  QUEtiapine 150 milliGRAM(s) Oral at bedtime  senna 1 Tablet(s) Oral daily  sodium chloride 0.9%. 1000 milliLiter(s) IV Continuous <Continuous>  tamsulosin 0.4 milliGRAM(s) Oral at bedtime

## 2019-09-10 DIAGNOSIS — R52 PAIN, UNSPECIFIED: ICD-10-CM

## 2019-09-10 LAB
% ALBUMIN: 49.7 % — SIGNIFICANT CHANGE UP
% ALPHA 1: 6.2 % — SIGNIFICANT CHANGE UP
% ALPHA 2: 12.6 % — SIGNIFICANT CHANGE UP
% BETA: 10.1 % — SIGNIFICANT CHANGE UP
% GAMMA: 21.4 % — SIGNIFICANT CHANGE UP
ALBUMIN SERPL ELPH-MCNC: 3.4 G/DL — LOW (ref 3.6–5.5)
ALBUMIN/GLOB SERPL ELPH: 1 RATIO — SIGNIFICANT CHANGE UP
ALPHA1 GLOB SERPL ELPH-MCNC: 0.4 G/DL — SIGNIFICANT CHANGE UP (ref 0.1–0.4)
ALPHA2 GLOB SERPL ELPH-MCNC: 0.9 G/DL — SIGNIFICANT CHANGE UP (ref 0.5–1)
ANION GAP SERPL CALC-SCNC: 11 MMOL/L — SIGNIFICANT CHANGE UP (ref 7–14)
B-GLOBULIN SERPL ELPH-MCNC: 0.7 G/DL — SIGNIFICANT CHANGE UP (ref 0.5–1)
BASOPHILS # BLD AUTO: 0.03 K/UL — SIGNIFICANT CHANGE UP (ref 0–0.2)
BASOPHILS NFR BLD AUTO: 0.6 % — SIGNIFICANT CHANGE UP (ref 0–1)
BUN SERPL-MCNC: 32 MG/DL — HIGH (ref 10–20)
CALCIUM SERPL-MCNC: 8.4 MG/DL — LOW (ref 8.5–10.1)
CHLORIDE SERPL-SCNC: 95 MMOL/L — LOW (ref 98–110)
CO2 SERPL-SCNC: 30 MMOL/L — SIGNIFICANT CHANGE UP (ref 17–32)
CREAT SERPL-MCNC: 1 MG/DL — SIGNIFICANT CHANGE UP (ref 0.7–1.5)
EOSINOPHIL # BLD AUTO: 0.34 K/UL — SIGNIFICANT CHANGE UP (ref 0–0.7)
EOSINOPHIL NFR BLD AUTO: 6.5 % — SIGNIFICANT CHANGE UP (ref 0–8)
GAMMA GLOBULIN: 1.5 G/DL — SIGNIFICANT CHANGE UP (ref 0.6–1.6)
GLUCOSE BLDC GLUCOMTR-MCNC: 121 MG/DL — HIGH (ref 70–99)
GLUCOSE BLDC GLUCOMTR-MCNC: 125 MG/DL — HIGH (ref 70–99)
GLUCOSE BLDC GLUCOMTR-MCNC: 128 MG/DL — HIGH (ref 70–99)
GLUCOSE BLDC GLUCOMTR-MCNC: 140 MG/DL — HIGH (ref 70–99)
GLUCOSE SERPL-MCNC: 126 MG/DL — HIGH (ref 70–99)
HCT VFR BLD CALC: 33.1 % — LOW (ref 42–52)
HGB BLD-MCNC: 10.7 G/DL — LOW (ref 14–18)
IMM GRANULOCYTES NFR BLD AUTO: 0.6 % — HIGH (ref 0.1–0.3)
LYMPHOCYTES # BLD AUTO: 1.39 K/UL — SIGNIFICANT CHANGE UP (ref 1.2–3.4)
LYMPHOCYTES # BLD AUTO: 26.6 % — SIGNIFICANT CHANGE UP (ref 20.5–51.1)
MCHC RBC-ENTMCNC: 25.7 PG — LOW (ref 27–31)
MCHC RBC-ENTMCNC: 32.3 G/DL — SIGNIFICANT CHANGE UP (ref 32–37)
MCV RBC AUTO: 79.4 FL — LOW (ref 80–94)
MONOCYTES # BLD AUTO: 0.46 K/UL — SIGNIFICANT CHANGE UP (ref 0.1–0.6)
MONOCYTES NFR BLD AUTO: 8.8 % — SIGNIFICANT CHANGE UP (ref 1.7–9.3)
NEUTROPHILS # BLD AUTO: 2.97 K/UL — SIGNIFICANT CHANGE UP (ref 1.4–6.5)
NEUTROPHILS NFR BLD AUTO: 56.9 % — SIGNIFICANT CHANGE UP (ref 42.2–75.2)
NRBC # BLD: 0 /100 WBCS — SIGNIFICANT CHANGE UP (ref 0–0)
PLATELET # BLD AUTO: 186 K/UL — SIGNIFICANT CHANGE UP (ref 130–400)
POTASSIUM SERPL-MCNC: 4.8 MMOL/L — SIGNIFICANT CHANGE UP (ref 3.5–5)
POTASSIUM SERPL-SCNC: 4.8 MMOL/L — SIGNIFICANT CHANGE UP (ref 3.5–5)
PROT PATTERN SERPL ELPH-IMP: SIGNIFICANT CHANGE UP
PROT SERPL-MCNC: 6.8 G/DL — SIGNIFICANT CHANGE UP (ref 6–8.3)
PROT SERPL-MCNC: 6.8 G/DL — SIGNIFICANT CHANGE UP (ref 6–8.3)
RBC # BLD: 4.17 M/UL — LOW (ref 4.7–6.1)
RBC # FLD: 13.2 % — SIGNIFICANT CHANGE UP (ref 11.5–14.5)
SODIUM SERPL-SCNC: 136 MMOL/L — SIGNIFICANT CHANGE UP (ref 135–146)
WBC # BLD: 5.22 K/UL — SIGNIFICANT CHANGE UP (ref 4.8–10.8)
WBC # FLD AUTO: 5.22 K/UL — SIGNIFICANT CHANGE UP (ref 4.8–10.8)

## 2019-09-10 PROCEDURE — 99233 SBSQ HOSP IP/OBS HIGH 50: CPT

## 2019-09-10 RX ORDER — BENZOCAINE AND MENTHOL 5; 1 G/100ML; G/100ML
1 LIQUID ORAL THREE TIMES A DAY
Refills: 0 | Status: DISCONTINUED | OUTPATIENT
Start: 2019-09-10 | End: 2019-09-13

## 2019-09-10 RX ORDER — CLONAZEPAM 1 MG
0.5 TABLET ORAL EVERY 8 HOURS
Refills: 0 | Status: DISCONTINUED | OUTPATIENT
Start: 2019-09-10 | End: 2019-09-13

## 2019-09-10 RX ORDER — GABAPENTIN 400 MG/1
800 CAPSULE ORAL THREE TIMES A DAY
Refills: 0 | Status: DISCONTINUED | OUTPATIENT
Start: 2019-09-10 | End: 2019-09-13

## 2019-09-10 RX ORDER — MORPHINE SULFATE 50 MG/1
10 CAPSULE, EXTENDED RELEASE ORAL EVERY 6 HOURS
Refills: 0 | Status: DISCONTINUED | OUTPATIENT
Start: 2019-09-10 | End: 2019-09-13

## 2019-09-10 RX ORDER — MORPHINE SULFATE 50 MG/1
45 CAPSULE, EXTENDED RELEASE ORAL EVERY 12 HOURS
Refills: 0 | Status: DISCONTINUED | OUTPATIENT
Start: 2019-09-10 | End: 2019-09-13

## 2019-09-10 RX ORDER — MORPHINE SULFATE 50 MG/1
10 CAPSULE, EXTENDED RELEASE ORAL
Refills: 0 | Status: DISCONTINUED | OUTPATIENT
Start: 2019-09-10 | End: 2019-09-10

## 2019-09-10 RX ORDER — MORPHINE SULFATE 50 MG/1
60 CAPSULE, EXTENDED RELEASE ORAL EVERY 12 HOURS
Refills: 0 | Status: DISCONTINUED | OUTPATIENT
Start: 2019-09-10 | End: 2019-09-10

## 2019-09-10 RX ADMIN — Medication 20 MILLIGRAM(S): at 05:53

## 2019-09-10 RX ADMIN — Medication 10 MILLIGRAM(S): at 05:52

## 2019-09-10 RX ADMIN — DULOXETINE HYDROCHLORIDE 60 MILLIGRAM(S): 30 CAPSULE, DELAYED RELEASE ORAL at 11:08

## 2019-09-10 RX ADMIN — Medication 100 MILLIGRAM(S): at 11:08

## 2019-09-10 RX ADMIN — Medication 5 MILLIGRAM(S): at 05:52

## 2019-09-10 RX ADMIN — GABAPENTIN 600 MILLIGRAM(S): 400 CAPSULE ORAL at 05:53

## 2019-09-10 RX ADMIN — MORPHINE SULFATE 15 MILLIGRAM(S): 50 CAPSULE, EXTENDED RELEASE ORAL at 08:43

## 2019-09-10 RX ADMIN — MORPHINE SULFATE 45 MILLIGRAM(S): 50 CAPSULE, EXTENDED RELEASE ORAL at 17:04

## 2019-09-10 RX ADMIN — Medication 5 MILLIGRAM(S): at 17:04

## 2019-09-10 RX ADMIN — SODIUM CHLORIDE 75 MILLILITER(S): 9 INJECTION INTRAMUSCULAR; INTRAVENOUS; SUBCUTANEOUS at 06:01

## 2019-09-10 RX ADMIN — BENZOCAINE AND MENTHOL 1 LOZENGE: 5; 1 LIQUID ORAL at 21:37

## 2019-09-10 RX ADMIN — MORPHINE SULFATE 60 MILLIGRAM(S): 50 CAPSULE, EXTENDED RELEASE ORAL at 06:16

## 2019-09-10 RX ADMIN — Medication 12 UNIT(S): at 08:11

## 2019-09-10 RX ADMIN — Medication 650 MILLIGRAM(S): at 17:04

## 2019-09-10 RX ADMIN — PANTOPRAZOLE SODIUM 40 MILLIGRAM(S): 20 TABLET, DELAYED RELEASE ORAL at 05:53

## 2019-09-10 RX ADMIN — GABAPENTIN 800 MILLIGRAM(S): 400 CAPSULE ORAL at 21:37

## 2019-09-10 RX ADMIN — INSULIN GLARGINE 36 UNIT(S): 100 INJECTION, SOLUTION SUBCUTANEOUS at 21:36

## 2019-09-10 RX ADMIN — GABAPENTIN 800 MILLIGRAM(S): 400 CAPSULE ORAL at 13:57

## 2019-09-10 RX ADMIN — MORPHINE SULFATE 15 MILLIGRAM(S): 50 CAPSULE, EXTENDED RELEASE ORAL at 08:10

## 2019-09-10 RX ADMIN — MORPHINE SULFATE 10 MILLIGRAM(S): 50 CAPSULE, EXTENDED RELEASE ORAL at 17:43

## 2019-09-10 RX ADMIN — Medication 0.5 MILLIGRAM(S): at 17:43

## 2019-09-10 RX ADMIN — MORPHINE SULFATE 60 MILLIGRAM(S): 50 CAPSULE, EXTENDED RELEASE ORAL at 05:55

## 2019-09-10 RX ADMIN — Medication 12 UNIT(S): at 17:04

## 2019-09-10 RX ADMIN — Medication 650 MILLIGRAM(S): at 06:16

## 2019-09-10 RX ADMIN — SENNA PLUS 1 TABLET(S): 8.6 TABLET ORAL at 11:08

## 2019-09-10 RX ADMIN — TAMSULOSIN HYDROCHLORIDE 0.4 MILLIGRAM(S): 0.4 CAPSULE ORAL at 21:36

## 2019-09-10 RX ADMIN — IMMUNE GLOBULIN (HUMAN) 33.33 GRAM(S): 10 INJECTION INTRAVENOUS; SUBCUTANEOUS at 13:57

## 2019-09-10 RX ADMIN — Medication 81 MILLIGRAM(S): at 11:08

## 2019-09-10 RX ADMIN — ATORVASTATIN CALCIUM 80 MILLIGRAM(S): 80 TABLET, FILM COATED ORAL at 21:37

## 2019-09-10 RX ADMIN — Medication 650 MILLIGRAM(S): at 11:08

## 2019-09-10 RX ADMIN — QUETIAPINE FUMARATE 150 MILLIGRAM(S): 200 TABLET, FILM COATED ORAL at 21:36

## 2019-09-10 RX ADMIN — Medication 650 MILLIGRAM(S): at 05:52

## 2019-09-10 RX ADMIN — Medication 25 MILLIGRAM(S): at 13:56

## 2019-09-10 RX ADMIN — Medication 10 MILLIGRAM(S): at 17:04

## 2019-09-10 RX ADMIN — Medication 12 UNIT(S): at 12:04

## 2019-09-10 RX ADMIN — Medication 650 MILLIGRAM(S): at 00:33

## 2019-09-10 RX ADMIN — CHLORHEXIDINE GLUCONATE 1 APPLICATION(S): 213 SOLUTION TOPICAL at 05:56

## 2019-09-10 RX ADMIN — Medication 650 MILLIGRAM(S): at 01:00

## 2019-09-10 RX ADMIN — MORPHINE SULFATE 10 MILLIGRAM(S): 50 CAPSULE, EXTENDED RELEASE ORAL at 23:51

## 2019-09-10 NOTE — PROGRESS NOTE ADULT - SUBJECTIVE AND OBJECTIVE BOX
ORTHOPAEDIC SURGERY PROGRESS NOTE    Patient seen and examined at bedside this morning. Pain is controlled, however exacerbates intermittently around the hip and knee joints. Patient shared concerns about the surgical plan and would like to discuss it further with the operating surgeon. Patient is hoping to get better via IVIG which perhaps would alter the operative plan favorably toward preserving the joint and motion. I encouraged the patient to share his concerns preoperatively so they get addressed by the team prior to surgery.     Plan for tomorrow is Explant of left hip hemiarthroplasty as of now.       ICU Vital Signs Last 24 Hrs  T(C): 37.1 (10 Sep 2019 04:57), Max: 37.1 (10 Sep 2019 04:57)  T(F): 98.8 (10 Sep 2019 04:57), Max: 98.8 (10 Sep 2019 04:57)  HR: 83 (10 Sep 2019 04:57) (66 - 83)  BP: 151/78 (10 Sep 2019 04:57) (128/68 - 151/78)  BP(mean): --  ABP: --  ABP(mean): --  RR: 18 (10 Sep 2019 04:57) (16 - 18)  SpO2: 96% (09 Sep 2019 08:11) (96% - 96%)                          10.2   8.25  )-----------( 173      ( 09 Sep 2019 07:26 )             31.2         PE  NAD  Respirations non labored  Appropriate mood and affect    LLE  Skin intact  No motor function distally  Denies sensation distal to BL Knees   2+ DP pulse    A/P  Plan for Explant left hip hemiarthroplasty tomorrow  - NPO at midnight  - 2 units prbc on hold  - Hold anticoagulation

## 2019-09-10 NOTE — PROGRESS NOTE ADULT - ASSESSMENT
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 and extreme pain, b/l total hip replacement, anxiety and depression admitted for left hip pain     # minor fever is likely from IVIG - no infection suspected  cont IVIG   cont tylenol  premed w/ benadryl    # left hip pain and left knee pain:  xray showing superolateral dislocation of the left total hip arthroplasty  Pain management and I spoke  pain reg: (pt too drowsy)  decr morphine IR 10mg po q6 prn breakthru pain  decr MS contin 45 q12  cont neurontin 800mg po q8   cont tylenol 650mg po q6 ATC  d/c toradol IV - was getting rise in CR and K+  CT pelvis done - ortho planning on hip explant (Girdlestone procedure) nubia - pt agrees  pre-op done by cardio - high risk w/ favorable echo  NPO p MN, IVFs in am  plavix on hold for procedure    # CIDP / chr body pain and neuropathic pain;  (IV steroids and plasmapheresis of not much help in past)  C/w duloxetine 60 mg qd, baclofen 10 mg q12h and gabapentin 800 mg q8  IgA lvl is normal  per neuro, IVIG 0.4gm/kg = 40 gm over 6 hrs q24 x5 doses, pre-med w/ benadryl 25mg po x1 and tylenol already getting around the clock - dose 4 today  nubia, give IVIG AFTER sx  MRIs noted    # b/l dropped feet  prob not much more that can be done (aside from mechanical bracing)  will ask neuro/ortho opinions after IVIG and hip sx  perhaps OT (rehab) could asst w/ a brace    # chr lacunar infarct of rt caudate - see CT head and MRI-B  on antiplt   - incr atorvastatin to 80mg po qhs  carotid u/s: only mild b/l dz    # CAD s/p CABG:  hold Plavix 75 mg qd for poss ortho intervent on Mon, cont asa 81mg po q24 (after sx, d/c asa and go back to plavix)  statin  2d Echo - normal  cardio eval - noted - high risk for surg procedure (pt willing to accept risk to fix hip)    # PAD: no pulses palpable, decreased sensation:  seems chronic  duplex arterial: mod-sev occ Rt SFA; mild-mod occ Lt tib art  vasc noted: no acute intervention; angio can be done at later date (like in months, not now)  for edema in legs - gave lasix 20mg q24 x 2 days, ace wrap legs, elevated as tolerated - will reassess post-op    # Depression and anxiety: severe, not controlled; adjustment disorder  c/w Buspirone 5 mg q12h, Seroquel 150 mg qd and decr klonopin 0.5 mg q8h PO prn anxiety   d/c atarax (on benadryl w/ IVIG)  Psychiatry noted    # micro anemia  Fe studies NOT c/w STANLEY  RBC higher than expected  could have thal trait - no need to w/u  doubt lead poison or siderblastic anemia (anemia not severe)  guaiac stool x1    # T2 DM - not controlled in BMP; uses oral meds at home  higher values were likely related to steroids given for rigors  FS qac/hs and keep btw 100-180  A1c 11.4  Diet: Diabetic and DASH  cont lant 36 and lisp 12 and adjust til FS < 180    # BPH:  C/w Flomax 0.4 mg qd    # hyperkalemia - resolved  BMP stable - OK for OR    # DVT ppx: Lovenox 40 mg qd - hold for OR nubia    # GI ppx: Protonix 40 mg PO qd    # Code status: FULL    # Pt is being seen by multidisciplinary team, will need careful coordination:  - ortho  - neuro  - rehab  - pain  - cardio  - vasc  - psych  - social serv    # Dispo: most pressing issue is lt hip pain w/ dislocation - team should focus on fixing this, followed by subacute rehab for OT and PT and need pain control w/out being too drowsy    there is prob limited need to intervene from vasc standpoint as pain not vasc in origin - outpt f/u  there is prob limited options for CDIP: trying IVIG  there is limited options for b/l dropped feet - f/u w/ ortho and rehab post-op  aside from small med adjustments, there is prob not much to add for DM, CAD, DLD and HTN  psych issues will require long, on-going f/u and cannot be simply "fixed" on this hospitalization

## 2019-09-10 NOTE — DIETITIAN INITIAL EVALUATION ADULT. - FEEDING SKILL
minimal assistance/Pt reports typically adequate intake and appetite PTA. Consumes 3 meals/day with occasional snacks. Loosely follows DM diet as wife cooks for pt and pt children so "difficult to make meals that meets everyone's dietary needs". Pt bedbound x3yrs. NKFA. Denies use of nutrition supplements.

## 2019-09-10 NOTE — PROGRESS NOTE ADULT - SUBJECTIVE AND OBJECTIVE BOX
SERENE NAPOLEON  49y  Male  ***My note supersedes ALL resident notes that I sign.  My corrections for their notes are in my note.***    I can be reached directly on [employer] [id number]. My office number is [phone]. My personal cell number is [phone].    INTERVAL EVENTS: Here for f/u of pain. Pt too drowsy. Did not want to decr meds, but we have to.    T(F): 100.5 (09-10-19 @ 12:10), Max: 100.5 (09-10-19 @ 12:10)  HR: 98 (09-10-19 @ 12:10) (66 - 98)  BP: 113/58 (09-10-19 @ 12:10) (113/58 - 151/78)  RR: 18 (09-10-19 @ 12:10) (16 - 18) - no bradypnea  SpO2: 97% (09-10-19 @ 08:23) (97% - 97%)    Gen: NAD; no pain at rest while sleepy; pain is worse in both legs w/ passive mvmt Lt>>Rt  lung: clr  hrt s1 s2 rrr  abd soft NT/ND  ext:  tr edema in lower legs, no c/c, no skin breakdown on feet, feet seem perfused  neuro: has decent mvmt and mildly decr str in his arms; legs are paralyzed and stiff (there is very slight twitching in rt toes); CN intact aa ox3    LABS:                        10.7    (    79.4   5.22  )-----------( ---------      186      ( 10 Sep 2019 06:51 )             33.1    (    13.2     136   (   95   (   126      09-10-19 @ 06:51  ----------------------               4.8   (   30   (   32                             -----                        1.0  Ca  8.4   Mg  --    P   --     CAPILLARY BLOOD GLUCOSE  POCT Blood Glucose.: 125 (09-10-19 @ 11:51)  POCT Blood Glucose.: 140 (09-10-19 @ 08:02)  POCT Blood Glucose.: 183 (09-09-19 @ 20:58)  POCT Blood Glucose.: 99 (09-09-19 @ 18:00)  POCT Blood Glucose.: 133 (09-09-19 @ 11:01)  POCT Blood Glucose.: 174 (09-09-19 @ 07:52)  POCT Blood Glucose.: 262 (09-08-19 @ 22:09)  POCT Blood Glucose.: 299 (09-08-19 @ 15:47)    RADIOLOGY & ADDITIONAL TESTS:      MEDICATIONS:    acetaminophen   Tablet .. 650 milliGRAM(s) Oral every 6 hours  aspirin  chewable 81 milliGRAM(s) Oral daily  atorvastatin 80 milliGRAM(s) Oral at bedtime  baclofen 10 milliGRAM(s) Oral two times a day  busPIRone 5 milliGRAM(s) Oral two times a day  chlorhexidine 4% Liquid 1 Application(s) Topical <User Schedule>  clonazePAM  Tablet 0.5 milliGRAM(s) Oral every 8 hours PRN  diphenhydrAMINE 25 milliGRAM(s) Oral daily  docusate sodium 100 milliGRAM(s) Oral daily  DULoxetine 60 milliGRAM(s) Oral daily  enoxaparin Injectable 40 milliGRAM(s) SubCutaneous daily  gabapentin 800 milliGRAM(s) Oral three times a day  immune   globulin 10% (GAMMAGARD) IVPB 40 Gram(s) IV Intermittent daily  insulin glargine Injectable (LANTUS) 36 Unit(s) SubCutaneous at bedtime  insulin lispro (HumaLOG) corrective regimen sliding scale   SubCutaneous three times a day before meals  insulin lispro Injectable (HumaLOG) 12 Unit(s) SubCutaneous three times a day before meals  morphine   Solution 10 milliGRAM(s) Oral every 6 hours PRN  morphine ER Tablet 45 milliGRAM(s) Oral every 12 hours  pantoprazole    Tablet 40 milliGRAM(s) Oral before breakfast  polyethylene glycol 3350 17 Gram(s) Oral daily PRN  QUEtiapine 150 milliGRAM(s) Oral at bedtime  senna 1 Tablet(s) Oral daily  tamsulosin 0.4 milliGRAM(s) Oral at bedtime

## 2019-09-10 NOTE — DIETITIAN INITIAL EVALUATION ADULT. - PHYSICAL APPEARANCE
BMI 30(219#, 71in). reports wt gain likely d/t bedbound but pt unable to quantify. skin intact. no edema noted

## 2019-09-10 NOTE — DIETITIAN INITIAL EVALUATION ADULT. - PERTINENT MEDS FT
aspirin, lovenox, NS @ 75mL/hr, lantus, humalog, gabapentin, morphine, colace, miralax, senna, atorvastatin, protonix

## 2019-09-10 NOTE — DIETITIAN INITIAL EVALUATION ADULT. - ENERGY NEEDS
estimated calorie needs = 5599-6701 kcal/day (20-25 kcal/kg IBW d/t BMI 30 and quadriplegia).   estimated protein needs = 75-90 g/day (1.0-1.2 g/kg IBW, reason mentioned above).   estimated fluid needs = 1mL/kcal or per LIP

## 2019-09-10 NOTE — DIETITIAN INITIAL EVALUATION ADULT. - OTHER INFO
Pt p/w severe pain s/p recent b/l hip replacement and slurred speech. Hospital course complicated by superolateral dislocation of the left total hip arthroplasty, awaiting  Explant of left hip hemiarthroplasty with ortho tomorrow. PVD. CIDP IVIG started 9/7/2019 x5 days (day 4/5 today). s/p rapid response 9/7 d/t chills, tremors and SOB during IVIG tx, now stable and tx time adjusted. T2DM.

## 2019-09-10 NOTE — PROGRESS NOTE ADULT - SUBJECTIVE AND OBJECTIVE BOX
NAPOLEON CAST 49y Male  MRN#: [id number]   CODE STATUS: FULL      SUBJECTIVE  Patient is a 49y old Male who presents with a chief complaint of Left hip pain (10 Sep 2019 07:31)    Currently admitted to medicine with the primary diagnosis of Pain     Today is hospital day 7d, and this morning he is laying in bed and reports no overnight events. Frustrated that surgery didn't happen yesterday and that his pain is still not well controlled.    OBJECTIVE  PAST MEDICAL & SURGICAL HISTORY  CIDP (chronic inflammatory demyelinating polyneuropathy)  History of cholecystectomy  History of total left hip replacement  History of total right hip replacement: bilateral  S/P CABG x 5    ALLERGIES:  penicillins (Unknown)    MEDICATIONS:  STANDING MEDICATIONS  acetaminophen   Tablet .. 650 milliGRAM(s) Oral every 6 hours  aspirin  chewable 81 milliGRAM(s) Oral daily  atorvastatin 80 milliGRAM(s) Oral at bedtime  baclofen 10 milliGRAM(s) Oral two times a day  busPIRone 5 milliGRAM(s) Oral two times a day  chlorhexidine 4% Liquid 1 Application(s) Topical <User Schedule>  dextrose 5%. 1000 milliLiter(s) (50 mL/Hr) IV Continuous <Continuous>  dextrose 50% Injectable 12.5 Gram(s) IV Push once  dextrose 50% Injectable 25 Gram(s) IV Push once  dextrose 50% Injectable 25 Gram(s) IV Push once  diphenhydrAMINE 25 milliGRAM(s) Oral daily  docusate sodium 100 milliGRAM(s) Oral daily  DULoxetine 60 milliGRAM(s) Oral daily  enoxaparin Injectable 40 milliGRAM(s) SubCutaneous daily  gabapentin 800 milliGRAM(s) Oral three times a day  immune   globulin 10% (GAMMAGARD) IVPB 40 Gram(s) IV Intermittent daily  insulin glargine Injectable (LANTUS) 36 Unit(s) SubCutaneous at bedtime  insulin lispro (HumaLOG) corrective regimen sliding scale   SubCutaneous three times a day before meals  insulin lispro Injectable (HumaLOG) 12 Unit(s) SubCutaneous three times a day before meals  morphine ER Tablet 60 milliGRAM(s) Oral every 12 hours  pantoprazole    Tablet 40 milliGRAM(s) Oral before breakfast  QUEtiapine 150 milliGRAM(s) Oral at bedtime  senna 1 Tablet(s) Oral daily  sodium chloride 0.9%. 1000 milliLiter(s) (75 mL/Hr) IV Continuous <Continuous>  tamsulosin 0.4 milliGRAM(s) Oral at bedtime    PRN MEDICATIONS  clonazePAM  Tablet 1 milliGRAM(s) Oral every 12 hours PRN  dextrose 40% Gel 15 Gram(s) Oral once PRN  glucagon  Injectable 1 milliGRAM(s) IntraMuscular once PRN  hydrOXYzine  Oral Tab/Cap - Peds 50 milliGRAM(s) Oral at bedtime PRN  morphine Concentrate 10 milliGRAM(s) Oral every 3 hours PRN  polyethylene glycol 3350 17 Gram(s) Oral daily PRN      VITAL SIGNS: Last 24 Hours  T(C): 37.1 (10 Sep 2019 04:57), Max: 37.1 (10 Sep 2019 04:57)  T(F): 98.8 (10 Sep 2019 04:57), Max: 98.8 (10 Sep 2019 04:57)  HR: 83 (10 Sep 2019 04:57) (66 - 83)  BP: 151/78 (10 Sep 2019 04:57) (128/68 - 151/78)  BP(mean): --  RR: 18 (10 Sep 2019 04:57) (16 - 18)  SpO2: 97% (10 Sep 2019 08:23) (97% - 97%)    LABS:                        10.7   5.22  )-----------( 186      ( 10 Sep 2019 06:51 )             33.1     09-10    136  |  95<L>  |  32<H>  ----------------------------<  126<H>  4.8   |  30  |  1.0    Ca    8.4<L>      10 Sep 2019 06:51      RADIOLOGY:    EXAM:  CT HIP ONLY BI        EXAM:  CT PELVIS ONLY        PROCEDURE DATE:  09/05/2019      IMPRESSION:  1.  Superolateral dislocation of the left acetabular cup/femoral head   from the native acetabulum. Adjacent periosteal reaction indicativeof a   subacute etiology.  2.  Chronic/degenerative change as above.    PHYSICAL EXAM:    GENERAL: NAD, well-developed, AAOx3  HEENT:  Atraumatic, Normocephalic.   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     # Superolateral dislocation of the left total hip arthroplasty  - Ortho following - surgery pushed to Wednesday  - patient bedbound at home x3 years (quadraplegic)  - toradol discontinued yesterday, K and Cr improved  - continue MS contin 60 q12. Started morphine liquid 10mg po q3 prn for breakthrough pain   - increased gabapentin to 800 mg TID  - spoke with pain management regarding plan for pain control  - plavix on hold    # Depression and anxiety:  - c/w Buspirone 5 mg q12h, Seroquel 150 mg qd and Klonipin 1 mg q12 PO as per psych    # Peripheral Vascular Disease  - Cold b/l feet, no pulses palpable, decreased sensation; chronic as per pt  - VA duplex arterial : mod-severe stenosis R superficial femoral artery and mild L tibial vessel   - Vascular f/u appreciated; no acute intervention   - c/w atorvastatin 80 mg qhs    # CIDP:  - IVIG started 9/7/2019 x5 days 40 gm over 8 hrs yesterday well tolerated, will switch to 6 hours today and tomorrow to complete five doses  - day 4 today  - Premedicate with benadryl 25 mg , tylenol already around the clock  - C/w duloxetine 60 mg qd, baclofen 5 mg q12h and gabapentin 800mg TID  - Neurology consult: rec's appreciated  - MRI cervical and thoracic spine and brain noted  - IgA Levels wnl  - awaiting ganglioside abs and SPEP    # DM II   - higher values were likely related to steroids given for rigors  - FS qac/hs and keep btw 100-180  - Hb A1c 11.4  - c/w [id number] and lisp 12 and adjust til FS < 180    # CAD s/p CABG:  - hold Plavix 75 mg qd - OR Wednesday with ortho  - c/w statin     # BPH:  - C/w Flomax 0.4 mg qd    # Diet: DASH  # DVT ppx: Lovenox 40 mg qd  # GI ppx: Protonix 40 mg PO qd  # Activity: OOBTC.  Physiatry: not candidate for rehab  # Dispo: From home, was in nursing home before  # Code status: FULL    Handoff: OR on Wednesday with ORTHO; continue to hold plavix

## 2019-09-10 NOTE — PROGRESS NOTE ADULT - SUBJECTIVE AND OBJECTIVE BOX
F/U     Patient lying in bed supine eating. Noted patient with slower speech today and appearing very sleepy. Neg resp distress. Patient fell asleep while speaking to me. Patient stated his left hip pain is better today, but reports a pain level of 9/10. When reminded that his pain level yesterday was 9/10, he answered with that the pain scale can't determine pain. When I reminded him that he just stated pain is better and gave me a pain level of 9/10, he replied after much thought that the pain is worse today. No obvious non-verbal signs of pain noted. Discussed with Dr. Nunes.

## 2019-09-10 NOTE — DIETITIAN INITIAL EVALUATION ADULT. - CONTINUE CURRENT NUTRITION CARE PLAN
Pt to continue to tolerate 75% or greater of meals and  snacks throughout LOS. Reassess in 7 days./yes

## 2019-09-10 NOTE — DIETITIAN INITIAL EVALUATION ADULT. - ENERGY INTAKE
Pt reports typically adequate intake when assistance with meal setup provided. Observed breakfast tray with 100% intake cottage cheese, banana and 50% fruit plate. RD provided DM diet ed as per pt request. Requesting mechanical soft food for ease of intake and RD agrees, recs d/w LIP. Good (>75%)

## 2019-09-10 NOTE — DIETITIAN INITIAL EVALUATION ADULT. - FACTORS AFF FOOD INTAKE
c/o constipation as LBM 9/6, possibly 2/2 pain medication regimen. denies abd pain or discomfort a/w eating. c/o difficulty chewing tough foods d/t poorly fitting dentures.

## 2019-09-10 NOTE — PROGRESS NOTE ADULT - PROBLEM SELECTOR PLAN 1
Con't acetaminophen   Tablet .. 650 milliGRAM(s) Oral every 6 hours  Con't baclofen 10 milliGRAM(s) Oral two times a day  Con't clonazePAM  Tablet 1 milliGRAM(s) Oral every 12 hours PRN  Con't diphenhydrAMINE 25 milliGRAM(s) Oral daily  Con't DULoxetine 60 milliGRAM(s) Oral daily  Con't Gabapentin 800 milliGRAM(s) Oral three times a day  Change morphine Concentrate 10 milliGRAM(s) Oral every 6 hours PRN  Change morphine ER Tablet 45 milliGRAM(s) Oral every 12 hours

## 2019-09-11 LAB
ANION GAP SERPL CALC-SCNC: 15 MMOL/L — HIGH (ref 7–14)
APTT BLD: 67.3 SEC — HIGH (ref 27–39.2)
BASOPHILS # BLD AUTO: 0.04 K/UL — SIGNIFICANT CHANGE UP (ref 0–0.2)
BASOPHILS NFR BLD AUTO: 0.8 % — SIGNIFICANT CHANGE UP (ref 0–1)
BLD GP AB SCN SERPL QL: SIGNIFICANT CHANGE UP
BUN SERPL-MCNC: 21 MG/DL — HIGH (ref 10–20)
CALCIUM SERPL-MCNC: 8.8 MG/DL — SIGNIFICANT CHANGE UP (ref 8.5–10.1)
CHLORIDE SERPL-SCNC: 96 MMOL/L — LOW (ref 98–110)
CO2 SERPL-SCNC: 24 MMOL/L — SIGNIFICANT CHANGE UP (ref 17–32)
CREAT SERPL-MCNC: 1 MG/DL — SIGNIFICANT CHANGE UP (ref 0.7–1.5)
EOSINOPHIL # BLD AUTO: 0.22 K/UL — SIGNIFICANT CHANGE UP (ref 0–0.7)
EOSINOPHIL NFR BLD AUTO: 4.2 % — SIGNIFICANT CHANGE UP (ref 0–8)
GLUCOSE BLDC GLUCOMTR-MCNC: 163 MG/DL — HIGH (ref 70–99)
GLUCOSE BLDC GLUCOMTR-MCNC: 170 MG/DL — HIGH (ref 70–99)
GLUCOSE BLDC GLUCOMTR-MCNC: 235 MG/DL — HIGH (ref 70–99)
GLUCOSE BLDC GLUCOMTR-MCNC: 344 MG/DL — HIGH (ref 70–99)
GLUCOSE SERPL-MCNC: 150 MG/DL — HIGH (ref 70–99)
HCT VFR BLD CALC: 39.4 % — LOW (ref 42–52)
HGB BLD-MCNC: 12.9 G/DL — LOW (ref 14–18)
IMM GRANULOCYTES NFR BLD AUTO: 0.6 % — HIGH (ref 0.1–0.3)
INR BLD: 1.26 RATIO — SIGNIFICANT CHANGE UP (ref 0.65–1.3)
LYMPHOCYTES # BLD AUTO: 0.75 K/UL — LOW (ref 1.2–3.4)
LYMPHOCYTES # BLD AUTO: 14.3 % — LOW (ref 20.5–51.1)
MCHC RBC-ENTMCNC: 26 PG — LOW (ref 27–31)
MCHC RBC-ENTMCNC: 32.7 G/DL — SIGNIFICANT CHANGE UP (ref 32–37)
MCV RBC AUTO: 79.3 FL — LOW (ref 80–94)
MONOCYTES # BLD AUTO: 0.51 K/UL — SIGNIFICANT CHANGE UP (ref 0.1–0.6)
MONOCYTES NFR BLD AUTO: 9.7 % — HIGH (ref 1.7–9.3)
NEUTROPHILS # BLD AUTO: 3.69 K/UL — SIGNIFICANT CHANGE UP (ref 1.4–6.5)
NEUTROPHILS NFR BLD AUTO: 70.4 % — SIGNIFICANT CHANGE UP (ref 42.2–75.2)
NRBC # BLD: 0 /100 WBCS — SIGNIFICANT CHANGE UP (ref 0–0)
PLATELET # BLD AUTO: 161 K/UL — SIGNIFICANT CHANGE UP (ref 130–400)
POTASSIUM SERPL-MCNC: 4.5 MMOL/L — SIGNIFICANT CHANGE UP (ref 3.5–5)
POTASSIUM SERPL-SCNC: 4.5 MMOL/L — SIGNIFICANT CHANGE UP (ref 3.5–5)
PROTHROM AB SERPL-ACNC: 14.5 SEC — HIGH (ref 9.95–12.87)
RBC # BLD: 4.97 M/UL — SIGNIFICANT CHANGE UP (ref 4.7–6.1)
RBC # FLD: 13.4 % — SIGNIFICANT CHANGE UP (ref 11.5–14.5)
SODIUM SERPL-SCNC: 135 MMOL/L — SIGNIFICANT CHANGE UP (ref 135–146)
WBC # BLD: 5.24 K/UL — SIGNIFICANT CHANGE UP (ref 4.8–10.8)
WBC # FLD AUTO: 5.24 K/UL — SIGNIFICANT CHANGE UP (ref 4.8–10.8)

## 2019-09-11 PROCEDURE — 99233 SBSQ HOSP IP/OBS HIGH 50: CPT

## 2019-09-11 PROCEDURE — 71045 X-RAY EXAM CHEST 1 VIEW: CPT | Mod: 26

## 2019-09-11 RX ORDER — ACETAMINOPHEN 500 MG
650 TABLET ORAL ONCE
Refills: 0 | Status: DISCONTINUED | OUTPATIENT
Start: 2019-09-11 | End: 2019-09-11

## 2019-09-11 RX ORDER — MORPHINE SULFATE 50 MG/1
5 CAPSULE, EXTENDED RELEASE ORAL ONCE
Refills: 0 | Status: DISCONTINUED | OUTPATIENT
Start: 2019-09-11 | End: 2019-09-11

## 2019-09-11 RX ORDER — SODIUM CHLORIDE 9 MG/ML
500 INJECTION INTRAMUSCULAR; INTRAVENOUS; SUBCUTANEOUS ONCE
Refills: 0 | Status: COMPLETED | OUTPATIENT
Start: 2019-09-11 | End: 2019-09-11

## 2019-09-11 RX ORDER — ACETAMINOPHEN 500 MG
650 TABLET ORAL EVERY 6 HOURS
Refills: 0 | Status: DISCONTINUED | OUTPATIENT
Start: 2019-09-11 | End: 2019-09-13

## 2019-09-11 RX ADMIN — ATORVASTATIN CALCIUM 80 MILLIGRAM(S): 80 TABLET, FILM COATED ORAL at 21:47

## 2019-09-11 RX ADMIN — MORPHINE SULFATE 45 MILLIGRAM(S): 50 CAPSULE, EXTENDED RELEASE ORAL at 06:23

## 2019-09-11 RX ADMIN — MORPHINE SULFATE 45 MILLIGRAM(S): 50 CAPSULE, EXTENDED RELEASE ORAL at 17:18

## 2019-09-11 RX ADMIN — DULOXETINE HYDROCHLORIDE 60 MILLIGRAM(S): 30 CAPSULE, DELAYED RELEASE ORAL at 11:14

## 2019-09-11 RX ADMIN — Medication 12 UNIT(S): at 17:19

## 2019-09-11 RX ADMIN — MORPHINE SULFATE 10 MILLIGRAM(S): 50 CAPSULE, EXTENDED RELEASE ORAL at 09:59

## 2019-09-11 RX ADMIN — Medication 0.5 MILLIGRAM(S): at 13:21

## 2019-09-11 RX ADMIN — Medication 5 MILLIGRAM(S): at 06:20

## 2019-09-11 RX ADMIN — BENZOCAINE AND MENTHOL 1 LOZENGE: 5; 1 LIQUID ORAL at 13:19

## 2019-09-11 RX ADMIN — MORPHINE SULFATE 5 MILLIGRAM(S): 50 CAPSULE, EXTENDED RELEASE ORAL at 14:02

## 2019-09-11 RX ADMIN — BENZOCAINE AND MENTHOL 1 LOZENGE: 5; 1 LIQUID ORAL at 21:48

## 2019-09-11 RX ADMIN — Medication 10 MILLIGRAM(S): at 17:19

## 2019-09-11 RX ADMIN — MORPHINE SULFATE 10 MILLIGRAM(S): 50 CAPSULE, EXTENDED RELEASE ORAL at 18:22

## 2019-09-11 RX ADMIN — Medication 0.5 MILLIGRAM(S): at 06:19

## 2019-09-11 RX ADMIN — Medication 100 MILLIGRAM(S): at 09:23

## 2019-09-11 RX ADMIN — Medication 8: at 17:19

## 2019-09-11 RX ADMIN — GABAPENTIN 800 MILLIGRAM(S): 400 CAPSULE ORAL at 06:20

## 2019-09-11 RX ADMIN — Medication 650 MILLIGRAM(S): at 11:14

## 2019-09-11 RX ADMIN — Medication 650 MILLIGRAM(S): at 06:20

## 2019-09-11 RX ADMIN — CHLORHEXIDINE GLUCONATE 1 APPLICATION(S): 213 SOLUTION TOPICAL at 06:21

## 2019-09-11 RX ADMIN — Medication 2: at 11:50

## 2019-09-11 RX ADMIN — SODIUM CHLORIDE 1000 MILLILITER(S): 9 INJECTION INTRAMUSCULAR; INTRAVENOUS; SUBCUTANEOUS at 17:55

## 2019-09-11 RX ADMIN — MORPHINE SULFATE 10 MILLIGRAM(S): 50 CAPSULE, EXTENDED RELEASE ORAL at 00:22

## 2019-09-11 RX ADMIN — QUETIAPINE FUMARATE 150 MILLIGRAM(S): 200 TABLET, FILM COATED ORAL at 21:47

## 2019-09-11 RX ADMIN — Medication 5 MILLIGRAM(S): at 17:19

## 2019-09-11 RX ADMIN — TAMSULOSIN HYDROCHLORIDE 0.4 MILLIGRAM(S): 0.4 CAPSULE ORAL at 21:47

## 2019-09-11 RX ADMIN — Medication 2: at 08:19

## 2019-09-11 RX ADMIN — Medication 100 MILLIGRAM(S): at 13:20

## 2019-09-11 RX ADMIN — GABAPENTIN 800 MILLIGRAM(S): 400 CAPSULE ORAL at 13:20

## 2019-09-11 RX ADMIN — Medication 100 MILLIGRAM(S): at 21:47

## 2019-09-11 RX ADMIN — MORPHINE SULFATE 45 MILLIGRAM(S): 50 CAPSULE, EXTENDED RELEASE ORAL at 06:18

## 2019-09-11 RX ADMIN — Medication 81 MILLIGRAM(S): at 11:14

## 2019-09-11 RX ADMIN — SENNA PLUS 1 TABLET(S): 8.6 TABLET ORAL at 11:14

## 2019-09-11 RX ADMIN — INSULIN GLARGINE 36 UNIT(S): 100 INJECTION, SOLUTION SUBCUTANEOUS at 21:48

## 2019-09-11 RX ADMIN — GABAPENTIN 800 MILLIGRAM(S): 400 CAPSULE ORAL at 21:49

## 2019-09-11 RX ADMIN — Medication 10 MILLIGRAM(S): at 06:20

## 2019-09-11 NOTE — PROGRESS NOTE ADULT - SUBJECTIVE AND OBJECTIVE BOX
Ortho Preop Note    Patient is a 49y old  Male who presents with a chief complaint of Left hip pain (10 Sep 2019 13:29)    Diagnosis: L hip chronic prosthetic dislocation  Procedure: removal left hip replacement, possible placement of antibiotic beads  Surgeon: Gloria                          10.7   5.22  )-----------( 186      ( 10 Sep 2019 06:51 )             33.1     09-10    136  |  95<L>  |  32<H>  ----------------------------<  126<H>  4.8   |  30  |  1.0    Ca    8.4<L>      10 Sep 2019 06:51            [] PT/PTT/INR - MISSING  [] Type & Screen - MISSING  [x]pRBCs  [x] CBC  [x ] BMP  [x ] Chest X-ray  [x ] EKG  [x ] NPO/IVF  [x ] Consent  [x] Med Clearance  [x] cards clearance  [x ] Added on to OR Schedule    Assessment & Plan:  49yMale with Diagnosis: L hip chronic prosthetic dislocation  Procedure: removal left hip replacement, possible placement of antibiotic beads

## 2019-09-11 NOTE — PROGRESS NOTE ADULT - ASSESSMENT
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 and extreme pain, b/l total hip replacement, anxiety and depression admitted for left hip pain     # + fever; + tachy (doubt a w/drawal issue) = sepsis (not from admission); likely 2/2 infected and thrombosed IV site in rt forearm (IV d/c'd)  hold IVIG - spoke w/ RN  cancel surgery - spoke w/ ortho team (feed pt)  use tylenol prn T > 100.4  CXR neg  f/u UCx (though no urine complaints) and BCx  clinda 900mg iv q8  warm compress to forearm  keep sys BP > 100 at all times (currently 106)  ID eval not needed for now    # left hip pain and left knee pain:  xray showing superolateral dislocation of the left total hip arthroplasty  Pain management noted  pain reg: (pt not drowsy today)  morphine IR 10mg po q6 prn breakthru pain  MS contin 45mg q12  neurontin 800mg po q8   d/c tylenol - pt has fever, so use PRN only  d/c toradol IV - was getting rise in CR and K+  CT pelvis done - ortho planning on hip explant (Girdlestone procedure) once stable again  pre-op done by cardio - high risk w/ favorable echo  plavix on hold for procedure    # CIDP / chr body pain and neuropathic pain;  (IV steroids and plasmapheresis of not much help in past)  C/w duloxetine 60 mg qd, baclofen 10 mg q12h and gabapentin 800 mg q8  IgA lvl is normal  per neuro, IVIG 0.4gm/kg = 40 gm over 6 hrs q24 x5 doses, pre-med w/ benadryl 25mg po x1 and tylenol 650mg po x1; got 4 doses; hold dose 5 today for fever (try tomorrow)   MRIs noted  neuro f/u    # b/l dropped feet  prob not much more that can be done (aside from mechanical bracing)  will ask neuro/ortho opinions after IVIG and hip sx  perhaps OT (rehab) could asst w/ a brace    # chr lacunar infarct of rt caudate - see CT head and MRI-B  on antiplt   - increased atorvastatin to 80mg po qhs  carotid u/s: only mild b/l dz    # CAD s/p CABG:  hold Plavix 75 mg qd for ortho intervent, cont asa 81mg po q24 (after sx, d/c asa and go back to plavix)  statin  2d Echo - normal  cardio eval - noted - high risk for surg procedure (pt willing to accept risk to fix hip)    # PAD: no pulses palpable, decreased sensation:  seems chronic  duplex arterial: mod-sev occ Rt SFA; mild-mod occ Lt tib art  vasc noted: no acute intervention; angio can be done at later date (like in months, not now)  for edema in legs - gave lasix 20mg q24 x 2 days, ace wrap legs, elevated as tolerated - will reassess post-op    # Depression and anxiety: severe, not controlled; adjustment disorder  c/w Buspirone 5 mg q12h, Seroquel 150 mg qd and klonopin 0.5 mg q8h PO prn anxiety (xanax d/c'd)   d/c atarax (on benadryl w/ IVIG)  Psychiatry noted    # micro anemia  Fe studies NOT c/w STANLEY  RBC higher than expected  could have thal trait - no need to w/u  doubt lead poison or siderblastic anemia (anemia not severe)  guaiac stool x1    # T2 DM - uses oral meds at home; A1c 11.4  higher values were likely related to steroids given for rigors w/ first IVIG  FS qac/hs and keep btw 100-180 - much better  Diet: Diabetic and DASH  lant 36 and lisp 12/meal     # BPH:  C/w Flomax 0.4 mg qd    # DVT ppx: Lovenox 40 mg qd - hold when going to OR again     # GI ppx: Protonix 40 mg PO qd    # Code status: FULL    # Pt is being seen by multidisciplinary team, will need careful coordination:  - ortho  - neuro  - rehab  - pain  - cardio  - vasc  - psych  - social serv    # Dispo: eval and tx fever/sepsis; most pressing issue is lt hip pain w/ dislocation - team should focus on fixing this, followed by subacute rehab for OT and PT and need pain control w/out being too drowsy    there is prob limited need to intervene from vasc standpoint as pain not vasc in origin - outpt f/u  there is prob limited options for CDIP: trying IVIG  there is limited options for b/l dropped feet - f/u w/ ortho and rehab post-op  aside from small med adjustments, there is prob not much to add for DM, CAD, DLD and HTN  psych issues will require long, on-going f/u and cannot be simply "fixed" on this hospitalization

## 2019-09-11 NOTE — PROGRESS NOTE ADULT - SUBJECTIVE AND OBJECTIVE BOX
NAPOLEON CAST  49y  Male  ***My note supersedes ALL resident notes that I sign.  My corrections for their notes are in my note.***    I can be reached directly on [employer] [id number]. My office number is [phone]. My personal cell number is [phone].    INTERVAL EVENTS: Here for     T(F): 101.8 (09-11-19 @ 10:59), Max: 102.5 (09-11-19 @ 06:08)  HR: 143 (09-11-19 @ 10:59) (89 - 143)  BP: 123/78 (09-11-19 @ 10:59) (114/58 - 178/109)  RR: 20 (09-11-19 @ 06:08) (16 - 20)  SpO2: 97% (09-10-19 @ 21:30) (97% - 97%)    Gen: NAD; no pain at rest while sleepy; pain is worse in both legs w/ passive mvmt Lt>>Rt  lung: clr  hrt s1 s2 rrr  abd soft NT/ND  ext:  tr edema in lower legs, no c/c, no skin breakdown on feet, feet seem perfused  neuro: has decent mvmt and mildly decr str in his arms; legs are paralyzed and stiff (there is very slight twitching in rt toes); CN intact aa ox3        LABS:    RADIOLOGY & ADDITIONAL TESTS:      MEDICATIONS:  clindamycin IVPB      clindamycin IVPB 900 milliGRAM(s) IV Intermittent every 8 hours    acetaminophen   Tablet .. 650 milliGRAM(s) Oral every 6 hours  acetaminophen   Tablet .. 650 milliGRAM(s) Oral once PRN  aspirin  chewable 81 milliGRAM(s) Oral daily  atorvastatin 80 milliGRAM(s) Oral at bedtime  baclofen 10 milliGRAM(s) Oral two times a day  benzocaine 15 mG/menthol 3.6 mG Lozenge 1 Lozenge Oral three times a day  busPIRone 5 milliGRAM(s) Oral two times a day  chlorhexidine 4% Liquid 1 Application(s) Topical <User Schedule>  clonazePAM  Tablet 0.5 milliGRAM(s) Oral every 8 hours PRN  diphenhydrAMINE 25 milliGRAM(s) Oral daily  docusate sodium 100 milliGRAM(s) Oral daily  DULoxetine 60 milliGRAM(s) Oral daily  enoxaparin Injectable 40 milliGRAM(s) SubCutaneous daily  gabapentin 800 milliGRAM(s) Oral three times a day  immune   globulin 10% (GAMMAGARD) IVPB 40 Gram(s) IV Intermittent daily  insulin glargine Injectable (LANTUS) 36 Unit(s) SubCutaneous at bedtime  insulin lispro (HumaLOG) corrective regimen sliding scale   SubCutaneous three times a day before meals  insulin lispro Injectable (HumaLOG) 12 Unit(s) SubCutaneous three times a day before meals  morphine   Solution 10 milliGRAM(s) Oral every 6 hours PRN  morphine ER Tablet 45 milliGRAM(s) Oral every 12 hours  pantoprazole    Tablet 40 milliGRAM(s) Oral before breakfast  polyethylene glycol 3350 17 Gram(s) Oral daily PRN  QUEtiapine 150 milliGRAM(s) Oral at bedtime  senna 1 Tablet(s) Oral daily  tamsulosin 0.4 milliGRAM(s) Oral at bedtime NAPOLEON CAST  49y  Male  ***My note supersedes ALL resident notes that I sign.  My corrections for their notes are in my note.***    I can be reached directly on [employer] [id number]. My office number is [phone]. My personal cell number is [phone].    INTERVAL EVENTS: Here for f/u of hip pain. Pt's fever worsened last night. Only localizing finding is old IV site in rt ant forearm. He has mild h/a w/ fever, but not MS changes. He looks more awake and alert today than yesterday. Pt is c/o pain in hip/knee on left (meds were lowered). Though tachycardic, pt is not SOB and not having CP. He is a little sweaty, but not drenching. He is hungry, so no N/V.  No diarrhea.    T(F): 101.8 (09-11-19 @ 10:59), Max: 102.5 (09-11-19 @ 06:08)  HR: 143 (09-11-19 @ 10:59) (89 - 143)  BP: 123/78 (09-11-19 @ 10:59) (114/58 - 178/109)  RR: 20 (09-11-19 @ 06:08) (16 - 20)  SpO2: 97% (09-10-19 @ 21:30) (97% - 97%)    Gen: NAD; + pain at rest and worse in both legs w/ passive mvmt, luc left   Skin: pink area at old IV site in prox, ant, medial rt forearm, no pus; palp clot felt (thrombosed) and it is mod tender; no ascending lymphangitis; upper arm is fine; swelling at that site is mild; no foot breakdown  HEENT: scl white; PERRL; EOMI; nose clr; throat is clear - no thrush or exudates; tongue nl  Neck: no nodes, not tender  lung: clr  hrt s1 s2 tachy 136 reg no murmur  abd soft NT/ND  ext:  tr edema in lower legs, no c/c, no skin breakdown on feet, feet seem perfused; left hip does not look red or feel warm  neuro: has decent mvmt and mildly decr str in his arms; legs are paralyzed and stiff (there is very slight twitching in rt toes); CN intact, aa ox3    LABS:                        12.9    (    79.3   5.24  )-----------( ---------      161      ( 11 Sep 2019 07:24 )             39.4    (    13.4     135   (   96   (   150      09-11-19 @ 07:24  ----------------------               4.5   (   24   (   21                             -----                        1.0  Ca  8.8   Mg  --    P   --     PT/INR - ( 11 Sep 2019 12:40 )   PT: 14.50 sec;   INR: 1.26 ratio    PTT - ( 11 Sep 2019 12:40 )  PTT:67.3 sec    CAPILLARY BLOOD GLUCOSE  POCT Blood Glucose.: 170 (09-11-19 @ 11:31)  POCT Blood Glucose.: 163 (09-11-19 @ 08:02)  POCT Blood Glucose.: 121 (09-10-19 @ 21:04)  POCT Blood Glucose.: 128 (09-10-19 @ 16:28)  POCT Blood Glucose.: 125 (09-10-19 @ 11:51)  POCT Blood Glucose.: 140 (09-10-19 @ 08:02)  POCT Blood Glucose.: 183 (09-09-19 @ 20:58)  POCT Blood Glucose.: 99 (09-09-19 @ 18:00)    RADIOLOGY & ADDITIONAL TESTS:  < from: Xray Chest 1 View- PORTABLE-Urgent (09.11.19 @ 10:06) >  Impression:      No radiographic evidence of acute cardiopulmonary disease.    < end of copied text >      MEDICATIONS:  clindamycin IVPB      clindamycin IVPB 900 milliGRAM(s) IV Intermittent every 8 hours    acetaminophen   Tablet .. 650 milliGRAM(s) Oral every 6 hours  acetaminophen   Tablet .. 650 milliGRAM(s) Oral once PRN  aspirin  chewable 81 milliGRAM(s) Oral daily  atorvastatin 80 milliGRAM(s) Oral at bedtime  baclofen 10 milliGRAM(s) Oral two times a day  benzocaine 15 mG/menthol 3.6 mG Lozenge 1 Lozenge Oral three times a day  busPIRone 5 milliGRAM(s) Oral two times a day  chlorhexidine 4% Liquid 1 Application(s) Topical <User Schedule>  clonazePAM  Tablet 0.5 milliGRAM(s) Oral every 8 hours PRN  diphenhydrAMINE 25 milliGRAM(s) Oral daily  docusate sodium 100 milliGRAM(s) Oral daily  DULoxetine 60 milliGRAM(s) Oral daily  enoxaparin Injectable 40 milliGRAM(s) SubCutaneous daily  gabapentin 800 milliGRAM(s) Oral three times a day  immune   globulin 10% (GAMMAGARD) IVPB 40 Gram(s) IV Intermittent daily  insulin glargine Injectable (LANTUS) 36 Unit(s) SubCutaneous at bedtime  insulin lispro (HumaLOG) corrective regimen sliding scale   SubCutaneous three times a day before meals  insulin lispro Injectable (HumaLOG) 12 Unit(s) SubCutaneous three times a day before meals  morphine   Solution 10 milliGRAM(s) Oral every 6 hours PRN  morphine ER Tablet 45 milliGRAM(s) Oral every 12 hours  pantoprazole    Tablet 40 milliGRAM(s) Oral before breakfast  polyethylene glycol 3350 17 Gram(s) Oral daily PRN  QUEtiapine 150 milliGRAM(s) Oral at bedtime  senna 1 Tablet(s) Oral daily  tamsulosin 0.4 milliGRAM(s) Oral at bedtime

## 2019-09-11 NOTE — PROGRESS NOTE ADULT - SUBJECTIVE AND OBJECTIVE BOX
patient c/o pain being severe now as he does not want to the pain scale. Patient states he is not asking for pain medication to much in order to not bother the nurses. When asked about his pain being still severe despite being given the pain medication, the patient became agitated. I explained to him that we may need to change his medication and stated that "I need it". Patient exhibiting some dependency for the opioid. Will recommend a behavioral consult

## 2019-09-11 NOTE — PROGRESS NOTE ADULT - SUBJECTIVE AND OBJECTIVE BOX
NAPOLEON CAST 49y Male  MRN#: [id number]   CODE STATUS: FULL      SUBJECTIVE  Patient is a 49y old Male who presents with a chief complaint of Left hip pain (11 Sep 2019 14:33)    Currently admitted to medicine with the primary diagnosis of Pain     Today is hospital day 8d, and this morning he is laying in bed comfortably and reports no overnight events. Pt in pain, feeling weaker today, reports feeling clammy and feverish.      OBJECTIVE  PAST MEDICAL & SURGICAL HISTORY  CIDP (chronic inflammatory demyelinating polyneuropathy)  History of cholecystectomy  History of total left hip replacement  History of total right hip replacement: bilateral  S/P CABG x 5    ALLERGIES:  penicillins (Unknown)    MEDICATIONS:  STANDING MEDICATIONS  aspirin  chewable 81 milliGRAM(s) Oral daily  atorvastatin 80 milliGRAM(s) Oral at bedtime  baclofen 10 milliGRAM(s) Oral two times a day  benzocaine 15 mG/menthol 3.6 mG Lozenge 1 Lozenge Oral three times a day  busPIRone 5 milliGRAM(s) Oral two times a day  chlorhexidine 4% Liquid 1 Application(s) Topical <User Schedule>  clindamycin IVPB      clindamycin IVPB 900 milliGRAM(s) IV Intermittent every 8 hours  dextrose 5%. 1000 milliLiter(s) (50 mL/Hr) IV Continuous <Continuous>  dextrose 50% Injectable 12.5 Gram(s) IV Push once  dextrose 50% Injectable 25 Gram(s) IV Push once  dextrose 50% Injectable 25 Gram(s) IV Push once  docusate sodium 100 milliGRAM(s) Oral daily  DULoxetine 60 milliGRAM(s) Oral daily  enoxaparin Injectable 40 milliGRAM(s) SubCutaneous daily  gabapentin 800 milliGRAM(s) Oral three times a day  insulin glargine Injectable (LANTUS) 36 Unit(s) SubCutaneous at bedtime  insulin lispro (HumaLOG) corrective regimen sliding scale   SubCutaneous three times a day before meals  insulin lispro Injectable (HumaLOG) 12 Unit(s) SubCutaneous three times a day before meals  morphine ER Tablet 45 milliGRAM(s) Oral every 12 hours  pantoprazole    Tablet 40 milliGRAM(s) Oral before breakfast  QUEtiapine 150 milliGRAM(s) Oral at bedtime  senna 1 Tablet(s) Oral daily  tamsulosin 0.4 milliGRAM(s) Oral at bedtime    PRN MEDICATIONS  acetaminophen   Tablet .. 650 milliGRAM(s) Oral every 6 hours PRN  clonazePAM  Tablet 0.5 milliGRAM(s) Oral every 8 hours PRN  dextrose 40% Gel 15 Gram(s) Oral once PRN  glucagon  Injectable 1 milliGRAM(s) IntraMuscular once PRN  morphine   Solution 10 milliGRAM(s) Oral every 6 hours PRN  polyethylene glycol 3350 17 Gram(s) Oral daily PRN      VITAL SIGNS: Last 24 Hours  T(C): 36.6 (11 Sep 2019 17:54), Max: 39.2 (11 Sep 2019 06:08)  T(F): 97.8 (11 Sep 2019 17:54), Max: 102.5 (11 Sep 2019 06:08)  HR: 136 (11 Sep 2019 17:54) (89 - 143)  BP: 99/70 (11 Sep 2019 17:54) (99/70 - 178/109)  BP(mean): --  RR: 20 (11 Sep 2019 06:08) (16 - 20)  SpO2: 97% (10 Sep 2019 21:30) (97% - 97%)    LABS:                        12.9   5.24  )-----------( 161      ( 11 Sep 2019 07:24 )             39.4     09-11    135  |  96<L>  |  21<H>  ----------------------------<  150<H>  4.5   |  24  |  1.0    Ca    8.8      11 Sep 2019 07:24      PT/INR - ( 11 Sep 2019 12:40 )   PT: 14.50 sec;   INR: 1.26 ratio         PTT - ( 11 Sep 2019 12:40 )  PTT:67.3 sec      RADIOLOGY:      EXAM:  XR CHEST PORTABLE URGENT 1V        PROCEDURE DATE:  09/11/2019      Impression:      No radiographic evidence of acute cardiopulmonary disease.      EXAM:  CT HIP ONLY BI        EXAM:  CT PELVIS ONLY        PROCEDURE DATE:  09/05/2019      IMPRESSION:  1.  Superolateral dislocation of the left acetabular cup/femoral head   from the native acetabulum. Adjacent periosteal reaction indicativeof a   subacute etiology.  2.  Chronic/degenerative change as above.    PHYSICAL EXAM:    GENERAL: NAD, well-developed, AAOx3  HEENT:  Atraumatic, Normocephalic.   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     # Sepsis- possibly due to right arm cellulitis due to infected IV  - IV removed  - cxr negative  - f/u urine and blood cx   - IVIG and ortho surgery on hold, will reevaluate tomorrow  - tylenol PRN for fever  - started clinda 900mg iv q8  - warm compress to forearm  - keep sys BP > 100 at all times, 500ml bolus given at approx 6PM, will follow    # Superolateral dislocation of the left total hip arthroplasty  - Ortho following - surgery cancelled today due to fever, will reschedule  - patient bedbound at home 3 years ago  - toradol discontinued, K and Cr improved  - continue MS contin 60 q12. Started morphine liquid 10mg po q3 prn for breakthrough pain   - increased gabapentin to 800 mg TID  - spoke with pain management regarding plan for pain control  - plavix on hold    # Depression and anxiety:  - c/w Buspirone 5 mg q12h, Seroquel 150 mg qd and Klonipin 1 mg q12 PO as per psych    # Peripheral Vascular Disease  - Cold b/l feet, no pulses palpable, decreased sensation; chronic as per pt  - VA duplex arterial : mod-severe stenosis R superficial femoral artery and mild L tibial vessel   - Vascular f/u appreciated; no acute intervention   - c/w atorvastatin 80 mg qhs    # CIDP:  - IVIG started 9/7/2019 x5 days 40 gm over 6 hrs yesterday well tolerated, today was day 4  - day 5 will depend on clinical condition  - Premedicate with benadryl 25 mg , tylenol already around the clock  - C/w duloxetine 60 mg qd, baclofen 5 mg q12h and gabapentin 800mg TID  - Neurology consult: rec's appreciated  - MRI cervical and thoracic spine and brain noted  - IgA Levels wnl  - awaiting ganglioside abs and SPEP    # DM II   - higher values were likely related to steroids given for rigors  - FS qac/hs and keep btw 100-180  - Hb A1c 11.4  - c/w [id number] and lisp 12 and adjust til FS < 180    # CAD s/p CABG:  - hold Plavix 75 mg qd - OR in next few days  - c/w statin     # BPH:  - C/w Flomax 0.4 mg qd    # Diet: DASH  # DVT ppx: Lovenox 40 mg qd  # GI ppx: Protonix 40 mg PO qd  # Activity: OOBTC.  Physiatry: not candidate for rehab  # Dispo: From home, was in nursing home before  # Code status: FULL    Handoff: continue to hold plavix for OR in next few days NAPOLEON CAST 49y Male  MRN#: [id number]   CODE STATUS: FULL      SUBJECTIVE  Patient is a 49y old Male who presents with a chief complaint of Left hip pain (11 Sep 2019 14:33)    Currently admitted to medicine with the primary diagnosis of Pain     Today is hospital day 8d, and this morning he is laying in bed comfortably and reports no overnight events. Pt in pain, feeling weaker today, reports feeling clammy and feverish.      OBJECTIVE  PAST MEDICAL & SURGICAL HISTORY  CIDP (chronic inflammatory demyelinating polyneuropathy)  History of cholecystectomy  History of total left hip replacement  History of total right hip replacement: bilateral  S/P CABG x 5    ALLERGIES:  penicillins (Unknown)    MEDICATIONS:  STANDING MEDICATIONS  aspirin  chewable 81 milliGRAM(s) Oral daily  atorvastatin 80 milliGRAM(s) Oral at bedtime  baclofen 10 milliGRAM(s) Oral two times a day  benzocaine 15 mG/menthol 3.6 mG Lozenge 1 Lozenge Oral three times a day  busPIRone 5 milliGRAM(s) Oral two times a day  chlorhexidine 4% Liquid 1 Application(s) Topical <User Schedule>  clindamycin IVPB      clindamycin IVPB 900 milliGRAM(s) IV Intermittent every 8 hours  dextrose 5%. 1000 milliLiter(s) (50 mL/Hr) IV Continuous <Continuous>  dextrose 50% Injectable 12.5 Gram(s) IV Push once  dextrose 50% Injectable 25 Gram(s) IV Push once  dextrose 50% Injectable 25 Gram(s) IV Push once  docusate sodium 100 milliGRAM(s) Oral daily  DULoxetine 60 milliGRAM(s) Oral daily  enoxaparin Injectable 40 milliGRAM(s) SubCutaneous daily  gabapentin 800 milliGRAM(s) Oral three times a day  insulin glargine Injectable (LANTUS) 36 Unit(s) SubCutaneous at bedtime  insulin lispro (HumaLOG) corrective regimen sliding scale   SubCutaneous three times a day before meals  insulin lispro Injectable (HumaLOG) 12 Unit(s) SubCutaneous three times a day before meals  morphine ER Tablet 45 milliGRAM(s) Oral every 12 hours  pantoprazole    Tablet 40 milliGRAM(s) Oral before breakfast  QUEtiapine 150 milliGRAM(s) Oral at bedtime  senna 1 Tablet(s) Oral daily  tamsulosin 0.4 milliGRAM(s) Oral at bedtime    PRN MEDICATIONS  acetaminophen   Tablet .. 650 milliGRAM(s) Oral every 6 hours PRN  clonazePAM  Tablet 0.5 milliGRAM(s) Oral every 8 hours PRN  dextrose 40% Gel 15 Gram(s) Oral once PRN  glucagon  Injectable 1 milliGRAM(s) IntraMuscular once PRN  morphine   Solution 10 milliGRAM(s) Oral every 6 hours PRN  polyethylene glycol 3350 17 Gram(s) Oral daily PRN      VITAL SIGNS: Last 24 Hours  T(C): 36.6 (11 Sep 2019 17:54), Max: 39.2 (11 Sep 2019 06:08)  T(F): 97.8 (11 Sep 2019 17:54), Max: 102.5 (11 Sep 2019 06:08)  HR: 136 (11 Sep 2019 17:54) (89 - 143)  BP: 99/70 (11 Sep 2019 17:54) (99/70 - 178/109)  BP(mean): --  RR: 20 (11 Sep 2019 06:08) (16 - 20)  SpO2: 97% (10 Sep 2019 21:30) (97% - 97%)    LABS:                        12.9   5.24  )-----------( 161      ( 11 Sep 2019 07:24 )             39.4     09-11    135  |  96<L>  |  21<H>  ----------------------------<  150<H>  4.5   |  24  |  1.0    Ca    8.8      11 Sep 2019 07:24      PT/INR - ( 11 Sep 2019 12:40 )   PT: 14.50 sec;   INR: 1.26 ratio         PTT - ( 11 Sep 2019 12:40 )  PTT:67.3 sec      RADIOLOGY:      EXAM:  XR CHEST PORTABLE URGENT 1V        PROCEDURE DATE:  09/11/2019      Impression:      No radiographic evidence of acute cardiopulmonary disease.      EXAM:  CT HIP ONLY BI        EXAM:  CT PELVIS ONLY        PROCEDURE DATE:  09/05/2019      IMPRESSION:  1.  Superolateral dislocation of the left acetabular cup/femoral head   from the native acetabulum. Adjacent periosteal reaction indicativeof a   subacute etiology.  2.  Chronic/degenerative change as above.    PHYSICAL EXAM:    GENERAL: NAD, well-developed, AAOx3  HEENT:  Atraumatic, Normocephalic.   PULMONARY: Clear to auscultation bilaterally; No wheeze  CARDIOVASCULAR: Regular rate and rhythm; No murmurs, rubs, or gallops  GASTROINTESTINAL: Soft, Nontender, Nondistended; Bowel sounds present  MUSCULOSKELETAL:   No cyanosis or edema  NEUROLOGY: non-focal  SKIN: right forearm erythematous and slightly tender to palpation      ASSESSMENT & PLAN    50 y/o M with PMHx of CAD s/p CABG, BPH, CIDP with significant motor and sensory deficits and extreme pain, b/l total hip replacement, anxiety and depression presents to the ED for extreme left hip pain.     # Sepsis- possibly due to right arm cellulitis due to infected IV  - IV removed  - cxr negative  - f/u urine and blood cx   - IVIG and ortho surgery on hold, will reevaluate tomorrow  - tylenol PRN for fever  - started clinda 900mg iv q8  - warm compress to forearm  - keep sys BP > 100 at all times, 500ml bolus given at approx 6PM, will follow    # Superolateral dislocation of the left total hip arthroplasty  - Ortho following - surgery cancelled today due to fever, will reschedule  - patient bedbound at home 3 years ago  - toradol discontinued, K and Cr improved  - continue MS contin 60 q12. Started morphine liquid 10mg po q3 prn for breakthrough pain   - increased gabapentin to 800 mg TID  - spoke with pain management regarding plan for pain control  - plavix on hold    # Depression and anxiety:  - c/w Buspirone 5 mg q12h, Seroquel 150 mg qd and Klonipin 1 mg q12 PO as per psych    # Peripheral Vascular Disease  - Cold b/l feet, no pulses palpable, decreased sensation; chronic as per pt  - VA duplex arterial : mod-severe stenosis R superficial femoral artery and mild L tibial vessel   - Vascular f/u appreciated; no acute intervention   - c/w atorvastatin 80 mg qhs    # CIDP:  - IVIG started 9/7/2019 x5 days 40 gm over 6 hrs yesterday well tolerated, today was day 4  - day 5 will depend on clinical condition  - Premedicate with benadryl 25 mg , tylenol already around the clock  - C/w duloxetine 60 mg qd, baclofen 5 mg q12h and gabapentin 800mg TID  - Neurology consult: rec's appreciated  - MRI cervical and thoracic spine and brain noted  - IgA Levels wnl  - awaiting ganglioside abs and SPEP    # DM II   - higher values were likely related to steroids given for rigors  - FS qac/hs and keep btw 100-180  - Hb A1c 11.4  - c/w [id number] and lisp 12 and adjust til FS < 180    # CAD s/p CABG:  - hold Plavix 75 mg qd - OR in next few days  - c/w statin     # BPH:  - C/w Flomax 0.4 mg qd    # Diet: DASH  # DVT ppx: Lovenox 40 mg qd  # GI ppx: Protonix 40 mg PO qd  # Activity: OOBTC.  Physiatry: not candidate for rehab  # Dispo: From home, was in nursing home before  # Code status: FULL    Handoff: continue to hold plavix for OR in next few days

## 2019-09-12 LAB
-  COAGULASE NEGATIVE STAPHYLOCOCCUS: SIGNIFICANT CHANGE UP
ALBUMIN SERPL ELPH-MCNC: 3.4 G/DL — LOW (ref 3.5–5.2)
ALP SERPL-CCNC: 75 U/L — SIGNIFICANT CHANGE UP (ref 30–115)
ALT FLD-CCNC: 18 U/L — SIGNIFICANT CHANGE UP (ref 0–41)
ANION GAP SERPL CALC-SCNC: 12 MMOL/L — SIGNIFICANT CHANGE UP (ref 7–14)
AST SERPL-CCNC: 26 U/L — SIGNIFICANT CHANGE UP (ref 0–41)
BASOPHILS # BLD AUTO: 0.06 K/UL — SIGNIFICANT CHANGE UP (ref 0–0.2)
BASOPHILS NFR BLD AUTO: 1.1 % — HIGH (ref 0–1)
BILIRUB SERPL-MCNC: 0.8 MG/DL — SIGNIFICANT CHANGE UP (ref 0.2–1.2)
BUN SERPL-MCNC: 21 MG/DL — HIGH (ref 10–20)
CALCIUM SERPL-MCNC: 8.7 MG/DL — SIGNIFICANT CHANGE UP (ref 8.5–10.1)
CHLORIDE SERPL-SCNC: 97 MMOL/L — LOW (ref 98–110)
CO2 SERPL-SCNC: 26 MMOL/L — SIGNIFICANT CHANGE UP (ref 17–32)
CREAT SERPL-MCNC: 1 MG/DL — SIGNIFICANT CHANGE UP (ref 0.7–1.5)
CULTURE RESULTS: SIGNIFICANT CHANGE UP
EOSINOPHIL # BLD AUTO: 0.31 K/UL — SIGNIFICANT CHANGE UP (ref 0–0.7)
EOSINOPHIL NFR BLD AUTO: 5.8 % — SIGNIFICANT CHANGE UP (ref 0–8)
GLUCOSE BLDC GLUCOMTR-MCNC: 129 MG/DL — HIGH (ref 70–99)
GLUCOSE BLDC GLUCOMTR-MCNC: 184 MG/DL — HIGH (ref 70–99)
GLUCOSE BLDC GLUCOMTR-MCNC: 185 MG/DL — HIGH (ref 70–99)
GLUCOSE BLDC GLUCOMTR-MCNC: 207 MG/DL — HIGH (ref 70–99)
GLUCOSE SERPL-MCNC: 206 MG/DL — HIGH (ref 70–99)
GRAM STN FLD: SIGNIFICANT CHANGE UP
HCT VFR BLD CALC: 36.5 % — LOW (ref 42–52)
HGB BLD-MCNC: 12 G/DL — LOW (ref 14–18)
IMM GRANULOCYTES NFR BLD AUTO: 0.8 % — HIGH (ref 0.1–0.3)
LYMPHOCYTES # BLD AUTO: 1.47 K/UL — SIGNIFICANT CHANGE UP (ref 1.2–3.4)
LYMPHOCYTES # BLD AUTO: 27.6 % — SIGNIFICANT CHANGE UP (ref 20.5–51.1)
MAGNESIUM SERPL-MCNC: 1.6 MG/DL — LOW (ref 1.8–2.4)
MCHC RBC-ENTMCNC: 25.9 PG — LOW (ref 27–31)
MCHC RBC-ENTMCNC: 32.9 G/DL — SIGNIFICANT CHANGE UP (ref 32–37)
MCV RBC AUTO: 78.7 FL — LOW (ref 80–94)
METHOD TYPE: SIGNIFICANT CHANGE UP
MONOCYTES # BLD AUTO: 0.73 K/UL — HIGH (ref 0.1–0.6)
MONOCYTES NFR BLD AUTO: 13.7 % — HIGH (ref 1.7–9.3)
NEUTROPHILS # BLD AUTO: 2.72 K/UL — SIGNIFICANT CHANGE UP (ref 1.4–6.5)
NEUTROPHILS NFR BLD AUTO: 51 % — SIGNIFICANT CHANGE UP (ref 42.2–75.2)
NRBC # BLD: 0 /100 WBCS — SIGNIFICANT CHANGE UP (ref 0–0)
PLATELET # BLD AUTO: 173 K/UL — SIGNIFICANT CHANGE UP (ref 130–400)
POTASSIUM SERPL-MCNC: 4.3 MMOL/L — SIGNIFICANT CHANGE UP (ref 3.5–5)
POTASSIUM SERPL-SCNC: 4.3 MMOL/L — SIGNIFICANT CHANGE UP (ref 3.5–5)
PROT SERPL-MCNC: 7.9 G/DL — SIGNIFICANT CHANGE UP (ref 6–8)
RBC # BLD: 4.64 M/UL — LOW (ref 4.7–6.1)
RBC # FLD: 13.4 % — SIGNIFICANT CHANGE UP (ref 11.5–14.5)
SODIUM SERPL-SCNC: 135 MMOL/L — SIGNIFICANT CHANGE UP (ref 135–146)
SPECIMEN SOURCE: SIGNIFICANT CHANGE UP
SPECIMEN SOURCE: SIGNIFICANT CHANGE UP
WBC # BLD: 5.33 K/UL — SIGNIFICANT CHANGE UP (ref 4.8–10.8)
WBC # FLD AUTO: 5.33 K/UL — SIGNIFICANT CHANGE UP (ref 4.8–10.8)

## 2019-09-12 PROCEDURE — 99233 SBSQ HOSP IP/OBS HIGH 50: CPT

## 2019-09-12 RX ORDER — DIPHENHYDRAMINE HCL 50 MG
25 CAPSULE ORAL ONCE
Refills: 0 | Status: COMPLETED | OUTPATIENT
Start: 2019-09-12 | End: 2019-09-12

## 2019-09-12 RX ORDER — IMMUNE GLOBULIN (HUMAN) 10 G/100ML
40 INJECTION INTRAVENOUS; SUBCUTANEOUS DAILY
Refills: 0 | Status: COMPLETED | OUTPATIENT
Start: 2019-09-12 | End: 2019-09-13

## 2019-09-12 RX ADMIN — Medication 5 MILLIGRAM(S): at 06:35

## 2019-09-12 RX ADMIN — PANTOPRAZOLE SODIUM 40 MILLIGRAM(S): 20 TABLET, DELAYED RELEASE ORAL at 06:33

## 2019-09-12 RX ADMIN — Medication 2: at 17:09

## 2019-09-12 RX ADMIN — Medication 25 MILLIGRAM(S): at 18:05

## 2019-09-12 RX ADMIN — Medication 100 MILLIGRAM(S): at 06:35

## 2019-09-12 RX ADMIN — Medication 10 MILLIGRAM(S): at 06:35

## 2019-09-12 RX ADMIN — Medication 100 MILLIGRAM(S): at 11:37

## 2019-09-12 RX ADMIN — MORPHINE SULFATE 45 MILLIGRAM(S): 50 CAPSULE, EXTENDED RELEASE ORAL at 06:32

## 2019-09-12 RX ADMIN — Medication 4: at 11:59

## 2019-09-12 RX ADMIN — Medication 10 MILLIGRAM(S): at 17:10

## 2019-09-12 RX ADMIN — QUETIAPINE FUMARATE 150 MILLIGRAM(S): 200 TABLET, FILM COATED ORAL at 21:43

## 2019-09-12 RX ADMIN — Medication 100 MILLIGRAM(S): at 13:03

## 2019-09-12 RX ADMIN — MORPHINE SULFATE 45 MILLIGRAM(S): 50 CAPSULE, EXTENDED RELEASE ORAL at 17:09

## 2019-09-12 RX ADMIN — GABAPENTIN 800 MILLIGRAM(S): 400 CAPSULE ORAL at 21:43

## 2019-09-12 RX ADMIN — IMMUNE GLOBULIN (HUMAN) 66.67 GRAM(S): 10 INJECTION INTRAVENOUS; SUBCUTANEOUS at 18:03

## 2019-09-12 RX ADMIN — MORPHINE SULFATE 10 MILLIGRAM(S): 50 CAPSULE, EXTENDED RELEASE ORAL at 11:33

## 2019-09-12 RX ADMIN — Medication 0.5 MILLIGRAM(S): at 18:04

## 2019-09-12 RX ADMIN — Medication 0.5 MILLIGRAM(S): at 09:23

## 2019-09-12 RX ADMIN — Medication 81 MILLIGRAM(S): at 11:37

## 2019-09-12 RX ADMIN — DULOXETINE HYDROCHLORIDE 60 MILLIGRAM(S): 30 CAPSULE, DELAYED RELEASE ORAL at 11:37

## 2019-09-12 RX ADMIN — Medication 12 UNIT(S): at 11:58

## 2019-09-12 RX ADMIN — ATORVASTATIN CALCIUM 80 MILLIGRAM(S): 80 TABLET, FILM COATED ORAL at 21:43

## 2019-09-12 RX ADMIN — Medication 12 UNIT(S): at 17:09

## 2019-09-12 RX ADMIN — SENNA PLUS 1 TABLET(S): 8.6 TABLET ORAL at 11:37

## 2019-09-12 RX ADMIN — TAMSULOSIN HYDROCHLORIDE 0.4 MILLIGRAM(S): 0.4 CAPSULE ORAL at 21:43

## 2019-09-12 RX ADMIN — GABAPENTIN 800 MILLIGRAM(S): 400 CAPSULE ORAL at 06:34

## 2019-09-12 RX ADMIN — MORPHINE SULFATE 10 MILLIGRAM(S): 50 CAPSULE, EXTENDED RELEASE ORAL at 09:23

## 2019-09-12 RX ADMIN — GABAPENTIN 800 MILLIGRAM(S): 400 CAPSULE ORAL at 13:02

## 2019-09-12 RX ADMIN — INSULIN GLARGINE 36 UNIT(S): 100 INJECTION, SOLUTION SUBCUTANEOUS at 21:47

## 2019-09-12 RX ADMIN — Medication 5 MILLIGRAM(S): at 17:10

## 2019-09-12 RX ADMIN — MORPHINE SULFATE 10 MILLIGRAM(S): 50 CAPSULE, EXTENDED RELEASE ORAL at 17:09

## 2019-09-12 RX ADMIN — Medication 100 MILLIGRAM(S): at 22:01

## 2019-09-12 NOTE — CHART NOTE - NSCHARTNOTEFT_GEN_A_CORE
Registered Dietitian Diet Education Note  -Diet ed provided at initial RD assessment, however, pt very tired and forgetful at times so pt stopped RD to discuss information again. CHO consistent diet ed provided at bedside. Pt with difficulty understanding CHO counting so RD provided detailed education with skill demonstrating using snacks at pts bedside. Addressed all of pts questions at this time. Provided written packet again as initial information that was provided had been misplaced. No further diet ed at this time. will reassess at f/u.

## 2019-09-12 NOTE — PROGRESS NOTE ADULT - SUBJECTIVE AND OBJECTIVE BOX
Patient with pain to the left leg as severe. Patient is still not requesting pain meds as often as he can.

## 2019-09-12 NOTE — PROGRESS NOTE ADULT - PROBLEM SELECTOR PLAN 1
Change Acetaminophen   Tablet .. 650 milliGRAM(s) Oral every 6 hours Jyoti  Con't Baclofen 10 milliGRAM(s) Oral two times a day  Con't ClonazePAM  Tablet 0.5 milliGRAM(s) Oral every 8 hours PRN  Con't DULoxetine 60 milliGRAM(s) Oral daily  Con't Gabapentin 800 milliGRAM(s) Oral three times a day  Change morphine   Solution 5 milliGRAM(s) Oral every 6 hours PRN  Con't morphine ER Tablet 45 milliGRAM(s) Oral every 12 hours

## 2019-09-12 NOTE — PROGRESS NOTE ADULT - SUBJECTIVE AND OBJECTIVE BOX
NAPOLEON CAST 49y Male  MRN#: [id number]   CODE STATUS: FULL      SUBJECTIVE  Patient is a 49y old Male who presents with a chief complaint of Left hip pain (12 Sep 2019 09:00)    Currently admitted to medicine with the primary diagnosis of Pain     Today is hospital day 9d, and this morning he is laying in bed comfortably and reports no overnight events. States that he is feeling a bit better than yesterday but very frustrated that surgery was, once again, cancelled.       OBJECTIVE  PAST MEDICAL & SURGICAL HISTORY  CIDP (chronic inflammatory demyelinating polyneuropathy)  History of cholecystectomy  History of total left hip replacement  History of total right hip replacement: bilateral  S/P CABG x 5    ALLERGIES:  penicillins (Unknown)    MEDICATIONS:  STANDING MEDICATIONS  aspirin  chewable 81 milliGRAM(s) Oral daily  atorvastatin 80 milliGRAM(s) Oral at bedtime  baclofen 10 milliGRAM(s) Oral two times a day  benzocaine 15 mG/menthol 3.6 mG Lozenge 1 Lozenge Oral three times a day  busPIRone 5 milliGRAM(s) Oral two times a day  chlorhexidine 4% Liquid 1 Application(s) Topical <User Schedule>  clindamycin IVPB      clindamycin IVPB 900 milliGRAM(s) IV Intermittent every 8 hours  dextrose 5%. 1000 milliLiter(s) (50 mL/Hr) IV Continuous <Continuous>  dextrose 50% Injectable 12.5 Gram(s) IV Push once  dextrose 50% Injectable 25 Gram(s) IV Push once  dextrose 50% Injectable 25 Gram(s) IV Push once  docusate sodium 100 milliGRAM(s) Oral daily  DULoxetine 60 milliGRAM(s) Oral daily  enoxaparin Injectable 40 milliGRAM(s) SubCutaneous daily  gabapentin 800 milliGRAM(s) Oral three times a day  insulin glargine Injectable (LANTUS) 36 Unit(s) SubCutaneous at bedtime  insulin lispro (HumaLOG) corrective regimen sliding scale   SubCutaneous three times a day before meals  insulin lispro Injectable (HumaLOG) 12 Unit(s) SubCutaneous three times a day before meals  morphine ER Tablet 45 milliGRAM(s) Oral every 12 hours  pantoprazole    Tablet 40 milliGRAM(s) Oral before breakfast  QUEtiapine 150 milliGRAM(s) Oral at bedtime  senna 1 Tablet(s) Oral daily  tamsulosin 0.4 milliGRAM(s) Oral at bedtime    PRN MEDICATIONS  acetaminophen   Tablet .. 650 milliGRAM(s) Oral every 6 hours PRN  clonazePAM  Tablet 0.5 milliGRAM(s) Oral every 8 hours PRN  dextrose 40% Gel 15 Gram(s) Oral once PRN  glucagon  Injectable 1 milliGRAM(s) IntraMuscular once PRN  morphine   Solution 10 milliGRAM(s) Oral every 6 hours PRN  polyethylene glycol 3350 17 Gram(s) Oral daily PRN      VITAL SIGNS: Last 24 Hours  T(C): 36.8 (12 Sep 2019 04:57), Max: 38.8 (11 Sep 2019 10:59)  T(F): 98.2 (12 Sep 2019 04:57), Max: 101.8 (11 Sep 2019 10:59)  HR: 135 (12 Sep 2019 04:57) (106 - 143)  BP: 104/70 (12 Sep 2019 04:57) (99/70 - 123/78)  BP(mean): --  RR: 18 (12 Sep 2019 04:57) (16 - 18)  SpO2: --    LABS:                        12.0   5.33  )-----------( 173      ( 12 Sep 2019 07:28 )             36.5     09-12    135  |  97<L>  |  21<H>  ----------------------------<  206<H>  4.3   |  26  |  1.0    Ca    8.7      12 Sep 2019 07:28  Mg     1.6     09-12    TPro  7.9  /  Alb  3.4<L>  /  TBili  0.8  /  DBili  x   /  AST  26  /  ALT  18  /  AlkPhos  75  09-12    PT/INR - ( 11 Sep 2019 12:40 )   PT: 14.50 sec;   INR: 1.26 ratio         PTT - ( 11 Sep 2019 12:40 )  PTT:67.3 sec      RADIOLOGY:      EXAM:  XR CHEST PORTABLE URGENT 1V        PROCEDURE DATE:  09/11/2019      Impression:      No radiographic evidence of acute cardiopulmonary disease.      EXAM:  CT HIP ONLY BI        EXAM:  CT PELVIS ONLY        PROCEDURE DATE:  09/05/2019      IMPRESSION:  1.  Superolateral dislocation of the left acetabular cup/femoral head   from the native acetabulum. Adjacent periosteal reaction indicativeof a   subacute etiology.  2.  Chronic/degenerative change as above.      PHYSICAL EXAM:    GENERAL: NAD, well-developed, AAOx3  HEENT:  Atraumatic, Normocephalic.   PULMONARY: Clear to auscultation bilaterally; No wheeze  CARDIOVASCULAR: Regular rate and rhythm; No murmurs, rubs, or gallops  GASTROINTESTINAL: Soft, Nontender, Nondistended; Bowel sounds present  MUSCULOSKELETAL:   No cyanosis or edema  NEUROLOGY: non-focal  SKIN: right forearm improved erythema, not tender to palpation anymore      ASSESSMENT & PLAN    50 y/o M with PMHx of CAD s/p CABG, BPH, CIDP with significant motor and sensory deficits and extreme pain, b/l total hip replacement, anxiety and depression presents to the ED for extreme left hip pain.     # Sepsis- possibly due to right arm cellulitis due to infected IV; resolving, afebrile almost 24 hours, WBC stable ~5  - IV removed  - cxr negative  - urine and blood cx pending   - final IVIG today  - tylenol PRN for fever  - on clinda 900mg iv q8  - keep sys BP > 100 at all times, BP improved from yesterday PM when 500ml bolus was given    # Superolateral dislocation of the left total hip arthroplasty  - Ortho following - surgery cancelled today again due to surgeon scheduling- rescheduled for tomorrow  - patient bedbound at home 3 years ago  - pain control as per pain management  - on gabapentin to 800 mg TID  - plavix on hold    # Depression and anxiety:  - c/w Buspirone 5 mg q12h, Seroquel 150 mg qd and Klonipin 0.5 mg q8 PO    # Peripheral Vascular Disease  - Cold b/l feet, no pulses palpable, decreased sensation; chronic as per pt  - VA duplex arterial : mod-severe stenosis R superficial femoral artery and mild L tibial vessel   - Vascular f/u appreciated; no acute intervention   - c/w atorvastatin 80 mg qhs    # CIDP:  - IVIG started 9/7/2019 x5 days, today is last dose  - Premedicate with benadryl 25 mg , tylenol already around the clock  - C/w duloxetine 60 mg qd, baclofen 5 mg q12h and gabapentin 800mg TID  - Neurology consult: rec's appreciated, will recall when IVIG is complete  - MRI cervical and thoracic spine and brain noted  - IgA Levels wnl  - SPEP wnl  - awaiting ganglioside abs     # DM II   - higher values were likely related to steroids given for rigors  - FS qac/hs and keep btw 100-180  - Hb A1c 11.4  - c/w [id number] and lisp 12 and adjust til FS < 180    # CAD s/p CABG:  - hold Plavix 75 mg qd - OR in next few days  - c/w statin     # BPH:  - C/w Flomax 0.4 mg qd    # Diet: DASH  # DVT ppx: Lovenox 40 mg qd  # GI ppx: Protonix 40 mg PO qd  # Activity: OOBTC.  Physiatry: not candidate for rehab  # Dispo: From home, was in nursing home before  # Code status: FULL    Handoff: continue to hold plavix for OR in next few days

## 2019-09-12 NOTE — PROGRESS NOTE ADULT - SUBJECTIVE AND OBJECTIVE BOX
Review of MAR shows patient is not requesting Morphine 10mg as often as he can. Will adjust morphine IR accordingly

## 2019-09-12 NOTE — PROGRESS NOTE ADULT - SUBJECTIVE AND OBJECTIVE BOX
Ortho Preop Note    Patient is a 49y old  Male who presents with a chief complaint of Left hip pain (10 Sep 2019 13:29)    Diagnosis: L hip chronic prosthetic dislocation  Procedure: removal left hip replacement, possible placement of antibiotic beads  Surgeon: Jono Tellez                                     12.0   5.33  )-----------( 173      ( 12 Sep 2019 07:28 )             36.5     09-12    135  |  97<L>  |  21<H>  ----------------------------<  206<H>  4.3   |  26  |  1.0    Ca    8.7      12 Sep 2019 07:28  Mg     1.6     09-12    TPro  7.9  /  Alb  3.4<L>  /  TBili  0.8  /  DBili  x   /  AST  26  /  ALT  18  /  AlkPhos  75  09-12              [x] PT/PTT/INR   [x] Type & Screen   [x]pRBCs  [x] CBC  [x ] BMP  [x ] Chest X-ray  [x ] EKG  [x ] NPO/IVF  [x ] Consent  [x] Med Clearance  [x] cards clearance  [x ] Added on to OR Schedule    Assessment & Plan:  49yMale with Diagnosis: L hip chronic prosthetic dislocation  Procedure: removal left hip replacement, possible placement of antibiotic beads

## 2019-09-13 LAB
ANION GAP SERPL CALC-SCNC: 17 MMOL/L — HIGH (ref 7–14)
APTT BLD: 55.5 SEC — HIGH (ref 27–39.2)
BLD GP AB SCN SERPL QL: SIGNIFICANT CHANGE UP
BUN SERPL-MCNC: 22 MG/DL — HIGH (ref 10–20)
CALCIUM SERPL-MCNC: 7.5 MG/DL — LOW (ref 8.5–10.1)
CHLORIDE SERPL-SCNC: 99 MMOL/L — SIGNIFICANT CHANGE UP (ref 98–110)
CK MB CFR SERPL CALC: 3.5 NG/ML — SIGNIFICANT CHANGE UP (ref 0.6–6.3)
CK SERPL-CCNC: 189 U/L — SIGNIFICANT CHANGE UP (ref 0–225)
CO2 SERPL-SCNC: 17 MMOL/L — SIGNIFICANT CHANGE UP (ref 17–32)
CREAT SERPL-MCNC: 0.9 MG/DL — SIGNIFICANT CHANGE UP (ref 0.7–1.5)
CULTURE RESULTS: SIGNIFICANT CHANGE UP
GLUCOSE BLDC GLUCOMTR-MCNC: 114 MG/DL — HIGH (ref 70–99)
GLUCOSE BLDC GLUCOMTR-MCNC: 129 MG/DL — HIGH (ref 70–99)
GLUCOSE BLDC GLUCOMTR-MCNC: 284 MG/DL — HIGH (ref 70–99)
GLUCOSE SERPL-MCNC: 236 MG/DL — HIGH (ref 70–99)
HCT VFR BLD CALC: 29.6 % — LOW (ref 42–52)
HCT VFR BLD CALC: 30.1 % — LOW (ref 42–52)
HGB BLD-MCNC: 10.1 G/DL — LOW (ref 14–18)
HGB BLD-MCNC: 9.7 G/DL — LOW (ref 14–18)
INR BLD: 1.31 RATIO — HIGH (ref 0.65–1.3)
INR BLD: 1.35 RATIO — HIGH (ref 0.65–1.3)
MAGNESIUM SERPL-MCNC: 1.5 MG/DL — LOW (ref 1.8–2.4)
MCHC RBC-ENTMCNC: 26.1 PG — LOW (ref 27–31)
MCHC RBC-ENTMCNC: 26.5 PG — LOW (ref 27–31)
MCHC RBC-ENTMCNC: 32.8 G/DL — SIGNIFICANT CHANGE UP (ref 32–37)
MCHC RBC-ENTMCNC: 33.6 G/DL — SIGNIFICANT CHANGE UP (ref 32–37)
MCV RBC AUTO: 79 FL — LOW (ref 80–94)
MCV RBC AUTO: 79.8 FL — LOW (ref 80–94)
NRBC # BLD: 0 /100 WBCS — SIGNIFICANT CHANGE UP (ref 0–0)
NRBC # BLD: 0 /100 WBCS — SIGNIFICANT CHANGE UP (ref 0–0)
ORGANISM # SPEC MICROSCOPIC CNT: SIGNIFICANT CHANGE UP
ORGANISM # SPEC MICROSCOPIC CNT: SIGNIFICANT CHANGE UP
PHOSPHATE SERPL-MCNC: 5.1 MG/DL — HIGH (ref 2.1–4.9)
PLATELET # BLD AUTO: 275 K/UL — SIGNIFICANT CHANGE UP (ref 130–400)
PLATELET # BLD AUTO: 304 K/UL — SIGNIFICANT CHANGE UP (ref 130–400)
POTASSIUM SERPL-MCNC: 5 MMOL/L — SIGNIFICANT CHANGE UP (ref 3.5–5)
POTASSIUM SERPL-SCNC: 5 MMOL/L — SIGNIFICANT CHANGE UP (ref 3.5–5)
PROTHROM AB SERPL-ACNC: 15 SEC — HIGH (ref 9.95–12.87)
PROTHROM AB SERPL-ACNC: 15.5 SEC — HIGH (ref 9.95–12.87)
RBC # BLD: 3.71 M/UL — LOW (ref 4.7–6.1)
RBC # BLD: 3.81 M/UL — LOW (ref 4.7–6.1)
RBC # FLD: 13.4 % — SIGNIFICANT CHANGE UP (ref 11.5–14.5)
RBC # FLD: 13.5 % — SIGNIFICANT CHANGE UP (ref 11.5–14.5)
SODIUM SERPL-SCNC: 133 MMOL/L — LOW (ref 135–146)
SPECIMEN SOURCE: SIGNIFICANT CHANGE UP
TROPONIN T SERPL-MCNC: 0.04 NG/ML — CRITICAL HIGH
WBC # BLD: 20.03 K/UL — HIGH (ref 4.8–10.8)
WBC # BLD: 24.47 K/UL — HIGH (ref 4.8–10.8)
WBC # FLD AUTO: 20.03 K/UL — HIGH (ref 4.8–10.8)
WBC # FLD AUTO: 24.47 K/UL — HIGH (ref 4.8–10.8)

## 2019-09-13 PROCEDURE — 93010 ELECTROCARDIOGRAM REPORT: CPT

## 2019-09-13 PROCEDURE — 99291 CRITICAL CARE FIRST HOUR: CPT

## 2019-09-13 PROCEDURE — 71045 X-RAY EXAM CHEST 1 VIEW: CPT | Mod: 26

## 2019-09-13 PROCEDURE — 73501 X-RAY EXAM HIP UNI 1 VIEW: CPT | Mod: 26,LT

## 2019-09-13 PROCEDURE — 99233 SBSQ HOSP IP/OBS HIGH 50: CPT

## 2019-09-13 RX ORDER — SENNA PLUS 8.6 MG/1
1 TABLET ORAL DAILY
Refills: 0 | Status: DISCONTINUED | OUTPATIENT
Start: 2019-09-13 | End: 2019-01-01

## 2019-09-13 RX ORDER — HYDROMORPHONE HYDROCHLORIDE 2 MG/ML
1 INJECTION INTRAMUSCULAR; INTRAVENOUS; SUBCUTANEOUS
Refills: 0 | Status: DISCONTINUED | OUTPATIENT
Start: 2019-09-13 | End: 2019-09-13

## 2019-09-13 RX ORDER — HYDROMORPHONE HYDROCHLORIDE 2 MG/ML
0.5 INJECTION INTRAMUSCULAR; INTRAVENOUS; SUBCUTANEOUS
Refills: 0 | Status: DISCONTINUED | OUTPATIENT
Start: 2019-09-13 | End: 2019-09-13

## 2019-09-13 RX ORDER — SODIUM CHLORIDE 9 MG/ML
1000 INJECTION, SOLUTION INTRAVENOUS
Refills: 0 | Status: DISCONTINUED | OUTPATIENT
Start: 2019-09-13 | End: 2019-01-01

## 2019-09-13 RX ORDER — LIDOCAINE 4 G/100G
1 CREAM TOPICAL ONCE
Refills: 0 | Status: COMPLETED | OUTPATIENT
Start: 2019-09-13 | End: 2019-09-13

## 2019-09-13 RX ORDER — METRONIDAZOLE 500 MG
500 TABLET ORAL ONCE
Refills: 0 | Status: COMPLETED | OUTPATIENT
Start: 2019-09-13 | End: 2019-09-13

## 2019-09-13 RX ORDER — DOCUSATE SODIUM 100 MG
100 CAPSULE ORAL DAILY
Refills: 0 | Status: DISCONTINUED | OUTPATIENT
Start: 2019-09-13 | End: 2019-01-01

## 2019-09-13 RX ORDER — METRONIDAZOLE 500 MG
TABLET ORAL
Refills: 0 | Status: DISCONTINUED | OUTPATIENT
Start: 2019-09-13 | End: 2019-09-14

## 2019-09-13 RX ORDER — METOPROLOL TARTRATE 50 MG
5 TABLET ORAL ONCE
Refills: 0 | Status: COMPLETED | OUTPATIENT
Start: 2019-09-13 | End: 2019-09-13

## 2019-09-13 RX ORDER — ONDANSETRON 8 MG/1
4 TABLET, FILM COATED ORAL ONCE
Refills: 0 | Status: DISCONTINUED | OUTPATIENT
Start: 2019-09-13 | End: 2019-09-14

## 2019-09-13 RX ORDER — DEXTROSE 50 % IN WATER 50 %
15 SYRINGE (ML) INTRAVENOUS ONCE
Refills: 0 | Status: DISCONTINUED | OUTPATIENT
Start: 2019-09-13 | End: 2019-09-14

## 2019-09-13 RX ORDER — DEXTROSE 50 % IN WATER 50 %
25 SYRINGE (ML) INTRAVENOUS ONCE
Refills: 0 | Status: DISCONTINUED | OUTPATIENT
Start: 2019-09-13 | End: 2019-01-01

## 2019-09-13 RX ORDER — QUETIAPINE FUMARATE 200 MG/1
150 TABLET, FILM COATED ORAL AT BEDTIME
Refills: 0 | Status: DISCONTINUED | OUTPATIENT
Start: 2019-09-13 | End: 2019-01-01

## 2019-09-13 RX ORDER — DICLOFENAC SODIUM 75 MG/1
75 TABLET, DELAYED RELEASE ORAL
Refills: 0 | Status: DISCONTINUED | OUTPATIENT
Start: 2019-09-13 | End: 2019-09-13

## 2019-09-13 RX ORDER — METRONIDAZOLE 500 MG
500 TABLET ORAL EVERY 8 HOURS
Refills: 0 | Status: DISCONTINUED | OUTPATIENT
Start: 2019-09-14 | End: 2019-09-14

## 2019-09-13 RX ORDER — GLUCAGON INJECTION, SOLUTION 0.5 MG/.1ML
1 INJECTION, SOLUTION SUBCUTANEOUS ONCE
Refills: 0 | Status: DISCONTINUED | OUTPATIENT
Start: 2019-09-13 | End: 2019-09-14

## 2019-09-13 RX ORDER — DEXTROSE 50 % IN WATER 50 %
12.5 SYRINGE (ML) INTRAVENOUS ONCE
Refills: 0 | Status: DISCONTINUED | OUTPATIENT
Start: 2019-09-13 | End: 2019-09-14

## 2019-09-13 RX ORDER — NOREPINEPHRINE BITARTRATE/D5W 8 MG/250ML
0.05 PLASTIC BAG, INJECTION (ML) INTRAVENOUS
Qty: 16 | Refills: 0 | Status: DISCONTINUED | OUTPATIENT
Start: 2019-09-13 | End: 2019-09-13

## 2019-09-13 RX ORDER — ATORVASTATIN CALCIUM 80 MG/1
80 TABLET, FILM COATED ORAL AT BEDTIME
Refills: 0 | Status: DISCONTINUED | OUTPATIENT
Start: 2019-09-13 | End: 2019-01-01

## 2019-09-13 RX ORDER — BENZOCAINE AND MENTHOL 5; 1 G/100ML; G/100ML
1 LIQUID ORAL THREE TIMES A DAY
Refills: 0 | Status: DISCONTINUED | OUTPATIENT
Start: 2019-09-13 | End: 2019-01-01

## 2019-09-13 RX ORDER — SODIUM CHLORIDE 9 MG/ML
1000 INJECTION INTRAMUSCULAR; INTRAVENOUS; SUBCUTANEOUS ONCE
Refills: 0 | Status: COMPLETED | OUTPATIENT
Start: 2019-09-13 | End: 2019-09-13

## 2019-09-13 RX ORDER — DULOXETINE HYDROCHLORIDE 30 MG/1
60 CAPSULE, DELAYED RELEASE ORAL DAILY
Refills: 0 | Status: DISCONTINUED | OUTPATIENT
Start: 2019-09-13 | End: 2019-09-14

## 2019-09-13 RX ORDER — HYDROMORPHONE HYDROCHLORIDE 2 MG/ML
0.5 INJECTION INTRAMUSCULAR; INTRAVENOUS; SUBCUTANEOUS EVERY 4 HOURS
Refills: 0 | Status: DISCONTINUED | OUTPATIENT
Start: 2019-09-13 | End: 2019-01-01

## 2019-09-13 RX ORDER — MAGNESIUM SULFATE 500 MG/ML
2 VIAL (ML) INJECTION ONCE
Refills: 0 | Status: COMPLETED | OUTPATIENT
Start: 2019-09-13 | End: 2019-09-13

## 2019-09-13 RX ORDER — NOREPINEPHRINE BITARTRATE/D5W 8 MG/250ML
0.05 PLASTIC BAG, INJECTION (ML) INTRAVENOUS
Qty: 8 | Refills: 0 | Status: DISCONTINUED | OUTPATIENT
Start: 2019-09-13 | End: 2019-09-14

## 2019-09-13 RX ORDER — HYDROMORPHONE HYDROCHLORIDE 2 MG/ML
1 INJECTION INTRAMUSCULAR; INTRAVENOUS; SUBCUTANEOUS EVERY 4 HOURS
Refills: 0 | Status: DISCONTINUED | OUTPATIENT
Start: 2019-09-13 | End: 2019-01-01

## 2019-09-13 RX ORDER — ACETAMINOPHEN 500 MG
650 TABLET ORAL EVERY 6 HOURS
Refills: 0 | Status: DISCONTINUED | OUTPATIENT
Start: 2019-09-13 | End: 2019-01-01

## 2019-09-13 RX ORDER — HYDROMORPHONE HYDROCHLORIDE 2 MG/ML
0.5 INJECTION INTRAMUSCULAR; INTRAVENOUS; SUBCUTANEOUS ONCE
Refills: 0 | Status: DISCONTINUED | OUTPATIENT
Start: 2019-09-13 | End: 2019-09-13

## 2019-09-13 RX ORDER — GABAPENTIN 400 MG/1
800 CAPSULE ORAL THREE TIMES A DAY
Refills: 0 | Status: DISCONTINUED | OUTPATIENT
Start: 2019-09-13 | End: 2019-01-01

## 2019-09-13 RX ORDER — VANCOMYCIN HCL 1 G
1000 VIAL (EA) INTRAVENOUS EVERY 12 HOURS
Refills: 0 | Status: DISCONTINUED | OUTPATIENT
Start: 2019-09-13 | End: 2019-09-14

## 2019-09-13 RX ORDER — ASPIRIN/CALCIUM CARB/MAGNESIUM 324 MG
81 TABLET ORAL DAILY
Refills: 0 | Status: DISCONTINUED | OUTPATIENT
Start: 2019-09-13 | End: 2019-01-01

## 2019-09-13 RX ORDER — SODIUM CHLORIDE 9 MG/ML
500 INJECTION INTRAMUSCULAR; INTRAVENOUS; SUBCUTANEOUS ONCE
Refills: 0 | Status: COMPLETED | OUTPATIENT
Start: 2019-09-13 | End: 2019-09-13

## 2019-09-13 RX ORDER — TAMSULOSIN HYDROCHLORIDE 0.4 MG/1
0.4 CAPSULE ORAL AT BEDTIME
Refills: 0 | Status: DISCONTINUED | OUTPATIENT
Start: 2019-09-13 | End: 2019-01-01

## 2019-09-13 RX ORDER — PHENYLEPHRINE HYDROCHLORIDE 10 MG/ML
0.83 INJECTION INTRAVENOUS
Qty: 160 | Refills: 0 | Status: DISCONTINUED | OUTPATIENT
Start: 2019-09-13 | End: 2019-09-13

## 2019-09-13 RX ORDER — CEFAZOLIN SODIUM 1 G
1000 VIAL (EA) INJECTION EVERY 8 HOURS
Refills: 0 | Status: DISCONTINUED | OUTPATIENT
Start: 2019-09-13 | End: 2019-09-13

## 2019-09-13 RX ORDER — ENOXAPARIN SODIUM 100 MG/ML
40 INJECTION SUBCUTANEOUS DAILY
Refills: 0 | Status: DISCONTINUED | OUTPATIENT
Start: 2019-09-13 | End: 2019-01-01

## 2019-09-13 RX ORDER — SODIUM CHLORIDE 9 MG/ML
1000 INJECTION, SOLUTION INTRAVENOUS
Refills: 0 | Status: DISCONTINUED | OUTPATIENT
Start: 2019-09-13 | End: 2019-09-14

## 2019-09-13 RX ORDER — INSULIN LISPRO 100/ML
VIAL (ML) SUBCUTANEOUS
Refills: 0 | Status: DISCONTINUED | OUTPATIENT
Start: 2019-09-13 | End: 2019-09-14

## 2019-09-13 RX ORDER — BACLOFEN 100 %
10 POWDER (GRAM) MISCELLANEOUS
Refills: 0 | Status: DISCONTINUED | OUTPATIENT
Start: 2019-09-13 | End: 2019-01-01

## 2019-09-13 RX ORDER — DICLOFENAC SODIUM 75 MG/1
75 TABLET, DELAYED RELEASE ORAL
Refills: 0 | Status: DISCONTINUED | OUTPATIENT
Start: 2019-09-13 | End: 2019-01-01

## 2019-09-13 RX ORDER — MEROPENEM 1 G/30ML
1000 INJECTION INTRAVENOUS EVERY 8 HOURS
Refills: 0 | Status: DISCONTINUED | OUTPATIENT
Start: 2019-09-13 | End: 2019-09-14

## 2019-09-13 RX ORDER — CLONAZEPAM 1 MG
0.5 TABLET ORAL EVERY 8 HOURS
Refills: 0 | Status: DISCONTINUED | OUTPATIENT
Start: 2019-09-13 | End: 2019-09-14

## 2019-09-13 RX ADMIN — Medication 81 MILLIGRAM(S): at 11:32

## 2019-09-13 RX ADMIN — GABAPENTIN 800 MILLIGRAM(S): 400 CAPSULE ORAL at 05:17

## 2019-09-13 RX ADMIN — MORPHINE SULFATE 45 MILLIGRAM(S): 50 CAPSULE, EXTENDED RELEASE ORAL at 07:02

## 2019-09-13 RX ADMIN — SODIUM CHLORIDE 500 MILLILITER(S): 9 INJECTION INTRAMUSCULAR; INTRAVENOUS; SUBCUTANEOUS at 18:15

## 2019-09-13 RX ADMIN — MORPHINE SULFATE 10 MILLIGRAM(S): 50 CAPSULE, EXTENDED RELEASE ORAL at 00:03

## 2019-09-13 RX ADMIN — Medication 100 MILLIGRAM(S): at 11:32

## 2019-09-13 RX ADMIN — TAMSULOSIN HYDROCHLORIDE 0.4 MILLIGRAM(S): 0.4 CAPSULE ORAL at 22:17

## 2019-09-13 RX ADMIN — GABAPENTIN 800 MILLIGRAM(S): 400 CAPSULE ORAL at 22:18

## 2019-09-13 RX ADMIN — SODIUM CHLORIDE 1000 MILLILITER(S): 9 INJECTION INTRAMUSCULAR; INTRAVENOUS; SUBCUTANEOUS at 17:15

## 2019-09-13 RX ADMIN — Medication 9 MICROGRAM(S)/KG/MIN: at 19:35

## 2019-09-13 RX ADMIN — Medication 6: at 22:03

## 2019-09-13 RX ADMIN — Medication 0.5 MILLIGRAM(S): at 05:16

## 2019-09-13 RX ADMIN — MORPHINE SULFATE 10 MILLIGRAM(S): 50 CAPSULE, EXTENDED RELEASE ORAL at 08:45

## 2019-09-13 RX ADMIN — Medication 5 MILLIGRAM(S): at 05:17

## 2019-09-13 RX ADMIN — ATORVASTATIN CALCIUM 80 MILLIGRAM(S): 80 TABLET, FILM COATED ORAL at 22:28

## 2019-09-13 RX ADMIN — SENNA PLUS 1 TABLET(S): 8.6 TABLET ORAL at 11:32

## 2019-09-13 RX ADMIN — PHENYLEPHRINE HYDROCHLORIDE 15 MICROGRAM(S)/KG/MIN: 10 INJECTION INTRAVENOUS at 18:06

## 2019-09-13 RX ADMIN — Medication 5 MILLIGRAM(S): at 19:32

## 2019-09-13 RX ADMIN — Medication 50 GRAM(S): at 22:07

## 2019-09-13 RX ADMIN — PANTOPRAZOLE SODIUM 40 MILLIGRAM(S): 20 TABLET, DELAYED RELEASE ORAL at 08:05

## 2019-09-13 RX ADMIN — Medication 100 MILLIGRAM(S): at 05:16

## 2019-09-13 RX ADMIN — DULOXETINE HYDROCHLORIDE 60 MILLIGRAM(S): 30 CAPSULE, DELAYED RELEASE ORAL at 11:32

## 2019-09-13 RX ADMIN — HYDROMORPHONE HYDROCHLORIDE 0.5 MILLIGRAM(S): 2 INJECTION INTRAMUSCULAR; INTRAVENOUS; SUBCUTANEOUS at 21:05

## 2019-09-13 RX ADMIN — MORPHINE SULFATE 45 MILLIGRAM(S): 50 CAPSULE, EXTENDED RELEASE ORAL at 05:16

## 2019-09-13 RX ADMIN — Medication 100 MILLIGRAM(S): at 23:08

## 2019-09-13 RX ADMIN — Medication 0.5 MILLIGRAM(S): at 13:15

## 2019-09-13 RX ADMIN — MEROPENEM 100 MILLIGRAM(S): 1 INJECTION INTRAVENOUS at 22:35

## 2019-09-13 RX ADMIN — Medication 5 MILLIGRAM(S): at 19:08

## 2019-09-13 RX ADMIN — HYDROMORPHONE HYDROCHLORIDE 0.5 MILLIGRAM(S): 2 INJECTION INTRAMUSCULAR; INTRAVENOUS; SUBCUTANEOUS at 22:12

## 2019-09-13 RX ADMIN — Medication 10 MILLIGRAM(S): at 05:17

## 2019-09-13 RX ADMIN — MORPHINE SULFATE 10 MILLIGRAM(S): 50 CAPSULE, EXTENDED RELEASE ORAL at 08:05

## 2019-09-13 RX ADMIN — QUETIAPINE FUMARATE 150 MILLIGRAM(S): 200 TABLET, FILM COATED ORAL at 22:16

## 2019-09-13 NOTE — BRIEF OPERATIVE NOTE - NSICDXBRIEFPOSTOP_GEN_ALL_CORE_FT
POST-OP DIAGNOSIS:  Failed total hip arthroplasty with dislocation 13-Sep-2019 16:10:51  Hip-Femi Tellez

## 2019-09-13 NOTE — CHART NOTE - NSCHARTNOTEFT_GEN_A_CORE
responded to call by PACU RN pt hypotensive S/P explant of right total  implant and antibiotic spacer  Pt has low BP in the range 50 to 60 and heart rate 122bpm pt given saline bolus and intermittent neosynephrine pushes .radial arterial line attempted both sides unable under ultrasound guidance both sides arteries calcified and aseptic prep with cholroprep and 20g right  femoral artery cannulated under ultrasound guidance pt tolerated very well. PT type and cross for two units Packed RBCS and ICU consult called and pt started on low dose phenyleprine and orthopedic team made aware of recent events and agreed with plan

## 2019-09-13 NOTE — PRE-ANESTHESIA EVALUATION ADULT - NSANTHADDINFOFT_GEN_ALL_CORE
type and cross for two units packed RBCS
Discussed risks and benefits of anesthesia including but not limited to the risk of sore throat, N/V, damage to mouth, teeth and lips, stroke, MI and even death.  Patient demonstrates understanding, all questions answered. The patient wishes to proceed with planned treatment.

## 2019-09-13 NOTE — CONSULT NOTE ADULT - ATTENDING COMMENTS
infected hip hardware with sepsis   admit to SICU continue resuscitation HR control   continue Antibiotics   f/u cultures

## 2019-09-13 NOTE — CONSULT NOTE ADULT - ASSESSMENT
Assessment: 48 y/o M PMHx of CDIP s/p L explantation of hardware, wash out and placement of abx spacer     Neuro: Dx depression/anxiety. Followed by psych and pain management. Monitor for changes in mental status. Restarted buspirone, klonipin, cymbalta, seroquel. Pain controlled with tylenol, baclofen, voltaren, gabapentin, dilaudid PRN.   Resp: NC satting well. Encourage IS. AM CXR ordered.   Cards: Dx: CAD, CABG x5 vessels, HLD. Post op EKG sinus tachy. Ordered echo. CE x3 ordered, pending. Restarted statin. NICOM when patient gets to the unit. Please order EKG for AM   GI: NPO. LR @ 150. PPI/[place].   : Dx of BPH restarted flomax. No graham. Monitor UOP. K post op 5.0 w/o EKG changes. Cr 0.9 baseline. Monitor lytes.   Heme: On ASA/Lovenox. Holding plavix as per primary team. Recieved 1 unit of PRBC post op by primary team. Monitor H/H   ID: Clindamycin pre-op.  Afebrile. WBC 24. Blood cultures from 9/11 final coag staph negative w/o sensitivities. Blood culture 9/12 NGTD prelim. Urine cx 9/11 negative. Started Vanco/Ethel/Flagyl.   Endo: Dx of DM. Started ISS w/ FS Q 6 hours.   MSK: Bed bound, oob to chair as per ortho.        D/W Dr Rodriguez

## 2019-09-13 NOTE — CONSULT NOTE ADULT - SUBJECTIVE AND OBJECTIVE BOX
SICU Consultation Note  ======================================================================================================  NAPOLEON CAST  MRN-[id number]    48y/o Male, w/ extensive PMH of CIDP with significant motor and sensory deficits and extreme pain,         Procedure:  OR time: 2hours     EBL: 500cc     IV Fluids: 2L             Blood Products: none intraop // 1pRBC post-op                UOP:  unknown (no graham)         PAST MEDICAL & SURGICAL HISTORY:  CIDP (chronic inflammatory demyelinating polyneuropathy) 2013  CAD  BPH  DM  History of cholecystectomy  History of total left hip replacement  History of total right hip replacement: bilateral  S/P CABG x 5 (2011)      Home Meds:   Home Medications:  Atarax 50 mg oral tablet: 1 tab(s) orally once a day (at bedtime) (03 Sep 2019 16:43)  baclofen 10 mg oral tablet: 1 tab(s) orally 2 times a day (03 Sep 2019 16:43)  busPIRone 5 mg oral tablet: 1 tab(s) orally 2 times a day (03 Sep 2019 16:43)  DULoxetine 60 mg oral delayed release capsule: 1 cap(s) orally once a day (03 Sep 2019 16:43)  Flomax 0.4 mg oral capsule: 1 cap(s) orally once a day (03 Sep 2019 16:43)  gabapentin 400 mg oral tablet: 1 tab(s) orally 3 times a day (before meals) (03 Sep 2019 16:43)  morphine 10 mg/5 mL oral solution: 5 milligram(s) orally every 3 hours, As Needed (03 Sep 2019 16:43)  morphine 60 mg oral capsule, extended release: 1 cap(s) orally every 12 hours (03 Sep 2019 16:43)  Plavix 75 mg oral tablet: 1 tab(s) orally once a day (03 Sep 2019 16:43)  SEROquel  mg oral tablet, extended release: 1 tab(s) orally once a day (in the evening) (03 Sep 2019 16:43)  Xanax 1 mg oral tablet: 1 tab(s) orally 3 times a day (03 Sep 2019 16:43)      Allergies    penicillins (Unknown)    Intolerances          Advanced Directives: Full Code      CURRENT MEDICATIONS:   acetaminophen   Tablet .. 650 milliGRAM(s) Oral every 6 hours PRN  aspirin  chewable 81 milliGRAM(s) Oral daily  atorvastatin 80 milliGRAM(s) Oral at bedtime  baclofen 10 milliGRAM(s) Oral two times a day  benzocaine 15 mG/menthol 3.6 mG Lozenge 1 Lozenge Oral three times a day  busPIRone 5 milliGRAM(s) Oral two times a day  clindamycin IVPB 900 milliGRAM(s) IV Intermittent every 8 hours  clonazePAM  Tablet 0.5 milliGRAM(s) Oral every 8 hours PRN  diclofenac 75 milliGRAM(s) Oral two times a day PRN  docusate sodium 100 milliGRAM(s) Oral daily  DULoxetine 60 milliGRAM(s) Oral daily  enoxaparin Injectable 40 milliGRAM(s) SubCutaneous daily  gabapentin 800 milliGRAM(s) Oral three times a day  HYDROmorphone  Injectable 0.5 milliGRAM(s) IV Push every 10 minutes PRN  HYDROmorphone  Injectable 1 milliGRAM(s) IV Push every 10 minutes PRN  lactated ringers. 1000 milliLiter(s) (150 mL/Hr) IV Continuous <Continuous>  norepinephrine Infusion 0.05 MICROgram(s)/kG/Min (9 mL/Hr) IV Continuous <Continuous>  ondansetron Injectable 4 milliGRAM(s) IV Push once PRN  QUEtiapine 150 milliGRAM(s) Oral at bedtime  senna 1 Tablet(s) Oral daily  sodium chloride 0.9% Bolus 500 milliLiter(s) IV Bolus once  tamsulosin 0.4 milliGRAM(s) Oral at bedtime            VITAL SIGNS, INS/OUTS (Last 24hours):    ICU Vital Signs Last 24 Hrs  T(C): 36.6 (13 Sep 2019 18:15), Max: 37.6 (13 Sep 2019 12:26)  T(F): 97.9 (13 Sep 2019 18:15), Max: 99.6 (13 Sep 2019 12:26)  HR: 118 (13 Sep 2019 18:45) (83 - 124)  BP: 103/57 (13 Sep 2019 18:45) (64/48 - 142/79)  BP(mean): 78 (13 Sep 2019 18:45) (38 - 79)  ABP: 108/60 (13 Sep 2019 18:38) (108/60 - 108/60)  ABP(mean): 74 (13 Sep 2019 18:38) (74 - 74)  RR: 12 (13 Sep 2019 18:45) (9 - 30)  SpO2: 96% (13 Sep 2019 18:45) (95% - 100%)    I&O's Summary    12 Sep 2019 07:01  -  13 Sep 2019 07:00  --------------------------------------------------------  IN: 0 mL / OUT: 1500 mL / NET: -1500 mL    13 Sep 2019 07:01  -  13 Sep 2019 20:05  --------------------------------------------------------  IN: 1445 mL / OUT: 0 mL / NET: 1445 mL          Height (cm): 180.34 (09-13-19)  Weight (kg): 96 (09-13-19)  BMI (kg/m2): 29.5 (09-13-19)  BSA (m2): 2.16 (09-13-19)    Physical Exam:  ---------------------------------------------------------------------------------------  RASS:   GCS:    E/M/V  Exam: A&Ox3, no focal deficits    RESPIRATORY:  Intubated/Tracheostomy  Lungs clear to auscultation b/l, Normal expansion/effort  Mechanical Ventilation:     CARDIOVASCULAR:   S1/S2.  RRR  No peripheral edema    GASTROINTESTINAL:  Abdomen soft, non-tender, non-distended, no wounds or discoloration  Colostomy/Ileostomy/NG/OG tube in place    MUSCULOSKELETAL:  Extremities warm, pink, well-perfused.  Palpable/Doppler pulses signals      DERM:  No skin breakdown     :   Exam: Graahm catheter in place.       Tubes/Lines/Drains   ----------------------------------------------------------------------------------------------------------  [x] Peripheral IV  [] Central Venous Line    R/L        IJ/Femoral             Date Placed:    [] Arterial Line		   R/L         Radial/Femoral    Date Placed:   [] PICC:         	[] Midline		                                  Date Placed:   [] Urinary Catheter Graham                                             Date Placed:       LABS  --------------------------------------------------------------------------------------  LFTs  LIVER FUNCTIONS - ( 12 Sep 2019 07:28 )  Alb: 3.4 g/dL / Pro: 7.9 g/dL / ALK PHOS: 75 U/L / ALT: 18 U/L / AST: 26 U/L / GGT: x             Cardiac Markers  CARDIAC MARKERS ( 13 Sep 2019 19:30 )  x     / x     / 189 U/L / x     / 3.5 ng/mL        Coagulation  PT/INR - ( 13 Sep 2019 19:30 )   PT: 15.50 sec;   INR: 1.35 ratio         PTT - ( 13 Sep 2019 19:30 )  PTT:55.5 sec    Arterial Blood Gas      Blood Sugar  CAPILLARY BLOOD GLUCOSE      POCT Blood Glucose.: 114 mg/dL (13 Sep 2019 11:11)  POCT Blood Glucose.: 129 mg/dL (13 Sep 2019 07:59)  POCT Blood Glucose.: 129 mg/dL (12 Sep 2019 21:16)      Urinalysis      Cultures    Culture - Blood (collected 12 Sep 2019 07:28)  Source: .Blood None  Preliminary Report (13 Sep 2019 14:01):    No growth to date.    Culture - Blood (collected 11 Sep 2019 11:00)  Source: .Blood Blood  Gram Stain (12 Sep 2019 15:59):    Growth in aerobic bottle: Gram Positive Cocci in Clusters  Preliminary Report (12 Sep 2019 15:59):    Growth in aerobic bottle: Gram Positive Cocci in Clusters    "Due to technical problems, Proteus sp. will Not be reported as part of    the BCID panel until further notice"    ***Blood Panel PCR results on this specimen are available    approximately 3 hours after the Gram stain result.***    Gram stain, PCR, and/or culture results may not always    correspond due to difference in methodologies.    ************************************************************    This PCR assay was performed using [employer].    The following targets are tested for: Enterococcus,    vancomycin resistant enterococci, Listeria monocytogenes,    coagulase negative staphylococci, S. aureus,    methicillin resistant S. aureus, Streptococcus agalactiae    (Group B), S. pneumoniae, S.pyogenes (Group A),    Acinetobacter baumannii, Enterobacter cloacae, E. coli,    Klebsiella oxytoca, K. pneumoniae, Proteus sp.,    Serratia marcescens, Haemophilus influenzae,    Neisseria meningitidis, Pseudomonas aeruginosa, Candida    albicans, C. glabrata, C krusei, C parapsilosis,    C. tropicalis and the KPC resistance gene.  Organism: Blood Culture PCR (12 Sep 2019 17:31)  Organism: Blood Culture PCR (12 Sep 2019 17:31)    Culture - Urine (collected 11 Sep 2019 07:50)  Source: .Urine Clean Catch (Midstream)  Final Report (12 Sep 2019 11:33):    <10,000 CFU/mL Normal Urogenital Nimo              CT/XRAY/ECHO/TCD/EEG  ----------------------------------------------------------------------------------------------          --------------------------------------------------------------------------------------  Admit Diagnosis: PAIN;H/O BILATERAL HIP REPLACEMENTS;CIPD;SLURRED SPEECH     Critical Care Diagnoses: SICU Consultation Note  ======================================================================================================  NAPOLEON CAST  MRN-[id number]    48y/o Male, w/ extensive PMHx CAD, ??patient reports "silent MI", s/p CABG x5 vessels, BPH, depression, anxiety, DM (HgbA1c 11.4) and CDIP post IVIG treatment. Patient has had PSHx of BL total arthroplasty. Patient has been bedbound for 3 years and is taken care by his wife at home. Patient is followed by Dr Diaz at home. Patient presented to the hospital 9/3/19 c/o of increasing LLE leg pain and he noticed that the L leg looked shorter and was externally rotated.  Patient is in constant pain 2/2 to CDIP. Patient found to have superolateral dislocation of the L hip total arthoplasty. Patient also had duplex of BL LE which showed mild to moderate arthrosclerotic occlusive disease on the L tibial and moderate to severe stenosis of the R SFA w/ distal reconstitution w/ diminished flow. Patient was admitted to the medical service. While being optimized for the OR patient became febrile cultures were taken on 9/11 which showed coag negative staph in the blood. Medical team attributed sepsis 2/2 to RUE cellulitis. Patient was treated with IV Clindamycin and Tylenol for fevers. Cardiology saw the patient pre-op for risk stratification, deemed high risk but benefits outweighed risks. Ortho took patient to the OR 9/13/19 s/p L explantation of hip hardware, wash out and placement of antibiotic spacer. Patient was stable intra-op. Patient was extubated post op w/o difficulty.     Patient was brought to the PACU at 4:30 PM. Patient became hypotensive at 5:30 PM, BP 60/40 's, patient given 1.5 L w/o response. Post op hemoglobin 10 from 12 pre-op, 1 unit of PRBC given by primary team. A line was placed by anesthesia and april was started peripherally. At 6:40 PM SICU team was consulted. SICU team arrived at bedside, patient HR in the 150's. EKG obtained, sinus tachy. Lopressor 5x2 given with response HR now 80's. Peripheral april stopped, peripheral Levo started. R IJ central line was placed and confirmed by CXR. Patient will be upgraded to the SICU for hemodynamic monitoring.       Procedure: s/p L explantation of hardware, wash out, placement of abx spacer   OR time: 2hours     EBL: 500cc     IV Fluids: 2L             Blood Products: none intraop // 1pRBC post-op                UOP:  unknown (no carter)         PAST MEDICAL & SURGICAL HISTORY:  CIDP (chronic inflammatory demyelinating polyneuropathy) 2013  CAD  BPH  DM  Depression   Anxiety   Claustrophobia   History of cholecystectomy  History of total left hip replacement  History of total right hip replacement: bilateral  R ulnar surgery x3   S/P CABG x 5 (2011)      Home Meds:   Atarax 50 mg oral tablet: 1 tab(s) orally once a day (at bedtime) (03 Sep 2019 16:43)  baclofen 10 mg oral tablet: 1 tab(s) orally 2 times a day (03 Sep 2019 16:43)  busPIRone 5 mg oral tablet: 1 tab(s) orally 2 times a day (03 Sep 2019 16:43)  DULoxetine 60 mg oral delayed release capsule: 1 cap(s) orally once a day (03 Sep 2019 16:43)  Flomax 0.4 mg oral capsule: 1 cap(s) orally once a day (03 Sep 2019 16:43)  gabapentin 400 mg oral tablet: 1 tab(s) orally 3 times a day (before meals) (03 Sep 2019 16:43)  morphine 10 mg/5 mL oral solution: 5 milligram(s) orally every 3 hours, As Needed (03 Sep 2019 16:43)  morphine 60 mg oral capsule, extended release: 1 cap(s) orally every 12 hours (03 Sep 2019 16:43)  Plavix 75 mg oral tablet: 1 tab(s) orally once a day (03 Sep 2019 16:43)  SEROquel  mg oral tablet, extended release: 1 tab(s) orally once a day (in the evening) (03 Sep 2019 16:43)  Xanax 1 mg oral tablet: 1 tab(s) orally 3 times a day (03 Sep 2019 16:43)      Allergies  penicillins (Unknown)    Advanced Directives: Full Code      CURRENT MEDICATIONS:   acetaminophen   Tablet .. 650 milliGRAM(s) Oral every 6 hours PRN  aspirin  chewable 81 milliGRAM(s) Oral daily  atorvastatin 80 milliGRAM(s) Oral at bedtime  baclofen 10 milliGRAM(s) Oral two times a day  benzocaine 15 mG/menthol 3.6 mG Lozenge 1 Lozenge Oral three times a day  busPIRone 5 milliGRAM(s) Oral two times a day  clindamycin IVPB 900 milliGRAM(s) IV Intermittent every 8 hours  clonazePAM  Tablet 0.5 milliGRAM(s) Oral every 8 hours PRN  diclofenac 75 milliGRAM(s) Oral two times a day PRN  docusate sodium 100 milliGRAM(s) Oral daily  DULoxetine 60 milliGRAM(s) Oral daily  enoxaparin Injectable 40 milliGRAM(s) SubCutaneous daily  gabapentin 800 milliGRAM(s) Oral three times a day  HYDROmorphone  Injectable 0.5 milliGRAM(s) IV Push every 10 minutes PRN  HYDROmorphone  Injectable 1 milliGRAM(s) IV Push every 10 minutes PRN  lactated ringers. 1000 milliLiter(s) (150 mL/Hr) IV Continuous <Continuous>  norepinephrine Infusion 0.05 MICROgram(s)/kG/Min (9 mL/Hr) IV Continuous <Continuous>  ondansetron Injectable 4 milliGRAM(s) IV Push once PRN  QUEtiapine 150 milliGRAM(s) Oral at bedtime  senna 1 Tablet(s) Oral daily  sodium chloride 0.9% Bolus 500 milliLiter(s) IV Bolus once  tamsulosin 0.4 milliGRAM(s) Oral at bedtime            VITAL SIGNS, INS/OUTS (Last 24hours):    ICU Vital Signs Last 24 Hrs  T(F): 97.9   HR: 80  BP: 103/57  ABP: 108/60  RR: 12   SpO2: 96%    I&O's Summary    12 Sep 2019 07:01  -  13 Sep 2019 07:00  --------------------------------------------------------  IN: 0 mL / OUT: 1500 mL / NET: -1500 mL    13 Sep 2019 07:01  -  13 Sep 2019 20:05  --------------------------------------------------------  IN: 1445 mL / OUT: 0 mL / NET: 1445 mL        Height (cm): 180.34 (09-13-19)  Weight (kg): 96 (09-13-19)  BMI (kg/m2): 29.5 (09-13-19)  BSA (m2): 2.16 (09-13-19)    Physical Exam:  ---------------------------------------------------------------------------------------    GCS:    E/M/V  Exam: A&Ox3, no focal deficits    RESPIRATORY:  Intubated/Tracheostomy  Lungs clear to auscultation b/l, Normal expansion/effort  Mechanical Ventilation:     CARDIOVASCULAR:   S1/S2.  RRR  No peripheral edema    GASTROINTESTINAL:  Abdomen soft, non-tender, non-distended, no wounds or discoloration  Colostomy/Ileostomy/NG/OG tube in place    MUSCULOSKELETAL:  Extremities warm, pink, well-perfused.  Palpable/Doppler pulses signals      DERM:  No skin breakdown     :   Exam: Carter catheter in place.       Tubes/Lines/Drains   ----------------------------------------------------------------------------------------------------------  [x] Peripheral IV  [x] Central Venous Line    R     IJ        Date Placed:  9/13/19  [x] Arterial Line		R radial   Date Placed: 9/13/19      LABS  --------------------------------------------------------------------------------------  LFTs  LIVER FUNCTIONS - ( 12 Sep 2019 07:28 )  Alb: 3.4 g/dL / Pro: 7.9 g/dL / ALK PHOS: 75 U/L / ALT: 18 U/L / AST: 26 U/L / GGT: x             Cardiac Markers  CARDIAC MARKERS ( 13 Sep 2019 19:30 )  x     / x     / 189 U/L / x     / 3.5 ng/mL        Coagulation  PT/INR - ( 13 Sep 2019 19:30 )   PT: 15.50 sec;   INR: 1.35 ratio         PTT - ( 13 Sep 2019 19:30 )  PTT:55.5 sec    Arterial Blood Gas      Blood Sugar  CAPILLARY BLOOD GLUCOSE      POCT Blood Glucose.: 114 mg/dL (13 Sep 2019 11:11)  POCT Blood Glucose.: 129 mg/dL (13 Sep 2019 07:59)  POCT Blood Glucose.: 129 mg/dL (12 Sep 2019 21:16)    Cultures    Culture - Blood (collected 12 Sep 2019 07:28)  Source: .Blood None  Preliminary Report (13 Sep 2019 14:01):    No growth to date.    Culture - Blood (collected 11 Sep 2019 11:00)  Source: .Blood Blood  Gram Stain (12 Sep 2019 15:59):    Growth in aerobic bottle: Gram Positive Cocci in Clusters  Preliminary Report (12 Sep 2019 15:59):    Growth in aerobic bottle: Gram Positive Cocci in Clusters    "Due to technical problems, Proteus sp. will Not be reported as part of    the BCID panel until further notice"    ***Blood Panel PCR results on this specimen are available    approximately 3 hours after the Gram stain result.***    Gram stain, PCR, and/or culture results may not always    correspond due to difference in methodologies.    ************************************************************    This PCR assay was performed using [employer].    The following targets are tested for: Enterococcus,    vancomycin resistant enterococci, Listeria monocytogenes,    coagulase negative staphylococci, S. aureus,    methicillin resistant S. aureus, Streptococcus agalactiae    (Group B), S. pneumoniae, S.pyogenes (Group A),    Acinetobacter baumannii, Enterobacter cloacae, E. coli,    Klebsiella oxytoca, K. pneumoniae, Proteus sp.,    Serratia marcescens, Haemophilus influenzae,    Neisseria meningitidis, Pseudomonas aeruginosa, Candida    albicans, C. glabrata, C krusei, C parapsilosis,    C. tropicalis and the KPC resistance gene.  Organism: Blood Culture PCR (12 Sep 2019 17:31)  Organism: Blood Culture PCR (12 Sep 2019 17:31)    Culture - Urine (collected 11 Sep 2019 07:50)  Source: .Urine Clean Catch (Midstream)  Final Report (12 Sep 2019 11:33):    <10,000 CFU/mL Normal Urogenital Nimo

## 2019-09-13 NOTE — PRE-ANESTHESIA EVALUATION ADULT - NSPROPOSEDPROCEDFT_GEN_ALL_CORE
MRI  of brain, c spine and t spine with and without gadolinium
Removal of hardware Left hip
removal of left total hip and insertion of antibiotic spacer

## 2019-09-13 NOTE — PROGRESS NOTE ADULT - SUBJECTIVE AND OBJECTIVE BOX
ORTHO POST OP NOTE    Procedure: Explantation of left hip hemiarthroplasty      Patient seen and examined at bedside. Pt was tachycardic and hypotensive in the PACU. s/p 1u pRBCs. ICU consulted. Resting without complaints. Pain controlled with medication. Denies chest pain, SOB, N/V.    T(C): 36.6 (09-13-19 @ 20:00), Max: 37.6 (09-13-19 @ 12:26)  HR: 108 (09-13-19 @ 21:00) (83 - 124)  BP: 122/66 (09-13-19 @ 20:30) (64/48 - 142/79)  RR: 20 (09-13-19 @ 21:00) (9 - 30)  SpO2: 95% (09-13-19 @ 21:00) (95% - 100%)  Wt(kg): --    Exam:  Alert and [place], No Acute Distress  Non labored respirations  LLE  Dressing  C/D/I  No motor function distally  Denies sensation distal to knee   2+ DP pulse                            9.7<L>  24.47<H> )-----------( 304      ( 13 Sep 2019 19:30 )             29.6<L>     09-13    133<L>  |  99  |  22<H>  ----------------------------<  236<H>  5.0   |  17  |  0.9      PT/INR - ( 13 Sep 2019 19:30 )   PT: 15.50 sec;   INR: 1.35 ratio         PTT - ( 13 Sep 2019 19:30 )  PTT:55.5 sec      A/P: 49yM S/p Explantation of left hip hemiarthroplasty  -Periop antibiotics- standing clindamycin until surgical culture results  - ICU to monitor due to tachycardia and hypotension  - F/U AM Labs  - DVT PPx  - Pain Control  - SCDs  - IS  - Continue Current Tx

## 2019-09-13 NOTE — PRE-ANESTHESIA EVALUATION ADULT - NSANTHOSAYNRD_GEN_A_CORE
No. IVORY screening performed.  STOP BANG Legend: 0-2 = LOW Risk; 3-4 = INTERMEDIATE Risk; 5-8 = HIGH Risk

## 2019-09-13 NOTE — PROGRESS NOTE ADULT - SUBJECTIVE AND OBJECTIVE BOX
Neurology Follow-Up:  Pt seen s/p 5 infusions of IVIG, tolerated better after the first dose. Overall neurologically feeling slightly improved, believes he can move his toes more and sensation has overall slightly increased. Currently awaiting left hip surgery.    Of note has hx of failed PLEX in past  Non-ambulatory at home  Has chronic left hip pain/injury    Vital Signs Last 24 Hrs  T(C): 37.1 (13 Sep 2019 04:46), Max: 37.1 (13 Sep 2019 04:46)  T(F): 98.8 (13 Sep 2019 04:46), Max: 98.8 (13 Sep 2019 04:46)  HR: 92 (13 Sep 2019 05:06) (87 - 92)  BP: 142/79 (13 Sep 2019 04:46) (133/63 - 142/79)  BP(mean): --  RR: 18 (13 Sep 2019 05:06) (18 - 20)  SpO2: 98% (13 Sep 2019 05:06) (98% - 98%)    Neuro Exam:  Mental status: Awake, alert and oriented x3.    Cranial nerves: Pupils equally round and reactive to light, visual fields full, no nystagmus, extraocular muscles intact, V1 through V3 intact bilaterally and symmetric, face symmetric, hearing intact to finger rub, palate elevation symmetric, tongue was midline.  Motor:  MRC grading 4/5 UE b/l, 1/5 LE b/l.   strength 5/5.   No abnormal movements.    Sensation: diminished b/l LE light touch  Coordination: No dysmetria on finger-to-nose.    Reflexes: clonus on RLE, no reflexes elicited LLE. 2+ reflex UE b/l (biceps tendon)  Gait: Non-ambulatory, LLE externally rotated.    Allergies    penicillins (Unknown)    Intolerances      MEDICATIONS  (STANDING):  aspirin  chewable 81 milliGRAM(s) Oral daily  atorvastatin 80 milliGRAM(s) Oral at bedtime  baclofen 10 milliGRAM(s) Oral two times a day  benzocaine 15 mG/menthol 3.6 mG Lozenge 1 Lozenge Oral three times a day  busPIRone 5 milliGRAM(s) Oral two times a day  chlorhexidine 4% Liquid 1 Application(s) Topical <User Schedule>  clindamycin IVPB      clindamycin IVPB 900 milliGRAM(s) IV Intermittent every 8 hours  dextrose 5%. 1000 milliLiter(s) (50 mL/Hr) IV Continuous <Continuous>  dextrose 50% Injectable 12.5 Gram(s) IV Push once  dextrose 50% Injectable 25 Gram(s) IV Push once  dextrose 50% Injectable 25 Gram(s) IV Push once  docusate sodium 100 milliGRAM(s) Oral daily  DULoxetine 60 milliGRAM(s) Oral daily  enoxaparin Injectable 40 milliGRAM(s) SubCutaneous daily  gabapentin 800 milliGRAM(s) Oral three times a day  insulin glargine Injectable (LANTUS) 36 Unit(s) SubCutaneous at bedtime  insulin lispro (HumaLOG) corrective regimen sliding scale   SubCutaneous three times a day before meals  insulin lispro Injectable (HumaLOG) 12 Unit(s) SubCutaneous three times a day before meals  morphine ER Tablet 45 milliGRAM(s) Oral every 12 hours  pantoprazole    Tablet 40 milliGRAM(s) Oral before breakfast  QUEtiapine 150 milliGRAM(s) Oral at bedtime  senna 1 Tablet(s) Oral daily  tamsulosin 0.4 milliGRAM(s) Oral at bedtime    MEDICATIONS  (PRN):  acetaminophen   Tablet .. 650 milliGRAM(s) Oral every 6 hours PRN Temp greater or equal to 38C (100.4F)  clonazePAM  Tablet 0.5 milliGRAM(s) Oral every 8 hours PRN anxiety  dextrose 40% Gel 15 Gram(s) Oral once PRN Blood Glucose LESS THAN 70 milliGRAM(s)/deciliter  diclofenac 75 milliGRAM(s) Oral two times a day PRN Moderate Pain (4 - 6)  glucagon  Injectable 1 milliGRAM(s) IntraMuscular once PRN Glucose LESS THAN 70 milligrams/deciliter  morphine   Solution 10 milliGRAM(s) Oral every 6 hours PRN Severe Pain (7 - 10)  polyethylene glycol 3350 17 Gram(s) Oral daily PRN Constipation      LABS:                        12.0   5.33  )-----------( 173      ( 12 Sep 2019 07:28 )             36.5     09-12    135  |  97<L>  |  21<H>  ----------------------------<  206<H>  4.3   |  26  |  1.0    Ca    8.7      12 Sep 2019 07:28  Mg     1.6     09-12    TPro  7.9  /  Alb  3.4<L>  /  TBili  0.8  /  DBili  x   /  AST  26  /  ALT  18  /  AlkPhos  75      PT/INR - ( 13 Sep 2019 07:26 )   PT: 15.00 sec;   INR: 1.31 ratio         PTT - ( 11 Sep 2019 12:40 )  PTT:67.3 sec  Hemoglobin A1C, Whole Blood: 11.4 % ( @ 10:40)    Quantitative Ig: New Reference Ranges effective 2019 mg/dL (19 @ 05:19)    Protein Electrophoresis, Serum (19 @ 16:10)    Protein Total, Serum: 6.8 g/dL    Total Protein, Serum: 6.8 g/dL    Albumin, Serum: 3.4 g/dL    Alpha 1: 0.4 g/dL    Alpha 2: 0.9 g/dL    Beta Globulin: 0.7 g/dL    Gamma Globulin: 1.5 g/dL    % Albumin: 49.7 %    % Alpha 1: 6.2 %    % Alpha 2: 12.6 %    % Beta: 10.1 %    % Gamma: 21.4 %    Albumin/Globulin Ratio: 1.0 Ratio    Serum Protein Electrophoresis Interp: Normal Electrophoresis Pattern      < from: MR Thoracic Spine w/wo IV Cont (19 @ 18:53) >  1.  Suggestion of a punctate focus of enhancement at the level of the   conus medullaris/proximal cauda equina at T12-L1 (series 16 image 6),   likely nerve root enhancement given history of CIDP.    2.  No other areas of abnormal enhancement are demonstrated.    3.  Mild degenerative changes as described.    4.  Mild chronic compression fractures of T11, T12 and L1. The T12 and L1   fractures are new when correlated withthe lumbar spine MRI 2013.   No osseous retropulsion.    < end of copied text >    < from: MR Head No Cont (19 @ 18:53) >  1.  No evidence of recent infarct or acute intracranial hemorrhage.   Stable exam since 2015.    2.  Stable chronic lacunar infarct in the right caudate nucleus.    < end of copied text >    < from: MR Cervical Spine w/wo IV Cont (19 @ 18:53) >  1.  Motion limited study demonstrates no definite cord signal abnormality   or abnormal enhancement.    2.  Mild progression of degenerative changes since the previous MRI   2013:    3.  C4-5 moderate spinal stenosis due to disc osteophyte with   superimposed right central disc protrusion slightly abutting the of the   right ventral spinal cord. Ligamentum flavum infolding contributes.    4.  Additional multilevel mild spinal stenosis and mild to moderate   foraminal narrowing.    < end of copied text > Neurology Follow-Up:  Pt seen s/p 5 infusions of IVIG, tolerated better after the first dose. Overall neurologically feeling slightly improved, believes he can move his toes more and sensation has overall slightly increased. Currently awaiting left hip surgery.    Of note has hx of failed PLEX in past  Non-ambulatory at home  Has chronic left hip pain/injury    Vital Signs Last 24 Hrs  T(C): 37.1 (13 Sep 2019 04:46), Max: 37.1 (13 Sep 2019 04:46)  T(F): 98.8 (13 Sep 2019 04:46), Max: 98.8 (13 Sep 2019 04:46)  HR: 92 (13 Sep 2019 05:06) (87 - 92)  BP: 142/79 (13 Sep 2019 04:46) (133/63 - 142/79)  BP(mean): --  RR: 18 (13 Sep 2019 05:06) (18 - 20)  SpO2: 98% (13 Sep 2019 05:06) (98% - 98%)    Neuro Exam:  Mental status: Awake, alert and oriented x3.    Cranial nerves: Pupils equally round and reactive to light, visual fields full, no nystagmus, extraocular muscles intact, V1 through V3 intact bilaterally and symmetric, face symmetric, hearing intact to finger rub, palate elevation symmetric, tongue was midline.  Motor:  MRC grading 4/5 UE b/l, 1/5 LE b/l.   strength 5/5.   No abnormal movements.    Sensation: diminished b/l LE light touch  Coordination: No dysmetria on finger-to-nose.    Reflexes: clonus on RLE, no reflexes elicited LLE. 2+ reflex UE b/l (biceps tendon)  Gait: Non-ambulatory, LLE externally rotated.    Allergies    penicillins (Unknown)    Intolerances      MEDICATIONS  (STANDING):  aspirin  chewable 81 milliGRAM(s) Oral daily  atorvastatin 80 milliGRAM(s) Oral at bedtime  baclofen 10 milliGRAM(s) Oral two times a day  benzocaine 15 mG/menthol 3.6 mG Lozenge 1 Lozenge Oral three times a day  busPIRone 5 milliGRAM(s) Oral two times a day  chlorhexidine 4% Liquid 1 Application(s) Topical <User Schedule>  clindamycin IVPB      clindamycin IVPB 900 milliGRAM(s) IV Intermittent every 8 hours  dextrose 5%. 1000 milliLiter(s) (50 mL/Hr) IV Continuous <Continuous>  dextrose 50% Injectable 12.5 Gram(s) IV Push once  dextrose 50% Injectable 25 Gram(s) IV Push once  dextrose 50% Injectable 25 Gram(s) IV Push once  docusate sodium 100 milliGRAM(s) Oral daily  DULoxetine 60 milliGRAM(s) Oral daily  enoxaparin Injectable 40 milliGRAM(s) SubCutaneous daily  gabapentin 800 milliGRAM(s) Oral three times a day  insulin glargine Injectable (LANTUS) 36 Unit(s) SubCutaneous at bedtime  insulin lispro (HumaLOG) corrective regimen sliding scale   SubCutaneous three times a day before meals  insulin lispro Injectable (HumaLOG) 12 Unit(s) SubCutaneous three times a day before meals  morphine ER Tablet 45 milliGRAM(s) Oral every 12 hours  pantoprazole    Tablet 40 milliGRAM(s) Oral before breakfast  QUEtiapine 150 milliGRAM(s) Oral at bedtime  senna 1 Tablet(s) Oral daily  tamsulosin 0.4 milliGRAM(s) Oral at bedtime    MEDICATIONS  (PRN):  acetaminophen   Tablet .. 650 milliGRAM(s) Oral every 6 hours PRN Temp greater or equal to 38C (100.4F)  clonazePAM  Tablet 0.5 milliGRAM(s) Oral every 8 hours PRN anxiety  dextrose 40% Gel 15 Gram(s) Oral once PRN Blood Glucose LESS THAN 70 milliGRAM(s)/deciliter  diclofenac 75 milliGRAM(s) Oral two times a day PRN Moderate Pain (4 - 6)  glucagon  Injectable 1 milliGRAM(s) IntraMuscular once PRN Glucose LESS THAN 70 milligrams/deciliter  morphine   Solution 10 milliGRAM(s) Oral every 6 hours PRN Severe Pain (7 - 10)  polyethylene glycol 3350 17 Gram(s) Oral daily PRN Constipation      LABS:                        12.0   5.33  )-----------( 173      ( 12 Sep 2019 07:28 )             36.5     09-12    135  |  97<L>  |  21<H>  ----------------------------<  206<H>  4.3   |  26  |  1.0    Ca    8.7      12 Sep 2019 07:28  Mg     1.6     09-12    TPro  7.9  /  Alb  3.4<L>  /  TBili  0.8  /  DBili  x   /  AST  26  /  ALT  18  /  AlkPhos  75  09-12    PT/INR - ( 13 Sep 2019 07:26 )   PT: 15.00 sec;   INR: 1.31 ratio         PTT - ( 11 Sep 2019 12:40 )  PTT:67.3 sec  Hemoglobin A1C, Whole Blood: 11.4 % (09-05 @ 10:40)    Quantitative IgA: 86 mg/dL (09.05.19 @ 05:19)      Protein Electrophoresis, Serum (09.08.19 @ 16:10)    Protein Total, Serum: 6.8 g/dL    Total Protein, Serum: 6.8 g/dL    Albumin, Serum: 3.4 g/dL    Alpha 1: 0.4 g/dL    Alpha 2: 0.9 g/dL    Beta Globulin: 0.7 g/dL    Gamma Globulin: 1.5 g/dL    % Albumin: 49.7 %    % Alpha 1: 6.2 %    % Alpha 2: 12.6 %    % Beta: 10.1 %    % Gamma: 21.4 %    Albumin/Globulin Ratio: 1.0 Ratio    Serum Protein Electrophoresis Interp: Normal Electrophoresis Pattern      < from: MR Thoracic Spine w/wo IV Cont (09.05.19 @ 18:53) >  1.  Suggestion of a punctate focus of enhancement at the level of the   conus medullaris/proximal cauda equina at T12-L1 (series 16 image 6),   likely nerve root enhancement given history of CIDP.    2.  No other areas of abnormal enhancement are demonstrated.    3.  Mild degenerative changes as described.    4.  Mild chronic compression fractures of T11, T12 and L1. The T12 and L1   fractures are new when correlated withthe lumbar spine MRI 12/18/2013.   No osseous retropulsion.    < end of copied text >    < from: MR Head No Cont (09.05.19 @ 18:53) >  1.  No evidence of recent infarct or acute intracranial hemorrhage.   Stable exam since 5/31/2015.    2.  Stable chronic lacunar infarct in the right caudate nucleus.    < end of copied text >    < from: MR Cervical Spine w/wo IV Cont (09.05.19 @ 18:53) >  1.  Motion limited study demonstrates no definite cord signal abnormality   or abnormal enhancement.    2.  Mild progression of degenerative changes since the previous MRI   12/18/2013:    3.  C4-5 moderate spinal stenosis due to disc osteophyte with   superimposed right central disc protrusion slightly abutting the of the   right ventral spinal cord. Ligamentum flavum infolding contributes.    4.  Additional multilevel mild spinal stenosis and mild to moderate   foraminal narrowing.    < end of copied text >

## 2019-09-13 NOTE — PRE-ANESTHESIA EVALUATION ADULT - MALLAMPATI CLASS
Class II - visualization of the soft palate, fauces, and uvula
Class III - visualization of the soft palate and the base of the uvula
Class IV (difficult) - the soft palate is not visible at all

## 2019-09-13 NOTE — PRE-OP CHECKLIST - SELECT TESTS ORDERED
CMP/Type and Screen/PT/PTT/CXR/CBC/INR/POCT Blood Glucose POCT Blood Glucose/INR/CXR/Type and Screen/CMP/PT/PTT/CBC/114 mg/dl at 1111

## 2019-09-13 NOTE — CHART NOTE - NSCHARTNOTEFT_GEN_A_CORE
PACU ANESTHESIA ADMISSION NOTE      Procedure: Explantation of total hip arthroplasty    Post op diagnosis:  Failed total hip arthroplasty with dislocation      ____  Intubated  TV:______       Rate: ______      FiO2: ______    _x___  Patent Airway    _x___  Full return of protective reflexes    _x___  Full recovery from anesthesia / back to baseline status    Vitals:    See anesthesia record      Mental Status:  _x___ Awake   _____ Alert   _____ Drowsy   _____ Sedated    Nausea/Vomiting:  _x___  NO       ______Yes,   See Post - Op Orders         Pain Scale (0-10):  __7___    Treatment: ____ None    __x__ See Post - Op/PCA Orders    Post - Operative Fluids:   __x__ Oral   ____ See Post - Op Orders    Plan: Discharge:   ____Home       ___x__Floor     _____Critical Care    _____  Other:_________________    Comments:  No anesthesia issues or complications noted.  Discharge when criteria met.

## 2019-09-13 NOTE — PRE-ANESTHESIA EVALUATION ADULT - NSANTHPEFT_GEN_ALL_CORE
Patient not able to move lower extremeties, sensation absent in B/L feet, weakness in B/L upper extremities due to CIDP.

## 2019-09-13 NOTE — PROGRESS NOTE ADULT - ASSESSMENT
Impression:  48 y/o man with PMH CIPD, initially was not candidate for IVIG because of low IgA and was treated w/ PLEX several years ago with no response. Here, IgA normal and is s/p 5 days of initial IVIG treatment with mild neurological improvement. Also awaiting ortho intervention for left hip pain.    Plan:  continue Gabapentin, Baclofen and Cymbalta  orthopedics to address left hip prosthesis issue  IVIG 1gm/kg Q3 weeks infuse over 6 hours with pre-medication w/Benadryl and APAP starting 3 weeks from last dose  f/u on ganglioside abs

## 2019-09-13 NOTE — PROGRESS NOTE ADULT - SUBJECTIVE AND OBJECTIVE BOX
NAPOLEON CAST 49y Male  MRN#: [id number]   CODE STATUS: FULL      SUBJECTIVE  Patient is a 49y old Male who presents with a chief complaint of Left hip pain (12 Sep 2019 20:22)    Currently admitted to medicine with the primary diagnosis of Pain       Today is hospital day 10d, and this morning he is laying in bed comfortably and reports no overnight events. States that he is feeling ok, just hopes that the surgery is not cancelled again.      OBJECTIVE  PAST MEDICAL & SURGICAL HISTORY  CIDP (chronic inflammatory demyelinating polyneuropathy)  History of cholecystectomy  History of total left hip replacement  History of total right hip replacement: bilateral  S/P CABG x 5    ALLERGIES:  penicillins (Unknown)    MEDICATIONS:  STANDING MEDICATIONS  aspirin  chewable 81 milliGRAM(s) Oral daily  atorvastatin 80 milliGRAM(s) Oral at bedtime  baclofen 10 milliGRAM(s) Oral two times a day  benzocaine 15 mG/menthol 3.6 mG Lozenge 1 Lozenge Oral three times a day  busPIRone 5 milliGRAM(s) Oral two times a day  chlorhexidine 4% Liquid 1 Application(s) Topical <User Schedule>  clindamycin IVPB      clindamycin IVPB 900 milliGRAM(s) IV Intermittent every 8 hours  dextrose 5%. 1000 milliLiter(s) (50 mL/Hr) IV Continuous <Continuous>  dextrose 50% Injectable 12.5 Gram(s) IV Push once  dextrose 50% Injectable 25 Gram(s) IV Push once  dextrose 50% Injectable 25 Gram(s) IV Push once  docusate sodium 100 milliGRAM(s) Oral daily  DULoxetine 60 milliGRAM(s) Oral daily  enoxaparin Injectable 40 milliGRAM(s) SubCutaneous daily  gabapentin 800 milliGRAM(s) Oral three times a day  immune   globulin 10% (GAMMAGARD) IVPB 40 Gram(s) IV Intermittent daily  insulin glargine Injectable (LANTUS) 36 Unit(s) SubCutaneous at bedtime  insulin lispro (HumaLOG) corrective regimen sliding scale   SubCutaneous three times a day before meals  insulin lispro Injectable (HumaLOG) 12 Unit(s) SubCutaneous three times a day before meals  morphine ER Tablet 45 milliGRAM(s) Oral every 12 hours  pantoprazole    Tablet 40 milliGRAM(s) Oral before breakfast  QUEtiapine 150 milliGRAM(s) Oral at bedtime  senna 1 Tablet(s) Oral daily  tamsulosin 0.4 milliGRAM(s) Oral at bedtime    PRN MEDICATIONS  acetaminophen   Tablet .. 650 milliGRAM(s) Oral every 6 hours PRN  clonazePAM  Tablet 0.5 milliGRAM(s) Oral every 8 hours PRN  dextrose 40% Gel 15 Gram(s) Oral once PRN  glucagon  Injectable 1 milliGRAM(s) IntraMuscular once PRN  morphine   Solution 10 milliGRAM(s) Oral every 6 hours PRN  polyethylene glycol 3350 17 Gram(s) Oral daily PRN      VITAL SIGNS: Last 24 Hours  T(C): 37.1 (13 Sep 2019 04:46), Max: 37.1 (13 Sep 2019 04:46)  T(F): 98.8 (13 Sep 2019 04:46), Max: 98.8 (13 Sep 2019 04:46)  HR: 92 (13 Sep 2019 05:06) (87 - 92)  BP: 142/79 (13 Sep 2019 04:46) (133/63 - 142/79)  BP(mean): --  RR: 18 (13 Sep 2019 05:06) (18 - 20)  SpO2: 98% (13 Sep 2019 05:06) (98% - 98%)    LABS:                        12.0   5.33  )-----------( 173      ( 12 Sep 2019 07:28 )             36.5     09-12    135  |  97<L>  |  21<H>  ----------------------------<  206<H>  4.3   |  26  |  1.0    Ca    8.7      12 Sep 2019 07:28  Mg     1.6     09-12    TPro  7.9  /  Alb  3.4<L>  /  TBili  0.8  /  DBili  x   /  AST  26  /  ALT  18  /  AlkPhos  75  09-12    PT/INR - ( 13 Sep 2019 07:26 )   PT: 15.00 sec;   INR: 1.31 ratio         PTT - ( 11 Sep 2019 12:40 )  PTT:67.3 sec      Culture - Blood (collected 11 Sep 2019 11:00)  Source: .Blood Blood  Gram Stain (12 Sep 2019 15:59):    Growth in aerobic bottle: Gram Positive Cocci in Clusters  Preliminary Report (12 Sep 2019 15:59):    Growth in aerobic bottle: Gram Positive Cocci in Clusters    "Due to technical problems, Proteus sp. will Not be reported as part of    the BCID panel until further notice"    ***Blood Panel PCR results on this specimen are available    approximately 3 hours after the Gram stain result.***    Gram stain, PCR, and/or culture results may not always    correspond due to difference in methodologies.    ************************************************************    This PCR assay was performed using [employer].    The following targets are tested for: Enterococcus,    vancomycin resistant enterococci, Listeria monocytogenes,    coagulase negative staphylococci, S. aureus,    methicillin resistant S. aureus, Streptococcus agalactiae    (Group B), S. pneumoniae, S.pyogenes (Group A),    Acinetobacter baumannii, Enterobacter cloacae, E. coli,    Klebsiella oxytoca, K. pneumoniae, Proteus sp.,    Serratia marcescens, Haemophilus influenzae,    Neisseria meningitidis, Pseudomonas aeruginosa, Candida    albicans, C. glabrata, C krusei, C parapsilosis,    C. tropicalis and the KPC resistance gene.  Organism: Blood Culture PCR (12 Sep 2019 17:31)  Organism: Blood Culture PCR (12 Sep 2019 17:31)    Culture - Urine (collected 11 Sep 2019 07:50)  Source: .Urine Clean Catch (Midstream)  Final Report (12 Sep 2019 11:33):    <10,000 CFU/mL Normal Urogenital Nimo      RADIOLOGY:      EXAM:  XR CHEST PORTABLE URGENT 1V        PROCEDURE DATE:  09/11/2019      Impression:      No radiographic evidence of acute cardiopulmonary disease.      EXAM:  CT HIP ONLY BI        EXAM:  CT PELVIS ONLY        PROCEDURE DATE:  09/05/2019      IMPRESSION:  1.  Superolateral dislocation of the left acetabular cup/femoral head   from the native acetabulum. Adjacent periosteal reaction indicativeof a   subacute etiology.  2.  Chronic/degenerative change as above.      PHYSICAL EXAM:    GENERAL: NAD, well-developed, AAOx3  HEENT:  Atraumatic, Normocephalic.   PULMONARY: Clear to auscultation bilaterally; No wheeze  CARDIOVASCULAR: Regular rate and rhythm; No murmurs, rubs, or gallops  GASTROINTESTINAL: Soft, Nontender, Nondistended; Bowel sounds present  MUSCULOSKELETAL:   No cyanosis or edema  NEUROLOGY: non-focal  SKIN: right forearm erythema and pain resolved. otherwise negative      ASSESSMENT & PLAN    50 y/o M with PMHx of CAD s/p CABG, BPH, CIDP with significant motor and sensory deficits and extreme pain, b/l total hip replacement, anxiety and depression presents to the ED for extreme left hip pain.     # Sepsis- possibly due to right arm cellulitis due to infected IV; resolved  - blood cx positive for Coag neg staph  - urine cx neg  - cxr negative  - IV removed  - tylenol PRN for fever  - on clinda 900mg iv q8  - keep sys BP > 100 at all times, BP improved from yesterday PM when 500ml bolus was given    # Superolateral dislocation of the left total hip arthroplasty  - Ortho following - planned for OR today  - patient bedbound at home 3 years ago  - pain control as per pain management  - on gabapentin to 800 mg TID  - plavix on hold    # Depression and anxiety:  - c/w Buspirone 5 mg q12h, Seroquel 150 mg qd and Klonipin 0.5 mg q8 PO    # Peripheral Vascular Disease  - Cold b/l feet, no pulses palpable, decreased sensation; chronic as per pt  - VA duplex arterial : mod-severe stenosis R superficial femoral artery and mild L tibial vessel   - Vascular f/u appreciated; no acute intervention   - c/w atorvastatin 80 mg qhs    # CIDP:  - Neuro recalled after IVIG completed to reasses  - IVIG started 9/7/2019 x5 days, completed last dose yesterday  - Premedicate with benadryl 25 mg , tylenol already around the clock  - C/w duloxetine 60 mg qd, baclofen 5 mg q12h and gabapentin 800mg TID  - Neurology consult: rec's appreciated, will recall when IVIG is complete  - MRI cervical and thoracic spine and brain noted  - IgA Levels wnl  - SPEP wnl  - awaiting ganglioside abs     # DM II   - higher values were likely related to steroids given for rigors  - FS qac/hs and keep btw 100-180  - Hb A1c 11.4  - c/w [id number] and lisp 12 and adjust til FS < 180    # CAD s/p CABG:  - hold Plavix 75 mg qd - OR in next few days  - c/w statin     # BPH:  - C/w Flomax 0.4 mg qd    # Diet: DASH  # DVT ppx: Lovenox 40 mg qd  # GI ppx: Protonix 40 mg PO qd  # Activity: OOBTC.  Physiatry: not candidate for rehab  # Dispo: From home, was in nursing home before  # Code status: FULL    Handoff: continue to hold plavix for OR in next few days

## 2019-09-13 NOTE — BRIEF OPERATIVE NOTE - NSICDXBRIEFPREOP_GEN_ALL_CORE_FT
PRE-OP DIAGNOSIS:  Failed total hip arthroplasty with dislocation 13-Sep-2019 16:10:42  Hip-Femi Tellez

## 2019-09-14 LAB
ANION GAP SERPL CALC-SCNC: 11 MMOL/L — SIGNIFICANT CHANGE UP (ref 7–14)
ANION GAP SERPL CALC-SCNC: 18 MMOL/L — HIGH (ref 7–14)
ANION GAP SERPL CALC-SCNC: 9 MMOL/L — SIGNIFICANT CHANGE UP (ref 7–14)
APTT BLD: 45.6 SEC — HIGH (ref 27–39.2)
APTT BLD: 53.2 SEC — HIGH (ref 27–39.2)
BUN SERPL-MCNC: 22 MG/DL — HIGH (ref 10–20)
BUN SERPL-MCNC: 24 MG/DL — HIGH (ref 10–20)
BUN SERPL-MCNC: 25 MG/DL — HIGH (ref 10–20)
CALCIUM SERPL-MCNC: 7.9 MG/DL — LOW (ref 8.5–10.1)
CALCIUM SERPL-MCNC: 8 MG/DL — LOW (ref 8.5–10.1)
CALCIUM SERPL-MCNC: 8.2 MG/DL — LOW (ref 8.5–10.1)
CHLORIDE SERPL-SCNC: 100 MMOL/L — SIGNIFICANT CHANGE UP (ref 98–110)
CHLORIDE SERPL-SCNC: 101 MMOL/L — SIGNIFICANT CHANGE UP (ref 98–110)
CHLORIDE SERPL-SCNC: 99 MMOL/L — SIGNIFICANT CHANGE UP (ref 98–110)
CK MB CFR SERPL CALC: 10.9 NG/ML — HIGH (ref 0.6–6.3)
CK MB CFR SERPL CALC: 13.9 NG/ML — HIGH (ref 0.6–6.3)
CK MB CFR SERPL CALC: 9.8 NG/ML — HIGH (ref 0.6–6.3)
CK SERPL-CCNC: 277 U/L — HIGH (ref 0–225)
CK SERPL-CCNC: 277 U/L — HIGH (ref 0–225)
CK SERPL-CCNC: 291 U/L — HIGH (ref 0–225)
CO2 SERPL-SCNC: 16 MMOL/L — LOW (ref 17–32)
CO2 SERPL-SCNC: 22 MMOL/L — SIGNIFICANT CHANGE UP (ref 17–32)
CO2 SERPL-SCNC: 23 MMOL/L — SIGNIFICANT CHANGE UP (ref 17–32)
CREAT SERPL-MCNC: 0.8 MG/DL — SIGNIFICANT CHANGE UP (ref 0.7–1.5)
CREAT SERPL-MCNC: 0.9 MG/DL — SIGNIFICANT CHANGE UP (ref 0.7–1.5)
CREAT SERPL-MCNC: 1 MG/DL — SIGNIFICANT CHANGE UP (ref 0.7–1.5)
ERYTHROCYTE [SEDIMENTATION RATE] IN BLOOD: 107 MM/HR — HIGH (ref 0–10)
GD1A GANGL IGG+IGM SER IA-ACNC: 36 IV — SIGNIFICANT CHANGE UP (ref 0–50)
GD1B GANGL IGG+IGM SER IA-ACNC: 41 IV — SIGNIFICANT CHANGE UP (ref 0–50)
GLUCOSE BLDC GLUCOMTR-MCNC: 198 MG/DL — HIGH (ref 70–99)
GLUCOSE BLDC GLUCOMTR-MCNC: 215 MG/DL — HIGH (ref 70–99)
GLUCOSE BLDC GLUCOMTR-MCNC: 259 MG/DL — HIGH (ref 70–99)
GLUCOSE BLDC GLUCOMTR-MCNC: 268 MG/DL — HIGH (ref 70–99)
GLUCOSE BLDC GLUCOMTR-MCNC: 283 MG/DL — HIGH (ref 70–99)
GLUCOSE SERPL-MCNC: 274 MG/DL — HIGH (ref 70–99)
GLUCOSE SERPL-MCNC: 275 MG/DL — HIGH (ref 70–99)
GLUCOSE SERPL-MCNC: 292 MG/DL — HIGH (ref 70–99)
GM1 ASIALO IGG+IGM SER IA-ACNC: 13 IV — SIGNIFICANT CHANGE UP (ref 0–50)
GM1 GANGL IGG+IGM SER-ACNC: 30 IV — SIGNIFICANT CHANGE UP (ref 0–50)
GM2 GANGL IGG+IGM SER IA-ACNC: 18 IV — SIGNIFICANT CHANGE UP (ref 0–50)
GQ1B GANGL IGG+IGM SER IA-ACNC: 10 IV — SIGNIFICANT CHANGE UP (ref 0–50)
GRAM STN FLD: SIGNIFICANT CHANGE UP
GRAM STN FLD: SIGNIFICANT CHANGE UP
HCT VFR BLD CALC: 27.8 % — LOW (ref 42–52)
HCT VFR BLD CALC: 32.1 % — LOW (ref 42–52)
HGB BLD-MCNC: 10.7 G/DL — LOW (ref 14–18)
HGB BLD-MCNC: 9.5 G/DL — LOW (ref 14–18)
INR BLD: 1.33 RATIO — HIGH (ref 0.65–1.3)
INR BLD: 1.33 RATIO — HIGH (ref 0.65–1.3)
MAGNESIUM SERPL-MCNC: 1.8 MG/DL — SIGNIFICANT CHANGE UP (ref 1.8–2.4)
MAGNESIUM SERPL-MCNC: 2 MG/DL — SIGNIFICANT CHANGE UP (ref 1.8–2.4)
MCHC RBC-ENTMCNC: 26.7 PG — LOW (ref 27–31)
MCHC RBC-ENTMCNC: 26.8 PG — LOW (ref 27–31)
MCHC RBC-ENTMCNC: 33.3 G/DL — SIGNIFICANT CHANGE UP (ref 32–37)
MCHC RBC-ENTMCNC: 34.2 G/DL — SIGNIFICANT CHANGE UP (ref 32–37)
MCV RBC AUTO: 78.3 FL — LOW (ref 80–94)
MCV RBC AUTO: 80 FL — SIGNIFICANT CHANGE UP (ref 80–94)
MRSA PCR RESULT.: POSITIVE
NRBC # BLD: 0 /100 WBCS — SIGNIFICANT CHANGE UP (ref 0–0)
NRBC # BLD: 0 /100 WBCS — SIGNIFICANT CHANGE UP (ref 0–0)
PHOSPHATE SERPL-MCNC: 4.3 MG/DL — SIGNIFICANT CHANGE UP (ref 2.1–4.9)
PHOSPHATE SERPL-MCNC: 4.9 MG/DL — SIGNIFICANT CHANGE UP (ref 2.1–4.9)
PLATELET # BLD AUTO: 147 K/UL — SIGNIFICANT CHANGE UP (ref 130–400)
PLATELET # BLD AUTO: 225 K/UL — SIGNIFICANT CHANGE UP (ref 130–400)
POTASSIUM SERPL-MCNC: 4.7 MMOL/L — SIGNIFICANT CHANGE UP (ref 3.5–5)
POTASSIUM SERPL-MCNC: 5.2 MMOL/L — HIGH (ref 3.5–5)
POTASSIUM SERPL-MCNC: 5.3 MMOL/L — HIGH (ref 3.5–5)
POTASSIUM SERPL-SCNC: 4.7 MMOL/L — SIGNIFICANT CHANGE UP (ref 3.5–5)
POTASSIUM SERPL-SCNC: 5.2 MMOL/L — HIGH (ref 3.5–5)
POTASSIUM SERPL-SCNC: 5.3 MMOL/L — HIGH (ref 3.5–5)
PROTHROM AB SERPL-ACNC: 15.2 SEC — HIGH (ref 9.95–12.87)
PROTHROM AB SERPL-ACNC: 15.3 SEC — HIGH (ref 9.95–12.87)
RBC # BLD: 3.55 M/UL — LOW (ref 4.7–6.1)
RBC # BLD: 4.01 M/UL — LOW (ref 4.7–6.1)
RBC # FLD: 13.4 % — SIGNIFICANT CHANGE UP (ref 11.5–14.5)
RBC # FLD: 13.8 % — SIGNIFICANT CHANGE UP (ref 11.5–14.5)
SODIUM SERPL-SCNC: 132 MMOL/L — LOW (ref 135–146)
SODIUM SERPL-SCNC: 133 MMOL/L — LOW (ref 135–146)
SODIUM SERPL-SCNC: 134 MMOL/L — LOW (ref 135–146)
SPECIMEN SOURCE: SIGNIFICANT CHANGE UP
SPECIMEN SOURCE: SIGNIFICANT CHANGE UP
TROPONIN T SERPL-MCNC: 0.11 NG/ML — CRITICAL HIGH
TROPONIN T SERPL-MCNC: 0.15 NG/ML — CRITICAL HIGH
TROPONIN T SERPL-MCNC: 0.17 NG/ML — CRITICAL HIGH
TROPONIN T SERPL-MCNC: 0.19 NG/ML — CRITICAL HIGH
WBC # BLD: 10.3 K/UL — SIGNIFICANT CHANGE UP (ref 4.8–10.8)
WBC # BLD: 13.92 K/UL — HIGH (ref 4.8–10.8)
WBC # FLD AUTO: 10.3 K/UL — SIGNIFICANT CHANGE UP (ref 4.8–10.8)
WBC # FLD AUTO: 13.92 K/UL — HIGH (ref 4.8–10.8)

## 2019-09-14 PROCEDURE — 99232 SBSQ HOSP IP/OBS MODERATE 35: CPT

## 2019-09-14 PROCEDURE — 93306 TTE W/DOPPLER COMPLETE: CPT | Mod: 26

## 2019-09-14 PROCEDURE — 93010 ELECTROCARDIOGRAM REPORT: CPT

## 2019-09-14 PROCEDURE — 99291 CRITICAL CARE FIRST HOUR: CPT

## 2019-09-14 PROCEDURE — 71045 X-RAY EXAM CHEST 1 VIEW: CPT | Mod: 26

## 2019-09-14 RX ORDER — METOPROLOL TARTRATE 50 MG
5 TABLET ORAL ONCE
Refills: 0 | Status: COMPLETED | OUTPATIENT
Start: 2019-09-14 | End: 2019-09-14

## 2019-09-14 RX ORDER — METOPROLOL TARTRATE 50 MG
12.5 TABLET ORAL EVERY 8 HOURS
Refills: 0 | Status: DISCONTINUED | OUTPATIENT
Start: 2019-09-14 | End: 2019-09-15

## 2019-09-14 RX ORDER — INSULIN HUMAN 100 [IU]/ML
10 INJECTION, SOLUTION SUBCUTANEOUS ONCE
Refills: 0 | Status: DISCONTINUED | OUTPATIENT
Start: 2019-09-14 | End: 2019-09-14

## 2019-09-14 RX ORDER — CHLORHEXIDINE GLUCONATE 213 G/1000ML
1 SOLUTION TOPICAL DAILY
Refills: 0 | Status: DISCONTINUED | OUTPATIENT
Start: 2019-09-14 | End: 2019-01-01

## 2019-09-14 RX ORDER — CEFTRIAXONE 500 MG/1
2000 INJECTION, POWDER, FOR SOLUTION INTRAMUSCULAR; INTRAVENOUS EVERY 24 HOURS
Refills: 0 | Status: DISCONTINUED | OUTPATIENT
Start: 2019-09-14 | End: 2019-01-01

## 2019-09-14 RX ORDER — DEXTROSE 50 % IN WATER 50 %
15 SYRINGE (ML) INTRAVENOUS ONCE
Refills: 0 | Status: DISCONTINUED | OUTPATIENT
Start: 2019-09-14 | End: 2019-01-01

## 2019-09-14 RX ORDER — HYDROXYZINE HCL 10 MG
50 TABLET ORAL AT BEDTIME
Refills: 0 | Status: DISCONTINUED | OUTPATIENT
Start: 2019-09-14 | End: 2019-01-01

## 2019-09-14 RX ORDER — SODIUM CHLORIDE 9 MG/ML
1000 INJECTION INTRAMUSCULAR; INTRAVENOUS; SUBCUTANEOUS
Refills: 0 | Status: DISCONTINUED | OUTPATIENT
Start: 2019-09-14 | End: 2019-09-14

## 2019-09-14 RX ORDER — HYDROXYZINE HCL 10 MG
50 TABLET ORAL AT BEDTIME
Refills: 0 | Status: DISCONTINUED | OUTPATIENT
Start: 2019-09-14 | End: 2019-09-14

## 2019-09-14 RX ORDER — GLUCAGON INJECTION, SOLUTION 0.5 MG/.1ML
1 INJECTION, SOLUTION SUBCUTANEOUS ONCE
Refills: 0 | Status: DISCONTINUED | OUTPATIENT
Start: 2019-09-14 | End: 2019-01-01

## 2019-09-14 RX ORDER — INSULIN HUMAN 100 [IU]/ML
1 INJECTION, SOLUTION SUBCUTANEOUS
Qty: 100 | Refills: 0 | Status: DISCONTINUED | OUTPATIENT
Start: 2019-09-14 | End: 2019-09-14

## 2019-09-14 RX ORDER — SODIUM CHLORIDE 9 MG/ML
250 INJECTION INTRAMUSCULAR; INTRAVENOUS; SUBCUTANEOUS ONCE
Refills: 0 | Status: COMPLETED | OUTPATIENT
Start: 2019-09-14 | End: 2019-09-14

## 2019-09-14 RX ORDER — DULOXETINE HYDROCHLORIDE 30 MG/1
90 CAPSULE, DELAYED RELEASE ORAL DAILY
Refills: 0 | Status: DISCONTINUED | OUTPATIENT
Start: 2019-09-14 | End: 2019-01-01

## 2019-09-14 RX ORDER — MORPHINE SULFATE 50 MG/1
60 CAPSULE, EXTENDED RELEASE ORAL EVERY 12 HOURS
Refills: 0 | Status: DISCONTINUED | OUTPATIENT
Start: 2019-09-14 | End: 2019-01-01

## 2019-09-14 RX ORDER — HYDROMORPHONE HYDROCHLORIDE 2 MG/ML
0.5 INJECTION INTRAMUSCULAR; INTRAVENOUS; SUBCUTANEOUS ONCE
Refills: 0 | Status: DISCONTINUED | OUTPATIENT
Start: 2019-09-14 | End: 2019-09-14

## 2019-09-14 RX ORDER — MORPHINE SULFATE 50 MG/1
10 CAPSULE, EXTENDED RELEASE ORAL EVERY 4 HOURS
Refills: 0 | Status: DISCONTINUED | OUTPATIENT
Start: 2019-09-14 | End: 2019-01-01

## 2019-09-14 RX ORDER — METOPROLOL TARTRATE 50 MG
12.5 TABLET ORAL EVERY 12 HOURS
Refills: 0 | Status: DISCONTINUED | OUTPATIENT
Start: 2019-09-14 | End: 2019-09-14

## 2019-09-14 RX ORDER — VANCOMYCIN HCL 1 G
1250 VIAL (EA) INTRAVENOUS EVERY 12 HOURS
Refills: 0 | Status: DISCONTINUED | OUTPATIENT
Start: 2019-09-14 | End: 2019-01-01

## 2019-09-14 RX ORDER — MUPIROCIN 20 MG/G
1 OINTMENT TOPICAL
Refills: 0 | Status: COMPLETED | OUTPATIENT
Start: 2019-09-14 | End: 2019-01-01

## 2019-09-14 RX ORDER — INSULIN LISPRO 100/ML
VIAL (ML) SUBCUTANEOUS
Refills: 0 | Status: DISCONTINUED | OUTPATIENT
Start: 2019-09-14 | End: 2019-01-01

## 2019-09-14 RX ORDER — INSULIN GLARGINE 100 [IU]/ML
36 INJECTION, SOLUTION SUBCUTANEOUS AT BEDTIME
Refills: 0 | Status: DISCONTINUED | OUTPATIENT
Start: 2019-09-14 | End: 2019-01-01

## 2019-09-14 RX ORDER — MAGNESIUM SULFATE 500 MG/ML
1 VIAL (ML) INJECTION ONCE
Refills: 0 | Status: COMPLETED | OUTPATIENT
Start: 2019-09-14 | End: 2019-09-14

## 2019-09-14 RX ORDER — CLONAZEPAM 1 MG
1 TABLET ORAL THREE TIMES A DAY
Refills: 0 | Status: DISCONTINUED | OUTPATIENT
Start: 2019-09-14 | End: 2019-01-01

## 2019-09-14 RX ORDER — PANTOPRAZOLE SODIUM 20 MG/1
40 TABLET, DELAYED RELEASE ORAL
Refills: 0 | Status: DISCONTINUED | OUTPATIENT
Start: 2019-09-14 | End: 2019-01-01

## 2019-09-14 RX ORDER — HYDROMORPHONE HYDROCHLORIDE 2 MG/ML
1 INJECTION INTRAMUSCULAR; INTRAVENOUS; SUBCUTANEOUS ONCE
Refills: 0 | Status: DISCONTINUED | OUTPATIENT
Start: 2019-09-14 | End: 2019-09-14

## 2019-09-14 RX ORDER — INSULIN LISPRO 100/ML
12 VIAL (ML) SUBCUTANEOUS
Refills: 0 | Status: DISCONTINUED | OUTPATIENT
Start: 2019-09-14 | End: 2019-01-01

## 2019-09-14 RX ADMIN — HYDROMORPHONE HYDROCHLORIDE 0.5 MILLIGRAM(S): 2 INJECTION INTRAMUSCULAR; INTRAVENOUS; SUBCUTANEOUS at 06:00

## 2019-09-14 RX ADMIN — SENNA PLUS 1 TABLET(S): 8.6 TABLET ORAL at 12:18

## 2019-09-14 RX ADMIN — HYDROMORPHONE HYDROCHLORIDE 1 MILLIGRAM(S): 2 INJECTION INTRAMUSCULAR; INTRAVENOUS; SUBCUTANEOUS at 16:12

## 2019-09-14 RX ADMIN — GABAPENTIN 800 MILLIGRAM(S): 400 CAPSULE ORAL at 16:14

## 2019-09-14 RX ADMIN — Medication 50 GRAM(S): at 08:48

## 2019-09-14 RX ADMIN — Medication 12 UNIT(S): at 13:38

## 2019-09-14 RX ADMIN — QUETIAPINE FUMARATE 150 MILLIGRAM(S): 200 TABLET, FILM COATED ORAL at 21:40

## 2019-09-14 RX ADMIN — DULOXETINE HYDROCHLORIDE 90 MILLIGRAM(S): 30 CAPSULE, DELAYED RELEASE ORAL at 12:15

## 2019-09-14 RX ADMIN — Medication 100 MILLIGRAM(S): at 05:19

## 2019-09-14 RX ADMIN — SODIUM CHLORIDE 3000 MILLILITER(S): 9 INJECTION INTRAMUSCULAR; INTRAVENOUS; SUBCUTANEOUS at 06:30

## 2019-09-14 RX ADMIN — Medication 100 MILLIGRAM(S): at 00:32

## 2019-09-14 RX ADMIN — Medication 50 MILLIGRAM(S): at 23:59

## 2019-09-14 RX ADMIN — GABAPENTIN 800 MILLIGRAM(S): 400 CAPSULE ORAL at 21:42

## 2019-09-14 RX ADMIN — Medication 12 UNIT(S): at 17:38

## 2019-09-14 RX ADMIN — HYDROMORPHONE HYDROCHLORIDE 0.5 MILLIGRAM(S): 2 INJECTION INTRAMUSCULAR; INTRAVENOUS; SUBCUTANEOUS at 06:15

## 2019-09-14 RX ADMIN — Medication 4: at 17:38

## 2019-09-14 RX ADMIN — Medication 166.67 MILLIGRAM(S): at 17:46

## 2019-09-14 RX ADMIN — SODIUM CHLORIDE 3000 MILLILITER(S): 9 INJECTION INTRAMUSCULAR; INTRAVENOUS; SUBCUTANEOUS at 21:41

## 2019-09-14 RX ADMIN — BENZOCAINE AND MENTHOL 1 LOZENGE: 5; 1 LIQUID ORAL at 21:44

## 2019-09-14 RX ADMIN — CEFTRIAXONE 100 MILLIGRAM(S): 500 INJECTION, POWDER, FOR SOLUTION INTRAMUSCULAR; INTRAVENOUS at 17:38

## 2019-09-14 RX ADMIN — INSULIN GLARGINE 36 UNIT(S): 100 INJECTION, SOLUTION SUBCUTANEOUS at 21:41

## 2019-09-14 RX ADMIN — ATORVASTATIN CALCIUM 80 MILLIGRAM(S): 80 TABLET, FILM COATED ORAL at 21:44

## 2019-09-14 RX ADMIN — MORPHINE SULFATE 60 MILLIGRAM(S): 50 CAPSULE, EXTENDED RELEASE ORAL at 17:34

## 2019-09-14 RX ADMIN — TAMSULOSIN HYDROCHLORIDE 0.4 MILLIGRAM(S): 0.4 CAPSULE ORAL at 21:40

## 2019-09-14 RX ADMIN — Medication 81 MILLIGRAM(S): at 12:16

## 2019-09-14 RX ADMIN — Medication 5 MILLIGRAM(S): at 17:39

## 2019-09-14 RX ADMIN — MEROPENEM 100 MILLIGRAM(S): 1 INJECTION INTRAVENOUS at 05:19

## 2019-09-14 RX ADMIN — HYDROMORPHONE HYDROCHLORIDE 0.5 MILLIGRAM(S): 2 INJECTION INTRAMUSCULAR; INTRAVENOUS; SUBCUTANEOUS at 20:24

## 2019-09-14 RX ADMIN — Medication 5 MILLIGRAM(S): at 19:35

## 2019-09-14 RX ADMIN — Medication 250 MILLIGRAM(S): at 00:31

## 2019-09-14 RX ADMIN — BENZOCAINE AND MENTHOL 1 LOZENGE: 5; 1 LIQUID ORAL at 16:13

## 2019-09-14 RX ADMIN — Medication 1 MILLIGRAM(S): at 21:42

## 2019-09-14 RX ADMIN — Medication 100 MILLIGRAM(S): at 05:12

## 2019-09-14 RX ADMIN — Medication 12.5 MILLIGRAM(S): at 17:39

## 2019-09-14 RX ADMIN — Medication 5 MILLIGRAM(S): at 05:06

## 2019-09-14 RX ADMIN — HYDROMORPHONE HYDROCHLORIDE 0.5 MILLIGRAM(S): 2 INJECTION INTRAMUSCULAR; INTRAVENOUS; SUBCUTANEOUS at 20:25

## 2019-09-14 RX ADMIN — Medication 6: at 13:38

## 2019-09-14 RX ADMIN — HYDROMORPHONE HYDROCHLORIDE 1 MILLIGRAM(S): 2 INJECTION INTRAMUSCULAR; INTRAVENOUS; SUBCUTANEOUS at 13:57

## 2019-09-14 RX ADMIN — GABAPENTIN 800 MILLIGRAM(S): 400 CAPSULE ORAL at 05:19

## 2019-09-14 RX ADMIN — Medication 5 MILLIGRAM(S): at 21:43

## 2019-09-14 RX ADMIN — Medication 100 MILLIGRAM(S): at 12:17

## 2019-09-14 RX ADMIN — Medication 12.5 MILLIGRAM(S): at 12:15

## 2019-09-14 RX ADMIN — Medication 10 MILLIGRAM(S): at 17:39

## 2019-09-14 RX ADMIN — Medication 250 MILLIGRAM(S): at 05:08

## 2019-09-14 RX ADMIN — Medication 1 MILLIGRAM(S): at 14:18

## 2019-09-14 RX ADMIN — HYDROMORPHONE HYDROCHLORIDE 0.5 MILLIGRAM(S): 2 INJECTION INTRAMUSCULAR; INTRAVENOUS; SUBCUTANEOUS at 23:58

## 2019-09-14 RX ADMIN — Medication 6: at 07:32

## 2019-09-14 RX ADMIN — HYDROMORPHONE HYDROCHLORIDE 0.5 MILLIGRAM(S): 2 INJECTION INTRAMUSCULAR; INTRAVENOUS; SUBCUTANEOUS at 23:59

## 2019-09-14 RX ADMIN — HYDROMORPHONE HYDROCHLORIDE 1 MILLIGRAM(S): 2 INJECTION INTRAMUSCULAR; INTRAVENOUS; SUBCUTANEOUS at 09:54

## 2019-09-14 NOTE — CONSULT NOTE ADULT - CONSULT REQUESTED DATE/TIME
03-Sep-2019 22:11
04-Sep-2019 12:44
04-Sep-2019 13:01
04-Sep-2019 13:29
04-Sep-2019 14:17
06-Sep-2019 21:50
13-Sep-2019 20:04
14-Sep-2019 10:00

## 2019-09-14 NOTE — PROGRESS NOTE ADULT - SUBJECTIVE AND OBJECTIVE BOX
NAPOLEON CAST  [id number]  49y Male    Indication for ICU admission: s/p failed total hip arthoplasty w/ dislocation s/p explanation of total hip arthroplasty  Admit Date:09-03-19  ICU Date:   OR Date:    penicillins (Unknown)    PAST MEDICAL & SURGICAL HISTORY:  CIDP (chronic inflammatory demyelinating polyneuropathy)  History of cholecystectomy  History of total left hip replacement  History of total right hip replacement: bilateral  S/P CABG x 5    Home Medications:  Atarax 50 mg oral tablet: 1 tab(s) orally once a day (at bedtime) (03 Sep 2019 16:43)  baclofen 10 mg oral tablet: 1 tab(s) orally 2 times a day (03 Sep 2019 16:43)  busPIRone 5 mg oral tablet: 1 tab(s) orally 2 times a day (03 Sep 2019 16:43)  DULoxetine 60 mg oral delayed release capsule: 1 cap(s) orally once a day (03 Sep 2019 16:43)  Flomax 0.4 mg oral capsule: 1 cap(s) orally once a day (03 Sep 2019 16:43)  gabapentin 400 mg oral tablet: 1 tab(s) orally 3 times a day (before meals) (03 Sep 2019 16:43)  morphine 10 mg/5 mL oral solution: 5 milligram(s) orally every 3 hours, As Needed (03 Sep 2019 16:43)  morphine 60 mg oral capsule, extended release: 1 cap(s) orally every 12 hours (03 Sep 2019 16:43)  Plavix 75 mg oral tablet: 1 tab(s) orally once a day (03 Sep 2019 16:43)  SEROquel  mg oral tablet, extended release: 1 tab(s) orally once a day (in the evening) (03 Sep 2019 16:43)  Xanax 1 mg oral tablet: 1 tab(s) orally 3 times a day (03 Sep 2019 16:43)        24HRS EVENT:  Neuro: Dx depression/anxiety. Followed by psych and pain management. Monitor for changes in mental status. Restarted buspirone, klonipin, cymbalta, seroquel. Pain controlled with tylenol, baclofen, voltaren, gabapentin, dilaudid PRN.     Resp: NC satting well. Encourage IS. AM CXR ordered.     Cards: Dx: CAD, CABG x5 vessels, HLD. Post op EKG sinus tachy. Ordered echo. CE x3 ordered, pending. Restarted statin. NICOM when patient gets to the unit. Please order EKG for AM     GI: NPO. LR @ 150. PPI/Phylicia.     : Dx of BPH restarted flomax. No graham. Monitor UOP. K post op 5.0 w/o EKG changes. Cr 0.9 baseline. Monitor lytes.     Heme: On ASA/Lovenox. Holding plavix as per primary team. Recieved 1 unit of PRBC post op by primary team. Monitor H/H     ID: Clindamycin pre-op.  Afebrile. WBC 24. Blood cultures from 9/11 final coag staph negative w/o sensitivities. Blood culture 9/12 NGTD prelim. Urine cx 9/11 negative. Started Vanco/Ethel/Flagyl.     Endo: Dx of DM. Started ISS w/ FS Q 6 hours.     MSK: Bed bound, oob to chair as per ortho.       DVT PTX: DVT    GI PTX: PPI    ***Tubes/Lines/Drains  ***  Peripheral IV  Central Venous Line     	Date 9/13  Arterial Line	fem	                Date  9/13      REVIEW OF SYSTEMS    [ x] A ten-point review of systems was otherwise negative except as noted.  [ ] Due to altered mental status/intubation, subjective information were not able to be obtained from the patient. History was obtained, to the extent possible, from review of the chart and collateral sources of information. NAPOLEON CAST  [id number]  49y Male    Indication for ICU admission: s/p failed total hip arthoplasty w/ dislocation s/p explanation of hardware, hypotension required pressors in PACU  Admit Date:09-03-19  ICU Date: 9/13  OR Date: 9/13    penicillins (Unknown)    PAST MEDICAL & SURGICAL HISTORY:  CIDP (chronic inflammatory demyelinating polyneuropathy)  History of cholecystectomy  History of total left hip replacement  History of total right hip replacement: bilateral  S/P CABG x 5    Home Medications:  Atarax 50 mg oral tablet: 1 tab(s) orally once a day (at bedtime) (03 Sep 2019 16:43)  baclofen 10 mg oral tablet: 1 tab(s) orally 2 times a day (03 Sep 2019 16:43)  busPIRone 5 mg oral tablet: 1 tab(s) orally 2 times a day (03 Sep 2019 16:43)  DULoxetine 60 mg oral delayed release capsule: 1 cap(s) orally once a day (03 Sep 2019 16:43)  Flomax 0.4 mg oral capsule: 1 cap(s) orally once a day (03 Sep 2019 16:43)  gabapentin 400 mg oral tablet: 1 tab(s) orally 3 times a day (before meals) (03 Sep 2019 16:43)  morphine 10 mg/5 mL oral solution: 5 milligram(s) orally every 3 hours, As Needed (03 Sep 2019 16:43)  morphine 60 mg oral capsule, extended release: 1 cap(s) orally every 12 hours (03 Sep 2019 16:43)  Plavix 75 mg oral tablet: 1 tab(s) orally once a day (03 Sep 2019 16:43)  SEROquel  mg oral tablet, extended release: 1 tab(s) orally once a day (in the evening) (03 Sep 2019 16:43)  Xanax 1 mg oral tablet: 1 tab(s) orally 3 times a day (03 Sep 2019 16:43)        24HRS EVENT:    overnight: episodes of desaturation to 88-89%, low grade tachy to 110, nicom __, refused graham for strict i&os    Neuro: Dx depression/anxiety   -Followed by psych and pain management.  -Restarted buspirone, klonipin, cymbalta, seroquel  -Pain controlled with tylenol, baclofen, voltaren, gabapentin, dilaudid PRN.     Resp: NC satting well. Encourage IS. f/u CXR in am    Cards: Dx: CAD, CABG x5 vessels, HLD  -Post op EKG sinus tachy, NICOM __  -echo pending   -CE 0.11>0.04, 4am__ >10am __  -Restarted statin   -AM EKG__    GI: NPO except meds,  PPI/[place].     : Dx of BPH restarted flomax.   -No graham, refusing  -voided 400cc post op  -Cr at baseline 0.9  -hyponatremia 133  -K post op 5.0 w/o EKG changes    Heme:  -On home  ASA for CAD  -DVT ppx with Lovenox 40qd  -Holding plavix as per primary team.   -Recieved 1 unit of PRBC post op by primary team  -Hg 10.7 pre op post op 9.7 > f/u 4am CBC __    ID: ?septic joint, Stimulan beads in place (vanc/tobramycin)  -recieved Clindamycin pre-op.  -Afebrile, WBC rising 24<20<13  -bld cx 9/11: final coag staph negative w/o sensitivities  -bld cx 9/12 NGTD prelim  -Urine cx 9/11 final negative  -on Vanco/Ethel/Flagyl/clinda    Endo: Dx of DM. Started ISS w/ FS Q 6 hours.     MSK: Bed bound, oob to chair as per ortho.       DVT PTX: DVT    GI PTX: PPI    ***Tubes/Lines/Drains  ***  Peripheral IV  Central Venous Line     	Date 9/13  Arterial Line	fem	                Date  9/13      REVIEW OF SYSTEMS    [ x] A ten-point review of systems was otherwise negative except as noted.  [ ] Due to altered mental status/intubation, subjective information were not able to be obtained from the patient. History was obtained, to the extent possible, from review of the chart and collateral sources of information. NAPOLEON CAST  [id number]  49y Male    Indication for ICU admission: s/p failed total hip arthoplasty w/ dislocation s/p explanation of hardware, hypotension required pressors in PACU  Admit Date:19  ICU Date:   OR Date:     penicillins (Unknown)    PAST MEDICAL & SURGICAL HISTORY:  CIDP (chronic inflammatory demyelinating polyneuropathy)  History of cholecystectomy  History of total left hip replacement  History of total right hip replacement: bilateral  S/P CABG x 5    Home Medications:  Atarax 50 mg oral tablet: 1 tab(s) orally once a day (at bedtime) (03 Sep 2019 16:43)  baclofen 10 mg oral tablet: 1 tab(s) orally 2 times a day (03 Sep 2019 16:43)  busPIRone 5 mg oral tablet: 1 tab(s) orally 2 times a day (03 Sep 2019 16:43)  DULoxetine 60 mg oral delayed release capsule: 1 cap(s) orally once a day (03 Sep 2019 16:43)  Flomax 0.4 mg oral capsule: 1 cap(s) orally once a day (03 Sep 2019 16:43)  gabapentin 400 mg oral tablet: 1 tab(s) orally 3 times a day (before meals) (03 Sep 2019 16:43)  morphine 10 mg/5 mL oral solution: 5 milligram(s) orally every 3 hours, As Needed (03 Sep 2019 16:43)  morphine 60 mg oral capsule, extended release: 1 cap(s) orally every 12 hours (03 Sep 2019 16:43)  Plavix 75 mg oral tablet: 1 tab(s) orally once a day (03 Sep 2019 16:43)  SEROquel  mg oral tablet, extended release: 1 tab(s) orally once a day (in the evening) (03 Sep 2019 16:43)  Xanax 1 mg oral tablet: 1 tab(s) orally 3 times a day (03 Sep 2019 16:43)        24HRS EVENT:    overnight: episodes of desaturation to 88-89%, low grade tachy to 110, nicom negative 8, HR decreased to 90, refused graham for strict i&os    Neuro: Dx depression/anxiety   -Followed by psych and pain management.  -Restarted buspirone, klonipin, cymbalta, seroquel  -Pain controlled with tylenol, baclofen, voltaren, gabapentin, dilaudid PRN.     Resp: NC satting well. Encourage IS. f/u CXR in am    Cards: Dx: CAD, CABG x5 vessels, HLD  -Post op EKG sinus tachy, NICOM __  -echo pending   -CE 0.11>0.04, 4am 0.15 > f/u 10am trop  -Restarted statin   -AM EKG stable    GI: NPO except meds,  PPI/[place].     : Dx of BPH restarted flomax.   -No graham, refusing  -voided 400cc post op  -Cr at baseline 0.9  -hyponatremia 133  -K post op 5.0 w/o EKG changes    Heme:  -On home  ASA for CAD  -DVT ppx with Lovenox 40qd  -Holding plavix as per primary team.   -Recieved 1 unit of PRBC post op by primary team  -Hg 10.7 pre op post op 9.7 >     ID: ?septic joint, Stimulan beads in place (vanc/tobramycin)  -received Clindamycin pre-op.  -Afebrile, WBC rising 24<20<13  -bld cx : final coag staph negative w/o sensitivities  -bld cx  NGTD prelim  -Urine cx  final negative  -on Vanco/Ethel/Flagyl/clinda    Endo: Dx of DM. Started ISS w/ FS Q 6 hours.     MSK: Bed bound, oob to chair as per ortho.       DVT PTX: DVT    GI PTX: PPI    ***Tubes/Lines/Drains  ***  Peripheral IV  Central Venous Line     	Date   Arterial Line	fem	                Date        REVIEW OF SYSTEMS    [ x] A ten-point review of systems was otherwise negative except as noted.  [ ] Due to altered mental status/intubation, subjective information were not able to be obtained from the patient. History was obtained, to the extent possible, from review of the chart and collateral sources of information.      HD: 11d  Daily Height in cm: 180.34 (13 Sep 2019 13:28)    Daily Weight in k.5 (14 Sep 2019 00:00)    Diet, Consistent Carbohydrate/No Snacks (19 @ 09:06)      CURRENT MEDS:  Neurologic Medications  acetaminophen   Tablet .. 650 milliGRAM(s) Oral every 6 hours PRN Temp greater or equal to 38C (100.4F)  baclofen 10 milliGRAM(s) Oral two times a day  busPIRone 5 milliGRAM(s) Oral two times a day  clonazePAM  Tablet 0.5 milliGRAM(s) Oral every 8 hours PRN anxiety  diclofenac 75 milliGRAM(s) Oral two times a day PRN Moderate Pain (4 - 6)  DULoxetine 60 milliGRAM(s) Oral daily  gabapentin 800 milliGRAM(s) Oral three times a day  HYDROmorphone  Injectable 0.5 milliGRAM(s) IV Push every 4 hours PRN Moderate Pain (4 - 6)  HYDROmorphone  Injectable 1 milliGRAM(s) IV Push every 4 hours PRN Severe Pain (7 - 10)  hydrOXYzine  Oral Tab/Cap - Peds 50 milliGRAM(s) Oral at bedtime PRN Itching  QUEtiapine 150 milliGRAM(s) Oral at bedtime    Respiratory Medications    Cardiovascular Medications  tamsulosin 0.4 milliGRAM(s) Oral at bedtime    Gastrointestinal Medications  dextrose 5%. 1000 milliLiter(s) IV Continuous <Continuous>  docusate sodium 100 milliGRAM(s) Oral daily  senna 1 Tablet(s) Oral daily  sodium chloride 0.9%. 1000 milliLiter(s) IV Continuous <Continuous>    Genitourinary Medications    Hematologic/Oncologic Medications  aspirin  chewable 81 milliGRAM(s) Oral daily  enoxaparin Injectable 40 milliGRAM(s) SubCutaneous daily    Antimicrobial/Immunologic Medications  clindamycin IVPB 900 milliGRAM(s) IV Intermittent every 8 hours  meropenem  IVPB 1000 milliGRAM(s) IV Intermittent every 8 hours  metroNIDAZOLE  IVPB 500 milliGRAM(s) IV Intermittent every 8 hours  metroNIDAZOLE  IVPB      vancomycin  IVPB 1000 milliGRAM(s) IV Intermittent every 12 hours    Endocrine/Metabolic Medications  atorvastatin 80 milliGRAM(s) Oral at bedtime  dextrose 50% Injectable 25 Gram(s) IV Push once  dextrose 50% Injectable 25 Gram(s) IV Push once  insulin glargine Injectable (LANTUS) 36 Unit(s) SubCutaneous at bedtime  insulin lispro (HumaLOG) corrective regimen sliding scale   SubCutaneous three times a day before meals  insulin lispro Injectable (HumaLOG) 12 Unit(s) SubCutaneous three times a day before meals    Topical/Other Medications  benzocaine 15 mG/menthol 3.6 mG Lozenge 1 Lozenge Oral three times a day      ICU Vital Signs Last 24 Hrs  T(C): 37.2 (14 Sep 2019 04:00), Max: 37.7 (14 Sep 2019 00:00)  T(F): 98.9 (14 Sep 2019 04:00), Max: 99.9 (14 Sep 2019 00:00)  HR: 112 (14 Sep 2019 07:00) (83 - 136)  BP: 138/79 (14 Sep 2019 00:00) (64/48 - 138/79)  BP(mean): 98 (14 Sep 2019 00:00) (38 - 98)  ABP: 172/88 (14 Sep 2019 07:00) (104/64 - 182/92)  ABP(mean): 114 (14 Sep 2019 07:00) (74 - 120)  RR: 19 (14 Sep 2019 07:00) (9 - 33)  SpO2: 99% (14 Sep 2019 08:57) (91% - 100%)      I&O's Summary    13 Sep 2019 07:  -  14 Sep 2019 07:00  --------------------------------------------------------  IN: 4071.6 mL / OUT: 550 mL / NET: 3521.6 mL      I&O's Detail    13 Sep 2019 07:  -  14 Sep 2019 07:00  --------------------------------------------------------  IN:    IV PiggyBack: 700 mL    lactated ringers.: 975 mL    norepinephrine Infusion: 26.6 mL    Packed Red Blood Cells: 320 mL    phenylephrine   Infusion: 70 mL    Sodium Chloride 0.9% IV Bolus: 1000 mL    sodium chloride 0.9%.: 980 mL  Total IN: 4071.6 mL    OUT:    Voided: 550 mL  Total OUT: 550 mL    Total NET: 3521.6 mL          PHYSICAL EXAM:    General/Neuro  GCS:     = 15  Deficits:    alert & oriented x 3, no focal deficits      Lungs:      clear to auscultation, Normal expansion/effort.     Cardiovascular : S1, S2.  Regular rate and rhythm.  Peripheral edema   Cardiac Rhythm: Normal Sinus Rhythm    GI: Abdomen soft, Non-tender, Non-distended.      Extremities: RLE pale, cold, LLE  externally rotated. Palp pulse    Derm: Good skin turgor, no skin breakdown.          CXR:     LABS:    Labs:  CAPILLARY BLOOD GLUCOSE      POCT Blood Glucose.: 259 mg/dL (14 Sep 2019 08:57)  POCT Blood Glucose.: 268 mg/dL (14 Sep 2019 07:07)  POCT Blood Glucose.: 284 mg/dL (13 Sep 2019 21:55)  POCT Blood Glucose.: 114 mg/dL (13 Sep 2019 11:11)                          9.5    10.30 )-----------( 147      ( 14 Sep 2019 05:47 )             27.8             134<L>  |  101  |  24<H>  ----------------------------<  275<H>  5.2<H>   |  22  |  0.9      Calcium, Total Serum: 8.0 mg/dL (19 @ 05:47)      LFTs:         Coags:     15.20  ----< 1.33    ( 14 Sep 2019 05:47 )     53.2        CARDIAC MARKERS ( 14 Sep 2019 05:47 )  x     / 0.15 ng/mL / 291 U/L / x     / 13.9 ng/mL  CARDIAC MARKERS ( 13 Sep 2019 19:30 )  x     / 0.04 ng/mL / 189 U/L / x     / 3.5 ng/mL  CARDIAC MARKERS ( 13 Sep 2019 01:04 )  x     / 0.11 ng/mL / 277 U/L / x     / 9.8 ng/mL              Culture - Blood (collected 12 Sep 2019 07:28)  Source: .Blood None  Preliminary Report (13 Sep 2019 14:01):    No growth to date.    Culture - Blood (collected 11 Sep 2019 11:00)  Source: .Blood Blood  Gram Stain (12 Sep 2019 15:59):    Growth in aerobic bottle: Gram Positive Cocci in Clusters  Final Report (13 Sep 2019 23:15):    Growth in aerobic bottle: Coag Negative Staphylococcus "Susceptibilities    not performed"    "Due to technical problems, Proteus sp. will Not be reported as part of    the BCID panel until further notice"    ***Blood Panel PCR results on this specimen areavailable    approximately 3 hours after the Gram stain result.***    Gram stain, PCR, and/or culture results may not always    correspond due to difference in methodologies.    ************************************************************    This PCR assay wasperformed using [employer].    The following targets are tested for: Enterococcus,    vancomycin resistant enterococci, Listeria monocytogenes,    coagulase negative staphylococci, S. aureus,    methicillin resistant S. aureus, Streptococcus agalactiae    (Group B), S. pneumoniae, S. pyogenes (Group A),    Acinetobacter baumannii, Enterobacter cloacae, E. coli,    Klebsiella oxytoca, K. pneumoniae, Proteus sp.,    Serratia marcescens, Haemophilus influenzae,    Neisseria meningitidis, Pseudomonas aeruginosa, Candida    albicans, C. glabrata, C krusei, C parapsilosis,    C. tropicalis and the KPC resistance gene.  Organism: Blood Culture PCR (13 Sep 2019 23:15)  Organism: Blood Culture PCR (13 Sep 2019 23:15)

## 2019-09-14 NOTE — CONSULT NOTE ADULT - ASSESSMENT
ASSESSMENT  50 y/o M with PMHx of CAD s/p CABG, BPH, CIDP with significant motor and sensory deficits and extreme pain, b/l total hip replacement, anxiety and depression presents to the ED for extreme left hip pain, s/p failed total hip arthoplasty w/ dislocation s/p explanation of hardware, hypotension required pressors in PACU    IMPRESSION  #Suspected R hip infection with hardware, s/p OR 9/13 Explantation of total hip arthroplasty , abx beads placed  #9/11 Bcx CoNS likely contaminant 9/12 BCX NG  #Hyponatremia  #Elevated trop/CKMB  #Abx allergy: PCN     RECOMMENDATIONS  - f/u OR cultures  - CHANGE abx to Ceftriaxone 2g q24h IV (Aware of PCN allergy) And Vanc 1.25 q12h IV  - Please check vanc trough 30 min prior to 4th dose   - ESR/CRP  - MRSA PCR + --> mupirocin to nares BID and CHG daily washes x 7 days     Spectra [id number] ASSESSMENT  50 y/o M with PMHx of CAD s/p CABG, BPH, CIDP with significant motor and sensory deficits and extreme pain, b/l total hip replacement, anxiety and depression presents to the ED for extreme left hip pain, s/p failed total hip arthoplasty w/ dislocation s/p explanation of hardware, hypotension required pressors in PACU    IMPRESSION  #Suspected R hip infection with hardware, s/p OR 9/13 Explantation of total hip arthroplasty , abx beads placed  #9/11 Bcx CoNS likely contaminant 9/12 BCX NG  #Hyponatremia  #Elevated trop/CKMB, suspect demand ischemia  #Abx allergy: PCN childhood - was told by mother not to take    RECOMMENDATIONS  - f/u OR cultures  - CHANGE abx to Ceftriaxone 2g q24h IV (Aware of PCN allergy) And Vanc 1.25 q12h IV  - Please check vanc trough 30 min prior to 4th dose   - ESR/CRP  - MRSA PCR + --> mupirocin to nares BID and CHG daily washes x 7 days     Spectra [id number]

## 2019-09-14 NOTE — CONSULT NOTE ADULT - REASON FOR ADMISSION
Left hip pain

## 2019-09-14 NOTE — PROGRESS NOTE ADULT - ASSESSMENT
IMP: 48 y/o M with CIDP syndrome with Infected left hip - Now POD #1 from Hemiarthroplasty  for removal of hardware with ABX infusion for septic joint, recent completion of IVIG          PLAN:Continue Gabapentin, Baclofen and Cymbalta  IVIG 1gm/kg Q3 weeks infuse over 6 hours with pre-medication w/Benadryl and APAP starting 3 weeks from last dose  f/u on ganglioside abs  Continue current care

## 2019-09-14 NOTE — PROGRESS NOTE ADULT - SUBJECTIVE AND OBJECTIVE BOX
Neurology Follow up note      Name  NAPOLEON CAST    HPI:  48 y/o M with PMHx of CAD s/p CABG, BPH, CIDP with significant motor and sensory deficits and extreme pain, b/l total hip replacement, anxiety and depression presents to the ED for extreme left hip pain. He mentions that this has been going for some time and it was well managed by Dr. Camarena but it has been harder to control the pain recently and he was sent by her for pain management and further management. Patient is bedbound and is unable to visit doctors and his condition has been managed by telemetry. He also complains of worsening depression and anxiety, he is also claustrophobic and is complaining of staying in small confined places. He denies suicidal ideation.   In ED, VS wnl, CT head negative for intracranial bleed. Xray of left femur showed superolateral dislocation of the left total hip arthroplasty since the prior exam in 2016.    Attd: very complex and long, chr hx; pt had DM, HTN, DLD as young man coupled w/ high stress jobs ( in [employer] for [place] - lots of travel); had CAD and CABG in 2011; shortly after developed odd neuro conditions and finally dx'd in 2013 w/ CDIP by [facility]; He had become weaker over time and fell and broke rt hip - s/p ORIF and shortly after fell again on lt hip and had ORIF on lt; pt was doing STR, but pain kept getting worse in lt hip; pt eventually became homebound and bedbound (2/2 pain and CIDP); overtime; lt leg seemed shorter and externally rotated, pain kept increasing; pt presents now w/ a myriad of unaddressed complaints from yrs of poor f/u:  1) has extreme pain lt hip w/ deformity; meds are not working - asked for ortho eval  2) CDIP is causing neuropathic pains all over and he has not followed up w/ neuro - asked for pain mngmt eval  3) he thinks he may have had TIA w/ numbness in face and arm, but he is not sure how it relates to CIDP - asked for neuro eval  4) pt not walking well at all and developed b/l dropped feet - pt asking for neuro and ortho for options - I told him that if he is not strong enough to walk, it is likely not a good idea to operate on his dropped feet (luc given his operative risk) - he has zero mvmt in his legs currently  5) pt has not been to cardio in yrs; has hx of DM and CABG; denies CP per se - pt asked for cardio eval (will need anyway if lt hip to be surgically fixed for pain control)  6) pt is considering rehab again to increase mobility, even if just to assist in transfers so that he can get out of house to see MDs - pt asked for rehab eval  7) pt is every depressed and anxious given all his ailments at such a young age; he is missing his dtrs grow up and can no longer work or even function at an independent level - pt asked for a psych eval to adjust meds  8) pt having trouble trying to get to MD offices - pt asked for social serv eval for asst. Pt has HHA for 12 hrs/day (10 am to 10 pm). (03 Sep 2019 15:38)      Interval History - Pt is POD # 0- 1 from left hip hemiarthroplasty-  removal of hardware with  infusion of ABX within joints.. Pt c/o pain. No acute changes in exam..          Vital Signs Last 24 Hrs  T(C): 37.7 (14 Sep 2019 00:00), Max: 37.7 (14 Sep 2019 00:00)  T(F): 99.9 (14 Sep 2019 00:00), Max: 99.9 (14 Sep 2019 00:00)  HR: 108 (14 Sep 2019 01:00) (83 - 136)  BP: 138/79 (14 Sep 2019 00:00) (64/48 - 138/79)  BP(mean): 98 (14 Sep 2019 00:00) (38 - 98)  RR: 21 (14 Sep 2019 01:00) (9 - 33)  SpO2: 91% (14 Sep 2019 01:00) (91% - 100%)          Neurological Exam:   Mental status: Awakens to voice   Motor: UE's - 4/5 UE b/l,   LLE 1/5 RLE 2/5     Sensation: diminished b/l LE light touch  Coordination: No dysmetria on finger-to-nose.    Reflexes: clonus on RLE, 2+ reflex UE b/l   Gait: Non-ambulatory, LLE externally rotated.    Medications  acetaminophen   Tablet .. 650 milliGRAM(s) Oral every 6 hours PRN  aspirin  chewable 81 milliGRAM(s) Oral daily  atorvastatin 80 milliGRAM(s) Oral at bedtime  baclofen 10 milliGRAM(s) Oral two times a day  benzocaine 15 mG/menthol 3.6 mG Lozenge 1 Lozenge Oral three times a day  busPIRone 5 milliGRAM(s) Oral two times a day  clindamycin IVPB 900 milliGRAM(s) IV Intermittent every 8 hours  clonazePAM  Tablet 0.5 milliGRAM(s) Oral every 8 hours PRN  dextrose 5%. 1000 milliLiter(s) IV Continuous <Continuous>  dextrose 50% Injectable 25 Gram(s) IV Push once  dextrose 50% Injectable 25 Gram(s) IV Push once  diclofenac 75 milliGRAM(s) Oral two times a day PRN  docusate sodium 100 milliGRAM(s) Oral daily  DULoxetine 60 milliGRAM(s) Oral daily  enoxaparin Injectable 40 milliGRAM(s) SubCutaneous daily  gabapentin 800 milliGRAM(s) Oral three times a day  HYDROmorphone  Injectable 0.5 milliGRAM(s) IV Push every 4 hours PRN  HYDROmorphone  Injectable 1 milliGRAM(s) IV Push every 4 hours PRN  hydrOXYzine  Oral Tab/Cap - Peds 50 milliGRAM(s) Oral at bedtime PRN  insulin lispro (HumaLOG) corrective regimen sliding scale   SubCutaneous three times a day before meals  meropenem  IVPB 1000 milliGRAM(s) IV Intermittent every 8 hours  metroNIDAZOLE  IVPB 500 milliGRAM(s) IV Intermittent every 8 hours  metroNIDAZOLE  IVPB      QUEtiapine 150 milliGRAM(s) Oral at bedtime  senna 1 Tablet(s) Oral daily  sodium chloride 0.9% Bolus 250 milliLiter(s) IV Bolus once  sodium chloride 0.9%. 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 He mentions that this has been going for some time and it was well managed by Dr. Camarena but it has been harder to control the pain recently and he was sent by her for pain management and further management. Patient is bedbound and is unable to visit doctors and his condition has been managed by telemetry. He also complains of worsening depression and anxiety, he is also claustrophobic and is complaining of staying in small confined places. He denies suicidal ideation.   In ED, VS wnl, CT head negative for intracranial bleed. Xray of left femur showed superolateral dislocation of the left total hip arthroplasty since the prior exam in 2016.    Attd: very complex and long, chr hx; pt had DM, HTN, DLD as young man coupled w/ high stress jobs ( in [employer] [place] [employer] [place] [employer] - lots of travel); had CAD and CABG in 2011; shortly after developed odd neuro conditions and finally dx'd in 2013 w/ CDIP by [facility]; He had become weaker over time and fell and broke rt hip - s/p ORIF and shortly after fell again on lt hip and had ORIF on lt; pt was doing STR, but pain kept getting worse in lt hip; pt eventually became homebound and bedbound (2/2 pain and CIDP); overtime; lt leg seemed shorter and externally rotated, pain kept increasing; pt presents now w/ a myriad of unaddressed complaints from yrs of poor f/u:  1) has extreme pain lt hip w/ deformity; meds are not working - asked for ortho eval  2) CDIP is causing neuropathic pains all over and he has not followed up w/ neuro - asked for pain mngmt eval  3) he thinks he may have had TIA w/ numbness in face and arm, but he is not sure how it relates to CIDP - asked for neuro eval  4) pt not walking well at all and developed b/l dropped feet - pt asking for neuro and ortho for options - I told him that if he is not strong enough to walk, it is likely not a good idea to operate on his dropped feet (luc given his operative risk) - he has zero mvmt in his legs currently  5) pt has not been to cardio in yrs; has hx of DM and CABG; denies CP per se - pt asked for cardio eval (will need anyway if lt hip to be surgically fixed for pain control)  6) pt is considering rehab again to increase mobility, even if just to assist in transfers so that he can get out of house to see MDs - pt asked for rehab eval  7) pt is every depressed and anxious given all his ailments at such a young age; he is missing his dtrs grow up and can no longer work or even function at an independent level - pt asked for a psych eval to adjust meds  8) pt having trouble trying to get to MD offices - pt asked for social serv eval for asst. Pt has HHA for 12 hrs/day (10 am to 10 pm). (03 Sep 2019 15:38)      Interval History - Pt is POD # 0- 1 from left hip hemiarthroplasty-  removal of hardware with  infusion of ABX within joints.. Pt c/o pain. No acute changes in exam..          Vital Signs Last 24 Hrs  T(C): 37.7 (14 Sep 2019 00:00), Max: 37.7 (14 Sep 2019 00:00)  T(F): 99.9 (14 Sep 2019 00:00), Max: 99.9 (14 Sep 2019 00:00)  HR: 108 (14 Sep 2019 01:00) (83 - 136)  BP: 138/79 (14 Sep 2019 00:00) (64/48 - 138/79)  BP(mean): 98 (14 Sep 2019 00:00) (38 - 98)  RR: 21 (14 Sep 2019 01:00) (9 - 33)  SpO2: 91% (14 Sep 2019 01:00) (91% - 100%)          Neurological Exam:   Mental status: Awakens to voice   Motor: UE's - 4/5 UE b/l,   LLE 1/5 RLE 2/5     Sensation: diminished b/l LE light touch  Coordination: No dysmetria on finger-to-nose.    Reflexes: depressed le > ue  Gait: Non-ambulatory, LLE externally rotated.    Medications  acetaminophen   Tablet .. 650 milliGRAM(s) Oral every 6 hours PRN  aspirin  chewable 81 milliGRAM(s) Oral daily  atorvastatin 80 milliGRAM(s) Oral at bedtime  baclofen 10 milliGRAM(s) Oral two times a day  benzocaine 15 mG/menthol 3.6 mG Lozenge 1 Lozenge Oral three times a day  busPIRone 5 milliGRAM(s) Oral two times a day  clindamycin IVPB 900 milliGRAM(s) IV Intermittent every 8 hours  clonazePAM  Tablet 0.5 milliGRAM(s) Oral every 8 hours PRN  dextrose 5%. 1000 milliLiter(s) IV Continuous <Continuous>  dextrose 50% Injectable 25 Gram(s) IV Push once  dextrose 50% Injectable 25 Gram(s) IV Push once  diclofenac 75 milliGRAM(s) Oral two times a day PRN  docusate sodium 100 milliGRAM(s) Oral daily  DULoxetine 60 milliGRAM(s) Oral daily  enoxaparin Injectable 40 milliGRAM(s) SubCutaneous daily  gabapentin 800 milliGRAM(s) Oral three times a day  HYDROmorphone  Injectable 0.5 milliGRAM(s) IV Push every 4 hours PRN  HYDROmorphone  Injectable 1 milliGRAM(s) IV Push every 4 hours PRN  hydrOXYzine  Oral Tab/Cap - Peds 50 milliGRAM(s) Oral at bedtime PRN  insulin lispro (HumaLOG) corrective regimen sliding scale   SubCutaneous three times a day before meals  meropenem  IVPB 1000 milliGRAM(s) IV Intermittent every 8 hours  metroNIDAZOLE  IVPB 500 milliGRAM(s) IV Intermittent every 8 hours  metroNIDAZOLE  IVPB      QUEtiapine 150 milliGRAM(s) Oral at bedtime  senna 1 Tablet(s) Oral daily  sodium chloride 0.9% Bolus 250 milliLiter(s) IV Bolus once  sodium chloride 0.9%. 1000 milliLiter(s) IV Continuous <Continuous>  tamsulosin 0.4 milliGRAM(s) Oral at bedtime  vancomycin  IVPB 1000 milliGRAM(s) IV Intermittent every 12 hours      Lab      Radiology

## 2019-09-14 NOTE — CHART NOTE - NSCHARTNOTEFT_GEN_A_CORE
Spoke with Dr. Lerma (ID) who recommended the following changes to patient's antibiotic regimen:  -d/c: Flagyl, Clinda  -increase Vanco to 1.25g q12h  -start Ceftriaxone 1g q24h    -MRSA PCR  -ESR  -CRP    Will follow these recommendations while pending official documentation from ID.

## 2019-09-14 NOTE — CONSULT NOTE ADULT - CONSULT REASON
L hip dislocation
Chronic pain
PAD
Pt w/ hx of CABG at early age. CIDP dx precludes pt from proper cardio f/u. Pt might need surgical reduction of left hip. Please eval for pre-op assessment.
hemodynamic monitoring s/p explant of L hip arthroplasty and placement of antibiotic beads
history of CIDP
infected hardware
left hip pain

## 2019-09-14 NOTE — PROGRESS NOTE ADULT - SUBJECTIVE AND OBJECTIVE BOX
Patient is a 49y old  Male who presents with a chief complaint of Left hip pain (14 Sep 2019 06:08)      Subjective:  Patient seen and examined at bedside.        Review Of Systems: No chest pain, shortness of breath, or palpitations     POD#1 left hip surgery  Elevated troponins         Medications:  acetaminophen   Tablet .. 650 milliGRAM(s) Oral every 6 hours PRN  aspirin  chewable 81 milliGRAM(s) Oral daily  atorvastatin 80 milliGRAM(s) Oral at bedtime  baclofen 10 milliGRAM(s) Oral two times a day  benzocaine 15 mG/menthol 3.6 mG Lozenge 1 Lozenge Oral three times a day  busPIRone 5 milliGRAM(s) Oral two times a day  clindamycin IVPB 900 milliGRAM(s) IV Intermittent every 8 hours  clonazePAM  Tablet 1 milliGRAM(s) Oral three times a day  dextrose 5%. 1000 milliLiter(s) IV Continuous <Continuous>  dextrose 50% Injectable 25 Gram(s) IV Push once  dextrose 50% Injectable 25 Gram(s) IV Push once  diclofenac 75 milliGRAM(s) Oral two times a day PRN  docusate sodium 100 milliGRAM(s) Oral daily  DULoxetine 90 milliGRAM(s) Oral daily  enoxaparin Injectable 40 milliGRAM(s) SubCutaneous daily  gabapentin 800 milliGRAM(s) Oral three times a day  HYDROmorphone  Injectable 0.5 milliGRAM(s) IV Push every 4 hours PRN  HYDROmorphone  Injectable 1 milliGRAM(s) IV Push every 4 hours PRN  hydrOXYzine  Oral Tab/Cap - Peds 50 milliGRAM(s) Oral at bedtime PRN  insulin glargine Injectable (LANTUS) 36 Unit(s) SubCutaneous at bedtime  insulin lispro (HumaLOG) corrective regimen sliding scale   SubCutaneous three times a day before meals  insulin lispro Injectable (HumaLOG) 12 Unit(s) SubCutaneous three times a day before meals  metoprolol tartrate 12.5 milliGRAM(s) Oral every 12 hours  metroNIDAZOLE  IVPB 500 milliGRAM(s) IV Intermittent every 8 hours  metroNIDAZOLE  IVPB      QUEtiapine 150 milliGRAM(s) Oral at bedtime  senna 1 Tablet(s) Oral daily  tamsulosin 0.4 milliGRAM(s) Oral at bedtime  vancomycin  IVPB 1000 milliGRAM(s) IV Intermittent every 12 hours      PAST MEDICAL & SURGICAL HISTORY:  CIDP (chronic inflammatory demyelinating polyneuropathy)  History of cholecystectomy  History of total left hip replacement  History of total right hip replacement: bilateral  S/P CABG x 5      Objective:  Vitals:  T(F): 97.5 (09-14), Max: 99.9 (09-14)  HR: 118 (09-14) (83 - 136)  BP: 138/79 (09-14) (64/48 - 138/79)  RR: 11 (09-14)  SpO2: 97% (09-14)  I&O's Summary    13 Sep 2019 07:01  -  14 Sep 2019 07:00  --------------------------------------------------------  IN: 4071.6 mL / OUT: 550 mL / NET: 3521.6 mL    14 Sep 2019 07:01  -  14 Sep 2019 12:17  --------------------------------------------------------  IN: 420 mL / OUT: 0 mL / NET: 420 mL        Physical Exam:  Appearance: No acute distress; well appearing  Eyes: PERRL, EOMI, pink conjunctiva  HENT: Normal oral muscosa  Cardiovascular: RRR, S1, S2, no murmurs, rubs, or gallops; no edema; no JVD  Respiratory: Clear to auscultation bilaterally  Gastrointestinal: soft, non-tender, non-distended with normal bowel sounds  Musculoskeletal: No clubbing; no joint deformity   Neurologic: Non-focal  Lymphatic: No lymphadenopathy  Psychiatry: AAOx3, mood & affect appropriate  Skin: No rashes, ecchymoses, or cyanosis                          9.5    10.30 )-----------( 147      ( 14 Sep 2019 05:47 )             27.8     09-14    134<L>  |  101  |  24<H>  ----------------------------<  275<H>  5.2<H>   |  22  |  0.9    Ca    8.0<L>      14 Sep 2019 05:47  Phos  4.3     09-14  Mg     1.8     09-14      PT/INR - ( 14 Sep 2019 05:47 )   PT: 15.20 sec;   INR: 1.33 ratio         PTT - ( 14 Sep 2019 05:47 )  PTT:53.2 sec  CARDIAC MARKERS ( 14 Sep 2019 05:47 )  x     / 0.15 ng/mL / 291 U/L / x     / 13.9 ng/mL  CARDIAC MARKERS ( 13 Sep 2019 19:30 )  x     / 0.04 ng/mL / 189 U/L / x     / 3.5 ng/mL  CARDIAC MARKERS ( 13 Sep 2019 01:04 )  x     / 0.11 ng/mL / 277 U/L / x     / 9.8 ng/mL              New ECG(s): Personally reviewed. Anterolateral TWI    Operative reports: CABG 8/1/2011: LIMA to LAD, SVG sequential to 1st and 2nd Diagonal, SVG to OM1 and PDA    Echo:  < from: Transthoracic Echocardiogram (09.06.19 @ 13:32) >  Summary:   1. LV Ejection Fraction by Ruiz's Method with a biplane EF of 67 %.   2. Normal left ventricular size and wall thicknesses, with normal   systolic and diastolic function.   3. LA volume Index is 7.4 ml/m² ml/m2.    < end of copied text >      Cath:  08/2011, significant multi- vessel disease, mLAD, LCx, Prox RCA-    Imaging:

## 2019-09-14 NOTE — PROGRESS NOTE ADULT - ASSESSMENT
Patient is a 49y old  Male who presents with a chief complaint of Left hip pain (14 Sep 2019 06:08)    PAST MEDICAL & SURGICAL HISTORY:  CIDP (chronic inflammatory demyelinating polyneuropathy)  CAD s/p CABG  Post-op hypotension  Elevated troponins    Recommendations: Patient is a 49y old  Male who presents with a chief complaint of Left hip pain (14 Sep 2019 06:08)    PAST MEDICAL & SURGICAL HISTORY:  CIDP (chronic inflammatory demyelinating polyneuropathy)  CAD s/p CABG  Post-op hypotension => resolved now  Elevated troponins    Recommendations:   Repeat ECG  Resume metoprolol, can increase to 25 mg q12h if BP tolerates  Resume ASA 81 mg  No further cardiac work-up Patient is a 49y old  Male who presents with a chief complaint of Left hip pain (14 Sep 2019 06:08)    PAST MEDICAL & SURGICAL HISTORY:  CIDP (chronic inflammatory demyelinating polyneuropathy)  CAD s/p CABG  Post-op hypotension => resolved now  Elevated troponins

## 2019-09-14 NOTE — PROGRESS NOTE ADULT - ASSESSMENT
Assessment: 50 y/o M PMHx of CDIP s/p L explantation of hardware, wash out and placement of abx spacer     Neuro: Dx depression/anxiety. Followed by psych and pain management. Monitor for changes in mental status. Restarted buspirone, klonipin, cymbalta, seroquel. Pain controlled with tylenol, baclofen, voltaren, gabapentin, dilaudid PRN.     Resp: NC satting well. Encourage IS. AM CXR ordered.     Cards: Dx: CAD, CABG x5 vessels, HLD. Post op EKG sinus tachy. Ordered echo. CE x3 ordered, pending. Restarted statin. NICOM when patient gets to the unit. Please order EKG for AM     GI: NPO. LR @ 150. PPI/[place].     : Dx of BPH restarted flomax. No graham. Monitor UOP. K post op 5.0 w/o EKG changes. Cr 0.9 baseline. Monitor lytes.     Heme: On ASA/Lovenox. Holding plavix as per primary team. Recieved 1 unit of PRBC post op by primary team. Monitor H/H     ID: Clindamycin pre-op.  Afebrile. WBC 24. Blood cultures from 9/11 final coag staph negative w/o sensitivities. Blood culture 9/12 NGTD prelim. Urine cx 9/11 negative. Started Vanco/Ethel/Flagyl.     Endo: Dx of DM. Started ISS w/ FS Q 6 hours.     MSK: Bed bound, oob to chair as per ortho.     Dispo: SICU Assessment: 50 y/o M PMHx of CDIP s/p L explantation of hardware, wash out and placement of abx spacer     Neuro: Dx depression/anxiety. Followed by psych and pain management. Monitor for changes in mental status. Restarted buspirone, klonipin, cymbalta, seroquel. Pain controlled with tylenol, baclofen, voltaren, gabapentin, dilaudid PRN.     Resp: NC satting well. Encourage IS. AM CXR ordered.     Cards: Dx: CAD, CABG x5 vessels, HLD. Post op EKG sinus tachy. Metoprolol 5 x2. Echo pending. Cardiac enzymes x3, first 0.04. Continue statin. NICOM prn. AM EKG    GI: advance diet, IVL when tolerating PO. PPI/[place].     : Dx of BPH restarted flomax. No graham. Monitor UOP. K post op 5.0 w/o EKG changes. Cr 0.9 baseline. Monitor lytes.     Heme: On ASA/Lovenox. Holding plavix as per primary team. Recieved 1 unit of PRBC post op by primary team. Monitor H/H     ID: Clindamycin pre-op.  Afebrile. WBC 24. Blood cultures from 9/11 final coag staph negative w/o sensitivities. Blood culture 9/12 NGTD prelim. Urine cx 9/11 negative. Started Vanco/Ethel/Flagyl/Clinda    Endo: Dx of DM. Started ISS w/ FS Q 6 hours.     MSK: Bed bound, oob to chair as per ortho.     Dispo: SICU

## 2019-09-14 NOTE — CONSULT NOTE ADULT - SUBJECTIVE AND OBJECTIVE BOX
NAPOLEON CAST  49y, Male  Allergy: penicillins (Unknown)      CHIEF COMPLAINT: Left hip pain (14 Sep 2019 12:17)      HPI:  50 y/o M with PMHx of CAD s/p CABG, BPH, CIDP with significant motor and sensory deficits and extreme pain, b/l total hip replacement, anxiety and depression presents to the ED for extreme left hip pain. He mentions that this has been going for some time and it was well managed by Dr. Camarena but it has been harder to control the pain recently and he was sent by her for pain management and further management. Patient is bedbound and is unable to visit doctors and his condition has been managed by telemetry. He also complains of worsening depression and anxiety, he is also claustrophobic and is complaining of staying in small confined places. He denies suicidal ideation.   In ED, VS wnl, CT head negative for intracranial bleed. Xray of left femur showed superolateral dislocation of the left total hip arthroplasty since the prior exam in 2016.    Attd: very complex and long, chr hx; pt had DM, HTN, DLD as young man coupled w/ high stress jobs ( in [employer] - lots of travel); had CAD and CABG in ; shortly after developed odd neuro conditions and finally dx'd in  w/ CDIP by [facility]; He had become weaker over time and fell and broke rt hip - s/p ORIF and shortly after fell again on lt hip and had ORIF on lt; pt was doing STR, but pain kept getting worse in lt hip; pt eventually became homebound and bedbound (2/2 pain and CIDP); overtime; lt leg seemed shorter and externally rotated, pain kept increasing; pt presents now w/ a myriad of unaddressed complaints from yrs of poor f/u:  1) has extreme pain lt hip w/ deformity; meds are not working - asked for ortho eval  2) CDIP is causing neuropathic pains all over and he has not followed up w/ neuro - asked for pain mngmt eval  3) he thinks he may have had TIA w/ numbness in face and arm, but he is not sure how it relates to CIDP - asked for neuro eval  4) pt not walking well at all and developed b/l dropped feet - pt asking for neuro and ortho for options - I told him that if he is not strong enough to walk, it is likely not a good idea to operate on his dropped feet (luc given his operative risk) - he has zero mvmt in his legs currently  5) pt has not been to cardio in yrs; has hx of DM and CABG; denies CP per se - pt asked for cardio eval (will need anyway if lt hip to be surgically fixed for pain control)  6) pt is considering rehab again to increase mobility, even if just to assist in transfers so that he can get out of house to see MDs - pt asked for rehab eval  7) pt is every depressed and anxious given all his ailments at such a young age; he is missing his dtrs grow up and can no longer work or even function at an independent level - pt asked for a psych eval to adjust meds  8) pt having trouble trying to get to MD offices - pt asked for social serv eval for asst. Pt has HHA for 12 hrs/day (10 am to 10 pm). (03 Sep 2019 15:38)      INFECTIOUS DISEASE HISTORY:  s/p failed total hip arthoplasty w/ dislocation s/p explanation of hardware, hypotension required pressors in PACU    PAST MEDICAL & SURGICAL HISTORY:  CIDP (chronic inflammatory demyelinating polyneuropathy)  History of cholecystectomy  History of total left hip replacement  History of total right hip replacement: bilateral  S/P CABG x 5      FAMILY HISTORY      SOCIAL HISTORY    Pt denies any current heavy ETOH use, IVDU. No recent travel outside the [place].       ROS  General: Denies rigors, nightsweats  HEENT: Denies headache, rhinorrhea, sore throat, eye pain  CV: Denies CP, palpitations  PULM: Denies SOB, wheezing  GI: Denies hematemesis, hematochezia, melena  : Denies discharge, hematuria  MSK: Denies arthralgias, myalgias  SKIN: Denies rash, lesions  NEURO: Denies paresthesias, weakness  PSYCH: Denies depression, anxiety    VITALS:  T(F): 97.5, Max: 99.9 (19 @ 00:00)  HR: 118  BP: 138/79  RR: 37Vital Signs Last 24 Hrs  T(C): 36.4 (14 Sep 2019 08:00), Max: 37.7 (14 Sep 2019 00:00)  T(F): 97.5 (14 Sep 2019 08:00), Max: 99.9 (14 Sep 2019 00:00)  HR: 118 (14 Sep 2019 13:00) (97 - 136)  BP: 138/79 (14 Sep 2019 00:00) (64/48 - 138/79)  BP(mean): 98 (14 Sep 2019 00:00) (38 - 98)  RR: 37 (14 Sep 2019 13:00) (0 - 37)  SpO2: 98% (14 Sep 2019 13:00) (91% - 100%)    PHYSICAL EXAM:  Gen: NAD, resting in bed  HEENT: Normocephalic, atraumatic  Neck: supple, no lymphadenopathy  CV: Regular rate & regular rhythm  Lungs: decreased BS at bases, no fremitus  Abdomen: Soft, BS present  Ext: Warm, well perfused, hip dressings  Neuro: non focal, awake  Skin: no rash, no erythema  Lines: no phlebitis    TESTS & MEASUREMENTS:                        9.5    10.30 )-----------( 147      ( 14 Sep 2019 05:47 )             27.8         132<L>  |  100  |  22<H>  ----------------------------<  274<H>  4.7   |  23  |  0.8    Ca    7.9<L>      14 Sep 2019 11:00  Phos  4.3       Mg     1.8           eGFR if Non African American: 103 mL/min/1.73M2 (19 @ 11:00)  eGFR if African American: 120 mL/min/1.73M2 (19 @ 11:00)  eGFR if Non African American: 99 mL/min/1.73M2 (19 @ 05:47)  eGFR if : 114 mL/min/1.73M2 (19 @ 05:47)  eGFR if Non African American: 99 mL/min/1.73M2 (19 @ 19:30)  eGFR if African American: 114 mL/min/1.73M2 (19 @ 19:30)          Culture - Blood (collected 19 @ 07:28)  Source: .Blood None  Preliminary Report (19 @ 14:01):    No growth to date.    Culture - Blood (collected 19 @ 11:00)  Source: .Blood Blood  Gram Stain (19 @ 15:59):    Growth in aerobic bottle: Gram Positive Cocci in Clusters  Final Report (19 @ 23:15):    Growth in aerobic bottle: Coag Negative Staphylococcus "Susceptibilities    not performed"    "Due to technical problems, Proteus sp. will Not be reported as part of    the BCID panel until further notice"    ***Blood Panel PCR results on this specimen areavailable    approximately 3 hours after the Gram stain result.***    Gram stain, PCR, and/or culture results may not always    correspond due to difference in methodologies.    ************************************************************    This PCR assay wasperformed using [employer].    The following targets are tested for: Enterococcus,    vancomycin resistant enterococci, Listeria monocytogenes,    coagulase negative staphylococci, S. aureus,    methicillin resistant S. aureus, Streptococcus agalactiae    (Group B), S. pneumoniae, S. pyogenes (Group A),    Acinetobacter baumannii, Enterobacter cloacae, E. coli,    Klebsiella oxytoca, K. pneumoniae, Proteus sp.,    Serratia marcescens, Haemophilus influenzae,    Neisseria meningitidis, Pseudomonas aeruginosa, Candida    albicans, C. glabrata, C krusei, C parapsilosis,    C. tropicalis and the KPC resistance gene.  Organism: Blood Culture PCR (19 @ 23:15)  Organism: Blood Culture PCR (19 @ 23:15)      -  Coagulase negative Staphylococcus: Detec      Method Type: PCR    Culture - Urine (collected 19 @ 07:50)  Source: .Urine Clean Catch (Midstream)  Final Report (19 @ 11:33):    <10,000 CFU/mL Normal Urogenital Nimo            INFECTIOUS DISEASES TESTING  MRSA PCR Result.: Positive (19 @ 11:30)      RADIOLOGY & ADDITIONAL TESTS:  I have personally reviewed the last Chest xray  CXR      CT      CARDIOLOGY TESTING  Transthoracic Echocardiogram:    EXAM:  2-D ECHO (TTE) COMPLETE        PROCEDURE DATE:  2019      INTERPRETATION:  REPORT:    TRANSTHORACIC ECHOCARDIOGRAM REPORT         Patient Name:   NAPOLEON CAST Accession #: [id number]  Medical Rec #:  [id number]    Height:      72.0 in 182.9 cm  YOB: 1970   Weight:      211.0 lb 95.71 kg  Patient Age:    49 years    BSA:         2.18 m²  Patient Gender: M           BP:          178/87 mmHg       Date of Exam:        2019 7:24:43 AM  Referring Physician: [id number] JOY CAMARENA  Sonographer:         LACI  Reading Physician:   Chelo Benson M.D.    Procedure:     2D Echo/Doppler/Color Doppler Complete.  Indications:   R57.9 - Shock, unspecified  Diagnosis:     Shock, unspecified - R57.9  Study Details: Technically fair study.         Summary:   1. Technically fair study.   2. Normal global left ventricular systolic function.   3. LV Ejection Fraction by Ruiz's Method with a biplane EF of 60 %.   4. Spectral Doppler shows impaired relaxation pattern of left   ventricular myocardial filling (Grade I diastolic dysfunction).   5. No evidence of mitral valve regurgitation.   6. Structurally normal mitral valve, with normal leaflet excursion.   7. Normal trileaflet aortic valve with normal opening.   8. Estimated pulmonary artery systolic pressure is 39.3 mmHg assuming a   right atrial pressure of 5 mmHg, which is consistent with borderline   pulmonary hypertension.   9. LA volume Index is 24.8 ml/m² ml/m2.    PHYSICIAN INTERPRETATION:  Left Ventricle: Theleft ventricular internal cavity size is normal. Left   ventricular wall thickness is normal. Global LV systolic function was   normal. Spectral Doppler shows impaired relaxation pattern of left   ventricular myocardial filling (Grade I diastolic dysfunction).  Right Ventricle: Normal right ventricular size and function.  Left Atrium: Normal left atrial size. LA volume Index is 24.8 ml/m² ml/m2.  Right Atrium: Normal right atrial size.  Pericardium: There is no evidence of pericardial effusion.  Mitral Valve: Structurally normal mitral valve, with normal leaflet   excursion. The mitral valve is normal in structure. No evidence of mitral   valve regurgitation is seen.  Tricuspid Valve: Structurally normal tricuspid valve, with normal leaflet   excursion. The tricuspid valve is normal in structure. Mild tricuspid   regurgitation is visualized. Estimated pulmonary artery systolic pressure   is 39.3 mmHg assuming a right atrial pressure of 5 mmHg, which is   consistent with borderline pulmonary hypertension.  Aortic Valve: Normal trileaflet aortic valve with normal opening. The   aortic valve is normal. No evidence of aortic valve regurgitation is seen.  Pulmonic Valve: Structurally normal pulmonic valve, with normal leaflet   excursion. The pulmonic valve is normal. No indication of pulmonic valve   regurgitation.  Aorta: The aortic root and ascending aorta are structurally normal, with   no evidence of dilitation.  Pulmonary Artery: The main pulmonary artery is normal in size.       2D AND M-MODE MEASUREMENTS (normal ranges within parentheses):  Left                  Normal   Aorta/Left             Normal  Ventricle:                     Atrium:  IVSd (2D):  0.92 cm  (0.7-1.1) AoV Cusp       2.27  (1.5-2.6)  LVPWd (2D): 0.90 cm  (0.7-1.1) Separation:     cm  LVIDd (2D): 4.61 cm  (3.4-5.7) Left Atrium    3.45  (1.9-4.0)  LVIDs (2D): 3.14 cm            (Mmode):        cm  LV FS (2D):  32.0 %   (>25%)   LA Volume      24.8  Relative      0.39    (<0.42)  Index         ml/m²  Wall                           Right  Thickness                      Ventricle:                                 RVd (2D):      3.18 cm    SPECTRAL DOPPLER ANALYSIS:  LV DIASTOLIC FUNCTION:  MV Peak E: 0.44 m/s Decel Time: 194 msec  MV Peak A: 0.62 m/s  E/A Ratio: 0.70    Aortic Valve:  AoV VMax:    0.95 m/s AoV Area, Vmax: 3.60 cm² Vmax Indx: 1.65 cm²/m²  AoV Pk Grad: 3.6 mmHg    LVOT Vmax: 0.87 m/s  LVOT VTI:  0.16 m  LVOT Diam: 2.23 cm    Mitral Valve:  MV P1/2 Time: 56.15 msec  MV Area, PHT: 3.92 cm²    Tricuspid Valve and PA/RV Systolic Pressure: TR Max Velocity: 2.93 m/s RA   Pressure: 5 mmHg RVSP/PASP: 39.3 mmHg    Pulmonic Valve:  PV Max Velocity: 0.73 m/s PV Max P.1 mmHg PV Mean PG:       [id number] Chelo Benson M.D., Electronically signed on 2019 at   12:34:00 PM              *** Final ***                CHELO BENSON M.D., RESIDENT RADIOLOGIST  This document has been electronically signed.  CHELO BENSON M.D., RESIDENT RADIOLOGIST  This document has been electronically signed. Sep 14 2019  7:24AM             (19 @ 07:24)  12 Lead ECG:   Ventricular Rate 101 BPM    Atrial Rate 101 BPM    P-R Interval 168 ms    QRS Duration 86 ms    Q-T Interval 370 ms    QTC Calculation(Bezet) 479 ms    P Axis 42 degrees    R Axis 28 degrees    T Axis 251 degrees    Diagnosis Line Sinus tachycardia  ST & T wave abnormality, consider anterolateral ischemia  Abnormal ECG    Confirmed by CHELO BENSON MD ([id number]) on 2019 10:33:03 AM (19 @ 05:26)      MEDICATIONS  aspirin  chewable 81  atorvastatin 80  baclofen 10  benzocaine 15 mG/menthol 3.6 mG Lozenge 1  busPIRone 5  cefTRIAXone   IVPB 2000  clonazePAM  Tablet 1  dextrose 5%. 1000  dextrose 50% Injectable 25  dextrose 50% Injectable 25  docusate sodium 100  DULoxetine 90  enoxaparin Injectable 40  gabapentin 800  insulin glargine Injectable (LANTUS) 36  insulin lispro (HumaLOG) corrective regimen sliding scale   insulin lispro Injectable (HumaLOG) 12  metoprolol tartrate 12.5  QUEtiapine 150  senna 1  tamsulosin 0.4  vancomycin  IVPB 1250      ANTIBIOTICS:  cefTRIAXone   IVPB 2000 milliGRAM(s) IV Intermittent every 24 hours  vancomycin  IVPB 1250 milliGRAM(s) IV Intermittent every 12 hours      ALLERGIES:  penicillins (Unknown) NAPOLEON CAST  49y, Male  Allergy: penicillins (Unknown)      CHIEF COMPLAINT: Left hip pain (14 Sep 2019 12:17)      HPI:  48 y/o M with PMHx of CAD s/p CABG, BPH, CIDP with significant motor and sensory deficits and extreme pain, b/l total hip replacement, anxiety and depression presents to the ED for extreme left hip pain. He mentions that this has been going for some time and it was well managed by Dr. Camarena but it has been harder to control the pain recently and he was sent by her for pain management and further management. Patient is bedbound and is unable to visit doctors and his condition has been managed by telemetry. He also complains of worsening depression and anxiety, he is also claustrophobic and is complaining of staying in small confined places. He denies suicidal ideation.   In ED, VS wnl, CT head negative for intracranial bleed. Xray of left femur showed superolateral dislocation of the left total hip arthroplasty since the prior exam in 2016.    Attd: very complex and long, chr hx; pt had DM, HTN, DLD as young man coupled w/ high stress jobs ( in [employer] - lots of travel); had CAD and CABG in ; shortly after developed odd neuro conditions and finally dx'd in  w/ CDIP by [place]; He had become weaker over time and fell and broke rt hip - s/p ORIF and shortly after fell again on lt hip and had ORIF on lt; pt was doing STR, but pain kept getting worse in lt hip; pt eventually became homebound and bedbound (2/2 pain and CIDP); overtime; lt leg seemed shorter and externally rotated, pain kept increasing; pt presents now w/ a myriad of unaddressed complaints from yrs of poor f/u:  1) has extreme pain lt hip w/ deformity; meds are not working - asked for ortho eval  2) CDIP is causing neuropathic pains all over and he has not followed up w/ neuro - asked for pain mngmt eval  3) he thinks he may have had TIA w/ numbness in face and arm, but he is not sure how it relates to CIDP - asked for neuro eval  4) pt not walking well at all and developed b/l dropped feet - pt asking for neuro and ortho for options - I told him that if he is not strong enough to walk, it is likely not a good idea to operate on his dropped feet (luc given his operative risk) - he has zero mvmt in his legs currently  5) pt has not been to cardio in yrs; has hx of DM and CABG; denies CP per se - pt asked for cardio eval (will need anyway if lt hip to be surgically fixed for pain control)  6) pt is considering rehab again to increase mobility, even if just to assist in transfers so that he can get out of house to see MDs - pt asked for rehab eval  7) pt is every depressed and anxious given all his ailments at such a young age; he is missing his dtrs grow up and can no longer work or even function at an independent level - pt asked for a psych eval to adjust meds  8) pt having trouble trying to get to MD offices - pt asked for social serv eval for asst. Pt has HHA for 12 hrs/day (10 am to 10 pm). (03 Sep 2019 15:38)      INFECTIOUS DISEASE HISTORY:  s/p failed total hip arthoplasty w/ dislocation s/p explanation of hardware, hypotension required pressors in PACU    PAST MEDICAL & SURGICAL HISTORY:  CIDP (chronic inflammatory demyelinating polyneuropathy)  History of cholecystectomy  History of total left hip replacement  History of total right hip replacement: bilateral  S/P CABG x 5      FAMILY HISTORY      SOCIAL HISTORY    Pt denies any current heavy ETOH use, IVDU. No recent travel outside the [place].       ROS  General: Denies rigors, nightsweats  HEENT: Denies headache, rhinorrhea, sore throat, eye pain  CV: Denies CP, palpitations  PULM: Denies SOB, wheezing  GI: Denies hematemesis, hematochezia, melena  : Denies discharge, hematuria  MSK: reports L knee and hip pain  SKIN: Denies rash, lesions  NEURO: Denies paresthesias, weakness  PSYCH: Denies depression, anxiety    VITALS:  T(F): 97.5, Max: 99.9 (19 @ 00:00)  HR: 118  BP: 138/79  RR: 37Vital Signs Last 24 Hrs  T(C): 36.4 (14 Sep 2019 08:00), Max: 37.7 (14 Sep 2019 00:00)  T(F): 97.5 (14 Sep 2019 08:00), Max: 99.9 (14 Sep 2019 00:00)  HR: 118 (14 Sep 2019 13:00) (97 - 136)  BP: 138/79 (14 Sep 2019 00:00) (64/48 - 138/79)  BP(mean): 98 (14 Sep 2019 00:00) (38 - 98)  RR: 37 (14 Sep 2019 13:00) (0 - 37)  SpO2: 98% (14 Sep 2019 13:00) (91% - 100%)    PHYSICAL EXAM:  Gen: NAD, resting in bed, very pale  HEENT: Normocephalic, atraumatic  Neck: supple, no lymphadenopathy  CV: Regular rate & regular rhythm  Lungs: decreased BS at bases, no fremitus  Abdomen: Soft, BS present  Ext: Warm, well perfused, L hip dressings  Neuro: non focal, awake  Skin: no rash, no erythema  Lines: no phlebitis    TESTS & MEASUREMENTS:                        9.5    10.30 )-----------( 147      ( 14 Sep 2019 05:47 )             27.8         132<L>  |  100  |  22<H>  ----------------------------<  274<H>  4.7   |  23  |  0.8    Ca    7.9<L>      14 Sep 2019 11:00  Phos  4.3       Mg     1.8           eGFR if Non African American: 103 mL/min/1.73M2 (19 @ 11:00)  eGFR if African American: 120 mL/min/1.73M2 (19 @ 11:00)  eGFR if Non African American: 99 mL/min/1.73M2 (19 @ 05:47)  eGFR if : 114 mL/min/1.73M2 (19 @ 05:47)  eGFR if Non African American: 99 mL/min/1.73M2 (19 @ 19:30)  eGFR if African American: 114 mL/min/1.73M2 (19 @ 19:30)          Culture - Blood (collected 19 @ 07:28)  Source: .Blood None  Preliminary Report (19 @ 14:01):    No growth to date.    Culture - Blood (collected 19 @ 11:00)  Source: .Blood Blood  Gram Stain (19 @ 15:59):    Growth in aerobic bottle: Gram Positive Cocci in Clusters  Final Report (19 @ 23:15):    Growth in aerobic bottle: Coag Negative Staphylococcus "Susceptibilities    not performed"    "Due to technical problems, Proteus sp. will Not be reported as part of    the BCID panel until further notice"    ***Blood Panel PCR results on this specimen areavailable    approximately 3 hours after the Gram stain result.***    Gram stain, PCR, and/or culture results may not always    correspond due to difference in methodologies.    ************************************************************    This PCR assay wasperformed using [employer].    The following targets are tested for: Enterococcus,    vancomycin resistant enterococci, Listeria monocytogenes,    coagulase negative staphylococci, S. aureus,    methicillin resistant S. aureus, Streptococcus agalactiae    (Group B), S. pneumoniae, S. pyogenes (Group A),    Acinetobacter baumannii, Enterobacter cloacae, E. coli,    Klebsiella oxytoca, K. pneumoniae, Proteus sp.,    Serratia marcescens, Haemophilus influenzae,    Neisseria meningitidis, Pseudomonas aeruginosa, Candida    albicans, C. glabrata, C krusei, C parapsilosis,    C. tropicalis and the KPC resistance gene.  Organism: Blood Culture PCR (19 @ 23:15)  Organism: Blood Culture PCR (19 @ 23:15)      -  Coagulase negative Staphylococcus: Detec      Method Type: PCR    Culture - Urine (collected 19 @ 07:50)  Source: .Urine Clean Catch (Midstream)  Final Report (19 @ 11:33):    <10,000 CFU/mL Normal Urogenital Nimo            INFECTIOUS DISEASES TESTING  MRSA PCR Result.: Positive (19 @ 11:30)      RADIOLOGY & ADDITIONAL TESTS:  I have personally reviewed the last Chest xray  CXR      CT      CARDIOLOGY TESTING  Transthoracic Echocardiogram:    EXAM:  2-D ECHO (TTE) COMPLETE        PROCEDURE DATE:  2019      INTERPRETATION:  REPORT:    TRANSTHORACIC ECHOCARDIOGRAM REPORT         Patient Name:   NAPOLEON CAST Accession #: [id number]  Medical Rec #:  [id number]    Height:      72.0 in 182.9 cm  YOB: 1970   Weight:      211.0 lb 95.71 kg  Patient Age:    49 years    BSA:         2.18 m²  Patient Gender: M           BP:          178/87 mmHg       Date of Exam:        2019 7:24:43 AM  Referring Physician: [id number] JOY CAMARENA  Sonographer:         LACI  Reading Physician:   Chelo Benson M.D.    Procedure:     2D Echo/Doppler/Color Doppler Complete.  Indications:   R57.9 - Shock, unspecified  Diagnosis:     Shock, unspecified - R57.9  Study Details: Technically fair study.         Summary:   1. Technically fair study.   2. Normal global left ventricular systolic function.   3. LV Ejection Fraction by Ruiz's Method with a biplane EF of 60 %.   4. Spectral Doppler shows impaired relaxation pattern of left   ventricular myocardial filling (Grade I diastolic dysfunction).   5. No evidence of mitral valve regurgitation.   6. Structurally normal mitral valve, with normal leaflet excursion.   7. Normal trileaflet aortic valve with normal opening.   8. Estimated pulmonary artery systolic pressure is 39.3 mmHg assuming a   right atrial pressure of 5 mmHg, which is consistent with borderline   pulmonary hypertension.   9. LA volume Index is 24.8 ml/m² ml/m2.    PHYSICIAN INTERPRETATION:  Left Ventricle: Theleft ventricular internal cavity size is normal. Left   ventricular wall thickness is normal. Global LV systolic function was   normal. Spectral Doppler shows impaired relaxation pattern of left   ventricular myocardial filling (Grade I diastolic dysfunction).  Right Ventricle: Normal right ventricular size and function.  Left Atrium: Normal left atrial size. LA volume Index is 24.8 ml/m² ml/m2.  Right Atrium: Normal right atrial size.  Pericardium: There is no evidence of pericardial effusion.  Mitral Valve: Structurally normal mitral valve, with normal leaflet   excursion. The mitral valve is normal in structure. No evidence of mitral   valve regurgitation is seen.  Tricuspid Valve: Structurally normal tricuspid valve, with normal leaflet   excursion. The tricuspid valve is normal in structure. Mild tricuspid   regurgitation is visualized. Estimated pulmonary artery systolic pressure   is 39.3 mmHg assuming a right atrial pressure of 5 mmHg, which is   consistent with borderline pulmonary hypertension.  Aortic Valve: Normal trileaflet aortic valve with normal opening. The   aortic valve is normal. No evidence of aortic valve regurgitation is seen.  Pulmonic Valve: Structurally normal pulmonic valve, with normal leaflet   excursion. The pulmonic valve is normal. No indication of pulmonic valve   regurgitation.  Aorta: The aortic root and ascending aorta are structurally normal, with   no evidence of dilitation.  Pulmonary Artery: The main pulmonary artery is normal in size.       2D AND M-MODE MEASUREMENTS (normal ranges within parentheses):  Left                  Normal   Aorta/Left             Normal  Ventricle:                     Atrium:  IVSd (2D):  0.92 cm  (0.7-1.1) AoV Cusp       2.27  (1.5-2.6)  LVPWd (2D): 0.90 cm  (0.7-1.1) Separation:     cm  LVIDd (2D): 4.61 cm  (3.4-5.7) Left Atrium    3.45  (1.9-4.0)  LVIDs (2D): 3.14 cm            (Mmode):        cm  LV FS (2D):  32.0 %   (>25%)   LA Volume      24.8  Relative      0.39    (<0.42)  Index         ml/m²  Wall                           Right  Thickness                      Ventricle:                                 RVd (2D):      3.18 cm    SPECTRAL DOPPLER ANALYSIS:  LV DIASTOLIC FUNCTION:  MV Peak E: 0.44 m/s Decel Time: 194 msec  MV Peak A: 0.62 m/s  E/A Ratio: 0.70    Aortic Valve:  AoV VMax:    0.95 m/s AoV Area, Vmax: 3.60 cm² Vmax Indx: 1.65 cm²/m²  AoV Pk Grad: 3.6 mmHg    LVOT Vmax: 0.87 m/s  LVOT VTI:  0.16 m  LVOT Diam: 2.23 cm    Mitral Valve:  MV P1/2 Time: 56.15 msec  MV Area, PHT: 3.92 cm²    Tricuspid Valve and PA/RV Systolic Pressure: TR Max Velocity: 2.93 m/s RA   Pressure: 5 mmHg RVSP/PASP: 39.3 mmHg    Pulmonic Valve:  PV Max Velocity: 0.73 m/s PV Max P.1 mmHg PV Mean PG:       [id number] Chelo Benson M.D., Electronically signed on 2019 at   12:34:00 PM              *** Final ***                CHELO BENSON M.D., RESIDENT RADIOLOGIST  This document has been electronically signed.  CHELO BENSON M.D., RESIDENT RADIOLOGIST  This document has been electronically signed. Sep 14 2019  7:24AM             (19 @ 07:24)  12 Lead ECG:   Ventricular Rate 101 BPM    Atrial Rate 101 BPM    P-R Interval 168 ms    QRS Duration 86 ms    Q-T Interval 370 ms    QTC Calculation(Bezet) 479 ms    P Axis 42 degrees    R Axis 28 degrees    T Axis 251 degrees    Diagnosis Line Sinus tachycardia  ST & T wave abnormality, consider anterolateral ischemia  Abnormal ECG    Confirmed by CHELO BENSON MD ([id number]) on 2019 10:33:03 AM (19 @ 05:26)      MEDICATIONS  aspirin  chewable 81  atorvastatin 80  baclofen 10  benzocaine 15 mG/menthol 3.6 mG Lozenge 1  busPIRone 5  cefTRIAXone   IVPB 2000  clonazePAM  Tablet 1  dextrose 5%. 1000  dextrose 50% Injectable 25  dextrose 50% Injectable 25  docusate sodium 100  DULoxetine 90  enoxaparin Injectable 40  gabapentin 800  insulin glargine Injectable (LANTUS) 36  insulin lispro (HumaLOG) corrective regimen sliding scale   insulin lispro Injectable (HumaLOG) 12  metoprolol tartrate 12.5  QUEtiapine 150  senna 1  tamsulosin 0.4  vancomycin  IVPB 1250      ANTIBIOTICS:  cefTRIAXone   IVPB 2000 milliGRAM(s) IV Intermittent every 24 hours  vancomycin  IVPB 1250 milliGRAM(s) IV Intermittent every 12 hours      ALLERGIES:  penicillins (Unknown)

## 2019-09-15 LAB
ANION GAP SERPL CALC-SCNC: 8 MMOL/L — SIGNIFICANT CHANGE UP (ref 7–14)
APTT BLD: 37.7 SEC — SIGNIFICANT CHANGE UP (ref 27–39.2)
BUN SERPL-MCNC: 20 MG/DL — SIGNIFICANT CHANGE UP (ref 10–20)
CALCIUM SERPL-MCNC: 7.8 MG/DL — LOW (ref 8.5–10.1)
CHLORIDE SERPL-SCNC: 99 MMOL/L — SIGNIFICANT CHANGE UP (ref 98–110)
CK MB CFR SERPL CALC: 5.9 NG/ML — SIGNIFICANT CHANGE UP (ref 0.6–6.3)
CK SERPL-CCNC: 222 U/L — SIGNIFICANT CHANGE UP (ref 0–225)
CO2 SERPL-SCNC: 25 MMOL/L — SIGNIFICANT CHANGE UP (ref 17–32)
CREAT SERPL-MCNC: 0.8 MG/DL — SIGNIFICANT CHANGE UP (ref 0.7–1.5)
CRP SERPL-MCNC: 8.16 MG/DL — HIGH (ref 0–0.4)
GLUCOSE BLDC GLUCOMTR-MCNC: 103 MG/DL — HIGH (ref 70–99)
GLUCOSE BLDC GLUCOMTR-MCNC: 140 MG/DL — HIGH (ref 70–99)
GLUCOSE BLDC GLUCOMTR-MCNC: 198 MG/DL — HIGH (ref 70–99)
GLUCOSE BLDC GLUCOMTR-MCNC: 198 MG/DL — HIGH (ref 70–99)
GLUCOSE BLDC GLUCOMTR-MCNC: 97 MG/DL — SIGNIFICANT CHANGE UP (ref 70–99)
GLUCOSE SERPL-MCNC: 147 MG/DL — HIGH (ref 70–99)
HCT VFR BLD CALC: 22 % — LOW (ref 42–52)
HCT VFR BLD CALC: 22.7 % — LOW (ref 42–52)
HCT VFR BLD CALC: 23.9 % — LOW (ref 42–52)
HCT VFR BLD CALC: 25.7 % — LOW (ref 42–52)
HGB BLD-MCNC: 7.3 G/DL — LOW (ref 14–18)
HGB BLD-MCNC: 7.4 G/DL — LOW (ref 14–18)
HGB BLD-MCNC: 8.1 G/DL — LOW (ref 14–18)
HGB BLD-MCNC: 8.3 G/DL — LOW (ref 14–18)
INR BLD: 1.2 RATIO — SIGNIFICANT CHANGE UP (ref 0.65–1.3)
MAGNESIUM SERPL-MCNC: 1.9 MG/DL — SIGNIFICANT CHANGE UP (ref 1.8–2.4)
MCHC RBC-ENTMCNC: 25.9 PG — LOW (ref 27–31)
MCHC RBC-ENTMCNC: 26.1 PG — LOW (ref 27–31)
MCHC RBC-ENTMCNC: 26.6 PG — LOW (ref 27–31)
MCHC RBC-ENTMCNC: 27 PG — SIGNIFICANT CHANGE UP (ref 27–31)
MCHC RBC-ENTMCNC: 32.2 G/DL — SIGNIFICANT CHANGE UP (ref 32–37)
MCHC RBC-ENTMCNC: 32.3 G/DL — SIGNIFICANT CHANGE UP (ref 32–37)
MCHC RBC-ENTMCNC: 33.6 G/DL — SIGNIFICANT CHANGE UP (ref 32–37)
MCHC RBC-ENTMCNC: 33.9 G/DL — SIGNIFICANT CHANGE UP (ref 32–37)
MCV RBC AUTO: 78.4 FL — LOW (ref 80–94)
MCV RBC AUTO: 80.3 FL — SIGNIFICANT CHANGE UP (ref 80–94)
MCV RBC AUTO: 80.5 FL — SIGNIFICANT CHANGE UP (ref 80–94)
MCV RBC AUTO: 80.8 FL — SIGNIFICANT CHANGE UP (ref 80–94)
NRBC # BLD: 0 /100 WBCS — SIGNIFICANT CHANGE UP (ref 0–0)
PHOSPHATE SERPL-MCNC: 2.4 MG/DL — SIGNIFICANT CHANGE UP (ref 2.1–4.9)
PLATELET # BLD AUTO: 126 K/UL — LOW (ref 130–400)
PLATELET # BLD AUTO: 133 K/UL — SIGNIFICANT CHANGE UP (ref 130–400)
PLATELET # BLD AUTO: 141 K/UL — SIGNIFICANT CHANGE UP (ref 130–400)
PLATELET # BLD AUTO: 155 K/UL — SIGNIFICANT CHANGE UP (ref 130–400)
POTASSIUM SERPL-MCNC: 4.3 MMOL/L — SIGNIFICANT CHANGE UP (ref 3.5–5)
POTASSIUM SERPL-SCNC: 4.3 MMOL/L — SIGNIFICANT CHANGE UP (ref 3.5–5)
PROTHROM AB SERPL-ACNC: 13.8 SEC — HIGH (ref 9.95–12.87)
RBC # BLD: 2.74 M/UL — LOW (ref 4.7–6.1)
RBC # BLD: 2.82 M/UL — LOW (ref 4.7–6.1)
RBC # BLD: 3.05 M/UL — LOW (ref 4.7–6.1)
RBC # BLD: 3.18 M/UL — LOW (ref 4.7–6.1)
RBC # FLD: 13.8 % — SIGNIFICANT CHANGE UP (ref 11.5–14.5)
RBC # FLD: 13.8 % — SIGNIFICANT CHANGE UP (ref 11.5–14.5)
RBC # FLD: 14.1 % — SIGNIFICANT CHANGE UP (ref 11.5–14.5)
RBC # FLD: 14.1 % — SIGNIFICANT CHANGE UP (ref 11.5–14.5)
SODIUM SERPL-SCNC: 132 MMOL/L — LOW (ref 135–146)
TROPONIN T SERPL-MCNC: 0.18 NG/ML — CRITICAL HIGH
VANCOMYCIN TROUGH SERPL-MCNC: 11.8 UG/ML — HIGH (ref 5–10)
WBC # BLD: 7.36 K/UL — SIGNIFICANT CHANGE UP (ref 4.8–10.8)
WBC # BLD: 8.02 K/UL — SIGNIFICANT CHANGE UP (ref 4.8–10.8)
WBC # BLD: 8.09 K/UL — SIGNIFICANT CHANGE UP (ref 4.8–10.8)
WBC # BLD: 8.94 K/UL — SIGNIFICANT CHANGE UP (ref 4.8–10.8)
WBC # FLD AUTO: 7.36 K/UL — SIGNIFICANT CHANGE UP (ref 4.8–10.8)
WBC # FLD AUTO: 8.02 K/UL — SIGNIFICANT CHANGE UP (ref 4.8–10.8)
WBC # FLD AUTO: 8.09 K/UL — SIGNIFICANT CHANGE UP (ref 4.8–10.8)
WBC # FLD AUTO: 8.94 K/UL — SIGNIFICANT CHANGE UP (ref 4.8–10.8)

## 2019-09-15 PROCEDURE — 99232 SBSQ HOSP IP/OBS MODERATE 35: CPT

## 2019-09-15 PROCEDURE — 71045 X-RAY EXAM CHEST 1 VIEW: CPT | Mod: 26

## 2019-09-15 PROCEDURE — 99291 CRITICAL CARE FIRST HOUR: CPT

## 2019-09-15 RX ORDER — CARVEDILOL PHOSPHATE 80 MG/1
3.12 CAPSULE, EXTENDED RELEASE ORAL EVERY 12 HOURS
Refills: 0 | Status: DISCONTINUED | OUTPATIENT
Start: 2019-09-15 | End: 2019-01-01

## 2019-09-15 RX ORDER — LACTULOSE 10 G/15ML
15 SOLUTION ORAL EVERY 12 HOURS
Refills: 0 | Status: DISCONTINUED | OUTPATIENT
Start: 2019-09-15 | End: 2019-01-01

## 2019-09-15 RX ORDER — MAGNESIUM SULFATE 500 MG/ML
1 VIAL (ML) INJECTION ONCE
Refills: 0 | Status: COMPLETED | OUTPATIENT
Start: 2019-09-15 | End: 2019-09-15

## 2019-09-15 RX ADMIN — Medication 50 MILLIGRAM(S): at 22:10

## 2019-09-15 RX ADMIN — MORPHINE SULFATE 60 MILLIGRAM(S): 50 CAPSULE, EXTENDED RELEASE ORAL at 19:27

## 2019-09-15 RX ADMIN — DULOXETINE HYDROCHLORIDE 90 MILLIGRAM(S): 30 CAPSULE, DELAYED RELEASE ORAL at 11:34

## 2019-09-15 RX ADMIN — CARVEDILOL PHOSPHATE 3.12 MILLIGRAM(S): 80 CAPSULE, EXTENDED RELEASE ORAL at 19:19

## 2019-09-15 RX ADMIN — HYDROMORPHONE HYDROCHLORIDE 1 MILLIGRAM(S): 2 INJECTION INTRAMUSCULAR; INTRAVENOUS; SUBCUTANEOUS at 23:57

## 2019-09-15 RX ADMIN — Medication 100 MILLIGRAM(S): at 11:33

## 2019-09-15 RX ADMIN — PANTOPRAZOLE SODIUM 40 MILLIGRAM(S): 20 TABLET, DELAYED RELEASE ORAL at 06:26

## 2019-09-15 RX ADMIN — HYDROMORPHONE HYDROCHLORIDE 1 MILLIGRAM(S): 2 INJECTION INTRAMUSCULAR; INTRAVENOUS; SUBCUTANEOUS at 20:40

## 2019-09-15 RX ADMIN — Medication 12.5 MILLIGRAM(S): at 00:27

## 2019-09-15 RX ADMIN — HYDROMORPHONE HYDROCHLORIDE 0.5 MILLIGRAM(S): 2 INJECTION INTRAMUSCULAR; INTRAVENOUS; SUBCUTANEOUS at 22:08

## 2019-09-15 RX ADMIN — Medication 12 UNIT(S): at 18:25

## 2019-09-15 RX ADMIN — MORPHINE SULFATE 60 MILLIGRAM(S): 50 CAPSULE, EXTENDED RELEASE ORAL at 05:39

## 2019-09-15 RX ADMIN — Medication 5 MILLIGRAM(S): at 18:26

## 2019-09-15 RX ADMIN — HYDROMORPHONE HYDROCHLORIDE 1 MILLIGRAM(S): 2 INJECTION INTRAMUSCULAR; INTRAVENOUS; SUBCUTANEOUS at 23:56

## 2019-09-15 RX ADMIN — HYDROMORPHONE HYDROCHLORIDE 1 MILLIGRAM(S): 2 INJECTION INTRAMUSCULAR; INTRAVENOUS; SUBCUTANEOUS at 13:40

## 2019-09-15 RX ADMIN — SENNA PLUS 1 TABLET(S): 8.6 TABLET ORAL at 11:33

## 2019-09-15 RX ADMIN — ATORVASTATIN CALCIUM 80 MILLIGRAM(S): 80 TABLET, FILM COATED ORAL at 22:08

## 2019-09-15 RX ADMIN — Medication 12 UNIT(S): at 11:58

## 2019-09-15 RX ADMIN — MUPIROCIN 1 APPLICATION(S): 20 OINTMENT TOPICAL at 18:27

## 2019-09-15 RX ADMIN — Medication 100 GRAM(S): at 01:58

## 2019-09-15 RX ADMIN — GABAPENTIN 800 MILLIGRAM(S): 400 CAPSULE ORAL at 05:40

## 2019-09-15 RX ADMIN — Medication 1 MILLIGRAM(S): at 05:39

## 2019-09-15 RX ADMIN — Medication 10 MILLIGRAM(S): at 18:26

## 2019-09-15 RX ADMIN — INSULIN GLARGINE 36 UNIT(S): 100 INJECTION, SOLUTION SUBCUTANEOUS at 22:09

## 2019-09-15 RX ADMIN — BENZOCAINE AND MENTHOL 1 LOZENGE: 5; 1 LIQUID ORAL at 15:10

## 2019-09-15 RX ADMIN — ENOXAPARIN SODIUM 40 MILLIGRAM(S): 100 INJECTION SUBCUTANEOUS at 11:34

## 2019-09-15 RX ADMIN — Medication 10 MILLIGRAM(S): at 05:39

## 2019-09-15 RX ADMIN — Medication 85 MILLIMOLE(S): at 02:43

## 2019-09-15 RX ADMIN — Medication 81 MILLIGRAM(S): at 11:34

## 2019-09-15 RX ADMIN — Medication 5 MILLIGRAM(S): at 05:39

## 2019-09-15 RX ADMIN — GABAPENTIN 800 MILLIGRAM(S): 400 CAPSULE ORAL at 15:10

## 2019-09-15 RX ADMIN — CEFTRIAXONE 100 MILLIGRAM(S): 500 INJECTION, POWDER, FOR SOLUTION INTRAMUSCULAR; INTRAVENOUS at 18:30

## 2019-09-15 RX ADMIN — MORPHINE SULFATE 60 MILLIGRAM(S): 50 CAPSULE, EXTENDED RELEASE ORAL at 18:31

## 2019-09-15 RX ADMIN — HYDROMORPHONE HYDROCHLORIDE 1 MILLIGRAM(S): 2 INJECTION INTRAMUSCULAR; INTRAVENOUS; SUBCUTANEOUS at 08:51

## 2019-09-15 RX ADMIN — Medication 1 MILLIGRAM(S): at 13:40

## 2019-09-15 RX ADMIN — Medication 166.67 MILLIGRAM(S): at 18:30

## 2019-09-15 RX ADMIN — HYDROMORPHONE HYDROCHLORIDE 1 MILLIGRAM(S): 2 INJECTION INTRAMUSCULAR; INTRAVENOUS; SUBCUTANEOUS at 19:06

## 2019-09-15 RX ADMIN — HYDROMORPHONE HYDROCHLORIDE 0.5 MILLIGRAM(S): 2 INJECTION INTRAMUSCULAR; INTRAVENOUS; SUBCUTANEOUS at 22:11

## 2019-09-15 RX ADMIN — MORPHINE SULFATE 60 MILLIGRAM(S): 50 CAPSULE, EXTENDED RELEASE ORAL at 05:45

## 2019-09-15 RX ADMIN — Medication 3: at 11:57

## 2019-09-15 RX ADMIN — Medication 12.5 MILLIGRAM(S): at 06:26

## 2019-09-15 RX ADMIN — Medication 1 MILLIGRAM(S): at 22:08

## 2019-09-15 RX ADMIN — MUPIROCIN 1 APPLICATION(S): 20 OINTMENT TOPICAL at 05:39

## 2019-09-15 RX ADMIN — QUETIAPINE FUMARATE 150 MILLIGRAM(S): 200 TABLET, FILM COATED ORAL at 22:14

## 2019-09-15 RX ADMIN — GABAPENTIN 800 MILLIGRAM(S): 400 CAPSULE ORAL at 22:09

## 2019-09-15 RX ADMIN — TAMSULOSIN HYDROCHLORIDE 0.4 MILLIGRAM(S): 0.4 CAPSULE ORAL at 22:08

## 2019-09-15 RX ADMIN — Medication 166.67 MILLIGRAM(S): at 06:18

## 2019-09-15 NOTE — PROGRESS NOTE ADULT - SUBJECTIVE AND OBJECTIVE BOX
SERENE, NAPOLEON  49y, Male  Allergy: penicillins (Unknown)      CHIEF COMPLAINT: Left hip pain (15 Sep 2019 10:03)      INTERVAL EVENTS/HPI  - No acute events overnight  - T(F): , Max: 98.7 (19 @ 20:00)  - Denies any worsening symptoms  - Tolerating medication  - WBC Count: 8.02 K/uL (19 @ 23:30)      ROS  General: Denies rigors, nightsweats  HEENT: Denies headache, rhinorrhea, sore throat, eye pain  CV: Denies CP, palpitations  PULM: Denies SOB, wheezing  GI: Denies hematemesis, hematochezia, melena  : Denies discharge, hematuria  MSK: Denies arthralgias, myalgias  SKIN: Denies rash, lesions  NEURO: Denies paresthesias, weakness  PSYCH: Denies depression, anxiety    VITALS:  T(F): 97.9, Max: 98.7 (19 @ 20:00)  HR: 106  BP: --  RR: 18Vital Signs Last 24 Hrs  T(C): 36.6 (15 Sep 2019 08:00), Max: 37.1 (14 Sep 2019 20:00)  T(F): 97.9 (15 Sep 2019 08:00), Max: 98.7 (14 Sep 2019 20:00)  HR: 106 (15 Sep 2019 11:00) (92 - 130)  BP: --  BP(mean): --  RR: 18 (15 Sep 2019 11:00) (0 - 37)  SpO2: 96% (15 Sep 2019 11:00) (95% - 100%)    PHYSICAL EXAM:  Gen: NAD, resting in bed, very pale  HEENT: Normocephalic, atraumatic  Neck: supple, no lymphadenopathy  CV: Regular rate & regular rhythm  Lungs: decreased BS at bases, no fremitus  Abdomen: Soft, BS present  Ext: Warm, well perfused, L hip dressings  Neuro: non focal, awake  Skin: no rash, no erythema  Lines: no phlebitis      FH: Non-contributory  Social Hx: Non-contributory    TESTS & MEASUREMENTS:                        8.1    8.02  )-----------( 155      ( 14 Sep 2019 23:30 )             23.9     -    132<L>  |  99  |  20  ----------------------------<  147<H>  4.3   |  25  |  0.8    Ca    7.8<L>      14 Sep 2019 23:30  Phos  2.4       Mg     1.9           eGFR if Non African American: 103 mL/min/1.73M2 (19 @ 23:30)  eGFR if African American: 120 mL/min/1.73M2 (19 @ 23:30)          Culture - Body Fluid with Gram Stain (collected 19 @ 14:49)  Source: .Body Fluid None  Gram Stain (19 @ 21:27):    polymorphonuclear leukocytes seen    No organisms seen    by cytocentrifuge    Culture - Body Fluid with Gram Stain (collected 19 @ 14:49)  Source: .Body Fluid None  Gram Stain (19 @ 21:26):    No polymorphonuclear leukocytes seen    No organisms seen    by cytocentrifuge    Culture - Blood (collected 19 @ 07:28)  Source: .Blood None  Preliminary Report (19 @ 14:01):    No growth to date.    Culture - Blood (collected 19 @ 11:00)  Source: .Blood Blood  Gram Stain (19 @ 15:59):    Growth in aerobic bottle: Gram Positive Cocci in Clusters  Final Report (19 @ 23:15):    Growth in aerobic bottle: Coag Negative Staphylococcus "Susceptibilities    not performed"    "Due to technical problems, Proteus sp. will Not be reported as part of    the BCID panel until further notice"    ***Blood Panel PCR results on this specimen areavailable    approximately 3 hours after the Gram stain result.***    Gram stain, PCR, and/or culture results may not always    correspond due to difference in methodologies.    ************************************************************    This PCR assay wasperformed using [employer].    The following targets are tested for: Enterococcus,    vancomycin resistant enterococci, Listeria monocytogenes,    coagulase negative staphylococci, S. aureus,    methicillin resistant S. aureus, Streptococcus agalactiae    (Group B), S. pneumoniae, S. pyogenes (Group A),    Acinetobacter baumannii, Enterobacter cloacae, E. coli,    Klebsiella oxytoca, K. pneumoniae, Proteus sp.,    Serratia marcescens, Haemophilus influenzae,    Neisseria meningitidis, Pseudomonas aeruginosa, Candida    albicans, C. glabrata, C krusei, C parapsilosis,    C. tropicalis and the KPC resistance gene.  Organism: Blood Culture PCR (19 @ 23:15)  Organism: Blood Culture PCR (19 @ 23:15)      -  Coagulase negative Staphylococcus: Detec      Method Type: PCR    Culture - Urine (collected 19 @ 07:50)  Source: .Urine Clean Catch (Midstream)  Final Report (19 @ 11:33):    <10,000 CFU/mL Normal Urogenital Nimo            INFECTIOUS DISEASES TESTING  MRSA PCR Result.: Positive (19 @ 11:30)      RADIOLOGY & ADDITIONAL TESTS:  I have personally reviewed the last Chest xray  CXR      CT      CARDIOLOGY TESTING  Transthoracic Echocardiogram:    EXAM:  2-D ECHO (TTE) COMPLETE        PROCEDURE DATE:  2019      INTERPRETATION:  REPORT:    TRANSTHORACIC ECHOCARDIOGRAM REPORT         Patient Name:   NAPOLEON CAST Accession #: [id number]  Medical Rec #:  [id number]    Height:      72.0 in 182.9 cm  YOB: 1970   Weight:      211.0 lb 95.71 kg  Patient Age:    49 years    BSA:         2.18 m²  Patient Gender: M           BP:          178/87 mmHg       Date of Exam:        2019 7:24:43 AM  Referring Physician: [id number] JOY MOSQUEDA  Sonographer:         LACI  Reading Physician:   Chelo Benson M.D.    Procedure:     2D Echo/Doppler/Color Doppler Complete.  Indications:   R57.9 - Shock, unspecified  Diagnosis:     Shock, unspecified - R57.9  Study Details: Technically fair study.         Summary:   1. Technically fair study.   2. Normal global left ventricular systolic function.   3. LV Ejection Fraction by Ruiz's Method with a biplane EF of 60 %.   4. Spectral Doppler shows impaired relaxation pattern of left   ventricular myocardial filling (Grade I diastolic dysfunction).   5. No evidence of mitral valve regurgitation.   6. Structurally normal mitral valve, with normal leaflet excursion.   7. Normal trileaflet aortic valve with normal opening.   8. Estimated pulmonary artery systolic pressure is 39.3 mmHg assuming a   right atrial pressure of 5 mmHg, which is consistent with borderline   pulmonary hypertension.   9. LA volume Index is 24.8 ml/m² ml/m2.    PHYSICIAN INTERPRETATION:  Left Ventricle: Theleft ventricular internal cavity size is normal. Left   ventricular wall thickness is normal. Global LV systolic function was   normal. Spectral Doppler shows impaired relaxation pattern of left   ventricular myocardial filling (Grade I diastolic dysfunction).  Right Ventricle: Normal right ventricular size and function.  Left Atrium: Normal left atrial size. LA volume Index is 24.8 ml/m² ml/m2.  Right Atrium: Normal right atrial size.  Pericardium: There is no evidence of pericardial effusion.  Mitral Valve: Structurally normal mitral valve, with normal leaflet   excursion. The mitral valve is normal in structure. No evidence of mitral   valve regurgitation is seen.  Tricuspid Valve: Structurally normal tricuspid valve, with normal leaflet   excursion. The tricuspid valve is normal in structure. Mild tricuspid   regurgitation is visualized. Estimated pulmonary artery systolic pressure   is 39.3 mmHg assuming a right atrial pressure of 5 mmHg, which is   consistent with borderline pulmonary hypertension.  Aortic Valve: Normal trileaflet aortic valve with normal opening. The   aortic valve is normal. No evidence of aortic valve regurgitation is seen.  Pulmonic Valve: Structurally normal pulmonic valve, with normal leaflet   excursion. The pulmonic valve is normal. No indication of pulmonic valve   regurgitation.  Aorta: The aortic root and ascending aorta are structurally normal, with   no evidence of dilitation.  Pulmonary Artery: The main pulmonary artery is normal in size.       2D AND M-MODE MEASUREMENTS (normal ranges within parentheses):  Left                  Normal   Aorta/Left             Normal  Ventricle:                     Atrium:  IVSd (2D):  0.92 cm  (0.7-1.1) AoV Cusp       2.27  (1.5-2.6)  LVPWd (2D): 0.90 cm  (0.7-1.1) Separation:     cm  LVIDd (2D): 4.61 cm  (3.4-5.7) Left Atrium    3.45  (1.9-4.0)  LVIDs (2D): 3.14 cm            (Mmode):        cm  LV FS (2D):  32.0 %   (>25%)   LA Volume      24.8  Relative      0.39    (<0.42)  Index         ml/m²  Wall                           Right  Thickness                      Ventricle:                                 RVd (2D):      3.18 cm    SPECTRAL DOPPLER ANALYSIS:  LV DIASTOLIC FUNCTION:  MV Peak E: 0.44 m/s Decel Time: 194 msec  MV Peak A: 0.62 m/s  E/A Ratio: 0.70    Aortic Valve:  AoV VMax:    0.95 m/s AoV Area, Vmax: 3.60 cm² Vmax Indx: 1.65 cm²/m²  AoV Pk Grad: 3.6 mmHg    LVOT Vmax: 0.87 m/s  LVOT VTI:  0.16 m  LVOT Diam: 2.23 cm    Mitral Valve:  MV P1/2 Time: 56.15 msec  MV Area, PHT: 3.92 cm²    Tricuspid Valve and PA/RV Systolic Pressure: TR Max Velocity: 2.93 m/s RA   Pressure: 5 mmHg RVSP/PASP: 39.3 mmHg    Pulmonic Valve:  PV Max Velocity: 0.73 m/s PV Max P.1 mmHg PV Mean PG:       [id number] Chelo Benson M.D., Electronically signed on 2019 at   12:34:00 PM              *** Final ***                CHELO BENSON M.D., RESIDENT RADIOLOGIST  This document has been electronically signed.  CHELO BENSON M.D., RESIDENT RADIOLOGIST  This document has been electronically signed. Sep 14 2019  7:24AM             (19 @ 07:24)  12 Lead ECG:   Ventricular Rate 101 BPM    Atrial Rate 101 BPM    P-R Interval 168 ms    QRS Duration 86 ms    Q-T Interval 370 ms    QTC Calculation(Bezet) 479 ms    P Axis 42 degrees    R Axis 28 degrees    T Axis 251 degrees    Diagnosis Line Sinus tachycardia  ST & T wave abnormality, consider anterolateral ischemia  Abnormal ECG    Confirmed by CHELO BENSON MD ([id number]) on 2019 10:33:03 AM (19 @ 05:26)      MEDICATIONS  aspirin  chewable 81  atorvastatin 80  baclofen 10  benzocaine 15 mG/menthol 3.6 mG Lozenge 1  busPIRone 5  carvedilol 3.125  cefTRIAXone   IVPB 2000  chlorhexidine 4% Liquid 1  clonazePAM  Tablet 1  dextrose 5%. 1000  dextrose 50% Injectable 25  dextrose 50% Injectable 25  docusate sodium 100  DULoxetine 90  enoxaparin Injectable 40  gabapentin 800  hydrOXYzine hydrochloride 50  insulin glargine Injectable (LANTUS) 36  insulin lispro (HumaLOG) corrective regimen sliding scale   insulin lispro Injectable (HumaLOG) 12  lactulose Syrup 15  morphine ER Tablet 60  mupirocin 2% Ointment 1  pantoprazole    Tablet 40  QUEtiapine 150  senna 1  tamsulosin 0.4  vancomycin  IVPB 1250      ANTIBIOTICS:  cefTRIAXone   IVPB 2000 milliGRAM(s) IV Intermittent every 24 hours  vancomycin  IVPB 1250 milliGRAM(s) IV Intermittent every 12 hours      All available historical records have been reviewed

## 2019-09-15 NOTE — CHART NOTE - NSCHARTNOTEFT_GEN_A_CORE
Pt is currently refusing turning and positioning. Pt has refused attempts and offers by multiple nurses as well as myself. Pt is aware of the risks of such a refusal including but not limited to pressure ulcer and diverting colostomy.

## 2019-09-15 NOTE — PROGRESS NOTE ADULT - ASSESSMENT
ASSESSMENT  48 y/o M with PMHx of CAD s/p CABG, BPH, CIDP with significant motor and sensory deficits and extreme pain, b/l total hip replacement, anxiety and depression presents to the ED for extreme left hip pain, s/p failed total hip arthoplasty w/ dislocation s/p explanation of hardware, hypotension required pressors in PACU    IMPRESSION  #Suspected R hip infection with hardware, s/p OR 9/13 Explantation of total hip arthroplasty , abx beads placed  OR cx   polymorphonuclear leukocytes seen    No organisms seen    MRSA PCR pos    Sedimentation Rate, Erythrocyte: 107 mm/Hr (09.14.19 @ 11:21)    C-Reactive Protein, Serum: 8.16 mg/dL (09.14.19 @ 11:21)  #9/11 Bcx CoNS likely contaminant 9/12 BCX NG  #Hyponatremia  #Elevated trop/CKMB, suspect demand ischemia  #Abx allergy: PCN childhood - was told by mother not to take    RECOMMENDATIONS  - f/u OR cultures final  - Ceftriaxone 2g q24h IV (Aware of PCN allergy) And Vanc 1.25 q12h IV  - Please check vanc trough 30 min prior to 4th dose   - MRSA PCR + --> mupirocin to nares BID and CHG daily washes x 7 days     Spectra [id number]

## 2019-09-15 NOTE — PROGRESS NOTE ADULT - SUBJECTIVE AND OBJECTIVE BOX
NAPOLEON CAST  [id number]  48 yo Male    Indication for ICU admission: s/p failed total hip arthoplasty w/ dislocation s/p explanation of hardware, hypotension required pressors in PACU    Admit Date:09-03-19  ICU Date: 09-13-19  OR Date: 09-13-19    ALLERGIES:  penicillins (Unknown)    PAST MEDICAL & SURGICAL HISTORY:  CIDP (chronic inflammatory demyelinating polyneuropathy)  History of cholecystectomy  History of total left hip replacement  History of total right hip replacement: bilateral  S/P CABG x 5 in 2011    Home Medications:  Atarax 50 mg oral tablet: 1 tab(s) orally once a day (at bedtime)   baclofen 10 mg oral tablet: 1 tab(s) orally 2 times a day   busPIRone 5 mg oral tablet: 1 tab(s) orally 2 times a day   DULoxetine 60 mg oral delayed release capsule: 1 cap(s) orally once a day   Flomax 0.4 mg oral capsule: 1 cap(s) orally once a day   gabapentin 400 mg oral tablet: 1 tab(s) orally 3 times a day (before meals)  morphine 10 mg/5 mL oral solution: 5 milligram(s) orally every 3 hours, As Needed   morphine 60 mg oral capsule, extended release: 1 cap(s) orally every 12 hours   Plavix 75 mg oral tablet: 1 tab(s) orally once a day   SEROquel  mg oral tablet, extended release: 1 tab(s) orally once a day (in the evening)   Xanax 1 mg oral tablet: 1 tab(s) orally 3 times a day     24HRS EVENT:    Overnight: Tachycardia to 120s,  EKG stable with inverted t waves, Nicom unresponsive 4%, lopressor 5mg x1 for tachy resolved, increased metoprolol q8hr, dilaudid 0.5 x1 for pain    Neuro: hx of depression/anxiety   -Followed by psych and pain management.  -Restarted buspirone, klonipin, cymbalta, seroquel  -Pain controlled with tylenol, baclofen, voltaren, gabapentin, dilaudid PRN  -Increased Klonopin and Cymbalta doses as according to prev psych recommendations     Resp: NC satting well. Encourage IS. CXR unmarkable    Cards: Dx: CAD, CABG x5 vessels, HLD  -Post op EKG sinus tachy s/p metoprolol 5 mg IV x2  -Echo 9/14: EF 60%, G1DD, mild TR  -CE 0.11>0.04>0.15>0.17>0.19  -Restarted statin, aspirin  -AM EKG with inverted T waves  -Started metoprolol 12.5 mg Q8H  -Per cardio: likely demand ischemia, resume ASA and statin, can increase metoprolol to 25 BID as tolerated, no intervention at this time    GI: advance diet, IVL, PPI, senna    : hx of BPH   -Restarted flomax  -Refusing graham, voiding  -Cr at baseline 0.9>0.8  -Hyponatremia 133>134>132  -K 5>5.2>4.7    Heme:  -DVT ppx with Lovenox 40qd  -Holding plavix as per primary team  -Recieved 1 unit of PRBC post op by primary team  -Hg 10.7>9.7 >9.5    ID: Septic joint, Stimulan beads in place (vanc/tobramycin)  -Recieved Clindamycin pre-op  -Afebrile, WBC improving 24>10  -Blood cx 9/11: final coag staph negative w/o sensitivities  -Urine cx 9/11: negative  -Blood cx 9/12 NGTD prelim  -Nasal MRSA 9/14: positive --> on Mupirocin  -Per ID: d/c clinda, d/c flagyl, increase vanco, start ceftriaxone  -, CRP pending    Endo: hx of DM. a1c 11%  -On home lantus 36, lispro 12 qac  ->268>259>283>215    MSK: Bed bound for past 3 yrs  -Oob to chair as per ortho.     DVT PPX: lovenox    GI PPX: PPI    TUBES/LINES/DRAINS:  - peripheral IV  - R IJ CVC 9/13  - R femoral yari 9/13    REVIEW OF SYSTEMS:  [x] A ten-point review of systems was otherwise negative except as noted.  [ ] Due to altered mental status/intubation, subjective information were not able to be obtained from the patient. History was obtained, to the extent possible, from review of the chart and collateral sources of information.

## 2019-09-15 NOTE — PROGRESS NOTE ADULT - SUBJECTIVE AND OBJECTIVE BOX
ORTHO PROGRESS NOTE     49yMale POD #2  S/P Explantation of left hemiarthroplasty    Patient seen and examined at bedside . The patient is awake and alert in NAD. Complains of L knee pain. No complaints of chest pain, SOB, N/V.    MEDICATIONS  (STANDING):  aspirin  chewable 81 milliGRAM(s) Oral daily  atorvastatin 80 milliGRAM(s) Oral at bedtime  baclofen 10 milliGRAM(s) Oral two times a day  benzocaine 15 mG/menthol 3.6 mG Lozenge 1 Lozenge Oral three times a day  busPIRone 5 milliGRAM(s) Oral two times a day  carvedilol 3.125 milliGRAM(s) Oral every 12 hours  cefTRIAXone   IVPB 2000 milliGRAM(s) IV Intermittent every 24 hours  chlorhexidine 4% Liquid 1 Application(s) Topical daily  clonazePAM  Tablet 1 milliGRAM(s) Oral three times a day  dextrose 5%. 1000 milliLiter(s) (50 mL/Hr) IV Continuous <Continuous>  dextrose 50% Injectable 25 Gram(s) IV Push once  dextrose 50% Injectable 25 Gram(s) IV Push once  docusate sodium 100 milliGRAM(s) Oral daily  DULoxetine 90 milliGRAM(s) Oral daily  enoxaparin Injectable 40 milliGRAM(s) SubCutaneous daily  gabapentin 800 milliGRAM(s) Oral three times a day  hydrOXYzine hydrochloride 50 milliGRAM(s) Oral at bedtime  insulin glargine Injectable (LANTUS) 36 Unit(s) SubCutaneous at bedtime  insulin lispro (HumaLOG) corrective regimen sliding scale   SubCutaneous three times a day before meals  insulin lispro Injectable (HumaLOG) 12 Unit(s) SubCutaneous three times a day before meals  lactulose Syrup 15 Gram(s) Oral every 12 hours  morphine ER Tablet 60 milliGRAM(s) Oral every 12 hours  mupirocin 2% Ointment 1 Application(s) Both Nostrils two times a day  pantoprazole    Tablet 40 milliGRAM(s) Oral before breakfast  QUEtiapine 150 milliGRAM(s) Oral at bedtime  senna 1 Tablet(s) Oral daily  tamsulosin 0.4 milliGRAM(s) Oral at bedtime  vancomycin  IVPB 1250 milliGRAM(s) IV Intermittent every 12 hours    MEDICATIONS  (PRN):  acetaminophen   Tablet .. 650 milliGRAM(s) Oral every 6 hours PRN Temp greater or equal to 38C (100.4F)  dextrose 40% Gel 15 Gram(s) Oral once PRN Blood Glucose LESS THAN 70 milliGRAM(s)/deciliter  diclofenac 75 milliGRAM(s) Oral two times a day PRN Moderate Pain (4 - 6)  glucagon  Injectable 1 milliGRAM(s) IntraMuscular once PRN Glucose LESS THAN 70 milligrams/deciliter  HYDROmorphone  Injectable 0.5 milliGRAM(s) IV Push every 4 hours PRN Moderate Pain (4 - 6)  HYDROmorphone  Injectable 1 milliGRAM(s) IV Push every 4 hours PRN Severe Pain (7 - 10)  morphine Concentrate 10 milliGRAM(s) Oral every 4 hours PRN Severe Pain (7 - 10)      Vital Signs Last 24 Hrs  T(C): 36.9 (15 Sep 2019 04:00), Max: 37.1 (14 Sep 2019 20:00)  T(F): 98.5 (15 Sep 2019 04:00), Max: 98.7 (14 Sep 2019 20:00)  HR: 98 (15 Sep 2019 07:00) (92 - 130)  BP: --  BP(mean): --  RR: 15 (15 Sep 2019 07:00) (0 - 37)  SpO2: 99% (15 Sep 2019 08:33) (95% - 99%)    PE:   Incision C/D/I, redressed  Compartments soft and compressible  No motor function distally  Denies sensation distal to knee   2+ DP pulse                        8.1    8.02  )-----------( 155      ( 14 Sep 2019 23:30 )             23.9     09-14    132<L>  |  99  |  20  ----------------------------<  147<H>  4.3   |  25  |  0.8    Ca    7.8<L>      14 Sep 2019 23:30  Phos  2.4     09-14  Mg     1.9     09-14      PT/INR - ( 14 Sep 2019 05:47 )   PT: 15.20 sec;   INR: 1.33 ratio         PTT - ( 14 Sep 2019 05:47 )  PTT:53.2 sec      09-14-19 @ 07:01  -  09-15-19 @ 07:00  --------------------------------------------------------  IN: 1520 mL / OUT: 1220 mL / NET: 300 mL    A/P: 49yMale POD #2  S/P  Explantation of left hemiarthroplasty  -Periop antibiotics- standing clindamycin until surgical culture results  - ICU to monitor due to tachycardia and hypotension  - F/U AM Labs  - DVT PPx  - Pain Control  - SCDs  - IS

## 2019-09-15 NOTE — PROGRESS NOTE ADULT - ASSESSMENT
Assessment: 48 y/o M PMHx of CDIP s/p L explantation of hardware, wash out and placement of abx spacer     Neuro: Dx depression/anxiety. Followed by psych and pain management. Monitor for changes in mental status. Restarted buspirone, klonipin, cymbalta, seroquel. Pain controlled with tylenol, baclofen, voltaren, gabapentin, dilaudid PRN.     Resp: NC satting well. Encourage IS. AM CXR ordered.     Cards: Dx: CAD, CABG x5 vessels, HLD. Post op EKG sinus tachy. Metoprolol 12.5 q8hr. Echo . Cardiac enzymes uptrending, likely demand ischemia, Cardiology consult. Continue trending cardiac enzymes. Continue statin. NICOM prn. EKG stable.     GI: advance diet, IVL when tolerating PO. PPI/Phylicia.     : Dx of BPH restarted flomax. No graham. Monitor UOP. K post op 5.0 w/o EKG changes. Cr 0.9 baseline. Monitor lytes.     Heme: On ASA/Lovenox. Holding plavix as per primary team. Received 1 unit of PRBC post op by primary team. Monitor H/H     ID: Clindamycin pre-op.  Afebrile. WBC 24. Blood cultures from 9/11 final coag staph negative w/o sensitivities. Blood culture 9/12 NGTD prelim. Urine cx 9/11 negative. ID consult, Abx: Vanco/Ceftriaxone  Endo: Dx of DM. Started ISS w/ FS Q 6 hours.     MSK: Bed bound, oob to chair as per ortho.     Dispo: SICU

## 2019-09-15 NOTE — PROGRESS NOTE ADULT - ASSESSMENT
50 y/o M with CIDP syndrome with Infected left hip - Now POD #2 from Hemiarthroplasty  for removal of hardware with ABX infusion for septic joint, recent completion of IVIG  No acute changes on the exam.        PLAN:  Continue Gabapentin, Baclofen and Cymbalta  IVIG 1gm/kg Q3 weeks infuse over 6 hours with pre-medication w/Benadryl and APAP starting 3 weeks from last dose  f/u on ganglioside abs  Continue current care        Neuroattending note will follow

## 2019-09-15 NOTE — PROGRESS NOTE ADULT - SUBJECTIVE AND OBJECTIVE BOX
SUBJECTIVE:    No CP / dyspnea.    VITALS:  T(C): 36.6 (09-15-19 @ 08:00), Max: 37.1 (09-14-19 @ 20:00)  HR: 106 (09-15-19 @ 11:00) (92 - 130)  BP: --  RR: 18 (09-15-19 @ 11:00) (0 - 37)  SpO2: 96% (09-15-19 @ 11:00) (95% - 100%)    I&O's Summary:    14 Sep 2019 07:01  -  15 Sep 2019 07:00  --------------------------------------------------------  IN: 1520 mL / OUT: 1220 mL / NET: 300 mL    15 Sep 2019 07:01  -  15 Sep 2019 11:27  --------------------------------------------------------  IN: 0 mL / OUT: 650 mL / NET: -650 mL    PHYSICAL:  Alert, no distress  Right IJ Line  Reg, nL s1/s2, no m/r/g  CTA  Soft, nontender / nondistended  Warm, 1+ edema    TELEMETRY: No arrhythmias.    LABS:                        8.1    8.02  )-----------( 155      ( 14 Sep 2019 23:30 )             23.9     09-14    132<L>  |  99  |  20  ----------------------------<  147<H>  4.3   |  25  |  0.8    Ca    7.8<L>      14 Sep 2019 23:30  Phos  2.4     09-14  Mg     1.9     09-14      PT/INR - ( 14 Sep 2019 05:47 )   PT: 15.20 sec;   INR: 1.33 ratio    PTT - ( 14 Sep 2019 05:47 )  PTT:53.2 sec    Trop: 0.19 --> 0.18    MEDICATIONS  (STANDING):  aspirin  chewable 81 milliGRAM(s) Oral daily  atorvastatin 80 milliGRAM(s) Oral at bedtime  baclofen 10 milliGRAM(s) Oral two times a day  benzocaine 15 mG/menthol 3.6 mG Lozenge 1 Lozenge Oral three times a day  busPIRone 5 milliGRAM(s) Oral two times a day  carvedilol 3.125 milliGRAM(s) Oral every 12 hours  cefTRIAXone   IVPB 2000 milliGRAM(s) IV Intermittent every 24 hours  chlorhexidine 4% Liquid 1 Application(s) Topical daily  clonazePAM  Tablet 1 milliGRAM(s) Oral three times a day  dextrose 5%. 1000 milliLiter(s) (50 mL/Hr) IV Continuous <Continuous>  dextrose 50% Injectable 25 Gram(s) IV Push once  dextrose 50% Injectable 25 Gram(s) IV Push once  docusate sodium 100 milliGRAM(s) Oral daily  DULoxetine 90 milliGRAM(s) Oral daily  enoxaparin Injectable 40 milliGRAM(s) SubCutaneous daily  gabapentin 800 milliGRAM(s) Oral three times a day  hydrOXYzine hydrochloride 50 milliGRAM(s) Oral at bedtime  insulin glargine Injectable (LANTUS) 36 Unit(s) SubCutaneous at bedtime  insulin lispro (HumaLOG) corrective regimen sliding scale   SubCutaneous three times a day before meals  insulin lispro Injectable (HumaLOG) 12 Unit(s) SubCutaneous three times a day before meals  lactulose Syrup 15 Gram(s) Oral every 12 hours  morphine ER Tablet 60 milliGRAM(s) Oral every 12 hours  mupirocin 2% Ointment 1 Application(s) Both Nostrils two times a day  pantoprazole    Tablet 40 milliGRAM(s) Oral before breakfast  QUEtiapine 150 milliGRAM(s) Oral at bedtime  senna 1 Tablet(s) Oral daily  tamsulosin 0.4 milliGRAM(s) Oral at bedtime  vancomycin  IVPB 1250 milliGRAM(s) IV Intermittent every 12 hours    IMPRESSON:  1) NSTEMI:  - Associated with perioperative hypotension (resolved).  - Mild troponin elevation (improving).  - Asymptomatic  - nL LVSF.    2) CAD s/p CABG    RECOMMEND:  - Cont ASA / Lipitor.  - Add Plavix if bleeding risk acceptable.  - Cont Coreg.  - Repeat EKG.    No plan for urgent cath at present.  Dr. Cid to reassume care tomorrow.

## 2019-09-15 NOTE — PROGRESS NOTE ADULT - SUBJECTIVE AND OBJECTIVE BOX
Neurology Progress Note    Interval History:      HPI:  48 y/o M with PMHx of CAD s/p CABG, BPH, CIDP with significant motor and sensory deficits and extreme pain, b/l total hip replacement, anxiety and depression presents to the ED for extreme left hip pain. He mentions that this has been going for some time and it was well managed by Dr. Camarena but it has been harder to control the pain recently and he was sent by her for pain management and further management. Patient is bedbound and is unable to visit doctors and his condition has been managed by telemetry. He also complains of worsening depression and anxiety, he is also claustrophobic and is complaining of staying in small confined places. He denies suicidal ideation.   In ED, VS wnl, CT head negative for intracranial bleed. Xray of left femur showed superolateral dislocation of the left total hip arthroplasty since the prior exam in 2016.    Attd: very complex and long, chr hx; pt had DM, HTN, DLD as young man coupled w/ high stress jobs ( in [employer] - lots of travel); had CAD and CABG in 2011; shortly after developed odd neuro conditions and finally dx'd in 2013 w/ CDIP by [employer]; He had become weaker over time and fell and broke rt hip - s/p ORIF and shortly after fell again on lt hip and had ORIF on lt; pt was doing STR, but pain kept getting worse in lt hip; pt eventually became homebound and bedbound (2/2 pain and CIDP); overtime; lt leg seemed shorter and externally rotated, pain kept increasing; pt presents now w/ a myriad of unaddressed complaints from yrs of poor f/u:  1) has extreme pain lt hip w/ deformity; meds are not working - asked for ortho eval  2) CDIP is causing neuropathic pains all over and he has not followed up w/ neuro - asked for pain mngmt eval  3) he thinks he may have had TIA w/ numbness in face and arm, but he is not sure how it relates to CIDP - asked for neuro eval  4) pt not walking well at all and developed b/l dropped feet - pt asking for neuro and ortho for options - I told him that if he is not strong enough to walk, it is likely not a good idea to operate on his dropped feet (luc given his operative risk) - he has zero mvmt in his legs currently  5) pt has not been to cardio in yrs; has hx of DM and CABG; denies CP per se - pt asked for cardio eval (will need anyway if lt hip to be surgically fixed for pain control)  6) pt is considering rehab again to increase mobility, even if just to assist in transfers so that he can get out of house to see MDs - pt asked for rehab eval  7) pt is every depressed and anxious given all his ailments at such a young age; he is missing his dtrs grow up and can no longer work or even function at an independent level - pt asked for a psych eval to adjust meds  8) pt having trouble trying to get to MD offices - pt asked for social serv eval for asst. Pt has HHA for 12 hrs/day (10 am to 10 pm). (03 Sep 2019 15:38)    Patient is examined at the bedside. He is awake and alert, answers questions and follows commands. Reports pain mostly Left lower extremity. He is antigravity in UE, no movement in LE.      PAST MEDICAL & SURGICAL HISTORY:  CIDP (chronic inflammatory demyelinating polyneuropathy)  History of cholecystectomy  History of total left hip replacement  History of total right hip replacement: bilateral  S/P CABG x 5          Medications:  acetaminophen   Tablet .. 650 milliGRAM(s) Oral every 6 hours PRN  aspirin  chewable 81 milliGRAM(s) Oral daily  atorvastatin 80 milliGRAM(s) Oral at bedtime  baclofen 10 milliGRAM(s) Oral two times a day  benzocaine 15 mG/menthol 3.6 mG Lozenge 1 Lozenge Oral three times a day  busPIRone 5 milliGRAM(s) Oral two times a day  cefTRIAXone   IVPB 2000 milliGRAM(s) IV Intermittent every 24 hours  chlorhexidine 4% Liquid 1 Application(s) Topical daily  clonazePAM  Tablet 1 milliGRAM(s) Oral three times a day  dextrose 40% Gel 15 Gram(s) Oral once PRN  dextrose 5%. 1000 milliLiter(s) IV Continuous <Continuous>  dextrose 50% Injectable 25 Gram(s) IV Push once  dextrose 50% Injectable 25 Gram(s) IV Push once  diclofenac 75 milliGRAM(s) Oral two times a day PRN  docusate sodium 100 milliGRAM(s) Oral daily  DULoxetine 90 milliGRAM(s) Oral daily  enoxaparin Injectable 40 milliGRAM(s) SubCutaneous daily  gabapentin 800 milliGRAM(s) Oral three times a day  glucagon  Injectable 1 milliGRAM(s) IntraMuscular once PRN  HYDROmorphone  Injectable 0.5 milliGRAM(s) IV Push every 4 hours PRN  HYDROmorphone  Injectable 1 milliGRAM(s) IV Push every 4 hours PRN  hydrOXYzine hydrochloride 50 milliGRAM(s) Oral at bedtime  insulin glargine Injectable (LANTUS) 36 Unit(s) SubCutaneous at bedtime  insulin lispro (HumaLOG) corrective regimen sliding scale   SubCutaneous three times a day before meals  insulin lispro Injectable (HumaLOG) 12 Unit(s) SubCutaneous three times a day before meals  metoprolol tartrate 12.5 milliGRAM(s) Oral every 8 hours  morphine Concentrate 10 milliGRAM(s) Oral every 4 hours PRN  morphine ER Tablet 60 milliGRAM(s) Oral every 12 hours  mupirocin 2% Ointment 1 Application(s) Both Nostrils two times a day  pantoprazole    Tablet 40 milliGRAM(s) Oral before breakfast  QUEtiapine 150 milliGRAM(s) Oral at bedtime  senna 1 Tablet(s) Oral daily  tamsulosin 0.4 milliGRAM(s) Oral at bedtime  vancomycin  IVPB 1250 milliGRAM(s) IV Intermittent every 12 hours      Vital Signs Last 24 Hrs  T(C): 36.9 (15 Sep 2019 00:00), Max: 37.2 (14 Sep 2019 04:00)  T(F): 98.5 (15 Sep 2019 00:00), Max: 98.9 (14 Sep 2019 04:00)  HR: 104 (15 Sep 2019 03:00) (92 - 130)  BP: --  BP(mean): --  RR: 0 (15 Sep 2019 03:00) (0 - 37)  SpO2: 95% (15 Sep 2019 03:00) (95% - 100%)    Neurological Exam:   Mental status: Awake, alert and oriented x3.  Recent and remote memory intact.  Naming, repetition and comprehension intact.  Attention/concentration intact.  No dysarthria, no aphasia.  Fund of knowledge appropriate.    Cranial nerves: Pupils equally round and reactive to light, visual fields full, no nystagmus, extraocular muscles intact, V1 through V3 intact bilaterally and symmetric, face symmetric, hearing intact to finger rub, palate elevation symmetric, tongue was midline.  Motor:  MRC grading 4/5 b/l UE/ 1/5 b/l LE  Sensation: diffuse loss of sensation over distal LE to light touch  Coordination: No dysmetria on finger-to-nose and heel-to-shin.  No dysdiadokinesia.    Labs:  CBC Full  -  ( 14 Sep 2019 23:30 )  WBC Count : 8.02 K/uL  RBC Count : 3.05 M/uL  Hemoglobin : 8.1 g/dL  Hematocrit : 23.9 %  Platelet Count - Automated : 155 K/uL  Mean Cell Volume : 78.4 fL  Mean Cell Hemoglobin : 26.6 pg  Mean Cell Hemoglobin Concentration : 33.9 g/dL  Auto Neutrophil # : x  Auto Lymphocyte # : x  Auto Monocyte # : x  Auto Eosinophil # : x  Auto Basophil # : x  Auto Neutrophil % : x  Auto Lymphocyte % : x  Auto Monocyte % : x  Auto Eosinophil % : x  Auto Basophil % : x    09-14    132<L>  |  99  |  20  ----------------------------<  147<H>  4.3   |  25  |  0.8    Ca    7.8<L>      14 Sep 2019 23:30  Phos  2.4     09-14  Mg     1.9     09-14        PT/INR - ( 14 Sep 2019 05:47 )   PT: 15.20 sec;   INR: 1.33 ratio         PTT - ( 14 Sep 2019 05:47 )  PTT:53.2 sec      Assessment:  This is a 49y Male w/ h/o     Plan:

## 2019-09-16 NOTE — CHART NOTE - NSCHARTNOTEFT_GEN_A_CORE
Ovi Nicole    50 y/o male POD #3 s/p explantation of L total hip arthroplasty and insertion of ABX spacer. Nicole, Ovi    50 y/o male POD #3 s/p explantation of L total hip arthroplasty and insertion of ABX spacer. Post op was complicated by hypotension, requiring 1 PRBC transfusion, pressors. Pt was weaned off pressors by POD1 in the evening     Neuro: hx of depression/anxiety   -Followed by psych and pain management.  -Restarted buspirone, klonipin, cymbalta, seroquel  -Pain controlled with tylenol, baclofen, voltaren, gabapentin, dilaudid PRN  -Increased Klonopin and Cymbalta doses as according to prev psych recommendations     Resp: NC satting well. Encourage IS. CXR unmarkable    Cards: persistent tachy   -d/c'd metop 12.5q8, started coreg 3.125mg  Q12hr  -elevated trops, downtrended, no longer following  -((CE 0.11>0.04>0.15>0.17>0.19>0.18))  -Per cards c/s:was likely demand ischemia, resume ASA and statin, can increase metoprolol to 25 BID as tolerated  Hx: CAD, CABG x5 vessels, HLD  -Echo 9/14: EF 60%, G1DD, mild TR  -on statin, aspirin  -AM EKG with stable inverted T waves    GI: CC diet, IVL, PPI, senna    : hx of BPH   -on flomax  -Refused graham, voiding  -Cr at baseline 0.9  -Hyponatremia resolved 132>136    Heme:  -Hg 9.5>8.1>7.4 rpt 7.3, transfused 1u pRBC, post tranfusion Hg 8.3  -Recieved 1 unit of PRBC post op by primary team  -DVT ppx with Lovenox 40qd  -Holding plavix as per primary team    ID: Septic joint, Stimulan beads in place (vanc/tobramycin)  -Recieved Clindamycin pre-op  -Afebrile, Tmax 100.1, WBC improving 24>10>7>8  -Blood cx 9/11: final coag staph negative w/o sensitivities  -Urine cx 9/11: negative  -Blood cx 9/12 NGTD prelim  -OR cx 9/13; prelim +pseudomonas  -Nasal MRSA 9/14: positive --> on Mupirocin  -Per ID: d/c vanc/ceftriaxone, started meropenem   -, CRP 8.16    Endo: hx of DM. a1c 11%  -On home lantus 36, lispro 12 qac, ISS   -FS <200    MSK: Bed bound for past 3 yrs  -Oob to chair as per ortho.     f/u  -restart Plavix when not going to the OR  -f/u cultures  -full set of evening labs    full sign out given to primary team  CHIQUITA Duran @ 11:20am

## 2019-09-16 NOTE — PROGRESS NOTE ADULT - ATTENDING COMMENTS
Pt seen and examined.  Agree with resident exam and plan.  receiving 1UPRBC today 9/17    serous drainage.
patient seen and examined independently on morning rounds , chart reviewed and discussed with the medicine resident and on interdisciplinary rounds and agree with the above resident progress note with the following addendum:    npo awaiting OR today with ortho----also c/o bilateral shoulder pain (chronic)--will try diclofenac cream post op as well as PT/OT    PE:  GEN-NAD, AAOx3  PULM- Clear to auscultation bilaterally, fair air entry  CVS- +s1/s2 tachy  GI- soft NT ND +bs, no rebound, no guarding  EXT- trace edema    a/p:  #Left hip dislocation- superolateral  -pain control  -f/u with ortho---npo for or today  -patient has been bedbound >3 years    #tachycardia---unclear etiology but suspect 2/2 pain vs resolving RUE cellulitis (cellulitis)  -monitor hr  -pain control  -bcx gram +cocci--cont abx and monitor wbc/fever curve (afebrile >24 hrs)  -repeat bcx  -cont iv abx (clinda)    #CIDP  -f/u with neuro---s/p IVIG yesterday    DVT/GI ppx  FULL CODE  guarded prognosis
patient seen and examined independently on morning rounds for the first time today, chart reviewed and discussed with the medicine resident and on interdisciplinary rounds and agree with the above resident progress note with the following addendum:    surgery again postponed today---f/u with ortho- patient upset and frustrated and still in pain and anxious- keep npo after midnight tonight for OR tomorrow for Left hip dislocation surgical repair    PE:  GEN-NAD, AAOx3  PULM- Clear to auscultation bilaterally, fair air entry  CVS- +s1/s2 tachy  GI- soft NT ND +bs, no rebound, no guarding  EXT- trace edema,     a/p:  #Left hip dislocation- superolateral  -pain control  -f/u with ortho---plan for OR tomorrow  -patient has been bedbound >3 years  -holding plavix    #tachycardia---unclear etiology but suspect 2/2 pain vs resolving RUE cellulitis (cellulitis)  -monitor hr  -pain control  -monitor wbc and fever curve  -cont iv abx (clinda)--->f/u bcx   -today final IVIG for CIDP    DVT/GI ppx  FULL CODE
Patient seen and examined and agree with above except as noted.  Likely had reaction yesterday during IVIG due to fast rate.    Plan as above (give over 6 hours)
stable overnight   less tachycardic   pain better controlled   Psych meds resumed   advance diet continue IV antibiotics   continue BB   continue sicu care   DVT prophylaxis
Hypotensive postop-weaned off pressors  Depression/Anxiety: cont antipsychotic medications  Tachycardia: improved with coreg, troponin down - will need tele  voiding  GI: cont diet.   cont to hold plavix as per ortho for possible   ID: septic joint with pseudomonas will switch tony as per ID  DM: cont lantus and lispro  Remove central line and yari   downgrade tele
patient seen and examined  agree with PA note above  patient with advanced CIDP followed by dr arredondo  patient s/p IVIG load  resume maintenance at 1g/kg/day 3 weeks after the last day of the load  f/u Abs  please call with any questions
Seen / examined and above reviewed.    Post-op hypotension associated with EKG changes (anterior t-wave inversion) and mild troponin elevation.  No CP.    Now hemodynamically stable.  No CP / dyspnea.    RECOMMEND:  - Cont ASA / Lipitor.  - Add Plavix if bleeding risk acceptable.  - Start Metoprolol 12.6mg q6.  - Repeat EKG / follow troponin.
feeling better   mild tachycardia   continue resuscitation   continue Antibiotics   f/u cultures   SW to discuss needs  Psych eval

## 2019-09-16 NOTE — PROGRESS NOTE ADULT - ASSESSMENT
ASSESSMENT  50 y/o M with PMHx of CAD s/p CABG, BPH, CIDP with significant motor and sensory deficits and extreme pain, b/l total hip replacement, anxiety and depression presents to the ED for extreme left hip pain, s/p failed total hip arthoplasty w/ dislocation s/p explanation of hardware, hypotension required pressors in PACU    IMPRESSION  #Suspected R hip infection with hardware, s/p OR 9/13 Explantation of total hip arthroplasty , abx beads placed  OR cx   polymorphonuclear leukocytes seen, rare Pseudomonas on one sample but no PMNs    MRSA PCR pos    Sedimentation Rate, Erythrocyte: 107 mm/Hr (09.14.19 @ 11:21)    C-Reactive Protein, Serum: 8.16 mg/dL (09.14.19 @ 11:21)  #9/11 Bcx CoNS : contaminant 9/12 BCX NG  #Hyponatremia  #Elevated trop/CKMB, suspect demand ischemia  #Abx allergy: PCN childhood - was told by mother not to take    RECOMMENDATIONS  - f/u OR cultures final  - D/c Ceftriaxone and D/c vanco  -MEROPENEM 1 GM IV Q8H  - MRSA PCR + --> mupirocin to nares BID and CHG daily washes x 7 days

## 2019-09-16 NOTE — PROGRESS NOTE ADULT - SUBJECTIVE AND OBJECTIVE BOX
CAST, NAPOLEON  49y, Male      OVERNIGHT EVENTS:    low grade fevers, no pressors, no complaints  no BM  no cough/ SOB  R IJ catheter with no erythema    VITALS:  T(F): 99.6, Max: 100.1 (09-16-19 @ 00:00)  HR: 104  BP: --  RR: 17Vital Signs Last 24 Hrs  T(C): 37.6 (16 Sep 2019 04:00), Max: 37.8 (16 Sep 2019 00:00)  T(F): 99.6 (16 Sep 2019 04:00), Max: 100.1 (16 Sep 2019 00:00)  HR: 104 (16 Sep 2019 05:00) (96 - 110)  BP: --  BP(mean): --  RR: 17 (16 Sep 2019 05:00) (0 - 22)  SpO2: 95% (16 Sep 2019 05:00) (94% - 100%)    TESTS & MEASUREMENTS:                        8.3    8.94  )-----------( 141      ( 15 Sep 2019 23:10 )             25.7     09-15    136  |  100  |  15  ----------------------------<  169<H>  4.5   |  28  |  0.9    Ca    7.9<L>      15 Sep 2019 23:10  Phos  3.3     09-15  Mg     1.7     09-15          Culture - Body Fluid with Gram Stain (collected 09-13-19 @ 14:49)  Source: .Body Fluid None  Gram Stain (09-14-19 @ 21:27):    polymorphonuclear leukocytes seen    No organisms seen    by cytocentrifuge  Preliminary Report (09-15-19 @ 15:35):    No growth    Culture - Body Fluid with Gram Stain (collected 09-13-19 @ 14:49)  Source: .Body Fluid None  Gram Stain (09-14-19 @ 21:26):    No polymorphonuclear leukocytes seen    No organisms seen    by cytocentrifuge  Preliminary Report (09-15-19 @ 18:38):    Rare Pseudomonas aeruginosa    Culture - Blood (collected 09-12-19 @ 07:28)  Source: .Blood None  Preliminary Report (09-13-19 @ 14:01):    No growth to date.    Culture - Blood (collected 09-11-19 @ 11:00)  Source: .Blood Blood  Gram Stain (09-12-19 @ 15:59):    Growth in aerobic bottle: Gram Positive Cocci in Clusters  Final Report (09-13-19 @ 23:15):    Growth in aerobic bottle: Coag Negative Staphylococcus "Susceptibilities    not performed"    "Due to technical problems, Proteus sp. will Not be reported as part of    the BCID panel until further notice"    ***Blood Panel PCR results on this specimen areavailable    approximately 3 hours after the Gram stain result.***    Gram stain, PCR, and/or culture results may not always    correspond due to difference in methodologies.    ************************************************************    This PCR assay wasperformed using [employer].    The following targets are tested for: Enterococcus,    vancomycin resistant enterococci, Listeria monocytogenes,    coagulase negative staphylococci, S. aureus,    methicillin resistant S. aureus, Streptococcus agalactiae    (Group B), S. pneumoniae, S. pyogenes (Group A),    Acinetobacter baumannii, Enterobacter cloacae, E. coli,    Klebsiella oxytoca, K. pneumoniae, Proteus sp.,    Serratia marcescens, Haemophilus influenzae,    Neisseria meningitidis, Pseudomonas aeruginosa, Candida    albicans, C. glabrata, C krusei, C parapsilosis,    C. tropicalis and the KPC resistance gene.  Organism: Blood Culture PCR (09-13-19 @ 23:15)  Organism: Blood Culture PCR (09-13-19 @ 23:15)      -  Coagulase negative Staphylococcus: Detec      Method Type: PCR    Culture - Urine (collected 09-11-19 @ 07:50)  Source: .Urine Clean Catch (Midstream)  Final Report (09-12-19 @ 11:33):    <10,000 CFU/mL Normal Urogenital Nimo            RADIOLOGY & ADDITIONAL TESTS:    ANTIBIOTICS:  cefTRIAXone   IVPB 2000 milliGRAM(s) IV Intermittent every 24 hours  vancomycin  IVPB 1250 milliGRAM(s) IV Intermittent every 12 hours

## 2019-09-16 NOTE — PROGRESS NOTE ADULT - ASSESSMENT
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  Workup revealed superolateral dislocation of the left hip and he is s/p explantation of Left total hip arthroplasty and insertion of ABX spacer. Post op course was complicated by hypotension, requiring 1 PRBC transfusion and pressors. Pt was weaned off pressors by POD1 in the evening.   He had elevated troponins postop likely from demand ischemia per cardiology and he is on medical management.    Preop, he received loading dose of IVIG during this admission and will continue maintenance dose q3 weeks per neurology.  He is being transferred to telemetry now.    1. Dislocation of left hip s/p explant of left ОЛЬГА and insertion of abx spacer  OR culture + for Pseudomonas from hip  on meropenem per ID  f/u sensitivities  pain control  Ortho f/u    2. NSTEMI type 2  ASA/statin/Bblocker  cardio following  add Plavix if H/H stable and low bleeding risk  normal EF on ECHO  telemetry  cardio f/u  no plan for urgent cath    3. CIDP - for IVIG q3 weeks per neurology    4. CAD s/p CABG - continue medical management    5. DM - controlled on insulin    6. Anemia due to acute blood loss   s/p transfusion with 1 unit PRBC on 9/15 with appropriate rise in Hgb  check H/H today and if stable, may transfer to medicine    7. Chronic pain - followed by pain management  continue current regimen and monitor    8. Depression/anxiety  seen by psychiatry this admission  recommendations being followed  needs close outpt f/u    9. BPH on flomax    10. + MRSA nasal swab - on mupirocin and CHG baths    11. DVT prophylaxis      PROGRESS NOTE HANDOFF    Pending: CBC today and in AM, f/u sensitivities of Pseudomonas    Disposition: to be determined

## 2019-09-16 NOTE — PROGRESS NOTE ADULT - SUBJECTIVE AND OBJECTIVE BOX
NAPOLEON CAST  [id number]  50 yo Male    Indication for ICU admission: s/p failed total hip arthoplasty w/ dislocation s/p explanation of hardware, hypotension required pressors in PACU    Admit Date:09-03-19  ICU Date: 09-13-19  OR Date: 09-13-19    ALLERGIES:  penicillins (Unknown)    PAST MEDICAL & SURGICAL HISTORY:  CIDP (chronic inflammatory demyelinating polyneuropathy)  History of cholecystectomy  History of total left hip replacement  History of total right hip replacement: bilateral  S/P CABG x 5 in 2011    Home Medications:  Atarax 50 mg oral tablet: 1 tab(s) orally once a day (at bedtime)   baclofen 10 mg oral tablet: 1 tab(s) orally 2 times a day   busPIRone 5 mg oral tablet: 1 tab(s) orally 2 times a day   DULoxetine 60 mg oral delayed release capsule: 1 cap(s) orally once a day   Flomax 0.4 mg oral capsule: 1 cap(s) orally once a day   gabapentin 400 mg oral tablet: 1 tab(s) orally 3 times a day (before meals)  morphine 10 mg/5 mL oral solution: 5 milligram(s) orally every 3 hours, As Needed   morphine 60 mg oral capsule, extended release: 1 cap(s) orally every 12 hours   Plavix 75 mg oral tablet: 1 tab(s) orally once a day   SEROquel  mg oral tablet, extended release: 1 tab(s) orally once a day (in the evening)   Xanax 1 mg oral tablet: 1 tab(s) orally 3 times a day     24HRS EVENT:    Overnight: repleted mg, post transfuse Hg 8.3 from 7.4, low tachy regardless of BB switch to Coreg    Neuro: hx of depression/anxiety   -Followed by psych and pain management.  -Restarted buspirone, klonipin, cymbalta, seroquel  -Pain controlled with tylenol, baclofen, voltaren, gabapentin, dilaudid PRN  -Increased Klonopin and Cymbalta doses as according to prev psych recommendations     Resp: NC satting well. Encourage IS. CXR unmarkable    Cards: persistent tachy   -d/c'd metop 12.5q8, started coreg 3.125mg  Q12hr  -elevated trops, downtrended, no longer following  -((CE 0.11>0.04>0.15>0.17>0.19>0.18))  -Per cards c/s:was likely demand ischemia, resume ASA and statin, can increase metoprolol to 25 BID as tolerated  Hx: CAD, CABG x5 vessels, HLD  -Echo 9/14: EF 60%, G1DD, mild TR  -on statin, aspirin  -AM EKG with stable inverted T waves    GI: CC diet, IVL, PPI, senna    : hx of BPH   -on flomax  -Refused graham, voiding  -Cr at baseline 0.9  -Hyponatremia resolved 132>136    Heme:  -Hg 9.5>8.1>7.4 rpt 7.3, transfused 1u pRBC, post tranfusion Hg 8.3  -Recieved 1 unit of PRBC post op by primary team  -DVT ppx with Lovenox 40qd  -Holding plavix as per primary team    ID: Septic joint, Stimulan beads in place (vanc/tobramycin)  -Recieved Clindamycin pre-op  -Afebrile, Tmax 100.1, WBC improving 24>10>7>8  -Blood cx 9/11: final coag staph negative w/o sensitivities  -Urine cx 9/11: negative  -Blood cx 9/12 NGTD prelim  -OR cx 9/13; prelim +pseudomonas  -Nasal MRSA 9/14: positive --> on Mupirocin  -Per ID: d/c clinda, d/c flagyl, on vanc/ceftriaxone  -Vanc trough 9.15 = 11.8  -, CRP 8.16    Endo: hx of DM. a1c 11%  -On home lantus 36, lispro 12 qac, ISS   -FS <200    MSK: Bed bound for past 3 yrs  -Oob to chair as per ortho.     DVT PPX: lovenox    GI PPX: PPI    TUBES/LINES/DRAINS:  - peripheral IV  - R IJ CVC 9/13  - R femoral yari 9/13    REVIEW OF SYSTEMS:  [x] A ten-point review of systems was otherwise negative except as noted.  [ ] Due to altered mental status/intubation, subjective information were not able to be obtained from the patient. History was obtained, to the extent possible, from review of the chart and collateral sources of information. NAPOLEON CAST  [id number]  48 yo Male        Ortho took patient to the OR 19 s/p L explantation of hip hardware, wash out and placement of antibiotic spacer. Patient was stable intra-op. Patient was extubated post op w/o difficulty.     In the PACU the pt became hypotensive > given 1.5 L w/o response. Post op hemoglobin 10 from 12 pre-op, 1 unit of PRBC given by primary team and april started    SICU team arrived at bedside, patient HR in the 150's. EKG obtained, sinus tachy. Lopressor 5x2 given with response in HR. Peripheral april stopped, R IJ central line placed and Levo started and upgraded to the SICU for hemodynamic monitoring.           Indication for ICU admission: s/p failed total hip arthoplasty w/ dislocation s/p explanation of hardware, hypotension required pressors in PACU    Admit Date:19  ICU Date: 19  OR Date: 19    ALLERGIES:  penicillins (Unknown)    PAST MEDICAL & SURGICAL HISTORY:  CIDP (chronic inflammatory demyelinating polyneuropathy)  History of cholecystectomy  History of total left hip replacement  History of total right hip replacement: bilateral  S/P CABG x 5 in     Home Medications:  Atarax 50 mg oral tablet: 1 tab(s) orally once a day (at bedtime)   baclofen 10 mg oral tablet: 1 tab(s) orally 2 times a day   busPIRone 5 mg oral tablet: 1 tab(s) orally 2 times a day   DULoxetine 60 mg oral delayed release capsule: 1 cap(s) orally once a day   Flomax 0.4 mg oral capsule: 1 cap(s) orally once a day   gabapentin 400 mg oral tablet: 1 tab(s) orally 3 times a day (before meals)  morphine 10 mg/5 mL oral solution: 5 milligram(s) orally every 3 hours, As Needed   morphine 60 mg oral capsule, extended release: 1 cap(s) orally every 12 hours   Plavix 75 mg oral tablet: 1 tab(s) orally once a day   SEROquel  mg oral tablet, extended release: 1 tab(s) orally once a day (in the evening)   Xanax 1 mg oral tablet: 1 tab(s) orally 3 times a day     24HRS EVENT:    Overnight: repleted mg, post transfuse Hg 8.3 from 7.4, low tachy regardless of BB switch to Coreg    Neuro: hx of depression/anxiety   -Followed by psych and pain management.  -Restarted buspirone, klonipin, cymbalta, seroquel  -Pain controlled with tylenol, baclofen, voltaren, gabapentin, dilaudid PRN  -Increased Klonopin and Cymbalta doses as according to prev psych recommendations     Resp: NC satting well. Encourage IS. CXR unmarkable    Cards: persistent tachy   -d/c'd metop 12.5q8, started coreg 3.125mg  Q12hr  -elevated trops, downtrended, no longer following  -((CE 0.11>0.04>0.15>0.17>0.19>0.18))  -Per cards c/s:was likely demand ischemia, resume ASA and statin, can increase metoprolol to 25 BID as tolerated  Hx: CAD, CABG x5 vessels, HLD  -Echo : EF 60%, G1DD, mild TR  -on statin, aspirin  -AM EKG with stable inverted T waves  -pulses dopperable, moves right toes,     GI: CC diet, IVL, PPI, senna    : hx of BPH   -on flomax  -Refused graham, voiding  -Cr at baseline 0.9, cr 0.8 0.8> > 0.9 >   -Hyponatremia resolved 132>136    k 4.5 mg 1.7 phos 3.3    Heme:  -Hg 9.5>8.1>7.4 rpt 7.3, transfused 1u pRBC, post tranfusion Hg 8.3  -Recieved 1 unit of PRBC post op by primary team  -DVT ppx with Lovenox 40qd  -Holding plavix as per primary team    ID: Septic joint, Stimulan beads in place (vanc/tobramycin)  -Recieved Clindamycin pre-op  -Afebrile, Tmax 100.1, WBC improving 24>10>7.3>8.9  -Blood cx : final coag staph negative w/o sensitivities  -Urine cx : negative  -Blood cx  NGTD prelim  -OR cx ; prelim +pseudomonas  -Nasal MRSA : positive --> on Mupirocin  -Per ID: d/c clinda, d/c flagyl, on vanc/ceftriaxone  -Vanc trough 9.15 = 11.8  -, CRP 8.16    Endo: hx of DM. a1c 11%  -On home lantus 36, lispro 12 qac, ISS   -FS <200    MSK: Bed bound for past 3 yrs  -Oob to chair as per ortho.     DVT PPX: lovenox    GI PPX: PPI    TUBES/LINES/DRAINS:  - peripheral IV  - R IJ CVC   - R femoral yari     REVIEW OF SYSTEMS:  [x] A ten-point review of systems was otherwise negative except as noted.  [ ] Due to altered mental status/intubation, subjective information were not able to be obtained from the patient. History was obtained, to the extent possible, from review of the chart and collateral sources of information.      Daily     Daily Weight in k (16 Sep 2019 03:00)    Diet, Consistent Carbohydrate/No Snacks (19 @ 09:06)      CURRENT MEDS:  Neurologic Medications  acetaminophen   Tablet .. 650 milliGRAM(s) Oral every 6 hours PRN Temp greater or equal to 38C (100.4F)  baclofen 10 milliGRAM(s) Oral two times a day  busPIRone 5 milliGRAM(s) Oral two times a day  clonazePAM  Tablet 1 milliGRAM(s) Oral three times a day  diclofenac 75 milliGRAM(s) Oral two times a day PRN Moderate Pain (4 - 6)  DULoxetine 90 milliGRAM(s) Oral daily  gabapentin 800 milliGRAM(s) Oral three times a day  HYDROmorphone  Injectable 0.5 milliGRAM(s) IV Push every 4 hours PRN Moderate Pain (4 - 6)  HYDROmorphone  Injectable 1 milliGRAM(s) IV Push every 4 hours PRN Severe Pain (7 - 10)  hydrOXYzine hydrochloride 50 milliGRAM(s) Oral at bedtime  morphine Concentrate 10 milliGRAM(s) Oral every 4 hours PRN Severe Pain (7 - 10)  morphine ER Tablet 60 milliGRAM(s) Oral every 12 hours  QUEtiapine 150 milliGRAM(s) Oral at bedtime    Respiratory Medications    Cardiovascular Medications  carvedilol 3.125 milliGRAM(s) Oral every 12 hours  tamsulosin 0.4 milliGRAM(s) Oral at bedtime    Gastrointestinal Medications  bisacodyl 5 milliGRAM(s) Oral at bedtime  dextrose 5%. 1000 milliLiter(s) IV Continuous <Continuous>  docusate sodium 100 milliGRAM(s) Oral daily  lactulose Syrup 15 Gram(s) Oral every 12 hours  pantoprazole    Tablet 40 milliGRAM(s) Oral before breakfast  senna 1 Tablet(s) Oral daily    Genitourinary Medications    Hematologic/Oncologic Medications  aspirin  chewable 81 milliGRAM(s) Oral daily  enoxaparin Injectable 40 milliGRAM(s) SubCutaneous daily    Antimicrobial/Immunologic Medications  cefTRIAXone   IVPB 2000 milliGRAM(s) IV Intermittent every 24 hours  vancomycin  IVPB 1250 milliGRAM(s) IV Intermittent every 12 hours    Endocrine/Metabolic Medications  atorvastatin 80 milliGRAM(s) Oral at bedtime  dextrose 40% Gel 15 Gram(s) Oral once PRN Blood Glucose LESS THAN 70 milliGRAM(s)/deciliter  dextrose 50% Injectable 25 Gram(s) IV Push once  dextrose 50% Injectable 25 Gram(s) IV Push once  glucagon  Injectable 1 milliGRAM(s) IntraMuscular once PRN Glucose LESS THAN 70 milligrams/deciliter  insulin glargine Injectable (LANTUS) 36 Unit(s) SubCutaneous at bedtime  insulin lispro (HumaLOG) corrective regimen sliding scale   SubCutaneous three times a day before meals  insulin lispro Injectable (HumaLOG) 12 Unit(s) SubCutaneous three times a day before meals    Topical/Other Medications  benzocaine 15 mG/menthol 3.6 mG Lozenge 1 Lozenge Oral three times a day  chlorhexidine 4% Liquid 1 Application(s) Topical daily  mupirocin 2% Ointment 1 Application(s) Both Nostrils two times a day      ICU Vital Signs Last 24 Hrs  T(C): 37.6 (16 Sep 2019 04:00), Max: 37.8 (16 Sep 2019 00:00)  T(F): 99.6 (16 Sep 2019 04:00), Max: 100.1 (16 Sep 2019 00:00)  HR: 96 (16 Sep 2019 07:00) (96 - 110)  BP: --  BP(mean): --  ABP: 118/68 (16 Sep 2019 07:00) (102/62 - 126/74)  ABP(mean): 84 (16 Sep 2019 07:00) (40 - 90)  RR: 14 (16 Sep 2019 07:00) (0 - 22)  SpO2: 96% (16 Sep 2019 07:00) (94% - 100%)      Adult Advanced Hemodynamics Last 24 Hrs  CVP(mm Hg): --  CVP(cm H2O): --  CO: --  CI: --  PA: --  PA(mean): --  PCWP: --  SVR: --  SVRI: --  PVR: --  PVRI: --          I&O's Summary    15 Sep 2019 07:  -  16 Sep 2019 07:00  --------------------------------------------------------  IN: 1148 mL / OUT: 1950 mL / NET: -802 mL      I&O's Detail    15 Sep 2019 07:  -  16 Sep 2019 07:00  --------------------------------------------------------  IN:    IV PiggyBack: 600 mL    Oral Fluid: 240 mL    Packed Red Blood Cells: 308 mL  Total IN: 1148 mL    OUT:    Voided: 1950 mL  Total OUT: 1950 mL    Total NET: -802 mL          PHYSICAL EXAM:    General/Neuro  RASS:             GCS:     = E   / V   / M      Deficits:                             alert & oriented x 3, no focal deficits  Pupils:    Lungs:      clear to auscultation, Normal expansion/effort.     Cardiovascular : S1, S2.  Regular rate and rhythm.  Peripheral edema   Cardiac Rhythm: Normal Sinus Rhythm    GI: Abdomen soft, Non-tender, Non-distended.    Gastrostomy / Jejunostomy tube in place.  Nasogastric tube in place.  Colostomy / Ileostomy.    Wound:    Extremities: Extremities warm, pink, well-perfused. Pulses:Rt     Lt    Derm: Good skin turgor, no skin breakdown.      :       Graham catheter in place.      CXR:     LABS:  CAPILLARY BLOOD GLUCOSE      POCT Blood Glucose.: 170 mg/dL (16 Sep 2019 05:26)  POCT Blood Glucose.: 198 mg/dL (15 Sep 2019 22:00)  POCT Blood Glucose.: 140 mg/dL (15 Sep 2019 17:58)  POCT Blood Glucose.: 198 mg/dL (15 Sep 2019 11:53)  POCT Blood Glucose.: 103 mg/dL (15 Sep 2019 09:19)                          8.3    8.94  )-----------( 141      ( 15 Sep 2019 23:10 )             25.7       09-15    136  |  100  |  15  ----------------------------<  169<H>  4.5   |  28  |  0.9    Ca    7.9<L>      15 Sep 2019 23:10  Phos  3.3     09-15  Mg     1.7     09-15        PT/INR - ( 15 Sep 2019 23:10 )   PT: 13.80 sec;   INR: 1.20 ratio         PTT - ( 15 Sep 2019 23:10 )  PTT:37.7 sec  CARDIAC MARKERS ( 14 Sep 2019 23:30 )  x     / 0.18 ng/mL / 222 U/L / x     / 5.9 ng/mL  CARDIAC MARKERS ( 14 Sep 2019 18:10 )  x     / 0.19 ng/mL / x     / x     / x      CARDIAC MARKERS ( 14 Sep 2019 10:00 )  x     / 0.17 ng/mL / 277 U/L / x     / 10.9 ng/mL          Culture - Body Fluid with Gram Stain (collected 13 Sep 2019 14:49)  Source: .Body Fluid None  Gram Stain (14 Sep 2019 21:27):    polymorphonuclear leukocytes seen    No organisms seen    by cytocentrifuge  Preliminary Report (15 Sep 2019 15:35):    No growth    Culture - Body Fluid with Gram Stain (collected 13 Sep 2019 14:49)  Source: .Body Fluid None  Gram Stain (14 Sep 2019 21:26):    No polymorphonuclear leukocytes seen    No organisms seen    by cytocentrifuge  Preliminary Report (15 Sep 2019 18:38):    Rare Pseudomonas aeruginosa

## 2019-09-16 NOTE — PROGRESS NOTE ADULT - SUBJECTIVE AND OBJECTIVE BOX
SERENE, NAPOLEON  49y Male    INTERVAL HPI/OVERNIGHT EVENTS:    Pt has pain all over due to CIDP.  No chest pain, SOB, N/V.  for transfer to telemetry today.  Case discussed with Ortho PA.    T(F): 99 (19 @ 12:00), Max: 100.1 (19 @ 00:00)  HR: 96 (19 @ 12:00) (90 - 110)  BP: --  RR: 14 (19 @ 12:00) (0 - 22)  SpO2: 97% (19 @ 12:00) (93% - 100%) on O2 NC    I&O's Summary    15 Sep 2019 07:  -  16 Sep 2019 07:00  --------------------------------------------------------  IN: 1148 mL / OUT: 1950 mL / NET: -802 mL    16 Sep 2019 07:01  -  16 Sep 2019 15:30  --------------------------------------------------------  IN: 220 mL / OUT: 750 mL / NET: -530 mL        Daily     Daily Weight in k (16 Sep 2019 03:00)    CAPILLARY BLOOD GLUCOSE      POCT Blood Glucose.: 110 mg/dL (16 Sep 2019 12:24)  POCT Blood Glucose.: 119 mg/dL (16 Sep 2019 08:00)  POCT Blood Glucose.: 170 mg/dL (16 Sep 2019 05:26)  POCT Blood Glucose.: 198 mg/dL (15 Sep 2019 22:00)  POCT Blood Glucose.: 140 mg/dL (15 Sep 2019 17:58)        PHYSICAL EXAM:  GENERAL: NAD  HEAD:  Normocephalic  EYES:  conjunctiva and sclera clear  ENMT: Moist mucous membranes  NECK: Supple, No JVD  NERVOUS SYSTEM:  Alert & Oriented X3, Good concentration, able to move right toes,  squeezes with his hands, LE weakness  CHEST/LUNG: Clear to percussion bilaterally; No rales, rhonchi, wheezing  HEART: tachy  ABDOMEN: Soft, Nontender, Nondistended  EXTREMITIES:  LE edema  SKIN: pale  left thigh with dressing    Consultant(s) Notes Reviewed:  [x ] YES  [ ] NO  Care Discussed with Consultants/Other Providers [ x] YES  [ ] NO    MEDICATIONS  (STANDING):  aspirin  chewable 81 milliGRAM(s) Oral daily  atorvastatin 80 milliGRAM(s) Oral at bedtime  baclofen 10 milliGRAM(s) Oral two times a day  benzocaine 15 mG/menthol 3.6 mG Lozenge 1 Lozenge Oral three times a day  bisacodyl 5 milliGRAM(s) Oral at bedtime  busPIRone 5 milliGRAM(s) Oral two times a day  carvedilol 3.125 milliGRAM(s) Oral every 12 hours  chlorhexidine 4% Liquid 1 Application(s) Topical daily  clonazePAM  Tablet 1 milliGRAM(s) Oral three times a day  dextrose 5%. 1000 milliLiter(s) (50 mL/Hr) IV Continuous <Continuous>  dextrose 50% Injectable 25 Gram(s) IV Push once  dextrose 50% Injectable 25 Gram(s) IV Push once  docusate sodium 100 milliGRAM(s) Oral daily  DULoxetine 90 milliGRAM(s) Oral daily  enoxaparin Injectable 40 milliGRAM(s) SubCutaneous daily  gabapentin 800 milliGRAM(s) Oral three times a day  hydrOXYzine hydrochloride 50 milliGRAM(s) Oral at bedtime  insulin glargine Injectable (LANTUS) 36 Unit(s) SubCutaneous at bedtime  insulin lispro (HumaLOG) corrective regimen sliding scale   SubCutaneous three times a day before meals  insulin lispro Injectable (HumaLOG) 12 Unit(s) SubCutaneous three times a day before meals  lactulose Syrup 15 Gram(s) Oral every 12 hours  meropenem  IVPB      meropenem  IVPB 1000 milliGRAM(s) IV Intermittent every 8 hours  morphine ER Tablet 60 milliGRAM(s) Oral every 12 hours  mupirocin 2% Ointment 1 Application(s) Both Nostrils two times a day  pantoprazole    Tablet 40 milliGRAM(s) Oral before breakfast  QUEtiapine 150 milliGRAM(s) Oral at bedtime  senna 1 Tablet(s) Oral daily  tamsulosin 0.4 milliGRAM(s) Oral at bedtime    MEDICATIONS  (PRN):  acetaminophen   Tablet .. 650 milliGRAM(s) Oral every 6 hours PRN Temp greater or equal to 38C (100.4F)  dextrose 40% Gel 15 Gram(s) Oral once PRN Blood Glucose LESS THAN 70 milliGRAM(s)/deciliter  diclofenac 75 milliGRAM(s) Oral two times a day PRN Moderate Pain (4 - 6)  glucagon  Injectable 1 milliGRAM(s) IntraMuscular once PRN Glucose LESS THAN 70 milligrams/deciliter  HYDROmorphone  Injectable 0.5 milliGRAM(s) IV Push every 4 hours PRN Moderate Pain (4 - 6)  HYDROmorphone  Injectable 1 milliGRAM(s) IV Push every 4 hours PRN Severe Pain (7 - 10)  morphine Concentrate 10 milliGRAM(s) Oral every 4 hours PRN Severe Pain (7 - 10)    EKG reviewed  Telemetry - tachycardia  LABS:                        8.3    8.94  )-----------( 141      ( 15 Sep 2019 23:10 )             25.7     09-15    136  |  100  |  15  ----------------------------<  169<H>  4.5   |  28  |  0.9    Ca    7.9<L>      15 Sep 2019 23:10  Phos  3.3     09-15  Mg     1.7     09-15      PT/INR - ( 15 Sep 2019 23:10 )   PT: 13.80 sec;   INR: 1.20 ratio         PTT - ( 15 Sep 2019 23:10 )  PTT:37.7 sec  CARDIAC MARKERS ( 14 Sep 2019 23:30 )  x     / 0.18 ng/mL / 222 U/L / x     / 5.9 ng/mL  CARDIAC MARKERS ( 14 Sep 2019 18:10 )  x     / 0.19 ng/mL / x     / x     / x        Culture - Body Fluid with Gram Stain (19 @ 14:49)    Gram Stain:   polymorphonuclear leukocytes seen  No organisms seen  by cytocentrifuge    Specimen Source: .Body Fluid None    Culture Results:   No growth    Culture - Body Fluid with Gram Stain (19 @ 14:49)    Gram Stain:   No polymorphonuclear leukocytes seen  No organisms seen  by cytocentrifuge    Specimen Source: .Body Fluid None    Culture Results:   Rare Pseudomonas aeruginosa    Culture - Blood (19 @ 07:28)    Specimen Source: .Blood None    Culture Results:   No growth to date.      Culture - Blood (19 @ 11:00)    -  Coagulase negative Staphylococcus: Detec    Gram Stain:   Growth in aerobic bottle: Gram Positive Cocci in Clusters    Specimen Source: .Blood Blood    Organism: Blood Culture PCR    Culture Results:   Growth in aerobic bottle: Coag Negative Staphylococcus "Susceptibilities  not performed"  "Due to technical problems, Proteus sp. will Not be reported as part of  the BCID panel until further notice"  ***Blood Panel PCR results on this specimen areavailable  approximately 3 hours after the Gram stain result.***  Gram stain, PCR, and/or culture results may not always  correspond due to difference in methodologies.  ************************************************************  This PCR assay wasperformed using [employer].  The following targets are tested for: Enterococcus,  vancomycin resistant enterococci, Listeria monocytogenes,  coagulase negative staphylococci, S. aureus,  methicillin resistant S. aureus, Streptococcus agalactiae  (Group B), S. pneumoniae, S. pyogenes (Group A),  Acinetobacter baumannii, Enterobacter cloacae, E. coli,  Klebsiella oxytoca, K. pneumoniae, Proteus sp.,  Serratia marcescens, Haemophilus influenzae,  Neisseria meningitidis, Pseudomonas aeruginosa, Candida  albicans, C. glabrata, C krusei, C parapsilosis,  C. tropicalis and the KPC resistance gene.    Organism Identification: Blood Culture PCR    Method Type: PCR      Culture - Urine (19 @ 07:50)    Specimen Source: .Urine Clean Catch (Midstream)    Culture Results:   <10,000 CFU/mL Normal Urogenital Nimo      MRSA/MSSA PCR (19 @ 11:30)    MRSA PCR Result.: Positive: By: Real-Time PCR (Polymerase Reaction Method)      RADIOLOGY & ADDITIONAL TESTS:    Imaging or report Personally Reviewed:  [x ] YES  [ ] NO    < from: CT Pelvis No Cont (19 @ 13:07) >  IMPRESSION:    1.  Superolateral dislocation of the left acetabular cup/femoral head   from the native acetabulum. Adjacent periosteal reaction indicative of a   subacute etiology.  2.  Chronic/degenerative change as above.    < end of copied text >    < from: Xray Chest 1 View- PORTABLE-Routine (19 @ 06:47) >  Impression:      No radiographic evidence of acute pulmonary disease.      < end of copied text >    < from: Transthoracic Echocardiogram (19 @ 07:24) >  Summary:   1. Technically fair study.   2. Normal global left ventricular systolic function.   3. LV Ejection Fraction by Ruiz's Method with a biplane EF of 60 %.   4. Spectral Doppler shows impaired relaxation pattern of left   ventricular myocardial filling (Grade I diastolic dysfunction).   5. No evidence of mitral valve regurgitation.   6. Structurally normal mitral valve, with normal leaflet excursion.   7. Normal trileaflet aortic valve with normal opening.   8. Estimated pulmonary artery systolic pressure is 39.3 mmHg assuming a   right atrial pressure of 5 mmHg, which is consistent with borderline   pulmonary hypertension.   9. LA volume Index is 24.8 ml/m² ml/m2.    < end of copied text >          Case discussed with Ortho and SICU    Care discussed with pt

## 2019-09-16 NOTE — PROGRESS NOTE ADULT - SUBJECTIVE AND OBJECTIVE BOX
ORTHO PROGRESS NOTE     49yMale POD #3  S/P Explantation of left hemiarthroplasty    Patient seen and examined at bedside . The patient is awake and alert in NAD. Complains of L knee pain but states hip pain has improved since preop. No complaints of chest pain, SOB, N/V.    Vital Signs Last 24 Hrs  T(C): 37.2 (16 Sep 2019 12:00), Max: 37.8 (16 Sep 2019 00:00)  T(F): 99 (16 Sep 2019 12:00), Max: 100.1 (16 Sep 2019 00:00)  HR: 96 (16 Sep 2019 12:00) (90 - 110)  BP: --  BP(mean): --  RR: 14 (16 Sep 2019 12:00) (0 - 22)  SpO2: 97% (16 Sep 2019 12:00) (93% - 100%)    PE:   dressing mild drainage, redressed  Compartments soft and compressible  No motor function distally  Denies sensation distal to knee                            8.3    8.94  )-----------( 141      ( 15 Sep 2019 23:10 )             25.7     Cultures ngtd    A/P: 49yMale POD #3  S/P  Explantation of left hemiarthroplasty/Girdlestone procedure on 9/13  - Periop antibiotics- standing clindamycin until surgical culture results  - ICU care  - DVT PPx  - Pain Control  - SCDs  - IS

## 2019-09-16 NOTE — PROGRESS NOTE ADULT - ASSESSMENT
Assessment: 48 y/o M PMHx of CDIP s/p L explantation of hardware, wash out and placement of abx spacer     Neuro: Dx depression/anxiety. Followed by psych and pain management. Monitor for changes in mental status. Restarted buspirone, klonipin, cymbalta, seroquel. Pain controlled with tylenol, baclofen, voltaren, gabapentin, dilaudid PRN.     Resp: NC satting well. Encourage IS. AM CXR.     Cards: Dx: CAD, CABG x5 vessels, HLD. Post op EKG sinus tachy. Metoprolol 12.5 q8hr, changed to Coreg 3.125mg Q12hr. Echo 9/14: EF 60%, G1DD, borderline Pulm HTN. Cardiac enzymes downtrending was likely demand ischemia, Cardiology consult, recs appreciated. Continue statin & ASA, restart Plavix as soon as possible. EKG with stable inverted T waves. F/U AM EKG.    GI: Carb Consistent diet, IVL when tolerating PO. PPI/[place].     : Dx of BPH restarted Flomax. Refused graham. Cr 0.9 baseline. Monitor I&Os. Monitor/replete lytes.     Heme: On ASA/Lovenox. Holding plavix as per primary team. Received 1 unit of PRBC post op by primary team. Monitor H/H     ID: Clindamycin pre-op.  Afebrile. WBC 8.  Blood cultures from 9/11 final coag staph negative w/o sensitivities. Blood culture 9/12 NGTD prelim. Urine cx 9/11 negative. OR cx 9/13 prelim pseudomonas, f/u final. ID on board. Current Abx regimen: Vanco/Ceftriaxone.    Endo: Dx of DM. Started ISS w/ FS Q 6 hours.     MSK: Bed bound, OOB to chair as per Ortho.     Dispo: Downgrade to Tele

## 2019-09-17 NOTE — PROGRESS NOTE ADULT - SUBJECTIVE AND OBJECTIVE BOX
SERENE, NAPOLEON  49y, Male  Allergy: penicillins (Unknown)      CHIEF COMPLAINT: Left hip pain (17 Sep 2019 13:33)      INTERVAL EVENTS/HPI  - downtrending hgb, 1u prbc  - T(F): , Max: 96.8 (09-17-19 @ 13:10)  - Denies any worsening symptoms  - Tolerating medication  - WBC Count: 13.43 K/uL (09-17-19 @ 04:59) <--WBC Count: 8.94 K/uL (09-15-19 @ 23:10)    - Creatinine, Serum: 0.8 (09-17-19 @ 04:59)      ROS  General: Denies rigors, nightsweats  HEENT: Denies headache, rhinorrhea, sore throat, eye pain  CV: Denies CP, palpitations  PULM: Denies SOB, wheezing  GI: Denies hematemesis, hematochezia, melena  : Denies discharge, hematuria  MSK: Denies arthralgias, myalgias  SKIN: Denies rash, lesions  NEURO: Denies paresthesias, weakness  PSYCH: Denies depression, anxiety    VITALS:  T(F): 96.8, Max: 96.8 (09-17-19 @ 13:10)  HR: 82  BP: 108/78  RR: 18Vital Signs Last 24 Hrs  T(C): 36 (17 Sep 2019 13:10), Max: 36 (17 Sep 2019 13:10)  T(F): 96.8 (17 Sep 2019 13:10), Max: 96.8 (17 Sep 2019 13:10)  HR: 82 (17 Sep 2019 13:10) (80 - 108)  BP: 108/78 (17 Sep 2019 13:10) (78/59 - 109/53)  BP(mean): 75 (17 Sep 2019 06:11) (69 - 82)  RR: 18 (17 Sep 2019 13:10) (11 - 24)  SpO2: 98% (17 Sep 2019 11:38) (91% - 99%)    PHYSICAL EXAM:  Gen: NAD, resting in bed, very pale, sleepy  HEENT: Normocephalic, atraumatic  Neck: supple, no lymphadenopathy  CV: Regular rate & regular rhythm  Lungs: decreased BS at bases, no fremitus  Abdomen: Soft, BS present  Ext: Warm, well perfused, L hip dressings  Neuro: non focal, awake  Skin: no rash, no erythema  Lines: no phlebitis    FH: Non-contributory  Social Hx: Non-contributory    TESTS & MEASUREMENTS:                        6.4    13.43 )-----------( 186      ( 17 Sep 2019 04:59 )             19.8     09-17    137  |  99  |  11  ----------------------------<  79  4.3   |  27  |  0.8    Ca    8.2<L>      17 Sep 2019 04:59  Phos  3.9     09-17  Mg     1.9     09-17      eGFR if Non African American: 103 mL/min/1.73M2 (09-17-19 @ 04:59)  eGFR if African American: 120 mL/min/1.73M2 (09-17-19 @ 04:59)          Culture - Body Fluid with Gram Stain (collected 09-13-19 @ 14:49)  Source: .Body Fluid None  Gram Stain (09-14-19 @ 21:27):    polymorphonuclear leukocytes seen    No organisms seen    by cytocentrifuge  Preliminary Report (09-16-19 @ 18:39):    Few Coag Negative Staphylococcus    Culture - Body Fluid with Gram Stain (collected 09-13-19 @ 14:49)  Source: .Body Fluid None  Gram Stain (09-14-19 @ 21:26):    No polymorphonuclear leukocytes seen    No organisms seen    by cytocentrifuge  Preliminary Report (09-16-19 @ 19:47):    Rare Pseudomonas aeruginosa    Few Coag Negative Staphylococcus  Organism: Pseudomonas aeruginosa (09-16-19 @ 18:42)  Organism: Pseudomonas aeruginosa (09-16-19 @ 18:42)      -  Amikacin: S <=16      -  Aztreonam: S <=4      -  Cefepime: S <=2      -  Ceftazidime: S <=1      -  Ciprofloxacin: S <=1      -  Gentamicin: S <=2      -  Imipenem: S 2      -  Levofloxacin: S <=2      -  Meropenem: S <=1      -  Piperacillin/Tazobactam: S <=8      -  Tobramycin: S <=2      Method Type: MAN    Culture - Blood (collected 09-12-19 @ 07:28)  Source: .Blood None  Final Report (09-17-19 @ 14:00):    No growth at 5 days.    Culture - Blood (collected 09-11-19 @ 11:00)  Source: .Blood Blood  Gram Stain (09-12-19 @ 15:59):    Growth in aerobic bottle: Gram Positive Cocci in Clusters  Final Report (09-13-19 @ 23:15):    Growth in aerobic bottle: Coag Negative Staphylococcus "Susceptibilities    not performed"    "Due to technical problems, Proteus sp. will Not be reported as part of    the BCID panel until further notice"    ***Blood Panel PCR results on this specimen areavailable    approximately 3 hours after the Gram stain result.***    Gram stain, PCR, and/or culture results may not always    correspond due to difference in methodologies.    ************************************************************    This PCR assay wasperformed using [employer].    The following targets are tested for: Enterococcus,    vancomycin resistant enterococci, Listeria monocytogenes,    coagulase negative staphylococci, S. aureus,    methicillin resistant S. aureus, Streptococcus agalactiae    (Group B), S. pneumoniae, S. pyogenes (Group A),    Acinetobacter baumannii, Enterobacter cloacae, E. coli,    Klebsiella oxytoca, K. pneumoniae, Proteus sp.,    Serratia marcescens, Haemophilus influenzae,    Neisseria meningitidis, Pseudomonas aeruginosa, Candida    albicans, C. glabrata, C krusei, C parapsilosis,    C. tropicalis and the KPC resistance gene.  Organism: Blood Culture PCR (09-13-19 @ 23:15)  Organism: Blood Culture PCR (09-13-19 @ 23:15)      -  Coagulase negative Staphylococcus: Detec      Method Type: PCR    Culture - Urine (collected 09-11-19 @ 07:50)  Source: .Urine Clean Catch (Midstream)  Final Report (09-12-19 @ 11:33):    <10,000 CFU/mL Normal Urogenital Nimo            INFECTIOUS DISEASES TESTING  MRSA PCR Result.: Positive (09-14-19 @ 11:30)      RADIOLOGY & ADDITIONAL TESTS:  I have personally reviewed the last Chest xray  CXR      CT      CARDIOLOGY TESTING  12 Lead ECG:   Ventricular Rate 104 BPM    Atrial Rate 104 BPM    P-R Interval 112 ms    QRS Duration 80 ms    Q-T Interval 360 ms    QTC Calculation(Bezet) 473 ms    P Axis 48 degrees    R Axis 19 degrees    T Axis 204 degrees    Diagnosis Line Sinus tachycardia  ST & T wave abnormality, consider anterolateral ischemia  Abnormal ECG    Confirmed by Carla Broderick MD ([id number]) on 9/16/2019 8:36:06 AM (09-16-19 @ 04:33)  12 Lead ECG:   Ventricular Rate 130 BPM    Atrial Rate 130 BPM    P-R Interval 124 ms    QRS Duration 84 ms    Q-T Interval 300 ms    QTC Calculation(Bezet) 441 ms    R Axis 25 degrees    T Axis 207 degrees    Diagnosis Line junctional or atrial tachycardia  ST &T wave abnormality, consider anterolateral ischemia  Abnormal ECG    Confirmed by CHELO MANZANO MD ([id number]) on 9/15/2019 4:15:12 PM (09-14-19 @ 20:28)      MEDICATIONS  aspirin  chewable 81  atorvastatin 80  baclofen 10  benzocaine 15 mG/menthol 3.6 mG Lozenge 1  bisacodyl 5  busPIRone 5  carvedilol 3.125  chlorhexidine 4% Liquid 1  clonazePAM  Tablet 1  dextrose 5%. 1000  dextrose 50% Injectable 25  dextrose 50% Injectable 25  docusate sodium 100  DULoxetine 90  enoxaparin Injectable 40  gabapentin 800  hydrOXYzine hydrochloride 50  insulin glargine Injectable (LANTUS) 36  insulin lispro (HumaLOG) corrective regimen sliding scale   insulin lispro Injectable (HumaLOG) 12  lactulose Syrup 15  meropenem  IVPB   meropenem  IVPB 1000  morphine ER Tablet 60  mupirocin 2% Ointment 1  pantoprazole    Tablet 40  QUEtiapine 150  senna 1  tamsulosin 0.4      ANTIBIOTICS:  meropenem  IVPB      meropenem  IVPB 1000 milliGRAM(s) IV Intermittent every 8 hours      All available historical records have been reviewed

## 2019-09-17 NOTE — PROGRESS NOTE ADULT - ASSESSMENT
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  Workup revealed superolateral dislocation of the left hip and he is s/p explantation of Left total hip arthroplasty and insertion of ABX spacer. Post op course was complicated by hypotension, requiring 1 PRBC transfusion and pressors. Pt was weaned off pressors by POD1 in the evening.   He had elevated troponins postop likely from demand ischemia per cardiology and he is on medical management.    Preop, he received loading dose of IVIG during this admission and will continue maintenance dose q3 weeks per neurology.  He was transferred to telemetry.    1. Dislocation of left hip s/p explant of left ОЛЬГА and insertion of abx spacer on 9/13  OR culture + for Pseudomonas from hip - pansensitive  on meropenem   ID f/u  pain control  Ortho f/u today    2. NSTEMI type 2  ASA/statin/Bblocker  cardio following  continue to hold Plavix since Hgb dropped again  normal EF on ECHO  telemetry  cardio f/u  no plan for urgent cath    3. CIDP - for IVIG q3 weeks per neurology    4. CAD s/p CABG - continue medical management    5. DM - controlled on insulin    6. Anemia due to acute blood loss postop  s/p transfusion with 1 unit PRBC on 9/15 with appropriate rise in Hgb  now Hgb is 6.4 - transfuse 1 unit PRBC and serial H/H  goal Hgb > 8   Ortho to evaluate left hip today  CT scan of abdomen/pelvis and left hip if no improvement in Hgb  if active bleeding or hemodynamic instability, then needs upgrade back to SICU  guarded prognosis    7. Chronic pain - followed by pain management  continue current regimen and monitor    8. Depression/anxiety  seen by psychiatry this admission  recommendations being followed  needs close outpt f/u    9. BPH on flomax    10. + MRSA nasal swab - on mupirocin and CHG baths    11. DVT prophylaxis    12. Constipation - change colace to 100mg q8hr, lactulose to q6hr, continue senna  add miralax 17g daily if needed        PROGRESS NOTE HANDOFF    Pending: transfuse 1 Unit PRBC, postop CBC, Ortho f/u    Disposition: to be determined

## 2019-09-17 NOTE — PROGRESS NOTE ADULT - ASSESSMENT
Cataract APPEARS VISUALLY SIGNIFICANT. ASSESSMENT  48 y/o M with PMHx of CAD s/p CABG, BPH, CIDP with significant motor and sensory deficits and extreme pain, b/l total hip replacement, anxiety and depression presents to the ED for extreme left hip pain, s/p failed total hip arthoplasty w/ dislocation s/p explanation of hardware, hypotension required pressors in PACU    IMPRESSION  #Suspected R hip infection with hardware, s/p OR 9/13 Explantation of total hip arthroplasty , abx beads placed    OR cx  Pseudomonas    MRSA PCR pos    Sedimentation Rate, Erythrocyte: 107 mm/Hr (09.14.19 @ 11:21)    C-Reactive Protein, Serum: 8.16 mg/dL (09.14.19 @ 11:21)  #9/11 Bcx CoNS likely contaminant 9/12 BCX NG  #Hyponatremia  #Elevated trop/CKMB, suspect demand ischemia  #Abx allergy: PCN childhood - was told by mother not to take    RECOMMENDATIONS  - NARROW meropenem to Cefepime 2g q8h IV as pseudo is S (aware of PCN allergy)  - Pt will need PICC x 6 weeks IV ABX  - MRSA PCR + --> mupirocin to nares BID and CHG daily washes x 7 days     Spectra [id number]

## 2019-09-17 NOTE — PROGRESS NOTE ADULT - SUBJECTIVE AND OBJECTIVE BOX
NAPOLEON CAST  [id number]  48 yo Male    Indication for ICU admission: s/p failed total hip arthoplasty w/ dislocation s/p explanation of hardware, hypotension required pressors in PACU    Admit Date:09-03-19  ICU Date: 09-13-19  OR Date: 09-13-19    ALLERGIES:  penicillins (Unknown)    PAST MEDICAL & SURGICAL HISTORY:  CIDP (chronic inflammatory demyelinating polyneuropathy)  History of cholecystectomy  History of total left hip replacement  History of total right hip replacement: bilateral  S/P CABG x 5 in 2011    Home Medications:  Atarax 50 mg oral tablet: 1 tab(s) orally once a day (at bedtime)   baclofen 10 mg oral tablet: 1 tab(s) orally 2 times a day   busPIRone 5 mg oral tablet: 1 tab(s) orally 2 times a day   DULoxetine 60 mg oral delayed release capsule: 1 cap(s) orally once a day   Flomax 0.4 mg oral capsule: 1 cap(s) orally once a day   gabapentin 400 mg oral tablet: 1 tab(s) orally 3 times a day (before meals)  morphine 10 mg/5 mL oral solution: 5 milligram(s) orally every 3 hours, As Needed   morphine 60 mg oral capsule, extended release: 1 cap(s) orally every 12 hours   Plavix 75 mg oral tablet: 1 tab(s) orally once a day   SEROquel  mg oral tablet, extended release: 1 tab(s) orally once a day (in the evening)   Xanax 1 mg oral tablet: 1 tab(s) orally 3 times a day     24HRS EVENT:    Neuro: hx of depression/anxiety   -Followed by psych and pain management.  -Restarted buspirone, klonipin, cymbalta, seroquel  -Pain controlled with tylenol, baclofen, voltaren, gabapentin, dilaudid PRN  -Increased Klonopin and Cymbalta doses as according to prev psych recommendations     Resp: NC satting well. Encourage IS. CXR unmarkable    Cards: persistent tachy   -d/c'd metop 12.5q8, started coreg 3.125mg  Q12hr  -elevated trops, downtrended, no longer following  -((CE 0.11>0.04>0.15>0.17>0.19>0.18))  -Per cards c/s:was likely demand ischemia, resume ASA and statin, can increase metoprolol to 25 BID as tolerated  Hx: CAD, CABG x5 vessels, HLD  -Echo 9/14: EF 60%, G1DD, mild TR  -on statin, aspirin  -AM EKG with stable inverted T waves    GI: CC diet, IVL, PPI, senna    : hx of BPH   -on flomax  -Refused graham, voiding  -Cr at baseline 0.9  -Hyponatremia resolved 132>136    Heme:  -Hg 9.5>8.1>7.4 rpt 7.3, transfused 1u pRBC, post tranfusion Hg 8.3  -Recieved 1 unit of PRBC post op by primary team  -DVT ppx with Lovenox 40qd  -Holding plavix as per primary team    ID: Septic joint, Stimulan beads in place (vanc/tobramycin)  -Recieved Clindamycin pre-op  -Afebrile, Tmax 100.1, WBC improving 24>10>7>8  -Blood cx 9/11: final coag staph negative w/o sensitivities  -Urine cx 9/11: negative  -Blood cx 9/12 NGTD prelim  -OR cx 9/13; prelim +pseudomonas  -Nasal MRSA 9/14: positive --> on Mupirocin  -Per ID: d/c clinda, d/c flagyl, on vanc/ceftriaxone  -Vanc trough 9.15 = 11.8  -, CRP 8.16    Endo: hx of DM. a1c 11%  -On home lantus 36, lispro 12 qac, ISS   -FS <200    MSK: Bed bound for past 3 yrs  -Oob to chair as per ortho.     DVT PPX: lovenox    GI PPX: PPI    TUBES/LINES/DRAINS:  - peripheral IV  - R IJ CVC 9/13  - R femoral yari 9/13    REVIEW OF SYSTEMS:  [x] A ten-point review of systems was otherwise negative except as noted.  [ ] Due to altered mental status/intubation, subjective information were not able to be obtained from the patient. History was obtained, to the extent possible, from review of the chart and collateral sources of information.

## 2019-09-17 NOTE — PROGRESS NOTE ADULT - SUBJECTIVE AND OBJECTIVE BOX
NAPOLEON CAST 49y Male  MRN#: [id number]     SUBJECTIVE  Patient is a 49y old Male who presents with a chief complaint of Left hip pain (17 Sep 2019 04:21)  Currently admitted to medicine with the primary diagnosis of Pain    Today is hospital day 14d, and this morning he is complaining of chronic pains. He however does not have chest pains or SOB      OBJECTIVE  PAST MEDICAL & SURGICAL HISTORY  CIDP (chronic inflammatory demyelinating polyneuropathy)  History of cholecystectomy  History of total left hip replacement  History of total right hip replacement: bilateral  S/P CABG x 5    ALLERGIES:  penicillins (Unknown)    MEDICATIONS:  STANDING MEDICATIONS  aspirin  chewable 81 milliGRAM(s) Oral daily  atorvastatin 80 milliGRAM(s) Oral at bedtime  baclofen 10 milliGRAM(s) Oral two times a day  benzocaine 15 mG/menthol 3.6 mG Lozenge 1 Lozenge Oral three times a day  bisacodyl 5 milliGRAM(s) Oral at bedtime  busPIRone 5 milliGRAM(s) Oral two times a day  carvedilol 3.125 milliGRAM(s) Oral every 12 hours  chlorhexidine 4% Liquid 1 Application(s) Topical daily  clonazePAM  Tablet 1 milliGRAM(s) Oral three times a day  dextrose 5%. 1000 milliLiter(s) IV Continuous <Continuous>  dextrose 50% Injectable 25 Gram(s) IV Push once  dextrose 50% Injectable 25 Gram(s) IV Push once  docusate sodium 100 milliGRAM(s) Oral daily  DULoxetine 90 milliGRAM(s) Oral daily  enoxaparin Injectable 40 milliGRAM(s) SubCutaneous daily  gabapentin 800 milliGRAM(s) Oral three times a day  hydrOXYzine hydrochloride 50 milliGRAM(s) Oral at bedtime  insulin glargine Injectable (LANTUS) 36 Unit(s) SubCutaneous at bedtime  insulin lispro (HumaLOG) corrective regimen sliding scale   SubCutaneous three times a day before meals  insulin lispro Injectable (HumaLOG) 12 Unit(s) SubCutaneous three times a day before meals  lactulose Syrup 15 Gram(s) Oral every 12 hours  meropenem  IVPB      meropenem  IVPB 1000 milliGRAM(s) IV Intermittent every 8 hours  morphine ER Tablet 60 milliGRAM(s) Oral every 12 hours  mupirocin 2% Ointment 1 Application(s) Both Nostrils two times a day  pantoprazole    Tablet 40 milliGRAM(s) Oral before breakfast  QUEtiapine 150 milliGRAM(s) Oral at bedtime  senna 1 Tablet(s) Oral daily  tamsulosin 0.4 milliGRAM(s) Oral at bedtime    PRN MEDICATIONS  acetaminophen   Tablet .. 650 milliGRAM(s) Oral every 6 hours PRN  dextrose 40% Gel 15 Gram(s) Oral once PRN  diclofenac 75 milliGRAM(s) Oral two times a day PRN  glucagon  Injectable 1 milliGRAM(s) IntraMuscular once PRN  HYDROmorphone  Injectable 0.5 milliGRAM(s) IV Push every 4 hours PRN  HYDROmorphone  Injectable 1 milliGRAM(s) IV Push every 4 hours PRN  morphine Concentrate 10 milliGRAM(s) Oral every 4 hours PRN    VITAL SIGNS: Last 24 Hours  T(C): 37.2 (16 Sep 2019 12:00), Max: 37.2 (16 Sep 2019 12:00)  T(F): 99 (16 Sep 2019 12:00), Max: 99 (16 Sep 2019 12:00)  HR: 80 (17 Sep 2019 06:11) (80 - 108)  BP: 106/59 (17 Sep 2019 06:11) (78/59 - 111/60)  BP(mean): 75 (17 Sep 2019 06:11) (69 - 82)  RR: 20 (17 Sep 2019 06:11) (11 - 24)  SpO2: 97% (17 Sep 2019 06:11) (91% - 99%)    LABS:                        6.4    13.43 )-----------( 186      ( 17 Sep 2019 04:59 )             19.8     09-17    137  |  99  |  11  ----------------------------<  79  4.3   |  27  |  0.8    Ca    8.2<L>      17 Sep 2019 04:59  Phos  3.9     09-17  Mg     1.9     09-17      PT/INR - ( 17 Sep 2019 04:59 )   PT: 13.50 sec;   INR: 1.18 ratio         PTT - ( 17 Sep 2019 04:59 )  PTT:34.8 sec              RADIOLOGY:      PHYSICAL EXAM:    GENERAL: NAD, well-developed, AAOx3  HEENT:  Atraumatic, Normocephalic. EOMI, PERRLA, conjunctiva and sclera clear, No JVD  PULMONARY: Clear to auscultation bilaterally; No wheeze  CARDIOVASCULAR: Regular rate and rhythm; No murmurs, rubs, or gallops  GASTROINTESTINAL: Soft, Nontender, Nondistended; Bowel sounds present  MUSCULOSKELETAL:  2+ Peripheral Pulses, No clubbing, cyanosis, or edema  NEUROLOGY: non-focal  SKIN: No rashes or lesions      ADMISSION SUMMARY  Patient is a 49y old Male who presents with a chief complaint of Left hip pain (17 Sep 2019 04:21)  Hospital course has been as follows:  Patient was       ASSESSMENT & PLAN    1. PAIN;H/O BILATERAL HIP REPLACEMENTS;CIPD;SLURRED SPEECH      2.    3. CIDP (chronic inflammatory demyelinating polyneuropathy)      MEDICATIONS:  STANDING MEDICATIONS  aspirin  chewable 81 milliGRAM(s) Oral daily  atorvastatin 80 milliGRAM(s) Oral at bedtime  baclofen 10 milliGRAM(s) Oral two times a day  benzocaine 15 mG/menthol 3.6 mG Lozenge 1 Lozenge Oral three times a day  bisacodyl 5 milliGRAM(s) Oral at bedtime  busPIRone 5 milliGRAM(s) Oral two times a day  carvedilol 3.125 milliGRAM(s) Oral every 12 hours  chlorhexidine 4% Liquid 1 Application(s) Topical daily  clonazePAM  Tablet 1 milliGRAM(s) Oral three times a day  dextrose 5%. 1000 milliLiter(s) IV Continuous <Continuous>  dextrose 50% Injectable 25 Gram(s) IV Push once  dextrose 50% Injectable 25 Gram(s) IV Push once  docusate sodium 100 milliGRAM(s) Oral daily  DULoxetine 90 milliGRAM(s) Oral daily  enoxaparin Injectable 40 milliGRAM(s) SubCutaneous daily  gabapentin 800 milliGRAM(s) Oral three times a day  hydrOXYzine hydrochloride 50 milliGRAM(s) Oral at bedtime  insulin glargine Injectable (LANTUS) 36 Unit(s) SubCutaneous at bedtime  insulin lispro (HumaLOG) corrective regimen sliding scale   SubCutaneous three times a day before meals  insulin lispro Injectable (HumaLOG) 12 Unit(s) SubCutaneous three times a day before meals  lactulose Syrup 15 Gram(s) Oral every 12 hours  meropenem  IVPB      meropenem  IVPB 1000 milliGRAM(s) IV Intermittent every 8 hours  morphine ER Tablet 60 milliGRAM(s) Oral every 12 hours  mupirocin 2% Ointment 1 Application(s) Both Nostrils two times a day  pantoprazole    Tablet 40 milliGRAM(s) Oral before breakfast  QUEtiapine 150 milliGRAM(s) Oral at bedtime  senna 1 Tablet(s) Oral daily  tamsulosin 0.4 milliGRAM(s) Oral at bedtime NAPOLEON CAST 49y Male  MRN#: [id number]     SUBJECTIVE  Patient is a 49y old Male who presents with a chief complaint of Left hip pain (17 Sep 2019 04:21)  Currently admitted to medicine with the primary diagnosis of Pain    Today is hospital day 14d, and this morning he is complaining of chronic pains. He however does not have chest pains or SOB      OBJECTIVE  PAST MEDICAL & SURGICAL HISTORY  CIDP (chronic inflammatory demyelinating polyneuropathy)  History of cholecystectomy  History of total left hip replacement  History of total right hip replacement: bilateral  S/P CABG x 5    ALLERGIES:  penicillins (Unknown)    MEDICATIONS:  STANDING MEDICATIONS  aspirin  chewable 81 milliGRAM(s) Oral daily  atorvastatin 80 milliGRAM(s) Oral at bedtime  baclofen 10 milliGRAM(s) Oral two times a day  benzocaine 15 mG/menthol 3.6 mG Lozenge 1 Lozenge Oral three times a day  bisacodyl 5 milliGRAM(s) Oral at bedtime  busPIRone 5 milliGRAM(s) Oral two times a day  carvedilol 3.125 milliGRAM(s) Oral every 12 hours  chlorhexidine 4% Liquid 1 Application(s) Topical daily  clonazePAM  Tablet 1 milliGRAM(s) Oral three times a day  dextrose 5%. 1000 milliLiter(s) IV Continuous <Continuous>  dextrose 50% Injectable 25 Gram(s) IV Push once  dextrose 50% Injectable 25 Gram(s) IV Push once  docusate sodium 100 milliGRAM(s) Oral daily  DULoxetine 90 milliGRAM(s) Oral daily  enoxaparin Injectable 40 milliGRAM(s) SubCutaneous daily  gabapentin 800 milliGRAM(s) Oral three times a day  hydrOXYzine hydrochloride 50 milliGRAM(s) Oral at bedtime  insulin glargine Injectable (LANTUS) 36 Unit(s) SubCutaneous at bedtime  insulin lispro (HumaLOG) corrective regimen sliding scale   SubCutaneous three times a day before meals  insulin lispro Injectable (HumaLOG) 12 Unit(s) SubCutaneous three times a day before meals  lactulose Syrup 15 Gram(s) Oral every 12 hours  meropenem  IVPB      meropenem  IVPB 1000 milliGRAM(s) IV Intermittent every 8 hours  morphine ER Tablet 60 milliGRAM(s) Oral every 12 hours  mupirocin 2% Ointment 1 Application(s) Both Nostrils two times a day  pantoprazole    Tablet 40 milliGRAM(s) Oral before breakfast  QUEtiapine 150 milliGRAM(s) Oral at bedtime  senna 1 Tablet(s) Oral daily  tamsulosin 0.4 milliGRAM(s) Oral at bedtime    PRN MEDICATIONS  acetaminophen   Tablet .. 650 milliGRAM(s) Oral every 6 hours PRN  dextrose 40% Gel 15 Gram(s) Oral once PRN  diclofenac 75 milliGRAM(s) Oral two times a day PRN  glucagon  Injectable 1 milliGRAM(s) IntraMuscular once PRN  HYDROmorphone  Injectable 0.5 milliGRAM(s) IV Push every 4 hours PRN  HYDROmorphone  Injectable 1 milliGRAM(s) IV Push every 4 hours PRN  morphine Concentrate 10 milliGRAM(s) Oral every 4 hours PRN    VITAL SIGNS: Last 24 Hours  T(C): 37.2 (16 Sep 2019 12:00), Max: 37.2 (16 Sep 2019 12:00)  T(F): 99 (16 Sep 2019 12:00), Max: 99 (16 Sep 2019 12:00)  HR: 80 (17 Sep 2019 06:11) (80 - 108)  BP: 106/59 (17 Sep 2019 06:11) (78/59 - 111/60)  BP(mean): 75 (17 Sep 2019 06:11) (69 - 82)  RR: 20 (17 Sep 2019 06:11) (11 - 24)  SpO2: 97% (17 Sep 2019 06:11) (91% - 99%)    LABS:                        6.4    13.43 )-----------( 186      ( 17 Sep 2019 04:59 )             19.8     09-17    137  |  99  |  11  ----------------------------<  79  4.3   |  27  |  0.8    Ca    8.2<L>      17 Sep 2019 04:59  Phos  3.9     09-17  Mg     1.9     09-17      PT/INR - ( 17 Sep 2019 04:59 )   PT: 13.50 sec;   INR: 1.18 ratio         PTT - ( 17 Sep 2019 04:59 )  PTT:34.8 sec      PHYSICAL EXAM:    GENERAL: NAD, well-developed, AAOx3  HEENT:  Atraumatic, Normocephalic. EOMI, PERRLA, conjunctiva and sclera clear, No JVD  PULMONARY: Clear to auscultation bilaterally; No wheeze  CARDIOVASCULAR: Regular rate and rhythm; No murmurs, rubs, or gallops  GASTROINTESTINAL: Soft, Nontender, Nondistended; Bowel sounds present  MUSCULOSKELETAL:  2+ Peripheral Pulses, No clubbing, cyanosis, or edema  NEUROLOGY: does not move lower extremities.   SKIN: No rashes or lesions      ADMISSION SUMMARY  Patient is a 49y old Male who presents with a chief complaint of Left hip pain (17 Sep 2019 04:21)  Hospital course has been as follows:  Initially admitted for lip pain. Work up showed dislocation of his previous arthroplasty. During his hospital stay he developed RUE cellulitis treated with clindamycin.   Workup revealed superolateral dislocation of the left hip and he is s/p explantation of Left total hip arthroplasty and insertion of ABX spacer. Post op course was complicated by hypotension, requiring 1 PRBC transfusion and pressors. Pt was weaned off pressors by POD1 in the evening.   He had elevated troponins postop likely from demand ischemia per cardiology and he is on medical management.    Preop, he received loading dose of IVIG during this admission and will continue maintenance dose q3 weeks per neurology.  He was transferred to telemetry.        ASSESSMENT & PLAN    1. PAIN;H/O BILATERAL HIP REPLACEMENTS;CIPD;SLURRED SPEECH      2.    3. CIDP (chronic inflammatory demyelinating polyneuropathy)      MEDICATIONS:  STANDING MEDICATIONS  aspirin  chewable 81 milliGRAM(s) Oral daily  atorvastatin 80 milliGRAM(s) Oral at bedtime  baclofen 10 milliGRAM(s) Oral two times a day  benzocaine 15 mG/menthol 3.6 mG Lozenge 1 Lozenge Oral three times a day  bisacodyl 5 milliGRAM(s) Oral at bedtime  busPIRone 5 milliGRAM(s) Oral two times a day  carvedilol 3.125 milliGRAM(s) Oral every 12 hours  chlorhexidine 4% Liquid 1 Application(s) Topical daily  clonazePAM  Tablet 1 milliGRAM(s) Oral three times a day  dextrose 5%. 1000 milliLiter(s) IV Continuous <Continuous>  dextrose 50% Injectable 25 Gram(s) IV Push once  dextrose 50% Injectable 25 Gram(s) IV Push once  docusate sodium 100 milliGRAM(s) Oral daily  DULoxetine 90 milliGRAM(s) Oral daily  enoxaparin Injectable 40 milliGRAM(s) SubCutaneous daily  gabapentin 800 milliGRAM(s) Oral three times a day  hydrOXYzine hydrochloride 50 milliGRAM(s) Oral at bedtime  insulin glargine Injectable (LANTUS) 36 Unit(s) SubCutaneous at bedtime  insulin lispro (HumaLOG) corrective regimen sliding scale   SubCutaneous three times a day before meals  insulin lispro Injectable (HumaLOG) 12 Unit(s) SubCutaneous three times a day before meals  lactulose Syrup 15 Gram(s) Oral every 12 hours  meropenem  IVPB      meropenem  IVPB 1000 milliGRAM(s) IV Intermittent every 8 hours  morphine ER Tablet 60 milliGRAM(s) Oral every 12 hours  mupirocin 2% Ointment 1 Application(s) Both Nostrils two times a day  pantoprazole    Tablet 40 milliGRAM(s) Oral before breakfast  QUEtiapine 150 milliGRAM(s) Oral at bedtime  senna 1 Tablet(s) Oral daily  tamsulosin 0.4 milliGRAM(s) Oral at bedtime NAPOLEON CAST 49y Male  MRN#: [id number]     SUBJECTIVE  Patient is a 49y old Male who presents with a chief complaint of Left hip pain (17 Sep 2019 04:21)  Currently admitted to medicine with the primary diagnosis of Pain    Today is hospital day 14d, and this morning he is complaining of chronic pains. He however does not have chest pains or SOB      OBJECTIVE  PAST MEDICAL & SURGICAL HISTORY  CIDP (chronic inflammatory demyelinating polyneuropathy)  History of cholecystectomy  History of total left hip replacement  History of total right hip replacement: bilateral  S/P CABG x 5    ALLERGIES:  penicillins (Unknown)    MEDICATIONS:  STANDING MEDICATIONS  aspirin  chewable 81 milliGRAM(s) Oral daily  atorvastatin 80 milliGRAM(s) Oral at bedtime  baclofen 10 milliGRAM(s) Oral two times a day  benzocaine 15 mG/menthol 3.6 mG Lozenge 1 Lozenge Oral three times a day  bisacodyl 5 milliGRAM(s) Oral at bedtime  busPIRone 5 milliGRAM(s) Oral two times a day  carvedilol 3.125 milliGRAM(s) Oral every 12 hours  chlorhexidine 4% Liquid 1 Application(s) Topical daily  clonazePAM  Tablet 1 milliGRAM(s) Oral three times a day  dextrose 5%. 1000 milliLiter(s) IV Continuous <Continuous>  dextrose 50% Injectable 25 Gram(s) IV Push once  dextrose 50% Injectable 25 Gram(s) IV Push once  docusate sodium 100 milliGRAM(s) Oral daily  DULoxetine 90 milliGRAM(s) Oral daily  enoxaparin Injectable 40 milliGRAM(s) SubCutaneous daily  gabapentin 800 milliGRAM(s) Oral three times a day  hydrOXYzine hydrochloride 50 milliGRAM(s) Oral at bedtime  insulin glargine Injectable (LANTUS) 36 Unit(s) SubCutaneous at bedtime  insulin lispro (HumaLOG) corrective regimen sliding scale   SubCutaneous three times a day before meals  insulin lispro Injectable (HumaLOG) 12 Unit(s) SubCutaneous three times a day before meals  lactulose Syrup 15 Gram(s) Oral every 12 hours  meropenem  IVPB      meropenem  IVPB 1000 milliGRAM(s) IV Intermittent every 8 hours  morphine ER Tablet 60 milliGRAM(s) Oral every 12 hours  mupirocin 2% Ointment 1 Application(s) Both Nostrils two times a day  pantoprazole    Tablet 40 milliGRAM(s) Oral before breakfast  QUEtiapine 150 milliGRAM(s) Oral at bedtime  senna 1 Tablet(s) Oral daily  tamsulosin 0.4 milliGRAM(s) Oral at bedtime    PRN MEDICATIONS  acetaminophen   Tablet .. 650 milliGRAM(s) Oral every 6 hours PRN  dextrose 40% Gel 15 Gram(s) Oral once PRN  diclofenac 75 milliGRAM(s) Oral two times a day PRN  glucagon  Injectable 1 milliGRAM(s) IntraMuscular once PRN  HYDROmorphone  Injectable 0.5 milliGRAM(s) IV Push every 4 hours PRN  HYDROmorphone  Injectable 1 milliGRAM(s) IV Push every 4 hours PRN  morphine Concentrate 10 milliGRAM(s) Oral every 4 hours PRN    VITAL SIGNS: Last 24 Hours  T(C): 37.2 (16 Sep 2019 12:00), Max: 37.2 (16 Sep 2019 12:00)  T(F): 99 (16 Sep 2019 12:00), Max: 99 (16 Sep 2019 12:00)  HR: 80 (17 Sep 2019 06:11) (80 - 108)  BP: 106/59 (17 Sep 2019 06:11) (78/59 - 111/60)  BP(mean): 75 (17 Sep 2019 06:11) (69 - 82)  RR: 20 (17 Sep 2019 06:11) (11 - 24)  SpO2: 97% (17 Sep 2019 06:11) (91% - 99%)    LABS:                        6.4    13.43 )-----------( 186      ( 17 Sep 2019 04:59 )             19.8     09-17    137  |  99  |  11  ----------------------------<  79  4.3   |  27  |  0.8    Ca    8.2<L>      17 Sep 2019 04:59  Phos  3.9     09-17  Mg     1.9     09-17      PT/INR - ( 17 Sep 2019 04:59 )   PT: 13.50 sec;   INR: 1.18 ratio         PTT - ( 17 Sep 2019 04:59 )  PTT:34.8 sec      PHYSICAL EXAM:    GENERAL: NAD, well-developed, AAOx3  HEENT:  Atraumatic, Normocephalic. EOMI, PERRLA, conjunctiva and sclera clear, No JVD  PULMONARY: Clear to auscultation bilaterally; No wheeze  CARDIOVASCULAR: Regular rate and rhythm; No murmurs, rubs, or gallops  GASTROINTESTINAL: Soft, Nontender, Nondistended; Bowel sounds present  MUSCULOSKELETAL:  2+ Peripheral Pulses, No clubbing, cyanosis, or edema  NEUROLOGY: does not move lower extremities.   SKIN: No rashes or lesions      ADMISSION SUMMARY  Patient is a 49y old Male who presents with a chief complaint of Left hip pain (17 Sep 2019 04:21)  Hospital course has been as follows:  Initially admitted for lip pain. Work up showed dislocation of his previous arthroplasty. During his hospital stay he developed RUE cellulitis treated with clindamycin.   Workup revealed superolateral dislocation of the left hip and he is s/p failed hip replacement due to hypotension. He was transferred to the SICU and requiring 1 PRBC transfusion and pressors. Pt was weaned off pressors by POD1 in the evening.   He had elevated troponins postop likely from demand ischemia per cardiology and he is on medical management.    Preop, he received loading dose of IVIG during this admission and will continue maintenance dose q3 weeks per neurology.  He was transferred to telemetry.      ASSESSMENT & PLAN    1. PAIN;H/O BILATERAL HIP REPLACEMENTS;CIPD;SLURRED SPEECH      2.    3. CIDP (chronic inflammatory demyelinating polyneuropathy)      MEDICATIONS:  STANDING MEDICATIONS  aspirin  chewable 81 milliGRAM(s) Oral daily  atorvastatin 80 milliGRAM(s) Oral at bedtime  baclofen 10 milliGRAM(s) Oral two times a day  benzocaine 15 mG/menthol 3.6 mG Lozenge 1 Lozenge Oral three times a day  bisacodyl 5 milliGRAM(s) Oral at bedtime  busPIRone 5 milliGRAM(s) Oral two times a day  carvedilol 3.125 milliGRAM(s) Oral every 12 hours  chlorhexidine 4% Liquid 1 Application(s) Topical daily  clonazePAM  Tablet 1 milliGRAM(s) Oral three times a day  dextrose 5%. 1000 milliLiter(s) IV Continuous <Continuous>  dextrose 50% Injectable 25 Gram(s) IV Push once  dextrose 50% Injectable 25 Gram(s) IV Push once  docusate sodium 100 milliGRAM(s) Oral daily  DULoxetine 90 milliGRAM(s) Oral daily  enoxaparin Injectable 40 milliGRAM(s) SubCutaneous daily  gabapentin 800 milliGRAM(s) Oral three times a day  hydrOXYzine hydrochloride 50 milliGRAM(s) Oral at bedtime  insulin glargine Injectable (LANTUS) 36 Unit(s) SubCutaneous at bedtime  insulin lispro (HumaLOG) corrective regimen sliding scale   SubCutaneous three times a day before meals  insulin lispro Injectable (HumaLOG) 12 Unit(s) SubCutaneous three times a day before meals  lactulose Syrup 15 Gram(s) Oral every 12 hours  meropenem  IVPB      meropenem  IVPB 1000 milliGRAM(s) IV Intermittent every 8 hours  morphine ER Tablet 60 milliGRAM(s) Oral every 12 hours  mupirocin 2% Ointment 1 Application(s) Both Nostrils two times a day  pantoprazole    Tablet 40 milliGRAM(s) Oral before breakfast  QUEtiapine 150 milliGRAM(s) Oral at bedtime  senna 1 Tablet(s) Oral daily  tamsulosin 0.4 milliGRAM(s) Oral at bedtime NAPOLEON CAST 49y Male  MRN#: [id number]     SUBJECTIVE  Patient is a 49y old Male who presents with a chief complaint of Left hip pain (17 Sep 2019 04:21)  Currently admitted to medicine with the primary diagnosis of Pain    Today is hospital day 14d, and this morning he is complaining of chronic pains. He however does not have chest pains or SOB      OBJECTIVE  PAST MEDICAL & SURGICAL HISTORY  CIDP (chronic inflammatory demyelinating polyneuropathy)  History of cholecystectomy  History of total left hip replacement  History of total right hip replacement: bilateral  S/P CABG x 5    ALLERGIES:  penicillins (Unknown)    MEDICATIONS:  STANDING MEDICATIONS  aspirin  chewable 81 milliGRAM(s) Oral daily  atorvastatin 80 milliGRAM(s) Oral at bedtime  baclofen 10 milliGRAM(s) Oral two times a day  benzocaine 15 mG/menthol 3.6 mG Lozenge 1 Lozenge Oral three times a day  bisacodyl 5 milliGRAM(s) Oral at bedtime  busPIRone 5 milliGRAM(s) Oral two times a day  carvedilol 3.125 milliGRAM(s) Oral every 12 hours  chlorhexidine 4% Liquid 1 Application(s) Topical daily  clonazePAM  Tablet 1 milliGRAM(s) Oral three times a day  dextrose 5%. 1000 milliLiter(s) IV Continuous <Continuous>  dextrose 50% Injectable 25 Gram(s) IV Push once  dextrose 50% Injectable 25 Gram(s) IV Push once  docusate sodium 100 milliGRAM(s) Oral daily  DULoxetine 90 milliGRAM(s) Oral daily  enoxaparin Injectable 40 milliGRAM(s) SubCutaneous daily  gabapentin 800 milliGRAM(s) Oral three times a day  hydrOXYzine hydrochloride 50 milliGRAM(s) Oral at bedtime  insulin glargine Injectable (LANTUS) 36 Unit(s) SubCutaneous at bedtime  insulin lispro (HumaLOG) corrective regimen sliding scale   SubCutaneous three times a day before meals  insulin lispro Injectable (HumaLOG) 12 Unit(s) SubCutaneous three times a day before meals  lactulose Syrup 15 Gram(s) Oral every 12 hours  meropenem  IVPB      meropenem  IVPB 1000 milliGRAM(s) IV Intermittent every 8 hours  morphine ER Tablet 60 milliGRAM(s) Oral every 12 hours  mupirocin 2% Ointment 1 Application(s) Both Nostrils two times a day  pantoprazole    Tablet 40 milliGRAM(s) Oral before breakfast  QUEtiapine 150 milliGRAM(s) Oral at bedtime  senna 1 Tablet(s) Oral daily  tamsulosin 0.4 milliGRAM(s) Oral at bedtime    PRN MEDICATIONS  acetaminophen   Tablet .. 650 milliGRAM(s) Oral every 6 hours PRN  dextrose 40% Gel 15 Gram(s) Oral once PRN  diclofenac 75 milliGRAM(s) Oral two times a day PRN  glucagon  Injectable 1 milliGRAM(s) IntraMuscular once PRN  HYDROmorphone  Injectable 0.5 milliGRAM(s) IV Push every 4 hours PRN  HYDROmorphone  Injectable 1 milliGRAM(s) IV Push every 4 hours PRN  morphine Concentrate 10 milliGRAM(s) Oral every 4 hours PRN    VITAL SIGNS: Last 24 Hours  T(C): 37.2 (16 Sep 2019 12:00), Max: 37.2 (16 Sep 2019 12:00)  T(F): 99 (16 Sep 2019 12:00), Max: 99 (16 Sep 2019 12:00)  HR: 80 (17 Sep 2019 06:11) (80 - 108)  BP: 106/59 (17 Sep 2019 06:11) (78/59 - 111/60)  BP(mean): 75 (17 Sep 2019 06:11) (69 - 82)  RR: 20 (17 Sep 2019 06:11) (11 - 24)  SpO2: 97% (17 Sep 2019 06:11) (91% - 99%)    LABS:                        6.4    13.43 )-----------( 186      ( 17 Sep 2019 04:59 )             19.8     09-17    137  |  99  |  11  ----------------------------<  79  4.3   |  27  |  0.8    Ca    8.2<L>      17 Sep 2019 04:59  Phos  3.9     09-17  Mg     1.9     09-17      PT/INR - ( 17 Sep 2019 04:59 )   PT: 13.50 sec;   INR: 1.18 ratio         PTT - ( 17 Sep 2019 04:59 )  PTT:34.8 sec      PHYSICAL EXAM:    GENERAL: NAD, well-developed, AAOx3  HEENT:  Atraumatic, Normocephalic. EOMI, PERRLA, conjunctiva and sclera clear, No JVD  PULMONARY: Clear to auscultation bilaterally; No wheeze  CARDIOVASCULAR: Regular rate and rhythm; No murmurs, rubs, or gallops  GASTROINTESTINAL: Soft, Nontender, Nondistended; Bowel sounds present  MUSCULOSKELETAL:  2+ Peripheral Pulses, No clubbing, cyanosis, or edema  NEUROLOGY: has decent mvmt and mildly decr str in his arms; legs are paralyzed and stiff (there is very slight twitching in rt toes); CN intact  SKIN: No rashes or lesions      ADMISSION SUMMARY  Patient is a 49y old Male who presents with a chief complaint of Left hip pain (17 Sep 2019 04:21)  Hospital course has been as follows:  Initially admitted for lip pain. Work up showed dislocation of his previous arthroplasty. During his hospital stay he developed RUE cellulitis treated with clindamycin.   Workup revealed superolateral dislocation of the left hip and he is s/p failed hip replacement due to hypotension. He was transferred to the SICU and requiring 1 PRBC transfusion and pressors. Pt was weaned off pressors by POD1 in the evening.  Intraop cultures grew pseudomonas. Patient was diagnosed with septic arthritis  He had elevated troponins postop likely from demand ischemia per cardiology and he is on medical management.    He also received loading dose of IVIG during this admission and will continue maintenance dose q3 weeks per neurology.  He was transferred to telemetry for further monitoring       ASSESSMENT & PLAN    49 year old male with a history of CIDP, presenting with hip pain, found to have septic arthitis of hardware (old arthroplasty).     ID: septic arthritis of hardware  - on Meropenem (started 9/16). Cx growing pseudomonas and staph coag negative    Ortho: s/p failed arthroplasty due to hypotension   - ortho will follow    CIDP: s/p IVIG in hospital (chronic inflammatory demyelinating polyneuropathy)    - currently stable    Cardio: CAD s/p CABG  - on aspirin and atorvastatin  - demand ischemia with troponin elevated. Medical management.   - carvedilol every 12 hours.         MEDICATIONS:      carvedilol 3.125 milliGRAM(s) Oral every 12 hours  chlorhexidine 4% Liquid 1 Application(s) Topical daily  clonazePAM  Tablet 1 milliGRAM(s) Oral three times a day  dextrose 5%. 1000 milliLiter(s) IV Continuous <Continuous>  dextrose 50% Injectable 25 Gram(s) IV Push once  dextrose 50% Injectable 25 Gram(s) IV Push once  docusate sodium 100 milliGRAM(s) Oral daily  DULoxetine 90 milliGRAM(s) Oral daily  enoxaparin Injectable 40 milliGRAM(s) SubCutaneous daily  gabapentin 800 milliGRAM(s) Oral three times a day  hydrOXYzine hydrochloride 50 milliGRAM(s) Oral at bedtime  insulin glargine Injectable (LANTUS) 36 Unit(s) SubCutaneous at bedtime  insulin lispro (HumaLOG) corrective regimen sliding scale   SubCutaneous three times a day before meals  insulin lispro Injectable (HumaLOG) 12 Unit(s) SubCutaneous three times a day before meals  lactulose Syrup 15 Gram(s) Oral every 12 hours  meropenem  IVPB      meropenem  IVPB 1000 milliGRAM(s) IV Intermittent every 8 hours  morphine ER Tablet 60 milliGRAM(s) Oral every 12 hours  mupirocin 2% Ointment 1 Application(s) Both Nostrils two times a day  pantoprazole    Tablet 40 milliGRAM(s) Oral before breakfast  QUEtiapine 150 milliGRAM(s) Oral at bedtime  senna 1 Tablet(s) Oral daily  tamsulosin 0.4 milliGRAM(s) Oral at bedtime NAPOLEON CAST 49y Male  MRN#: [id number]     SUBJECTIVE  Patient is a 49y old Male who presents with a chief complaint of Left hip pain (17 Sep 2019 04:21)  Currently admitted to medicine with the primary diagnosis of Pain    Today is hospital day 14d, and this morning he is complaining of chronic pains. He however does not have chest pains or SOB      OBJECTIVE  PAST MEDICAL & SURGICAL HISTORY  CIDP (chronic inflammatory demyelinating polyneuropathy)  History of cholecystectomy  History of total left hip replacement  History of total right hip replacement: bilateral  S/P CABG x 5    ALLERGIES:  penicillins (Unknown)    MEDICATIONS:  STANDING MEDICATIONS  aspirin  chewable 81 milliGRAM(s) Oral daily  atorvastatin 80 milliGRAM(s) Oral at bedtime  baclofen 10 milliGRAM(s) Oral two times a day  benzocaine 15 mG/menthol 3.6 mG Lozenge 1 Lozenge Oral three times a day  bisacodyl 5 milliGRAM(s) Oral at bedtime  busPIRone 5 milliGRAM(s) Oral two times a day  carvedilol 3.125 milliGRAM(s) Oral every 12 hours  chlorhexidine 4% Liquid 1 Application(s) Topical daily  clonazePAM  Tablet 1 milliGRAM(s) Oral three times a day  dextrose 5%. 1000 milliLiter(s) IV Continuous <Continuous>  dextrose 50% Injectable 25 Gram(s) IV Push once  dextrose 50% Injectable 25 Gram(s) IV Push once  docusate sodium 100 milliGRAM(s) Oral daily  DULoxetine 90 milliGRAM(s) Oral daily  enoxaparin Injectable 40 milliGRAM(s) SubCutaneous daily  gabapentin 800 milliGRAM(s) Oral three times a day  hydrOXYzine hydrochloride 50 milliGRAM(s) Oral at bedtime  insulin glargine Injectable (LANTUS) 36 Unit(s) SubCutaneous at bedtime  insulin lispro (HumaLOG) corrective regimen sliding scale   SubCutaneous three times a day before meals  insulin lispro Injectable (HumaLOG) 12 Unit(s) SubCutaneous three times a day before meals  lactulose Syrup 15 Gram(s) Oral every 12 hours  meropenem  IVPB      meropenem  IVPB 1000 milliGRAM(s) IV Intermittent every 8 hours  morphine ER Tablet 60 milliGRAM(s) Oral every 12 hours  mupirocin 2% Ointment 1 Application(s) Both Nostrils two times a day  pantoprazole    Tablet 40 milliGRAM(s) Oral before breakfast  QUEtiapine 150 milliGRAM(s) Oral at bedtime  senna 1 Tablet(s) Oral daily  tamsulosin 0.4 milliGRAM(s) Oral at bedtime    PRN MEDICATIONS  acetaminophen   Tablet .. 650 milliGRAM(s) Oral every 6 hours PRN  dextrose 40% Gel 15 Gram(s) Oral once PRN  diclofenac 75 milliGRAM(s) Oral two times a day PRN  glucagon  Injectable 1 milliGRAM(s) IntraMuscular once PRN  HYDROmorphone  Injectable 0.5 milliGRAM(s) IV Push every 4 hours PRN  HYDROmorphone  Injectable 1 milliGRAM(s) IV Push every 4 hours PRN  morphine Concentrate 10 milliGRAM(s) Oral every 4 hours PRN    VITAL SIGNS: Last 24 Hours  T(C): 37.2 (16 Sep 2019 12:00), Max: 37.2 (16 Sep 2019 12:00)  T(F): 99 (16 Sep 2019 12:00), Max: 99 (16 Sep 2019 12:00)  HR: 80 (17 Sep 2019 06:11) (80 - 108)  BP: 106/59 (17 Sep 2019 06:11) (78/59 - 111/60)  BP(mean): 75 (17 Sep 2019 06:11) (69 - 82)  RR: 20 (17 Sep 2019 06:11) (11 - 24)  SpO2: 97% (17 Sep 2019 06:11) (91% - 99%)    LABS:                        6.4    13.43 )-----------( 186      ( 17 Sep 2019 04:59 )             19.8     09-17    137  |  99  |  11  ----------------------------<  79  4.3   |  27  |  0.8    Ca    8.2<L>      17 Sep 2019 04:59  Phos  3.9     09-17  Mg     1.9     09-17      PT/INR - ( 17 Sep 2019 04:59 )   PT: 13.50 sec;   INR: 1.18 ratio         PTT - ( 17 Sep 2019 04:59 )  PTT:34.8 sec      PHYSICAL EXAM:    GENERAL: NAD, well-developed, AAOx3  HEENT:  Atraumatic, Normocephalic. EOMI, PERRLA, conjunctiva and sclera clear, No JVD  PULMONARY: Clear to auscultation bilaterally; No wheeze  CARDIOVASCULAR: Regular rate and rhythm; No murmurs, rubs, or gallops  GASTROINTESTINAL: Soft, Nontender, Nondistended; Bowel sounds present  MUSCULOSKELETAL:  2+ Peripheral Pulses, No clubbing, cyanosis, or edema  NEUROLOGY: has decent mvmt and mildly decr str in his arms; legs are paralyzed and stiff (there is very slight twitching in rt toes); CN intact  SKIN: No rashes or lesions      ADMISSION SUMMARY  Patient is a 49y old Male who presents with a chief complaint of Left hip pain (17 Sep 2019 04:21)  Hospital course has been as follows:  Initially admitted for lip pain. Work up showed dislocation of his previous arthroplasty. During his hospital stay he developed RUE cellulitis treated with clindamycin.   Workup revealed superolateral dislocation of the left hip and he is s/p failed hip replacement due to hypotension. He was transferred to the SICU and requiring 1 PRBC transfusion and pressors. Pt was weaned off pressors by POD1 in the evening.  Intraop cultures grew pseudomonas. Patient was diagnosed with septic arthritis  He had elevated troponins postop likely from demand ischemia per cardiology and he is on medical management.    He also received loading dose of IVIG during this admission and will continue maintenance dose q3 weeks per neurology.  He was transferred to telemetry for further monitoring       ASSESSMENT & PLAN    49 year old male with a history of CIDP, presenting with hip pain, found to have septic arthitis of hardware (old arthroplasty).     Acute Hb drop     ID: septic arthritis of hardware  - on Meropenem (started 9/16). Cx growing pseudomonas and staph coag negative    Ortho: s/p failed arthroplasty due to hypotension   - ortho will follow    CIDP: s/p IVIG in hospital (chronic inflammatory demyelinating polyneuropathy)  - currently stable  - on gabapentin and duloxitine    Cardio: CAD s/p CABG  - on aspirin and atorvastatin  - demand ischemia with troponin elevated. Medical management.   - carvedilol every 12 hours.     Endo: DMII  - FS between 100-130   - Well controlled on Lantus 36 and Lispro 12    Uro: BPH  - on tamulosin     DVT prophylaxis: Lovenox NAPOLEON CAST 49y Male  MRN#: [id number]     SUBJECTIVE  Patient is a 49y old Male who presents with a chief complaint of Left hip pain (17 Sep 2019 04:21)  Currently admitted to medicine with the primary diagnosis of Pain    Today is hospital day 14d, and this morning he is complaining of chronic pains. He however does not have chest pains or SOB.      OBJECTIVE  PAST MEDICAL & SURGICAL HISTORY  CIDP (chronic inflammatory demyelinating polyneuropathy)  History of cholecystectomy  History of total left hip replacement  History of total right hip replacement: bilateral  S/P CABG x 5    ALLERGIES:  penicillins (Unknown)    MEDICATIONS:  STANDING MEDICATIONS  aspirin  chewable 81 milliGRAM(s) Oral daily  atorvastatin 80 milliGRAM(s) Oral at bedtime  baclofen 10 milliGRAM(s) Oral two times a day  benzocaine 15 mG/menthol 3.6 mG Lozenge 1 Lozenge Oral three times a day  bisacodyl 5 milliGRAM(s) Oral at bedtime  busPIRone 5 milliGRAM(s) Oral two times a day  carvedilol 3.125 milliGRAM(s) Oral every 12 hours  chlorhexidine 4% Liquid 1 Application(s) Topical daily  clonazePAM  Tablet 1 milliGRAM(s) Oral three times a day  dextrose 5%. 1000 milliLiter(s) IV Continuous <Continuous>  dextrose 50% Injectable 25 Gram(s) IV Push once  dextrose 50% Injectable 25 Gram(s) IV Push once  docusate sodium 100 milliGRAM(s) Oral daily  DULoxetine 90 milliGRAM(s) Oral daily  enoxaparin Injectable 40 milliGRAM(s) SubCutaneous daily  gabapentin 800 milliGRAM(s) Oral three times a day  hydrOXYzine hydrochloride 50 milliGRAM(s) Oral at bedtime  insulin glargine Injectable (LANTUS) 36 Unit(s) SubCutaneous at bedtime  insulin lispro (HumaLOG) corrective regimen sliding scale   SubCutaneous three times a day before meals  insulin lispro Injectable (HumaLOG) 12 Unit(s) SubCutaneous three times a day before meals  lactulose Syrup 15 Gram(s) Oral every 12 hours  meropenem  IVPB      meropenem  IVPB 1000 milliGRAM(s) IV Intermittent every 8 hours  morphine ER Tablet 60 milliGRAM(s) Oral every 12 hours  mupirocin 2% Ointment 1 Application(s) Both Nostrils two times a day  pantoprazole    Tablet 40 milliGRAM(s) Oral before breakfast  QUEtiapine 150 milliGRAM(s) Oral at bedtime  senna 1 Tablet(s) Oral daily  tamsulosin 0.4 milliGRAM(s) Oral at bedtime    PRN MEDICATIONS  acetaminophen   Tablet .. 650 milliGRAM(s) Oral every 6 hours PRN  dextrose 40% Gel 15 Gram(s) Oral once PRN  diclofenac 75 milliGRAM(s) Oral two times a day PRN  glucagon  Injectable 1 milliGRAM(s) IntraMuscular once PRN  HYDROmorphone  Injectable 0.5 milliGRAM(s) IV Push every 4 hours PRN  HYDROmorphone  Injectable 1 milliGRAM(s) IV Push every 4 hours PRN  morphine Concentrate 10 milliGRAM(s) Oral every 4 hours PRN    VITAL SIGNS: Last 24 Hours  T(C): 37.2 (16 Sep 2019 12:00), Max: 37.2 (16 Sep 2019 12:00)  T(F): 99 (16 Sep 2019 12:00), Max: 99 (16 Sep 2019 12:00)  HR: 80 (17 Sep 2019 06:11) (80 - 108)  BP: 106/59 (17 Sep 2019 06:11) (78/59 - 111/60)  BP(mean): 75 (17 Sep 2019 06:11) (69 - 82)  RR: 20 (17 Sep 2019 06:11) (11 - 24)  SpO2: 97% (17 Sep 2019 06:11) (91% - 99%)    LABS:                        6.4    13.43 )-----------( 186      ( 17 Sep 2019 04:59 )             19.8     09-17    137  |  99  |  11  ----------------------------<  79  4.3   |  27  |  0.8    Ca    8.2<L>      17 Sep 2019 04:59  Phos  3.9     09-17  Mg     1.9     09-17      PT/INR - ( 17 Sep 2019 04:59 )   PT: 13.50 sec;   INR: 1.18 ratio         PTT - ( 17 Sep 2019 04:59 )  PTT:34.8 sec      PHYSICAL EXAM:    GENERAL: NAD, well-developed, AAOx3  HEENT:  Atraumatic, Normocephalic. EOMI, PERRLA, conjunctiva and sclera clear, No JVD  PULMONARY: Clear to auscultation bilaterally; No wheeze  CARDIOVASCULAR: Regular rate and rhythm; No murmurs, rubs, or gallops  GASTROINTESTINAL: Soft, Nontender, Nondistended; Bowel sounds present  MUSCULOSKELETAL:  2+ Peripheral Pulses, No clubbing, cyanosis, or edema  NEUROLOGY: has decent mvmt and mildly decr str in his arms; legs are paralyzed and stiff (there is very slight twitching in rt toes); CN intact  SKIN: No rashes or lesions      ADMISSION SUMMARY  Patient is a 49y old Male who presents with a chief complaint of Left hip pain (17 Sep 2019 04:21)  Hospital course has been as follows:  Initially admitted for lip pain. Work up showed dislocation of his previous arthroplasty. During his hospital stay he developed RUE cellulitis treated with clindamycin.   Workup revealed superolateral dislocation of the left hip and he is s/p failed hip replacement due to hypotension. He was transferred to the SICU and requiring 1 PRBC transfusion and pressors. Pt was weaned off pressors by POD1 in the evening.  Intraop cultures grew pseudomonas. Patient was diagnosed with septic arthritis  He had elevated troponins postop likely from demand ischemia per cardiology and he is on medical management.    He also received loading dose of IVIG during this admission and will continue maintenance dose q3 weeks per neurology.  He was transferred to telemetry for further monitoring       ASSESSMENT & PLAN    49 year old male with a history of CIDP, presenting with hip pain, found to have septic arthitis of hardware (old arthroplasty).     Acute Hb drop: could be bleeding into hip?  - will transfuse 1PRBC  - will do CT abdomen pelvis to evaluate for hematoma  - will repeat CBC in 4pm    ID: septic arthritis of hardware  - on Meropenem (started 9/16). Cx growing pseudomonas and staph coag negative  - will switch to cefepime today. Patient will need 6 weeks of antibiotics to be dicharged with w picc line     Ortho: s/p failed arthroplasty due to hypotension   - ortho will follow    CIDP: s/p IVIG in hospital (chronic inflammatory demyelinating polyneuropathy)  - currently stable  - on gabapentin and duloxitine    Cardio: CAD s/p CABG  - on aspirin and atorvastatin  - demand ischemia with troponin elevated. Medical management.   - carvedilol every 12 hours.     Endo: DMII  - FS between 100-130   - Well controlled on Lantus 36 and Lispro 12    Uro: BPH  - on tamulosin     DVT prophylaxis: Lovenox

## 2019-09-17 NOTE — PROGRESS NOTE ADULT - ASSESSMENT
ASSESSMENT: 48 y/o M PMHx of CDIP s/p L explantation of hardware, wash out and placement of abx spacer       Neuro: hx of depression/anxiety   -Followed by psych and pain management.  -Restarted buspirone, klonipin, cymbalta, seroquel  -Pain controlled with tylenol, baclofen, voltaren, gabapentin, dilaudid PRN  -Increased Klonopin and Cymbalta doses as according to prev psych recommendations     Resp: NC satting well. Encourage IS. CXR unmarkable    Cards: persistent tachy   -d/c'd metop 12.5q8, started coreg 3.125mg  Q12hr  -elevated trops, downtrended, no longer following  -((CE 0.11>0.04>0.15>0.17>0.19>0.18))  -Per cards c/s:was likely demand ischemia, resume ASA and statin, can increase metoprolol to 25 BID as tolerated  Hx: CAD, CABG x5 vessels, HLD  -Echo 9/14: EF 60%, G1DD, mild TR  -on statin, aspirin  -AM EKG with stable inverted T waves    GI: CC diet, IVL, PPI, senna    : hx of BPH   -on flomax  -Refused graham, voiding  -Cr at baseline 0.9  -Hyponatremia resolved 132>136    Heme:  -Hg 9.5>8.1>7.4 rpt 7.3, transfused 1u pRBC, post tranfusion Hg 8.3  -Recieved 1 unit of PRBC post op by primary team  -DVT ppx with Lovenox 40qd  -Holding plavix as per primary team    ID: Septic joint, Stimulan beads in place (vanc/tobramycin)  -Recieved Clindamycin pre-op  -Afebrile, Tmax 100.1, WBC improving 24>10>7>8  -Blood cx 9/11: final coag staph negative w/o sensitivities  -Urine cx 9/11: negative  -Blood cx 9/12 NGTD prelim  -OR cx 9/13; prelim +pseudomonas  -Nasal MRSA 9/14: positive --> on Mupirocin  -Per ID: d/c clinda, d/c flagyl, on vanc/ceftriaxone  -Vanc trough 9.15 = 11.8  -, CRP 8.16    Endo: hx of DM. a1c 11%  -On home lantus 36, lispro 12 qac, ISS   -FS <200    MSK: Bed bound for past 3 yrs  -Oob to chair as per ortho.

## 2019-09-17 NOTE — PROGRESS NOTE ADULT - SUBJECTIVE AND OBJECTIVE BOX
ORTHO PROGRESS NOTE     49yMale POD #4  S/P Explantation of left hemiarthroplasty    Patient seen and examined at bedside . Appears lethargic today. Complains of L knee pain but states hip pain has improved since preop.  Downgraded to floor, receiving blood transfusion.    NAD, awake but lethargic appearing  Dressings with mod serous drainage  motor/sensory limited at baseline                            6.4    13.43 )-----------( 186      ( 17 Sep 2019 04:59 )             19.8       Cultures +pseudomonas    A/P: 49yMale POD #4  S/P  Explantation of left hemiarthroplasty/Girdlestone procedure on 9/13 with intraop cultures positive for pseudomonas   - continue antibiotics per ID recommendations  - monitor H/H, transfuse prn  - DVT PPx  - Pain Control  - SCDs  - IS

## 2019-09-17 NOTE — PROGRESS NOTE ADULT - SUBJECTIVE AND OBJECTIVE BOX
CAST, NAPOLEON  49y Male    INTERVAL HPI/OVERNIGHT EVENTS:    Pt in pain when he is moved - just received MS contin and he has dilaudid prn.  No SOB or chest pain.   No overt bleeding.  Spoke to Ortho resident today to evaluate pt.  Hypotensive overnight.  States he has not had a BM since 9/2 but he is passing gas.    T(F): 99 (09-16-19 @ 12:00), Max: 99 (09-16-19 @ 12:00)  HR: 80 (09-17-19 @ 06:11) (80 - 108)  BP: 106/59 (09-17-19 @ 06:11) (78/59 - 111/60)  RR: 20 (09-17-19 @ 06:11) (11 - 24)  SpO2: 97% (09-17-19 @ 06:11) (91% - 99%) on RA    I&O's Summary    16 Sep 2019 07:01  -  17 Sep 2019 07:00  --------------------------------------------------------  IN: 340 mL / OUT: 750 mL / NET: -410 mL    CAPILLARY BLOOD GLUCOSE      POCT Blood Glucose.: 105 mg/dL (17 Sep 2019 08:01)  POCT Blood Glucose.: 95 mg/dL (16 Sep 2019 21:29)  POCT Blood Glucose.: 115 mg/dL (16 Sep 2019 18:30)  POCT Blood Glucose.: 110 mg/dL (16 Sep 2019 12:24)        PHYSICAL EXAM:  GENERAL: NAD in bed  HEAD:  Normocephalic  EYES:  conjunctiva and sclera clear  ENMT: Moist mucous membranes  NECK: Supple,  NERVOUS SYSTEM:  Alert & Oriented X3, Good concentration, LE paresis  CHEST/LUNG: Clear to percussion bilaterally  HEART: Regular rate and rhythm  ABDOMEN: Soft, Nontender, distended; Bowel sounds present  EXTREMITIES: Left thigh with clean dressings in place, tender to touch, swelling  b/l LE edema  SKIN: pale     Consultant(s) Notes Reviewed:  [x ] YES  [ ] NO  Care Discussed with Consultants/Other Providers [ x] YES  [ ] NO    MEDICATIONS  (STANDING):  aspirin  chewable 81 milliGRAM(s) Oral daily  atorvastatin 80 milliGRAM(s) Oral at bedtime  baclofen 10 milliGRAM(s) Oral two times a day  benzocaine 15 mG/menthol 3.6 mG Lozenge 1 Lozenge Oral three times a day  bisacodyl 5 milliGRAM(s) Oral at bedtime  busPIRone 5 milliGRAM(s) Oral two times a day  carvedilol 3.125 milliGRAM(s) Oral every 12 hours  chlorhexidine 4% Liquid 1 Application(s) Topical daily  clonazePAM  Tablet 1 milliGRAM(s) Oral three times a day  dextrose 5%. 1000 milliLiter(s) (50 mL/Hr) IV Continuous <Continuous>  dextrose 50% Injectable 25 Gram(s) IV Push once  dextrose 50% Injectable 25 Gram(s) IV Push once  docusate sodium 100 milliGRAM(s) Oral daily  DULoxetine 90 milliGRAM(s) Oral daily  enoxaparin Injectable 40 milliGRAM(s) SubCutaneous daily  gabapentin 800 milliGRAM(s) Oral three times a day  hydrOXYzine hydrochloride 50 milliGRAM(s) Oral at bedtime  insulin glargine Injectable (LANTUS) 36 Unit(s) SubCutaneous at bedtime  insulin lispro (HumaLOG) corrective regimen sliding scale   SubCutaneous three times a day before meals  insulin lispro Injectable (HumaLOG) 12 Unit(s) SubCutaneous three times a day before meals  lactulose Syrup 15 Gram(s) Oral every 12 hours  meropenem  IVPB      meropenem  IVPB 1000 milliGRAM(s) IV Intermittent every 8 hours  morphine ER Tablet 60 milliGRAM(s) Oral every 12 hours  mupirocin 2% Ointment 1 Application(s) Both Nostrils two times a day  pantoprazole    Tablet 40 milliGRAM(s) Oral before breakfast  QUEtiapine 150 milliGRAM(s) Oral at bedtime  senna 1 Tablet(s) Oral daily  tamsulosin 0.4 milliGRAM(s) Oral at bedtime    MEDICATIONS  (PRN):  acetaminophen   Tablet .. 650 milliGRAM(s) Oral every 6 hours PRN Temp greater or equal to 38C (100.4F)  dextrose 40% Gel 15 Gram(s) Oral once PRN Blood Glucose LESS THAN 70 milliGRAM(s)/deciliter  diclofenac 75 milliGRAM(s) Oral two times a day PRN Moderate Pain (4 - 6)  glucagon  Injectable 1 milliGRAM(s) IntraMuscular once PRN Glucose LESS THAN 70 milligrams/deciliter  HYDROmorphone  Injectable 0.5 milliGRAM(s) IV Push every 4 hours PRN Moderate Pain (4 - 6)  HYDROmorphone  Injectable 1 milliGRAM(s) IV Push every 4 hours PRN Severe Pain (7 - 10)  morphine Concentrate 10 milliGRAM(s) Oral every 4 hours PRN Severe Pain (7 - 10)    Telemetry reviewed    LABS:                        6.4    13.43 )-----------( 186      ( 17 Sep 2019 04:59 )             19.8     09-17    137  |  99  |  11  ----------------------------<  79  4.3   |  27  |  0.8    Ca    8.2<L>      17 Sep 2019 04:59  Phos  3.9     09-17  Mg     1.9     09-17      PT/INR - ( 17 Sep 2019 04:59 )   PT: 13.50 sec;   INR: 1.18 ratio         PTT - ( 17 Sep 2019 04:59 )  PTT:34.8 sec      Culture - Body Fluid with Gram Stain (09.13.19 @ 14:49)    Gram Stain:   No polymorphonuclear leukocytes seen  No organisms seen  by cytocentrifuge    -  Amikacin: S <=16    -  Aztreonam: S <=4    -  Cefepime: S <=2    -  Ceftazidime: S <=1    -  Ciprofloxacin: S <=1    -  Gentamicin: S <=2    -  Imipenem: S 2    -  Levofloxacin: S <=2    -  Meropenem: S <=1    -  Piperacillin/Tazobactam: S <=8    -  Tobramycin: S <=2    Specimen Source: .Body Fluid None    Culture Results:   Rare Pseudomonas aeruginosa  Few Coag Negative Staphylococcus    Organism Identification: Pseudomonas aeruginosa    Organism: Pseudomonas aeruginosa    Method Type: MAN          Case discussed with residents    Care discussed with pt

## 2019-09-17 NOTE — CHART NOTE - NSCHARTNOTEFT_GEN_A_CORE
Registered Dietitian Follow-Up     Patient Profile Reviewed                           Yes [x]   No []     Nutrition History Previously Obtained        Yes [x]  No []       Pertinent Subjective Information: Pt just received pain medication - a bit lethargic. Pt says he is eating a little better. Observed breakfast tray and lunch tray on desk. Spoke w/ RN - pt did not eat breakfast this AM.      Pertinent Medical Interventions: POD #4 s/p explantation of L total hip arthroplasty and insertion of ABX spacer. NSTEMI type 2. Anemia due to acute blood loss postop.      Diet order: Consistent carb (no snack)     Anthropometrics:  - Ht. 180.34 cm   - Wt. 98.5 kg (9/10) vs 108 kg (9/16) 1+ generalized edema noted, will monitor weight changes   - %wt change  - BMI 30   - IBW     Pertinent Lab Data: (9/17) H/H 6.4/19.8  POCT glucose 105, 134     Pertinent Meds: lovenox, abx, lantus, humalog, lactulose, klonopin, protonix, lipitor, colace     Physical Findings:  - Appearance: lethargic   - GI function: RN reports he has not had a BM on this shift - just gave BM meds  - Tubes:  - Oral/Mouth cavity: per RD assessment, pt requesting mech soft   - Skin: BS 13 ecchymosis, 1+ generalized edema      Nutrition Requirements  Weight Used: 75.4kg, needs cont from RD assessment 9/10     Estimated Energy Needs    Continue [x]  Adjust [] 1474-4461 kcal/day (20-25 kcal/kg IBW d/t BMI 30 and quadriplegia).   Adjusted Energy Recommendations:   kcal/day        Estimated Protein Needs    Continue [x]  Adjust [] 75-90 g/day (1.0-1.2 g/kg IBW, reason mentioned above).   Adjusted Protein Recommendations:   gm/day        Estimated Fluid Needs        Continue [x]  Adjust [] 1mL/kcal or per LIP  Adjusted Fluid Recommendations:   mL/day     Nutrient Intake: not meeting needs         [] Previous Nutrition Diagnosis: n/a            [] Ongoing          [] Resolved    [] No active nutrition diagnosis identified at this time     NEW Nutrition Diagnostic #1  Problem: Inadequate oral intake  Etiology: decreased appetite  Statement: observed poor po intake      Nutrition Intervention meal/snack     Goal/Expected Outcome: Pt to consume >75% of meal tray In 3 days     Indicator/Monitoring: RD to monitor energy intake, diet order, body comp, NFPF     Recommendation: Add mechanical soft and glucerna shake daily.

## 2019-09-18 NOTE — PROGRESS NOTE ADULT - SUBJECTIVE AND OBJECTIVE BOX
no chest pain   no sob   no overnight events     Vital Signs Last 24 Hrs  T(C): 37.1 (18 Sep 2019 05:19), Max: 37.4 (17 Sep 2019 20:49)  T(F): 98.8 (18 Sep 2019 05:19), Max: 99.4 (17 Sep 2019 20:49)  HR: 92 (18 Sep 2019 05:19) (80 - 101)  BP: 126/63 (18 Sep 2019 05:19) (98/66 - 126/63)  BP(mean): --  RR: 18 (17 Sep 2019 20:49) (18 - 18)  SpO2: 98% (17 Sep 2019 20:49) (98% - 98%)    PHYSICAL EXAM:  GENERAL: NAD,   Pulm: Clear to auscultation bilaterally; No wheeze  CV: Regular rate and rhythm; No murmurs, rubs, or gallops  GI: Soft, Nontender, Nondistended; Bowel sounds present  EXTREMITIES: Left thigh with clean dressings in place, tender to touch, swelling  b/l LE edema  PSYCH: AAOx3  NEUROLOGY: non-focal                            9.6    10.16 )-----------( 180      ( 18 Sep 2019 04:43 )             29.2     09-18    137  |  99  |  10  ----------------------------<  92  4.2   |  27  |  0.7    Ca    8.4<L>      18 Sep 2019 04:43  Phos  3.9     09-17  Mg     1.9     09-17        PT/INR - ( 17 Sep 2019 04:59 )   PT: 13.50 sec;   INR: 1.18 ratio         PTT - ( 17 Sep 2019 04:59 )  PTT:34.8 sec      MEDICATIONS  (STANDING):  aspirin  chewable 81 milliGRAM(s) Oral daily  atorvastatin 80 milliGRAM(s) Oral at bedtime  baclofen 10 milliGRAM(s) Oral two times a day  benzocaine 15 mG/menthol 3.6 mG Lozenge 1 Lozenge Oral three times a day  bisacodyl 5 milliGRAM(s) Oral at bedtime  busPIRone 5 milliGRAM(s) Oral two times a day  carvedilol 3.125 milliGRAM(s) Oral every 12 hours  cefepime   IVPB 2000 milliGRAM(s) IV Intermittent every 8 hours  cefepime   IVPB      chlorhexidine 4% Liquid 1 Application(s) Topical daily  clonazePAM  Tablet 1 milliGRAM(s) Oral three times a day  dextrose 5%. 1000 milliLiter(s) (50 mL/Hr) IV Continuous <Continuous>  dextrose 50% Injectable 25 Gram(s) IV Push once  dextrose 50% Injectable 25 Gram(s) IV Push once  docusate sodium 100 milliGRAM(s) Oral three times a day  DULoxetine 90 milliGRAM(s) Oral daily  enoxaparin Injectable 40 milliGRAM(s) SubCutaneous daily  gabapentin 800 milliGRAM(s) Oral three times a day  hydrOXYzine hydrochloride 50 milliGRAM(s) Oral at bedtime  insulin glargine Injectable (LANTUS) 36 Unit(s) SubCutaneous at bedtime  insulin lispro (HumaLOG) corrective regimen sliding scale   SubCutaneous three times a day before meals  insulin lispro Injectable (HumaLOG) 12 Unit(s) SubCutaneous three times a day before meals  lactulose Syrup 15 Gram(s) Oral every 6 hours  morphine ER Tablet 60 milliGRAM(s) Oral every 12 hours  mupirocin 2% Ointment 1 Application(s) Both Nostrils two times a day  pantoprazole    Tablet 40 milliGRAM(s) Oral before breakfast  QUEtiapine 150 milliGRAM(s) Oral at bedtime  senna 2 Tablet(s) Oral at bedtime  tamsulosin 0.4 milliGRAM(s) Oral at bedtime    MEDICATIONS  (PRN):  acetaminophen   Tablet .. 650 milliGRAM(s) Oral every 6 hours PRN Temp greater or equal to 38C (100.4F)  dextrose 40% Gel 15 Gram(s) Oral once PRN Blood Glucose LESS THAN 70 milliGRAM(s)/deciliter  diclofenac 75 milliGRAM(s) Oral two times a day PRN Moderate Pain (4 - 6)  glucagon  Injectable 1 milliGRAM(s) IntraMuscular once PRN Glucose LESS THAN 70 milligrams/deciliter  HYDROmorphone  Injectable 0.5 milliGRAM(s) IV Push every 4 hours PRN Moderate Pain (4 - 6)  HYDROmorphone  Injectable 1 milliGRAM(s) IV Push every 4 hours PRN Severe Pain (7 - 10)  morphine Concentrate 10 milliGRAM(s) Oral every 4 hours PRN Severe Pain (7 - 10)

## 2019-09-18 NOTE — PROGRESS NOTE ADULT - SUBJECTIVE AND OBJECTIVE BOX
NAPOLEON CAST 49y Male  MRN#: [id number]     SUBJECTIVE  Patient is a 49y old Male who presents with a chief complaint of Left hip pain (17 Sep 2019 04:21)  Currently admitted to medicine with the primary diagnosis of Pain    Today is hospital day 15d, and this morning      OBJECTIVE  PAST MEDICAL & SURGICAL HISTORY  CIDP (chronic inflammatory demyelinating polyneuropathy)  History of cholecystectomy  History of total left hip replacement  History of total right hip replacement: bilateral  S/P CABG x 5    ALLERGIES:  penicillins (Unknown)    MEDICATIONS:        VITAL SIGNS: Last 24 Hours    LABS:                     PHYSICAL EXAM:    GENERAL: NAD, well-developed, AAOx3  HEENT:  Atraumatic, Normocephalic. EOMI, PERRLA, conjunctiva and sclera clear, No JVD  PULMONARY: Clear to auscultation bilaterally; No wheeze  CARDIOVASCULAR: Regular rate and rhythm; No murmurs, rubs, or gallops  GASTROINTESTINAL: Soft, Nontender, Nondistended; Bowel sounds present  MUSCULOSKELETAL:  2+ Peripheral Pulses, No clubbing, cyanosis, or edema  NEUROLOGY: has decent mvmt and mildly decr str in his arms; legs are paralyzed and stiff (there is very slight twitching in rt toes); CN intact  SKIN: No rashes or lesions      ADMISSION SUMMARY  Patient is a 49y old Male who presents with a chief complaint of Left hip pain (17 Sep 2019 04:21)  Hospital course has been as follows:  Initially admitted for lip pain. Work up showed dislocation of his previous arthroplasty. During his hospital stay he developed RUE cellulitis treated with clindamycin.   Workup revealed superolateral dislocation of the left hip and he is s/p failed hip replacement due to hypotension. He was transferred to the SICU and requiring 1 PRBC transfusion and pressors. Pt was weaned off pressors by POD1 in the evening.  Intraop cultures grew pseudomonas. Patient was diagnosed with septic arthritis  He had elevated troponins postop likely from demand ischemia per cardiology and he is on medical management.    He also received loading dose of IVIG during this admission and will continue maintenance dose q3 weeks per neurology.  He was transferred to telemetry for further monitoring       ASSESSMENT & PLAN    49 year old male with a history of CIDP, presenting with hip pain, found to have septic arthitis of hardware (old arthroplasty).     Acute Hb drop: could be bleeding into hip?  - will transfuse 1PRBC  - will do CT abdomen pelvis to evaluate for hematoma  - will repeat CBC in 4pm    ID: septic arthritis of hardware  - on Meropenem (started 9/16). Cx growing pseudomonas and staph coag negative  - will switch to cefepime today. Patient will need 6 weeks of antibiotics to be dicharged with w picc line     Ortho: s/p failed arthroplasty due to hypotension   - ortho will follow    CIDP: s/p IVIG in hospital (chronic inflammatory demyelinating polyneuropathy)  - currently stable  - on gabapentin and duloxetine    Cardio: CAD s/p CABG  - on aspirin and atorvastatin  - demand ischemia with troponin elevated. Medical management.   - carvedilol every 12 hours.     Endo: DMII  - FS between 100-130   - Well controlled on Lantus 36 and Lispro 12    Uro: BPH  - on tamulosin     DVT prophylaxis: Lovenox NAPOLEON CAST 49y Male  MRN#: [id number]     SUBJECTIVE  Patient is a 49y old Male who presents with a chief complaint of Left hip pain (17 Sep 2019 04:21)  Currently admitted to medicine with the primary diagnosis of Pain    Today is hospital day 15d, and this morning he is still in pain. He still has not had a bowel movement since adjusting his laxatives yesterday    OBJECTIVE  PAST MEDICAL & SURGICAL HISTORY  CIDP (chronic inflammatory demyelinating polyneuropathy)  History of cholecystectomy  History of total left hip replacement  History of total right hip replacement: bilateral  S/P CABG x 5    ALLERGIES:  penicillins (Unknown)    MEDICATIONS:  MEDICATIONS  (STANDING):  aspirin  chewable 81 milliGRAM(s) Oral daily  atorvastatin 80 milliGRAM(s) Oral at bedtime  baclofen 10 milliGRAM(s) Oral two times a day  benzocaine 15 mG/menthol 3.6 mG Lozenge 1 Lozenge Oral three times a day  bisacodyl 5 milliGRAM(s) Oral at bedtime  busPIRone 5 milliGRAM(s) Oral two times a day  carvedilol 3.125 milliGRAM(s) Oral every 12 hours  cefepime   IVPB 2000 milliGRAM(s) IV Intermittent every 8 hours  cefepime   IVPB      chlorhexidine 4% Liquid 1 Application(s) Topical daily  clonazePAM  Tablet 1 milliGRAM(s) Oral three times a day  dextrose 5%. 1000 milliLiter(s) (50 mL/Hr) IV Continuous <Continuous>  dextrose 50% Injectable 25 Gram(s) IV Push once  dextrose 50% Injectable 25 Gram(s) IV Push once  docusate sodium 100 milliGRAM(s) Oral three times a day  DULoxetine 90 milliGRAM(s) Oral daily  enoxaparin Injectable 40 milliGRAM(s) SubCutaneous daily  gabapentin 800 milliGRAM(s) Oral three times a day  hydrOXYzine hydrochloride 50 milliGRAM(s) Oral at bedtime  insulin glargine Injectable (LANTUS) 36 Unit(s) SubCutaneous at bedtime  insulin lispro (HumaLOG) corrective regimen sliding scale   SubCutaneous three times a day before meals  insulin lispro Injectable (HumaLOG) 12 Unit(s) SubCutaneous three times a day before meals  lactulose Syrup 15 Gram(s) Oral every 6 hours  morphine ER Tablet 60 milliGRAM(s) Oral every 12 hours  mupirocin 2% Ointment 1 Application(s) Both Nostrils two times a day  pantoprazole    Tablet 40 milliGRAM(s) Oral before breakfast  QUEtiapine 150 milliGRAM(s) Oral at bedtime  senna 2 Tablet(s) Oral at bedtime  tamsulosin 0.4 milliGRAM(s) Oral at bedtime    MEDICATIONS  (PRN):  acetaminophen   Tablet .. 650 milliGRAM(s) Oral every 6 hours PRN Temp greater or equal to 38C (100.4F)  dextrose 40% Gel 15 Gram(s) Oral once PRN Blood Glucose LESS THAN 70 milliGRAM(s)/deciliter  diclofenac 75 milliGRAM(s) Oral two times a day PRN Moderate Pain (4 - 6)  glucagon  Injectable 1 milliGRAM(s) IntraMuscular once PRN Glucose LESS THAN 70 milligrams/deciliter  HYDROmorphone  Injectable 0.5 milliGRAM(s) IV Push every 4 hours PRN Moderate Pain (4 - 6)  HYDROmorphone  Injectable 1 milliGRAM(s) IV Push every 4 hours PRN Severe Pain (7 - 10)  morphine Concentrate 10 milliGRAM(s) Oral every 4 hours PRN Severe Pain (7 - 10)    VITAL SIGNS: Last 24 Hours  Vital Signs Last 24 Hrs  T(C): 37.1 (18 Sep 2019 05:19), Max: 37.4 (17 Sep 2019 20:49)  T(F): 98.8 (18 Sep 2019 05:19), Max: 99.4 (17 Sep 2019 20:49)  HR: 92 (18 Sep 2019 05:19) (80 - 101)  BP: 126/63 (18 Sep 2019 05:19) (98/66 - 126/63)  BP(mean): --  RR: 18 (17 Sep 2019 20:49) (18 - 18)  SpO2: 98% (17 Sep 2019 20:49) (98% - 98%)  LABS:                         9.6    10.16 )-----------( 180      ( 18 Sep 2019 04:43 )             29.2                09-18    137  |  99  |  10  ----------------------------<  92  4.2   |  27  |  0.7    Ca    8.4<L>      18 Sep 2019 04:43  Phos  3.9     09-17  Mg     1.9     09-17      PHYSICAL EXAM:      GENERAL: NAD, well-developed, AAOx3  HEENT:  Atraumatic, Normocephalic. EOMI, PERRLA, conjunctiva and sclera clear, No JVD  PULMONARY: Clear to auscultation bilaterally; No wheeze  CARDIOVASCULAR: Regular rate and rhythm; No murmurs, rubs, or gallops  GASTROINTESTINAL: Soft, Nontender, Nondistended; Bowel sounds present  MUSCULOSKELETAL:  2+ Peripheral Pulses, No clubbing, cyanosis, or edema  NEUROLOGY: movement in upper extremities but no movement of lower extremities  SKIN: No rashes or lesions.   WOUND: left hip wound looks clean. No signs of bleeding into subcutaneous tissue.       ADMISSION SUMMARY  Patient is a 49y old Male who presents with a chief complaint of Left hip pain (17 Sep 2019 04:21)  Hospital course has been as follows:  Initially admitted for lip pain. Work up showed dislocation of his previous arthroplasty. During his hospital stay he developed RUE cellulitis treated with clindamycin.   Workup revealed superolateral dislocation of the left hip and he is s/p failed hip replacement due to hypotension. He was transferred to the SICU and requiring 1 PRBC transfusion and pressors. Pt was weaned off pressors by POD1 in the evening.  Intraop cultures grew pseudomonas. Patient was diagnosed with septic arthritis  He had elevated troponins postop likely from demand ischemia per cardiology and he is on medical management.    He also received loading dose of IVIG during this admission and will continue maintenance dose q3 weeks per neurology.  He was transferred to telemetry for further monitoring       ASSESSMENT & PLAN    49 year old male with a history of CIDP, presenting with hip pain, found to have septic arthitis of hardware (old arthroplasty).     Acute Hb drop yesterday - got 1unit transfused with appropriate Hb correction (Hb=9.6 today)  - will do CT abdomen pelvis to evaluate for hematoma    ID: septic arthritis of hardware  - on Meropenem (started 9/16). Cx growing pseudomonas and staph coag negative  - will switch to cefepime today. Patient will need 6 weeks of antibiotics to be discharged with w picc line   - will consult IR today     Ortho: s/p failed arthroplasty s/p abx spacer on 9/13 - OR failed due to hypotension   - ortho on board    CIDP: s/p IVIG in hospital (chronic inflammatory demyelinating polyneuropathy)  - being followed by neurology. Treatment is to get IVIG every 3 weeks.   - on gabapentin and duloxetine for pain.  - being followed by pain management     Cardio: CAD s/p CABG  - on aspirin and atorvastatin  - demand ischemia with troponin elevated. Medical management.   - carvedilol every 12 hours.     Endo: DMII  - FS between    - Well controlled on Lantus 36 and Lispro 12. Will keep same.     Nutrition: evaluated by Nutrition   - good intake (>75% of meals)    Uro: BPH  - on tamulosin     DVT prophylaxis: Lovenox

## 2019-09-18 NOTE — PROGRESS NOTE ADULT - SUBJECTIVE AND OBJECTIVE BOX
SERENE, NAPOLEON  49y, Male  Allergy: penicillins (Unknown)      CHIEF COMPLAINT: Left hip pain (18 Sep 2019 07:14)      INTERVAL EVENTS/HPI  - No acute events overnight  - T(F): , Max: 99.4 (09-17-19 @ 20:49)  - Denies any worsening symptoms  - Tolerating medication  - WBC Count: 10.16 (09-18-19 @ 04:43)<--WBC Count: 10.15 (09-17-19 @ 17:14)  - Creatinine, Serum: 0.7 (09-18-19 @ 04:43)    ROS  General: Denies rigors, nightsweats  HEENT: Denies headache, rhinorrhea, sore throat, eye pain  CV: Denies CP, palpitations  PULM: Denies SOB, wheezing  GI: Denies hematemesis, hematochezia, melena  : Denies discharge, hematuria  MSK: Denies arthralgias, myalgias  SKIN: Denies rash, lesions  NEURO: Denies paresthesias, weakness  PSYCH: Denies depression, anxiety    VITALS:  T(F): 98.8, Max: 99.4 (09-17-19 @ 20:49)  HR: 92  BP: 126/63  RR: 18Vital Signs Last 24 Hrs  T(C): 37.1 (18 Sep 2019 05:19), Max: 37.4 (17 Sep 2019 20:49)  T(F): 98.8 (18 Sep 2019 05:19), Max: 99.4 (17 Sep 2019 20:49)  HR: 92 (18 Sep 2019 05:19) (80 - 101)  BP: 126/63 (18 Sep 2019 05:19) (98/66 - 126/63)  BP(mean): --  RR: 18 (17 Sep 2019 20:49) (18 - 18)  SpO2: 98% (17 Sep 2019 20:49) (98% - 98%)    PHYSICAL EXAM:  Gen: NAD, resting in bed, very pale, sleepy  HEENT: Normocephalic, atraumatic  Neck: supple, no lymphadenopathy  CV: Regular rate & regular rhythm  Lungs: decreased BS at bases, no fremitus  Abdomen: Soft, BS present  Ext: Warm, well perfused, L hip dressings  Neuro: non focal, awake  Skin: no rash, no erythema  Lines: no phlebitis      FH: Non-contributory  Social Hx: Non-contributory    TESTS & MEASUREMENTS:                        9.6    10.16 )-----------( 180      ( 18 Sep 2019 04:43 )             29.2     09-18    137  |  99  |  10  ----------------------------<  92  4.2   |  27  |  0.7    Ca    8.4<L>      18 Sep 2019 04:43  Phos  3.9     09-17  Mg     1.9     09-17      eGFR if Non African American: 109 mL/min/1.73M2 (09-18-19 @ 04:43)  eGFR if African American: 127 mL/min/1.73M2 (09-18-19 @ 04:43)          Culture - Body Fluid with Gram Stain (collected 09-13-19 @ 14:49)  Source: .Body Fluid None  Gram Stain (09-14-19 @ 21:27):    polymorphonuclear leukocytes seen    No organisms seen    by cytocentrifuge  Preliminary Report (09-16-19 @ 18:39):    Few Coag Negative Staphylococcus  Organism: Coag Negative Staphylococcus (09-17-19 @ 19:50)  Organism: Coag Negative Staphylococcus (09-17-19 @ 19:50)      -  Ampicillin/Sulbactam: S <=8/4      -  Cefazolin: S <=4      -  Clindamycin: R >4      -  Erythromycin: R >4      -  Gentamicin: R >8 Should not be used as monotherapy      -  Oxacillin: S <=0.25      -  Penicillin: R 2      -  RIF- Rifampin: S <=1 Should not be used as monotherapy      -  Tetra/Doxy: S <=1      -  Trimethoprim/Sulfamethoxazole: R >2/38      -  Vancomycin: S 1      Method Type: MAN    Culture - Body Fluid with Gram Stain (collected 09-13-19 @ 14:49)  Source: .Body Fluid None  Gram Stain (09-14-19 @ 21:26):    No polymorphonuclear leukocytes seen    No organisms seen    by cytocentrifuge  Preliminary Report (09-16-19 @ 19:47):    Rare Pseudomonas aeruginosa    Few Coag Negative Staphylococcus  Organism: Pseudomonas aeruginosa  Coag Negative Staphylococcus (09-17-19 @ 19:39)  Organism: Coag Negative Staphylococcus (09-17-19 @ 19:39)      -  Ampicillin/Sulbactam: S <=8/4      -  Cefazolin: S <=4      -  Clindamycin: R >4      -  Erythromycin: R >4      -  Gentamicin: S <=1 Should not be used as monotherapy      -  Oxacillin: S <=0.25      -  Penicillin: R >8      -  RIF- Rifampin: S <=1 Should not be used as monotherapy      -  Tetra/Doxy: S <=1      -  Vancomycin: S <=0.25      Method Type: MAN  Organism: Pseudomonas aeruginosa (09-16-19 @ 18:42)      -  Amikacin: S <=16      -  Aztreonam: S <=4      -  Cefepime: S <=2      -  Ceftazidime: S <=1      -  Ciprofloxacin: S <=1      -  Gentamicin: S <=2      -  Imipenem: S 2      -  Levofloxacin: S <=2      -  Meropenem: S <=1      -  Piperacillin/Tazobactam: S <=8      -  Tobramycin: S <=2      Method Type: MAN    Culture - Blood (collected 09-12-19 @ 07:28)  Source: .Blood None  Final Report (09-17-19 @ 14:00):    No growth at 5 days.    Culture - Blood (collected 09-11-19 @ 11:00)  Source: .Blood Blood  Gram Stain (09-12-19 @ 15:59):    Growth in aerobic bottle: Gram Positive Cocci in Clusters  Final Report (09-13-19 @ 23:15):    Growth in aerobic bottle: Coag Negative Staphylococcus "Susceptibilities    not performed"    "Due to technical problems, Proteus sp. will Not be reported as part of    the BCID panel until further notice"    ***Blood Panel PCR results on this specimen areavailable    approximately 3 hours after the Gram stain result.***    Gram stain, PCR, and/or culture results may not always    correspond due to difference in methodologies.    ************************************************************    This PCR assay wasperformed using [employer].    The following targets are tested for: Enterococcus,    vancomycin resistant enterococci, Listeria monocytogenes,    coagulase negative staphylococci, S. aureus,    methicillin resistant S. aureus, Streptococcus agalactiae    (Group B), S. pneumoniae, S. pyogenes (Group A),    Acinetobacter baumannii, Enterobacter cloacae, E. coli,    Klebsiella oxytoca, K. pneumoniae, Proteus sp.,    Serratia marcescens, Haemophilus influenzae,    Neisseria meningitidis, Pseudomonas aeruginosa, Candida    albicans, C. glabrata, C krusei, C parapsilosis,    C. tropicalis and the KPC resistance gene.  Organism: Blood Culture PCR (09-13-19 @ 23:15)  Organism: Blood Culture PCR (09-13-19 @ 23:15)      -  Coagulase negative Staphylococcus: Detec      Method Type: PCR            INFECTIOUS DISEASES TESTING  MRSA PCR Result.: Positive (09-14-19 @ 11:30)      RADIOLOGY & ADDITIONAL TESTS:  I have personally reviewed the last Chest xray  CXR      CT      CARDIOLOGY TESTING  12 Lead ECG:   Ventricular Rate 104 BPM    Atrial Rate 104 BPM    P-R Interval 112 ms    QRS Duration 80 ms    Q-T Interval 360 ms    QTC Calculation(Bezet) 473 ms    P Axis 48 degrees    R Axis 19 degrees    T Axis 204 degrees    Diagnosis Line Sinus tachycardia  ST & T wave abnormality, consider anterolateral ischemia  Abnormal ECG    Confirmed by Carla Broderick MD ([id number]) on 9/16/2019 8:36:06 AM (09-16-19 @ 04:33)  12 Lead ECG:   Ventricular Rate 130 BPM    Atrial Rate 130 BPM    P-R Interval 124 ms    QRS Duration 84 ms    Q-T Interval 300 ms    QTC Calculation(Bezet) 441 ms    R Axis 25 degrees    T Axis 207 degrees    Diagnosis Line junctional or atrial tachycardia  ST &T wave abnormality, consider anterolateral ischemia  Abnormal ECG    Confirmed by CHELO MANZANO MD ([id number]) on 9/15/2019 4:15:12 PM (09-14-19 @ 20:28)      MEDICATIONS  aspirin  chewable 81  atorvastatin 80  baclofen 10  benzocaine 15 mG/menthol 3.6 mG Lozenge 1  bisacodyl 5  busPIRone 5  carvedilol 3.125  cefepime   IVPB 2000  cefepime   IVPB   chlorhexidine 4% Liquid 1  clonazePAM  Tablet 1  dextrose 5%. 1000  dextrose 50% Injectable 25  dextrose 50% Injectable 25  docusate sodium 100  DULoxetine 90  enoxaparin Injectable 40  gabapentin 800  hydrOXYzine hydrochloride 50  insulin glargine Injectable (LANTUS) 36  insulin lispro (HumaLOG) corrective regimen sliding scale   insulin lispro Injectable (HumaLOG) 12  lactulose Syrup 15  morphine ER Tablet 60  mupirocin 2% Ointment 1  pantoprazole    Tablet 40  QUEtiapine 150  senna 2  tamsulosin 0.4      ANTIBIOTICS:  cefepime   IVPB 2000 milliGRAM(s) IV Intermittent every 8 hours  cefepime   IVPB          All available historical records have been reviewed

## 2019-09-18 NOTE — PROVIDER CONTACT NOTE (OTHER) - SITUATION
Pt.'s nail on left middle toe fell off during turn and positioning. Site pink and dry. Dr. Oropeza notified.

## 2019-09-18 NOTE — PROGRESS NOTE ADULT - ASSESSMENT
ASSESSMENT  48 y/o M with PMHx of CAD s/p CABG, BPH, CIDP with significant motor and sensory deficits and extreme pain, b/l total hip replacement, anxiety and depression presents to the ED for extreme left hip pain, s/p failed total hip arthoplasty w/ dislocation s/p explanation of hardware, hypotension required pressors in PACU    IMPRESSION  #R prosthetic hip infection with hardware, s/p OR 9/13 Explantation of total hip arthroplasty , abx beads placed    OR cx  Pseudomonas, coNS (S oxacillin)    MRSA PCR pos    Sedimentation Rate, Erythrocyte: 107 mm/Hr (09.14.19 @ 11:21)    C-Reactive Protein, Serum: 8.16 mg/dL (09.14.19 @ 11:21)  #9/11 Bcx CoNS likely contaminant 9/12 BCX NG  #Hyponatremia  #Elevated trop/CKMB, suspect demand ischemia  #Abx allergy: PCN childhood - was told by mother not to take    RECOMMENDATIONS  - Cefepime 2g q8h IV as pseudo is S (aware of PCN allergy)  - Pt will need PICC x 6 weeks IV ABX  - MRSA PCR + --> mupirocin to nares BID and CHG daily washes x 7 days     Spectra [id number]

## 2019-09-18 NOTE — PROGRESS NOTE ADULT - SUBJECTIVE AND OBJECTIVE BOX
ORTHO PROGRESS NOTE     49yMale POD #5  S/P Explantation of left hemiarthroplasty    Patient seen and examined at bedside . Lethargy improved. S/p 1u PRBC yesterday.     NAD, awake but lethargic appearing  Dressings with mod serous drainage, changed  motor/sensory limited at baseline                                     9.6    10.16 )-----------( 180      ( 18 Sep 2019 04:43 )             29.2         Cultures +pseudomonas    A/P: 49yMale POD #5 S/P Explantation of left hemiarthroplasty/[place] procedure on 9/13 with intraop cultures positive for pseudomonas   - continue antibiotics per ID recommendations  - monitor H/H, transfuse prn  - DVT PPx  - Pain Control  - SCDs  - IS

## 2019-09-18 NOTE — PROGRESS NOTE ADULT - SUBJECTIVE AND OBJECTIVE BOX
Pain Management Progress Note -     HPI:  48 y/o M Patient lying in bed and is very sleepy. States he did not sleep last night. Patient states his pain is better.  Patient cont's home pain meds. No resp distress. Con't current regimen. Will sign off. Recall for any uncontrolled pain.      penicillins (Unknown)    PAIN;H/O BILATERAL HIP REPLACEMENTS;CIPD;SLURRED SPEECH  ^PAIN;H/O BILATERAL HIP REPLACEMENTS;CIPD;SLURRED SPEECH  Handoff  MEWS Score  CIDP (chronic inflammatory demyelinating polyneuropathy)  Failed total hip arthroplasty with dislocation  Failed total hip arthroplasty with dislocation  Pain  Pain  Anxiety  Major depression, chronic  Explantation of total hip arthroplasty  History of cholecystectomy  History of total left hip replacement  History of total right hip replacement  S/P CABG x 5  LEFT HIP PAIN  90+  Slurred speech  CIDP (chronic inflammatory demyelinating polyneuropathy)  H/O bilateral hip replacements      enoxaparin Injectable  acetaminophen   Tablet ..  atorvastatin  baclofen  benzocaine 15 mG/menthol 3.6 mG Lozenge  busPIRone  diclofenac  gabapentin  QUEtiapine  tamsulosin  aspirin  chewable  HYDROmorphone  Injectable  HYDROmorphone  Injectable  dextrose 5%.  dextrose 50% Injectable  dextrose 50% Injectable  insulin glargine Injectable (LANTUS)  insulin lispro Injectable (HumaLOG)  clonazePAM  Tablet  DULoxetine  morphine ER Tablet  morphine Concentrate  mupirocin 2% Ointment  insulin lispro (HumaLOG) corrective regimen sliding scale  dextrose 40% Gel  glucagon  Injectable  hydrOXYzine hydrochloride  chlorhexidine 4% Liquid  pantoprazole    Tablet  bisacodyl  carvedilol  cefepime   IVPB  senna  docusate sodium  lactulose Syrup  cefepime   IVPB      acetaminophen   Tablet .. 650 milliGRAM(s) Oral every 6 hours PRN  aspirin  chewable 81 milliGRAM(s) Oral daily  atorvastatin 80 milliGRAM(s) Oral at bedtime  baclofen 10 milliGRAM(s) Oral two times a day  benzocaine 15 mG/menthol 3.6 mG Lozenge 1 Lozenge Oral three times a day  bisacodyl 5 milliGRAM(s) Oral at bedtime  busPIRone 5 milliGRAM(s) Oral two times a day  carvedilol 3.125 milliGRAM(s) Oral every 12 hours  cefepime   IVPB 2000 milliGRAM(s) IV Intermittent every 8 hours  cefepime   IVPB      chlorhexidine 4% Liquid 1 Application(s) Topical daily  clonazePAM  Tablet 1 milliGRAM(s) Oral three times a day  dextrose 40% Gel 15 Gram(s) Oral once PRN  dextrose 5%. 1000 milliLiter(s) IV Continuous <Continuous>  dextrose 50% Injectable 25 Gram(s) IV Push once  dextrose 50% Injectable 25 Gram(s) IV Push once  diclofenac 75 milliGRAM(s) Oral two times a day PRN  docusate sodium 100 milliGRAM(s) Oral three times a day  DULoxetine 90 milliGRAM(s) Oral daily  enoxaparin Injectable 40 milliGRAM(s) SubCutaneous daily  gabapentin 800 milliGRAM(s) Oral three times a day  glucagon  Injectable 1 milliGRAM(s) IntraMuscular once PRN  HYDROmorphone  Injectable 0.5 milliGRAM(s) IV Push every 4 hours PRN  HYDROmorphone  Injectable 1 milliGRAM(s) IV Push every 4 hours PRN  hydrOXYzine hydrochloride 50 milliGRAM(s) Oral at bedtime  insulin glargine Injectable (LANTUS) 36 Unit(s) SubCutaneous at bedtime  insulin lispro (HumaLOG) corrective regimen sliding scale   SubCutaneous three times a day before meals  insulin lispro Injectable (HumaLOG) 12 Unit(s) SubCutaneous three times a day before meals  lactulose Syrup 15 Gram(s) Oral every 6 hours  morphine Concentrate 10 milliGRAM(s) Oral every 4 hours PRN  morphine ER Tablet 60 milliGRAM(s) Oral every 12 hours  mupirocin 2% Ointment 1 Application(s) Both Nostrils two times a day  pantoprazole    Tablet 40 milliGRAM(s) Oral before breakfast  QUEtiapine 150 milliGRAM(s) Oral at bedtime  senna 2 Tablet(s) Oral at bedtime  tamsulosin 0.4 milliGRAM(s) Oral at bedtime      09-18 @ [id number]:82161 mL/min/1.73M2          Hemoglobin: 9.6 g/dL (09-18 @ 04:43)  Hemoglobin: 9.9 g/dL (09-17 @ 17:14)  Hemoglobin: 6.4 g/dL (09-17 @ 04:59)        T(C): 37.1 (09-18-19 @ 05:19), Max: 37.4 (09-17-19 @ 20:49)  HR: 92 (09-18-19 @ 05:19) (80 - 101)  BP: 126/63 (09-18-19 @ 05:19) (98/66 - 126/63)  RR: 18 (09-17-19 @ 20:49) (18 - 18)  SpO2: 98% (09-17-19 @ 20:49) (98% - 98%)  Wt(kg): --     PHYSICAL EXAM:  Gen Appearance: no acute distress

## 2019-09-18 NOTE — PROGRESS NOTE ADULT - ASSESSMENT
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  Workup revealed superolateral dislocation of the left hip and he is s/p explantation of Left total hip arthroplasty and insertion of ABX spacer. Post op course was complicated by hypotension, requiring 1 PRBC transfusion and pressors. Pt was weaned off pressors by POD1 in the evening.   He had elevated troponins postop likely from demand ischemia per cardiology and he is on medical management.    Preop, he received loading dose of IVIG during this admission and will continue maintenance dose q3 weeks per neurology.  He was transferred to telemetry.    1. Dislocation of left hip s/p explant of left ОЛЬГА and insertion of abx spacer on 9/13  OR culture + for Pseudomonas from hip - pansensitive  on cefepime   needs picc line prior to discharge   pain control  Ortho on board     2. NSTEMI type 2  ASA/statin/Bblocker  cardio following  holding Plavix since Hgb dropped again  normal EF on ECHO  telemetry  cardio f/u  no plan for urgent cath    3. CIDP - for IVIG q3 weeks per neurology    4. CAD s/p CABG - continue medical management    5. DM - controlled on insulin    6. Anemia due to acute blood loss postop  s/p transfusion with 1 unit PRBC on 9/15 with appropriate rise in Hgb  yesterday Hgb was 6.4 - got  1 unit PRBC and today up to 9.6 CT scan of abdomen/pelvis and left hip if hgb drops again   if active bleeding or hemodynamic instability, then needs upgrade back to SICU  guarded prognosis    7. Chronic pain - followed by pain management  continue current regimen and monitor    8. Depression/anxiety  seen by psychiatry this admission  recommendations being followed  needs close outpt f/u    9. BPH on flomax    10. + MRSA nasal swab - on mupirocin and CHG baths    11. DVT prophylaxis    12. Constipation - c/w  colace  100mg q8hr, lactulose to q6hr, continue senna        PROGRESS NOTE HANDOFF    Pending: follow cbc daily , cardiology follow up later when stable to determine if patient needs cath inpatient   Disposition: to be determined

## 2019-09-19 NOTE — PROGRESS NOTE ADULT - SUBJECTIVE AND OBJECTIVE BOX
NAPOLEON CAST 49y Male  MRN#: [id number]     SUBJECTIVE  Patient is a 49y old Male who presents with a chief complaint of Left hip pain (17 Sep 2019 04:21)  Currently admitted to medicine with the primary diagnosis of Pain    Today is hospital day 16d, and this morning he is still in pain  OBJECTIVE  PAST MEDICAL & SURGICAL HISTORY  CIDP (chronic inflammatory demyelinating polyneuropathy)  History of cholecystectomy  History of total left hip replacement  History of total right hip replacement: bilateral  S/P CABG x 5    ALLERGIES:  penicillins (Unknown)    MEDICATIONS:  MEDICATIONS  (STANDING):  aspirin  chewable 81 milliGRAM(s) Oral daily  atorvastatin 80 milliGRAM(s) Oral at bedtime  baclofen 10 milliGRAM(s) Oral two times a day  benzocaine 15 mG/menthol 3.6 mG Lozenge 1 Lozenge Oral three times a day  bisacodyl 5 milliGRAM(s) Oral at bedtime  busPIRone 5 milliGRAM(s) Oral two times a day  carvedilol 3.125 milliGRAM(s) Oral every 12 hours  cefepime   IVPB 2000 milliGRAM(s) IV Intermittent every 8 hours  cefepime   IVPB      chlorhexidine 4% Liquid 1 Application(s) Topical daily  clonazePAM  Tablet 1 milliGRAM(s) Oral three times a day  dextrose 5%. 1000 milliLiter(s) (50 mL/Hr) IV Continuous <Continuous>  dextrose 50% Injectable 25 Gram(s) IV Push once  dextrose 50% Injectable 25 Gram(s) IV Push once  docusate sodium 100 milliGRAM(s) Oral three times a day  DULoxetine 90 milliGRAM(s) Oral daily  enoxaparin Injectable 40 milliGRAM(s) SubCutaneous daily  gabapentin 800 milliGRAM(s) Oral three times a day  hydrOXYzine hydrochloride 50 milliGRAM(s) Oral at bedtime  insulin glargine Injectable (LANTUS) 36 Unit(s) SubCutaneous at bedtime  insulin lispro (HumaLOG) corrective regimen sliding scale   SubCutaneous three times a day before meals  insulin lispro Injectable (HumaLOG) 12 Unit(s) SubCutaneous three times a day before meals  lactulose Syrup 15 Gram(s) Oral every 6 hours  morphine ER Tablet 60 milliGRAM(s) Oral every 12 hours  mupirocin 2% Ointment 1 Application(s) Both Nostrils two times a day  pantoprazole    Tablet 40 milliGRAM(s) Oral before breakfast  QUEtiapine 150 milliGRAM(s) Oral at bedtime  senna 2 Tablet(s) Oral at bedtime  tamsulosin 0.4 milliGRAM(s) Oral at bedtime    MEDICATIONS  (PRN):  acetaminophen   Tablet .. 650 milliGRAM(s) Oral every 6 hours PRN Temp greater or equal to 38C (100.4F)  dextrose 40% Gel 15 Gram(s) Oral once PRN Blood Glucose LESS THAN 70 milliGRAM(s)/deciliter  diclofenac 75 milliGRAM(s) Oral two times a day PRN Moderate Pain (4 - 6)  glucagon  Injectable 1 milliGRAM(s) IntraMuscular once PRN Glucose LESS THAN 70 milligrams/deciliter  HYDROmorphone  Injectable 0.5 milliGRAM(s) IV Push every 4 hours PRN Moderate Pain (4 - 6)  HYDROmorphone  Injectable 1 milliGRAM(s) IV Push every 4 hours PRN Severe Pain (7 - 10)  morphine Concentrate 10 milliGRAM(s) Oral every 4 hours PRN Severe Pain (7 - 10)      VITAL SIGNS: Last 24 Hours  Vital Signs Last 24 Hrs  T(C): 36.5 (19 Sep 2019 05:14), Max: 37.1 (18 Sep 2019 18:26)  T(F): 97.7 (19 Sep 2019 05:14), Max: 98.7 (18 Sep 2019 18:26)  HR: 95 (19 Sep 2019 05:14) (90 - 98)  BP: 143/63 (19 Sep 2019 05:14) (121/55 - 143/63)  BP(mean): --  RR: 18 (19 Sep 2019 05:14) (18 - 18)  SpO2: --    LABS:      PHYSICAL EXAM:      GENERAL: NAD, well-developed, AAOx3  HEENT:  Atraumatic, Normocephalic. EOMI, PERRLA, conjunctiva and sclera clear, No JVD  PULMONARY: Clear to auscultation bilaterally; No wheeze  CARDIOVASCULAR: Regular rate and rhythm; No murmurs, rubs, or gallops  GASTROINTESTINAL: Soft, Nontender, Nondistended; Bowel sounds present  MUSCULOSKELETAL:  2+ Peripheral Pulses, No clubbing, cyanosis, or edema  NEUROLOGY: movement in upper extremities but no movement of lower extremities  SKIN: No rashes or lesions.   WOUND: left hip wound looks clean. No signs of bleeding into subcutaneous tissue.       ADMISSION SUMMARY  Patient is a 49y old Male who presents with a chief complaint of Left hip pain (17 Sep 2019 04:21)  Hospital course has been as follows:  Initially admitted for lip pain. Work up showed dislocation of his previous arthroplasty. During his hospital stay he developed RUE cellulitis treated with clindamycin.   Workup revealed superolateral dislocation of the left hip and he is s/p failed hip replacement due to hypotension. He was transferred to the SICU and requiring 1 PRBC transfusion and pressors. Pt was weaned off pressors by POD1 in the evening.  Intraop cultures grew pseudomonas. Patient was diagnosed with septic arthritis  He had elevated troponins postop likely from demand ischemia per cardiology and he is on medical management.    He also received loading dose of IVIG during this admission and will continue maintenance dose q3 weeks per neurology.  He was transferred to telemetry for further monitoring       ASSESSMENT & PLAN    49 year old male with a history of CIDP, presenting with hip pain, found to have septic arthitis of hardware (old arthroplasty).     Acute Hb drop yesterday - got 1unit transfused with appropriate Hb correction (Hb=9.6 today)  - will do CT abdomen pelvis to evaluate for hematoma    ID: septic arthritis of hardware  - on Meropenem (started 9/16). Cx growing pseudomonas and staph coag negative  - will switch to cefepime today. Patient will need 6 weeks of antibiotics to be discharged with w picc line   - will consult IR today     Ortho: s/p failed arthroplasty s/p abx spacer on 9/13 - OR failed due to hypotension   - ortho on board    CIDP: s/p IVIG in hospital (chronic inflammatory demyelinating polyneuropathy)  - being followed by neurology. Treatment is to get IVIG every 3 weeks.   - on gabapentin and duloxetine for pain.  - being followed by pain management     Cardio: CAD s/p CABG  - on aspirin and atorvastatin  - demand ischemia with troponin elevated. Medical management.   - carvedilol every 12 hours.     Endo: DMII  - FS between    - Well controlled on Lantus 36 and Lispro 12. Will keep same.     Nutrition: evaluated by Nutrition   - good intake (>75% of meals)    Uro: BPH  - on tamulosin     DVT prophylaxis: Lovenox NAPOLEON CAST 49y Male  MRN#: [id number]     SUBJECTIVE  Patient is a 49y old Male who presents with a chief complaint of Left hip pain (17 Sep 2019 04:21)  Currently admitted to medicine with the primary diagnosis of Pain    Today is hospital day 16d, and this morning he is still in pain, particularly on his legs. He still has not had a bowel movement. He says he is eating well   OBJECTIVE  PAST MEDICAL & SURGICAL HISTORY  CIDP (chronic inflammatory demyelinating polyneuropathy)  History of cholecystectomy  History of total left hip replacement  History of total right hip replacement: bilateral  S/P CABG x 5    ALLERGIES:  penicillins (Unknown)    MEDICATIONS:  MEDICATIONS  (STANDING):  aspirin  chewable 81 milliGRAM(s) Oral daily  atorvastatin 80 milliGRAM(s) Oral at bedtime  baclofen 10 milliGRAM(s) Oral two times a day  benzocaine 15 mG/menthol 3.6 mG Lozenge 1 Lozenge Oral three times a day  bisacodyl 5 milliGRAM(s) Oral at bedtime  busPIRone 5 milliGRAM(s) Oral two times a day  carvedilol 3.125 milliGRAM(s) Oral every 12 hours  cefepime   IVPB 2000 milliGRAM(s) IV Intermittent every 8 hours  cefepime   IVPB      chlorhexidine 4% Liquid 1 Application(s) Topical daily  clonazePAM  Tablet 1 milliGRAM(s) Oral three times a day  dextrose 5%. 1000 milliLiter(s) (50 mL/Hr) IV Continuous <Continuous>  dextrose 50% Injectable 25 Gram(s) IV Push once  dextrose 50% Injectable 25 Gram(s) IV Push once  docusate sodium 100 milliGRAM(s) Oral three times a day  DULoxetine 90 milliGRAM(s) Oral daily  enoxaparin Injectable 40 milliGRAM(s) SubCutaneous daily  gabapentin 800 milliGRAM(s) Oral three times a day  hydrOXYzine hydrochloride 50 milliGRAM(s) Oral at bedtime  insulin glargine Injectable (LANTUS) 36 Unit(s) SubCutaneous at bedtime  insulin lispro (HumaLOG) corrective regimen sliding scale   SubCutaneous three times a day before meals  insulin lispro Injectable (HumaLOG) 12 Unit(s) SubCutaneous three times a day before meals  lactulose Syrup 15 Gram(s) Oral every 6 hours  morphine ER Tablet 60 milliGRAM(s) Oral every 12 hours  mupirocin 2% Ointment 1 Application(s) Both Nostrils two times a day  pantoprazole    Tablet 40 milliGRAM(s) Oral before breakfast  QUEtiapine 150 milliGRAM(s) Oral at bedtime  senna 2 Tablet(s) Oral at bedtime  tamsulosin 0.4 milliGRAM(s) Oral at bedtime    MEDICATIONS  (PRN):  acetaminophen   Tablet .. 650 milliGRAM(s) Oral every 6 hours PRN Temp greater or equal to 38C (100.4F)  dextrose 40% Gel 15 Gram(s) Oral once PRN Blood Glucose LESS THAN 70 milliGRAM(s)/deciliter  diclofenac 75 milliGRAM(s) Oral two times a day PRN Moderate Pain (4 - 6)  glucagon  Injectable 1 milliGRAM(s) IntraMuscular once PRN Glucose LESS THAN 70 milligrams/deciliter  HYDROmorphone  Injectable 1 milliGRAM(s) IV Push every 4 hours PRN Severe Pain (7 - 10)  morphine Concentrate 10 milliGRAM(s) Oral every 4 hours PRN Severe Pain (7 - 10)    VITAL SIGNS: Last 24 Hours  Vital Signs Last 24 Hrs  T(C): 36.5 (19 Sep 2019 05:14), Max: 37.1 (18 Sep 2019 18:26)  T(F): 97.7 (19 Sep 2019 05:14), Max: 98.7 (18 Sep 2019 18:26)  HR: 95 (19 Sep 2019 05:14) (90 - 98)  BP: 143/63 (19 Sep 2019 05:14) (121/55 - 143/63)  BP(mean): --  RR: 18 (19 Sep 2019 05:14) (18 - 18)  SpO2: --    LABS:                        9.2    10.09 )-----------( 212      ( 19 Sep 2019 05:54 )             28.4         140  |  101  |  7<L>  ----------------------------<  111<H>  4.0   |  29  |  0.7    Ca    8.5      19 Sep 2019 05:54    PHYSICAL EXAM:      GENERAL: NAD, well-developed, AAOx3  HEENT:  Atraumatic, Normocephalic. EOMI, PERRLA, conjunctiva and sclera clear, No JVD  PULMONARY: Clear to auscultation bilaterally; No wheeze  CARDIOVASCULAR: Regular rate and rhythm; No murmurs, rubs, or gallops  GASTROINTESTINAL: Soft, Nontender, Nondistended; Bowel sounds present  MUSCULOSKELETAL:  2+ Peripheral Pulses, No clubbing, cyanosis, or edema  NEUROLOGY: movement in upper extremities but no movement of lower extremities  SKIN: No rashes or lesions.   WOUND: left hip wound looks clean. No signs of bleeding into subcutaneous tissue.       ADMISSION SUMMARY  Patient is a 49y old Male who presents with a chief complaint of Left hip pain (17 Sep 2019 04:21)  Hospital course has been as follows:  Initially admitted for lip pain. Work up showed dislocation of his previous arthroplasty. During his hospital stay he developed RUE cellulitis treated with clindamycin.   Workup revealed superolateral dislocation of the left hip and he is s/p failed hip replacement due to hypotension. He was transferred to the SICU and requiring 1 PRBC transfusion and pressors. Pt was weaned off pressors by POD1 in the evening.  Intraop cultures grew pseudomonas. Patient was diagnosed with septic arthritis  He had elevated troponins postop likely from demand ischemia per cardiology and he is on medical management.    He also received loading dose of IVIG during this admission and will continue maintenance dose q3 weeks per neurology.  He was transferred to telemetry for further monitoring       ASSESSMENT & PLAN    49 year old male with a history of CIDP, presenting with hip pain, found to have septic arthitis of hardware (old arthroplasty).     Acute Hb drop yesterday - got 1unit transfused with appropriate Hb correction (Hb=9.6 today)  - will do CT abdomen pelvis to evaluate for hematoma    ID: septic arthritis of hardware  - on Meropenem (started 9/16). Cx growing pseudomonas and staph coag negative  - will switch to cefepime today. Patient will need 6 weeks of antibiotics to be discharged with w picc line   - will consult IR today     Ortho: s/p failed arthroplasty s/p abx spacer on 9/13 - OR failed due to hypotension   - ortho on board    CIDP: s/p IVIG in hospital (chronic inflammatory demyelinating polyneuropathy)  - being followed by neurology. Treatment is to get IVIG every 3 weeks.   - on gabapentin and duloxetine for pain.  - being followed by pain management     Cardio: CAD s/p CABG  - on aspirin and atorvastatin  - demand ischemia with troponin elevated. Medical management.   - carvedilol every 12 hours.     Endo: DMII  - FS between    - Well controlled on Lantus 36 and Lispro 12. Will keep same.     Nutrition: evaluated by Nutrition   - good intake (>75% of meals)    Uro: BPH  - on tamulosin     DVT prophylaxis: Lovenox NAPOLEON CAST 49y Male  MRN#: [id number]     SUBJECTIVE  Patient is a 49y old Male who presents with a chief complaint of Left hip pain (17 Sep 2019 04:21)  Currently admitted to medicine with the primary diagnosis of Pain    Today is hospital day 16d, and this morning he is still in pain, particularly on his legs. He still has not had a bowel movement. He says he is eating well   OBJECTIVE  PAST MEDICAL & SURGICAL HISTORY  CIDP (chronic inflammatory demyelinating polyneuropathy)  History of cholecystectomy  History of total left hip replacement  History of total right hip replacement: bilateral  S/P CABG x 5    ALLERGIES:  penicillins (Unknown)    MEDICATIONS:  MEDICATIONS  (STANDING):  aspirin  chewable 81 milliGRAM(s) Oral daily  atorvastatin 80 milliGRAM(s) Oral at bedtime  baclofen 10 milliGRAM(s) Oral two times a day  benzocaine 15 mG/menthol 3.6 mG Lozenge 1 Lozenge Oral three times a day  bisacodyl 5 milliGRAM(s) Oral at bedtime  busPIRone 5 milliGRAM(s) Oral two times a day  carvedilol 3.125 milliGRAM(s) Oral every 12 hours  cefepime   IVPB 2000 milliGRAM(s) IV Intermittent every 8 hours  cefepime   IVPB      chlorhexidine 4% Liquid 1 Application(s) Topical daily  clonazePAM  Tablet 1 milliGRAM(s) Oral three times a day  dextrose 5%. 1000 milliLiter(s) (50 mL/Hr) IV Continuous <Continuous>  dextrose 50% Injectable 25 Gram(s) IV Push once  dextrose 50% Injectable 25 Gram(s) IV Push once  docusate sodium 100 milliGRAM(s) Oral three times a day  DULoxetine 90 milliGRAM(s) Oral daily  enoxaparin Injectable 40 milliGRAM(s) SubCutaneous daily  gabapentin 800 milliGRAM(s) Oral three times a day  hydrOXYzine hydrochloride 50 milliGRAM(s) Oral at bedtime  insulin glargine Injectable (LANTUS) 36 Unit(s) SubCutaneous at bedtime  insulin lispro (HumaLOG) corrective regimen sliding scale   SubCutaneous three times a day before meals  insulin lispro Injectable (HumaLOG) 12 Unit(s) SubCutaneous three times a day before meals  lactulose Syrup 15 Gram(s) Oral every 6 hours  morphine ER Tablet 60 milliGRAM(s) Oral every 12 hours  mupirocin 2% Ointment 1 Application(s) Both Nostrils two times a day  pantoprazole    Tablet 40 milliGRAM(s) Oral before breakfast  QUEtiapine 150 milliGRAM(s) Oral at bedtime  senna 2 Tablet(s) Oral at bedtime  tamsulosin 0.4 milliGRAM(s) Oral at bedtime    MEDICATIONS  (PRN):  acetaminophen   Tablet .. 650 milliGRAM(s) Oral every 6 hours PRN Temp greater or equal to 38C (100.4F)  dextrose 40% Gel 15 Gram(s) Oral once PRN Blood Glucose LESS THAN 70 milliGRAM(s)/deciliter  diclofenac 75 milliGRAM(s) Oral two times a day PRN Moderate Pain (4 - 6)  glucagon  Injectable 1 milliGRAM(s) IntraMuscular once PRN Glucose LESS THAN 70 milligrams/deciliter  HYDROmorphone  Injectable 1 milliGRAM(s) IV Push every 4 hours PRN Severe Pain (7 - 10)  morphine Concentrate 10 milliGRAM(s) Oral every 4 hours PRN Severe Pain (7 - 10)    VITAL SIGNS: Last 24 Hours  Vital Signs Last 24 Hrs  T(C): 36.5 (19 Sep 2019 05:14), Max: 37.1 (18 Sep 2019 18:26)  T(F): 97.7 (19 Sep 2019 05:14), Max: 98.7 (18 Sep 2019 18:26)  HR: 95 (19 Sep 2019 05:14) (90 - 98)  BP: 143/63 (19 Sep 2019 05:14) (121/55 - 143/63)  BP(mean): --  RR: 18 (19 Sep 2019 05:14) (18 - 18)  SpO2: --    LABS:                        9.2    10.09 )-----------( 212      ( 19 Sep 2019 05:54 )             28.4         140  |  101  |  7<L>  ----------------------------<  111<H>  4.0   |  29  |  0.7    Ca    8.5      19 Sep 2019 05:54    PHYSICAL EXAM:      GENERAL: NAD, well-developed, AAOx3  HEENT:  Atraumatic, Normocephalic. EOMI, PERRLA, conjunctiva and sclera clear, No JVD  PULMONARY: Clear to auscultation bilaterally; No wheeze  CARDIOVASCULAR: Regular rate and rhythm; No murmurs, rubs, or gallops  GASTROINTESTINAL: Soft, Nontender, Nondistended; Bowel sounds present  MUSCULOSKELETAL:  2+ Peripheral Pulses, lower extremity edema and upper extremity edema.   NEUROLOGY: movement in upper extremities but no movement of lower extremities  SKIN: No rashes or lesions.   WOUND: left hip wound looks clean. No signs of bleeding into subcutaneous tissue.       ADMISSION SUMMARY  Patient is a 49y old Male who presents with a chief complaint of Left hip pain (17 Sep 2019 04:21)  Hospital course has been as follows:  Initially admitted for lip pain. Work up showed dislocation of his previous arthroplasty. During his hospital stay he developed RUE cellulitis treated with clindamycin.   Workup revealed superolateral dislocation of the left hip and he is s/p failed hip replacement due to hypotension. He was transferred to the SICU and requiring 1 PRBC transfusion and pressors. Pt was weaned off pressors by POD1 in the evening.  Intraop cultures grew pseudomonas. Patient was diagnosed with septic arthritis  He had elevated troponins postop likely from demand ischemia per cardiology and he is on medical management.    He also received loading dose of IVIG during this admission and will continue maintenance dose q3 weeks per neurology.  He was transferred to telemetry for further monitoring       ASSESSMENT & PLAN    49 year old male with a history of CIDP, presenting with hip pain, found to have septic arthitis of hardware (old arthroplasty).     ID: septic arthritis of hardware  - on Meropenem (started 9/16). Cx growing pseudomonas and staph coag negative  - will switch to cefepime today. Patient will need 6 weeks of antibiotics to be discharged with w picc line   - IR picc line placed for tomorrow. Does not need to be NPO    Ortho: s/p failed arthroplasty s/p abx spacer on 9/13 - OR failed due to hypotension   - ortho on board. They do not plan to re-evaluate the hip    CIDP: s/p IVIG in hospital (chronic inflammatory demyelinating polyneuropathy)  - being followed by neurology. Treatment is to get IVIG every 3 weeks.   - on gabapentin and duloxetine for pain.  - being followed by pain management     Cardio: CAD s/p CABG  - on aspirin and atorvastatin  - plavix was held due to suspicion of hemorrhagic shock. In the setting of stable Hb/Hct, will follow up with cardiology to resume plavix  - demand ischemia with troponin elevated. Medical management.   - carvedilol every 12 hours.     Endo: DMII  - FS between    - Well controlled on Lantus 36 and Lispro 12. Will keep same.     Nutrition: evaluated by Nutrition   - good intake (>75% of meals)    Uro: BPH  - on tamulosin     DVT prophylaxis: Lovenox

## 2019-09-19 NOTE — CHART NOTE - NSCHARTNOTEFT_GEN_A_CORE
Called on patient as he had decreased mentation.   Saturating 88% (usually at 95%). RR=6-8/min  Patient was given Narcan 0.4mg x2 with improvement of RR and mental status. Oxygenation improved as well.     Current pain med: Klonipin 1mg TID  Morphine 10mg PRN  Morphine ER 60mg q 12hrs  Hydromorphone 1g q4    Will make morphine ER 30mg q 12hrs and stop hydromorphone.     Called pain team for re-evaluation of pain medications

## 2019-09-19 NOTE — PROGRESS NOTE ADULT - SUBJECTIVE AND OBJECTIVE BOX
48 y/o male s/p Explantation of left hemiarthroplasty/Girdlestone procedure and antibiotic beads placement 9/13/2019 POD#6.  Intra-op cultures positive for pseudomonas. Patient is only arousable to name.      Vital Signs Last 24 Hrs  T(C): 36.5 (19 Sep 2019 05:14), Max: 37.1 (18 Sep 2019 18:26)  T(F): 97.7 (19 Sep 2019 05:14), Max: 98.7 (18 Sep 2019 18:26)  HR: 95 (19 Sep 2019 05:14) (90 - 98)  BP: 143/63 (19 Sep 2019 05:14) (121/55 - 143/63)  BP(mean): --  RR: 18 (19 Sep 2019 05:14) (18 - 18)  SpO2: 95% (19 Sep 2019 08:10) (95% - 95%)    GEN: obese, A&O X 1, NAD  Left hip and LE: Dressing saturated with serosanguinous drainage changed, incisions are clean and dry, decreased ROM secondary to pain, NVI, unable to assess NV status, DP pulse 2+, brisk cap refill.                        9.2    10.09 )-----------( 212      ( 19 Sep 2019 05:54 )             28.4   09-19    140  |  101  |  7<L>  ----------------------------<  111<H>  4.0   |  29  |  0.7    Ca    8.5      19 Sep 2019 05:54    A/P: s/p Explantation of left hemiarthroplasty/Girdlestone procedure and antibiotic beads placement POD#6:  - continue current Tx;  - DVT prophylaxis;  - pain management;  - NWB; fall precautions;

## 2019-09-19 NOTE — PROGRESS NOTE ADULT - SUBJECTIVE AND OBJECTIVE BOX
SERENE NAPOLEON  49y Male    INTERVAL HPI/OVERNIGHT EVENTS:    When I went to see the pt in the morning, he was asleep so I went back to see him after 2PM.  He was only responsive to sternal rub and the 3C team were actively working on him.  Respiratory rate was 6/minute.  Wife at bedside and plan of care discussed with her in detail.  He was awake earlier today per RN and had his usual dose of MS Contin 60mg this morning.  He last received dilaudid 1mg IV at 4AM today and his usual dose of clonazepam 1mg PO this morning.    He was given Narcan 0.4mg IV x2 with improvement in mental status and respiratory rate.  He is sleepy but arousable. Recognized his wife at bedside.    Still no BM.    T(F): 97.1 (09-19-19 @ 13:05), Max: 98.7 (09-18-19 @ 18:26)  HR: 94 (09-19-19 @ 13:05) (94 - 98)  BP: 112/72 (09-19-19 @ 13:05) (112/72 - 143/63)  RR: 18 (09-19-19 @ 13:05) (18 - 18)  SpO2: 95% (09-19-19 @ 08:10) (95% - 95%) on RA  96% on facemask    I&O's Summary    18 Sep 2019 07:01  -  19 Sep 2019 07:00  --------------------------------------------------------  IN: 220 mL / OUT: 0 mL / NET: 220 mL        Daily     Daily     CAPILLARY BLOOD GLUCOSE      POCT Blood Glucose.: 119 mg/dL (19 Sep 2019 14:13)  POCT Blood Glucose.: 90 mg/dL (19 Sep 2019 11:06)  POCT Blood Glucose.: 122 mg/dL (19 Sep 2019 09:05)  POCT Blood Glucose.: 120 mg/dL (19 Sep 2019 07:30)  POCT Blood Glucose.: 103 mg/dL (19 Sep 2019 03:18)  POCT Blood Glucose.: 91 mg/dL (18 Sep 2019 21:27)  POCT Blood Glucose.: 128 mg/dL (18 Sep 2019 16:38)        PHYSICAL EXAM:  GENERAL: NAD  HEAD:  Normocephalic  EYES:  conjunctiva and sclera clear, pupils dilated  ENMT: Moist mucous membranes  NECK: Supple  NERVOUS SYSTEM: now sleepy but arousable to verbal stimuli  CHEST/LUNG: shallow BS  HEART: Regular rate and rhythm  ABDOMEN: firm, Nontender, mildly distended  EXTREMITIES: LE edema  SKIN: pale    Consultant(s) Notes Reviewed:  [x ] YES  [ ] NO  Care Discussed with Consultants/Other Providers [ x] YES  [ ] NO    MEDICATIONS  (STANDING):  aspirin  chewable 81 milliGRAM(s) Oral daily  atorvastatin 80 milliGRAM(s) Oral at bedtime  baclofen 10 milliGRAM(s) Oral two times a day  benzocaine 15 mG/menthol 3.6 mG Lozenge 1 Lozenge Oral three times a day  bisacodyl 5 milliGRAM(s) Oral at bedtime  busPIRone 5 milliGRAM(s) Oral two times a day  carvedilol 3.125 milliGRAM(s) Oral every 12 hours  cefepime   IVPB 2000 milliGRAM(s) IV Intermittent every 8 hours  cefepime   IVPB      chlorhexidine 4% Liquid 1 Application(s) Topical daily  clonazePAM  Tablet 1 milliGRAM(s) Oral three times a day  dextrose 5%. 1000 milliLiter(s) (50 mL/Hr) IV Continuous <Continuous>  dextrose 50% Injectable 25 Gram(s) IV Push once  dextrose 50% Injectable 25 Gram(s) IV Push once  docusate sodium 100 milliGRAM(s) Oral three times a day  DULoxetine 90 milliGRAM(s) Oral daily  enoxaparin Injectable 40 milliGRAM(s) SubCutaneous daily  gabapentin 800 milliGRAM(s) Oral three times a day  hydrOXYzine hydrochloride 50 milliGRAM(s) Oral at bedtime  insulin glargine Injectable (LANTUS) 36 Unit(s) SubCutaneous at bedtime  insulin lispro (HumaLOG) corrective regimen sliding scale   SubCutaneous three times a day before meals  insulin lispro Injectable (HumaLOG) 12 Unit(s) SubCutaneous three times a day before meals  lactulose Syrup 15 Gram(s) Oral every 4 hours  morphine ER Tablet 30 milliGRAM(s) Oral every 12 hours  mupirocin 2% Ointment 1 Application(s) Both Nostrils two times a day  naloxone Injectable 0.4 milliGRAM(s) IV Push once  pantoprazole    Tablet 40 milliGRAM(s) Oral before breakfast  pramipexole 0.5 milliGRAM(s) Oral every 12 hours  QUEtiapine 150 milliGRAM(s) Oral at bedtime  senna 2 Tablet(s) Oral at bedtime  tamsulosin 0.4 milliGRAM(s) Oral at bedtime    MEDICATIONS  (PRN):  acetaminophen   Tablet .. 650 milliGRAM(s) Oral every 6 hours PRN Temp greater or equal to 38C (100.4F)  dextrose 40% Gel 15 Gram(s) Oral once PRN Blood Glucose LESS THAN 70 milliGRAM(s)/deciliter  diclofenac 75 milliGRAM(s) Oral two times a day PRN Moderate Pain (4 - 6)  glucagon  Injectable 1 milliGRAM(s) IntraMuscular once PRN Glucose LESS THAN 70 milligrams/deciliter  morphine Concentrate 10 milliGRAM(s) Oral every 4 hours PRN Severe Pain (7 - 10)  naloxone Injectable 2 milliGRAM(s) IV Push once PRN If no response to 0.4 mg of Narcan  naloxone Injectable 0.4 milliGRAM(s) IV Push once PRN ams    EKG reviewed today - no acute changes  Telemetry reviewed    LABS:                        9.2    10.09 )-----------( 212      ( 19 Sep 2019 05:54 )             28.4     09-19    140  |  101  |  7<L>  ----------------------------<  111<H>  4.0   |  29  |  0.7    Ca    8.5      19 Sep 2019 05:54              Culture - Blood (collected 18 Sep 2019 04:43)  Source: .Blood None  Preliminary Report (19 Sep 2019 14:01):    No growth to date.        RADIOLOGY & ADDITIONAL TESTS:    Imaging or report Personally Reviewed:  [x] YES  [ ] NO    < from: CT Hip No Cont, Bilateral (09.17.19 @ 19:53) >  IMPRESSION:    Post recent left hemiarthroplasty hardware removal with placement of   antibiotic seeds in the femoral head region.    Acute/subacute mildly displaced transverse left proximal femoral fracture   at level of the lesser trochanter.    Punctate air foci and soft tissue density surrounding the left proximal   femur into the left hip, consistent with known infection and   postoperative changes.    10.7 cm left lateral proximal thigh subcutaneous fluid density   collection, most compatible with postsurgical changes.    No other large soft tissue hematoma identified.    Recommend close clinical and laboratory follow-up.    < end of copied text >    < from: CT Abdomen and Pelvis No Cont (09.17.19 @ 19:53) >  IMPRESSION:   1.  No evidence of an intraperitoneal or retroperitoneal hematoma.    < end of copied text >      Case discussed with residents    Care discussed with pt's family

## 2019-09-19 NOTE — PROGRESS NOTE ADULT - ASSESSMENT
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  Workup revealed superolateral dislocation of the left hip and he is s/p explantation of Left total hip arthroplasty and insertion of ABX spacer. Post op course was complicated by hypotension, requiring 1 PRBC transfusion and pressors. Pt was weaned off pressors by POD1 in the evening.   He had elevated troponins postop likely from demand ischemia per cardiology and he is on medical management.    Preop, he received loading dose of IVIG during this admission and will continue maintenance dose q3 weeks per neurology.  He was transferred to telemetry.    1. Dislocation of left hip s/p explant of left ОЛЬГА and insertion of abx spacer on 9/13  OR culture + for Pseudomonas from hip - pansensitive  on cefepime   needs picc line prior to discharge   6 weeks of abx per ID  pain control  Ortho following    2. NSTEMI type 2  ASA/statin/Bblocker  start Plavix if H/H remains stable  normal EF on ECHO  telemetry  cardio f/u - Dr. Cid  no plan for urgent cath    3. CIDP - for IVIG q3 weeks per neurology    4. CAD s/p CABG - continue medical management    5. DM - controlled on insulin    6. Anemia due to acute blood loss postop  s/p transfusion with 1 unit PRBC on 9/15 with appropriate rise in Hgb  s/p another unit on 9/17 for Hgb 6.4 and now Hgb >9 (? lab error in Hgb 6.4)  monitor H/H  no active bleeding on CT scans    7. Episode of decreased responsiveness and respiratory depression that responded to Narcan x 2  pt has been on MS Contin 60mg q12hr for years per wife  had dilaudid 1mg this AM  agree with decreasing MS Contin to 30mg q12hr and stopping dilaudid  hold benzodiazepine for lethargy  pain management f/u  monitor closely on telemetry  ABG reviewed: compensated respiratory acidosisChronic pain - followed by pain management    8. Depression/anxiety  seen by psychiatry this admission  recommendations being followed  needs close outpt f/u    9. BPH on flomax    10. + MRSA nasal swab - on mupirocin and CHG baths    11. DVT prophylaxis    12. Constipation - on colace 100mg q8hr, lactulose q4hr, senna, add miralax  if no BM with above, then will give tap water enema  pt with chronic constipation per his wife        PROGRESS NOTE HANDOFF    Pending: improvement in mental status, pain management f/u, cardio f/u, PICC line    Family discussion: yes    Disposition: wife wants home with services on discharge

## 2019-09-20 NOTE — DISCHARGE NOTE PROVIDER - NSDCCPCAREPLAN_GEN_ALL_CORE_FT
PRINCIPAL DISCHARGE DIAGNOSIS  Diagnosis: Septic arthritis  Assessment and Plan of Treatment:       SECONDARY DISCHARGE DIAGNOSES  Diagnosis: CIDP (chronic inflammatory demyelinating polyneuropathy)  Assessment and Plan of Treatment:     Diagnosis: H/O bilateral hip replacements  Assessment and Plan of Treatment: PRINCIPAL DISCHARGE DIAGNOSIS  Diagnosis: Septic arthritis  Assessment and Plan of Treatment: You were found to have septic arthritis of the left hip. You are being treated with IV antibiotics. You will need to schedule an appointment with infectious disease doctor weekly. Please call the number provided to do so. Please follow up with your PCP in 1-2 weeks.  Please follow up with your orthopedic doctor in 1-2 weeks      SECONDARY DISCHARGE DIAGNOSES  Diagnosis: CIDP (chronic inflammatory demyelinating polyneuropathy)  Assessment and Plan of Treatment: You have CIDP. You were given IVIG treatment during this hospital. Your last dose was on 9/12/19. Please follow up with a neurologist to schedule your next round of treatment.    Diagnosis: Chronic pain  Assessment and Plan of Treatment: You have a history of chronic pain. Your medications were adjusted. Please take your medications as prescribed. Please follow up with a pain management doctor in 1-2 weeks.    Diagnosis: S/P CABG x 5  Assessment and Plan of Treatment: You have history of coronary artery disease s/p 5 vessel coronary artery bypass. During your hospitalization your have elevation of your cardiac enzymes. Please continue taking your aspirin and plavix. Please follow up with your cardiologist in 1-2 weeks.    Diagnosis: Left fibular fracture  Assessment and Plan of Treatment: Imaging of the left knee revealed acute nondisplaced fibular neck impaction fracture. Please follow up with your orthopedic doctor in 1-2 weeks.    Diagnosis: H/O bilateral hip replacements  Assessment and Plan of Treatment: You have a history of bilateral hip replacement. Please follow with your orthopedic doctor in 1-2 weeks.    Diagnosis: Anxiety and depression  Assessment and Plan of Treatment: You have a history of anxiety and major depressive disorder. Please follow up with a psychiatrist in 1-2 weeks. Please take all your medications as prescribed. PRINCIPAL DISCHARGE DIAGNOSIS  Diagnosis: Septic arthritis  Assessment and Plan of Treatment: You were found to have septic arthritis of the left hip. You are being treated with IV antibiotics. You will need to schedule an appointment with infectious disease doctor weekly. Please call the number provided to do so. Please follow up with your PCP in 1-2 weeks.  Please follow up with your orthopedic doctor in 1-2 weeks      SECONDARY DISCHARGE DIAGNOSES  Diagnosis: CIDP (chronic inflammatory demyelinating polyneuropathy)  Assessment and Plan of Treatment: You have a history of CIDP. You were given IVIG treatment during this hospital. Your last dose was on 9/12/19. Please follow up with a neurologist to schedule your next round of treatment around 10/3/19. Please call their office to schedule this appointment and coordinate transportation from the rehab facility    Diagnosis: Chronic pain  Assessment and Plan of Treatment: You have a history of chronic pain. Your medications were adjusted. Please take your medications as prescribed. Please follow up with a pain management doctor in 1-2 weeks.    Diagnosis: S/P CABG x 5  Assessment and Plan of Treatment: You have history of coronary artery disease s/p 5 vessel coronary artery bypass. During your hospitalization your have elevation of your cardiac enzymes. Please continue taking your aspirin and plavix. Please follow up with your cardiologist in 1-2 weeks.    Diagnosis: Left fibular fracture  Assessment and Plan of Treatment: Imaging of the left knee revealed acute nondisplaced fibular neck impaction fracture. Please follow up with your orthopedic doctor in 1-2 weeks.    Diagnosis: H/O bilateral hip replacements  Assessment and Plan of Treatment: You have a history of bilateral hip replacement. Please follow with your orthopedic doctor in 1-2 weeks.    Diagnosis: Anxiety and depression  Assessment and Plan of Treatment: You have a history of anxiety and major depressive disorder. Please follow up with a psychiatrist in 1-2 weeks. Please take all your medications as prescribed.    Diagnosis: Vitamin D deficiency  Assessment and Plan of Treatment: You were found to have low vitamin D. You were started on supplementation during your hospitalization. Please continue taking supplementation. Please follow up with your PCP in 1-2 weeks. PRINCIPAL DISCHARGE DIAGNOSIS  Diagnosis: Septic arthritis  Assessment and Plan of Treatment: You were found to have septic arthritis of the left hip. You are being treated with IV antibiotics. You will need to schedule an appointment with infectious disease doctor weekly. Please call the number provided to do so. Please follow up with your PCP at nursing home.  Please follow up with your orthopedic doctor in 1 week      SECONDARY DISCHARGE DIAGNOSES  Diagnosis: Vitamin D deficiency  Assessment and Plan of Treatment: You were found to have low vitamin D. You were started on supplementation during your hospitalization. Please continue taking supplementation. Please follow up with your PCP at nursing home.    Diagnosis: Chronic pain  Assessment and Plan of Treatment: You have a history of chronic pain. Your medications were adjusted. Please take your medications as prescribed. Please follow up with a pain management doctor in 1-2 weeks.    Diagnosis: S/P CABG x 5  Assessment and Plan of Treatment: You have history of coronary artery disease s/p 5 vessel coronary artery bypass. During your hospitalization you had elevation of your cardiac enzymes. Please continue taking your aspirin and plavix. Please follow up with your cardiologist in 1-2 weeks.    Diagnosis: Left fibular fracture  Assessment and Plan of Treatment: Imaging of the left knee revealed acute nondisplaced fibular neck impaction fracture, femur and tibial fractures. Please follow up with your orthopedic doctor in 1 week.    Diagnosis: Anxiety and depression  Assessment and Plan of Treatment: You have a history of anxiety and major depressive disorder. Please follow up with a psychiatrist in 1-2 weeks. Please take all your medications as prescribed.    Diagnosis: CIDP (chronic inflammatory demyelinating polyneuropathy)  Assessment and Plan of Treatment: You have a history of CIDP. You were given IVIG treatment during this hospital. Your last dose was on 9/12/19. Please follow up with a neurologist to schedule your next round of treatment around 10/3/19. Please inform the nursing home to coordinate transportation from the rehab facility for the infusion.

## 2019-09-20 NOTE — DISCHARGE NOTE PROVIDER - CARE PROVIDER_API CALL
Lillie Camarena (DO)  Geriatric Medicine; Internal Medicine  [address] [place] [address]  Phone: [phone]  Fax: [phone]  Follow Up Time:     Bong Cid)  Cardiovascular Disease; Internal Medicine; Interventional Cardiology; Nuclear Cardiology  [address] [place] [address], [address]  [place] [address]  Phone: [phone]  Fax: [phone]  Follow Up Time:     Armando Mitchell)  Neuromuscular Medicine  [address] [place] [address], [address]  [place], [place] [id number]  Phone: [phone]  Fax: [phone]  Follow Up Time:     Rogelio Avila)  Anesthesiology; Pain Medicine  [address] [place] [address]  Phone: [phone]  Fax: [phone]  Follow Up Time:     Shay Lerma)  Infectious Disease; Internal Medicine  [address] [place] [address]  Phone: [phone]  Fax: [phone]  Follow Up Time: Lillie Camarena (DO)  Geriatric Medicine; Internal Medicine  [address] [place] [address]  Phone: [phone]  Fax: [phone]  Follow Up Time:     Bong Cid)  Cardiovascular Disease; Internal Medicine; Interventional Cardiology; Nuclear Cardiology  [address] [place] [address], [address]  [place] [address]  Phone: [phone]  Fax: [phone]  Follow Up Time:     Armando Mitchell)  Neuromuscular Medicine  [address] [place] [address], [address]  [place], [place] [id number]  Phone: [phone]  Fax: [phone]  Follow Up Time:     Rogelio Avila)  Anesthesiology; Pain Medicine  [address] [place] [address]  Phone: [phone]  Fax: [phone]  Follow Up Time:     Shay Lerma)  Infectious Disease; Internal Medicine  [address] [place] [address]  Phone: [phone]  Fax: [phone]  Follow Up Time:     Efrem Hassan)  Psychiatry  [address] [place] [address]  Phone: [phone]  Fax: [phone]  Follow Up Time:

## 2019-09-20 NOTE — DISCHARGE NOTE PROVIDER - NSDCFUADDINST_GEN_ALL_CORE_FT
Please ask your doctor to perform the following lab work within 1 week: Complete metabolic panel. This is to check your liver function tests. Please ask your doctor to perform the following lab work within 1 week: CBC and BMP with liver profile (had elevated Alk phos - asymptomatic)

## 2019-09-20 NOTE — PROGRESS NOTE ADULT - SUBJECTIVE AND OBJECTIVE BOX
NAPOLEON CAST  49y Male    INTERVAL HPI/OVERNIGHT EVENTS:    Pt is better today. Arousable to verbal stimuli. Hungry.  c/o pain ( did not receive MS contin yet today).    T(F): 96.5 (09-20-19 @ 05:10), Max: 99.5 (09-19-19 @ 18:06)  HR: 83 (09-20-19 @ 05:10) (83 - 94)  BP: 99/55 (09-20-19 @ 05:10) (86/50 - 120/67)  RR: 16 (09-20-19 @ 05:10) (6 - 18)  SpO2: 97% (09-19-19 @ 19:28) (88% - 100%) on O2 NC      CAPILLARY BLOOD GLUCOSE      POCT Blood Glucose.: 108 mg/dL (20 Sep 2019 07:30)  POCT Blood Glucose.: 130 mg/dL (19 Sep 2019 17:21)  POCT Blood Glucose.: 119 mg/dL (19 Sep 2019 14:13)  POCT Blood Glucose.: 90 mg/dL (19 Sep 2019 11:06)        PHYSICAL EXAM:  GENERAL: NAD  HEAD:  Normocephalic  EYES:  closed but interacting  ENMT: Moist mucous membranes  NECK: Supple  NERVOUS SYSTEM:  Alert, awake, better concentration  CHEST/LUNG: Clear to percussion bilaterally  HEART: Regular rate and rhythm  ABDOMEN: Soft, mildly tender, distended; Bowel sounds present, no guarding  EXTREMITIES:  Left LE with dressing in place, edema  SKIN: pale    Consultant(s) Notes Reviewed:  [x ] YES  [ ] NO  Care Discussed with Consultants/Other Providers [ x] YES  [ ] NO    MEDICATIONS  (STANDING):  aspirin  chewable 81 milliGRAM(s) Oral daily  atorvastatin 80 milliGRAM(s) Oral at bedtime  baclofen 10 milliGRAM(s) Oral two times a day  benzocaine 15 mG/menthol 3.6 mG Lozenge 1 Lozenge Oral three times a day  bisacodyl 5 milliGRAM(s) Oral at bedtime  busPIRone 5 milliGRAM(s) Oral two times a day  carvedilol 3.125 milliGRAM(s) Oral every 12 hours  cefepime   IVPB 2000 milliGRAM(s) IV Intermittent every 8 hours  cefepime   IVPB      chlorhexidine 4% Liquid 1 Application(s) Topical daily  clonazePAM  Tablet 1 milliGRAM(s) Oral three times a day  dextrose 5%. 1000 milliLiter(s) (50 mL/Hr) IV Continuous <Continuous>  dextrose 50% Injectable 25 Gram(s) IV Push once  dextrose 50% Injectable 25 Gram(s) IV Push once  docusate sodium 100 milliGRAM(s) Oral three times a day  DULoxetine 90 milliGRAM(s) Oral daily  enoxaparin Injectable 40 milliGRAM(s) SubCutaneous daily  gabapentin 800 milliGRAM(s) Oral three times a day  hydrOXYzine hydrochloride 50 milliGRAM(s) Oral at bedtime  insulin glargine Injectable (LANTUS) 36 Unit(s) SubCutaneous at bedtime  insulin lispro (HumaLOG) corrective regimen sliding scale   SubCutaneous three times a day before meals  insulin lispro Injectable (HumaLOG) 12 Unit(s) SubCutaneous three times a day before meals  lactulose Syrup 15 Gram(s) Oral every 12 hours  morphine ER Tablet 30 milliGRAM(s) Oral every 12 hours  pantoprazole    Tablet 40 milliGRAM(s) Oral before breakfast  pramipexole 0.5 milliGRAM(s) Oral every 12 hours  QUEtiapine 150 milliGRAM(s) Oral at bedtime  senna 2 Tablet(s) Oral at bedtime  tamsulosin 0.4 milliGRAM(s) Oral at bedtime    MEDICATIONS  (PRN):  acetaminophen   Tablet .. 650 milliGRAM(s) Oral every 6 hours PRN Temp greater or equal to 38C (100.4F)  dextrose 40% Gel 15 Gram(s) Oral once PRN Blood Glucose LESS THAN 70 milliGRAM(s)/deciliter  diclofenac 75 milliGRAM(s) Oral two times a day PRN Moderate Pain (4 - 6)  glucagon  Injectable 1 milliGRAM(s) IntraMuscular once PRN Glucose LESS THAN 70 milligrams/deciliter  morphine Concentrate 5 milliGRAM(s) Oral every 4 hours PRN Severe Pain (7 - 10)  naloxone Injectable 2 milliGRAM(s) IV Push once PRN If no response to 0.4 mg of Narcan    Telemetry reviewed    LABS:                        9.1    8.28  )-----------( 184      ( 20 Sep 2019 05:00 )             28.3     09-20    138  |  98  |  9<L>  ----------------------------<  137<H>  4.1   |  31  |  0.7    Ca    8.5      20 Sep 2019 05:00              Culture - Blood (collected 18 Sep 2019 04:43)  Source: .Blood None  Preliminary Report (19 Sep 2019 14:01):    No growth to date.          Case discussed with resident    Care discussed with pt

## 2019-09-20 NOTE — PROGRESS NOTE ADULT - SUBJECTIVE AND OBJECTIVE BOX
NAPOLEON CAST 49y Male  MRN#: [id number]     SUBJECTIVE  Patient is a 49y old Male who presents with a chief complaint of Left hip pain (17 Sep 2019 04:21)  Currently admitted to medicine with the primary diagnosis of Pain    Today is hospital day 17d, and this morning     OBJECTIVE  PAST MEDICAL & SURGICAL HISTORY  CIDP (chronic inflammatory demyelinating polyneuropathy)  History of cholecystectomy  History of total left hip replacement  History of total right hip replacement: bilateral  S/P CABG x 5    ALLERGIES:  penicillins (Unknown)    MEDICATIONS:  MEDICATIONS  (STANDING):  aspirin  chewable 81 milliGRAM(s) Oral daily  atorvastatin 80 milliGRAM(s) Oral at bedtime  baclofen 10 milliGRAM(s) Oral two times a day  benzocaine 15 mG/menthol 3.6 mG Lozenge 1 Lozenge Oral three times a day  bisacodyl 5 milliGRAM(s) Oral at bedtime  busPIRone 5 milliGRAM(s) Oral two times a day  carvedilol 3.125 milliGRAM(s) Oral every 12 hours  cefepime   IVPB 2000 milliGRAM(s) IV Intermittent every 8 hours  cefepime   IVPB      chlorhexidine 4% Liquid 1 Application(s) Topical daily  clonazePAM  Tablet 1 milliGRAM(s) Oral three times a day  dextrose 5%. 1000 milliLiter(s) (50 mL/Hr) IV Continuous <Continuous>  dextrose 50% Injectable 25 Gram(s) IV Push once  dextrose 50% Injectable 25 Gram(s) IV Push once  docusate sodium 100 milliGRAM(s) Oral three times a day  DULoxetine 90 milliGRAM(s) Oral daily  enoxaparin Injectable 40 milliGRAM(s) SubCutaneous daily  gabapentin 800 milliGRAM(s) Oral three times a day  hydrOXYzine hydrochloride 50 milliGRAM(s) Oral at bedtime  insulin glargine Injectable (LANTUS) 36 Unit(s) SubCutaneous at bedtime  insulin lispro (HumaLOG) corrective regimen sliding scale   SubCutaneous three times a day before meals  insulin lispro Injectable (HumaLOG) 12 Unit(s) SubCutaneous three times a day before meals  lactulose Syrup 15 Gram(s) Oral every 4 hours  morphine ER Tablet 30 milliGRAM(s) Oral every 12 hours  pantoprazole    Tablet 40 milliGRAM(s) Oral before breakfast  pramipexole 0.5 milliGRAM(s) Oral every 12 hours  QUEtiapine 150 milliGRAM(s) Oral at bedtime  senna 2 Tablet(s) Oral at bedtime  tamsulosin 0.4 milliGRAM(s) Oral at bedtime    MEDICATIONS  (PRN):  acetaminophen   Tablet .. 650 milliGRAM(s) Oral every 6 hours PRN Temp greater or equal to 38C (100.4F)  dextrose 40% Gel 15 Gram(s) Oral once PRN Blood Glucose LESS THAN 70 milliGRAM(s)/deciliter  diclofenac 75 milliGRAM(s) Oral two times a day PRN Moderate Pain (4 - 6)  glucagon  Injectable 1 milliGRAM(s) IntraMuscular once PRN Glucose LESS THAN 70 milligrams/deciliter  morphine Concentrate 10 milliGRAM(s) Oral every 4 hours PRN Severe Pain (7 - 10)  naloxone Injectable 2 milliGRAM(s) IV Push once PRN If no response to 0.4 mg of Narcan      VITAL SIGNS: Last 24 Hours  Vital Signs Last 24 Hrs  T(C): 35.8 (20 Sep 2019 05:10), Max: 37.5 (19 Sep 2019 18:06)  T(F): 96.5 (20 Sep 2019 05:10), Max: 99.5 (19 Sep 2019 18:06)  HR: 83 (20 Sep 2019 05:10) (83 - 94)  BP: 99/55 (20 Sep 2019 05:10) (86/50 - 120/67)  BP(mean): --  RR: 16 (20 Sep 2019 05:10) (6 - 18)  SpO2: 97% (19 Sep 2019 19:28) (88% - 100%)    LABS:              PHYSICAL EXAM:      GENERAL: NAD, well-developed, AAOx3  HEENT:  Atraumatic, Normocephalic. EOMI, PERRLA, conjunctiva and sclera clear, No JVD  PULMONARY: Clear to auscultation bilaterally; No wheeze  CARDIOVASCULAR: Regular rate and rhythm; No murmurs, rubs, or gallops  GASTROINTESTINAL: Soft, Nontender, Nondistended; Bowel sounds present  MUSCULOSKELETAL:  2+ Peripheral Pulses, lower extremity edema and upper extremity edema.   NEUROLOGY: movement in upper extremities but no movement of lower extremities  SKIN: No rashes or lesions.   WOUND: left hip wound looks clean. No signs of bleeding into subcutaneous tissue.       ADMISSION SUMMARY  Patient is a 49y old Male who presents with a chief complaint of Left hip pain (17 Sep 2019 04:21)  Hospital course has been as follows:  Initially admitted for lip pain. Work up showed dislocation of his previous arthroplasty. During his hospital stay he developed RUE cellulitis treated with clindamycin.   Workup revealed superolateral dislocation of the left hip and he is s/p failed hip replacement due to hypotension. He was transferred to the SICU and requiring 1 PRBC transfusion and pressors. Pt was weaned off pressors by POD1 in the evening.  Intraop cultures grew pseudomonas. Patient was diagnosed with septic arthritis  He had elevated troponins postop likely from demand ischemia per cardiology and he is on medical management.    He also received loading dose of IVIG during this admission and will continue maintenance dose q3 weeks per neurology.  He was transferred to telemetry for further monitoring       ASSESSMENT & PLAN    49 year old male with a history of CIDP, presenting with hip pain, found to have septic arthitis of hardware (old arthroplasty).     ID: septic arthritis of hardware  - on Meropenem (started 9/16). Cx growing pseudomonas and staph coag negative  - will switch to cefepime today. Patient will need 6 weeks of antibiotics to be discharged with w picc line   - IR picc line placed for tomorrow. Does not need to be NPO    Ortho: s/p failed arthroplasty s/p abx spacer on 9/13 - OR failed due to hypotension   - ortho on board. They do not plan to re-evaluate the hip    CIDP: s/p IVIG in hospital (chronic inflammatory demyelinating polyneuropathy)  - being followed by neurology. Treatment is to get IVIG every 3 weeks.   - on gabapentin and duloxetine for pain.  - being followed by pain management     Cardio: CAD s/p CABG  - on aspirin and atorvastatin  - plavix was held due to suspicion of hemorrhagic shock. In the setting of stable Hb/Hct, will follow up with cardiology to resume plavix  - demand ischemia with troponin elevated. Medical management.   - carvedilol every 12 hours.     Endo: DMII  - FS between    - Well controlled on Lantus 36 and Lispro 12. Will keep same.     Nutrition: evaluated by Nutrition   - good intake (>75% of meals)    Uro: BPH  - on tamulosin     DVT prophylaxis: Lovenox NAPOLEON CAST 49y Male  MRN#: [id number]     SUBJECTIVE  Patient is a 49y old Male who presents with a chief complaint of Left hip pain (17 Sep 2019 04:21)  Currently admitted to medicine with the primary diagnosis of Pain    Today is hospital day 17d, and this morning he is in pain. He had 2 bowel movements so far.     OBJECTIVE  PAST MEDICAL & SURGICAL HISTORY  CIDP (chronic inflammatory demyelinating polyneuropathy)  History of cholecystectomy  History of total left hip replacement  History of total right hip replacement: bilateral  S/P CABG x 5    ALLERGIES:  penicillins (Unknown)    MEDICATIONS:  MEDICATIONS  (STANDING):  aspirin  chewable 81 milliGRAM(s) Oral daily  atorvastatin 80 milliGRAM(s) Oral at bedtime  baclofen 10 milliGRAM(s) Oral two times a day  benzocaine 15 mG/menthol 3.6 mG Lozenge 1 Lozenge Oral three times a day  bisacodyl 5 milliGRAM(s) Oral at bedtime  busPIRone 5 milliGRAM(s) Oral two times a day  carvedilol 3.125 milliGRAM(s) Oral every 12 hours  cefepime   IVPB 2000 milliGRAM(s) IV Intermittent every 8 hours  cefepime   IVPB      chlorhexidine 4% Liquid 1 Application(s) Topical daily  clonazePAM  Tablet 1 milliGRAM(s) Oral three times a day  dextrose 5%. 1000 milliLiter(s) (50 mL/Hr) IV Continuous <Continuous>  dextrose 50% Injectable 25 Gram(s) IV Push once  dextrose 50% Injectable 25 Gram(s) IV Push once  docusate sodium 100 milliGRAM(s) Oral three times a day  DULoxetine 90 milliGRAM(s) Oral daily  enoxaparin Injectable 40 milliGRAM(s) SubCutaneous daily  gabapentin 800 milliGRAM(s) Oral three times a day  hydrOXYzine hydrochloride 50 milliGRAM(s) Oral at bedtime  insulin glargine Injectable (LANTUS) 36 Unit(s) SubCutaneous at bedtime  insulin lispro (HumaLOG) corrective regimen sliding scale   SubCutaneous three times a day before meals  insulin lispro Injectable (HumaLOG) 12 Unit(s) SubCutaneous three times a day before meals  lactulose Syrup 15 Gram(s) Oral every 4 hours  morphine ER Tablet 30 milliGRAM(s) Oral every 12 hours  pantoprazole    Tablet 40 milliGRAM(s) Oral before breakfast  pramipexole 0.5 milliGRAM(s) Oral every 12 hours  QUEtiapine 150 milliGRAM(s) Oral at bedtime  senna 2 Tablet(s) Oral at bedtime  tamsulosin 0.4 milliGRAM(s) Oral at bedtime    MEDICATIONS  (PRN):  acetaminophen   Tablet .. 650 milliGRAM(s) Oral every 6 hours PRN Temp greater or equal to 38C (100.4F)  dextrose 40% Gel 15 Gram(s) Oral once PRN Blood Glucose LESS THAN 70 milliGRAM(s)/deciliter  diclofenac 75 milliGRAM(s) Oral two times a day PRN Moderate Pain (4 - 6)  glucagon  Injectable 1 milliGRAM(s) IntraMuscular once PRN Glucose LESS THAN 70 milligrams/deciliter  morphine Concentrate 10 milliGRAM(s) Oral every 4 hours PRN Severe Pain (7 - 10)  naloxone Injectable 2 milliGRAM(s) IV Push once PRN If no response to 0.4 mg of Narcan      VITAL SIGNS: Last 24 Hours  Vital Signs Last 24 Hrs  T(C): 35.8 (20 Sep 2019 05:10), Max: 37.5 (19 Sep 2019 18:06)  T(F): 96.5 (20 Sep 2019 05:10), Max: 99.5 (19 Sep 2019 18:06)  HR: 83 (20 Sep 2019 05:10) (83 - 94)  BP: 99/55 (20 Sep 2019 05:10) (86/50 - 120/67)  BP(mean): --  RR: 16 (20 Sep 2019 05:10) (6 - 18)  SpO2: 97% (19 Sep 2019 19:28) (88% - 100%)    LABS:                        9.1    8.28  )-----------( 184      ( 20 Sep 2019 05:00 )             28.3   09-20    138  |  98  |  9<L>  ----------------------------<  137<H>  4.1   |  31  |  0.7    Ca    8.5      20 Sep 2019 05:00        PHYSICAL EXAM:    GENERAL: NAD, well-developed, AAOx3  HEENT:  Atraumatic, Normocephalic. EOMI, PERRLA, conjunctiva and sclera clear, No JVD  PULMONARY: Clear to auscultation bilaterally; No wheeze  CARDIOVASCULAR: Regular rate and rhythm; No murmurs, rubs, or gallops  GASTROINTESTINAL: Soft, Nontender, mild distension; Bowel sounds present  MUSCULOSKELETAL:  2+ Peripheral Pulses, lower extremity edema and upper extremity edema.   NEUROLOGY: movement in upper extremities but no movement of lower extremities  SKIN: No rashes or lesions.   WOUND: left hip wound looks clean. No signs of bleeding into subcutaneous tissue.       ADMISSION SUMMARY  Patient is a 49y old Male who presents with a chief complaint of Left hip pain (17 Sep 2019 04:21)  Hospital course has been as follows:  Initially admitted for lip pain. Work up showed dislocation of his previous arthroplasty. During his hospital stay he developed RUE cellulitis treated with clindamycin.   Workup revealed superolateral dislocation of the left hip and he is s/p failed hip replacement due to hypotension. He was transferred to the SICU and requiring 1 PRBC transfusion and pressors. Pt was weaned off pressors by POD1 in the evening.  Intraop cultures grew pseudomonas. Patient was diagnosed with septic arthritis  He had elevated troponins postop likely from demand ischemia per cardiology and he is on medical management.    He also received loading dose of IVIG during this admission and will continue maintenance dose q3 weeks per neurology.  He was transferred to telemetry for further monitoring       ASSESSMENT & PLAN    49 year old male with a history of CIDP, presenting with hip pain, found to have septic arthitis of hardware (old arthroplasty).     Pain: patient is on multiple pain medications, being followed by pain team  - yesterday patient had 1 episode of decreased RR, dropping to 6, with desaturation  - responded to naloxon  - long acting morphine was decreased to 30mg instead of 60mg  - hydromorphone was stopped  - reached out to pain team to re-evaluate meds    ID: septic arthritis of hardware  - on Meropenem (started 9/16). Cx growing pseudomonas and staph coag negative  - will switch to cefepime today. Patient will need 6 weeks of antibiotics to be discharged with w picc line   - IR picc line placed for today?. Does not need to be NPO    Ortho: s/p failed arthroplasty s/p abx spacer on 9/13 - OR failed due to hypotension   - ortho on board. They do not plan to re-evaluate the hip    CIDP: s/p IVIG in hospital (chronic inflammatory demyelinating polyneuropathy)  - being followed by neurology. Treatment is to get IVIG every 3 weeks.   - on gabapentin and duloxetine for pain.  - being followed by pain management     Cardio: CAD s/p CABG  - on aspirin and atorvastatin  - plavix was held due to suspicion of hemorrhagic shock. In the setting of stable Hb/Hct, will follow up with cardiology to resume plavix  - demand ischemia with troponin elevated. Medical management.   - carvedilol every 12 hours.     Endo: DMII  - FS between    - Well controlled on Lantus 36 and Lispro 12. Will keep same.     Nutrition: evaluated by Nutrition   - good intake (>75% of meals)    Uro: BPH  - on tamulosin     DVT prophylaxis: Lovenox

## 2019-09-20 NOTE — DISCHARGE NOTE PROVIDER - HOSPITAL COURSE
Patient is a 49y old Male who presents with a chief complaint of Left hip pain (17 Sep 2019 04:21)    Hospital course has been as follows:        Initially admitted for lip pain. Work up showed dislocation of his previous arthroplasty. During his hospital stay he developed RUE cellulitis treated with clindamycin.         Workup revealed superolateral dislocation of the left hip and he is s/p failed hip replacement due to hypotension. He was transferred to the SICU and requiring 1 PRBC transfusion and pressors. Pt was weaned off pressors by POD1 in the evening.  Intraop cultures grew pseudomonas. Patient was diagnosed with septic arthritis. Patient was started on cefepime and is to receive a 6 week course of antibiotics    He had elevated troponins postop likely from demand ischemia per cardiology and he is on medical management.          He also received loading dose of IVIG during this admission and will continue maintenance dose q3 weeks per neurology.        During his stay in telemetry, he had 1 episode of decreased RR, dropping to 6, with desaturation and he responded to naloxon. His long acting morphine was decreased to 30mg instead of 60mg and hydromorphone was stopped, pending further evaluation by pain team .................................................... Patient is a 49y old Male who presents with a chief complaint of Left hip pain.    Hospital course has been as follows:        Initially admitted for lip pain. Work up showed dislocation of his previous arthroplasty. During his hospital stay he developed RUE cellulitis treated with clindamycin.         Workup revealed superolateral dislocation of the left hip and he is s/p failed hip replacement due to hypotension. He was transferred to the SICU and requiring 1 PRBC transfusion and pressors. Pt was weaned off pressors by POD1 in the evening.  Intraop cultures grew pseudomonas. Patient was diagnosed with septic arthritis. Patient was started on cefepime and is to receive a 6 week course of antibiotics    He had elevated troponins postop likely from demand ischemia per cardiology and he is on medical management.          He also received loading dose of IVIG during this admission and will continue maintenance dose q3 weeks per neurology.        During his stay in telemetry, he had 1 episode of decreased RR, dropping to 6, with desaturation and he responded to naloxone. Pain management was consulted and appropriate adjustments were made. CT was done for left knee pain and it revealed acute nondisplaced fibular neck impaction fracture. Ortho stated no surgical intervention for the knee. Patient had PICC line placed by IR for long term antibiotic treatment. Patient was discharged to short term rehab. Patient is a 49y old Male who presents with a chief complaint of Left hip pain.    Hospital course has been as follows:        Initially admitted for lip pain. Work up showed dislocation of his previous arthroplasty. During his hospital stay he developed RUE cellulitis treated with clindamycin.         Workup revealed superolateral dislocation of the left hip and he is underwent explant of failed hip replacement and insertion of antibiotic spacer. Postop he had hypotension and type 2 NSTEMI due to acute blood loss anemia. He was transferred to the SICU and required 1 PRBC transfusion and pressors. Pt was weaned off pressors by POD1 in the evening.  Intraop cultures grew pseudomonas. Patient was diagnosed with septic arthritis. Patient was started on cefepime and is to receive a 6 week course of antibiotics    He had elevated troponins postop likely from demand ischemia per cardiology and he is on medical management.          He also received loading dose of IVIG during this admission and will continue maintenance dose q3 weeks per neurology.        During his stay in telemetry, he had 1 episode of decreased RR, dropping to 6, with desaturation and he responded to naloxone. Pain management was consulted and appropriate adjustments were made. CT was done for left knee pain and it revealed acute nondisplaced fibular neck impaction fracture. Ortho stated no surgical intervention for the knee. Patient had PICC line placed by IR for long term antibiotic treatment. Patient was discharged to short term rehab at [facility].

## 2019-09-20 NOTE — DISCHARGE NOTE PROVIDER - NSDCFUADDAPPT_GEN_ALL_CORE_FT
ID follow-up with Christina Lerma or Thurs Dr. Mohsena Amin      [address] [address]       [phone] (call to make an appointment, walk-ins Tuesdays 10:30 AM)      Fax [phone] ID follow-up with Christina Lerma or Thurs Dr. Mohsena Amin      [address] [facility]       [phone] (call to make an appointment, walk-ins Tuesdays 10:30 AM)      Fax [phone]    Please ask your doctor to repeat xray of the left knee in 2 weeks. Please follow up with orthopedics: Dr. Lebron in 1-2 weeks. Please call [phone] to schedule an appointment. ID follow-up with Christina Lerma or Thurs Dr. Mohsena Amin      [address] [facility]       [phone] (call to make an appointment, walk-ins Tuesdays 10:30 AM)      Fax [phone]    Please ask your doctor to repeat xray of the left knee in 2 weeks. Please follow up with orthopedics: Dr. Lebron in 1 week. Please call [phone] to schedule an appointment.

## 2019-09-20 NOTE — PROGRESS NOTE ADULT - SUBJECTIVE AND OBJECTIVE BOX
SERENE, NAPOLEON  49y, Male  Allergy: penicillins (Unknown)      CHIEF COMPLAINT: Left hip pain (20 Sep 2019 08:46)      INTERVAL EVENTS/HPI  - No acute events overnight  - T(F): , Max: 99.5 (09-19-19 @ 18:06)  - Denies any worsening symptoms  - Tolerating medication  - WBC Count: 8.28 (09-20-19 @ 05:00)      ROS  General: Denies rigors, nightsweats  HEENT: Denies headache, rhinorrhea, sore throat, eye pain  CV: Denies CP, palpitations  PULM: Denies SOB, wheezing  GI: Denies hematemesis, hematochezia, melena  : Denies discharge, hematuria  MSK: Denies arthralgias, myalgias  SKIN: Denies rash, lesions  NEURO: Denies paresthesias, weakness  PSYCH: Denies depression, anxiety    VITALS:  T(F): 96.5, Max: 99.5 (09-19-19 @ 18:06)  HR: 83  BP: 99/55  RR: 16Vital Signs Last 24 Hrs  T(C): 35.8 (20 Sep 2019 05:10), Max: 37.5 (19 Sep 2019 18:06)  T(F): 96.5 (20 Sep 2019 05:10), Max: 99.5 (19 Sep 2019 18:06)  HR: 83 (20 Sep 2019 05:10) (83 - 94)  BP: 99/55 (20 Sep 2019 05:10) (86/50 - 120/67)  BP(mean): --  RR: 16 (20 Sep 2019 05:10) (6 - 18)  SpO2: 97% (19 Sep 2019 19:28) (88% - 100%)    PHYSICAL EXAM:  Gen: NAD, resting in bed, very pale, sleepy  HEENT: Normocephalic, atraumatic  Neck: supple, no lymphadenopathy  CV: Regular rate & regular rhythm  Lungs: decreased BS at bases, no fremitus  Abdomen: Soft, BS present  Ext: Warm, well perfused, L hip dressings  Neuro: non focal, awake  Skin: no rash, no erythema  Lines: no phlebitis    FH: Non-contributory  Social Hx: Non-contributory    TESTS & MEASUREMENTS:                        9.1    8.28  )-----------( 184      ( 20 Sep 2019 05:00 )             28.3     09-20    138  |  98  |  9<L>  ----------------------------<  137<H>  4.1   |  31  |  0.7    Ca    8.5      20 Sep 2019 05:00      eGFR if Non African American: 109 mL/min/1.73M2 (09-20-19 @ 05:00)  eGFR if African American: 127 mL/min/1.73M2 (09-20-19 @ 05:00)          Culture - Blood (collected 09-18-19 @ 04:43)  Source: .Blood None  Preliminary Report (09-19-19 @ 14:01):    No growth to date.    Culture - Body Fluid with Gram Stain (collected 09-13-19 @ 14:49)  Source: .Body Fluid None  Gram Stain (09-14-19 @ 21:27):    polymorphonuclear leukocytes seen    No organisms seen    by cytocentrifuge  Preliminary Report (09-16-19 @ 18:39):    Few Coag Negative Staphylococcus  Organism: Coag Negative Staphylococcus (09-17-19 @ 19:50)  Organism: Coag Negative Staphylococcus (09-17-19 @ 19:50)      -  Ampicillin/Sulbactam: S <=8/4      -  Cefazolin: S <=4      -  Clindamycin: R >4      -  Erythromycin: R >4      -  Gentamicin: R >8 Should not be used as monotherapy      -  Oxacillin: S <=0.25      -  Penicillin: R 2      -  RIF- Rifampin: S <=1 Should not be used as monotherapy      -  Tetra/Doxy: S <=1      -  Trimethoprim/Sulfamethoxazole: R >2/38      -  Vancomycin: S 1      Method Type: MAN    Culture - Body Fluid with Gram Stain (collected 09-13-19 @ 14:49)  Source: .Body Fluid None  Gram Stain (09-14-19 @ 21:26):    No polymorphonuclear leukocytes seen    No organisms seen    by cytocentrifuge  Preliminary Report (09-16-19 @ 19:47):    Rare Pseudomonas aeruginosa    Few Coag Negative Staphylococcus  Organism: Pseudomonas aeruginosa  Coag Negative Staphylococcus (09-17-19 @ 19:39)  Organism: Coag Negative Staphylococcus (09-17-19 @ 19:39)      -  Ampicillin/Sulbactam: S <=8/4      -  Cefazolin: S <=4      -  Clindamycin: R >4      -  Erythromycin: R >4      -  Gentamicin: S <=1 Should not be used as monotherapy      -  Oxacillin: S <=0.25      -  Penicillin: R >8      -  RIF- Rifampin: S <=1 Should not be used as monotherapy      -  Tetra/Doxy: S <=1      -  Vancomycin: S <=0.25      Method Type: MAN  Organism: Pseudomonas aeruginosa (09-16-19 @ 18:42)      -  Amikacin: S <=16      -  Aztreonam: S <=4      -  Cefepime: S <=2      -  Ceftazidime: S <=1      -  Ciprofloxacin: S <=1      -  Gentamicin: S <=2      -  Imipenem: S 2      -  Levofloxacin: S <=2      -  Meropenem: S <=1      -  Piperacillin/Tazobactam: S <=8      -  Tobramycin: S <=2      Method Type: MAN            INFECTIOUS DISEASES TESTING  MRSA PCR Result.: Positive (09-14-19 @ 11:30)      RADIOLOGY & ADDITIONAL TESTS:  I have personally reviewed the last Chest xray  CXR  Xray Chest 1 View- PORTABLE-Urgent:   EXAM:  XR CHEST PORTABLE URGENT 1V            PROCEDURE DATE:  09/19/2019            INTERPRETATION:  Clinical History / Reason for exam: Sepsis    Comparison : Chest radiograph September 17, 2019.    Technique/Positioning: Single frontal view of the chest.    Findings:    Support devices: None.    Cardiac/mediastinum/hilum: Stable poststernotomy.    Lung parenchyma/Pleura: Within normal limits.    Skeleton/soft tissues: Unremarkable.    Impression:      No radiographic evidence of acute pulmonary disease.                      HAYDE PAULSON M.D., ATTENDING RADIOLOGIST  This document has been electronically signed. Sep 20 2019  6:46AM             (09-19-19 @ 16:26)      CT  CT Abdomen and Pelvis No Cont:   EXAM:  CT ABDOMEN AND PELVIS            PROCEDURE DATE:  09/17/2019            INTERPRETATION:  CLINICAL STATEMENT: Drop in hemoglobin.      TECHNIQUE: Contiguous axial CT images were obtained from the lower chest   to the pubic symphysis without intravenous contrast.  Oral contrast was   not administered.  Reformatted images in the coronal and sagittal planes   were acquired.    COMPARISON CT: September 5, 2019 CT of the pelvis    OTHER STUDIES USED FOR CORRELATION: Thoracic spine MRI September 5, 2019.       FINDINGS:    LOWER CHEST: Basilar subsegmental dependent atelectasis.    HEPATOBILIARY: Post cholecystectomy. Unremarkable unenhanced liver.    SPLEEN: Unremarkable.    PANCREAS: Diffuse fatty.    ADRENAL GLANDS: Unremarkable.    KIDNEYS: No nephroureteral calculi or hydronephrosis.    ABDOMINOPELVIC NODES: No lymphadenopathy.    PELVIC ORGANS: Obscured by right hip hardware.    PERITONEUM/MESENTERY/BOWEL: No bowel obstruction, ascites or   pneumoperitoneum. No evidence of an intraperitoneal or retroperitoneal   hematoma.    BONES/SOFT TISSUES: Diffuse osteopenia. Chronic compression deformities   of T11, T12 and L1 and L4 vertebral bodies. Please see separately   dictated report of CT hips for detailed evaluation of bilateralhips and   associated hardware. Soft tissue anasarca.     OTHER: Atherosclerotic vascular calcifications.      IMPRESSION:   1.  No evidence of an intraperitoneal or retroperitoneal hematoma.    Please see separately dictated report of concurrently performed CT hips   bilateral with detailed evaluation.                  EMILIA MATA M.D., ATTENDING RADIOLOGIST  This document has been electronically signed. Sep 18 2019  9:51AM             (09-17-19 @ 19:53)      CARDIOLOGY TESTING  12 Lead ECG:   Ventricular Rate 92 BPM    Atrial Rate 92 BPM    P-R Interval 126 ms    QRS Duration 82 ms    Q-T Interval 382 ms    QTC Calculation(Bezet) 472 ms    P Axis 25 degrees    R Axis 12 degrees    T Axis 230 degrees    Diagnosis Line Normal sinus rhythm  T wave abnormality, consider anterior ischemia  Prolonged QT  Abnormal ECG    Confirmed by DEBRA CORCORAN MD ([id number]) on 9/20/2019 8:44:54 AM (09-19-19 @ 14:54)  12 Lead ECG:   Ventricular Rate 104 BPM    Atrial Rate 104 BPM    P-R Interval 112 ms    QRS Duration 80 ms    Q-T Interval 360 ms    QTC Calculation(Bezet) 473 ms    P Axis 48 degrees    R Axis 19 degrees    T Axis 204 degrees    Diagnosis Line Sinus tachycardia  ST & T wave abnormality, consider anterolateral ischemia  Abnormal ECG    Confirmed by Carla Broderick MD ([id number]) on 9/16/2019 8:36:06 AM (09-16-19 @ 04:33)      MEDICATIONS  aspirin  chewable 81  atorvastatin 80  baclofen 10  benzocaine 15 mG/menthol 3.6 mG Lozenge 1  bisacodyl 5  busPIRone 5  carvedilol 3.125  cefepime   IVPB 2000  cefepime   IVPB   chlorhexidine 4% Liquid 1  clonazePAM  Tablet 1  dextrose 5%. 1000  dextrose 50% Injectable 25  dextrose 50% Injectable 25  docusate sodium 100  DULoxetine 90  enoxaparin Injectable 40  gabapentin 800  hydrOXYzine hydrochloride 50  insulin glargine Injectable (LANTUS) 36  insulin lispro (HumaLOG) corrective regimen sliding scale   insulin lispro Injectable (HumaLOG) 12  lactulose Syrup 15  morphine ER Tablet 30  pantoprazole    Tablet 40  pramipexole 0.5  QUEtiapine 150  senna 2  tamsulosin 0.4      ANTIBIOTICS:  cefepime   IVPB 2000 milliGRAM(s) IV Intermittent every 8 hours  cefepime   IVPB          All available historical records have been reviewed

## 2019-09-20 NOTE — CHART NOTE - NSCHARTNOTEFT_GEN_A_CORE
Registered Dietitian Follow-Up     Patient Profile Reviewed                           Yes [x]   No []     Nutrition History Previously Obtained        Yes [x]  No []       Pertinent Subjective Information: Observed pt not eating lunch tray - says he will wait a little before eating. Pt speaks very softly. He reports thin liquids are easier for him. Pending diet order from 9/13 not active - will d/w LIP      Pertinent Medical Interventions: Dislocation of left hip s/p explant of left ОЛЬГА and insertion of abx spacer on 9/13. Ortho following. NSTEMI type 2. Episode of decreased responsiveness and respiratory depression that responded to Narcan x 2 on 9/19. Disposition: wife wants home with services on discharge.      Diet order: Consistent Carb (no snack)     Anthropometrics:  - Ht. 180.34 cm   - Wt. 98.5 kg (9/10) vs 108 kg (9/16) vs 102.6 kg (9/18) edema noted, will monitor weight changes   - %wt change  - BMI 30   - IBW     Pertinent Lab Data: (9/19) RBC 3,43 BUN 9  glucose 137      Pertinent Meds: lovenox, abx, lantus, humalog, lactulose, klonopin, protonix, lipitor, colace     Physical Findings:  - Appearance: alert  - GI function: LBM 9/19   - Tubes:  - Oral/Mouth cavity:   per RD assessment, pt requesting mech soft   - Skin: BS 13 surg incision. 2+ edema b/l arm, 3+ b/l leg      Nutrition Requirements  Weight Used: 75.4kg, needs cont from RD assessment 9/10     Estimated Energy Needs    Continue [x]  Adjust [] 4921-2493 kcal/day (20-25 kcal/kg IBW d/t BMI 30 and quadriplegia).   Adjusted Energy Recommendations:   kcal/day        Estimated Protein Needs    Continue [x]  Adjust [] 75-90 g/day (1.0-1.2 g/kg IBW, reason mentioned above).   Adjusted Protein Recommendations:   gm/day        Estimated Fluid Needs        Continue [x]  Adjust [] 1mL/kcal or per LIP  Adjusted Fluid Recommendations:   mL/day     Nutrient Intake: not meeting needs        [] Previous Nutrition Diagnosis: Inadequate oral intake            [x] Ongoing          [] Resolved    [] No active nutrition diagnosis identified at this time     Nutrition Diagnostic #1  Problem:  Etiology:  Statement:     Nutrition Diagnostic #2  Problem:  Etiology:  Statement:     Nutrition Intervention meal/snack, med food supplement      Goal/Expected Outcome: Pt to consume >75% of meal tray In 4 days     Indicator/Monitoring: RD to monitor energy intake, diet order, body comp, NFPF     Recommendation: Add mechanical soft and glucerna shake daily.

## 2019-09-20 NOTE — PROGRESS NOTE ADULT - SUBJECTIVE AND OBJECTIVE BOX
48 y/o male s/p Explantation of left hemiarthroplasty/Girdlestone procedure and antibiotic beads placement 9/13/2019 POD#7.  Intra-op cultures positive for pseudomonas.     ICU Vital Signs Last 24 Hrs  T(C): 35.8 (20 Sep 2019 05:10), Max: 37.5 (19 Sep 2019 18:06)  T(F): 96.5 (20 Sep 2019 05:10), Max: 99.5 (19 Sep 2019 18:06)  HR: 83 (20 Sep 2019 05:10) (83 - 94)  BP: 99/55 (20 Sep 2019 05:10) (86/50 - 120/67)  BP(mean): --  ABP: --  ABP(mean): --  RR: 16 (20 Sep 2019 05:10) (6 - 18)  SpO2: 97% (19 Sep 2019 19:28) (88% - 100%)      NAD, lethargic  Left hip and LE: Dressing saturated with serosanguinous drainage changed  limited motor/sensory fx at baseline                   9.1    8.28  )-----------( 184      ( 20 Sep 2019 05:00 )             28.3         A/P: s/p Explantation of left hemiarthroplasty/Girdlestone procedure and antibiotic beads placement on 9/13/19  - antibiotics per ID  - DVT prophylaxis  - pain control  - fall precautions

## 2019-09-20 NOTE — DISCHARGE NOTE PROVIDER - PROVIDER TOKENS
PROVIDER:[TOKEN:[[id number]]],PROVIDER:[TOKEN:[[id number]]],PROVIDER:[TOKEN:[[id number]]],PROVIDER:[TOKEN:[[id number]]],PROVIDER:[TOKEN:[[id number]]] PROVIDER:[TOKEN:[[id number]]],PROVIDER:[TOKEN:[[id number]]],PROVIDER:[TOKEN:[[id number]]],PROVIDER:[TOKEN:[[id number]]],PROVIDER:[TOKEN:[[id number]]],PROVIDER:[TOKEN:[[id number]]] PROVIDER:[TOKEN:[[id number]]],PROVIDER:[TOKEN:[[id number]]],PROVIDER:[TOKEN:[[id number]]],PROVIDER:[TOKEN:[[id number]]],PROVIDER:[TOKEN:[[id number]]],PROVIDER:[TOKEN:[[id number]]],PROVIDER:[TOKEN:[[id number]]]

## 2019-09-20 NOTE — PROGRESS NOTE ADULT - ASSESSMENT
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  Workup revealed superolateral dislocation of the left hip and he is s/p explantation of Left total hip arthroplasty and insertion of ABX spacer. Post op course was complicated by hypotension, requiring 1 PRBC transfusion and pressors. Pt was weaned off pressors by POD1 in the evening.   He had elevated troponins postop likely from demand ischemia per cardiology and he is on medical management.    Preop, he received loading dose of IVIG during this admission and will continue maintenance dose q3 weeks per neurology.  He was transferred to telemetry.    1. Dislocation of left hip s/p explant of left ОЛЬГА and insertion of abx spacer on 9/13  OR culture + for Pseudomonas from hip - pansensitive  on cefepime   needs picc line prior to discharge   6 weeks of abx per ID  outpt f/u with ID on discharge  pain control  Ortho following    2. NSTEMI type 2  ASA/statin/Bblocker  start Plavix if H/H remains stable  normal EF on ECHO  telemetry  cardio f/u - Dr. Cid  no plan for urgent cath    3. CIDP - for IVIG q3 weeks per neurology    4. CAD s/p CABG - continue medical management    5. DM - controlled on insulin    6. Anemia due to acute blood loss postop  s/p transfusion with 1 unit PRBC on 9/15 with appropriate rise in Hgb  s/p another unit on 9/17 for Hgb 6.4 and now Hgb >9 (? lab error in Hgb 6.4)  monitor H/H - so far stable  no active bleeding on CT scans    7. Episode of decreased responsiveness and respiratory depression that responded to Narcan x 2 on 9/19  pt has been on MS Contin 60mg q12hr for years per wife  had dilaudid 1mg on morning of 9/19  decreased MS Contin to 30mg q12hr and stopped dilaudid  hold benzodiazepine and opioid for lethargy  pain management f/u  monitor closely on telemetry  ABG reviewed: compensated respiratory acidosis  mental status improved today    8. Depression/anxiety  seen by psychiatry this admission  recommendations being followed  needs close outpt f/u    9. BPH on flomax    10. + MRSA nasal swab - on mupirocin and CHG baths    11. DVT prophylaxis    12. Constipation - still no BM on multiple laxatives and stool softeners  give tap water enema today  consider methylnaltrexone if no response  had CT scan of abdomen and pelvis this week - no obstruction  pt with chronic constipation per his wife        PROGRESS NOTE HANDOFF    Pending: pain management f/u, cardio f/u, PICC line, monitor mental status    Disposition: wife wants home with services on discharge

## 2019-09-20 NOTE — DISCHARGE NOTE PROVIDER - CARE PROVIDERS DIRECT ADDRESSES
,[email or web address],DirectAddress_Unknown,[email or web address] ,[email or web address],DirectAddress_Unknown,DirectAddress_Unknown,[email or web address] ,[email or web address],DirectAddress_Unknown,DirectAddress_Unknown,DirectAddress_Unknown,[email or web address]

## 2019-09-21 NOTE — PROGRESS NOTE ADULT - ASSESSMENT
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 and extreme pain, b/l total hip replacement, anxiety and depression presents to the ED for extreme left hip pain, s/p failed total hip arthoplasty w/ dislocation s/p explanation of hardware, hypotension required pressors in PACU    IMPRESSION  #R prosthetic hip infection with hardware, s/p OR 9/13 Explantation of total hip arthroplasty , abx beads placed    OR cx Pseudomonas, coNS (S oxacillin)    MRSA PCR pos    Sedimentation Rate, Erythrocyte: 107 mm/Hr (09.14.19 @ 11:21)    C-Reactive Protein, Serum: 8.16 mg/dL (09.14.19 @ 11:21)  #9/11 Bcx CoNS likely contaminant 9/12 BCX NG  #Hyponatremia  #Elevated trop/CKMB, suspect demand ischemia  #Abx allergy: PCN childhood - was told by mother not to take    RECOMMENDATIONS  - Cefepime 2g q8h IV as pseudo is S (aware of PCN allergy)  - Pt will need PICC x 6 weeks IV ABX end 10/25/19  - MRSA PCR + --> mupirocin to nares BID and CHG daily washes x 7 days   - Weekly CBC, BMP, Vanc trough, ESR/CRP  - ID follow-up with Christina Lerma or Thurs Dr. Mohsena Amin      [address]       [phone] (call to make an appointment, walk-ins Tuesdays 10:30 AM)      Fax [phone]   recall prn please

## 2019-09-21 NOTE — PROGRESS NOTE ADULT - SUBJECTIVE AND OBJECTIVE BOX
CAST, NAPOLEON  49y, Male      OVERNIGHT EVENTS:    no fevers, has pain left knee    VITALS:  T(F): 97.6, Max: 98.8 (09-20-19 @ 14:21)  HR: 85  BP: 129/63  RR: 18Vital Signs Last 24 Hrs  T(C): 36.4 (21 Sep 2019 05:21), Max: 37.1 (20 Sep 2019 14:21)  T(F): 97.6 (21 Sep 2019 05:21), Max: 98.8 (20 Sep 2019 14:21)  HR: 85 (21 Sep 2019 05:21) (82 - 99)  BP: 129/63 (21 Sep 2019 05:21) (106/55 - 163/76)  BP(mean): --  RR: 18 (21 Sep 2019 05:21) (16 - 18)  SpO2: 97% (20 Sep 2019 20:00) (96% - 97%)    TESTS & MEASUREMENTS:                        8.5    7.73  )-----------( 230      ( 21 Sep 2019 04:40 )             26.6     09-21    139  |  98  |  8<L>  ----------------------------<  105<H>  3.7   |  34<H>  |  0.7    Ca    8.2<L>      21 Sep 2019 04:40          Culture - Blood (collected 09-19-19 @ 16:26)  Source: .Blood None  Preliminary Report (09-21-19 @ 02:15):    No growth to date.    Culture - Blood (collected 09-18-19 @ 04:43)  Source: .Blood None  Preliminary Report (09-19-19 @ 14:01):    No growth to date.            RADIOLOGY & ADDITIONAL TESTS:    ANTIBIOTICS:  cefepime   IVPB 2000 milliGRAM(s) IV Intermittent every 8 hours  cefepime   IVPB

## 2019-09-21 NOTE — PROGRESS NOTE ADULT - SUBJECTIVE AND OBJECTIVE BOX
NAPOLEON CAST 49y Male  MRN#: [id number]     SUBJECTIVE  Patient is a 49y old Male who presents with a chief complaint of Left hip pain   Currently admitted to medicine with the primary diagnosis of Pain    Today is hospital day 18d, and this morning he is in pain. He had 2 bowel movements so far.     OBJECTIVE  PAST MEDICAL & SURGICAL HISTORY  CIDP (chronic inflammatory demyelinating polyneuropathy)  History of cholecystectomy  History of total left hip replacement  History of total right hip replacement: bilateral  S/P CABG x 5    ALLERGIES:  penicillins (Unknown)    MEDICATIONS:  MEDICATIONS  (STANDING):  aspirin  chewable 81 milliGRAM(s) Oral daily  atorvastatin 80 milliGRAM(s) Oral at bedtime  baclofen 10 milliGRAM(s) Oral two times a day  benzocaine 15 mG/menthol 3.6 mG Lozenge 1 Lozenge Oral three times a day  bisacodyl 5 milliGRAM(s) Oral at bedtime  busPIRone 5 milliGRAM(s) Oral two times a day  carvedilol 3.125 milliGRAM(s) Oral every 12 hours  cefepime   IVPB 2000 milliGRAM(s) IV Intermittent every 8 hours  cefepime   IVPB      chlorhexidine 4% Liquid 1 Application(s) Topical daily  clonazePAM  Tablet 1 milliGRAM(s) Oral three times a day  dextrose 5%. 1000 milliLiter(s) (50 mL/Hr) IV Continuous <Continuous>  dextrose 50% Injectable 25 Gram(s) IV Push once  dextrose 50% Injectable 25 Gram(s) IV Push once  docusate sodium 100 milliGRAM(s) Oral three times a day  DULoxetine 90 milliGRAM(s) Oral daily  enoxaparin Injectable 40 milliGRAM(s) SubCutaneous daily  gabapentin 800 milliGRAM(s) Oral three times a day  hydrOXYzine hydrochloride 50 milliGRAM(s) Oral at bedtime  insulin glargine Injectable (LANTUS) 36 Unit(s) SubCutaneous at bedtime  insulin lispro (HumaLOG) corrective regimen sliding scale   SubCutaneous three times a day before meals  insulin lispro Injectable (HumaLOG) 12 Unit(s) SubCutaneous three times a day before meals  lactulose Syrup 15 Gram(s) Oral every 4 hours  morphine ER Tablet 30 milliGRAM(s) Oral every 12 hours  pantoprazole    Tablet 40 milliGRAM(s) Oral before breakfast  pramipexole 0.5 milliGRAM(s) Oral every 12 hours  QUEtiapine 150 milliGRAM(s) Oral at bedtime  senna 2 Tablet(s) Oral at bedtime  tamsulosin 0.4 milliGRAM(s) Oral at bedtime    MEDICATIONS  (PRN):  acetaminophen   Tablet .. 650 milliGRAM(s) Oral every 6 hours PRN Temp greater or equal to 38C (100.4F)  dextrose 40% Gel 15 Gram(s) Oral once PRN Blood Glucose LESS THAN 70 milliGRAM(s)/deciliter  diclofenac 75 milliGRAM(s) Oral two times a day PRN Moderate Pain (4 - 6)  glucagon  Injectable 1 milliGRAM(s) IntraMuscular once PRN Glucose LESS THAN 70 milligrams/deciliter  morphine Concentrate 10 milliGRAM(s) Oral every 4 hours PRN Severe Pain (7 - 10)  naloxone Injectable 2 milliGRAM(s) IV Push once PRN If no response to 0.4 mg of Narcan      VITAL SIGNS: Last 24 Hours      LABS:                   PHYSICAL EXAM:    GENERAL: NAD, well-developed, AAOx3  HEENT:  Atraumatic, Normocephalic. EOMI, PERRLA, conjunctiva and sclera clear, No JVD  PULMONARY: Clear to auscultation bilaterally; No wheeze  CARDIOVASCULAR: Regular rate and rhythm; No murmurs, rubs, or gallops  GASTROINTESTINAL: Soft, Nontender, mild distension; Bowel sounds present  MUSCULOSKELETAL:  2+ Peripheral Pulses, lower extremity edema and upper extremity edema.   NEUROLOGY: movement in upper extremities but no movement of lower extremities  SKIN: No rashes or lesions.   WOUND: left hip wound looks clean. No signs of bleeding into subcutaneous tissue.       ADMISSION SUMMARY  Patient is a 49y old Male who presents with a chief complaint of Left hip pain (17 Sep 2019 04:21)  Hospital course has been as follows:  Initially admitted for lip pain. Work up showed dislocation of his previous arthroplasty. During his hospital stay he developed RUE cellulitis treated with clindamycin.   Workup revealed superolateral dislocation of the left hip and he is s/p failed hip replacement due to hypotension. He was transferred to the SICU and requiring 1 PRBC transfusion and pressors. Pt was weaned off pressors by POD1 in the evening.  Intraop cultures grew pseudomonas. Patient was diagnosed with septic arthritis  He had elevated troponins postop likely from demand ischemia per cardiology and he is on medical management.    He also received loading dose of IVIG during this admission and will continue maintenance dose q3 weeks per neurology.  He was transferred to telemetry for further monitoring       ASSESSMENT & PLAN    49 year old male with a history of CIDP, presenting with hip pain, found to have septic arthitis of hardware (old arthroplasty).     Pain: patient is on multiple pain medications, being followed by pain team  - yesterday patient had 1 episode of decreased RR, dropping to 6, with desaturation  - responded to naloxon  - long acting morphine was decreased to 30mg instead of 60mg  - hydromorphone was stopped  - reached out to pain team to re-evaluate meds    ID: septic arthritis of hardware  - on Meropenem (started 9/16). Cx growing pseudomonas and staph coag negative  - will switch to cefepime today. Patient will need 6 weeks of antibiotics to be discharged with w picc line   - IR picc line placed for today?. Does not need to be NPO    Ortho: s/p failed arthroplasty s/p abx spacer on 9/13 - OR failed due to hypotension   - ortho on board. They do not plan to re-evaluate the hip    CIDP: s/p IVIG in hospital (chronic inflammatory demyelinating polyneuropathy)  - being followed by neurology. Treatment is to get IVIG every 3 weeks.   - on gabapentin and duloxetine for pain.  - being followed by pain management     Cardio: CAD s/p CABG  - on aspirin and atorvastatin  - plavix was held due to suspicion of hemorrhagic shock. In the setting of stable Hb/Hct, will follow up with cardiology to resume plavix  - demand ischemia with troponin elevated. Medical management.   - carvedilol every 12 hours.     Endo: DMII  - FS between    - Well controlled on Lantus 36 and Lispro 12. Will keep same.     Nutrition: evaluated by Nutrition   - good intake (>75% of meals)    Uro: BPH  - on tamulosin     DVT prophylaxis: Lovenox NAPOLEON CAST 49y Male  MRN#: [id number]     SUBJECTIVE  Patient is a 49y old Male who presents with a chief complaint of Left hip pain   Currently admitted to medicine with the primary diagnosis of Pain    Today is hospital day 18d, and this morning he is in pain. yesterday he had several bowel movements after almost 2 weeks of no stools    OBJECTIVE  PAST MEDICAL & SURGICAL HISTORY  CIDP (chronic inflammatory demyelinating polyneuropathy)  History of cholecystectomy  History of total left hip replacement  History of total right hip replacement: bilateral  S/P CABG x 5    ALLERGIES:  penicillins (Unknown)    MEDICATIONS:  MEDICATIONS  (STANDING):  aspirin  chewable 81 milliGRAM(s) Oral daily  atorvastatin 80 milliGRAM(s) Oral at bedtime  baclofen 10 milliGRAM(s) Oral two times a day  benzocaine 15 mG/menthol 3.6 mG Lozenge 1 Lozenge Oral three times a day  bisacodyl 5 milliGRAM(s) Oral at bedtime  busPIRone 5 milliGRAM(s) Oral two times a day  carvedilol 3.125 milliGRAM(s) Oral every 12 hours  cefepime   IVPB 2000 milliGRAM(s) IV Intermittent every 8 hours  cefepime   IVPB      chlorhexidine 4% Liquid 1 Application(s) Topical daily  clonazePAM  Tablet 1 milliGRAM(s) Oral three times a day  dextrose 5%. 1000 milliLiter(s) (50 mL/Hr) IV Continuous <Continuous>  dextrose 50% Injectable 25 Gram(s) IV Push once  dextrose 50% Injectable 25 Gram(s) IV Push once  docusate sodium 100 milliGRAM(s) Oral three times a day  DULoxetine 90 milliGRAM(s) Oral daily  enoxaparin Injectable 40 milliGRAM(s) SubCutaneous daily  gabapentin 800 milliGRAM(s) Oral three times a day  hydrOXYzine hydrochloride 50 milliGRAM(s) Oral at bedtime  insulin glargine Injectable (LANTUS) 36 Unit(s) SubCutaneous at bedtime  insulin lispro (HumaLOG) corrective regimen sliding scale   SubCutaneous three times a day before meals  insulin lispro Injectable (HumaLOG) 12 Unit(s) SubCutaneous three times a day before meals  lactulose Syrup 15 Gram(s) Oral every 12 hours  morphine ER Tablet 45 milliGRAM(s) Oral every 12 hours  oxyCODONE  ER Tablet 15 milliGRAM(s) Oral every 12 hours  pantoprazole    Tablet 40 milliGRAM(s) Oral before breakfast  pramipexole 0.5 milliGRAM(s) Oral every 12 hours  QUEtiapine 150 milliGRAM(s) Oral at bedtime  senna 2 Tablet(s) Oral at bedtime  tamsulosin 0.4 milliGRAM(s) Oral at bedtime    MEDICATIONS  (PRN):  acetaminophen   Tablet .. 650 milliGRAM(s) Oral every 6 hours PRN Temp greater or equal to 38C (100.4F)  dextrose 40% Gel 15 Gram(s) Oral once PRN Blood Glucose LESS THAN 70 milliGRAM(s)/deciliter  diclofenac 75 milliGRAM(s) Oral two times a day PRN Moderate Pain (4 - 6)  glucagon  Injectable 1 milliGRAM(s) IntraMuscular once PRN Glucose LESS THAN 70 milligrams/deciliter  morphine Concentrate 5 milliGRAM(s) Oral every 4 hours PRN Severe Pain (7 - 10)  naloxone Injectable 2 milliGRAM(s) IV Push once PRN If no response to 0.4 mg of Narcan    VITAL SIGNS: Last 24 Hours  Vital Signs Last 24 Hrs  T(C): 36.4 (21 Sep 2019 05:21), Max: 37.1 (20 Sep 2019 14:21)  T(F): 97.6 (21 Sep 2019 05:21), Max: 98.8 (20 Sep 2019 14:21)  HR: 85 (21 Sep 2019 05:21) (82 - 99)  BP: 129/63 (21 Sep 2019 05:21) (106/55 - 163/76)  BP(mean): --  RR: 18 (21 Sep 2019 05:21) (16 - 18)  SpO2: 98% (21 Sep 2019 08:31) (97% - 98%)    LABS:                         8.5    7.73  )-----------( 230      ( 21 Sep 2019 04:40 )             26.6                   09-21    139  |  98  |  8<L>  ----------------------------<  105<H>  3.7   |  34<H>  |  0.7    Ca    8.2<L>      21 Sep 2019 04:40    PHYSICAL EXAM:    GENERAL: NAD, well-developed, AAOx3  HEENT:  Atraumatic, Normocephalic. EOMI, PERRLA, conjunctiva and sclera clear, No JVD  PULMONARY: Clear to auscultation bilaterally; No wheeze  CARDIOVASCULAR: Regular rate and rhythm; No murmurs, rubs, or gallops  GASTROINTESTINAL: Soft, Nontender, mild distension; Bowel sounds present  MUSCULOSKELETAL:  2+ Peripheral Pulses, lower extremity edema and upper extremity edema.   NEUROLOGY: movement in upper extremities but no movement of lower extremities  SKIN: No rashes or lesions.   WOUND: left hip wound looks clean. No signs of bleeding into subcutaneous tissue.       ADMISSION SUMMARY  Patient is a 49y old Male who presents with a chief complaint of Left hip pain (17 Sep 2019 04:21)  Hospital course has been as follows:  Initially admitted for lip pain. Work up showed dislocation of his previous arthroplasty. During his hospital stay he developed RUE cellulitis treated with clindamycin.   Workup revealed superolateral dislocation of the left hip and he is s/p failed hip replacement due to hypotension. He was transferred to the SICU and requiring 1 PRBC transfusion and pressors. Pt was weaned off pressors by POD1 in the evening.  Intraop cultures grew pseudomonas. Patient was diagnosed with septic arthritis  He had elevated troponins postop likely from demand ischemia per cardiology and he is on medical management.    He also received loading dose of IVIG during this admission and will continue maintenance dose q3 weeks per neurology.  He was transferred to telemetry for further monitoring       ASSESSMENT & PLAN    49 year old male with a history of CIDP, presenting with hip pain, found to have septic arthitis of hardware (old arthroplasty).     Pain: patient is on multiple pain medications, being followed by pain team  - yesterday patient had 1 episode of decreased RR, dropping to 6, with desaturation  - responded to naloxon  - long acting morphine was decreased to 30mg BID 2 days ago, but patient is in severe pain. Will increase today to 45mg BID and gradually increase to target 60mg BID  - hydromorphone was stopped  - reached out to pain team to re-evaluate meds. Please follow up!    ID: septic arthritis of hardware  - on Meropenem (started 9/16). Cx growing pseudomonas and staph coag negative  - will switch to cefepime today. Patient will need 6 weeks of antibiotics to be discharged with w picc line   - IR picc line placed for today?. Does not need to be NPO    Ortho: s/p failed arthroplasty s/p abx spacer on 9/13 - OR failed due to hypotension   - ortho on board. 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 Treatment is to get IVIG every 3 weeks.   - on gabapentin and duloxetine for pain.  - being followed by pain management     Cardio: CAD s/p CABG  - on aspirin and atorvastatin  - plavix was held due to suspicion of hemorrhagic shock. In the setting of stable Hb/Hct, will follow up with cardiology to resume plavix  - demand ischemia with troponin elevated. Medical management.   - carvedilol every 12 hours.     Endo: DMII  - FS between   - Well controlled on Lantus 36 and Lispro 12. Will keep same.     Nutrition: evaluated by Nutrition   - good intake (>75% of meals)    Uro: BPH  - on tamulosin     DVT prophylaxis: Lovenox NAPOLEON CAST 49y Male  MRN#: [id number]     SUBJECTIVE  Patient is a 49y old Male who presents with a chief complaint of Left hip pain   Currently admitted to medicine with the primary diagnosis of Pain    Today is hospital day 18d, and this morning he is in pain. yesterday he had several bowel movements after almost 2 weeks of no stools    OBJECTIVE  PAST MEDICAL & SURGICAL HISTORY  CIDP (chronic inflammatory demyelinating polyneuropathy)  History of cholecystectomy  History of total left hip replacement  History of total right hip replacement: bilateral  S/P CABG x 5    ALLERGIES:  penicillins (Unknown)    MEDICATIONS:  MEDICATIONS  (STANDING):  aspirin  chewable 81 milliGRAM(s) Oral daily  atorvastatin 80 milliGRAM(s) Oral at bedtime  baclofen 10 milliGRAM(s) Oral two times a day  benzocaine 15 mG/menthol 3.6 mG Lozenge 1 Lozenge Oral three times a day  bisacodyl 5 milliGRAM(s) Oral at bedtime  busPIRone 5 milliGRAM(s) Oral two times a day  carvedilol 3.125 milliGRAM(s) Oral every 12 hours  cefepime   IVPB 2000 milliGRAM(s) IV Intermittent every 8 hours  cefepime   IVPB      chlorhexidine 4% Liquid 1 Application(s) Topical daily  clonazePAM  Tablet 1 milliGRAM(s) Oral three times a day  dextrose 5%. 1000 milliLiter(s) (50 mL/Hr) IV Continuous <Continuous>  dextrose 50% Injectable 25 Gram(s) IV Push once  dextrose 50% Injectable 25 Gram(s) IV Push once  docusate sodium 100 milliGRAM(s) Oral three times a day  DULoxetine 90 milliGRAM(s) Oral daily  enoxaparin Injectable 40 milliGRAM(s) SubCutaneous daily  gabapentin 800 milliGRAM(s) Oral three times a day  hydrOXYzine hydrochloride 50 milliGRAM(s) Oral at bedtime  insulin glargine Injectable (LANTUS) 36 Unit(s) SubCutaneous at bedtime  insulin lispro (HumaLOG) corrective regimen sliding scale   SubCutaneous three times a day before meals  insulin lispro Injectable (HumaLOG) 12 Unit(s) SubCutaneous three times a day before meals  lactulose Syrup 15 Gram(s) Oral every 12 hours  morphine ER Tablet 45 milliGRAM(s) Oral every 12 hours  oxyCODONE  ER Tablet 15 milliGRAM(s) Oral every 12 hours  pantoprazole    Tablet 40 milliGRAM(s) Oral before breakfast  pramipexole 0.5 milliGRAM(s) Oral every 12 hours  QUEtiapine 150 milliGRAM(s) Oral at bedtime  senna 2 Tablet(s) Oral at bedtime  tamsulosin 0.4 milliGRAM(s) Oral at bedtime    MEDICATIONS  (PRN):  acetaminophen   Tablet .. 650 milliGRAM(s) Oral every 6 hours PRN Temp greater or equal to 38C (100.4F)  dextrose 40% Gel 15 Gram(s) Oral once PRN Blood Glucose LESS THAN 70 milliGRAM(s)/deciliter  diclofenac 75 milliGRAM(s) Oral two times a day PRN Moderate Pain (4 - 6)  glucagon  Injectable 1 milliGRAM(s) IntraMuscular once PRN Glucose LESS THAN 70 milligrams/deciliter  morphine Concentrate 5 milliGRAM(s) Oral every 4 hours PRN Severe Pain (7 - 10)  naloxone Injectable 2 milliGRAM(s) IV Push once PRN If no response to 0.4 mg of Narcan    VITAL SIGNS: Last 24 Hours  Vital Signs Last 24 Hrs  T(C): 36.4 (21 Sep 2019 05:21), Max: 37.1 (20 Sep 2019 14:21)  T(F): 97.6 (21 Sep 2019 05:21), Max: 98.8 (20 Sep 2019 14:21)  HR: 85 (21 Sep 2019 05:21) (82 - 99)  BP: 129/63 (21 Sep 2019 05:21) (106/55 - 163/76)  BP(mean): --  RR: 18 (21 Sep 2019 05:21) (16 - 18)  SpO2: 98% (21 Sep 2019 08:31) (97% - 98%)    LABS:                         8.5    7.73  )-----------( 230      ( 21 Sep 2019 04:40 )             26.6                   09-21    139  |  98  |  8<L>  ----------------------------<  105<H>  3.7   |  34<H>  |  0.7    Ca    8.2<L>      21 Sep 2019 04:40    PHYSICAL EXAM:    GENERAL: NAD, well-developed, AAOx3  HEENT:  Atraumatic, Normocephalic. EOMI, PERRLA, conjunctiva and sclera clear, No JVD  PULMONARY: Clear to auscultation bilaterally; No wheeze  CARDIOVASCULAR: Regular rate and rhythm; No murmurs, rubs, or gallops  GASTROINTESTINAL: Soft, Nontender, mild distension; Bowel sounds present  MUSCULOSKELETAL:  2+ Peripheral Pulses, lower extremity edema and upper extremity edema.   NEUROLOGY: movement in upper extremities but no movement of lower extremities  SKIN: No rashes or lesions.   WOUND: left hip wound looks clean. No signs of bleeding into subcutaneous tissue.       ADMISSION SUMMARY  Patient is a 49y old Male who presents with a chief complaint of Left hip pain (17 Sep 2019 04:21)  Hospital course has been as follows:  Initially admitted for lip pain. Work up showed dislocation of his previous arthroplasty. During his hospital stay he developed RUE cellulitis treated with clindamycin.   Workup revealed superolateral dislocation of the left hip and he is s/p failed hip replacement due to hypotension. He was transferred to the SICU and requiring 1 PRBC transfusion and pressors. Pt was weaned off pressors by POD1 in the evening.  Intraop cultures grew pseudomonas. Patient was diagnosed with septic arthritis  He had elevated troponins postop likely from demand ischemia per cardiology and he is on medical management.    He also received loading dose of IVIG during this admission and will continue maintenance dose q3 weeks per neurology.  He was transferred to telemetry for further monitoring       ASSESSMENT & PLAN    49 year old male with a history of CIDP, presenting with hip pain, found to have septic arthitis of hardware (old arthroplasty).     Pain: patient is on multiple pain medications, being followed by pain team  - yesterday patient had 1 episode of decreased RR, dropping to 6, with desaturation  - responded to naloxon  - long acting morphine was decreased to 30mg BID 2 days ago, but patient is in severe pain. Will increase today to 45mg BID and gradually increase to target 60mg BID  Spoke to Damian Barone on recommendations for pain  He suggested as follows:  - Morphine ER 45mg q 12hrs  -d/c morphine concentrate  - d/c oxycodone ER  - start oxycodone IR 10mg q6 PRN  - Acetaminophen 650mg q 6 hrs STANDING  - please to not use hydromorphone.    ID: septic arthritis of hardware  - on Meropenem (started 9/16). Cx growing pseudomonas and staph coag negative  - will switch to cefepime today. Patient will need 6 weeks of antibiotics to be discharged with w picc line   - IR picc line placed for today?. Does not need to be NPO    Ortho: s/p failed arthroplasty s/p abx spacer on 9/13 - OR failed due to hypotension   - ortho on board. 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 Treatment is to get IVIG every 3 weeks.   - on gabapentin and duloxetine for pain.  - being followed by pain management     Cardio: CAD s/p CABG  - on aspirin and atorvastatin  - plavix was held due to suspicion of hemorrhagic shock. In the setting of stable Hb/Hct, will follow up with cardiology to resume plavix  - demand ischemia with troponin elevated. Medical management.   - carvedilol every 12 hours.     Endo: DMII  - FS between   - Well controlled on Lantus 36 and Lispro 12. Will keep same.     Nutrition: evaluated by Nutrition   - good intake (>75% of meals)    Uro: BPH  - on tamulosin     DVT prophylaxis: Lovenox NAPOLEON CAST 49y Male  MRN#: [id number]     SUBJECTIVE  Patient is a 49y old Male who presents with a chief complaint of Left hip pain   Currently admitted to medicine with the primary diagnosis of Pain    Today is hospital day 18d, and this morning he is in pain. yesterday he had several bowel movements after almost 2 weeks of no stools    OBJECTIVE  PAST MEDICAL & SURGICAL HISTORY  CIDP (chronic inflammatory demyelinating polyneuropathy)  History of cholecystectomy  History of total left hip replacement  History of total right hip replacement: bilateral  S/P CABG x 5    ALLERGIES:  penicillins (Unknown)    MEDICATIONS:  MEDICATIONS  (STANDING):  aspirin  chewable 81 milliGRAM(s) Oral daily  atorvastatin 80 milliGRAM(s) Oral at bedtime  baclofen 10 milliGRAM(s) Oral two times a day  benzocaine 15 mG/menthol 3.6 mG Lozenge 1 Lozenge Oral three times a day  bisacodyl 5 milliGRAM(s) Oral at bedtime  busPIRone 5 milliGRAM(s) Oral two times a day  carvedilol 3.125 milliGRAM(s) Oral every 12 hours  cefepime   IVPB 2000 milliGRAM(s) IV Intermittent every 8 hours  cefepime   IVPB      chlorhexidine 4% Liquid 1 Application(s) Topical daily  clonazePAM  Tablet 1 milliGRAM(s) Oral three times a day  dextrose 5%. 1000 milliLiter(s) (50 mL/Hr) IV Continuous <Continuous>  dextrose 50% Injectable 25 Gram(s) IV Push once  dextrose 50% Injectable 25 Gram(s) IV Push once  docusate sodium 100 milliGRAM(s) Oral three times a day  DULoxetine 90 milliGRAM(s) Oral daily  enoxaparin Injectable 40 milliGRAM(s) SubCutaneous daily  gabapentin 800 milliGRAM(s) Oral three times a day  hydrOXYzine hydrochloride 50 milliGRAM(s) Oral at bedtime  insulin glargine Injectable (LANTUS) 36 Unit(s) SubCutaneous at bedtime  insulin lispro (HumaLOG) corrective regimen sliding scale   SubCutaneous three times a day before meals  insulin lispro Injectable (HumaLOG) 12 Unit(s) SubCutaneous three times a day before meals  lactulose Syrup 15 Gram(s) Oral every 12 hours  morphine ER Tablet 45 milliGRAM(s) Oral every 12 hours  oxyCODONE  ER Tablet 15 milliGRAM(s) Oral every 12 hours  pantoprazole    Tablet 40 milliGRAM(s) Oral before breakfast  pramipexole 0.5 milliGRAM(s) Oral every 12 hours  QUEtiapine 150 milliGRAM(s) Oral at bedtime  senna 2 Tablet(s) Oral at bedtime  tamsulosin 0.4 milliGRAM(s) Oral at bedtime    MEDICATIONS  (PRN):  acetaminophen   Tablet .. 650 milliGRAM(s) Oral every 6 hours PRN Temp greater or equal to 38C (100.4F)  dextrose 40% Gel 15 Gram(s) Oral once PRN Blood Glucose LESS THAN 70 milliGRAM(s)/deciliter  diclofenac 75 milliGRAM(s) Oral two times a day PRN Moderate Pain (4 - 6)  glucagon  Injectable 1 milliGRAM(s) IntraMuscular once PRN Glucose LESS THAN 70 milligrams/deciliter  morphine Concentrate 5 milliGRAM(s) Oral every 4 hours PRN Severe Pain (7 - 10)  naloxone Injectable 2 milliGRAM(s) IV Push once PRN If no response to 0.4 mg of Narcan    VITAL SIGNS: Last 24 Hours  Vital Signs Last 24 Hrs  T(C): 36.4 (21 Sep 2019 05:21), Max: 37.1 (20 Sep 2019 14:21)  T(F): 97.6 (21 Sep 2019 05:21), Max: 98.8 (20 Sep 2019 14:21)  HR: 85 (21 Sep 2019 05:21) (82 - 99)  BP: 129/63 (21 Sep 2019 05:21) (106/55 - 163/76)  BP(mean): --  RR: 18 (21 Sep 2019 05:21) (16 - 18)  SpO2: 98% (21 Sep 2019 08:31) (97% - 98%)    LABS:                         8.5    7.73  )-----------( 230      ( 21 Sep 2019 04:40 )             26.6                   09-21    139  |  98  |  8<L>  ----------------------------<  105<H>  3.7   |  34<H>  |  0.7    Ca    8.2<L>      21 Sep 2019 04:40    PHYSICAL EXAM:    GENERAL: NAD, well-developed, AAOx3  HEENT:  Atraumatic, Normocephalic. EOMI, PERRLA, conjunctiva and sclera clear, No JVD  PULMONARY: Clear to auscultation bilaterally; No wheeze  CARDIOVASCULAR: Regular rate and rhythm; No murmurs, rubs, or gallops  GASTROINTESTINAL: Soft, Nontender, mild distension; Bowel sounds present  MUSCULOSKELETAL:  2+ Peripheral Pulses, lower extremity edema and upper extremity edema.   NEUROLOGY: movement in upper extremities but no movement of lower extremities  SKIN: No rashes or lesions.   WOUND: left hip wound looks clean. No signs of bleeding into subcutaneous tissue.       ADMISSION SUMMARY  Patient is a 49y old Male who presents with a chief complaint of Left hip pain (17 Sep 2019 04:21)  Hospital course has been as follows:  Initially admitted for lip pain. Work up showed dislocation of his previous arthroplasty. During his hospital stay he developed RUE cellulitis treated with clindamycin.   Workup revealed superolateral dislocation of the left hip and he is s/p failed hip replacement due to hypotension. He was transferred to the SICU and requiring 1 PRBC transfusion and pressors. Pt was weaned off pressors by POD1 in the evening.  Intraop cultures grew pseudomonas. Patient was diagnosed with septic arthritis  He had elevated troponins postop likely from demand ischemia per cardiology and he is on medical management.    He also received loading dose of IVIG during this admission and will continue maintenance dose q3 weeks per neurology.  He was transferred to telemetry for further monitoring       ASSESSMENT & PLAN    49 year old male with a history of CIDP, presenting with hip pain, found to have septic arthitis of hardware (old arthroplasty).     Pain: patient is on multiple pain medications, being followed by pain team  - yesterday patient had 1 episode of decreased RR, dropping to 6, with desaturation  - responded to naloxon  - long acting morphine was decreased to 30mg BID 2 days ago, but patient is in severe pain. Will increase today to 45mg BID and gradually increase to target 60mg BID  Spoke to Damian Barone on recommendations for pain  He suggested as follows:  - Morphine ER 45mg q 12hrs  -d/c morphine concentrate  - d/c oxycodone ER  - start oxycodone IR 10mg q6 PRN  - Acetaminophen 650mg q 6 hrs STANDING  - please to not use hydromorphone.    ID: septic arthritis of hardware  - on Meropenem (started 9/16). Cx growing pseudomonas and staph coag negative  - will switch to cefepime today. Patient will need 6 weeks of antibiotics to be discharged with w picc line   - IR picc line placed for today?. Does not need to be NPO    Ortho: s/p failed arthroplasty s/p abx spacer on 9/13 - OR failed due to hypotension   - ortho on board. 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 Treatment is to get IVIG every 3 weeks.   - on gabapentin and duloxetine for pain.  - being followed by pain management     Cardio: CAD s/p CABG  - on aspirin and atorvastatin  - plavix was held due to suspicion of hemorrhagic shock. In the setting of stable Hb/Hct, will follow up with cardiology to resume plavix  - demand ischemia with troponin elevated. Medical management.   - carvedilol every 12 hours.     Endo: DMII  - FS between   - Well controlled on Lantus 36 and Lispro 12. Will keep same.     Nutrition: evaluated by Nutrition   - good intake (>75% of meals)    Uro: BPH  - on tamulosin     DVT prophylaxis: Lovenox     Attending Attestation:    Pt has been seen and examined. Case and Plan discussed with Resident covering and agree with above note as reviewed.  CIDP on IVIG - stable  Septic Arthritis - s/p ОЛЬГА with ABX Spacer, on Cefepime IV for 6wks per ID. Pain management eval. Pt not lethargic today.   Surgery complicated by Type 2 NSTEMI and Post op Anemia s/p 1PRBC. Off telemetry today and will need f/u upon d/c with Cardiology.    Dispo: [facility] per Wife and Pt for IV Abx as of now NAPOLEON CAST 49y Male  MRN#: [id number]     SUBJECTIVE  Patient is a 49y old Male who presents with a chief complaint of Left hip pain   Currently admitted to medicine with the primary diagnosis of Pain    Today is hospital day 18d, and this morning he is in pain. yesterday he had several bowel movements after almost 2 weeks of no stools    OBJECTIVE  PAST MEDICAL & SURGICAL HISTORY  CIDP (chronic inflammatory demyelinating polyneuropathy)  History of cholecystectomy  History of total left hip replacement  History of total right hip replacement: bilateral  S/P CABG x 5    ALLERGIES:  penicillins (Unknown)    MEDICATIONS:  MEDICATIONS  (STANDING):  aspirin  chewable 81 milliGRAM(s) Oral daily  atorvastatin 80 milliGRAM(s) Oral at bedtime  baclofen 10 milliGRAM(s) Oral two times a day  benzocaine 15 mG/menthol 3.6 mG Lozenge 1 Lozenge Oral three times a day  bisacodyl 5 milliGRAM(s) Oral at bedtime  busPIRone 5 milliGRAM(s) Oral two times a day  carvedilol 3.125 milliGRAM(s) Oral every 12 hours  cefepime   IVPB 2000 milliGRAM(s) IV Intermittent every 8 hours  cefepime   IVPB      chlorhexidine 4% Liquid 1 Application(s) Topical daily  clonazePAM  Tablet 1 milliGRAM(s) Oral three times a day  dextrose 5%. 1000 milliLiter(s) (50 mL/Hr) IV Continuous <Continuous>  dextrose 50% Injectable 25 Gram(s) IV Push once  dextrose 50% Injectable 25 Gram(s) IV Push once  docusate sodium 100 milliGRAM(s) Oral three times a day  DULoxetine 90 milliGRAM(s) Oral daily  enoxaparin Injectable 40 milliGRAM(s) SubCutaneous daily  gabapentin 800 milliGRAM(s) Oral three times a day  hydrOXYzine hydrochloride 50 milliGRAM(s) Oral at bedtime  insulin glargine Injectable (LANTUS) 36 Unit(s) SubCutaneous at bedtime  insulin lispro (HumaLOG) corrective regimen sliding scale   SubCutaneous three times a day before meals  insulin lispro Injectable (HumaLOG) 12 Unit(s) SubCutaneous three times a day before meals  lactulose Syrup 15 Gram(s) Oral every 12 hours  morphine ER Tablet 45 milliGRAM(s) Oral every 12 hours  oxyCODONE  ER Tablet 15 milliGRAM(s) Oral every 12 hours  pantoprazole    Tablet 40 milliGRAM(s) Oral before breakfast  pramipexole 0.5 milliGRAM(s) Oral every 12 hours  QUEtiapine 150 milliGRAM(s) Oral at bedtime  senna 2 Tablet(s) Oral at bedtime  tamsulosin 0.4 milliGRAM(s) Oral at bedtime    MEDICATIONS  (PRN):  acetaminophen   Tablet .. 650 milliGRAM(s) Oral every 6 hours PRN Temp greater or equal to 38C (100.4F)  dextrose 40% Gel 15 Gram(s) Oral once PRN Blood Glucose LESS THAN 70 milliGRAM(s)/deciliter  diclofenac 75 milliGRAM(s) Oral two times a day PRN Moderate Pain (4 - 6)  glucagon  Injectable 1 milliGRAM(s) IntraMuscular once PRN Glucose LESS THAN 70 milligrams/deciliter  morphine Concentrate 5 milliGRAM(s) Oral every 4 hours PRN Severe Pain (7 - 10)  naloxone Injectable 2 milliGRAM(s) IV Push once PRN If no response to 0.4 mg of Narcan    VITAL SIGNS: Last 24 Hours  Vital Signs Last 24 Hrs  T(C): 36.4 (21 Sep 2019 05:21), Max: 37.1 (20 Sep 2019 14:21)  T(F): 97.6 (21 Sep 2019 05:21), Max: 98.8 (20 Sep 2019 14:21)  HR: 85 (21 Sep 2019 05:21) (82 - 99)  BP: 129/63 (21 Sep 2019 05:21) (106/55 - 163/76)  BP(mean): --  RR: 18 (21 Sep 2019 05:21) (16 - 18)  SpO2: 98% (21 Sep 2019 08:31) (97% - 98%)    LABS:                         8.5    7.73  )-----------( 230      ( 21 Sep 2019 04:40 )             26.6                   09-21    139  |  98  |  8<L>  ----------------------------<  105<H>  3.7   |  34<H>  |  0.7    Ca    8.2<L>      21 Sep 2019 04:40    PHYSICAL EXAM:    GENERAL: NAD, well-developed, AAOx3  HEENT:  Atraumatic, Normocephalic. EOMI, PERRLA, conjunctiva and sclera clear, No JVD  PULMONARY: Clear to auscultation bilaterally; No wheeze  CARDIOVASCULAR: Regular rate and rhythm; No murmurs, rubs, or gallops  GASTROINTESTINAL: Soft, Nontender, mild distension; Bowel sounds present  MUSCULOSKELETAL:  2+ Peripheral Pulses, lower extremity edema and upper extremity edema.   NEUROLOGY: movement in upper extremities but no movement of lower extremities  SKIN: No rashes or lesions.   WOUND: left hip wound looks clean. No signs of bleeding into subcutaneous tissue.       ADMISSION SUMMARY  Patient is a 49y old Male who presents with a chief complaint of Left hip pain (17 Sep 2019 04:21)  Hospital course has been as follows:  Initially admitted for lip pain. Work up showed dislocation of his previous arthroplasty. During his hospital stay he developed RUE cellulitis treated with clindamycin.   Workup revealed superolateral dislocation of the left hip and he is s/p failed hip replacement due to hypotension. He was transferred to the SICU and requiring 1 PRBC transfusion and pressors. Pt was weaned off pressors by POD1 in the evening.  Intraop cultures grew pseudomonas. Patient was diagnosed with septic arthritis  He had elevated troponins postop likely from demand ischemia per cardiology and he is on medical management.    He also received loading dose of IVIG during this admission and will continue maintenance dose q3 weeks per neurology.  He was transferred to telemetry for further monitoring       ASSESSMENT & PLAN    49 year old male with a history of CIDP, presenting with hip pain, found to have septic arthitis of hardware (old arthroplasty).     Pain: patient is on multiple pain medications, being followed by pain team  - yesterday patient had 1 episode of decreased RR, dropping to 6, with desaturation  - responded to naloxon  - long acting morphine was decreased to 30mg BID 2 days ago, but patient is in severe pain. Will increase today to 45mg BID and gradually increase to target 60mg BID  Spoke to Damian Barone on recommendations for pain  He suggested as follows:  - Morphine ER 45mg q 12hrs  -d/c morphine concentrate  - d/c oxycodone ER  - start oxycodone IR 10mg q6 PRN  - Acetaminophen 650mg q 6 hrs STANDING  - please to not use hydromorphone.    ID: septic arthritis of hardware  - On Cefepime Patient will need 6 weeks of antibiotics to be discharged with w picc line   - IR picc line placed was supposed to be placed on Friday, but IR backed up. Please follow up on Monday. Does not need to be NPO    Ortho: s/p failed arthroplasty s/p abx spacer on 9/13 - OR failed due to hypotension   - ortho on board. 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 Treatment is to get IVIG every 3 weeks.   - on gabapentin and duloxetine for pain.  - being followed by pain management     Cardio: CAD s/p CABG  - on aspirin and atorvastatin  - plavix was held due to suspicion of hemorrhagic shock. In the setting of stable Hb/Hct, will follow up with cardiology to resume plavix  - demand ischemia with troponin elevated. Medical management.   - carvedilol every 12 hours.     Endo: DMII  - FS between   - Well controlled on Lantus 36 and Lispro 12. Will keep same.     Nutrition: evaluated by Nutrition   - good intake (>75% of meals)    Uro: BPH  - on tamulosin     DVT prophylaxis: Lovenox     Attending Attestation:    Pt has been seen and examined. Case and Plan discussed with Resident covering and agree with above note as reviewed.  CIDP on IVIG - stable  Septic Arthritis - s/p ОЛЬГА with ABX Spacer, on Cefepime IV for 6wks per ID. Pain management eval. Pt not lethargic today.   Surgery complicated by Type 2 NSTEMI and Post op Anemia s/p 1PRBC. Off telemetry today and will need f/u upon d/c with Cardiology.    Dispo: [facility] per Wife and Pt for IV Abx as of now

## 2019-09-21 NOTE — CHART NOTE - NSCHARTNOTEFT_GEN_A_CORE
Spoke to Damian Barone on recommendations for pain  He suggested as follows:  - Morphine ER 45mg q 12hrs  -d/c morphine concentrate  - d/c oxycodone ER  - start oxycodone IR 10mg q6 PRN  - Acetaminophen 650mg q 6 hrs STANDING    I have made these adjustments. Please retain current medications as described above until further evaluation by pain team  - please to not use hydromorphone

## 2019-09-22 NOTE — CHART NOTE - NSCHARTNOTEFT_GEN_A_CORE
was called by radiology informing me pt. unable to complete x ray only able to complete lateral view and coulnt rule out a fracture, ortho was contacted and aware and will assess, will try for repeat x  ray when pt could tolerate

## 2019-09-22 NOTE — PROGRESS NOTE ADULT - SUBJECTIVE AND OBJECTIVE BOX
NAPOLEON CAST 49y Male  MRN#: [id number]     SUBJECTIVE  Patient is a 49y old Male who presents with a chief complaint of Left hip pain   Currently admitted to medicine with the primary diagnosis of Pain    Today is hospital day 18d, and this morning he is in pain. yesterday he had several bowel movements after almost 2 weeks of no stools    OBJECTIVE  PAST MEDICAL & SURGICAL HISTORY  CIDP (chronic inflammatory demyelinating polyneuropathy)  History of cholecystectomy  History of total left hip replacement  History of total right hip replacement: bilateral  S/P CABG x 5    ALLERGIES:  penicillins (Unknown)    MEDICATIONS:  MEDICATIONS  (STANDING):  aspirin  chewable 81 milliGRAM(s) Oral daily  atorvastatin 80 milliGRAM(s) Oral at bedtime  baclofen 10 milliGRAM(s) Oral two times a day  benzocaine 15 mG/menthol 3.6 mG Lozenge 1 Lozenge Oral three times a day  bisacodyl 5 milliGRAM(s) Oral at bedtime  busPIRone 5 milliGRAM(s) Oral two times a day  carvedilol 3.125 milliGRAM(s) Oral every 12 hours  cefepime   IVPB 2000 milliGRAM(s) IV Intermittent every 8 hours     chlorhexidine 4% Liquid 1 Application(s) Topical daily  clonazePAM  Tablet 1 milliGRAM(s) Oral three times a day  dextrose 5%. 1000 milliLiter(s) (50 mL/Hr) IV Continuous <Continuous>  dextrose 50% Injectable 25 Gram(s) IV Push once  dextrose 50% Injectable 25 Gram(s) IV Push once  docusate sodium 100 milliGRAM(s) Oral three times a day  DULoxetine 90 milliGRAM(s) Oral daily  enoxaparin Injectable 40 milliGRAM(s) SubCutaneous daily  gabapentin 800 milliGRAM(s) Oral three times a day  hydrOXYzine hydrochloride 50 milliGRAM(s) Oral at bedtime  insulin glargine Injectable (LANTUS) 36 Unit(s) SubCutaneous at bedtime  insulin lispro (HumaLOG) corrective regimen sliding scale   SubCutaneous three times a day before meals  insulin lispro Injectable (HumaLOG) 12 Unit(s) SubCutaneous three times a day before meals  lactulose Syrup 15 Gram(s) Oral every 12 hours  morphine ER Tablet 45 milliGRAM(s) Oral every 12 hours  oxyCODONE  ER Tablet 15 milliGRAM(s) Oral every 12 hours  pantoprazole    Tablet 40 milliGRAM(s) Oral before breakfast  pramipexole 0.5 milliGRAM(s) Oral every 12 hours  QUEtiapine 150 milliGRAM(s) Oral at bedtime  senna 2 Tablet(s) Oral at bedtime  tamsulosin 0.4 milliGRAM(s) Oral at bedtime    MEDICATIONS  (PRN):  acetaminophen   Tablet .. 650 milliGRAM(s) Oral every 6 hours PRN Temp greater or equal to 38C (100.4F)  dextrose 40% Gel 15 Gram(s) Oral once PRN Blood Glucose LESS THAN 70 milliGRAM(s)/deciliter  diclofenac 75 milliGRAM(s) Oral two times a day PRN Moderate Pain (4 - 6)  glucagon  Injectable 1 milliGRAM(s) IntraMuscular once PRN Glucose LESS THAN 70 milligrams/deciliter  morphine Concentrate 5 milliGRAM(s) Oral every 4 hours PRN Severe Pain (7 - 10)  naloxone Injectable 2 milliGRAM(s) IV Push once PRN If no response to 0.4 mg of Narcan    VITAL SIGNS: Last 24 Hours  T(C): 37.2 (22 Sep 2019 13:16), Max: 37.2 (22 Sep 2019 13:16)  T(F): 99 (22 Sep 2019 13:16), Max: 99 (22 Sep 2019 13:16)  HR: 103 (22 Sep 2019 13:16) (80 - 103)  BP: 144/71 (22 Sep 2019 13:16) (126/72 - 167/94)  RR: 18 (22 Sep 2019 13:16) (18 - 18)  SpO2: 95% (22 Sep 2019 06:09) (92% - 96%)    LABS:                          10.1   7.50  )-----------( 252      ( 22 Sep 2019 08:50 )             31.3       09-22    135  |  97<L>  |  9<L>  ----------------------------<  171<H>  4.0   |  29  |  0.7    Ca    8.6      22 Sep 2019 08:50      PHYSICAL EXAM:    GENERAL: NAD, well-developed, AAOx3  HEENT:  Atraumatic, Normocephalic. EOMI, PERRLA, conjunctiva and sclera clear, No JVD  PULMONARY: Clear to auscultation bilaterally; No wheeze  CARDIOVASCULAR: Regular rate and rhythm; No murmurs, rubs, or gallops  GASTROINTESTINAL: Soft, Nontender, mild distension; Bowel sounds present  MUSCULOSKELETAL:  2+ Peripheral Pulses, lower extremity edema and upper extremity edema.   NEUROLOGY: movement in upper extremities but no movement of lower extremities  SKIN: No rashes or lesions.   WOUND: left hip wound looks clean. No signs of bleeding into subcutaneous tissue.       ADMISSION SUMMARY  Patient is a 49y old Male who presents with a chief complaint of Left hip pain (17 Sep 2019 04:21)  Hospital course has been as follows:  Initially admitted for lip pain. Work up showed dislocation of his previous arthroplasty. During his hospital stay he developed RUE cellulitis treated with clindamycin.   Workup revealed superolateral dislocation of the left hip and he is s/p failed hip replacement due to hypotension. He was transferred to the SICU and requiring 1 PRBC transfusion and pressors. Pt was weaned off pressors by POD1 in the evening.  Intraop cultures grew pseudomonas. Patient was diagnosed with septic arthritis  He had elevated troponins postop likely from demand ischemia per cardiology and he is on medical management.    He also received loading dose of IVIG during this admission and will continue maintenance dose q3 weeks per neurology.  He was transferred to telemetry for further monitoring       ASSESSMENT & PLAN    49 year old male with a history of CIDP, presenting with hip pain, found to have septic arthitis of hardware (old arthroplasty).     Pain: patient is on multiple pain medications, being followed by pain team  - yesterday patient had 1 episode of decreased RR, dropping to 6, with desaturation  - responded to naloxon  - Pain Management Consultation:   - Morphine ER 45mg q 12hrs (can increase to 60mg bid)   - start oxycodone IR 10mg q6 PRN  - Acetaminophen 650mg q 6 hrs STANDING    ID: septic arthritis of hardware  - On Cefepime Patient will need 6 weeks of antibiotics to be discharged with w picc line   - IR picc line placed was supposed to be placed on Friday, but IR backed up. Please follow up on Monday. Does not need to be NPO    Ortho: s/p failed arthroplasty s/p abx spacer on 9/13 - OR failed due to hypotension   - ortho on board. They do not plan to re-evaluate the hip  - f/u xray left knee     CIDP: s/p IVIG in hospital (chronic inflammatory demyelinating polyneuropathy)  - being followed by neurology. Treatment is to get IVIG every 3 weeks.   - on gabapentin and duloxetine for pain.  - being followed by pain management     Cardio: CAD s/p CABG  - on aspirin and atorvastatin  - plavix was held due to suspicion of hemorrhagic shock. In the setting of stable Hb/Hct, will follow up with cardiology to resume plavix  - demand ischemia with troponin elevated. Medical management.   - carvedilol every 12 hours.     Endo: DMII  - FS between   - Well controlled on Lantus 36 and Lispro 12. Will keep same.     Nutrition: evaluated by Nutrition   - good intake (>75% of meals)    Uro: BPH  - on tamulosin     DVT prophylaxis: Lovenox     Dispo: [facility] when available / IV Cefepime x 6wks for Septic Joint s/p Surgery with Abx Spacer Implantation / f/u ID weekly at NH

## 2019-09-23 NOTE — PROGRESS NOTE ADULT - SUBJECTIVE AND OBJECTIVE BOX
NAPOLEON CAST  49y Male    INTERVAL HPI/OVERNIGHT EVENTS:    Pt c/o left rib pain that occurred after he was moved.  Still with left knee pain - for Ortho f/u.    T(F): 98 (09-23-19 @ 04:00), Max: 99 (09-22-19 @ 13:16)  HR: 97 (09-23-19 @ 04:00) (91 - 103)  BP: 123/58 (09-23-19 @ 04:00) (123/58 - 145/76)  RR: 18 (09-23-19 @ 04:00) (18 - 18)  SpO2: --  I&O's Summary    22 Sep 2019 07:01  -  23 Sep 2019 07:00  --------------------------------------------------------  IN: 0 mL / OUT: 400 mL / NET: -400 mL      CAPILLARY BLOOD GLUCOSE      POCT Blood Glucose.: 71 mg/dL (23 Sep 2019 11:24)  POCT Blood Glucose.: 125 mg/dL (23 Sep 2019 07:58)  POCT Blood Glucose.: 147 mg/dL (22 Sep 2019 20:59)  POCT Blood Glucose.: 85 mg/dL (22 Sep 2019 16:20)  POCT Blood Glucose.: 104 mg/dL (22 Sep 2019 11:39)        PHYSICAL EXAM:  GENERAL: NAD  HEAD:  Normocephalic  EYES:  conjunctiva and sclera clear  ENMT: Moist mucous membranes  NECK: Supple  NERVOUS SYSTEM:  Alert & Oriented X3, Good concentration, able to move right toes, LE paresis  CHEST/LUNG: Clear to percussion bilaterally; No rales, rhonchi, wheezing  HEART: Regular rate and rhythm; No murmurs  ABDOMEN: Soft, Nontender, less distended; Bowel sounds present  EXTREMITIES:   Left thigh + dressing  SKIN: pale     Consultant(s) Notes Reviewed:  [x ] YES  [ ] NO  Care Discussed with Consultants/Other Providers [ x] YES  [ ] NO    MEDICATIONS  (STANDING):  acetaminophen   Tablet .. 650 milliGRAM(s) Oral every 6 hours  aspirin  chewable 81 milliGRAM(s) Oral daily  atorvastatin 80 milliGRAM(s) Oral at bedtime  baclofen 10 milliGRAM(s) Oral two times a day  benzocaine 15 mG/menthol 3.6 mG Lozenge 1 Lozenge Oral three times a day  bisacodyl 5 milliGRAM(s) Oral at bedtime  busPIRone 5 milliGRAM(s) Oral two times a day  carvedilol 3.125 milliGRAM(s) Oral every 12 hours  cefepime   IVPB 2000 milliGRAM(s) IV Intermittent every 8 hours  cefepime   IVPB      chlorhexidine 4% Liquid 1 Application(s) Topical daily  dextrose 5%. 1000 milliLiter(s) (50 mL/Hr) IV Continuous <Continuous>  dextrose 50% Injectable 25 Gram(s) IV Push once  dextrose 50% Injectable 25 Gram(s) IV Push once  docusate sodium 100 milliGRAM(s) Oral three times a day  DULoxetine 90 milliGRAM(s) Oral daily  enoxaparin Injectable 40 milliGRAM(s) SubCutaneous daily  gabapentin 800 milliGRAM(s) Oral three times a day  hydrOXYzine hydrochloride 50 milliGRAM(s) Oral at bedtime  insulin glargine Injectable (LANTUS) 36 Unit(s) SubCutaneous at bedtime  insulin lispro (HumaLOG) corrective regimen sliding scale   SubCutaneous three times a day before meals  insulin lispro Injectable (HumaLOG) 12 Unit(s) SubCutaneous three times a day before meals  lactulose Syrup 15 Gram(s) Oral every 12 hours  morphine ER Tablet 45 milliGRAM(s) Oral every 12 hours  pantoprazole    Tablet 40 milliGRAM(s) Oral before breakfast  pramipexole 0.5 milliGRAM(s) Oral every 12 hours  QUEtiapine 150 milliGRAM(s) Oral at bedtime  senna 2 Tablet(s) Oral at bedtime  tamsulosin 0.4 milliGRAM(s) Oral at bedtime    MEDICATIONS  (PRN):  dextrose 40% Gel 15 Gram(s) Oral once PRN Blood Glucose LESS THAN 70 milliGRAM(s)/deciliter  diclofenac 75 milliGRAM(s) Oral two times a day PRN Moderate Pain (4 - 6)  glucagon  Injectable 1 milliGRAM(s) IntraMuscular once PRN Glucose LESS THAN 70 milligrams/deciliter  naloxone Injectable 2 milliGRAM(s) IV Push once PRN If no response to 0.4 mg of Narcan  oxyCODONE    IR 10 milliGRAM(s) Oral every 6 hours PRN Moderate Pain (4 - 6)      LABS:                        10.1   7.50  )-----------( 252      ( 22 Sep 2019 08:50 )             31.3     09-22    135  |  97<L>  |  9<L>  ----------------------------<  171<H>  4.0   |  29  |  0.7    Ca    8.6      22 Sep 2019 08:50            RADIOLOGY & ADDITIONAL TESTS:    Imaging or report Personally Reviewed:  [x ] YES  [ ] NO    < from: Xray Knee 1 or 2 Views, Left (09.21.19 @ 18:17) >  IMPRESSION: Possible distal femoral fracture limited on single   projection. Consider orthopedic follow-up/noncontrast CT evaluation of   the left knee if pain persists    < end of copied text >      Case discussed with resident    Care discussed with pt

## 2019-09-23 NOTE — PROGRESS NOTE ADULT - ASSESSMENT
Assessment and Plan:  pending final note    Pain: patient is on multiple pain medications, being followed by pain team  - patient had 1 episode of decreased RR, dropping to 6, with desaturation that responded to naloxon  - Pain Management Consultation: Morphine ER 45mg q 12hrs, oxycodone IR 10mg q6 PRN,  Acetaminophen 650mg q 6 hrs STANDING    ID: septic arthritis of hardware  - On Cefepime Patient will need 6 weeks of antibiotics to be discharged with w picc line   - IR picc line placed was supposed to be placed on Friday, but IR backed up. Please follow up on Monday. Does not need to be NPO    Ortho: s/p failed arthroplasty s/p abx spacer on 9/13 - OR failed due to hypotension   - ortho on board. They do not plan to re-evaluate the hip  - xray left knee: possible distal femoral fx (lmtd view). 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 Treatment is to get IVIG every 3 weeks.   - on gabapentin and duloxetine for pain.  - being followed by pain management     Cardio: CAD s/p CABG  - on aspirin and atorvastatin  - plavix was held due to suspicion of hemorrhagic shock. In the setting of stable Hb/Hct, will follow up with cardiology to resume plavix  - demand ischemia with troponin elevated. Medical management.   - carvedilol every 12 hours.     Endo: DMII  - FS between   - Well controlled on Lantus 36 and Lispro 12. Will keep same.     Nutrition: evaluated by Nutrition   - good intake (>75% of meals)    Uro: BPH  - on tamulosin     DVT ppx: Lovenox  GI ppx: protonix  Code Status: full code  Dispo: SNF for prolonged abx course (per pt he & wife unable to administer the IV antibiotics). Will need weekly ID f/u at NH (has abx space implantation Assessment and Plan:    50y/o Male, w/ extensive PMHx CAD, ??patient reports "silent MI", s/p CABG x5 vessels in 2011, BPH, depression, anxiety, DM and CIDP s/p post IVIG treatment  b/l total arthroplasty who presented w/ cc of LLE pain admitted for dislocation of the L hip total arthoplasty & septic arthritis 2/2 to Pseudomonas currently on cefepime s/p washout & abx spacer implantation    Hospital course complicated by RUE cellulitis tx w/ clindamycin. IVIG infusion complicated by hypotension. Failed hip replacement due to hypotension, troponemia and acute drop in H&H. He required pressors that were weaned off POD 1. Found to have septic arthritis with pseudomonas on intraop culture. He had an episode of unresponsiveness attributed to his pain medication, responded to naloxone.     Pain: patient is on multiple pain medications, being followed by pain team  - patient had 1 episode of decreased RR, dropping to 6, with desaturation that responded to naloxone  - Pain Management Consultation: Morphine ER 45mg q 12hrs, oxycodone IR 10mg q6 PRN,  Acetaminophen 650mg q 6 hrs STANDING  - can increase Morphine ER to 60 q12 if pain not controlled on 45  - complained of rib pain, chest xray was done this AM will follow up results     ID: septic arthritis of hardware  - Intraop cultures grew pseudomonas and Coag negative staph, currently on cefepime  - pt will need 6 weeks of antibiotics, PICC line to be placed today by IR (was scheduled for Friday but canceled)  - per patient, no one at home can administer abx, will need SNF     Ortho: s/p failed arthroplasty s/p abx spacer on 9/13 - OR failed due to hypotension   - ortho on board. 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)  - s/p 5 doses of IvIg, IVIG is every 3 weeks per neuro (last dose on 9/12/19  - also on gabapentin and duloxetine for pain  - being followed by pain management     Cardio: CAD s/p CABG  - on aspirin and atorvastatin, carvedilol q12  - Plavix was held due to suspicion of hemorrhagic shock (low H/H, hypotension post op)  - post op patient had elevated Trops likely 2/2 to demand ischemia  - will follow up cardio regarding restarting plavix    Endo: DMII  - FS between   - c/w Lantus 36 and Lispro 12  - FS TID & qHS    Nutrition: evaluated by Nutrition   - good intake (>75% of meals)    Uro: BPH  - on tamsulosin     DVT ppx: Lovenox  GI ppx: protonix  Code Status: full code  Dispo: SNF for prolonged abx course (per pt he & wife unable to administer the IV antibiotics). Will need weekly ID f/u at NH (has abx space implantation Assessment and Plan:    50y/o Male, w/ extensive PMHx CAD, ??patient reports "silent MI", s/p CABG x5 vessels in 2011, BPH, depression, anxiety, DM and CIDP s/p post IVIG treatment  b/l total arthroplasty who presented w/ cc of LLE pain admitted for dislocation of the L hip total arthoplasty & septic arthritis 2/2 to Pseudomonas currently on cefepime s/p washout & abx spacer implantation    Hospital course complicated by RUE cellulitis tx w/ clindamycin. IVIG infusion complicated by hypotension. Failed hip replacement due to hypotension, troponemia and acute drop in H&H. He required pressors that were weaned off POD 1. Found to have septic arthritis with pseudomonas on intraop culture. He had an episode of unresponsiveness attributed to his pain medication, responded to naloxone.     Pain: patient is on multiple pain medications, being followed by pain team  - patient had 1 episode of decreased RR, dropping to 6, with desaturation that responded to naloxone  - Pain Management Consultation: Morphine ER 45mg q 12hrs, oxycodone IR 10mg q6 PRN,  Acetaminophen 650mg q 6 hrs STANDING  - can increase Morphine ER to 60 q12 if pain not controlled on 45  - complained of rib pain, chest xray was done this AM, negative for acute pathology    ID: septic arthritis of hardware  - Intraop cultures grew pseudomonas and Coag negative staph, currently on cefepime  - pt will need 6 weeks of antibiotics, PICC line to be placed today by IR (was scheduled for Friday but canceled)  - per patient, no one at home can administer abx, will need SNF   - pt will need a total of 6 weeks of IV abx. Per ID, will end 10/25/19    Ortho: s/p failed arthroplasty s/p abx spacer on 9/13 - OR failed due to hypotension   - ortho on board. 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)  - s/p 5 doses of IvIg, IVIG is every 3 weeks per neuro (last dose on 9/12/19  - also on gabapentin and duloxetine for pain  - being followed by pain management     Cardio: CAD s/p CABG  - on aspirin and atorvastatin, carvedilol q12  - Plavix was held due to suspicion of hemorrhagic shock (low H/H, hypotension post op)  - post op patient had elevated Trops likely 2/2 to demand ischemia  - will follow up cardio regarding restarting plavix    Endo: DMII  - FS between   - c/w Lantus 36 and Lispro 12  - FS TID & qHS    Nutrition: evaluated by Nutrition   - good intake (>75% of meals)    Uro: BPH  - on tamsulosin     DVT ppx: Lovenox  GI ppx: protonix  Code Status: full code  Dispo: SNF for prolonged abx course (per pt he & wife unable to administer the IV antibiotics). Will need weekly ID f/u at NH (has abx space implantation Assessment and Plan:    48y/o Male, w/ extensive PMHx CAD, ??patient reports "silent MI", s/p CABG x5 vessels in 2011, BPH, depression, anxiety, DM and CIDP s/p post IVIG treatment  b/l total arthroplasty who presented w/ cc of LLE pain admitted for dislocation of the L hip total arthoplasty & septic arthritis 2/2 to Pseudomonas currently on cefepime s/p washout & abx spacer implantation    Hospital course complicated by RUE cellulitis tx w/ clindamycin. IVIG infusion complicated by hypotension. Failed hip replacement due to hypotension, troponemia and acute drop in H&H. He required pressors that were weaned off POD 1. Found to have septic arthritis with pseudomonas on intraop culture. He had an episode of unresponsiveness attributed to his pain medication, responded to naloxone.     Pain: patient is on multiple pain medications, being followed by pain team  - patient had 1 episode of decreased RR, dropping to 6, with desaturation that responded to naloxone  - Pain Management Consultation: Morphine ER 45mg q 12hrs, oxycodone IR 10mg q6 PRN,  Acetaminophen 650mg q 6 hrs STANDING  - can increase Morphine ER to 60 q12 if pain not controlled on 45  - complained of rib pain, chest xray was done this AM, negative for acute pathology    ID: septic arthritis of hardware  - Intraop cultures grew pseudomonas and Coag negative staph, currently on cefepime  - pt will need 6 weeks of antibiotics, PICC line to be placed today by IR (was scheduled for Friday but canceled)  - per patient, no one at home can administer abx, will need SNF   - pt will need a total of 6 weeks of IV abx. Per ID, will end 10/25/19    Ortho: s/p failed arthroplasty s/p abx spacer on 9/13 - OR failed due to hypotension   - ortho on board. 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)  - s/p 5 doses of IvIg, IVIG is every 3 weeks per neuro (last dose on 9/12/19  - also on gabapentin and duloxetine for pain  - being followed by pain management     Cardio: CAD s/p CABG (5 vessel disease)  - on aspirin and atorvastatin, carvedilol q12  - Plavix was held due to suspicion of hemorrhagic shock (low H/H, hypotension post op)  - post op patient had elevated Trops likely 2/2 to demand ischemia  - will follow up cardio regarding restarting plavix    Endo: DMII  - FS between   - c/w Lantus 36 and Lispro 12  - FS TID & qHS    Nutrition: evaluated by Nutrition   - good intake (>75% of meals)    Uro: BPH  - on tamsulosin     DVT ppx: Lovenox  GI ppx: protonix  Code Status: full code  Dispo: SNF for prolonged abx course (per pt he & wife unable to administer the IV antibiotics). Will need weekly ID f/u at NH (has abx space implantation

## 2019-09-23 NOTE — PROGRESS NOTE ADULT - SUBJECTIVE AND OBJECTIVE BOX
Hospital Day:  20d    Subjective:    Patient is a 49y old  Male who presents with a chief complaint of Left hip pain admitted for septic arthritis and dislocation of left hip joint. Overnight no acute events. This morning resting comfortably in bed. Complains of bilateral hand pain & neuropathy, would like OT consult. Patient also complains of rib pain.       Past Medical Hx:   CIDP (chronic inflammatory demyelinating polyneuropathy)    Past Sx:  History of cholecystectomy  History of total left hip replacement  History of total right hip replacement  S/P CABG x 5    Allergies:  penicillins (Unknown)    Current Meds:   Standng Meds:  acetaminophen   Tablet .. 650 milliGRAM(s) Oral every 6 hours  aspirin  chewable 81 milliGRAM(s) Oral daily  atorvastatin 80 milliGRAM(s) Oral at bedtime  baclofen 10 milliGRAM(s) Oral two times a day  benzocaine 15 mG/menthol 3.6 mG Lozenge 1 Lozenge Oral three times a day  bisacodyl 5 milliGRAM(s) Oral at bedtime  busPIRone 5 milliGRAM(s) Oral two times a day  carvedilol 3.125 milliGRAM(s) Oral every 12 hours  cefepime   IVPB 2000 milliGRAM(s) IV Intermittent every 8 hours  cefepime   IVPB      chlorhexidine 4% Liquid 1 Application(s) Topical daily  dextrose 5%. 1000 milliLiter(s) (50 mL/Hr) IV Continuous <Continuous>  dextrose 50% Injectable 25 Gram(s) IV Push once  dextrose 50% Injectable 25 Gram(s) IV Push once  docusate sodium 100 milliGRAM(s) Oral three times a day  DULoxetine 90 milliGRAM(s) Oral daily  enoxaparin Injectable 40 milliGRAM(s) SubCutaneous daily  gabapentin 800 milliGRAM(s) Oral three times a day  hydrOXYzine hydrochloride 50 milliGRAM(s) Oral at bedtime  insulin glargine Injectable (LANTUS) 36 Unit(s) SubCutaneous at bedtime  insulin lispro (HumaLOG) corrective regimen sliding scale   SubCutaneous three times a day before meals  insulin lispro Injectable (HumaLOG) 12 Unit(s) SubCutaneous three times a day before meals  lactulose Syrup 15 Gram(s) Oral every 12 hours  morphine ER Tablet 45 milliGRAM(s) Oral every 12 hours  pantoprazole    Tablet 40 milliGRAM(s) Oral before breakfast  pramipexole 0.5 milliGRAM(s) Oral every 12 hours  QUEtiapine 150 milliGRAM(s) Oral at bedtime  senna 2 Tablet(s) Oral at bedtime  tamsulosin 0.4 milliGRAM(s) Oral at bedtime    PRN Meds:  dextrose 40% Gel 15 Gram(s) Oral once PRN Blood Glucose LESS THAN 70 milliGRAM(s)/deciliter  diclofenac 75 milliGRAM(s) Oral two times a day PRN Moderate Pain (4 - 6)  glucagon  Injectable 1 milliGRAM(s) IntraMuscular once PRN Glucose LESS THAN 70 milligrams/deciliter  naloxone Injectable 2 milliGRAM(s) IV Push once PRN If no response to 0.4 mg of Narcan  oxyCODONE    IR 10 milliGRAM(s) Oral every 6 hours PRN Moderate Pain (4 - 6)    HOME MEDICATIONS:  Atarax 50 mg oral tablet: 1 tab(s) orally once a day (at bedtime)  baclofen 10 mg oral tablet: 1 tab(s) orally 2 times a day  busPIRone 5 mg oral tablet: 1 tab(s) orally 2 times a day  DULoxetine 60 mg oral delayed release capsule: 1 cap(s) orally once a day  Flomax 0.4 mg oral capsule: 1 cap(s) orally once a day  gabapentin 400 mg oral tablet: 1 tab(s) orally 3 times a day (before meals)  morphine 10 mg/5 mL oral solution: 5 milligram(s) orally every 3 hours, As Needed  morphine 60 mg oral capsule, extended release: 1 cap(s) orally every 12 hours  Plavix 75 mg oral tablet: 1 tab(s) orally once a day  SEROquel  mg oral tablet, extended release: 1 tab(s) orally once a day (in the evening)  Xanax 1 mg oral tablet: 1 tab(s) orally 3 times a day      Vital Signs:   T(F): 98 (09-23-19 @ 04:00), Max: 99 (09-22-19 @ 13:16)  HR: 97 (09-23-19 @ 04:00) (91 - 103)  BP: 123/58 (09-23-19 @ 04:00) (123/58 - 145/76)  RR: 18 (09-23-19 @ 04:00) (18 - 18)  SpO2: --      09-22-19 @ 07:01  -  09-23-19 @ 07:00  --------------------------------------------------------  IN: 0 mL / OUT: 400 mL / NET: -400 mL        Physical Exam:   GENERAL: NAD  HEENT: NCAT  CHEST/LUNG: CTA b/l  HEART: Regular rate and rhythm; s1 s2 appreciated, No murmurs, rubs, or gallops  ABDOMEN: Soft, Nontender, Nondistended; Bowel sounds present  EXTREMITIES: Pitting edema in b/l LE. Can wiggle his toes on right side, unable to lift up either legs. Loss of light touch sensation b/l L>R.   NERVOUS SYSTEM:  Alert & Oriented X3        Labs:                         10.1   7.50  )-----------( 252      ( 22 Sep 2019 08:50 )             31.3       22 Sep 2019 08:50    135    |  97     |  9      ----------------------------<  171    4.0     |  29     |  0.7      Ca    8.6        22 Sep 2019 08:50            Hemoglobin A1C, Whole Blood: 11.4 % (09-05-19 @ 10:40)    Culture - Blood (collected 09-19-19 @ 16:26)  Source: .Blood None  Preliminary Report (09-21-19 @ 02:15):    No growth to date.    Culture - Blood (collected 09-18-19 @ 04:43)  Source: .Blood None  Preliminary Report (09-19-19 @ 14:01):    No growth to date.        Radiology:

## 2019-09-23 NOTE — PROGRESS NOTE ADULT - SUBJECTIVE AND OBJECTIVE BOX
ORTHO PROGRESS NOTE     49yMale POD # 10  S/P Explantation of left hemiarthroplasty      Patient seen and examined at bedside . The patient is awake and alert in NAD. No complaints of chest pain, SOB, N/V.    MEDICATIONS  (STANDING):  acetaminophen   Tablet .. 650 milliGRAM(s) Oral every 6 hours  aspirin  chewable 81 milliGRAM(s) Oral daily  atorvastatin 80 milliGRAM(s) Oral at bedtime  baclofen 10 milliGRAM(s) Oral two times a day  benzocaine 15 mG/menthol 3.6 mG Lozenge 1 Lozenge Oral three times a day  bisacodyl 5 milliGRAM(s) Oral at bedtime  busPIRone 5 milliGRAM(s) Oral two times a day  carvedilol 3.125 milliGRAM(s) Oral every 12 hours  cefepime   IVPB 2000 milliGRAM(s) IV Intermittent every 8 hours  cefepime   IVPB      chlorhexidine 4% Liquid 1 Application(s) Topical daily  dextrose 5%. 1000 milliLiter(s) (50 mL/Hr) IV Continuous <Continuous>  dextrose 50% Injectable 25 Gram(s) IV Push once  dextrose 50% Injectable 25 Gram(s) IV Push once  docusate sodium 100 milliGRAM(s) Oral three times a day  DULoxetine 90 milliGRAM(s) Oral daily  enoxaparin Injectable 40 milliGRAM(s) SubCutaneous daily  gabapentin 800 milliGRAM(s) Oral three times a day  hydrOXYzine hydrochloride 50 milliGRAM(s) Oral at bedtime  insulin glargine Injectable (LANTUS) 36 Unit(s) SubCutaneous at bedtime  insulin lispro (HumaLOG) corrective regimen sliding scale   SubCutaneous three times a day before meals  insulin lispro Injectable (HumaLOG) 12 Unit(s) SubCutaneous three times a day before meals  lactulose Syrup 15 Gram(s) Oral every 12 hours  morphine ER Tablet 45 milliGRAM(s) Oral every 12 hours  pantoprazole    Tablet 40 milliGRAM(s) Oral before breakfast  pramipexole 0.5 milliGRAM(s) Oral every 12 hours  QUEtiapine 150 milliGRAM(s) Oral at bedtime  senna 2 Tablet(s) Oral at bedtime  tamsulosin 0.4 milliGRAM(s) Oral at bedtime    MEDICATIONS  (PRN):  dextrose 40% Gel 15 Gram(s) Oral once PRN Blood Glucose LESS THAN 70 milliGRAM(s)/deciliter  diclofenac 75 milliGRAM(s) Oral two times a day PRN Moderate Pain (4 - 6)  glucagon  Injectable 1 milliGRAM(s) IntraMuscular once PRN Glucose LESS THAN 70 milligrams/deciliter  naloxone Injectable 2 milliGRAM(s) IV Push once PRN If no response to 0.4 mg of Narcan  oxyCODONE    IR 10 milliGRAM(s) Oral every 6 hours PRN Moderate Pain (4 - 6)      Vital Signs Last 24 Hrs  T(C): 36.7 (23 Sep 2019 12:20), Max: 37.1 (22 Sep 2019 20:50)  T(F): 98 (23 Sep 2019 12:20), Max: 98.8 (22 Sep 2019 20:50)  HR: 87 (23 Sep 2019 12:20) (87 - 97)  BP: 154/83 (23 Sep 2019 12:20) (123/58 - 154/83)  BP(mean): --  RR: 18 (23 Sep 2019 12:20) (18 - 18)  SpO2: --    PE: NAD  LLE  Dressing C/D/I   limited motor/sensory fx at baseline                        10.1   7.50  )-----------( 252      ( 22 Sep 2019 08:50 )             31.3     09-22    135  |  97<L>  |  9<L>  ----------------------------<  171<H>  4.0   |  29  |  0.7    Ca    8.6      22 Sep 2019 08:50      09-22-19 @ 07:01  -  09-23-19 @ 07:00  --------------------------------------------------------  IN: 0 mL / OUT: 400 mL / NET: -400 mL    09-23-19 @ 07:01  -  09-23-19 @ 13:55  --------------------------------------------------------  IN: 0 mL / OUT: 600 mL / NET: -600 mL    A/P: 49yMale s/p Explantation of left hemiarthroplasty/Girdlestone procedure and antibiotic beads placement on 9/13/19  - antibiotics per ID  - DVT prophylaxis  - pain control  - fall precautions

## 2019-09-23 NOTE — PROGRESS NOTE ADULT - ASSESSMENT
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  Workup revealed superolateral dislocation of the left hip and he is s/p explantation of Left total hip arthroplasty and insertion of ABX spacer. Post op course was complicated by hypotension, requiring 1 PRBC transfusion and pressors. Pt was weaned off pressors by POD1 in the evening.   He had elevated troponins postop likely from demand ischemia per cardiology and he is on medical management.    Preop, he received loading dose of IVIG during this admission and will continue maintenance dose q3 weeks per neurology.  He was transferred to telemetry and now downgraded to medical floor.    1. Dislocation of left hip s/p explant of left ОЛЬГА and insertion of abx spacer on 9/13  OR culture + for Pseudomonas from hip - pansensitive  on cefepime   needs picc line prior to discharge - for today per resident  6 weeks of abx per ID  pain control  Ortho f/u re: possible femur fracture    2. NSTEMI type 2  ASA/statin/Bblocker  start Plavix if H/H remains stable and if recommended by cardiology  normal EF on ECHO  cardio f/u - Dr. Cid  no plan for urgent cath per last note    3. CIDP - for IVIG q3 weeks per neurology - next dose due 10/3  needs premedication and IVIG to be run over 6 hours    4. CAD s/p CABG - continue medical management    5. DM - controlled on insulin    6. Anemia due to acute blood loss postop  s/p transfusion with 1 unit PRBC on 9/15 with appropriate rise in Hgb  s/p another unit on 9/17 for Hgb 6.4 and now Hgb >9 (? lab error in Hgb 6.4)  monitor H/H - remains stable  no active bleeding on CT scans    7. Episode of decreased responsiveness and respiratory depression that responded to Narcan x 2 last weeks  meds adjusted and pt now tolerating  on MS Contin 45mg q12hr  on oxycodone prn  if pt still in pain, then can increase MS Contin to 60mg q12hr and monitor response  pain management f/u appreciated    8. Depression/anxiety  seen by psychiatry this admission  recommendations being followed  needs close outpt f/u    9. BPH on flomax    10. + MRSA nasal swab - on mupirocin and CHG baths    11. DVT prophylaxis    12. Constipation - improved on current regimen  monitor BM's      PROGRESS NOTE HANDOFF    Pending: ortho f/u, cardio f/u, PICC line    Disposition: SNF placement

## 2019-09-24 NOTE — PROCEDURE NOTE - NSPROCDETAILS_GEN_ALL_CORE
supine position/location identified, draped/prepped, sterile technique used/ultrasound assessment/sterile dressing applied/sterile technique, catheter placed/ultrasound guidance
guidewire recovered/sterile dressing applied/ultrasound guidance/sterile technique, catheter placed/lumen(s) aspirated and flushed

## 2019-09-24 NOTE — OCCUPATIONAL THERAPY INITIAL EVALUATION ADULT - RANGE OF MOTION EXAMINATION
SEvere impairment B./L LE's, R UE approx. 2+ shoulder, 3-/5 elbow and 2+ to 3-/5 hand. L UE 3-/5 shoulder, elbow and hand/deficits as listed below

## 2019-09-24 NOTE — PROCEDURE NOTE - ADDITIONAL PROCEDURE DETAILS
Single lumen 5 Mal[place], 38 cm Bard PowerPicc  Tip at cavoatrial junction  Catheter ready for immediate use

## 2019-09-24 NOTE — PROGRESS NOTE ADULT - SUBJECTIVE AND OBJECTIVE BOX
SERENENAPOLEON  49y Male    INTERVAL HPI/OVERNIGHT EVENTS:    Pt had PICC line placed today. MS Contin will be increased to 60mg q12hr starting tonight.   Still with pain of left side of chest and left knee.  Last BM 2 days ago.    T(F): 97.4 (09-24-19 @ 05:16), Max: 97.5 (09-23-19 @ 20:52)  HR: 104 (09-24-19 @ 05:16) (85 - 104)  BP: 100/58 (09-24-19 @ 05:16) (100/58 - 156/81)  RR: 18 (09-24-19 @ 05:16) (18 - 18)  SpO2: --  I&O's Summary    23 Sep 2019 07:01  -  24 Sep 2019 07:00  --------------------------------------------------------  IN: 0 mL / OUT: 1400 mL / NET: -1400 mL    24 Sep 2019 07:01  -  24 Sep 2019 13:46  --------------------------------------------------------  IN: 0 mL / OUT: 500 mL / NET: -500 mL      CAPILLARY BLOOD GLUCOSE      POCT Blood Glucose.: 214 mg/dL (24 Sep 2019 11:17)  POCT Blood Glucose.: 113 mg/dL (24 Sep 2019 07:33)  POCT Blood Glucose.: 132 mg/dL (23 Sep 2019 21:10)  POCT Blood Glucose.: 157 mg/dL (23 Sep 2019 16:11)        PHYSICAL EXAM:  GENERAL: NAD  HEAD:  Normocephalic  EYES:  conjunctiva and sclera clear  ENMT: Moist mucous membranes  NECK: Supple  NERVOUS SYSTEM:  Alert & Oriented X3, Good concentration, LE paresis  CHEST/LUNG: Clear to percussion bilaterally; No rales, rhonchi, wheezing  HEART: Regular rate and rhythm  ABDOMEN: Soft, Nontender, Nondistended; Bowel sounds present  EXTREMITIES:   PICC line right arm      Consultant(s) Notes Reviewed:  [x ] YES  [ ] NO  Care Discussed with Consultants/Other Providers [ x] YES  [ ] NO    MEDICATIONS  (STANDING):  acetaminophen   Tablet .. 650 milliGRAM(s) Oral every 6 hours  aspirin  chewable 81 milliGRAM(s) Oral daily  atorvastatin 80 milliGRAM(s) Oral at bedtime  baclofen 10 milliGRAM(s) Oral two times a day  benzocaine 15 mG/menthol 3.6 mG Lozenge 1 Lozenge Oral three times a day  bisacodyl 5 milliGRAM(s) Oral at bedtime  busPIRone 5 milliGRAM(s) Oral two times a day  carvedilol 3.125 milliGRAM(s) Oral every 12 hours  cefepime   IVPB 2000 milliGRAM(s) IV Intermittent every 8 hours  cefepime   IVPB      chlorhexidine 4% Liquid 1 Application(s) Topical daily  dextrose 5%. 1000 milliLiter(s) (50 mL/Hr) IV Continuous <Continuous>  dextrose 50% Injectable 25 Gram(s) IV Push once  dextrose 50% Injectable 25 Gram(s) IV Push once  docusate sodium 100 milliGRAM(s) Oral three times a day  DULoxetine 90 milliGRAM(s) Oral daily  enoxaparin Injectable 40 milliGRAM(s) SubCutaneous daily  gabapentin 800 milliGRAM(s) Oral three times a day  hydrOXYzine hydrochloride 50 milliGRAM(s) Oral at bedtime  insulin glargine Injectable (LANTUS) 36 Unit(s) SubCutaneous at bedtime  insulin lispro (HumaLOG) corrective regimen sliding scale   SubCutaneous three times a day before meals  insulin lispro Injectable (HumaLOG) 12 Unit(s) SubCutaneous three times a day before meals  lactulose Syrup 15 Gram(s) Oral every 12 hours  morphine ER Tablet 60 milliGRAM(s) Oral two times a day  pantoprazole    Tablet 40 milliGRAM(s) Oral before breakfast  pramipexole 0.5 milliGRAM(s) Oral every 12 hours  QUEtiapine 150 milliGRAM(s) Oral at bedtime  senna 2 Tablet(s) Oral at bedtime  tamsulosin 0.4 milliGRAM(s) Oral at bedtime    MEDICATIONS  (PRN):  dextrose 40% Gel 15 Gram(s) Oral once PRN Blood Glucose LESS THAN 70 milliGRAM(s)/deciliter  diclofenac 75 milliGRAM(s) Oral two times a day PRN Moderate Pain (4 - 6)  glucagon  Injectable 1 milliGRAM(s) IntraMuscular once PRN Glucose LESS THAN 70 milligrams/deciliter  morphine  - Injectable 2 milliGRAM(s) IV Push every 4 hours PRN Moderate Pain (4 - 6)  naloxone Injectable 2 milliGRAM(s) IV Push once PRN If no response to 0.4 mg of Narcan  oxyCODONE    IR 10 milliGRAM(s) Oral every 6 hours PRN Moderate Pain (4 - 6)      LABS:                        10.3   7.06  )-----------( 255      ( 24 Sep 2019 07:13 )             32.5     09-24    139  |  99  |  11  ----------------------------<  120<H>  4.1   |  32  |  0.8    Ca    8.5      24 Sep 2019 07:13    TPro  6.8  /  Alb  3.1<L>  /  TBili  0.6  /  DBili  x   /  AST  25  /  ALT  14  /  AlkPhos  221<H>  09-24          RADIOLOGY & ADDITIONAL TESTS:    Imaging or report Personally Reviewed:  [ x] YES  [ ] NO    < from: Xray Chest 1 View- PORTABLE-Routine (09.23.19 @ 08:25) >  IMPRESSION:     No radiographic evidence of acute cardiopulmonary disease.    < end of copied text >    < from: CT Knee No Cont, Left (09.23.19 @ 22:40) >  IMPRESSION:  Severe diffuse osteopenia.    Acute impaction distal femoral transverse fracture with intra-articular   extension through the intercondylar notch.    Acute impaction proximal tibial metaphyseal transverse fracture with   suggestion of lateral tibial plateau extension.    Findings suspicious for acute nondisplaced fibular neck impaction   fracture.    < end of copied text >      Case discussed with resident    Care discussed with pt

## 2019-09-24 NOTE — PROGRESS NOTE ADULT - SUBJECTIVE AND OBJECTIVE BOX
ORTHO PROGRESS NOTE     49yMale POD #11 Explantation of left hemiarthroplasty      Patient seen and examined at bedside . The patient is awake and alert in NAD. No complaints of chest pain, SOB, N/V.    MEDICATIONS  (STANDING):  acetaminophen   Tablet .. 650 milliGRAM(s) Oral every 6 hours  aspirin  chewable 81 milliGRAM(s) Oral daily  atorvastatin 80 milliGRAM(s) Oral at bedtime  baclofen 10 milliGRAM(s) Oral two times a day  benzocaine 15 mG/menthol 3.6 mG Lozenge 1 Lozenge Oral three times a day  bisacodyl 5 milliGRAM(s) Oral at bedtime  busPIRone 5 milliGRAM(s) Oral two times a day  carvedilol 3.125 milliGRAM(s) Oral every 12 hours  cefepime   IVPB 2000 milliGRAM(s) IV Intermittent every 8 hours  cefepime   IVPB      chlorhexidine 4% Liquid 1 Application(s) Topical daily  dextrose 5%. 1000 milliLiter(s) (50 mL/Hr) IV Continuous <Continuous>  dextrose 50% Injectable 25 Gram(s) IV Push once  dextrose 50% Injectable 25 Gram(s) IV Push once  docusate sodium 100 milliGRAM(s) Oral three times a day  DULoxetine 90 milliGRAM(s) Oral daily  enoxaparin Injectable 40 milliGRAM(s) SubCutaneous daily  gabapentin 800 milliGRAM(s) Oral three times a day  hydrOXYzine hydrochloride 50 milliGRAM(s) Oral at bedtime  insulin glargine Injectable (LANTUS) 36 Unit(s) SubCutaneous at bedtime  insulin lispro (HumaLOG) corrective regimen sliding scale   SubCutaneous three times a day before meals  insulin lispro Injectable (HumaLOG) 12 Unit(s) SubCutaneous three times a day before meals  lactulose Syrup 15 Gram(s) Oral every 12 hours  morphine ER Tablet 45 milliGRAM(s) Oral every 12 hours  pantoprazole    Tablet 40 milliGRAM(s) Oral before breakfast  pramipexole 0.5 milliGRAM(s) Oral every 12 hours  QUEtiapine 150 milliGRAM(s) Oral at bedtime  senna 2 Tablet(s) Oral at bedtime  tamsulosin 0.4 milliGRAM(s) Oral at bedtime    MEDICATIONS  (PRN):  dextrose 40% Gel 15 Gram(s) Oral once PRN Blood Glucose LESS THAN 70 milliGRAM(s)/deciliter  diclofenac 75 milliGRAM(s) Oral two times a day PRN Moderate Pain (4 - 6)  glucagon  Injectable 1 milliGRAM(s) IntraMuscular once PRN Glucose LESS THAN 70 milligrams/deciliter  morphine  - Injectable 2 milliGRAM(s) IV Push every 4 hours PRN Moderate Pain (4 - 6)  naloxone Injectable 2 milliGRAM(s) IV Push once PRN If no response to 0.4 mg of Narcan  oxyCODONE    IR 10 milliGRAM(s) Oral every 6 hours PRN Moderate Pain (4 - 6)      Vital Signs Last 24 Hrs  T(C): 36.3 (24 Sep 2019 05:16), Max: 36.7 (23 Sep 2019 12:20)  T(F): 97.4 (24 Sep 2019 05:16), Max: 98 (23 Sep 2019 12:20)  HR: 104 (24 Sep 2019 05:16) (85 - 104)  BP: 100/58 (24 Sep 2019 05:16) (100/58 - 156/81)  BP(mean): --  RR: 18 (24 Sep 2019 05:16) (18 - 18)  SpO2: --    PE:   NAD  LLE  Dressing C/D/I   limited motor/sensory fx at baseline                        10.1   7.50  )-----------( 252      ( 22 Sep 2019 08:50 )             31.3     09-22    135  |  97<L>  |  9<L>  ----------------------------<  171<H>  4.0   |  29  |  0.7    Ca    8.6      22 Sep 2019 08:50      09-23-19 @ 07:01  -  09-24-19 @ 07:00  --------------------------------------------------------  IN: 0 mL / OUT: 1400 mL / NET: -1400 mL      A/P: 49yMale s/p Explantation of left hemiarthroplasty/Girdlestone procedure and antibiotic beads placement on 9/13/19  - antibiotics per ID  - DVT prophylaxis  - pain control  - fall precautions

## 2019-09-24 NOTE — OCCUPATIONAL THERAPY INITIAL EVALUATION ADULT - BALANCE DISTURBANCE, IDENTIFIED IMPAIRMENT CONTRIBUTE, REHAB EVAL
abnormal muscle tone/impaired postural control/pain/impaired motor control/decreased ROM/decreased strength

## 2019-09-24 NOTE — OCCUPATIONAL THERAPY INITIAL EVALUATION ADULT - IMPAIRED TRANSFERS: BED/CHAIR, REHAB EVAL
abnormal muscle tone/impaired motor control/pain/impaired postural control/impaired balance/decreased strength/decreased flexibility

## 2019-09-24 NOTE — OCCUPATIONAL THERAPY INITIAL EVALUATION ADULT - GENERAL OBSERVATIONS, REHAB EVAL
OT attempted to see Pt at 8:00 am and pt was getting PICC placed off unit. OT saw Pt. from 9:25-10:15. Pt was encountered in supine in bed in NAD but 10/10 pain in L hip.

## 2019-09-24 NOTE — PROGRESS NOTE ADULT - SUBJECTIVE AND OBJECTIVE BOX
Hospital Day:  21d    Subjective:    Patient is a 49y old  Male who presents with a chief complaint of Left hip pain (23 Sep 2019 13:54), prelim note      Past Medical Hx:   CIDP (chronic inflammatory demyelinating polyneuropathy)    Past Sx:  History of cholecystectomy  History of total left hip replacement  History of total right hip replacement  S/P CABG x 5    Allergies:  penicillins (Unknown)    Current Meds:   Standng Meds:  acetaminophen   Tablet .. 650 milliGRAM(s) Oral every 6 hours  aspirin  chewable 81 milliGRAM(s) Oral daily  atorvastatin 80 milliGRAM(s) Oral at bedtime  baclofen 10 milliGRAM(s) Oral two times a day  benzocaine 15 mG/menthol 3.6 mG Lozenge 1 Lozenge Oral three times a day  bisacodyl 5 milliGRAM(s) Oral at bedtime  busPIRone 5 milliGRAM(s) Oral two times a day  carvedilol 3.125 milliGRAM(s) Oral every 12 hours  cefepime   IVPB 2000 milliGRAM(s) IV Intermittent every 8 hours  cefepime   IVPB      chlorhexidine 4% Liquid 1 Application(s) Topical daily  dextrose 5%. 1000 milliLiter(s) (50 mL/Hr) IV Continuous <Continuous>  dextrose 50% Injectable 25 Gram(s) IV Push once  dextrose 50% Injectable 25 Gram(s) IV Push once  docusate sodium 100 milliGRAM(s) Oral three times a day  DULoxetine 90 milliGRAM(s) Oral daily  enoxaparin Injectable 40 milliGRAM(s) SubCutaneous daily  gabapentin 800 milliGRAM(s) Oral three times a day  hydrOXYzine hydrochloride 50 milliGRAM(s) Oral at bedtime  insulin glargine Injectable (LANTUS) 36 Unit(s) SubCutaneous at bedtime  insulin lispro (HumaLOG) corrective regimen sliding scale   SubCutaneous three times a day before meals  insulin lispro Injectable (HumaLOG) 12 Unit(s) SubCutaneous three times a day before meals  lactulose Syrup 15 Gram(s) Oral every 12 hours  morphine ER Tablet 45 milliGRAM(s) Oral every 12 hours  pantoprazole    Tablet 40 milliGRAM(s) Oral before breakfast  pramipexole 0.5 milliGRAM(s) Oral every 12 hours  QUEtiapine 150 milliGRAM(s) Oral at bedtime  senna 2 Tablet(s) Oral at bedtime  tamsulosin 0.4 milliGRAM(s) Oral at bedtime    PRN Meds:  dextrose 40% Gel 15 Gram(s) Oral once PRN Blood Glucose LESS THAN 70 milliGRAM(s)/deciliter  diclofenac 75 milliGRAM(s) Oral two times a day PRN Moderate Pain (4 - 6)  glucagon  Injectable 1 milliGRAM(s) IntraMuscular once PRN Glucose LESS THAN 70 milligrams/deciliter  morphine  - Injectable 2 milliGRAM(s) IV Push every 4 hours PRN Moderate Pain (4 - 6)  naloxone Injectable 2 milliGRAM(s) IV Push once PRN If no response to 0.4 mg of Narcan  oxyCODONE    IR 10 milliGRAM(s) Oral every 6 hours PRN Moderate Pain (4 - 6)    HOME MEDICATIONS:  Atarax 50 mg oral tablet: 1 tab(s) orally once a day (at bedtime)  baclofen 10 mg oral tablet: 1 tab(s) orally 2 times a day  busPIRone 5 mg oral tablet: 1 tab(s) orally 2 times a day  DULoxetine 60 mg oral delayed release capsule: 1 cap(s) orally once a day  Flomax 0.4 mg oral capsule: 1 cap(s) orally once a day  gabapentin 400 mg oral tablet: 1 tab(s) orally 3 times a day (before meals)  morphine 10 mg/5 mL oral solution: 5 milligram(s) orally every 3 hours, As Needed  morphine 60 mg oral capsule, extended release: 1 cap(s) orally every 12 hours  Plavix 75 mg oral tablet: 1 tab(s) orally once a day  SEROquel  mg oral tablet, extended release: 1 tab(s) orally once a day (in the evening)  Xanax 1 mg oral tablet: 1 tab(s) orally 3 times a day      Vital Signs:   T(F): 97.4 (09-24-19 @ 05:16), Max: 98 (09-23-19 @ 12:20)  HR: 104 (09-24-19 @ 05:16) (85 - 104)  BP: 100/58 (09-24-19 @ 05:16) (100/58 - 156/81)  RR: 18 (09-24-19 @ 05:16) (18 - 18)  SpO2: --      09-22-19 @ 07:01  -  09-23-19 @ 07:00  --------------------------------------------------------  IN: 0 mL / OUT: 400 mL / NET: -400 mL    09-23-19 @ 07:01  -  09-24-19 @ 06:26  --------------------------------------------------------  IN: 0 mL / OUT: 1400 mL / NET: -1400 mL        Physical Exam:   GENERAL: NAD  HEENT: NCAT  CHEST/LUNG: CTAB  HEART: Regular rate and rhythm; s1 s2 appreciated, No murmurs, rubs, or gallops  ABDOMEN: Soft, Nontender, Nondistended; Bowel sounds present  EXTREMITIES: No LE edema b/l  NERVOUS SYSTEM:  Alert & Oriented X3        Labs:                         10.1   7.50  )-----------( 252      ( 22 Sep 2019 08:50 )             31.3       22 Sep 2019 08:50    135    |  97     |  9      ----------------------------<  171    4.0     |  29     |  0.7      Ca    8.6        22 Sep 2019 08:50            Hemoglobin A1C, Whole Blood: 11.4 % (09-05-19 @ 10:40)    Culture - Blood (collected 09-19-19 @ 16:26)  Source: .Blood None  Preliminary Report (09-21-19 @ 02:15):    No growth to date.    Culture - Blood (collected 09-18-19 @ 04:43)  Source: .Blood None  Final Report (09-23-19 @ 14:00):    No growth at 5 days.        Radiology: Hospital Day:  21d    Subjective:    Patient is a 49y old  Male who presents with a chief complaint of Left hip pain. Overnight no acute event. This morning complains of severe 10/10 sharp pain that starts at the hip and radiates to the groin and left knee. Does not feel well controlled on current pain regimen. Also complains of chest soreness, reproducible with palpation. Lastly, requests sedation prior to PICC line placement. Denies fevers, chills, CP, SOB, abdominal pain, n/v, bowel or urinary sxs.      Past Medical Hx:   CIDP (chronic inflammatory demyelinating polyneuropathy)    Past Sx:  History of cholecystectomy  History of total left hip replacement  History of total right hip replacement  S/P CABG x 5    Allergies:  penicillins (Unknown)    Current Meds:   Standng Meds:  acetaminophen   Tablet .. 650 milliGRAM(s) Oral every 6 hours  aspirin  chewable 81 milliGRAM(s) Oral daily  atorvastatin 80 milliGRAM(s) Oral at bedtime  baclofen 10 milliGRAM(s) Oral two times a day  benzocaine 15 mG/menthol 3.6 mG Lozenge 1 Lozenge Oral three times a day  bisacodyl 5 milliGRAM(s) Oral at bedtime  busPIRone 5 milliGRAM(s) Oral two times a day  carvedilol 3.125 milliGRAM(s) Oral every 12 hours  cefepime   IVPB 2000 milliGRAM(s) IV Intermittent every 8 hours  cefepime   IVPB      chlorhexidine 4% Liquid 1 Application(s) Topical daily  dextrose 5%. 1000 milliLiter(s) (50 mL/Hr) IV Continuous <Continuous>  dextrose 50% Injectable 25 Gram(s) IV Push once  dextrose 50% Injectable 25 Gram(s) IV Push once  docusate sodium 100 milliGRAM(s) Oral three times a day  DULoxetine 90 milliGRAM(s) Oral daily  enoxaparin Injectable 40 milliGRAM(s) SubCutaneous daily  gabapentin 800 milliGRAM(s) Oral three times a day  hydrOXYzine hydrochloride 50 milliGRAM(s) Oral at bedtime  insulin glargine Injectable (LANTUS) 36 Unit(s) SubCutaneous at bedtime  insulin lispro (HumaLOG) corrective regimen sliding scale   SubCutaneous three times a day before meals  insulin lispro Injectable (HumaLOG) 12 Unit(s) SubCutaneous three times a day before meals  lactulose Syrup 15 Gram(s) Oral every 12 hours  morphine ER Tablet 60 milliGRAM(s) Oral two times a day  pantoprazole    Tablet 40 milliGRAM(s) Oral before breakfast  pramipexole 0.5 milliGRAM(s) Oral every 12 hours  QUEtiapine 150 milliGRAM(s) Oral at bedtime  senna 2 Tablet(s) Oral at bedtime  tamsulosin 0.4 milliGRAM(s) Oral at bedtime    PRN Meds:  dextrose 40% Gel 15 Gram(s) Oral once PRN Blood Glucose LESS THAN 70 milliGRAM(s)/deciliter  diclofenac 75 milliGRAM(s) Oral two times a day PRN Moderate Pain (4 - 6)  glucagon  Injectable 1 milliGRAM(s) IntraMuscular once PRN Glucose LESS THAN 70 milligrams/deciliter  morphine  - Injectable 2 milliGRAM(s) IV Push every 4 hours PRN Moderate Pain (4 - 6)  naloxone Injectable 2 milliGRAM(s) IV Push once PRN If no response to 0.4 mg of Narcan  oxyCODONE    IR 10 milliGRAM(s) Oral every 6 hours PRN Moderate Pain (4 - 6)    HOME MEDICATIONS:  Atarax 50 mg oral tablet: 1 tab(s) orally once a day (at bedtime)  baclofen 10 mg oral tablet: 1 tab(s) orally 2 times a day  busPIRone 5 mg oral tablet: 1 tab(s) orally 2 times a day  DULoxetine 60 mg oral delayed release capsule: 1 cap(s) orally once a day  Flomax 0.4 mg oral capsule: 1 cap(s) orally once a day  gabapentin 400 mg oral tablet: 1 tab(s) orally 3 times a day (before meals)  morphine 10 mg/5 mL oral solution: 5 milligram(s) orally every 3 hours, As Needed  morphine 60 mg oral capsule, extended release: 1 cap(s) orally every 12 hours  Plavix 75 mg oral tablet: 1 tab(s) orally once a day  SEROquel  mg oral tablet, extended release: 1 tab(s) orally once a day (in the evening)  Xanax 1 mg oral tablet: 1 tab(s) orally 3 times a day      Vital Signs:   T(F): 98.5 (09-24-19 @ 14:25), Max: 98.5 (09-24-19 @ 14:25)  HR: 110 (09-24-19 @ 14:25) (85 - 110)  BP: 126/56 (09-24-19 @ 14:25) (100/58 - 156/81)  RR: 18 (09-24-19 @ 14:25) (18 - 18)  SpO2: --      09-23-19 @ 07:01  -  09-24-19 @ 07:00  --------------------------------------------------------  IN: 0 mL / OUT: 1400 mL / NET: -1400 mL    09-24-19 @ 07:01  -  09-24-19 @ 15:00  --------------------------------------------------------  IN: 0 mL / OUT: 500 mL / NET: -500 mL        Physical Exam:   GENERAL: NAD  HEENT: NCAT  CHEST/LUNG: CTA b/l  HEART: Regular rate and rhythm; s1 s2 appreciated, No murmurs, rubs, or gallops  ABDOMEN: Soft, Nontender, Nondistended; Bowel sounds present  EXTREMITIES: b/l LE edema, lack of light and deep touch sensation in LLE to mid calf, loss of light touch sensation on right side, to mid calf.   NERVOUS SYSTEM:  Alert & Oriented X3        Labs:                         10.3   7.06  )-----------( 255      ( 24 Sep 2019 07:13 )             32.5     Neutophil% 54.1, Lymphocyte% 29.3, Monocyte% 8.1, Bands% 0.7 09-24-19 @ 07:13    24 Sep 2019 07:13    139    |  99     |  11     ----------------------------<  120    4.1     |  32     |  0.8      Ca    8.5        24 Sep 2019 07:13    TPro  6.8    /  Alb  3.1    /  TBili  0.6    /  DBili  x      /  AST  25     /  ALT  14     /  AlkPhos  221    24 Sep 2019 07:13    Hemoglobin A1C, Whole Blood: 11.4 % (09-05-19 @ 10:40)    Culture - Blood (collected 09-19-19 @ 16:26)  Source: .Blood None  Preliminary Report (09-21-19 @ 02:15):    No growth to date.    Culture - Blood (collected 09-18-19 @ 04:43)  Source: .Blood None  Final Report (09-23-19 @ 14:00):    No growth at 5 days.    Radiology:

## 2019-09-24 NOTE — PROGRESS NOTE ADULT - ASSESSMENT
Assessment and Plan:  48y/o Male, w/ extensive PMHx CAD, ??patient reports "silent MI", s/p CABG x5 vessels in 2011, BPH, depression, anxiety, DM and CIDP s/p post IVIG treatment  b/l total arthroplasty who presented w/ cc of LLE pain admitted for dislocation of the L hip & septic arthritis in left hip joint 2/2 to Pseudomonas currently on cefepime s/p washout & abx spacer implantation    #Pain: patient is on multiple pain medications, being followed by pain team  - patient had 1 episode of decreased RR, dropping to 6, with desaturation that responded to naloxone  - Pain Management Consultation: Morphine ER 45mg q 12hrs, oxycodone IR 10mg q6 PRN,  Acetaminophen 650mg q 6 hrs standing  - can increase Morphine ER to 60 q12 if pain not controlled on 45  - complained of rib pain, chest xray was done, negative for acute pathology    #ID: septic arthritis of hardware  - Intraop cultures grew pseudomonas and Coag negative staph, currently on cefepime  - pt will need 6 weeks of antibiotics, PICC line to be placed today by IR (was not completed yesterday)  - per patient, no one at home can administer abx, will need SNF   - pt will need a total of 6 weeks of IV abx. Per ID, will end 10/25/19    #Ortho: s/p failed arthroplasty s/p abx spacer on 9/13 - OR failed due to hypotension   - ortho on board. They do not plan to re-evaluate the hip  - xray left knee: possible distal femoral fx (xray was only 1 view, limited due to pain)  - per ortho PA, should obtain CT C- of the left knee (performed, results pending)  - c/w pain regimen     #CIDP: s/p IVIG in hospital (chronic inflammatory demyelinating polyneuropathy)  - s/p 5 doses of IvIg, IVIG is every 3 weeks per neuro (last dose on 9/12/19)  - also on gabapentin and duloxetine for pain  - being followed by pain management     #Cardio: CAD s/p CABG (5 vessel disease)  - on aspirin and atorvastatin, carvedilol q12  - Plavix was held due to suspicion of hemorrhagic shock (low H/H, hypotension post op)  - post op patient had elevated Trops likely 2/2 to demand ischemia  - will follow up cardio regarding restarting plavix    #Endo: DMII  - FS between   - c/w Lantus 36 and Lispro 12  - FS TID & qHS    #Nutrition: evaluated by Nutrition   - good intake (>75% of meals)    #Uro: BPH  - on tamsulosin     DVT ppx: Lovenox  GI ppx: protonix  Code Status: full code  Dispo: SNF for prolonged abx course (per pt he & wife unable to administer the IV antibiotics). Will need weekly ID f/u at NH (has abx space implantation) Assessment and Plan:    48y/o Male, w/ extensive PMHx CAD, ??patient reports "silent MI", s/p CABG x5 vessels in 2011, BPH, depression, anxiety, DM and CIDP s/p post IVIG treatment  b/l total arthroplasty who presented w/ cc of LLE pain admitted for dislocation of the L hip & septic arthritis in left hip joint 2/2 to Pseudomonas currently on cefepime s/p washout & abx spacer implantation    #Pain: patient is on multiple pain medications, being followed by pain team  - patient had 1 episode of decreased RR, dropping to 6, with desaturation that responded to naloxone  - Pain Management Consultation: Morphine ER 45mg q 12hrs, oxycodone IR 10mg q6 PRN,  Acetaminophen 650mg q 6 hrs standing  - will increase Morphine ER to 60 q12 since pain not controlled on 45mg BID  - complained of rib pain, chest xray was done, negative for acute pathology    #ID: septic arthritis of hardware  - Intraop cultures grew pseudomonas and Coag negative staph, currently on cefepime  - pt will need 6 weeks of antibiotics, s/p  PICC line placement on 9/24  - per patient, no one at home can administer abx, will need SNF   - pt will need a total of 6 weeks of IV abx. Per ID, will end 10/25/19    #Ortho: s/p failed arthroplasty s/p abx spacer on 9/13 - OR failed due to hypotension   - ortho on board. They do not plan to re-evaluate the hip  - xray left knee: possible distal femoral fx (xray was only 1 view, limited due to pain)  - CT of the left knee shows acute nondisplaced fibular neck impaction fracture, per verbal convo with ortho there is no intervention from them  - c/w pain regimen     #CIDP: s/p IVIG in hospital (chronic inflammatory demyelinating polyneuropathy)  - s/p 5 doses of IvIg, IVIG is every 3 weeks per neuro (last dose on 9/12/19)  - also on gabapentin and duloxetine for pain  - being followed by pain management     #Cardio: CAD s/p CABG (5 vessel disease)  - on aspirin and atorvastatin, carvedilol q12  - Plavix was held due to suspicion of hemorrhagic shock (low H/H, hypotension post op)  - post op patient had elevated Trops likely 2/2 to demand ischemia  - will follow up cardio regarding restarting plavix    #Endo: DMII  - FS between   - c/w Lantus 36 and Lispro 12  - FS TID & qHS    #Nutrition: evaluated by Nutrition   - good intake (>75% of meals)    #Uro: BPH  - on tamsulosin     DVT ppx: Lovenox  GI ppx: protonix  Code Status: full code  Dispo: SNF for prolonged abx course (per pt he & wife unable to administer the IV antibiotics). Will need weekly ID f/u at NH (has abx space implantation)

## 2019-09-24 NOTE — CHART NOTE - NSCHARTNOTEFT_GEN_A_CORE
Registered Dietitian Follow-Up     Patient Profile Reviewed                           Yes [x]   No []     Nutrition History Previously Obtained        Yes [x]  No []       Pertinent Subjective Information: Pt reports that he is eating well but reports some concerns with the consistent CHO diet. He is unsure if he is supposed to be eating all of the foods that he is receiving. Per EMR, po intake has been consistently recorded at 100%. Requests diet education with regards to DM, RD provides nutrition education and materials to pt. Pt verbalizes understanding and asks relevant questions with regards to the material presented/condition. Pt reports that his last BM was 2 days ago (9/22) and that he has both diarrhea and constipation. Pt reports that his BMs have been irregular over the past year or so- he may go weeks without having a BM and when he does it is diarrhea; sometimes he goes 2x a week. Pt reports that he currently feels gassy. Pt reports chewing difficulty r/t dentures, and states that he is receiving mechanical soft foods and that he has been doing well. Pt denies nausea.        Pertinent Medical Interventions: Pt admitted d/t pain, h/o b/l hip replacements, slurred speech. Pt failed arthroplasty s/p abx spacer (9/13)- OR failed d/t hypotension. Per EMR- Dislocation of left hip s/p explant of left ОЛЬГА and insertion of abx spacer on 9/13 OR culture + for Pseudomonas from hip - pansensitive. Pt has has multiple blood transfusions throughout this admission.  per EMR, ortho PA recommends obtain CT C- of the left knee (performed, results pending).      Diet order: Consistent CHO, mechanical soft, Glucerna 1x/day      Anthropometrics:  - Ht. 71"  - Wt.  (9/10) 98.5 kg/216.7; (9/15) 104.9 kg/231.2 lbs; (9/16) 108 kg/237.6 lbs;  (9/18) 102.6 kg/225.72 no new wts since previous assessment- edema noted   - %wt change: fluctuations likely 2/2 edema  - BMI 32.4 using dosing wt of 105.4 kg/232 lbs  - IBW 78 kg/172 lbs     Pertinent Lab Data: (9/5) HbA1c 11.4; (9/24) RBC 3.84, H/H 10.3/32.5, POC glucose 113-214, glucose 120, alk phos 221     Pertinent Meds: lovenox, maxipime, humalog, lactulose syrup, colace, dulcolax, coreg, protonix, lipitor, baclofen, flomax     Physical Findings:  - Appearance: obese; +2 edema L/R ankles and legs  - GI function: pt reports both diarrhea/constipation  - Tubes: NA  - Oral/Mouth cavity: pt wears dentures (chewing difficulty)  - Skin: (9/24) surgical incision     Nutrition Requirements  Weight Used: 75.4kg, needs cont from RD assessment 9/10     Estimated Energy Needs    Continue [x] 5669-7001 kcal/day (20-25 kcal/kg IBW d/t BMI 30 and quadriplegia).      Estimated Protein Needs    Continue [x] 75-90 g/day (1.0-1.2 g/kg IBW, reason mentioned above).     Estimated Fluid Needs        Continue [x]  1mL/kcal or per LIP    Nutrient Intake: 100% and good po reported per pt (good)        [x] Previous Nutrition Diagnosis: Inadequate energy intake            [] Ongoing          [x] Resolved     Nutrition Intervention meals and snacks, nutrition education     Goal/Expected Outcome: pt to continue to meet >75% estimated energy requirements in the next 7 days     Indicator/Monitoring: RD to monitor diet order, energy intake, body composition, NFPF, glucose profile, adherence    Recommendation: continue current diet order + supplements, encourage po intake

## 2019-09-24 NOTE — PROGRESS NOTE ADULT - ASSESSMENT
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  Workup revealed superolateral dislocation of the left hip and he is s/p explantation of Left total hip arthroplasty and insertion of ABX spacer. Post op course was complicated by hypotension, requiring 1 PRBC transfusion and pressors. Pt was weaned off pressors by POD1 in the evening.   He had elevated troponins postop likely from demand ischemia per cardiology and he is on medical management.    Preop, he received loading dose of IVIG during this admission and will continue maintenance dose q3 weeks per neurology.  He was transferred to telemetry and now downgraded to medical floor.    1. Dislocation of left hip s/p explant of left ОЛЬГА and insertion of abx spacer on 9/13  OR culture + for Pseudomonas from hip - pansensitive  on cefepime   s/p picc line placement 9/24  6 weeks of abx per ID  pain control    2. NSTEMI type 2  ASA/statin/Bblocker  start Plavix if H/H remains stable and if recommended by cardiology  normal EF on ECHO  cardio f/u - Dr. Cid  no plan for urgent cath per last note    3. CIDP - for IVIG q3 weeks per neurology - next dose due 10/3  needs premedication and IVIG to be run over 6 hours    4. CAD s/p CABG - continue medical management    5. DM - controlled on insulin    6. Anemia due to acute blood loss postop  s/p transfusion with 1 unit PRBC on 9/15 with appropriate rise in Hgb  s/p another unit on 9/17 for Hgb 6.4 and now Hgb >9 (? lab error in Hgb 6.4)  monitor H/H - remains stable  no active bleeding on CT scans    7. Episode of decreased responsiveness and respiratory depression that responded to Narcan x 2 last weeks  meds adjusted and pt now tolerating  increase MS Contin to 60mg q12hr  on oxycodone and morphine prn - continue current doses for now  pain management f/u appreciated    8. Depression/anxiety  seen by psychiatry this admission  needs close outpt f/u    9. BPH on flomax    10. + MRSA nasal swab - on mupirocin and CHG baths    11. DVT prophylaxis    12. Constipation - continue current management  monitor BM's    13. Acute left tibial, fibula and distal femur fractures  continue pain control  no operative management per Orthopedics (resident discussed with them today)  check vitamin D level        PROGRESS NOTE HANDOFF    Pending: will contact cardio re: any further inpt plan  discharge planning to SNF once pain is better controlled    Disposition: SNF placement

## 2019-09-25 NOTE — PROGRESS NOTE ADULT - ASSESSMENT
Assessment and Plan:     50y/o Male, w/ extensive PMHx CAD, ??patient reports "silent MI", s/p CABG x5 vessels in 2011, BPH, depression, anxiety, DM and CIDP s/p post IVIG treatment  b/l total arthroplasty who presented w/ cc of LLE pain admitted for dislocation of the L hip & septic arthritis in left hip joint 2/2 to Pseudomonas currently on cefepime s/p washout & abx spacer implantation    #Pain: patient is on multiple pain medications, being followed by pain team  - patient had 1 episode of decreased RR, dropping to 6, with desaturation that responded to naloxone  - Pain Management Consultation: Morphine ER 45mg q 12hrs, oxycodone IR 10mg q6 PRN,  Acetaminophen 650mg q 6 hrs standing  - please do not change the above pain regimen without speaking to pain mgmt NP first (number:[phone])  - complained of rib pain, chest xray was done, negative for acute pathology    #ID: septic arthritis of hardware  - Intraop cultures grew pseudomonas and Coag negative staph, currently on cefepime  - pt will need 6 weeks of antibiotics, s/p  PICC line placement on 9/24  - per patient, no one at home can administer abx, will need SNF   - pt will need a total of 6 weeks of IV abx. Per ID, will end 10/25/19    #Ortho: s/p failed arthroplasty s/p abx spacer on 9/13 - OR failed due to hypotension   - ortho on board. They do not plan to re-evaluate the hip  - xray left knee: possible distal femoral fx (xray was only 1 view, limited due to pain)  - CT of the left knee shows acute nondisplaced fibular neck impaction fracture, per verbal convo with ortho there is no intervention from them (however, confirmed that pt is on list to be seen by them today, will f/u recs)  - c/w pain regimen     #CIDP: s/p IVIG in hospital (chronic inflammatory demyelinating polyneuropathy)  - s/p 5 doses of IvIg, IVIG is every 3 weeks per neuro (last dose on 9/12/19)  - also on gabapentin and duloxetine for pain  - being followed by pain management     #Cardio: CAD s/p CABG (5 vessel disease)  - per cardio no need for cath at this time. Okay to restart plavix 75mg qD  - on aspirin and atorvastatin, carvedilol q12  - Plavix was held due to suspicion of hemorrhagic shock (low H/H, hypotension post op)  - post op patient had elevated Trops likely 2/2 to demand ischemia    #Endo: DMII  - FS between   - c/w Lantus 36 and Lispro 12  - FS TID & qHS    #Nutrition: evaluated by Nutrition   - good intake (>75% of meals)    #Uro: BPH  - on tamsulosin     DVT ppx: Lovenox  GI ppx: protonix  Code Status: full code  Dispo: SNF for prolonged abx course (per pt he & wife unable to administer the IV antibiotics). Will need weekly ID f/u at NH (has abx space implantation) Assessment and Plan:     50y/o Male, w/ extensive PMHx CAD, ??patient reports "silent MI", s/p CABG x5 vessels in 2011, BPH, depression, anxiety, DM and CIDP s/p post IVIG treatment  b/l total arthroplasty who presented w/ cc of LLE pain admitted for dislocation of the L hip & septic arthritis in left hip joint 2/2 to Pseudomonas currently on cefepime s/p washout & abx spacer implantation    #Pain: patient is on multiple pain medications, being followed by pain team  - patient had 1 episode of decreased RR, dropping to 6, with desaturation that responded to naloxone  - Pain Management Consultation: Morphine ER 45mg q 12hrs, oxycodone IR 10mg q6 PRN,  Acetaminophen 650mg q 6 hrs standing  - please do not change the above pain regimen without speaking to pain mgmt NP first (number:[phone])  - complained of rib pain, chest xray was done, negative for acute pathology    #ID: septic arthritis of hardware  - Intraop cultures grew pseudomonas and Coag negative staph, currently on cefepime  - pt will need 6 weeks of antibiotics, s/p  PICC line placement on 9/24  - per patient, no one at home can administer abx, will need SNF   - pt will need a total of 6 weeks of IV abx. Per ID, will end 10/25/19    #Ortho: s/p failed arthroplasty s/p abx spacer on 9/13 - OR failed due to hypotension   - ortho on board. They do not plan to re-evaluate the hip  - xray left knee: possible distal femoral fx (xray was only 1 view, limited due to pain)  - CT of the left knee shows acute nondisplaced fibular neck impaction fracture, per verbal convo with ortho there is no intervention from them (however, confirmed that pt is on list to be seen by them today, will f/u recs)  - c/w pain regimen   - will check vitamin D levels (patient refused lab draw this morning)    #CIDP: s/p IVIG in hospital (chronic inflammatory demyelinating polyneuropathy)  - s/p 5 doses of IvIg, IVIG is every 3 weeks per neuro (last dose on 9/12/19)  - also on gabapentin and duloxetine for pain  - being followed by pain management     #Cardio: CAD s/p CABG (5 vessel disease)  - per cardio no need for cath at this time. Okay to restart plavix 75mg qD  - on aspirin and atorvastatin, carvedilol q12  - Plavix was held due to suspicion of hemorrhagic shock (low H/H, hypotension post op)  - post op patient had elevated Trops likely 2/2 to demand ischemia    #Endo: DMII  - FS between   - c/w Lantus 36 and Lispro 12  - FS TID & qHS    #Nutrition: evaluated by Nutrition   - good intake (>75% of meals)    #Uro: BPH  - on tamsulosin     DVT ppx: Lovenox  GI ppx: protonix  Code Status: full code  Dispo: SNF for prolonged abx course (per pt he & wife unable to administer the IV antibiotics). Will need weekly ID f/u at NH (has abx space implantation)

## 2019-09-25 NOTE — PROGRESS NOTE ADULT - SUBJECTIVE AND OBJECTIVE BOX
Hospital Day:  22d    Subjective:    Patient is a 49y old  Male who presents with a chief complaint of Left hip pain. Overnight no acute events. This morning resting comfortably in bed. Complains of left sided groin pain and swelling that radiates down to the knee. Also states that the pain is not well controlled on the current regimen and that he needs something for breakthrough pain. He also complains of left knee pain. Denies current fevers, chills CP, SOB, abdominal pain, bowel or urinary sxs.       Past Medical Hx:   CIDP (chronic inflammatory demyelinating polyneuropathy)    Past Sx:  History of cholecystectomy  History of total left hip replacement  History of total right hip replacement  S/P CABG x 5    Allergies:  penicillins (Unknown)    Current Meds:   Standng Meds:  acetaminophen   Tablet .. 650 milliGRAM(s) Oral every 6 hours  aspirin  chewable 81 milliGRAM(s) Oral daily  atorvastatin 80 milliGRAM(s) Oral at bedtime  baclofen 10 milliGRAM(s) Oral two times a day  benzocaine 15 mG/menthol 3.6 mG Lozenge 1 Lozenge Oral three times a day  bisacodyl 5 milliGRAM(s) Oral at bedtime  busPIRone 5 milliGRAM(s) Oral two times a day  carvedilol 3.125 milliGRAM(s) Oral every 12 hours  cefepime   IVPB 2000 milliGRAM(s) IV Intermittent every 8 hours  cefepime   IVPB      chlorhexidine 4% Liquid 1 Application(s) Topical daily  clopidogrel Tablet 75 milliGRAM(s) Oral daily  dextrose 5%. 1000 milliLiter(s) (50 mL/Hr) IV Continuous <Continuous>  dextrose 50% Injectable 25 Gram(s) IV Push once  dextrose 50% Injectable 25 Gram(s) IV Push once  docusate sodium 100 milliGRAM(s) Oral three times a day  DULoxetine 90 milliGRAM(s) Oral daily  enoxaparin Injectable 40 milliGRAM(s) SubCutaneous daily  gabapentin 800 milliGRAM(s) Oral three times a day  hydrOXYzine hydrochloride 50 milliGRAM(s) Oral at bedtime  insulin glargine Injectable (LANTUS) 36 Unit(s) SubCutaneous at bedtime  insulin lispro (HumaLOG) corrective regimen sliding scale   SubCutaneous three times a day before meals  insulin lispro Injectable (HumaLOG) 12 Unit(s) SubCutaneous three times a day before meals  lactulose Syrup 15 Gram(s) Oral four times a day  morphine ER Tablet 45 milliGRAM(s) Oral every 12 hours  pantoprazole    Tablet 40 milliGRAM(s) Oral before breakfast  polyethylene glycol 3350 17 Gram(s) Oral daily  pramipexole 0.5 milliGRAM(s) Oral every 12 hours  QUEtiapine 150 milliGRAM(s) Oral at bedtime  senna 2 Tablet(s) Oral at bedtime  tamsulosin 0.4 milliGRAM(s) Oral at bedtime    PRN Meds:  dextrose 40% Gel 15 Gram(s) Oral once PRN Blood Glucose LESS THAN 70 milliGRAM(s)/deciliter  diclofenac 75 milliGRAM(s) Oral two times a day PRN Moderate Pain (4 - 6)  glucagon  Injectable 1 milliGRAM(s) IntraMuscular once PRN Glucose LESS THAN 70 milligrams/deciliter  naloxone Injectable 2 milliGRAM(s) IV Push once PRN If no response to 0.4 mg of Narcan  oxyCODONE    IR 10 milliGRAM(s) Oral every 4 hours PRN Moderate Pain (4 - 6)    HOME MEDICATIONS:  Atarax 50 mg oral tablet: 1 tab(s) orally once a day (at bedtime)  baclofen 10 mg oral tablet: 1 tab(s) orally 2 times a day  busPIRone 5 mg oral tablet: 1 tab(s) orally 2 times a day  DULoxetine 60 mg oral delayed release capsule: 1 cap(s) orally once a day  Flomax 0.4 mg oral capsule: 1 cap(s) orally once a day  gabapentin 400 mg oral tablet: 1 tab(s) orally 3 times a day (before meals)  morphine 10 mg/5 mL oral solution: 5 milligram(s) orally every 3 hours, As Needed  morphine 60 mg oral capsule, extended release: 1 cap(s) orally every 12 hours  Plavix 75 mg oral tablet: 1 tab(s) orally once a day  SEROquel  mg oral tablet, extended release: 1 tab(s) orally once a day (in the evening)  Xanax 1 mg oral tablet: 1 tab(s) orally 3 times a day      Vital Signs:   T(F): 96.9 (09-25-19 @ 12:49), Max: 98.5 (09-24-19 @ 14:25)  HR: 92 (09-25-19 @ 12:49) (91 - 110)  BP: 139/67 (09-25-19 @ 12:49) (109/56 - 154/70)  RR: 18 (09-25-19 @ 12:49) (18 - 18)  SpO2: --      09-24-19 @ 07:01  -  09-25-19 @ 07:00  --------------------------------------------------------  IN: 0 mL / OUT: 1250 mL / NET: -1250 mL        Physical Exam:   GENERAL: NAD  HEENT: NCAT  CHEST/LUNG: CTA b/l  HEART: Regular rate and rhythm; s1 s2 appreciated, No murmurs, rubs, or gallops  ABDOMEN: Soft, Nontender, Nondistended; Bowel sounds present  EXTREMITIES: No LE edema b/l. Lack of light tough to LE b/l, lack of deep touch sensation in LLE to mid calf.   NERVOUS SYSTEM:  Alert & Oriented X3        Labs:                         10.3   7.06  )-----------( 255      ( 24 Sep 2019 07:13 )             32.5       24 Sep 2019 07:13    139    |  99     |  11     ----------------------------<  120    4.1     |  32     |  0.8      Ca    8.5        24 Sep 2019 07:13    TPro  6.8    /  Alb  3.1    /  TBili  0.6    /  DBili  x      /  AST  25     /  ALT  14     /  AlkPhos  221    24 Sep 2019 07:13    Hemoglobin A1C, Whole Blood: 11.4 % (09-05-19 @ 10:40)    Culture - Blood (collected 09-19-19 @ 16:26)  Source: .Blood None  Final Report (09-25-19 @ 02:00):    No growth at 5 days.        Radiology:    < from: CT Knee No Cont, Left (09.23.19 @ 22:40) >  IMPRESSION:  Severe diffuse osteopenia.    Acute impaction distal femoral transverse fracture with intra-articular   extension through the intercondylar notch.    Acute impaction proximal tibial metaphyseal transverse fracture with   suggestion of lateral tibial plateau extension.    Findings suspicious for acute nondisplaced fibular neck impaction   fracture.  < end of copied text >

## 2019-09-25 NOTE — PROGRESS NOTE ADULT - PROBLEM SELECTOR PLAN 1
Con't acetaminophen   Tablet .. 650 milliGRAM(s) Oral every 6 hours  Con't baclofen 10 milliGRAM(s) Oral two times a day  Con't clonazePAM  Tablet 1 milliGRAM(s) Oral three times a day  Con't diclofenac 75 milliGRAM(s) Oral two times a day PRN  con't DULoxetine 90 milliGRAM(s) Oral daily  con't gabapentin 800 milliGRAM(s) Oral three times a day  Con't morphine ER Tablet 45 milliGRAM(s) Oral every 12 hours  con't oxyCODONE    IR 10 milliGRAM(s) Oral every 4 hours PRN

## 2019-09-25 NOTE — PROGRESS NOTE ADULT - ASSESSMENT
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  Workup revealed superolateral dislocation of the left hip and he is s/p explantation of Left total hip arthroplasty and insertion of ABX spacer. Post op course was complicated by hypotension, requiring 1 PRBC transfusion and pressors. Pt was weaned off pressors by POD1 in the evening.   He had elevated troponins postop likely from demand ischemia per cardiology and he is on medical management.    Preop, he received loading dose of IVIG during this admission and will continue maintenance dose q3 weeks per neurology.  He was transferred to telemetry and now downgraded to medical floor.    1. Dislocation of left hip s/p explant of left ОЛЬГА and insertion of abx spacer on 9/13  OR culture + for Pseudomonas from hip - pansensitive  on cefepime   s/p picc line placement 9/24  6 weeks of abx per ID  pain control per pain management recommendations    2. NSTEMI type 2  ASA/statin/Bblocker  start Plavix per cardiology  normal EF on ECHO  case discussed with Dr. Cid - no cath at this time  outpt cardio f/u    3. CIDP - for IVIG q3 weeks per neurology - next dose due 10/3  needs premedication and IVIG to be run over 6 hours    4. CAD s/p CABG - continue medical management    5. DM - controlled on insulin    6. Anemia due to acute blood loss postop  s/p transfusion with 1 unit PRBC on 9/15 with appropriate rise in Hgb  s/p another unit on 9/17 for Hgb 6.4 and now Hgb >9 (? lab error in Hgb 6.4)  monitor H/H - remains stable  no active bleeding on CT scans    7. Episode of decreased responsiveness and respiratory depression that responded to Narcan x 2 last weeks  meds adjusted and pt now tolerating  case discussed with pain management in detail today  MS Contin 45mg q12hr  oxycodone 10mg q4hr prn  NO DILAUDID    8. Depression/anxiety  seen by psychiatry this admission  needs close outpt f/u    9. BPH on flomax    10. + MRSA nasal swab - on mupirocin and CHG baths    11. DVT prophylaxis    12. Constipation - miralax added and lactulose increased to 4 times a day  monitor BM's    13. Acute left tibial, fibula and distal femur fractures  continue pain control  Ortho f/u today  check vitamin D level        PROGRESS NOTE HANDOFF    Pending: Ortho f/u, management of pain     Disposition: SNF placement

## 2019-09-25 NOTE — PROGRESS NOTE ADULT - ASSESSMENT
Alert and orientated. Patient with dressing to the left side hip D&I. Neg s/s of infection. Neg cyanosis.

## 2019-09-25 NOTE — PROGRESS NOTE ADULT - SUBJECTIVE AND OBJECTIVE BOX
KATHY CASTY  49y Male    INTERVAL HPI/OVERNIGHT EVENTS:    Had a long discussion with the pt (and his wife via speakerphone).  Case also discussed with pain management PA and residents and RN.  Pt c/o pain - left knee - Ortho called to assess pt and to discuss with pt re: management of left hip and newly diagnosed fractures (spoke to CHIQUITA Duran).  I told the pt that we are changing oxycodone to 10mg q4hr prn pain.  He also requested something for anxiety/panic attack - Xanax 0.5mg PO x1 ordered.    T(F): 96.9 (09-25-19 @ 12:49), Max: 98.5 (09-24-19 @ 14:25)  HR: 92 (09-25-19 @ 12:49) (91 - 110)  BP: 139/67 (09-25-19 @ 12:49) (109/56 - 154/70)  RR: 18 (09-25-19 @ 12:49) (18 - 18)  SpO2: --  I&O's Summary    24 Sep 2019 07:01  -  25 Sep 2019 07:00  --------------------------------------------------------  IN: 0 mL / OUT: 1250 mL / NET: -1250 mL      CAPILLARY BLOOD GLUCOSE  207 (24 Sep 2019 21:27)      POCT Blood Glucose.: 183 mg/dL (25 Sep 2019 11:18)  POCT Blood Glucose.: 135 mg/dL (25 Sep 2019 07:24)  POCT Blood Glucose.: 207 mg/dL (24 Sep 2019 21:05)  POCT Blood Glucose.: 121 mg/dL (24 Sep 2019 16:15)        PHYSICAL EXAM:  GENERAL: NAD  HEAD:  Normocephalic  EYES:  conjunctiva and sclera clear  ENMT: Moist mucous membranes  NECK: Supple  NERVOUS SYSTEM:  Alert & Oriented X3, Good concentration, LE paresis  CHEST/LUNG: Clear to percussion bilaterally  HEART: Regular rate and rhythm  ABDOMEN: Soft, Nontender, Nondistended; Bowel sounds present  EXTREMITIES:   Left thigh with dressing  SKIN: pale    Consultant(s) Notes Reviewed:  [x ] YES  [ ] NO  Care Discussed with Consultants/Other Providers [ x] YES  [ ] NO    MEDICATIONS  (STANDING):  acetaminophen   Tablet .. 650 milliGRAM(s) Oral every 6 hours  aspirin  chewable 81 milliGRAM(s) Oral daily  atorvastatin 80 milliGRAM(s) Oral at bedtime  baclofen 10 milliGRAM(s) Oral two times a day  benzocaine 15 mG/menthol 3.6 mG Lozenge 1 Lozenge Oral three times a day  bisacodyl 5 milliGRAM(s) Oral at bedtime  busPIRone 5 milliGRAM(s) Oral two times a day  carvedilol 3.125 milliGRAM(s) Oral every 12 hours  cefepime   IVPB 2000 milliGRAM(s) IV Intermittent every 8 hours  cefepime   IVPB      chlorhexidine 4% Liquid 1 Application(s) Topical daily  clopidogrel Tablet 75 milliGRAM(s) Oral daily  dextrose 5%. 1000 milliLiter(s) (50 mL/Hr) IV Continuous <Continuous>  dextrose 50% Injectable 25 Gram(s) IV Push once  dextrose 50% Injectable 25 Gram(s) IV Push once  docusate sodium 100 milliGRAM(s) Oral three times a day  DULoxetine 90 milliGRAM(s) Oral daily  enoxaparin Injectable 40 milliGRAM(s) SubCutaneous daily  gabapentin 800 milliGRAM(s) Oral three times a day  hydrOXYzine hydrochloride 50 milliGRAM(s) Oral at bedtime  insulin glargine Injectable (LANTUS) 36 Unit(s) SubCutaneous at bedtime  insulin lispro (HumaLOG) corrective regimen sliding scale   SubCutaneous three times a day before meals  insulin lispro Injectable (HumaLOG) 12 Unit(s) SubCutaneous three times a day before meals  lactulose Syrup 15 Gram(s) Oral four times a day  morphine ER Tablet 45 milliGRAM(s) Oral every 12 hours  pantoprazole    Tablet 40 milliGRAM(s) Oral before breakfast  polyethylene glycol 3350 17 Gram(s) Oral daily  pramipexole 0.5 milliGRAM(s) Oral every 12 hours  QUEtiapine 150 milliGRAM(s) Oral at bedtime  senna 2 Tablet(s) Oral at bedtime  tamsulosin 0.4 milliGRAM(s) Oral at bedtime    MEDICATIONS  (PRN):  dextrose 40% Gel 15 Gram(s) Oral once PRN Blood Glucose LESS THAN 70 milliGRAM(s)/deciliter  diclofenac 75 milliGRAM(s) Oral two times a day PRN Moderate Pain (4 - 6)  glucagon  Injectable 1 milliGRAM(s) IntraMuscular once PRN Glucose LESS THAN 70 milligrams/deciliter  naloxone Injectable 2 milliGRAM(s) IV Push once PRN If no response to 0.4 mg of Narcan  oxyCODONE    IR 10 milliGRAM(s) Oral every 4 hours PRN Moderate Pain (4 - 6)      LABS:                        10.3   7.06  )-----------( 255      ( 24 Sep 2019 07:13 )             32.5     09-24    139  |  99  |  11  ----------------------------<  120<H>  4.1   |  32  |  0.8    Ca    8.5      24 Sep 2019 07:13    TPro  6.8  /  Alb  3.1<L>  /  TBili  0.6  /  DBili  x   /  AST  25  /  ALT  14  /  AlkPhos  221<H>  09-24          RADIOLOGY & ADDITIONAL TESTS:    Imaging or report Personally Reviewed:  [x ] YES  [ ] NO    < from: CT Knee No Cont, Left (09.23.19 @ 22:40) >  IMPRESSION:  Severe diffuse osteopenia.    Acute impaction distal femoral transverse fracture with intra-articular   extension through the intercondylar notch.    Acute impaction proximal tibial metaphyseal transverse fracture with   suggestion of lateral tibial plateau extension.    Findings suspicious for acute nondisplaced fibular neck impaction   fracture.        < end of copied text >      Case discussed with residents    Care discussed with pt and family

## 2019-09-25 NOTE — CHART NOTE - NSCHARTNOTEFT_GEN_A_CORE
I was called by nurse for FS of 79, chart reviewed , patient skipped 12 units of lispro twice yesterday. will decrease total daily dose of insulin  from 72 to 54 .   Lantus 27 and lispro 9x3 w SS.

## 2019-09-25 NOTE — PROGRESS NOTE ADULT - SUBJECTIVE AND OBJECTIVE BOX
Pain Mgmt f/u    Patient with complaint of severe pain to the left leg s/p Explantation of total hip arthroplasty 13-Sep-2019. Patient states he know has tib fx in his left leg which must have happened here. When his account of left knee pain when I first saw him, he stated when I came my knee was painful and then quickly changed his story to the pain starting when he had the MRI. When I stated that during our first interview, he complained of knee pain, he stated "I don't remember when it started".  Patient states he can not move his left leg. Patient requesting more opioids. Discussed with team. patient had an opioid oversedation episode on the 19th of September. Narcan was given and the patient responded positively to the narcan. Unable to determine if the patient may have taken opioids from home as he feels the medication we are giving him is not enough despite being his home dose.

## 2019-09-26 NOTE — PROGRESS NOTE ADULT - PROBLEM SELECTOR PLAN 1
Con't acetaminophen   Tablet .. 650 milliGRAM(s) Oral every 6 hours  Con't baclofen 10 milliGRAM(s) Oral two times a day  Con't clonazePAM  Tablet 1 milliGRAM(s) Oral three times a day  Con't diclofenac 75 milliGRAM(s) Oral two times a day PRN  con't DULoxetine 90 milliGRAM(s) Oral daily  con't gabapentin 800 milliGRAM(s) Oral three times a day  con't morphine ER Tablet 45 milliGRAM(s) Oral every 12 hours  con't oxyCODONE    IR 10 milliGRAM(s) Oral every 4 hours PRN  Start warm compress to the left groin Q1hr x20 mins PRN

## 2019-09-26 NOTE — PROGRESS NOTE ADULT - ASSESSMENT
Assessment and Plan:     50y/o Male, w/ extensive PMHx CAD, ??patient reports "silent MI", s/p CABG x5 vessels in 2011, BPH, depression, anxiety, DM and CIDP s/p post IVIG treatment  b/l total arthroplasty who presented w/ cc of LLE pain admitted for dislocation of the L hip & septic arthritis in left hip joint 2/2 to Pseudomonas currently on cefepime s/p washout & abx spacer implantation    #Pain: patient is on multiple pain medications, being followed by pain team  - patient had 1 episode of decreased RR, dropping to 6, with desaturation that responded to naloxone  - Pain Management Consultation appreciated. C/w Morphine ER 45mg q 12hrs, oxycodone IR 10mg q6 PRN,  Acetaminophen 650mg q 6 hrs standing  - please do not change the above pain regimen without speaking to pain mgmt first (number:[phone])  - complained of rib pain, chest xray was done, negative for acute pathology    #ID: septic arthritis of hardware  - Intraop cultures grew pseudomonas and Coag negative staph, currently on cefepime  - pt will need 6 weeks of antibiotics, s/p  PICC line placement on 9/24  - per patient, no one at home can administer abx, will need SNF   - pt will need a total of 6 weeks of IV abx. Per ID, will end 10/25/19    #Ortho: s/p failed arthroplasty s/p abx spacer on 9/13 - OR failed due to hypotension   - ortho on board. They do not plan to re-evaluate the hip  - xray left knee: possible distal femoral fx (xray was only 1 view, limited due to pain)  - CT of the left knee shows acute nondisplaced fibular neck impaction fracture, per verbal convo with ortho there is no intervention from them   - seen by ortho yesterday who placed an ace wrap around knee, no note in system will f/u official recs  - c/w pain regimen   - will check vitamin D levels    #CIDP: s/p IVIG in hospital (chronic inflammatory demyelinating polyneuropathy)  - s/p 5 doses of IvIg, IVIG is every 3 weeks per neuro (last dose on 9/12/19)  - also on gabapentin and duloxetine for pain  - being followed by pain management     #Cardio: CAD s/p CABG (5 vessel disease)  - per cardio no need for cath at this time. Okay to restart plavix 75mg qD  - on aspirin and atorvastatin, carvedilol q12  - Plavix was held due to suspicion of hemorrhagic shock (low H/H, hypotension post op)  - post op patient had elevated Trops likely 2/2 to demand ischemia    #Endo: DMII  - FS between   - c/w Lantus 36 and Lispro 12  - FS TID & qHS    #Nutrition: evaluated by Nutrition   - good intake (>75% of meals)    #Uro: BPH  - on tamsulosin     DVT ppx: Lovenox  GI ppx: protonix  Code Status: full code  Dispo: SNF for prolonged abx course (per pt he & wife unable to administer the IV antibiotics). Will need weekly ID f/u at NH (has abx space implantation) Assessment and Plan:     50y/o Male, w/ extensive PMHx CAD, ??patient reports "silent MI", s/p CABG x5 vessels in 2011, BPH, depression, anxiety, DM and CIDP s/p post IVIG treatment  b/l total arthroplasty who presented w/ cc of LLE pain admitted for dislocation of the L hip & septic arthritis in left hip joint 2/2 to Pseudomonas currently on cefepime s/p washout & abx spacer implantation    #Pain: patient is on multiple pain medications, being followed by pain team  - patient had 1 episode of decreased RR, dropping to 6, with desaturation that responded to naloxone  - Pain Management Consultation appreciated. C/w Morphine ER 45mg q 12hrs, oxycodone IR 10mg q6 PRN,  Acetaminophen 650mg q 6 hrs standing  - please do not change the above pain regimen without speaking to pain mgmt first (number:[phone])  - complained of rib pain, chest xray was done, negative for acute pathology    #ID: septic arthritis of hardware  - Intraop cultures grew pseudomonas and Coag negative staph, currently on cefepime  - pt will need 6 weeks of antibiotics, s/p  PICC line placement on 9/24  - per patient, no one at home can administer abx, will need SNF   - pt will need a total of 6 weeks of IV abx. Per ID, will end 10/25/19    #Ortho: s/p failed arthroplasty s/p abx spacer on 9/13 - OR failed due to hypotension   - ortho on board. They do not plan to re-evaluate the hip  - xray left knee: possible distal femoral fx (xray was only 1 view, limited due to pain)  - CT of the left knee shows acute nondisplaced fibular neck impaction fracture, per verbal convo with ortho there is no intervention from them   - seen by ortho yesterday who placed an ace wrap around knee, no note in system will f/u official recs  - c/w pain regimen   - will check vitamin D levels    #CIDP: s/p IVIG in hospital (chronic inflammatory demyelinating polyneuropathy)  - s/p 5 doses of IvIg, IVIG is every 3 weeks per neuro (last dose on 9/12/19)  - also on gabapentin and duloxetine for pain  - being followed by pain management     #Cardio: CAD s/p CABG (5 vessel disease)  - per cardio no need for cath at this time. Okay to restart plavix 75mg qD  - on aspirin and atorvastatin, carvedilol q12  - Plavix was held due to suspicion of hemorrhagic shock (low H/H, hypotension post op)  - post op patient had elevated Trops likely 2/2 to demand ischemia    #Endo: DMII  - FS between   - c/w Lantus 36 and Lispro 12  - FS TID & qHS    #Nutrition: evaluated by Nutrition   - good intake (>75% of meals)    #Uro: BPH  - on tamsulosin     DVT ppx: Lovenox  GI ppx: protonix  Code Status: full code  Dispo: SNF for prolonged abx course (per pt he & wife unable to administer the IV antibiotics). Will need weekly ID f/u at NH (has abx space implantation), bed was found at car

## 2019-09-26 NOTE — PROGRESS NOTE ADULT - ASSESSMENT
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  Workup revealed superolateral dislocation of the left hip and he is s/p explantation of Left total hip arthroplasty and insertion of ABX spacer. Post op course was complicated by hypotension, requiring 1 PRBC transfusion and pressors. Pt was weaned off pressors by POD1 in the evening.   He had elevated troponins postop likely from demand ischemia per cardiology and he is on medical management.    Preop, he received loading dose of IVIG during this admission and will continue maintenance dose q3 weeks per neurology.  He was transferred to telemetry and now downgraded to medical floor.    1. Dislocation of left hip s/p explant of left ОЛЬГА and insertion of abx spacer on 9/13  OR culture + for Pseudomonas from hip - pansensitive  on cefepime   s/p picc line placement 9/24  6 weeks of abx per ID  pain control per pain management recommendations    2. NSTEMI type 2  ASA/statin/Bblocker  start Plavix per cardiology  normal EF on ECHO  case discussed with Dr. Cid - no cath at this time  outpt cardio f/u    3. CIDP - for IVIG q3 weeks per neurology - next dose due 10/3  needs premedication and IVIG to be run over 6 hours    4. CAD s/p CABG - continue medical management    5. DM - controlled on insulin    6. Anemia due to acute blood loss postop  s/p transfusion with 1 unit PRBC on 9/15 with appropriate rise in Hgb  s/p another unit on 9/17 for Hgb 6.4 and now Hgb >9 (? lab error in Hgb 6.4)  monitor H/H - remains stable  no active bleeding on CT scans    7. Episode of decreased responsiveness and respiratory depression that responded to Narcan x 2 last weeks  meds adjusted and pt now tolerating  case discussed with pain management in detail today  MS Contin 45mg q12hr  oxycodone 10mg q4hr prn  NO DILAUDID    8. Depression/anxiety  seen by psychiatry this admission  needs close outpt f/u    9. BPH on flomax    10. + MRSA nasal swab - on mupirocin and CHG baths    11. DVT prophylaxis    12. Constipation - miralax added and lactulose increased to 4 times a day  monitor BM's    13. 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  Workup revealed superolateral dislocation of the left hip and he is s/p explantation of Left total hip arthroplasty and insertion of ABX spacer. Post op course was complicated by hypotension, requiring 1 PRBC transfusion and pressors. Pt was weaned off pressors by POD1 in the evening.   He had elevated troponins postop likely from demand ischemia per cardiology and he is on medical management.    Preop, he received loading dose of IVIG during this admission and will continue maintenance dose q3 weeks per neurology.  He was transferred to telemetry and now downgraded to medical floor.    1. Dislocation of left hip s/p explant of left ОЛЬГА and insertion of abx spacer on 9/13  OR culture + for Pseudomonas from hip - pansensitive  on cefepime   s/p picc line placement 9/24  6 weeks of abx per ID  pain control per pain management recommendations    2. NSTEMI type 2  ASA/Plavix/statin/Bblocker  normal EF on ECHO  case discussed with Dr. Cid - no cath at this time  outpt cardio f/u    3. CIDP - for IVIG q3 weeks per neurology - next dose due 10/3  needs premedication and IVIG to be run over 6 hours    4. CAD s/p CABG - continue medical management    5. DM - controlled on insulin    6. Anemia due to acute blood loss postop  s/p transfusion with 1 unit PRBC on 9/15 with appropriate rise in Hgb  s/p another unit on 9/17 for Hgb 6.4 and now Hgb >9 (? lab error in Hgb 6.4)  H/H now stablew    7. Episode of decreased responsiveness and respiratory depression that responded to Narcan x 2 last week  meds adjusted and pt now tolerating  case discussed with pain management in detail   MS Contin 45mg q12hr  oxycodone 10mg q4hr prn  NO DILAUDID    8. Depression/anxiety  continue current management  seen by psychiatry this admission  needs close outpt f/u    9. BPH on flomax    10. + MRSA nasal swab - on mupirocin and CHG baths    11. DVT prophylaxis    12. Constipation - pt had BM today  continue current management but make lactulose prn if no BM in 3 days    13. Acute left tibial, fibula and distal femur fractures  continue pain control  Ortho following - pt is awaiting device for left leg prior to discharge  check vitamin D level        PROGRESS NOTE HANDOFF    Pending: Ortho f/u    Disposition: SNF placement once cleared by orthopedics

## 2019-09-26 NOTE — PROGRESS NOTE ADULT - SUBJECTIVE AND OBJECTIVE BOX
Pain Mgmt f/u    Patient lying in bed supine. Patient states he is feeling better with his pain today vs. yesterday, but did have a lot of pain during the night. Left hip with no dressing. Staples in place are dry and intact. Neg redness. Noted patient to be supine, buy his body twisting to the right side. I explained to the patient that his leaning is a position he is always in. He states that he just winds up that way. RN, PCA and myself gave am care to the patient by changing his bedding. Patient skin reddened but blanching. No pressure ulcer noted. left leg was moved very gently and supported. Patient did not express a pain level as high as expected. Patient straighten out in bed with proper body alignment. Patient positioned onto more to his left side to prevent skin breakdown to the right side. Patient is not moving his lower ext's.

## 2019-09-26 NOTE — PROGRESS NOTE ADULT - SUBJECTIVE AND OBJECTIVE BOX
Hospital Day:  23d    Subjective:    Patient is a 49y old  Male who presents with a chief complaint of Left hip pain. Overnight no acute events. This morning was found resting comfortably in bed. When I woke him up patient complains of left groin pain that he likens to "muscle soreness after over exertion." He describes pain as excruciating. States pain not well controlled on regimen. I explained to him that regimen would not be changed without pain management team as this is their area of expertise. Patient agreed. ROS is negative for other acute symptoms.       Past Medical Hx:   CIDP (chronic inflammatory demyelinating polyneuropathy)    Past Sx:  History of cholecystectomy  History of total left hip replacement  History of total right hip replacement  S/P CABG x 5    Allergies:  penicillins (Unknown)    Current Meds:   Standng Meds:  acetaminophen   Tablet .. 650 milliGRAM(s) Oral every 6 hours  aspirin  chewable 81 milliGRAM(s) Oral daily  atorvastatin 80 milliGRAM(s) Oral at bedtime  baclofen 10 milliGRAM(s) Oral two times a day  benzocaine 15 mG/menthol 3.6 mG Lozenge 1 Lozenge Oral three times a day  bisacodyl 5 milliGRAM(s) Oral at bedtime  busPIRone 5 milliGRAM(s) Oral two times a day  carvedilol 3.125 milliGRAM(s) Oral every 12 hours  cefepime   IVPB 2000 milliGRAM(s) IV Intermittent every 8 hours  cefepime   IVPB      chlorhexidine 4% Liquid 1 Application(s) Topical daily  clonazePAM  Tablet 1 milliGRAM(s) Oral three times a day  clopidogrel Tablet 75 milliGRAM(s) Oral daily  dextrose 5%. 1000 milliLiter(s) (50 mL/Hr) IV Continuous <Continuous>  dextrose 50% Injectable 25 Gram(s) IV Push once  dextrose 50% Injectable 25 Gram(s) IV Push once  docusate sodium 100 milliGRAM(s) Oral three times a day  DULoxetine 90 milliGRAM(s) Oral daily  enoxaparin Injectable 40 milliGRAM(s) SubCutaneous daily  gabapentin 800 milliGRAM(s) Oral three times a day  hydrOXYzine hydrochloride 50 milliGRAM(s) Oral at bedtime  insulin glargine Injectable (LANTUS) 27 Unit(s) SubCutaneous at bedtime  insulin lispro (HumaLOG) corrective regimen sliding scale   SubCutaneous three times a day before meals  insulin lispro Injectable (HumaLOG) 9 Unit(s) SubCutaneous three times a day before meals  lactulose Syrup 15 Gram(s) Oral four times a day  morphine ER Tablet 45 milliGRAM(s) Oral every 12 hours  pantoprazole    Tablet 40 milliGRAM(s) Oral before breakfast  polyethylene glycol 3350 17 Gram(s) Oral daily  pramipexole 0.5 milliGRAM(s) Oral every 12 hours  QUEtiapine 150 milliGRAM(s) Oral at bedtime  senna 2 Tablet(s) Oral at bedtime  tamsulosin 0.4 milliGRAM(s) Oral at bedtime    PRN Meds:  dextrose 40% Gel 15 Gram(s) Oral once PRN Blood Glucose LESS THAN 70 milliGRAM(s)/deciliter  diclofenac 75 milliGRAM(s) Oral two times a day PRN Moderate Pain (4 - 6)  glucagon  Injectable 1 milliGRAM(s) IntraMuscular once PRN Glucose LESS THAN 70 milligrams/deciliter  naloxone Injectable 2 milliGRAM(s) IV Push once PRN If no response to 0.4 mg of Narcan  oxyCODONE    IR 10 milliGRAM(s) Oral every 4 hours PRN Moderate Pain (4 - 6)    HOME MEDICATIONS:  Atarax 50 mg oral tablet: 1 tab(s) orally once a day (at bedtime)  baclofen 10 mg oral tablet: 1 tab(s) orally 2 times a day  busPIRone 5 mg oral tablet: 1 tab(s) orally 2 times a day  DULoxetine 60 mg oral delayed release capsule: 1 cap(s) orally once a day  Flomax 0.4 mg oral capsule: 1 cap(s) orally once a day  gabapentin 400 mg oral tablet: 1 tab(s) orally 3 times a day (before meals)  morphine 10 mg/5 mL oral solution: 5 milligram(s) orally every 3 hours, As Needed  morphine 60 mg oral capsule, extended release: 1 cap(s) orally every 12 hours  Plavix 75 mg oral tablet: 1 tab(s) orally once a day  SEROquel  mg oral tablet, extended release: 1 tab(s) orally once a day (in the evening)  Xanax 1 mg oral tablet: 1 tab(s) orally 3 times a day      Vital Signs:   T(F): 97.9 (09-26-19 @ 04:44), Max: 98.9 (09-25-19 @ 21:28)  HR: 97 (09-26-19 @ 04:44) (80 - 97)  BP: 100/64 (09-26-19 @ 04:44) (100/64 - 155/79)  RR: 18 (09-26-19 @ 04:44) (18 - 18)  SpO2: 94% (09-26-19 @ 06:32) (94% - 94%)        Physical Exam:   GENERAL: NAD  HEENT: NCAT  CHEST/LUNG: CTA b/l  HEART: Regular rate and rhythm; s1 s2 appreciated, No murmurs, rubs, or gallops  ABDOMEN: Soft, Nontender, Nondistended; Bowel sounds present  EXTREMITIES: moderate B/l LE swelling w/ minimal pitting. Lack of light tough to LE b/l, lack of deep touch sensation in LLE to mid calf.   NERVOUS SYSTEM:  Alert & Oriented X3        Labs:                         11.3   7.62  )-----------( 242      ( 26 Sep 2019 07:08 )             35.8     Neutophil% 55.2, Lymphocyte% 31.4, Monocyte% 7.0, Bands% 0.5 09-26-19 @ 07:08    26 Sep 2019 07:08    137    |  99     |  14     ----------------------------<  150    4.5     |  24     |  0.7      Ca    8.7        26 Sep 2019 07:08    TPro  7.1    /  Alb  3.4    /  TBili  0.6    /  DBili  x      /  AST  45     /  ALT  16     /  AlkPhos  291    26 Sep 2019 07:08    Hemoglobin A1C, Whole Blood: 11.4 % (09-05-19 @ 10:40)    Culture - Blood (collected 09-19-19 @ 16:26)  Source: .Blood None  Final Report (09-25-19 @ 02:00):    No growth at 5 days.        Radiology:

## 2019-09-26 NOTE — PROGRESS NOTE ADULT - SUBJECTIVE AND OBJECTIVE BOX
NAPOLEON CAST  49y Male    INTERVAL HPI/OVERNIGHT EVENTS:    Pt c/o irritation of perineum - fungal rash noted and treatment will be ordered.  Some drainage of left hip wound per RN - spoke to Ortho PA who will evaluate the site.  Pt needs device for left leg that will not be ready until tomorrow per Ortho.  Had BM today.    T(F): 97.9 (09-26-19 @ 04:44), Max: 98.9 (09-25-19 @ 21:28)  HR: 97 (09-26-19 @ 04:44) (80 - 97)  BP: 100/64 (09-26-19 @ 04:44) (100/64 - 155/79)  RR: 18 (09-26-19 @ 04:44) (18 - 18)  SpO2: 94% (09-26-19 @ 06:32) (94% - 94%)  I&O's Summary    CAPILLARY BLOOD GLUCOSE      POCT Blood Glucose.: 219 mg/dL (26 Sep 2019 11:09)  POCT Blood Glucose.: 167 mg/dL (26 Sep 2019 07:32)  POCT Blood Glucose.: 141 mg/dL (26 Sep 2019 02:31)  POCT Blood Glucose.: 141 mg/dL (25 Sep 2019 23:23)  POCT Blood Glucose.: 79 mg/dL (25 Sep 2019 21:23)  POCT Blood Glucose.: 97 mg/dL (25 Sep 2019 16:48)        PHYSICAL EXAM:  GENERAL: NAD  HEAD:  Normocephalic  EYES:  conjunctiva and sclera clear  ENMT: Moist mucous membranes  NECK: Supple  NERVOUS SYSTEM:  Alert & Oriented X3, Good concentration  CHEST/LUNG: Clear to percussion bilaterally; No rales, rhonchi, wheezing  HEART: Regular rate and rhythm; No murmurs  ABDOMEN: Soft, Nontender, Nondistended  EXTREMITIES:   Left LE wound with staples, no active drainage noted at time of my exam  wrap over left knee  edema of Left LE  SKIN: pale    Consultant(s) Notes Reviewed:  [x ] YES  [ ] NO  Care Discussed with Consultants/Other Providers [ x] YES  [ ] NO    MEDICATIONS  (STANDING):  acetaminophen   Tablet .. 650 milliGRAM(s) Oral every 6 hours  aspirin  chewable 81 milliGRAM(s) Oral daily  atorvastatin 80 milliGRAM(s) Oral at bedtime  baclofen 10 milliGRAM(s) Oral two times a day  benzocaine 15 mG/menthol 3.6 mG Lozenge 1 Lozenge Oral three times a day  bisacodyl 5 milliGRAM(s) Oral at bedtime  busPIRone 5 milliGRAM(s) Oral two times a day  carvedilol 3.125 milliGRAM(s) Oral every 12 hours  cefepime   IVPB 2000 milliGRAM(s) IV Intermittent every 8 hours  cefepime   IVPB      chlorhexidine 4% Liquid 1 Application(s) Topical daily  clonazePAM  Tablet 1 milliGRAM(s) Oral three times a day  clopidogrel Tablet 75 milliGRAM(s) Oral daily  dextrose 5%. 1000 milliLiter(s) (50 mL/Hr) IV Continuous <Continuous>  dextrose 50% Injectable 25 Gram(s) IV Push once  dextrose 50% Injectable 25 Gram(s) IV Push once  docusate sodium 100 milliGRAM(s) Oral three times a day  DULoxetine 90 milliGRAM(s) Oral daily  enoxaparin Injectable 40 milliGRAM(s) SubCutaneous daily  gabapentin 800 milliGRAM(s) Oral three times a day  hydrOXYzine hydrochloride 50 milliGRAM(s) Oral at bedtime  insulin glargine Injectable (LANTUS) 27 Unit(s) SubCutaneous at bedtime  insulin lispro (HumaLOG) corrective regimen sliding scale   SubCutaneous three times a day before meals  insulin lispro Injectable (HumaLOG) 9 Unit(s) SubCutaneous three times a day before meals  lactulose Syrup 15 Gram(s) Oral four times a day  morphine ER Tablet 45 milliGRAM(s) Oral every 12 hours  nystatin Cream 1 Application(s) Topical two times a day  pantoprazole    Tablet 40 milliGRAM(s) Oral before breakfast  polyethylene glycol 3350 17 Gram(s) Oral daily  pramipexole 0.5 milliGRAM(s) Oral every 12 hours  QUEtiapine 150 milliGRAM(s) Oral at bedtime  senna 2 Tablet(s) Oral at bedtime  tamsulosin 0.4 milliGRAM(s) Oral at bedtime    MEDICATIONS  (PRN):  dextrose 40% Gel 15 Gram(s) Oral once PRN Blood Glucose LESS THAN 70 milliGRAM(s)/deciliter  diclofenac 75 milliGRAM(s) Oral two times a day PRN Moderate Pain (4 - 6)  glucagon  Injectable 1 milliGRAM(s) IntraMuscular once PRN Glucose LESS THAN 70 milligrams/deciliter  naloxone Injectable 2 milliGRAM(s) IV Push once PRN If no response to 0.4 mg of Narcan  oxyCODONE    IR 10 milliGRAM(s) Oral every 4 hours PRN Moderate Pain (4 - 6)      LABS:                        11.3   7.62  )-----------( 242      ( 26 Sep 2019 07:08 )             35.8     09-26    137  |  99  |  14  ----------------------------<  150<H>  4.5   |  24  |  0.7    Ca    8.7      26 Sep 2019 07:08    TPro  7.1  /  Alb  3.4<L>  /  TBili  0.6  /  DBili  x   /  AST  45<H>  /  ALT  16  /  AlkPhos  291<H>  09-26            Case discussed with resident    Care discussed with pt

## 2019-09-27 NOTE — PROGRESS NOTE ADULT - ASSESSMENT
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  Workup revealed superolateral dislocation of the left hip and he is s/p explantation of Left total hip arthroplasty and insertion of ABX spacer. Post op course was complicated by hypotension, requiring 1 PRBC transfusion and pressors. Pt was weaned off pressors by POD1 in the evening.   He had elevated troponins postop likely from demand ischemia per cardiology and he is on medical management.    Preop, he received loading dose of IVIG during this admission and will continue maintenance dose q3 weeks per neurology.  He was transferred to telemetry and now downgraded to medical floor.    1. Dislocation of left hip s/p explant of left ОЛЬГА and insertion of abx spacer on 9/13  OR culture + for Pseudomonas from hip - pansensitive  on cefepime   s/p picc line placement 9/24  6 weeks of abx per ID - end date 10/25/19  pain control per pain management recommendations  outpt Ortho f/u for staple removal    2. NSTEMI type 2  ASA/Plavix/statin/Bblocker  normal EF on ECHO  case discussed with Dr. Cid - no cath at this time  outpt cardio f/u    3. CIDP - for IVIG q3 weeks per neurology - next dose due 10/3  needs premedication and IVIG to be run over 6 hours    4. CAD s/p CABG - continue medical management    5. DM - controlled on insulin    6. Anemia due to acute blood loss postop  s/p transfusion with 1 unit PRBC on 9/15 with appropriate rise in Hgb  s/p another unit on 9/17 for Hgb 6.4 and now Hgb >9 (? lab error in Hgb 6.4)  H/H now stable - no need for daily labs    7. Episode of decreased responsiveness and respiratory depression that responded to Narcan x 2 last week  meds adjusted and pt now tolerating  case discussed with pain management in detail   MS Contin 45mg q12hr  oxycodone 10mg q4hr prn  NO DILAUDID    8. Depression/anxiety  continue current management  seen by psychiatry this admission  needs close outpt f/u    9. BPH on flomax    10. + MRSA nasal swab - on mupirocin and CHG baths    11. DVT prophylaxis    12. Constipation  continue current management     13. Acute left tibial, fibula and distal femur fractures  continue pain control  Ortho following - s/p splint and now awaiting EZ boot prior to discharge  WBAT right LE  NWB Left LE  may be OOB to chair  outpt f/u in 2 weeks    14. vitamin D deficiency - supplementation started    15. Elevated Alk Phos - monitor as outpt - pt asymptomatic    PROGRESS NOTE HANDOFF    Pending: EZ boot    Disposition: SNF placement once above obtained    Medication reconciliation reviewed with resident    Spent 45 minutes on the discharge process of this pt

## 2019-09-27 NOTE — PROGRESS NOTE ADULT - ASSESSMENT
Assessment and Plan:    50y/o Male, w/ extensive PMHx CAD s/p CABG x5 vessels in 2011, BPH, depression, anxiety, DM and CIDP s/p post IVIG treatment  b/l total arthroplasty who presented w/ cc of LLE pain admitted for dislocation of the L hip & septic arthritis in left hip joint 2/2 to Pseudomonas currently on cefepime s/p explanation washout & abx spacer implantation.     #Pain: patient is on multiple pain medications, being followed by pain team  - patient had 1 episode of decreased RR, dropping to 6, with desaturation that responded to naloxone  - Pain Management Consultation appreciated. C/w Morphine ER 45mg q 12hrs, oxycodone IR 10mg q6 PRN,  Acetaminophen 650mg q 6 hrs standing  - please do not change the above pain regimen without speaking to pain mgmt first (number:[phone])  - complained of rib pain, chest xray was done, negative for acute pathology    #ID: septic arthritis of hardware  - Intraop cultures grew pseudomonas and Coag negative staph, currently on cefepime  - pt will need 6 weeks of antibiotics, s/p  PICC line placement on 9/24  - per patient, no one at home can administer abx, will need SNF   - pt will need a total of 6 weeks of IV abx. Per ID, will end 10/25/19    #Ortho: s/p failed arthroplasty s/p abx spacer on 9/13 - OR failed due to hypotension   - knee immobilizer placed by ortho today, ez boot delivered  - ortho on board. They do not plan to re-evaluate the hip  - xray left knee: possible distal femoral fx (xray was only 1 view, limited due to pain)  - CT of the left knee shows acute nondisplaced fibular neck impaction fracture, per verbal convo with ortho there is no intervention from them   - c/w pain regimen     #Suspected vitamin d deficiency   - vitamin D level is low (19), will give weekly vitamin d supplement (50,000 units)    #CIDP: s/p IVIG in hospital (chronic inflammatory demyelinating polyneuropathy)  - s/p 5 doses of IVIg IVIG is every 3 weeks per neuro (last dose on 9/12/19)  - also on gabapentin and duloxetine for pain  - being followed by pain management     #Cardio: CAD s/p CABG (5 vessel disease)  - per cardio no need for cath at this time. Okay to restart Plavix 75mg qD  - on aspirin and atorvastatin, carvedilol q12  - Plavix was held due to suspicion of hemorrhagic shock (low H/H, hypotension post op)  - post op patient had elevated Trops likely 2/2 to demand ischemia    #Endo: DMII  - FS between   - c/w Lantus 36 and Lispro 12  - FS TID & qHS    #Nutrition: evaluated by Nutrition   - good intake (>75% of meals)    #Uro: BPH  - on tamsulosin     DVT ppx: Lovenox  GI ppx: protonix  Code Status: full code  Dispo: SNF pending insurance authorization

## 2019-09-27 NOTE — PROGRESS NOTE ADULT - SUBJECTIVE AND OBJECTIVE BOX
Hospital Day:  24d    Subjective:    Patient is a 49y old  Male who presents with a chief complaint of Left hip pain. Overnight no acute events. This morning resting comfortably in bed. When I woke him up complained of pain in left leg.       Past Medical Hx:   CIDP (chronic inflammatory demyelinating polyneuropathy)    Past Sx:  History of cholecystectomy  History of total left hip replacement  History of total right hip replacement  S/P CABG x 5    Allergies:  penicillins (Unknown)    Current Meds:   Standng Meds:  acetaminophen   Tablet .. 650 milliGRAM(s) Oral every 6 hours  aspirin  chewable 81 milliGRAM(s) Oral daily  atorvastatin 80 milliGRAM(s) Oral at bedtime  baclofen 10 milliGRAM(s) Oral two times a day  benzocaine 15 mG/menthol 3.6 mG Lozenge 1 Lozenge Oral three times a day  bisacodyl 5 milliGRAM(s) Oral at bedtime  busPIRone 5 milliGRAM(s) Oral two times a day  carvedilol 3.125 milliGRAM(s) Oral every 12 hours  cefepime   IVPB 2000 milliGRAM(s) IV Intermittent every 8 hours  cefepime   IVPB      chlorhexidine 4% Liquid 1 Application(s) Topical daily  clonazePAM  Tablet 1 milliGRAM(s) Oral three times a day  clopidogrel Tablet 75 milliGRAM(s) Oral daily  dextrose 5%. 1000 milliLiter(s) (50 mL/Hr) IV Continuous <Continuous>  dextrose 50% Injectable 25 Gram(s) IV Push once  dextrose 50% Injectable 25 Gram(s) IV Push once  docusate sodium 100 milliGRAM(s) Oral three times a day  DULoxetine 90 milliGRAM(s) Oral daily  enoxaparin Injectable 40 milliGRAM(s) SubCutaneous daily  ergocalciferol 52186 Unit(s) Oral every week  gabapentin 800 milliGRAM(s) Oral three times a day  hydrOXYzine hydrochloride 50 milliGRAM(s) Oral at bedtime  insulin glargine Injectable (LANTUS) 27 Unit(s) SubCutaneous at bedtime  insulin lispro (HumaLOG) corrective regimen sliding scale   SubCutaneous three times a day before meals  insulin lispro Injectable (HumaLOG) 9 Unit(s) SubCutaneous three times a day before meals  lactulose Syrup 15 Gram(s) Oral four times a day  morphine ER Tablet 45 milliGRAM(s) Oral every 12 hours  nystatin Cream 1 Application(s) Topical two times a day  pantoprazole    Tablet 40 milliGRAM(s) Oral before breakfast  polyethylene glycol 3350 17 Gram(s) Oral daily  pramipexole 0.5 milliGRAM(s) Oral every 12 hours  QUEtiapine 150 milliGRAM(s) Oral at bedtime  senna 2 Tablet(s) Oral at bedtime  tamsulosin 0.4 milliGRAM(s) Oral at bedtime    PRN Meds:  dextrose 40% Gel 15 Gram(s) Oral once PRN Blood Glucose LESS THAN 70 milliGRAM(s)/deciliter  diclofenac 75 milliGRAM(s) Oral two times a day PRN Moderate Pain (4 - 6)  glucagon  Injectable 1 milliGRAM(s) IntraMuscular once PRN Glucose LESS THAN 70 milligrams/deciliter  naloxone Injectable 2 milliGRAM(s) IV Push once PRN If no response to 0.4 mg of Narcan  ondansetron Injectable 4 milliGRAM(s) IV Push once PRN Nausea and/or Vomiting  oxyCODONE    IR 10 milliGRAM(s) Oral every 4 hours PRN Moderate Pain (4 - 6)    HOME MEDICATIONS:  acetaminophen 325 mg oral tablet: 2 tab(s) orally every 6 hours  aspirin 81 mg oral tablet, chewable: 1 tab(s) orally once a day  atorvastatin 80 mg oral tablet: 1 tab(s) orally once a day (at bedtime)  baclofen 10 mg oral tablet: 1 tab(s) orally 2 times a day  bisacodyl 5 mg oral delayed release tablet: 1 tab(s) orally once a day (at bedtime), As Needed  busPIRone 5 mg oral tablet: 1 tab(s) orally 2 times a day  carvedilol 3.125 mg oral tablet: 1 tab(s) orally every 12 hours  cefepime: 2 gram(s) intravenous every 8 hours  clonazePAM 1 mg oral tablet: 1 tab(s) orally 3 times a day  clopidogrel 75 mg oral tablet: 1 tab(s) orally once a day  diclofenac sodium 75 mg oral delayed release tablet: 1 tab(s) orally 2 times a day, As needed, Moderate Pain (4 - 6)  docusate sodium 100 mg oral capsule: 1 cap(s) orally 3 times a day  DULoxetine 30 mg oral delayed release capsule: 3 cap(s) orally once a day  enoxaparin:   Flomax 0.4 mg oral capsule: 1 cap(s) orally once a day  gabapentin 800 mg oral tablet: 1 tab(s) orally 3 times a day  hydrOXYzine hydrochloride 50 mg oral tablet: 1 tab(s) orally once a day (at bedtime)  insulin glargine: 27 unit(s) subcutaneously once a day (at bedtime)  insulin lispro 100 units/mL subcutaneous solution: 9 unit(s) subcutaneous 3 times a day    Administer 5 to 15 minutes prior to meal. HOLD nutritional insulin if patient is NPO  lactulose 10 g/15 mL oral syrup: 22.5 milliliter(s) orally 4 times a day, As Needed.    Please give if no bowel movement for 3 days  menthol-benzocaine 3.6 mg-15 mg mucous membrane lozenge:  mucous membrane   morphine 15 mg/8 to 12 hr oral tablet, extended release: 3 tab(s) orally every 12 hours  nystatin 100,000 units/g topical cream:  topically   oxyCODONE 10 mg oral tablet: 1 tab(s) orally every 4 hours, As needed, Moderate Pain (4 - 6)  pantoprazole 40 mg oral delayed release tablet: 1 tab(s) orally once a day (before a meal)  polyethylene glycol 3350 oral powder for reconstitution: 17 gram(s) orally once a day  pramipexole 0.5 mg oral tablet: 1 tab(s) orally every 12 hours  QUEtiapine 50 mg oral tablet: 3 tab(s) orally once a day (at bedtime)  senna oral tablet: 2 tab(s) orally once a day (at bedtime)      Vital Signs:   T(F): 98.3 (09-27-19 @ 12:16), Max: 98.5 (09-26-19 @ 21:28)  HR: 94 (09-27-19 @ 12:16) (94 - 99)  BP: 127/62 (09-27-19 @ 12:16) (127/62 - 158/67)  RR: 18 (09-27-19 @ 12:16) (18 - 18)  SpO2: --      09-26-19 @ 07:01  -  09-27-19 @ 07:00  --------------------------------------------------------  IN: 0 mL / OUT: 1150 mL / NET: -1150 mL        Physical Exam:   GENERAL: NAD  HEENT: NCAT  CHEST/LUNG: CTA b/l  HEART: Regular rate and rhythm; s1 s2 appreciated, No murmurs, rubs, or gallops  ABDOMEN: Soft, Nontender, Nondistended; Bowel sounds present  EXTREMITIES: Edema in b/l LE with trace edema. RASHADE has ecchymosis over the deltoid region.   NERVOUS SYSTEM:  Alert & Oriented X3        Labs:                         10.1   8.35  )-----------( 313      ( 27 Sep 2019 07:19 )             32.2     Neutophil% 56.2, Lymphocyte% 31.0, Monocyte% 7.7, Bands% 0.5 09-27-19 @ 07:19    27 Sep 2019 07:19    137    |  99     |  13     ----------------------------<  161    4.8     |  26     |  0.7      Ca    8.8        27 Sep 2019 07:19    TPro  7.0    /  Alb  3.4    /  TBili  0.6    /  DBili  x      /  AST  30     /  ALT  13     /  AlkPhos  314    27 Sep 2019 07:19          Hemoglobin A1C, Whole Blood: 11.4 % (09-05-19 @ 10:40)      Radiology:

## 2019-09-27 NOTE — PROGRESS NOTE ADULT - SUBJECTIVE AND OBJECTIVE BOX
NAPOLEON CAST  49y Male    INTERVAL HPI/OVERNIGHT EVENTS:    No new complaints.  Wife on speakerphone.   Listened to all concerns.  Ortho f/u appreciated - awaiting for EZ boot and then discharge to SNF.    T(F): 98.3 (09-27-19 @ 12:16), Max: 99.6 (09-26-19 @ 13:55)  HR: 94 (09-27-19 @ 12:16) (94 - 104)  BP: 127/62 (09-27-19 @ 12:16) (127/62 - 158/67)  RR: 18 (09-27-19 @ 12:16) (18 - 18)  SpO2: --  I&O's Summary    26 Sep 2019 07:01  -  27 Sep 2019 07:00  --------------------------------------------------------  IN: 0 mL / OUT: 1150 mL / NET: -1150 mL      CAPILLARY BLOOD GLUCOSE      POCT Blood Glucose.: 238 mg/dL (27 Sep 2019 11:13)  POCT Blood Glucose.: 169 mg/dL (27 Sep 2019 07:28)  POCT Blood Glucose.: 160 mg/dL (26 Sep 2019 20:55)  POCT Blood Glucose.: 125 mg/dL (26 Sep 2019 16:15)        PHYSICAL EXAM:  GENERAL: NAD  HEAD:  Normocephalic  EYES:  conjunctiva and sclera clear  ENMT: Moist mucous membranes  NECK: Supple  NERVOUS SYSTEM:  Alert & Oriented X3, Good concentration  CHEST/LUNG: Clear to percussion bilaterally; No rales, rhonchi, wheezing  HEART: Regular rate and rhythm  ABDOMEN: Soft, Nontender, Nondistended  EXTREMITIES:   Left LE with splint and some edema  wound with staples, no erythema  SKIN: pale     Consultant(s) Notes Reviewed:  [x ] YES  [ ] NO  Care Discussed with Consultants/Other Providers [ x] YES  [ ] NO    MEDICATIONS  (STANDING):  acetaminophen   Tablet .. 650 milliGRAM(s) Oral every 6 hours  aspirin  chewable 81 milliGRAM(s) Oral daily  atorvastatin 80 milliGRAM(s) Oral at bedtime  baclofen 10 milliGRAM(s) Oral two times a day  benzocaine 15 mG/menthol 3.6 mG Lozenge 1 Lozenge Oral three times a day  bisacodyl 5 milliGRAM(s) Oral at bedtime  busPIRone 5 milliGRAM(s) Oral two times a day  carvedilol 3.125 milliGRAM(s) Oral every 12 hours  cefepime   IVPB 2000 milliGRAM(s) IV Intermittent every 8 hours  cefepime   IVPB      chlorhexidine 4% Liquid 1 Application(s) Topical daily  clonazePAM  Tablet 1 milliGRAM(s) Oral three times a day  clopidogrel Tablet 75 milliGRAM(s) Oral daily  dextrose 5%. 1000 milliLiter(s) (50 mL/Hr) IV Continuous <Continuous>  dextrose 50% Injectable 25 Gram(s) IV Push once  dextrose 50% Injectable 25 Gram(s) IV Push once  docusate sodium 100 milliGRAM(s) Oral three times a day  DULoxetine 90 milliGRAM(s) Oral daily  enoxaparin Injectable 40 milliGRAM(s) SubCutaneous daily  ergocalciferol 52045 Unit(s) Oral every week  gabapentin 800 milliGRAM(s) Oral three times a day  hydrOXYzine hydrochloride 50 milliGRAM(s) Oral at bedtime  insulin glargine Injectable (LANTUS) 27 Unit(s) SubCutaneous at bedtime  insulin lispro (HumaLOG) corrective regimen sliding scale   SubCutaneous three times a day before meals  insulin lispro Injectable (HumaLOG) 9 Unit(s) SubCutaneous three times a day before meals  lactulose Syrup 15 Gram(s) Oral four times a day  morphine ER Tablet 45 milliGRAM(s) Oral every 12 hours  nystatin Cream 1 Application(s) Topical two times a day  pantoprazole    Tablet 40 milliGRAM(s) Oral before breakfast  polyethylene glycol 3350 17 Gram(s) Oral daily  pramipexole 0.5 milliGRAM(s) Oral every 12 hours  QUEtiapine 150 milliGRAM(s) Oral at bedtime  senna 2 Tablet(s) Oral at bedtime  tamsulosin 0.4 milliGRAM(s) Oral at bedtime    MEDICATIONS  (PRN):  dextrose 40% Gel 15 Gram(s) Oral once PRN Blood Glucose LESS THAN 70 milliGRAM(s)/deciliter  diclofenac 75 milliGRAM(s) Oral two times a day PRN Moderate Pain (4 - 6)  glucagon  Injectable 1 milliGRAM(s) IntraMuscular once PRN Glucose LESS THAN 70 milligrams/deciliter  naloxone Injectable 2 milliGRAM(s) IV Push once PRN If no response to 0.4 mg of Narcan  oxyCODONE    IR 10 milliGRAM(s) Oral every 4 hours PRN Moderate Pain (4 - 6)      LABS:                        10.1   8.35  )-----------( 313      ( 27 Sep 2019 07:19 )             32.2     09-27    137  |  99  |  13  ----------------------------<  161<H>  4.8   |  26  |  0.7    Ca    8.8      27 Sep 2019 07:19    TPro  7.0  /  Alb  3.4<L>  /  TBili  0.6  /  DBili  x   /  AST  30  /  ALT  13  /  AlkPhos  314<H>  09-27          Case discussed with resident    Care discussed with pt and family

## 2019-09-27 NOTE — PROGRESS NOTE ADULT - SUBJECTIVE AND OBJECTIVE BOX
The orthopedic team had a long  discussion with Mr Corey and his wife  on Wednesday regarding this new insufficiency distal femur fracture on the left,   and the progress of his left hip resection arthroplasty procedure  from 14 days ago,  The new distal femur fx does not require surgery it is impacted and well aligned placed in very well padded plaster side slabs   repeat xrays in 2 weeks and review with Dr Tati Tellez [id number]  The left hip surgical site is clean dry and without signs of infection.  staples should be removed in 10 days   chichi stockings have been placed to elp improve LE dependent edema   An EZ BOOT should be used to the left ankle foot,  the out- should be lateral to facilitate a more normal attitude of the LLE.  The pt may be out of bed to a chair   WBAT  RLE   NWB LLE  cont IV abx as per ID   f/u for the hip surgery  in  surgery in 2weeks with Dr MARISCAL [id number]

## 2019-09-28 NOTE — PROGRESS NOTE ADULT - SUBJECTIVE AND OBJECTIVE BOX
NAPOLEON CAST  49y Male    INTERVAL HPI/OVERNIGHT EVENTS:    Pt was about to eat lunch when I went to see him.  Later, he was sleeping comfortably - PE deferred  No new complaints per resident.    T(F): 96.5 (09-28-19 @ 05:38), Max: 98.6 (09-27-19 @ 20:00)  HR: 97 (09-28-19 @ 05:38) (91 - 97)  BP: 117/59 (09-28-19 @ 05:38) (114/63 - 117/59)  RR: 18 (09-28-19 @ 05:38) (18 - 18)  SpO2: --  I&O's Summary    27 Sep 2019 07:01  -  28 Sep 2019 07:00  --------------------------------------------------------  IN: 0 mL / OUT: 500 mL / NET: -500 mL      CAPILLARY BLOOD GLUCOSE      POCT Blood Glucose.: 255 mg/dL (28 Sep 2019 11:18)  POCT Blood Glucose.: 182 mg/dL (28 Sep 2019 07:34)  POCT Blood Glucose.: 122 mg/dL (27 Sep 2019 21:05)  POCT Blood Glucose.: 174 mg/dL (27 Sep 2019 16:53)        PHYSICAL EXAM:  GENERAL: NAD, sleeping comfortably  HEAD:  Normocephalic  EYES:  closed  ENMT: mouth closed  NECK: Supple  NERVOUS SYSTEM:  asleep  ABDOMEN: Nondistended  EXTREMITIES:   Left LE with splint in place      Consultant(s) Notes Reviewed:  [x ] YES  [ ] NO  Care Discussed with Consultants/Other Providers [ x] YES  [ ] NO    MEDICATIONS  (STANDING):  acetaminophen   Tablet .. 650 milliGRAM(s) Oral every 6 hours  aspirin  chewable 81 milliGRAM(s) Oral daily  atorvastatin 80 milliGRAM(s) Oral at bedtime  baclofen 10 milliGRAM(s) Oral two times a day  benzocaine 15 mG/menthol 3.6 mG Lozenge 1 Lozenge Oral three times a day  bisacodyl 5 milliGRAM(s) Oral at bedtime  busPIRone 5 milliGRAM(s) Oral two times a day  carvedilol 3.125 milliGRAM(s) Oral every 12 hours  cefepime   IVPB 2000 milliGRAM(s) IV Intermittent every 8 hours  cefepime   IVPB      chlorhexidine 4% Liquid 1 Application(s) Topical daily  clonazePAM  Tablet 1 milliGRAM(s) Oral three times a day  clopidogrel Tablet 75 milliGRAM(s) Oral daily  dextrose 5%. 1000 milliLiter(s) (50 mL/Hr) IV Continuous <Continuous>  dextrose 50% Injectable 25 Gram(s) IV Push once  dextrose 50% Injectable 25 Gram(s) IV Push once  docusate sodium 100 milliGRAM(s) Oral three times a day  DULoxetine 90 milliGRAM(s) Oral daily  enoxaparin Injectable 40 milliGRAM(s) SubCutaneous daily  ergocalciferol 04348 Unit(s) Oral every week  gabapentin 800 milliGRAM(s) Oral three times a day  hydrOXYzine hydrochloride 50 milliGRAM(s) Oral at bedtime  insulin glargine Injectable (LANTUS) 27 Unit(s) SubCutaneous at bedtime  insulin lispro (HumaLOG) corrective regimen sliding scale   SubCutaneous three times a day before meals  insulin lispro Injectable (HumaLOG) 9 Unit(s) SubCutaneous three times a day before meals  morphine ER Tablet 45 milliGRAM(s) Oral every 12 hours  nystatin Cream 1 Application(s) Topical two times a day  pantoprazole    Tablet 40 milliGRAM(s) Oral before breakfast  polyethylene glycol 3350 17 Gram(s) Oral daily  pramipexole 0.5 milliGRAM(s) Oral every 12 hours  QUEtiapine 150 milliGRAM(s) Oral at bedtime  senna 2 Tablet(s) Oral at bedtime  tamsulosin 0.4 milliGRAM(s) Oral at bedtime    MEDICATIONS  (PRN):  dextrose 40% Gel 15 Gram(s) Oral once PRN Blood Glucose LESS THAN 70 milliGRAM(s)/deciliter  diclofenac 75 milliGRAM(s) Oral two times a day PRN Moderate Pain (4 - 6)  glucagon  Injectable 1 milliGRAM(s) IntraMuscular once PRN Glucose LESS THAN 70 milligrams/deciliter  lactulose Syrup 15 Gram(s) Oral four times a day PRN Constipation  naloxone Injectable 2 milliGRAM(s) IV Push once PRN If no response to 0.4 mg of Narcan  oxyCODONE    IR 10 milliGRAM(s) Oral every 4 hours PRN Moderate Pain (4 - 6)      LABS:                        10.1   8.35  )-----------( 313      ( 27 Sep 2019 07:19 )             32.2     09-27    137  |  99  |  13  ----------------------------<  161<H>  4.8   |  26  |  0.7    Ca    8.8      27 Sep 2019 07:19    TPro  7.0  /  Alb  3.4<L>  /  TBili  0.6  /  DBili  x   /  AST  30  /  ALT  13  /  AlkPhos  314<H>  09-27            Case discussed with resident

## 2019-09-28 NOTE — PROGRESS NOTE ADULT - SUBJECTIVE AND OBJECTIVE BOX
Hospital Day:  25d    Subjective:    Patient is a 49y old  Male who presents with a chief complaint of Left hip pain. Overnight no acute events. This morning patient was sleeping comfortably. When I woke him up complained of b/l arm pain. States left leg pain has improved slightly.    Past Medical Hx:   CIDP (chronic inflammatory demyelinating polyneuropathy)    Past Sx:  History of cholecystectomy  History of total left hip replacement  History of total right hip replacement  S/P CABG x 5    Allergies:  penicillins (Unknown)    Current Meds:   Standng Meds:  acetaminophen   Tablet .. 650 milliGRAM(s) Oral every 6 hours  aspirin  chewable 81 milliGRAM(s) Oral daily  atorvastatin 80 milliGRAM(s) Oral at bedtime  baclofen 10 milliGRAM(s) Oral two times a day  benzocaine 15 mG/menthol 3.6 mG Lozenge 1 Lozenge Oral three times a day  bisacodyl 5 milliGRAM(s) Oral at bedtime  busPIRone 5 milliGRAM(s) Oral two times a day  carvedilol 3.125 milliGRAM(s) Oral every 12 hours  cefepime   IVPB 2000 milliGRAM(s) IV Intermittent every 8 hours  cefepime   IVPB      chlorhexidine 4% Liquid 1 Application(s) Topical daily  clonazePAM  Tablet 1 milliGRAM(s) Oral three times a day  clopidogrel Tablet 75 milliGRAM(s) Oral daily  dextrose 5%. 1000 milliLiter(s) (50 mL/Hr) IV Continuous <Continuous>  dextrose 50% Injectable 25 Gram(s) IV Push once  dextrose 50% Injectable 25 Gram(s) IV Push once  docusate sodium 100 milliGRAM(s) Oral three times a day  DULoxetine 90 milliGRAM(s) Oral daily  enoxaparin Injectable 40 milliGRAM(s) SubCutaneous daily  ergocalciferol 36040 Unit(s) Oral every week  gabapentin 800 milliGRAM(s) Oral three times a day  hydrOXYzine hydrochloride 50 milliGRAM(s) Oral at bedtime  insulin glargine Injectable (LANTUS) 27 Unit(s) SubCutaneous at bedtime  insulin lispro (HumaLOG) corrective regimen sliding scale   SubCutaneous three times a day before meals  insulin lispro Injectable (HumaLOG) 9 Unit(s) SubCutaneous three times a day before meals  morphine ER Tablet 45 milliGRAM(s) Oral every 12 hours  nystatin Cream 1 Application(s) Topical two times a day  pantoprazole    Tablet 40 milliGRAM(s) Oral before breakfast  polyethylene glycol 3350 17 Gram(s) Oral daily  pramipexole 0.5 milliGRAM(s) Oral every 12 hours  QUEtiapine 150 milliGRAM(s) Oral at bedtime  senna 2 Tablet(s) Oral at bedtime  tamsulosin 0.4 milliGRAM(s) Oral at bedtime    PRN Meds:  dextrose 40% Gel 15 Gram(s) Oral once PRN Blood Glucose LESS THAN 70 milliGRAM(s)/deciliter  diclofenac 75 milliGRAM(s) Oral two times a day PRN Moderate Pain (4 - 6)  glucagon  Injectable 1 milliGRAM(s) IntraMuscular once PRN Glucose LESS THAN 70 milligrams/deciliter  lactulose Syrup 15 Gram(s) Oral four times a day PRN Constipation  naloxone Injectable 2 milliGRAM(s) IV Push once PRN If no response to 0.4 mg of Narcan  oxyCODONE    IR 10 milliGRAM(s) Oral every 4 hours PRN Moderate Pain (4 - 6)    HOME MEDICATIONS:  acetaminophen 325 mg oral tablet: 2 tab(s) orally every 6 hours  aspirin 81 mg oral tablet, chewable: 1 tab(s) orally once a day  atorvastatin 80 mg oral tablet: 1 tab(s) orally once a day (at bedtime)  baclofen 10 mg oral tablet: 1 tab(s) orally 2 times a day  bisacodyl 5 mg oral delayed release tablet: 1 tab(s) orally once a day (at bedtime), As Needed  busPIRone 5 mg oral tablet: 1 tab(s) orally 2 times a day  carvedilol 3.125 mg oral tablet: 1 tab(s) orally every 12 hours  cefepime: 2 gram(s) intravenous every 8 hours  clonazePAM 1 mg oral tablet: 1 tab(s) orally 3 times a day  clopidogrel 75 mg oral tablet: 1 tab(s) orally once a day  diclofenac sodium 75 mg oral delayed release tablet: 1 tab(s) orally 2 times a day, As needed, Moderate Pain (4 - 6)  docusate sodium 100 mg oral capsule: 1 cap(s) orally 3 times a day  DULoxetine 30 mg oral delayed release capsule: 3 cap(s) orally once a day  enoxaparin:   Flomax 0.4 mg oral capsule: 1 cap(s) orally once a day  gabapentin 800 mg oral tablet: 1 tab(s) orally 3 times a day  hydrOXYzine hydrochloride 50 mg oral tablet: 1 tab(s) orally once a day (at bedtime)  insulin glargine: 27 unit(s) subcutaneously once a day (at bedtime)  insulin lispro 100 units/mL subcutaneous solution: 9 unit(s) subcutaneous 3 times a day    Administer 5 to 15 minutes prior to meal. HOLD nutritional insulin if patient is NPO  lactulose 10 g/15 mL oral syrup: 22.5 milliliter(s) orally 4 times a day, As Needed.    Please give if no bowel movement for 3 days  menthol-benzocaine 3.6 mg-15 mg mucous membrane lozenge:  mucous membrane   morphine 15 mg/8 to 12 hr oral tablet, extended release: 3 tab(s) orally every 12 hours  nystatin 100,000 units/g topical cream:  topically   oxyCODONE 10 mg oral tablet: 1 tab(s) orally every 4 hours, As needed, Moderate Pain (4 - 6)  pantoprazole 40 mg oral delayed release tablet: 1 tab(s) orally once a day (before a meal)  polyethylene glycol 3350 oral powder for reconstitution: 17 gram(s) orally once a day  pramipexole 0.5 mg oral tablet: 1 tab(s) orally every 12 hours  QUEtiapine 50 mg oral tablet: 3 tab(s) orally once a day (at bedtime)  senna oral tablet: 2 tab(s) orally once a day (at bedtime)      Vital Signs:   T(F): 96.5 (09-28-19 @ 05:38), Max: 98.6 (09-27-19 @ 20:00)  HR: 97 (09-28-19 @ 05:38) (91 - 97)  BP: 117/59 (09-28-19 @ 05:38) (114/63 - 127/62)  RR: 18 (09-28-19 @ 05:38) (18 - 18)  SpO2: --      09-27-19 @ 07:01  -  09-28-19 @ 07:00  --------------------------------------------------------  IN: 0 mL / OUT: 500 mL / NET: -500 mL        Physical Exam:   GENERAL: NAD  HEENT: NCAT  CHEST/LUNG: CTA b/l   HEART: Regular rate and rhythm; s1 s2 appreciated, No murmurs, rubs, or gallops  ABDOMEN: Soft, Nontender, Nondistended; Bowel sounds present  EXTREMITIES: b/l trace edema. Lack of light touch to b/l lower extremities  NERVOUS SYSTEM:  Alert & Oriented X3        Labs:                         10.1   8.35  )-----------( 313      ( 27 Sep 2019 07:19 )             32.2       27 Sep 2019 07:19    137    |  99     |  13     ----------------------------<  161    4.8     |  26     |  0.7      Ca    8.8        27 Sep 2019 07:19    TPro  7.0    /  Alb  3.4    /  TBili  0.6    /  DBili  x      /  AST  30     /  ALT  13     /  AlkPhos  314    27 Sep 2019 07:19    Hemoglobin A1C, Whole Blood: 11.4 % (09-05-19 @ 10:40)    Radiology:

## 2019-09-28 NOTE — PROGRESS NOTE ADULT - ASSESSMENT
Assessment and Plan:    50y/o Male, w/ extensive PMHx CAD s/p CABG x5 vessels in 2011, BPH, depression, anxiety, DM and CIDP s/p post IVIG treatment  b/l total arthroplasty who presented w/ cc of LLE pain admitted for dislocation of the L hip & septic arthritis in left hip joint 2/2 to Pseudomonas currently on cefepime s/p explanation washout & abx spacer implantation.     #Pain: patient is on multiple pain medications, being followed by pain team  - patient had 1 episode of decreased RR, dropping to 6, with desaturation that responded to naloxone  - Pain Management Consultation appreciated. C/w Morphine ER 45mg q 12hrs, oxycodone IR 10mg q6 PRN,  Acetaminophen 650mg q 6 hrs standing  - please do not change the above pain regimen  - complained of rib pain, chest xray was done, negative for acute pathology    #ID: septic arthritis of hardware  - Intraop cultures grew pseudomonas and Coag negative staph, currently on cefepime  - pt will need 6 weeks of antibiotics, s/p  PICC line placement on 9/24  - per patient, no one at home can administer abx, will need SNF   - pt will need a total of 6 weeks of IV abx. Per ID, will end 10/25/19    #Ortho: s/p failed arthroplasty s/p abx spacer on 9/13 - OR failed due to hypotension   - knee immobilizer placed by ortho today, ez boot delivered  - ortho on board. They do not plan to re-evaluate the hip  - xray left knee: possible distal femoral fx (xray was only 1 view, limited due to pain)  - CT of the left knee shows acute nondisplaced fibular neck impaction fracture, per verbal convo with ortho there is no intervention from them   - c/w pain regimen     #Suspected vitamin d deficiency   - vitamin D level is low (19), will give weekly vitamin d supplement (50,000 units)    #CIDP: s/p IVIG in hospital (chronic inflammatory demyelinating polyneuropathy)  - s/p 5 doses of IVIg IVIG is every 3 weeks per neuro (last dose on 9/12/19)  - also on gabapentin and duloxetine for pain  - being followed by pain management     #Cardio: CAD s/p CABG (5 vessel disease)  - per cardio no need for cath at this time. Okay to restart Plavix 75mg qD  - on aspirin and atorvastatin, carvedilol q12  - Plavix was held due to suspicion of hemorrhagic shock (low H/H, hypotension post op)  - post op patient had elevated Trops likely 2/2 to demand ischemia    #Endo: DMII  - FS between   - c/w Lantus 36 and Lispro 12  - FS TID & qHS    #Nutrition: evaluated by Nutrition   - good intake (>75% of meals)    #Uro: BPH  - on tamsulosin     DVT ppx: Lovenox  GI ppx: protonix  Code Status: full code  Dispo: SNF pending insurance authorization

## 2019-09-28 NOTE — PROGRESS NOTE ADULT - ASSESSMENT
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  Workup revealed superolateral dislocation of the left hip and he is s/p explantation of Left total hip arthroplasty and insertion of ABX spacer. Post op course was complicated by hypotension, requiring 1 PRBC transfusion and pressors. Pt was weaned off pressors by POD1 in the evening.   He had elevated troponins postop likely from demand ischemia per cardiology and he is on medical management.    Preop, he received loading dose of IVIG during this admission and will continue maintenance dose q3 weeks per neurology.  He was transferred to telemetry and now downgraded to medical floor.    1. Dislocation of left hip s/p explant of left ОЛЬГА and insertion of abx spacer on 9/13  OR culture + for Pseudomonas from hip - pansensitive  on cefepime   s/p picc line placement 9/24  6 weeks of abx per ID - end date 10/25/19  pain control per pain management recommendations  outpt Ortho f/u for staple removal    2. NSTEMI type 2  ASA/Plavix/statin/Bblocker  normal EF on ECHO  case discussed with Dr. Cid - no cath at this time  outpt cardio f/u    3. CIDP - for IVIG q3 weeks per neurology - next dose due 10/3  needs premedication and IVIG to be run over 6 hours    4. CAD s/p CABG - continue medical management    5. DM - controlled on insulin    6. Anemia due to acute blood loss postop  s/p transfusion with 1 unit PRBC on 9/15 with appropriate rise in Hgb  s/p another unit on 9/17 for Hgb 6.4 and now Hgb >9 (? lab error in Hgb 6.4)  H/H now stable - no need for daily labs    7. Episode of decreased responsiveness and respiratory depression that responded to Narcan x 2 last week  meds adjusted and pt now tolerating  case discussed with pain management in detail   MS Contin 45mg q12hr  oxycodone 10mg q4hr prn  NO DILAUDID    8. Depression/anxiety  continue current management  seen by psychiatry this admission  needs close outpt f/u    9. BPH on flomax    10. + MRSA nasal swab - on mupirocin and CHG baths    11. DVT prophylaxis    12. Constipation  continue current management     13. Acute left tibial, fibula and distal femur fractures  continue pain control  Ortho following - s/p splint and now awaiting EZ boot prior to discharge  WBAT right LE  NWB Left LE  may be OOB to chair  outpt f/u in 2 weeks    14. vitamin D deficiency - supplementation started    15. Elevated Alk Phos - monitor as outpt - pt asymptomatic          PROGRESS NOTE HANDOFF    Pending: EZ boot and further authorization for STR at SNF    Disposition: SNF placement once above obtained

## 2019-09-29 NOTE — PROGRESS NOTE ADULT - SUBJECTIVE AND OBJECTIVE BOX
KATHY CASTY  49y Male    INTERVAL HPI/OVERNIGHT EVENTS:    Wife at bedside. Ortho f/u appreciated - monitor drainage from proximal wound site  No new complaints.    T(F): 96.9 (09-29-19 @ 05:00), Max: 98.7 (09-28-19 @ 21:06)  HR: 84 (09-29-19 @ 05:00) (84 - 89)  BP: 110/69 (09-29-19 @ 05:00) (110/69 - 128/58)  RR: 18 (09-29-19 @ 05:00) (18 - 18)  SpO2: --  I&O's Summary    28 Sep 2019 07:01  -  29 Sep 2019 07:00  --------------------------------------------------------  IN: 0 mL / OUT: 900 mL / NET: -900 mL      CAPILLARY BLOOD GLUCOSE      POCT Blood Glucose.: 238 mg/dL (29 Sep 2019 11:04)  POCT Blood Glucose.: 149 mg/dL (29 Sep 2019 07:22)  POCT Blood Glucose.: 207 mg/dL (29 Sep 2019 01:57)  POCT Blood Glucose.: 190 mg/dL (28 Sep 2019 21:00)  POCT Blood Glucose.: 308 mg/dL (28 Sep 2019 16:11)        PHYSICAL EXAM:  GENERAL: NAD  HEAD:  Normocephalic  EYES:  conjunctiva and sclera clear  ENMT: Moist mucous membranes  NECK: Supple  NERVOUS SYSTEM:  Alert & Oriented X3, Good concentration  CHEST/LUNG: Clear to percussion bilaterally; No rales, rhonchi, wheezing  HEART: Regular rate and rhythm  ABDOMEN: Soft, Nontender, Nondistended  EXTREMITIES:   Left LE with splint and soft boot in place  Left thigh with proximal wound covered with gauze   staples in place - no drainage or erythema noted from part of wound not covered with dressing  LE edema     Consultant(s) Notes Reviewed:  [x ] YES  [ ] NO  Care Discussed with Consultants/Other Providers [ x] YES  [ ] NO    MEDICATIONS  (STANDING):  acetaminophen   Tablet .. 650 milliGRAM(s) Oral every 6 hours  aspirin  chewable 81 milliGRAM(s) Oral daily  atorvastatin 80 milliGRAM(s) Oral at bedtime  baclofen 10 milliGRAM(s) Oral two times a day  benzocaine 15 mG/menthol 3.6 mG Lozenge 1 Lozenge Oral three times a day  bisacodyl 5 milliGRAM(s) Oral at bedtime  busPIRone 5 milliGRAM(s) Oral two times a day  carvedilol 3.125 milliGRAM(s) Oral every 12 hours  cefepime   IVPB 2000 milliGRAM(s) IV Intermittent every 8 hours  cefepime   IVPB      chlorhexidine 4% Liquid 1 Application(s) Topical daily  clonazePAM  Tablet 1 milliGRAM(s) Oral three times a day  clopidogrel Tablet 75 milliGRAM(s) Oral daily  dextrose 5%. 1000 milliLiter(s) (50 mL/Hr) IV Continuous <Continuous>  dextrose 50% Injectable 25 Gram(s) IV Push once  dextrose 50% Injectable 25 Gram(s) IV Push once  docusate sodium 100 milliGRAM(s) Oral three times a day  DULoxetine 90 milliGRAM(s) Oral daily  enoxaparin Injectable 40 milliGRAM(s) SubCutaneous daily  ergocalciferol 91324 Unit(s) Oral every week  gabapentin 800 milliGRAM(s) Oral three times a day  hydrOXYzine hydrochloride 50 milliGRAM(s) Oral at bedtime  insulin glargine Injectable (LANTUS) 27 Unit(s) SubCutaneous at bedtime  insulin lispro (HumaLOG) corrective regimen sliding scale   SubCutaneous three times a day before meals  insulin lispro Injectable (HumaLOG) 9 Unit(s) SubCutaneous three times a day before meals  morphine ER Tablet 45 milliGRAM(s) Oral every 12 hours  nystatin Cream 1 Application(s) Topical two times a day  pantoprazole    Tablet 40 milliGRAM(s) Oral before breakfast  polyethylene glycol 3350 17 Gram(s) Oral daily  pramipexole 0.5 milliGRAM(s) Oral every 12 hours  QUEtiapine 150 milliGRAM(s) Oral at bedtime  senna 2 Tablet(s) Oral at bedtime  tamsulosin 0.4 milliGRAM(s) Oral at bedtime    MEDICATIONS  (PRN):  dextrose 40% Gel 15 Gram(s) Oral once PRN Blood Glucose LESS THAN 70 milliGRAM(s)/deciliter  diclofenac 75 milliGRAM(s) Oral two times a day PRN Moderate Pain (4 - 6)  glucagon  Injectable 1 milliGRAM(s) IntraMuscular once PRN Glucose LESS THAN 70 milligrams/deciliter  lactulose Syrup 15 Gram(s) Oral four times a day PRN Constipation  naloxone Injectable 2 milliGRAM(s) IV Push once PRN If no response to 0.4 mg of Narcan  oxyCODONE    IR 10 milliGRAM(s) Oral every 4 hours PRN Moderate Pain (4 - 6)        Care discussed with pt and family

## 2019-09-29 NOTE — PROGRESS NOTE ADULT - SUBJECTIVE AND OBJECTIVE BOX
ORTHO PROGRESS NOTE     49yMale POD #16 Explantation of left hemiarthroplasty      Patient seen and examined at bedside . The patient is awake and alert in NAD. No complaints of chest pain, SOB, N/V.    Vital Signs Last 24 Hrs  T(C): 36.1 (29 Sep 2019 05:00), Max: 37.1 (28 Sep 2019 21:06)  T(F): 96.9 (29 Sep 2019 05:00), Max: 98.7 (28 Sep 2019 21:06)  HR: 84 (29 Sep 2019 05:00) (84 - 89)  BP: 110/69 (29 Sep 2019 05:00) (110/69 - 128/58)  BP(mean): --  RR: 18 (29 Sep 2019 05:00) (18 - 18)  SpO2: --      PE:   NAD  LLE  Dressing with drainage, active sanguinous drainage from proximal wound  limited motor/sensory fx at baseline  in splint  ez boot applied                    A/P: 49yMale s/p Explantation of left hemiarthroplasty/Girdlestone procedure and antibiotic beads placement on 9/13/19, also with distal femur fracture being treated in splint  - will monitor for resolution of drainage  - antibiotics per ID  - DVT prophylaxis  - OOBTC  - WBAT RLE, NWB LLE  - pain control  - fall precautions

## 2019-09-29 NOTE — PROGRESS NOTE ADULT - ASSESSMENT
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  Workup revealed superolateral dislocation of the left hip and he is s/p explantation of Left total hip arthroplasty and insertion of ABX spacer. Post op course was complicated by hypotension, requiring 1 PRBC transfusion and pressors. Pt was weaned off pressors by POD1 in the evening.   He had elevated troponins postop likely from demand ischemia per cardiology and he is on medical management.    Preop, he received loading dose of IVIG during this admission and will continue maintenance dose q3 weeks per neurology.  He was transferred to telemetry and now downgraded to medical floor.    1. Dislocation of left hip s/p explant of left ОЛЬГА and insertion of abx spacer on 9/13  OR culture + for Pseudomonas from hip - pansensitive  on cefepime   s/p picc line placement 9/24  6 weeks of abx per ID - end date 10/25/19  pain control per pain management recommendations  Ortho monitoring drainage from wound   outpt Ortho f/u for staple removal    2. NSTEMI type 2  ASA/Plavix/statin/Bblocker  normal EF on ECHO  case discussed with Dr. Cid - no cath at this time  outpt cardio f/u    3. CIDP - for IVIG q3 weeks per neurology - next dose due 10/3  needs premedication and IVIG to be run over 6 hours  clarify with neurology where pt will receive the therapy on Thursday if discharged before that time    4. CAD s/p CABG - continue medical management    5. DM - on insulin    6. Anemia due to acute blood loss postop  s/p transfusion with 1 unit PRBC on 9/15 with appropriate rise in Hgb  s/p another unit on 9/17 for Hgb 6.4 and now Hgb >9 (? lab error in Hgb 6.4)  H/H now stable - no need for daily labs    7. Episode of decreased responsiveness and respiratory depression that responded to Narcan x 2 last week  meds adjusted and pt now tolerating  case discussed with pain management in detail   MS Contin 45mg q12hr  oxycodone 10mg q4hr prn  NO DILAUDID    8. Depression/anxiety  continue current management  seen by psychiatry this admission  needs close outpt f/u    9. BPH on flomax    10. + MRSA nasal swab - on mupirocin and CHG baths    11. DVT prophylaxis    12. Constipation  continue current management     13. Acute left tibial, fibula and distal femur fractures  continue pain control  Ortho following - s/p splint and EZ boot  WBAT right LE  NWB Left LE  may be OOB to chair  outpt f/u in 2 weeks    14. vitamin D deficiency - supplementation started    15. Elevated Alk Phos - monitor as outpt - pt asymptomatic          PROGRESS NOTE HANDOFF    Pending: Ortho clearance re: drainage from the wound, further authorization for STR at SNF    Disposition: SNF placement

## 2019-09-30 NOTE — PROGRESS NOTE ADULT - ASSESSMENT
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 Post op course was complicated by hypotension, requiring 1 PRBC transfusion and pressors. Pt was weaned off pressors by POD1 in the evening.   He had elevated troponins postop likely from demand ischemia per cardiology and he is on medical management.    Preop, he received loading dose of IVIG during this admission and will continue maintenance dose q3 weeks per neurology.  He was transferred to telemetry and now downgraded to medical floor.    1. Dislocation of left hip s/p explant of left ОЛЬГА and insertion of abx spacer on 9/13  OR culture + for Pseudomonas from hip - pansensitive  on cefepime   s/p picc line placement 9/24  6 weeks of abx per ID - end date 10/25/19  pain control per pain management recommendations  Ortho monitoring drainage from wound   outpt Ortho f/u for staple removal    2. NSTEMI type 2  ASA/Plavix/statin/Bblocker  normal EF on ECHO  case discussed with Dr. iCd - no cath at this time  outpt cardio f/u    3. CIDP - for IVIG q3 weeks per neurology - next dose due 10/3  needs premedication and IVIG to be run over 6 hours  clarify with neurology where pt will receive the therapy on Thursday if discharged before that time    4. CAD s/p CABG - continue medical management    5. DM - on insulin    6. Anemia due to acute blood loss postop  s/p transfusion with 1 unit PRBC on 9/15 with appropriate rise in Hgb  s/p another unit on 9/17 for Hgb 6.4 and now Hgb >9 (? lab error in Hgb 6.4)  H/H now stable - no need for daily labs    7. Episode of decreased responsiveness and respiratory depression that responded to Narcan x 2 last week  meds adjusted and pt now tolerating  case discussed with pain management in detail   MS Contin 45mg q12hr  oxycodone 10mg q4hr prn  NO DILAUDID    8. Depression/anxiety  continue current management  seen by psychiatry this admission  needs close outpt f/u    9. BPH on flomax    10. + MRSA nasal swab - on mupirocin and CHG baths    11. DVT prophylaxis    12. Constipation  continue current management     13. Acute left tibial, fibula and distal femur fractures  continue pain control  Ortho following - s/p splint and EZ boot  WBAT right LE  NWB Left LE  may be OOB to chair  outpt f/u in 2 weeks    14. vitamin D deficiency - supplementation started    15. 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    He had elevated troponins postop likely from demand ischemia per cardiology and he is on medical management.    Preop, he received loading dose of IVIG during this admission and will continue maintenance dose q3 weeks per neurology.  He was transferred to telemetry and now downgraded to medical floor.    1. Dislocation of left hip s/p explant of left ОЛЬГА and insertion of abx spacer on 9/13  OR culture + for Pseudomonas from hip - pansensitive  on cefepime   s/p picc line placement 9/24  6 weeks of abx per ID - end date 10/25/19  pain control per pain management recommendations  Ortho monitoring drainage from wound   outpt Ortho f/u for staple removal    2. NSTEMI type 2  ASA/Plavix/statin/Bblocker  normal EF on ECHO  case discussed with Dr. Cid - no cath at this time  outpt cardio f/u    3. CIDP - for IVIG q3 weeks per neurology - next dose due 10/3  needs premedication and IVIG to be run over 6 hours  clarify with neurology where pt will receive the therapy on Thursday if discharged before that time    4. CAD s/p CABG - continue medical management    5. DM - on insulin    6. Anemia due to acute blood loss postop  s/p transfusion with 1 unit PRBC on 9/15 with appropriate rise in Hgb  s/p another unit on 9/17 for Hgb 6.4 and now Hgb >9 (? lab error in Hgb 6.4)  H/H now stable - no need for daily labs    7. Episode of decreased responsiveness and respiratory depression that responded to Narcan x 2 last week  meds adjusted and pt now tolerating  case discussed with pain management in detail   MS Contin 45mg q12hr  oxycodone 10mg q4hr prn  NO DILAUDID    8. Depression/anxiety  continue current management  seen by psychiatry this admission  needs close outpt f/u    9. BPH on flomax    10. + MRSA nasal swab - on mupirocin and CHG baths    11. DVT prophylaxis    12. Constipation  continue current management     13. Acute left tibial, fibula and distal femur fractures  continue pain control  Ortho following - s/p splint and EZ boot  WBAT right LE  NWB Left LE  may be OOB to chair  outpt f/u in 2 weeks    14. vitamin D deficiency - supplementation started    15. 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    #Dislocation of left hip s/p explant of left ОЛЬГА and insertion of abx spacer on 9/13  -OR culture + for Pseudomonas from hip - pansensitive  -c/w on cefepime > end date 10/25/19  -pain control per pain management recommendations  -9/30 Ortho cleared for d/c w follow up in 1 week, daily dressing changes w betadine     # Acute left tibial, fibula and distal femur fractures  -continue pain control (see below) splint and EZ boot  -WBAT right LE  -NWB Left LE  -OOB to chair  -1 week OP f.u w Dr. Reese     # Episode of decreased responsiveness and respiratory depression that responded to Narcan x 2 last week  meds adjusted w pain mgmt  -MS Contin 45mg q12hr  -oxycodone 10mg q4hr prn  -NO DILAUDID    #NSTEMI type 2, likely from demand ischemia, post op  - continue with ASA/Plavix/statin/Bblocker  - normal EF on ECHO, OP cardio f/u     # CIDP - for IVIG q3 weeks per neurology - next dose due 10/3  -needs premedication and IVIG to be run over 6 hours  - waiting for neuro call back to clarify where pt will receive the therapy on Thursday if discharged before that time    #CAD s/p CABG  - continue medical management    # DM  -27 Lantus, 9 TID lispro, cont     #Depression/anxiety  continue current management  seen by psychiatry this admission  needs close outpt f/u    #BPH on flomax    # MRSA positive nares  - mupirocin and CHG baths    # vitamin D deficiency   -c/w w supp    #Elevated Alk Phos, asymtomatic  - monitor as outpt     Disposition: SNF placement, pending auth, no need for daily lab draws 48 y/o man with PMH of CAD s/p CABG, BPH, 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    #Dislocation of left hip s/p explant of left ОЛЬГА and insertion of abx spacer on 9/13  -OR culture + for Pseudomonas from hip - pansensitive  -c/w on cefepime > end date 10/25/19  -pain control per pain management recommendations  -9/30 Ortho cleared for d/c w follow up in 1 week, daily dressing changes w betadine     # Acute left tibial, fibula and distal femur fractures  -continue pain control (see below) splint and EZ boot  -WBAT right LE  -NWB Left LE  -OOB to chair  -1 week OP f.u w Dr. Reese     # Episode of decreased responsiveness and respiratory depression that responded to Narcan x 2 last week  meds adjusted w pain mgmt  -MS Contin 45mg q12hr  -oxycodone 10mg q4hr prn  -NO DILAUDID    #NSTEMI type 2, likely from demand ischemia, post op  - continue with ASA/Plavix/statin/Bblocker  - normal EF on ECHO, OP cardio f/u     # CIDP - for IVIG q3 weeks per neurology - next dose due 10/3  -needs premedication and IVIG to be run over 6 hours  - spoke rubi Leroy neuro NP, states they will arrange an appt for pt at their office for IVIG Thursday     #CAD s/p CABG  - continue medical management    # DM  -27 Lantus, 9 TID lispro, cont     #Depression/anxiety  continue current management  seen by psychiatry this admission  needs close outpt f/u    #BPH on flomax    # MRSA positive nares  - mupirocin and CHG baths    # vitamin D deficiency   -c/w w supp    #Elevated Alk Phos, asymtomatic  - monitor as outpt     Disposition: SNF placement, pending auth, no need for daily lab draws 50 y/o man with PMH of CAD s/p CABG, BPH, 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    #Dislocation of left hip s/p explant of left ОЛЬГА and insertion of abx spacer on 9/13  -OR culture + for Pseudomonas from hip - pansensitive  -c/w on cefepime > end date 10/25/19  -pain control per pain management recommendations  -9/30 Ortho cleared for d/c w follow up in 1 week, daily dressing changes w betadine     # Acute left tibial, fibula and distal femur fractures  -continue pain control (see below) splint and EZ boot  -WBAT right LE  -NWB Left LE  -OOB to chair  -1 week OP f.u w Dr. Reese     # Episode of decreased responsiveness and respiratory depression that responded to Narcan x 2 last week  meds adjusted w pain mgmt  -MS Contin 45mg q12hr  -oxycodone 10mg q4hr prn  -NO DILAUDID  -9/30 spoke w NP Lizabeth, attending [place] will speak w pt per pt's request    #NSTEMI type 2, likely from demand ischemia, post op  - continue with ASA/Plavix/statin/Bblocker  - normal EF on ECHO, OP cardio f/u     # CIDP - for IVIG q3 weeks per neurology - next dose due 10/3  -needs premedication and IVIG to be run over 6 hours  - spoke rubi Leroy neuro NP, states their office will arrange an appt for pt at their office for IVIG Thursday     #CAD s/p CABG  - continue medical management    # DM  -27 Lantus, 9 TID lispro, cont     #Depression/anxiety  continue current management  seen by psychiatry this admission  needs close outpt f/u    #BPH on flomax    # MRSA positive nares  - mupirocin and CHG baths    # vitamin D deficiency   -c/w w supp    #Elevated Alk Phos, asymtomatic  - monitor as outpt     Disposition: SNF placement, pending auth, no need for daily lab draws

## 2019-09-30 NOTE — PROGRESS NOTE ADULT - ASSESSMENT
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  Workup revealed superolateral dislocation of the left hip and he is s/p explantation of Left total hip arthroplasty and insertion of ABX spacer. Post op course was complicated by hypotension, requiring 1 PRBC transfusion and pressors. Pt was weaned off pressors by POD1 in the evening.   He had elevated troponins postop likely from demand ischemia per cardiology and he is on medical management.    Preop, he received loading dose of IVIG during this admission and will continue maintenance dose q3 weeks per neurology.  He was transferred to telemetry and now downgraded to medical floor.    1. Dislocation of left hip s/p explant of left ОЛЬГА and insertion of abx spacer on 9/13  OR culture + for Pseudomonas from hip - pansensitive  on cefepime   s/p picc line placement 9/24  6 weeks of abx per ID - end date 10/25/19  pain control per pain management recommendations  cleared by Ortho for outpt f/u in 1 week    2. NSTEMI type 2  ASA/Plavix/statin/Bblocker  normal EF on ECHO  case discussed with Dr. Cid - no cath at this time  outpt cardio f/u    3. CIDP - for IVIG q3 weeks per neurology - next dose due 10/3  needs premedication and IVIG to be run over 6 hours  clarify with neurology where pt will receive the therapy on Thursday if discharged before that time    4. CAD s/p CABG - continue medical management    5. DM - on insulin    6. Anemia due to acute blood loss postop  s/p transfusion with 1 unit PRBC on 9/15 with appropriate rise in Hgb  s/p another unit on 9/17 for Hgb 6.4 and now Hgb >9 (? lab error in Hgb 6.4)  H/H now stable - no need for daily labs    7. Episode of decreased responsiveness and respiratory depression that responded to Narcan x 2 on 9/19  meds adjusted and pt now tolerating  case discussed with pain management in detail   MS Contin 45mg q12hr  oxycodone 10mg q4hr prn  NO DILAUDID    8. Depression/anxiety  continue current management  seen by psychiatry this admission  needs close outpt f/u    9. BPH on flomax    10. + MRSA nasal swab - on mupirocin and CHG baths    11. DVT prophylaxis    12. Constipation  continue current management     13. Acute left tibial, fibula and distal femur fractures  continue pain control  Ortho following - s/p splint and EZ boot  WBAT right LE  NWB Left LE  may be OOB to chair  outpt f/u in 2 weeks    14. vitamin D deficiency - supplementation started    15. Elevated Alk Phos - monitor as outpt - pt asymptomatic          PROGRESS NOTE HANDOFF    Pending:  further authorization for STR at SNF    Disposition: SNF placement       Pt needs outpt f/u with medicine, orthopedics, cardiology, psychiatry, neurology and pain management  At risk for readmission due to multiple comorbidities, prolonged and complicated hospital course and if he refuses DVT prophylaxis.

## 2019-09-30 NOTE — PROGRESS NOTE ADULT - SUBJECTIVE AND OBJECTIVE BOX
SERENENAPOLEON  49y Male    INTERVAL HPI/OVERNIGHT EVENTS:    Pt has been refusing lovenox multiple times per MAR review.  He states that lovenox injections in the abdomen cause severe cramping and he has hematoma of left arm (stable for past 2 days) from injection. He did take today's dose.  I discussed with him the benefits of prophylactic lovenox (prevention of DVT/PE) but he states he will still refuse since he is on ASA and Plavix now. He was refusing multiple injections when he was just on ASA.    Cleared by Orthopedics for outpt f/u in  1 week.    Spoke to neurology re: outpt setup of IVIG infusion this Thursday - awaiting callback.    He had BM yesterday.    T(F): 97.4 (09-30-19 @ 05:00), Max: 99.4 (09-29-19 @ 13:48)  HR: 81 (09-30-19 @ 05:00) (81 - 94)  BP: 111/55 (09-30-19 @ 05:00) (110/52 - 123/60)  RR: 18 (09-30-19 @ 05:00) (18 - 18)  SpO2: 96% (09-30-19 @ 05:00) (96% - 96%)  I&O's Summary    29 Sep 2019 07:01  -  30 Sep 2019 07:00  --------------------------------------------------------  IN: 0 mL / OUT: 375 mL / NET: -375 mL    30 Sep 2019 07:01  -  30 Sep 2019 12:31  --------------------------------------------------------  IN: 0 mL / OUT: 300 mL / NET: -300 mL      CAPILLARY BLOOD GLUCOSE      POCT Blood Glucose.: 203 mg/dL (30 Sep 2019 11:09)  POCT Blood Glucose.: 166 mg/dL (30 Sep 2019 07:16)  POCT Blood Glucose.: 183 mg/dL (29 Sep 2019 21:16)  POCT Blood Glucose.: 228 mg/dL (29 Sep 2019 16:09)        PHYSICAL EXAM:  GENERAL: NAD  HEAD:  Normocephalic  EYES:  conjunctiva and sclera clear  ENMT: Moist mucous membranes  NECK: Supple  NERVOUS SYSTEM:  Alert & Oriented X3, Good concentration  CHEST/LUNG: Clear to percussion bilaterally  HEART: Regular rate and rhythm  ABDOMEN: Soft, Nontender, Nondistended  EXTREMITIES:   Left LE with splint and EZ boot  stable LE edema  right arm with PICC line  left upper arm hematoma - stable      Consultant(s) Notes Reviewed:  [x ] YES  [ ] NO  Care Discussed with Consultants/Other Providers [ x] YES  [ ] NO    MEDICATIONS  (STANDING):  acetaminophen   Tablet .. 650 milliGRAM(s) Oral every 6 hours  aspirin  chewable 81 milliGRAM(s) Oral daily  atorvastatin 80 milliGRAM(s) Oral at bedtime  baclofen 10 milliGRAM(s) Oral two times a day  benzocaine 15 mG/menthol 3.6 mG Lozenge 1 Lozenge Oral three times a day  bisacodyl 5 milliGRAM(s) Oral at bedtime  busPIRone 5 milliGRAM(s) Oral two times a day  carvedilol 3.125 milliGRAM(s) Oral every 12 hours  cefepime   IVPB 2000 milliGRAM(s) IV Intermittent every 8 hours  cefepime   IVPB      chlorhexidine 4% Liquid 1 Application(s) Topical daily  clonazePAM  Tablet 1 milliGRAM(s) Oral three times a day  clopidogrel Tablet 75 milliGRAM(s) Oral daily  dextrose 5%. 1000 milliLiter(s) (50 mL/Hr) IV Continuous <Continuous>  dextrose 50% Injectable 25 Gram(s) IV Push once  dextrose 50% Injectable 25 Gram(s) IV Push once  docusate sodium 100 milliGRAM(s) Oral three times a day  DULoxetine 90 milliGRAM(s) Oral daily  enoxaparin Injectable 40 milliGRAM(s) SubCutaneous daily  ergocalciferol 43883 Unit(s) Oral every week  gabapentin 800 milliGRAM(s) Oral three times a day  hydrOXYzine hydrochloride 50 milliGRAM(s) Oral at bedtime  insulin glargine Injectable (LANTUS) 27 Unit(s) SubCutaneous at bedtime  insulin lispro (HumaLOG) corrective regimen sliding scale   SubCutaneous three times a day before meals  insulin lispro Injectable (HumaLOG) 9 Unit(s) SubCutaneous three times a day before meals  morphine ER Tablet 45 milliGRAM(s) Oral every 12 hours  nystatin Cream 1 Application(s) Topical two times a day  pantoprazole    Tablet 40 milliGRAM(s) Oral before breakfast  polyethylene glycol 3350 17 Gram(s) Oral daily  pramipexole 0.5 milliGRAM(s) Oral every 12 hours  QUEtiapine 150 milliGRAM(s) Oral at bedtime  senna 2 Tablet(s) Oral at bedtime  tamsulosin 0.4 milliGRAM(s) Oral at bedtime    MEDICATIONS  (PRN):  dextrose 40% Gel 15 Gram(s) Oral once PRN Blood Glucose LESS THAN 70 milliGRAM(s)/deciliter  diclofenac 75 milliGRAM(s) Oral two times a day PRN Moderate Pain (4 - 6)  glucagon  Injectable 1 milliGRAM(s) IntraMuscular once PRN Glucose LESS THAN 70 milligrams/deciliter  lactulose Syrup 15 Gram(s) Oral four times a day PRN Constipation  naloxone Injectable 2 milliGRAM(s) IV Push once PRN If no response to 0.4 mg of Narcan  oxyCODONE    IR 10 milliGRAM(s) Oral every 4 hours PRN Moderate Pain (4 - 6)        Case discussed with resident and case management    Care discussed with pt

## 2019-09-30 NOTE — PROGRESS NOTE ADULT - SUBJECTIVE AND OBJECTIVE BOX
pt seen at bedside s/p Girdlestone left hip pod 17       staples in place  dressing applied this am was clean and dry   new dressing applied with betadine dressing     pt seen with dr geronimo    continue to do betadine dressing changes daily   do not remove staples   can dc to [facility] and will be monitored there by ortho team   discussed with medical resident

## 2019-10-01 NOTE — PROGRESS NOTE ADULT - SUBJECTIVE AND OBJECTIVE BOX
pt seen at bedside s/p Girdlestone left hip pod 18       staples in place  dressing applied this am was clean and dry   new dressing applied with betadine dressing       continue to do betadine dressing changes daily   do not remove staples   can dc to [facility] and will be monitored there by ortho team

## 2019-10-01 NOTE — DISCHARGE NOTE NURSING/CASE MANAGEMENT/SOCIAL WORK - PATIENT PORTAL LINK FT
You can access the FollowMyHealth Patient Portal offered by [facility] by registering at the following website: [email or web address]. By joining [facility]’s FollowMyHealth portal, you will also be able to view your health information using other applications (apps) compatible with our system.

## 2019-10-01 NOTE — PROGRESS NOTE ADULT - REASON FOR ADMISSION
Left hip pain

## 2019-10-01 NOTE — PROGRESS NOTE ADULT - ASSESSMENT
50 y/o man with PMH of CAD s/p CABG, BPH, CIDP with significant motor and sensory deficits and extreme pain, b/l total hip replacements, anxiety, depression, DM, HTN and bedbound/homebound presented with severe left hip pain, found to have superolateral dislocation of the left hip s/p explantation of left total hip arthroplasty and insertion of ABX spacer, post op course was complicated by hypotension, requiring 1 PRBC transfusion and pressors, now off pressors, medically stable, pending discharge.     #Dislocation of left hip s/p explant of left ОЛЬГА and insertion of abx spacer on 9/13  -OR culture + for Pseudomonas from hip - pansensitive  -c/w on cefepime > end date 10/25/19  -pain control per pain management recommendations  -9/30 Ortho cleared for d/c w follow up in 1 week, daily dressing changes w betadine     # Acute left tibial, fibula and distal femur fractures  -continue pain control (see below) splint and EZ boot  -WBAT right LE  -NWB Left LE  -OOB to chair  -1 week OP f.u w Dr. Reese     # Episode of decreased responsiveness and respiratory depression that responded to Narcan x 2 last week  meds adjusted w pain mgmt  -MS Contin 45mg q12hr  -oxycodone 10mg q4hr prn  -NO DILAUDID  -10/1 Different pain attending-Dr. Spears will see pt tmrw     #NSTEMI type 2, likely from demand ischemia, post op  - continue with ASA/Plavix/statin/Bblocker  - normal EF on ECHO, OP cardio f/u     # CIDP - for IVIG q3 weeks per neurology - next dose due 10/3  -needs premedication and IVIG to be run over 6 hours  - 9/30: spoke rubi Leroy neuro NP, states their office will arrange an appt for pt at their office for IVIG Thursday     #CAD s/p CABG  - continue medical management    # DM  -27 Lantus, 9 TID lispro, cont     #Depression/anxiety  continue current management  seen by psychiatry this admission  needs close outpt f/u    #BPH on flomax    # MRSA positive nares  - mupirocin and CHG baths    # vitamin D deficiency   -c/w w supp    #Elevated Alk Phos, asymtomatic  - monitor as outpt     Disposition: SNF placement, pending auth, no need for daily lab draws

## 2019-10-01 NOTE — CHART NOTE - NSCHARTNOTEFT_GEN_A_CORE
Limited reassessment    Meds and labs reviewed    Pt reports that he continue to eat well. Pt has a good appetite. Pt voices some concerns with regards to insulin/DM meds upon discharge. Encouraged pt to talk to physician with regards to meds. Pt demonstrates knowledge with regards to DM education provided during previous assessment. Pt voices a few follow up questions about the material. Pt seems to have concerns about what will happen upon d/c (where he is going, if he has his appointments that he needs scheduled, DM medication regimen, etc...).   Pt reports that his last BM was last night (9/30) and reports that his constipation has been improving with each day. Pt also reports that he has been enjoying the Glucerna supplement. Pt denies abdominal pain. Pt reports some nausea with regards to medications.      No further nutrition intervention necessary at this time. RD to follow up in 7 days. Limited reassessment    Meds and labs reviewed    Pt reports that he continue to eat well. Pt has a good appetite. Pt voices some concerns with regards to insulin/DM meds upon discharge. Encouraged pt to talk to physician with regards to meds. Pt demonstrates knowledge with regards to DM education provided during previous assessment. Pt voices a few follow up questions about the material. Pt seems to have concerns about what will happen upon d/c (where he is going, if he has his appointments that he needs scheduled, DM medication regimen, etc...). Pt reports that his goal upon discharge is to get his HbA1c down to 6.0.   Pt reports that his last BM was last night (9/30) and reports that his constipation has been improving with each day. Pt also reports that he has been enjoying the Glucerna supplement. Pt denies abdominal pain. Pt reports some nausea with regards to medications.    Wt hx: dosing wt is 105.4 kg/232 lbs; (9/30) 107.6 kg/237.2 lbs; (9/18) 107.6 kg/237.2 lbs  Wt gain could be attributed to increased po intake/appetite. +2%.       No further nutrition intervention necessary at this time. RD to follow up in 7 days.

## 2019-10-01 NOTE — PROGRESS NOTE ADULT - SUBJECTIVE AND OBJECTIVE BOX
Patient requesting to see a different Pain Management specialist. Dr. Spears will see the patient tomorrow.

## 2019-10-01 NOTE — PROGRESS NOTE ADULT - SUBJECTIVE AND OBJECTIVE BOX
Pt seen due to questions about leg positioning. He wants to know why his leg is continually externally rotated. Explained to patient that due his explantation and Girdlestone procedure he has no hip joint to prevent his leg from externally rotating and that he can prop it in whatever position is most comfortable.

## 2019-10-01 NOTE — DISCHARGE NOTE NURSING/CASE MANAGEMENT/SOCIAL WORK - NSDCFUADDAPPT_GEN_ALL_CORE_FT
ID follow-up with Christina Lerma or Thurs Dr. Mohsena Amin      [address] [facility]       [phone] (call to make an appointment, walk-ins Tuesdays 10:30 AM)      Fax [phone]    Please ask your doctor to repeat xray of the left knee in 2 weeks. Please follow up with orthopedics: Dr. Lebron in 1 week. Please call [phone] to schedule an appointment.

## 2019-10-01 NOTE — PROGRESS NOTE ADULT - SUBJECTIVE AND OBJECTIVE BOX
SUBJECTIVE:    Patient is a 49y old Male who presents with a chief complaint of Left hip pain (01 Oct 2019 10:35)    Currently admitted to medicine with the primary diagnosis of Septic arthritis     Today is hospital day 28d. This morning he is resting comfortably in bed and reports no new issues or overnight events.     INTERVAL EVENTS:     PAST MEDICAL & SURGICAL HISTORY  CIDP (chronic inflammatory demyelinating polyneuropathy)  History of cholecystectomy  History of total left hip replacement  History of total right hip replacement: bilateral  S/P CABG x 5      ALLERGIES:  penicillins (Unknown)    MEDICATIONS:  STANDING MEDICATIONS  acetaminophen   Tablet .. 650 milliGRAM(s) Oral every 6 hours  aspirin  chewable 81 milliGRAM(s) Oral daily  atorvastatin 80 milliGRAM(s) Oral at bedtime  baclofen 10 milliGRAM(s) Oral two times a day  benzocaine 15 mG/menthol 3.6 mG Lozenge 1 Lozenge Oral three times a day  bisacodyl 5 milliGRAM(s) Oral at bedtime  busPIRone 5 milliGRAM(s) Oral two times a day  carvedilol 3.125 milliGRAM(s) Oral every 12 hours  cefepime   IVPB 2000 milliGRAM(s) IV Intermittent every 8 hours  cefepime   IVPB      chlorhexidine 4% Liquid 1 Application(s) Topical daily  clonazePAM  Tablet 1 milliGRAM(s) Oral three times a day  clopidogrel Tablet 75 milliGRAM(s) Oral daily  dextrose 5%. 1000 milliLiter(s) IV Continuous <Continuous>  dextrose 50% Injectable 25 Gram(s) IV Push once  dextrose 50% Injectable 25 Gram(s) IV Push once  docusate sodium 100 milliGRAM(s) Oral three times a day  DULoxetine 90 milliGRAM(s) Oral daily  enoxaparin Injectable 40 milliGRAM(s) SubCutaneous daily  ergocalciferol 83048 Unit(s) Oral every week  gabapentin 800 milliGRAM(s) Oral three times a day  hydrOXYzine hydrochloride 50 milliGRAM(s) Oral at bedtime  insulin glargine Injectable (LANTUS) 27 Unit(s) SubCutaneous at bedtime  insulin lispro (HumaLOG) corrective regimen sliding scale   SubCutaneous three times a day before meals  insulin lispro Injectable (HumaLOG) 9 Unit(s) SubCutaneous three times a day before meals  morphine ER Tablet 45 milliGRAM(s) Oral every 12 hours  nystatin Cream 1 Application(s) Topical two times a day  pantoprazole    Tablet 40 milliGRAM(s) Oral before breakfast  polyethylene glycol 3350 17 Gram(s) Oral daily  pramipexole 0.5 milliGRAM(s) Oral every 12 hours  QUEtiapine 150 milliGRAM(s) Oral at bedtime  senna 2 Tablet(s) Oral at bedtime  tamsulosin 0.4 milliGRAM(s) Oral at bedtime    PRN MEDICATIONS  dextrose 40% Gel 15 Gram(s) Oral once PRN  diclofenac 75 milliGRAM(s) Oral two times a day PRN  glucagon  Injectable 1 milliGRAM(s) IntraMuscular once PRN  lactulose Syrup 15 Gram(s) Oral four times a day PRN  naloxone Injectable 2 milliGRAM(s) IV Push once PRN  oxyCODONE    IR 10 milliGRAM(s) Oral every 4 hours PRN    VITALS:   T(F): 98.7  HR: 92  BP: 114/56  RR: 18  SpO2: --    LABS:        PHYSICAL EXAM:  GEN: No acute distress  PULM/CHEST: Clear to auscultation bilaterally, no rales, rhonchi or wheezes   CVS: Regular rate and rhythm, S1-S2, no murmurs  ABD: Soft, non-tender, non-distended, +BS  EXT: No edema, L leg in bandage/knee immobilizer   NEURO: AAOx3    Carter Catheter: SUBJECTIVE:    Patient is a 49y old Male who presents with a chief complaint of Left hip pain (01 Oct 2019 10:35)    Currently admitted to medicine with the primary diagnosis of Septic arthritis     Today is hospital day 28d. No somatic complaints. would like to speak w pain attending.   INTERVAL EVENTS: NAEON    PAST MEDICAL & SURGICAL HISTORY  CIDP (chronic inflammatory demyelinating polyneuropathy)  History of cholecystectomy  History of total left hip replacement  History of total right hip replacement: bilateral  S/P CABG x 5      ALLERGIES:  penicillins (Unknown)    MEDICATIONS:  STANDING MEDICATIONS  acetaminophen   Tablet .. 650 milliGRAM(s) Oral every 6 hours  aspirin  chewable 81 milliGRAM(s) Oral daily  atorvastatin 80 milliGRAM(s) Oral at bedtime  baclofen 10 milliGRAM(s) Oral two times a day  benzocaine 15 mG/menthol 3.6 mG Lozenge 1 Lozenge Oral three times a day  bisacodyl 5 milliGRAM(s) Oral at bedtime  busPIRone 5 milliGRAM(s) Oral two times a day  carvedilol 3.125 milliGRAM(s) Oral every 12 hours  cefepime   IVPB 2000 milliGRAM(s) IV Intermittent every 8 hours  cefepime   IVPB      chlorhexidine 4% Liquid 1 Application(s) Topical daily  clonazePAM  Tablet 1 milliGRAM(s) Oral three times a day  clopidogrel Tablet 75 milliGRAM(s) Oral daily  dextrose 5%. 1000 milliLiter(s) IV Continuous <Continuous>  dextrose 50% Injectable 25 Gram(s) IV Push once  dextrose 50% Injectable 25 Gram(s) IV Push once  docusate sodium 100 milliGRAM(s) Oral three times a day  DULoxetine 90 milliGRAM(s) Oral daily  enoxaparin Injectable 40 milliGRAM(s) SubCutaneous daily  ergocalciferol 12738 Unit(s) Oral every week  gabapentin 800 milliGRAM(s) Oral three times a day  hydrOXYzine hydrochloride 50 milliGRAM(s) Oral at bedtime  insulin glargine Injectable (LANTUS) 27 Unit(s) SubCutaneous at bedtime  insulin lispro (HumaLOG) corrective regimen sliding scale   SubCutaneous three times a day before meals  insulin lispro Injectable (HumaLOG) 9 Unit(s) SubCutaneous three times a day before meals  morphine ER Tablet 45 milliGRAM(s) Oral every 12 hours  nystatin Cream 1 Application(s) Topical two times a day  pantoprazole    Tablet 40 milliGRAM(s) Oral before breakfast  polyethylene glycol 3350 17 Gram(s) Oral daily  pramipexole 0.5 milliGRAM(s) Oral every 12 hours  QUEtiapine 150 milliGRAM(s) Oral at bedtime  senna 2 Tablet(s) Oral at bedtime  tamsulosin 0.4 milliGRAM(s) Oral at bedtime    PRN MEDICATIONS  dextrose 40% Gel 15 Gram(s) Oral once PRN  diclofenac 75 milliGRAM(s) Oral two times a day PRN  glucagon  Injectable 1 milliGRAM(s) IntraMuscular once PRN  lactulose Syrup 15 Gram(s) Oral four times a day PRN  naloxone Injectable 2 milliGRAM(s) IV Push once PRN  oxyCODONE    IR 10 milliGRAM(s) Oral every 4 hours PRN    VITALS:   T(F): 98.7  HR: 92  BP: 114/56  RR: 18  SpO2: --    LABS:      PHYSICAL EXAM:  GEN: No acute distress  PULM/CHEST: Clear to auscultation bilaterally, no rales, rhonchi or wheezes   CVS: Regular rate and rhythm, S1-S2, no murmurs  ABD: Soft, non-tender, non-distended, +BS  EXT: No edema, L leg in bandage/knee immobilizer   NEURO: AAOx3    Carter Catheter:

## 2019-10-01 NOTE — PROGRESS NOTE ADULT - PROVIDER SPECIALTY LIST ADULT
Cardiology
Hospitalist
Infectious Disease
Internal Medicine
Neurology
Orthopedics
Pain Medicine
SICU
Internal Medicine
Cardiology
Internal Medicine
Internal Medicine
Hospitalist
Hospitalist
Internal Medicine
Neurology
Neurology
Pain Medicine

## 2019-10-02 PROBLEM — G61.81 CHRONIC INFLAMMATORY DEMYELINATING POLYNEURITIS: Chronic | Status: ACTIVE | Noted: 2019-09-03

## 2019-10-03 NOTE — ASU PATIENT PROFILE, ADULT - PMH
Anxiety    ASHD (arteriosclerotic heart disease)    BPH (benign prostatic hyperplasia)    CIDP (chronic inflammatory demyelinating polyneuropathy)    Depression    Diabetes    GERD (gastroesophageal reflux disease)    Myocardial infarct, old

## 2019-10-09 PROBLEM — F41.9 ANXIETY DISORDER, UNSPECIFIED: Chronic | Status: ACTIVE | Noted: 2019-01-01

## 2019-10-09 PROBLEM — I25.10 ATHEROSCLEROTIC HEART DISEASE OF NATIVE CORONARY ARTERY WITHOUT ANGINA PECTORIS: Chronic | Status: ACTIVE | Noted: 2019-01-01

## 2019-10-09 PROBLEM — E11.9 TYPE 2 DIABETES MELLITUS WITHOUT COMPLICATIONS: Chronic | Status: ACTIVE | Noted: 2019-01-01

## 2019-10-09 PROBLEM — K21.9 GASTRO-ESOPHAGEAL REFLUX DISEASE WITHOUT ESOPHAGITIS: Chronic | Status: ACTIVE | Noted: 2019-01-01

## 2019-10-09 PROBLEM — N40.0 BENIGN PROSTATIC HYPERPLASIA WITHOUT LOWER URINARY TRACT SYMPTOMS: Chronic | Status: ACTIVE | Noted: 2019-01-01

## 2019-10-09 PROBLEM — I25.2 OLD MYOCARDIAL INFARCTION: Chronic | Status: ACTIVE | Noted: 2019-01-01

## 2019-10-09 PROBLEM — F32.9 MAJOR DEPRESSIVE DISORDER, SINGLE EPISODE, UNSPECIFIED: Chronic | Status: ACTIVE | Noted: 2019-01-01

## 2019-10-24 NOTE — ASU PATIENT PROFILE, ADULT - PSH
History of cholecystectomy    History of total left hip replacement    History of total right hip replacement  bilateral  S/P CABG x 5

## 2019-11-12 NOTE — H&P ADULT - HISTORY OF PRESENT ILLNESS
49 year old M with PMHx of CIDP (followed by Dr. Mitchell on IVIG therapy), B/L hip replacements complicated by L hip septic arthritis, currently on Vancomycin and Cefepime in [facility].  He is followed  f/by Dr. Tati Tellez, presenting for persistently elevated esr/crp and wbc count with concerns for persistent infection. pt also has LLE fractures from september hospitalization f/by ortho . bedbound at baseline. no fever chills n/v/d/abd pain urinary sx cp sob. 49 year old M with PMHx of CIDP (followed by Dr. Mitchell on IVIG therapy), B/L hip replacements complicated by L hip septic arthritis, currently on Vancomycin and Cefepime, presenting from [facility] for persistently elevated ESR/CRP and concern for persistent infection.  He had left hip arthroplasty  Dr. Tati Tellez and right hip by Dr. Rutherford.. pt also has LLE fractures from september hospitalization which is being followed by  ortho.  Patient is bedbound at baseline and has been for the past 5 years. 49 year old M with PMHx of CIDP (followed by Dr. Mitchell on IVIG therapy), B/L hip replacements complicated by L hip septic arthritis, currently on Vancomycin and Cefepime, presenting from [facility] for persistently elevated ESR/CRP and concern for persistent infection. Per notes ID physician and PMD discussed ESR/CRP and though patient should be admitted for bone Biopsy. He had left hip arthroplasty.   also has LLE fractures from september hospitalization which is being followed by  ortho.  Patient is bedbound at baseline and has been for the past 5 years.  Patient was admitted in September for hip pain due to dislocation of his previous arthroplasty. During his hospital stay he developed RUE cellulitis treated with clindamycin. During that time workup revealed superolateral dislocation of the left hip and he is underwent explant of failed hip replacement and insertion of antibiotic spacer.  Intraoperative cultures grew pseudomonas and he was diagnosed with septic arthritis. Patient was started on cefepime and was to receive a 6 week course of antibiotics to be given via PICC.  He was started on Vancomycin as well while in the NH. During that admission CT was done for left knee pain and it revealed acute nondisplaced fibular neck impaction fracture. Ortho stated no surgical intervention for the knee. Patient states that he has chronic pain that is unchanged, but that his left hip has been giving him more pain.     In the ED Left Hip XR showed unchanged septic arthritis left hip status post explantation and antibiotics replacement.  XR of the Left Femur showed Left proximal femoral metaphyseal fracture with increased callus since prior consistent with partial healing, New acute or subacute distal left femoral metaphyseal fracture with 1.7 cm bony override and Diffuse osteopenia.  ESR was 64 and lactate was 2.8.  Ortho evaluated the patient and recommended Continue IV abx and follow up BCx, CRP. 49 year old M with PMHx of CIDP (followed by Dr. Mitchell on IVIG therapy), B/L hip replacements complicated by L hip septic arthritis, currently on Vancomycin and Cefepime, presenting from [facility] for persistently elevated ESR/CRP and concern for persistent infection. Per notes ID physician and PMD discussed ESR/CRP and though patient should be admitted for bone Biopsy. He had left hip arthroplasty.   Also has LLE fractures from september hospitalization which is being followed by  ortho.  Patient is bedbound at baseline and has been for the past 5 years.  Patient was admitted in September for hip pain due to dislocation of his previous arthroplasty. During his hospital stay he developed RUE cellulitis treated with clindamycin. During that time workup revealed superolateral dislocation of the left hip and he is underwent explant of failed hip replacement and insertion of antibiotic spacer.  Intraoperative cultures grew pseudomonas and he was diagnosed with septic arthritis. Patient was started on cefepime and was to receive a 6 week course of antibiotics to be given via PICC.  He was started on Vancomycin as well while in the NH. During that admission CT was done for left knee pain and it revealed acute nondisplaced fibular neck impaction fracture. Ortho stated no surgical intervention for the knee. Patient states that he has chronic pain that is unchanged, but that his left hip has been giving him more pain.     In the ED Left Hip XR showed unchanged septic arthritis left hip status post explantation and antibiotics replacement.  XR of the Left Femur showed Left proximal femoral metaphyseal fracture with increased callus since prior consistent with partial healing, New acute or subacute distal left femoral metaphyseal fracture with 1.7 cm bony override and Diffuse osteopenia.  ESR was 64 and lactate was 2.8.  Ortho evaluated the patient and recommended Continue IV abx and follow up BCx, CRP.

## 2019-11-12 NOTE — H&P ADULT - NSHPPHYSICALEXAM_GEN_ALL_CORE
GENERAL: NAD, well-developed, AAOx3  HEENT:  Atraumatic, Normocephalic. EOMI, PERRLA, conjunctiva clear, sclera white, No JVD  PULMONARY: Clear to auscultation bilaterally; No wheeze  CARDIOVASCULAR: Regular rate and rhythm; No murmurs, rubs, or gallops  GASTROINTESTINAL: Obese, Soft, Nontender, Nondistended; Bowel sounds present  MUSCULOSKELETAL:  2+ Peripheral Pulses, No clubbing, cyanosis, or edema  NEUROLOGY: non-focal  SKIN: scar on right leg, dressing on left hip GENERAL: NAD, well-developed, AAOx3  HEENT:  Atraumatic, Normocephalic. EOMI, PERRLA, conjunctiva clear, sclera white, No JVD  PULMONARY: Clear to auscultation bilaterally; No wheeze  CARDIOVASCULAR: Regular rate and rhythm; No murmurs, rubs, or gallops  GASTROINTESTINAL: Obese, Soft, Nontender, Nondistended; Bowel sounds present  MUSCULOSKELETAL:  2+ Peripheral Pulses, No clubbing, cyanosis, +1 LE edema, Pain with movement of left hip   NEUROLOGY: non-focal  SKIN: scar on right leg, dressing on left hip

## 2019-11-12 NOTE — ED PROVIDER NOTE - CLINICAL SUMMARY MEDICAL DECISION MAKING FREE TEXT BOX
pt with h/o CIDP, septic arthritis, on abx sent to ER for admission for elevated inflammatory marker and w/u and treatment for possible ongoing infection. labs with wbc 8, ESR 64.  pt given abx, seen by ortho, admitted for continued w/u and treatment.

## 2019-11-12 NOTE — ED ADULT TRIAGE NOTE - CHIEF COMPLAINT QUOTE
BIBA for elevated ESR & CRP. Chart states patient was on Cefepime and Vancomycin, sent to hospital for possible bone biopsy.

## 2019-11-12 NOTE — H&P ADULT - NSHPOUTPATIENTPROVIDERS_GEN_ALL_CORE
PCP: Dr. Camarena (home visit program) - sometimes just telemedicine visits  ortho: Dr Lim and Dr Reese   cardio - not seen in yrs  Neuro: Dr Mitchell PCP: Dr. Camarena (home visit program) - sometimes just telemedicine visits  ortho: Dr Lim and Dr Reese  Neuro: Dr. Mitchell  Cardio: Dr. Woody   cardio - not seen in yrs  Neuro: Dr Mitchell PCP: Dr. Camarena (home visit program) - sometimes just telemedicine visits  ortho:  Dr Reese  Neuro: Dr. Mitchell  Cardio: Dr. Woody  cardio - not seen in yrs  Neuro: Dr Mitchell PCP: Dr. Camarena (home visit program) - sometimes just telemedicine visits  ortho:  Dr Reese  Neuro: Dr. Mitchell  Cardio: Dr. Woody

## 2019-11-12 NOTE — ED PROVIDER NOTE - CARE PLAN
Principal Discharge DX:	Septic arthritis  Secondary Diagnosis:	CIDP (chronic inflammatory demyelinating polyneuropathy)

## 2019-11-12 NOTE — H&P ADULT - NSICDXPASTMEDICALHX_GEN_ALL_CORE_FT
PAST MEDICAL HISTORY:  Anxiety     ASHD (arteriosclerotic heart disease)     BPH (benign prostatic hyperplasia)     CIDP (chronic inflammatory demyelinating polyneuropathy)     Depression     Diabetes     GERD (gastroesophageal reflux disease)     Myocardial infarct, old

## 2019-11-12 NOTE — ED ADULT NURSE NOTE - NSIMPLEMENTINTERV_GEN_ALL_ED
Implemented All Fall Risk Interventions:  [place] to call system. Call bell, personal items and telephone within reach. Instruct patient to call for assistance. Room bathroom lighting operational. Non-slip footwear when patient is off stretcher. Physically safe environment: no spills, clutter or unnecessary equipment. Stretcher in lowest position, wheels locked, appropriate side rails in place. Provide visual cue, wrist band, yellow gown, etc. Monitor gait and stability. Monitor for mental status changes and reorient to person, place, and time. Review medications for side effects contributing to fall risk. Reinforce activity limits and safety measures with patient and family.

## 2019-11-12 NOTE — H&P ADULT - NSHPSOCIALHISTORY_GEN_ALL_CORE
Marital Status:  (   )    ( x  ) Single    (   )    (  )   Lives with: ( x ) alone  (  ) children   (  ) spouse   (  ) parents  (  ) other    Substance Use (street drugs): ( x ) never used  (  ) other:  Tobacco Usage:  ( x  ) never smoked   (   ) former smoker   (   ) current smoker  (     ) pack year  Alcohol Usage: None

## 2019-11-12 NOTE — ED PROVIDER NOTE - ATTENDING CONTRIBUTION TO CARE
48 y/o male with h/o CIDP on IVIG q 3 weeks (due this week), bed bound at baseline, s/p b/l hip replacements with recent admission for L septic arthritis in 9/2019 with distal femur fx's during admission, currently splinted.  Pt has been on vanc and cefepime but has had elevated ESR and CRP so sent to ER for admission for concern of ongoing persistent infection. denies worsening leg pain. pt denies f/c.  no ha/dizziness/loc.  no cp/sob.  no abd pain.  PE - nad, nc/at, eomi, perrl, op - clear, cta b/l, no w/r/r, rrr, abd- soft, nt/nd, nabs, RLE - + edema, LLE - splint in place, + swelling  -check labs, culture, iv abx, ortho/neuro eval, admit.

## 2019-11-12 NOTE — ED PROVIDER NOTE - NS ED ROS FT
Eyes:  No visual changes, eye pain or discharge.  ENMT:  No hearing changes, pain, no sore throat or runny nose, no difficulty swallowing  Cardiac:  No chest pain, SOB or edema. No chest pain with exertion.  Respiratory:  No cough or respiratory distress.    GI:  No nausea, vomiting, diarrhea or abdominal pain.  :  No dysuria, frequency or burning.  MS:  +myalgia, muscle weakness, joint pain and back pain.  Neuro:  No headache or weakness.  No LOC.  Skin:  No skin rash.   Endocrine: No history of thyroid disease or diabetes.

## 2019-11-12 NOTE — H&P ADULT - ASSESSMENT
49 year old M with PMHx of CIDP (followed by Dr. Mitchell on IVIG therapy), B/L hip replacements complicated by L hip septic arthritis, currently on Vancomycin and Cefepime, presenting from [facility] for persistently elevated ESR/CRP and concern for persistent infection.    CIDP (followed by Dr. Mitchell on IVIG therapy), B/L hip replacements complicated by L hip septic arthritis 49 year old M with PMHx of CIDP (followed by Dr. Mitchell on IVIG therapy), B/L hip replacements complicated by L hip septic arthritis, currently on Vancomycin and Cefepime, presenting from [facility] for persistently elevated ESR/CRP and concern for persistent infection.    #) B/L hip replacements complicated by L hip septic arthritis  -ESR/CRP persistently elevated  -Was on Vancomycin and Cefepime in NH without improvement  -Ortho recs appreciated, pending attending eval  -Will start Meropenem and continue Vancomycin for now   -ID consult    #) CIDP (chronic inflammatory demyelinating polyneuropathy)   -cont home medications  -Was scheduled for IVIG on Thursday, Dr. Mitchell aware and wants patient in 3E for IVIG    #) BPH (benign prostatic hyperplasia)   -cont Flomax     #) Depression, Anxiety  -continue home medications, Trintellix non-formulary, please complete in AM    #) Diabetes Mellitus  -Will resume home dose insulin  -check FS     #) GERD (gastroesophageal reflux disease)   -cont PPI     #) CAD s/p CABG  -cont ASA, Plavix     DVT ppx: Lovenox     GI ppx: On pantoprazole    Diet: DASH/ CC    Dispo: Pending CRP, ID eval

## 2019-11-12 NOTE — H&P ADULT - ATTENDING COMMENTS
Patient seen and examined independently. Agree with resident note/ history / physical exam and plan of care with following exceptions/additions/updates. Case discussed with house-staff, nursing and patient/pt decision maker.     severe pain and discomfort on the right side , mostly on the hip. unable to move the leg.   [facility] ortho.   check MRI of hip  IVIG on Thursday ( q2 wks)   cannot participate in rehab.     #Progress Note Handoff  Pending (specify):  Consults Ortho attending to see, MRI hip to be done. ID consult.   Family discussion: dor[facility] pt, full code.   Disposition: SNF

## 2019-11-12 NOTE — H&P ADULT - NSHPREVIEWOFSYSTEMS_GEN_ALL_CORE
CONSTITUTIONAL: No weakness, fevers or chills  RESPIRATORY: No cough, wheezing, hemoptysis; No shortness of breath  CARDIOVASCULAR: No chest pain or palpitations  GASTROINTESTINAL: No abdominal or epigastric pain. No nausea, vomiting, or hematemesis; No diarrhea or constipation. No melena or hematochezia.  GENITOURINARY: No dysuria, frequency or hematuria  NEUROLOGICAL: No numbness or weakness  SKIN: No itching, rashes CONSTITUTIONAL: No weakness, fevers or chills  RESPIRATORY: No cough, wheezing, hemoptysis; No shortness of breath  CARDIOVASCULAR: No chest pain or palpitations  GASTROINTESTINAL: No abdominal or epigastric pain. No nausea, vomiting, or hematemesis; No diarrhea or constipation. No melena or hematochezia.  GENITOURINARY: No dysuria, frequency or hematuria  NEUROLOGICAL: Nonspecific right sided pain, chronic   SKIN: No itching, rashes

## 2019-11-12 NOTE — ED PROVIDER NOTE - OBJECTIVE STATEMENT
49yM with PMH cidp f/by Dr. Mitchell on ivig therapy, BL hip replacements c/by L hip septic arthritis, currently on vanc/cefepime in [facility] f/by Dr. Tati Tellez, presents for persistently elevated esr/crp and wbc count with concerns for persistent infection. pt also has LLE fractures from september hospitalization f/by ortho . bedbound at baseline. no fever chills n/v/d/abd pain urinary sx cp sob.

## 2019-11-12 NOTE — H&P ADULT - NSICDXPASTSURGICALHX_GEN_ALL_CORE_FT
PAST SURGICAL HISTORY:  History of cholecystectomy     History of total left hip replacement     History of total right hip replacement bilateral    S/P CABG x 5

## 2019-11-12 NOTE — CONSULT NOTE ADULT - SUBJECTIVE AND OBJECTIVE BOX
Orthopaedics Consult Note    NAPOLEON CAST  [id number]    Time consult called: 1800  Time patient seen: 1815    Patient is a 49y year old Male with chronic left ОЛЬГА infection s/p L ОЛЬГА explantation and girdlestone procedure in September 2019  Also has left distal femur fracture treated in splint  Has been treated with chronic abx at [facility]  Sent in for evaluation after inflammatory markers failed to improved despite broad spectrum IV abx  He denies increasing pain, no fever  Vitals stable in ED    PMH/PSH  ^INFECTION  MEWS Score  GERD (gastroesophageal reflux disease)  BPH (benign prostatic hyperplasia)  Myocardial infarct, old  ASHD (arteriosclerotic heart disease)  Depression  Anxiety  Diabetes  CIDP (chronic inflammatory demyelinating polyneuropathy)  History of cholecystectomy  History of total left hip replacement  History of total right hip replacement  S/P CABG x 5  INFECTION  41      Medications      Allergies  penicillins (Unknown)        T(C): 37.1 (11-12-19 @ 16:52), Max: 37.1 (11-12-19 @ 16:52)  HR: 70 (11-12-19 @ 16:52) (70 - 70)  BP: 158/64 (11-12-19 @ 16:52) (158/64 - 158/64)  RR: 16 (11-12-19 @ 16:52) (16 - 16)  SpO2: 98% (11-12-19 @ 16:52) (98% - 98%)    Physical Exam  NAD, AAOx3  Breathing comfortably on RA  Resting comfortably  RLE  Moderate swelling noted in RLE  Ankle flexion contracture noted  LLE  Incision intact  No erythema/warmth  L knee splint in place  Diffuse swelling in LLE    Labs  Pending    Img  Requested XR pelvis, LLE hip through ankle    A/P: Patient is a 49y year old Male with chronic L ОЛЬГА infection s/p explantation    Please obtain WBC, ESR, CRP  Please obtain XR pelvis, L hip through ankle  Further plan pending above Orthopaedics Consult Note    NAPOLEON CAST  [id number]    Time consult called: 1800  Time patient seen: 1815    Patient is a 49y year old Male with chronic left ОЛЬГА infection s/p L ОЛЬГА explantation and girdlestone procedure in September 2019  Also has left distal femur fracture treated in splint  Has been treated with chronic abx at [facility]  Sent in for evaluation after inflammatory markers failed to improved despite broad spectrum IV abx  He denies increasing pain, no fever  Vitals stable in ED    PMH/PSH  ^INFECTION  MEWS Score  GERD (gastroesophageal reflux disease)  BPH (benign prostatic hyperplasia)  Myocardial infarct, old  ASHD (arteriosclerotic heart disease)  Depression  Anxiety  Diabetes  CIDP (chronic inflammatory demyelinating polyneuropathy)  History of cholecystectomy  History of total left hip replacement  History of total right hip replacement  S/P CABG x 5  INFECTION  41      Medications      Allergies  penicillins (Unknown)        T(C): 37.1 (11-12-19 @ 16:52), Max: 37.1 (11-12-19 @ 16:52)  HR: 70 (11-12-19 @ 16:52) (70 - 70)  BP: 158/64 (11-12-19 @ 16:52) (158/64 - 158/64)  RR: 16 (11-12-19 @ 16:52) (16 - 16)  SpO2: 98% (11-12-19 @ 16:52) (98% - 98%)    Physical Exam  NAD, AAOx3  Breathing comfortably on RA  Resting comfortably  RLE  Moderate swelling noted in RLE  Ankle flexion contracture noted  LLE  Incision intact  No erythema/warmth  L knee splint in place  Diffuse swelling in LLE    Labs                        11.0   8.19  )-----------( 175      ( 12 Nov 2019 18:30 )             33.5   11-12    140  |  97<L>  |  17  ----------------------------<  154<H>  4.6   |  29  |  0.7    Ca    9.2      12 Nov 2019 18:30    TPro  7.0  /  Alb  3.8  /  TBili  0.3  /  DBili  x   /  AST  26  /  ALT  24  /  AlkPhos  118<H>  11-12      Img  Pelvis, LLE XR  R ОЛЬГА in place  S/p L ОЛЬГА explantation, stable alignment  Chronic L distal femur fracture  Osteopenia    A/P: Patient is a 49y year old Male with chronic L ОЛЬГА infection s/p explantation and girdlestone procedure    No sign of sepsis at this time  No hardware present in L hip  Continue IV abx per  Follow up BCx, CRP  Further plan pending attending evaluation

## 2019-11-12 NOTE — H&P ADULT - NSHPLABSRESULTS_GEN_ALL_CORE
=======================================================    Labs:  11-12    140  |  97<L>  |  17  ----------------------------<  154<H>  4.6   |  29  |  0.7    Ca    9.2      12 Nov 2019 18:30    TPro  7.0  /  Alb  3.8  /  TBili  0.3  /  DBili  x   /  AST  26  /  ALT  24  /  AlkPhos  118<H>  11-12                          11.0   8.19  )-----------( 175      ( 12 Nov 2019 18:30 )             33.5       PT/INR - ( 12 Nov 2019 18:30 )   PT: 12.90 sec;   INR: 1.12 ratio         PTT - ( 12 Nov 2019 18:30 )  PTT:57.4 sec    LIVER FUNCTIONS - ( 12 Nov 2019 18:30 )  Alb: 3.8 g/dL / Pro: 7.0 g/dL / ALK PHOS: 118 U/L / ALT: 24 U/L / AST: 26 U/L / GGT: x             XR HIP 2-3V LT            PROCEDURE DATE:  11/12/2019      IMPRESSION:    No acute displaced fracture.    Since 10/24/2019, unchanged septic arthritis left hip status post   explantation and antibiotics replacement    Left proximal femoral metaphyseal fracture with increased callus since   prior consistent with partial healing    Stable right hemiarthroplasty Stable right hemiarthroplasty      XR CHEST PORTABLE IMMED 1V            PROCEDURE DATE:  11/12/2019        Impression:      Status post CABG. No evidence of infiltrate or CHF.

## 2019-11-12 NOTE — ED PROVIDER NOTE - PHYSICAL EXAMINATION
Vital Signs: I have reviewed the initial vital signs.  Constitutional: NAD, well-nourished, appears stated age, no acute distress.  HEENT: Airway patent, moist MM, no erythema/swelling/deformity of oral structures. EOMI, PERRLA.  CV: regular rate, regular rhythm, well-perfused extremities, 2+ b/l DP and radial pulses equal.  Lungs: BCTA, no increased WOB.  ABD: NTND, no guarding or rebound, no pulsatile mass, no hernias.   MSK: Neck supple, nontender, nl ROM, no stepoff. Chest nontender. Back nontender in TLS spine or to b/l bony structures or flanks. Ext with BL swelling and sensory loss. LLE rotated with cast in place  INTEG: Skin warm, dry, no rash.  NEURO: A&Ox3, moving all extremities, normal speech  PSYCH: Calm, cooperative, normal affect and interaction.

## 2019-11-13 NOTE — CONSULT NOTE ADULT - SUBJECTIVE AND OBJECTIVE BOX
NAPOLEON CAST  49y, Male  Allergy: penicillins (Unknown)      All historical available data reviewed.    HPI:  49 year old M with PMHx of CIDP (followed by Dr. Mitchell on IVIG therapy), B/L hip replacements complicated by L hip septic arthritis, currently on Vancomycin and Cefepime, presenting from [facility] for persistently elevated ESR/CRP and concern for persistent infection. Per notes ID physician and PMD discussed ESR/CRP and though patient should be admitted for bone Biopsy. He had left hip arthroplasty.   Also has LLE fractures from september hospitalization which is being followed by  ortho.  Patient is bedbound at baseline and has been for the past 5 years.  Patient was admitted in September for hip pain due to dislocation of his previous arthroplasty. During his hospital stay he developed RUE cellulitis treated with clindamycin. During that time workup revealed superolateral dislocation of the left hip and he is underwent explant of failed hip replacement and insertion of antibiotic spacer.  Intraoperative cultures grew pseudomonas and he was diagnosed with septic arthritis. Patient was started on cefepime and was to receive a 6 week course of antibiotics to be given via PICC.  He was started on Vancomycin as well while in the NH. During that admission CT was done for left knee pain and it revealed acute nondisplaced fibular neck impaction fracture. Ortho stated no surgical intervention for the knee. Patient states that he has chronic pain that is unchanged, but that his left hip has been giving him more pain.     In the ED Left Hip XR showed unchanged septic arthritis left hip status post explantation and antibiotics replacement.  XR of the Left Femur showed Left proximal femoral metaphyseal fracture with increased callus since prior consistent with partial healing, New acute or subacute distal left femoral metaphyseal fracture with 1.7 cm bony override and Diffuse osteopenia.  ESR was 64 and lactate was 2.8.  Ortho evaluated the patient and recommended Continue IV abx and follow up BCx, CRP. (12 Nov 2019 22:19)    FAMILY HISTORY:    PAST MEDICAL & SURGICAL HISTORY:  GERD (gastroesophageal reflux disease)  BPH (benign prostatic hyperplasia)  Myocardial infarct, old  ASHD (arteriosclerotic heart disease)  Depression  Anxiety  Diabetes  CIDP (chronic inflammatory demyelinating polyneuropathy)  History of cholecystectomy  History of total left hip replacement  History of total right hip replacement: bilateral  S/P CABG x 5        VITALS:  T(F): 97.6, Max: 98.7 (11-12-19 @ 16:52)  HR: 68  BP: 120/59  RR: 17Vital Signs Last 24 Hrs  T(C): 36.4 (13 Nov 2019 09:11), Max: 37.1 (12 Nov 2019 16:52)  T(F): 97.6 (13 Nov 2019 09:11), Max: 98.7 (12 Nov 2019 16:52)  HR: 68 (13 Nov 2019 09:11) (68 - 71)  BP: 120/59 (13 Nov 2019 09:11) (108/62 - 158/64)  BP(mean): --  RR: 17 (13 Nov 2019 09:11) (16 - 17)  SpO2: 97% (13 Nov 2019 09:11) (97% - 98%)    TESTS & MEASUREMENTS:                        11.1   5.92  )-----------( 176      ( 13 Nov 2019 08:09 )             33.7     11-13    138  |  94<L>  |  15  ----------------------------<  151<H>  3.7   |  30  |  0.7    Ca    9.0      13 Nov 2019 08:09  Phos  3.9     11-13  Mg     1.6     11-13    TPro  7.0  /  Alb  4.0  /  TBili  0.3  /  DBili  x   /  AST  25  /  ALT  24  /  AlkPhos  121<H>  11-13    LIVER FUNCTIONS - ( 13 Nov 2019 08:09 )  Alb: 4.0 g/dL / Pro: 7.0 g/dL / ALK PHOS: 121 U/L / ALT: 24 U/L / AST: 25 U/L / GGT: x                   RADIOLOGY & ADDITIONAL TESTS:  Personal review of radiological diagnostics performed  Echo and EKG results noted when applicable.     ANTIBIOTICS:  meropenem  IVPB 1000 milliGRAM(s) IV Intermittent every 8 hours  vancomycin  IVPB 1000 milliGRAM(s) IV Intermittent every 12 hours

## 2019-11-13 NOTE — CONSULT NOTE ADULT - ATTENDING COMMENTS
I have personally seen and examined this patient.  I have fully participated in the care of this patient.  I have reviewed all pertinent clinical information, including history, physical exam, plan and note.   I have reviewed all pertinent clinical information and reviewed all relevant imaging and diagnostic studies personally.  48 yo M w/ h/o CIDP well known to our service currently on maintenance IVIG stable with slight improvement after IVIG initiation currently admitted for possible underlying infection.  Recommend continue IVIG maintenance 1g/kg Q2 wks and aggressive PT with stretching L achilles tendon.  f/u further recommendations from ID and orthopedics.

## 2019-11-13 NOTE — PROGRESS NOTE ADULT - SUBJECTIVE AND OBJECTIVE BOX
NAPOLEON CAST 49y Male  MRN#: [id number]       SUBJECTIVE  49 year old M with PMH of CIDP (followed by Dr. Mitchell on IVIG therapy), B/L hip replacements complicated by L hip septic arthritis, currently on Vancomycin and Cefepime, presenting from [facility] for persistently elevated ESR/CRP and concern for persistent infection.   He complains of pain and unable to move his legs because of the cast that is not positioned properly.     OBJECTIVE  PAST MEDICAL & SURGICAL HISTORY  GERD (gastroesophageal reflux disease)  BPH (benign prostatic hyperplasia)  Myocardial infarct, old  ASHD (arteriosclerotic heart disease)  Depression  Anxiety  Diabetes  CIDP (chronic inflammatory demyelinating polyneuropathy)  History of cholecystectomy  History of total left hip replacement  History of total right hip replacement: bilateral  S/P CABG x 5    ALLERGIES:  penicillins (Unknown)    MEDICATIONS:  STANDING MEDICATIONS  ammonium lactate 12% Lotion 1 Application(s) Topical two times a day  aspirin  chewable 81 milliGRAM(s) Oral daily  atorvastatin 80 milliGRAM(s) Oral at bedtime  baclofen 10 milliGRAM(s) Oral two times a day  busPIRone 5 milliGRAM(s) Oral two times a day  carvedilol 3.125 milliGRAM(s) Oral every 12 hours  chlorhexidine 4% Liquid 1 Application(s) Topical <User Schedule>  clonazePAM  Tablet 1 milliGRAM(s) Oral two times a day  clopidogrel Tablet 75 milliGRAM(s) Oral daily  dextrose 5%. 1000 milliLiter(s) IV Continuous <Continuous>  dextrose 50% Injectable 12.5 Gram(s) IV Push once  dextrose 50% Injectable 25 Gram(s) IV Push once  dextrose 50% Injectable 25 Gram(s) IV Push once  DULoxetine 60 milliGRAM(s) Oral two times a day  enoxaparin Injectable 40 milliGRAM(s) SubCutaneous daily  ergocalciferol 42710 Unit(s) Oral <User Schedule>  gabapentin 800 milliGRAM(s) Oral three times a day  insulin glargine Injectable (LANTUS) 27 Unit(s) SubCutaneous every morning  insulin lispro (HumaLOG) corrective regimen sliding scale   SubCutaneous three times a day before meals  insulin lispro Injectable (HumaLOG) 9 Unit(s) SubCutaneous before breakfast  insulin lispro Injectable (HumaLOG) 9 Unit(s) SubCutaneous before lunch  insulin lispro Injectable (HumaLOG) 9 Unit(s) SubCutaneous before dinner  melatonin 5 milliGRAM(s) Oral at bedtime  meropenem  IVPB 1000 milliGRAM(s) IV Intermittent every 8 hours  morphine ER Tablet 60 milliGRAM(s) Oral two times a day  multivitamin 1 Tablet(s) Oral daily  nystatin Powder 1 Application(s) Topical two times a day  pantoprazole    Tablet 40 milliGRAM(s) Oral before breakfast  pramipexole 0.5 milliGRAM(s) Oral every 12 hours  senna 2 Tablet(s) Oral at bedtime  tamsulosin 0.4 milliGRAM(s) Oral at bedtime  traZODone 50 milliGRAM(s) Oral at bedtime  vancomycin  IVPB 1000 milliGRAM(s) IV Intermittent every 12 hours    PRN MEDICATIONS  bisacodyl 5 milliGRAM(s) Oral at bedtime PRN  dextrose 40% Gel 15 Gram(s) Oral once PRN  diclofenac 75 milliGRAM(s) Oral two times a day PRN  glucagon  Injectable 1 milliGRAM(s) IntraMuscular once PRN  hydrOXYzine  Oral Tab/Cap - Peds 50 milliGRAM(s) Oral at bedtime PRN  lactulose Syrup 20 Gram(s) Oral four times a day PRN  morphine  IR 15 milliGRAM(s) Oral every 6 hours PRN      VITAL SIGNS: Last 24 Hours  T(C): 37.1 (13 Nov 2019 16:03), Max: 37.1 (12 Nov 2019 16:52)  T(F): 98.8 (13 Nov 2019 16:03), Max: 98.8 (13 Nov 2019 16:03)  HR: 85 (13 Nov 2019 16:03) (68 - 85)  BP: 116/63 (13 Nov 2019 16:03) (108/62 - 158/64)  BP(mean): --  RR: 18 (13 Nov 2019 16:03) (16 - 18)  SpO2: 98% (13 Nov 2019 16:03) (97% - 98%)    LABS:                        11.1   5.92  )-----------( 176      ( 13 Nov 2019 08:09 )             33.7     11-13    138  |  94<L>  |  15  ----------------------------<  151<H>  3.7   |  30  |  0.7    Ca    9.0      13 Nov 2019 08:09  Phos  3.9     11-13  Mg     1.6     11-13    TPro  7.0  /  Alb  4.0  /  TBili  0.3  /  DBili  x   /  AST  25  /  ALT  24  /  AlkPhos  121<H>  11-13    PT/INR - ( 13 Nov 2019 08:09 )   PT: 13.60 sec;   INR: 1.18 ratio         PTT - ( 13 Nov 2019 08:09 )  PTT:60.4 sec      Lactate, Blood: 1.3 mmol/L (11-13-19 @ 08:09)  Lactate, Blood: 2.8 mmol/L <H> (11-12-19 @ 18:30)  Sedimentation Rate, Erythrocyte: 64 mm/Hr <H> (11-12-19 @ 18:30)    RADIOLOGY:  < from: Xray Femur 2 Views, Left (11.12.19 @ 19:48) >    The patient is status post left hip hemiarthroplasty followed by   explantation and placement of antibiotic spacers. There is a left   proximal femoral metaphyseal  fracture with trochanter fragmentation and partial bridging callus   formation  increased since prior.       < end of copied text >      PHYSICAL EXAM:    GENERAL: NAD, well-developed, AAOx3  HEENT:  Atraumatic, Normocephalic.  PULMONARY: Clear to auscultation bilaterally  CARDIOVASCULAR: Regular rate and rhythm  GASTROINTESTINAL: Soft, Nontender  EXTREMITIES:  +1 edema, legs wrapped in dressings. left sided Picc line in arm.   NEUROLOGY: non-focal  SKIN: No rashes or lesions      ADMISSION SUMMARY  Patient is a 49y old Male who presents with a chief complaint of Septic Arthritis (13 Nov 2019 15:21)    ASSESSMENT & PLAN  #) B/L hip replacements complicated by L hip septic arthritis  -Orthopedics recalled. MRI of the left hip ordered.   -ESR/CRP persistently elevated  -Continue with Meropenem and continue Vancomycin for now as per ID.      #) CIDP (chronic inflammatory demyelinating polyneuropathy)   -cont home medications  -follow up with neurology recommendations. IVIG already ordered.     #) BPH (benign prostatic hyperplasia)   -cont Flomax     #) Depression, Anxiety  -continue home medications    #) Diabetes Mellitus  -On home dose insulin    #) GERD (gastroesophageal reflux disease)   -cont PPI     #) CAD s/p CABG  -cont ASA, Plavix     DVT ppx: Lovenox

## 2019-11-13 NOTE — CONSULT NOTE ADULT - ASSESSMENT
Diagnosis: CIDP    Recommendation:  Please continue IVIg as inpatient. Next scheduled dose is tomorrow and receives the infusion every 2 weeks. Please contact us for any questions. Diagnosis: CIDP    Recommendation:  Please continue IVIg as inpatient. Next scheduled dose is tomorrow and receives the infusion every 2 weeks. The appropriate order has been placed. Please contact us for any questions. Diagnosis: 50 yo M w/ h/o HTN, DM, DLD, CAD, depression with longstanding CIDP complicated by b/l hip necrosis with recent L hip revision now admitted for persistently elevated inflammatory markers possibly secondary to underlying infection.      Recommendation:  - Please continue IVIg as inpatient. Next scheduled dose is tomorrow and receives the infusion every 2 weeks. The appropriate order has been placed. Please contact us for any questions.  - compression stockings LE  - PT for LE stretching  - f/u ID recs  - f/u orthopedic recs  - f/u as outpt in 4 weeks

## 2019-11-13 NOTE — CONSULT NOTE ADULT - ASSESSMENT
49 year old M with PMHx of CIDP (followed by Dr. Mitchell on IVIG therapy), B/L hip replacements complicated by L hip septic arthritis, currently on Vancomycin and Cefepime, presenting from [facility] for persistently elevated ESR/CRP and concern for persistent infection.    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

## 2019-11-13 NOTE — CONSULT NOTE ADULT - SUBJECTIVE AND OBJECTIVE BOX
This is a 48 yo M with history of CIDP followed by Dr. Mitchell in the office who receives IVIg maintenance therapy. He recently had bilateral hip replacements complicated by left hip septic arthritis and was sent for admission related to persistently elevated ESR, CRP and pain concerning for persistent infection. Neurology is consulted for recommendations as to whether to continue CIDP as an inpatient.    Past Medical History:  1. CIDP  2. Coronary heart disease  3. MI  4. Depression  5. Diabetes  6. GERD  Past Surgical History:  1. Bilateral hip replacement  2. Cholecystectomy  3. CABG  Family History:  Non-contributory  Social History:  Presently in nursing home after recent hip replacements. Denies tobacco, alcohol, drugs. Chronically immobile. mRS V at present.  Allergies:    Medications:    ROS:    Neurologic Exam:  Mentation: Awake, alert, oriented to person, place, and time. Speech is clear and fluent. No neglect.  Cranial Nerves:  	II – Visual fields full.  	III/IV/VI – Pupils 4 mm. PERRLA. EOMI.  	V – Intact  	VII – No palsy.  	VIII – No nystagmus.  	IX/X - Symmetric palate rise. Uvula midline.  XI – SCM strong b/l.  XII - Tongue protrusion midline.  Motor: 5/5 strength in all four.  Sensory: Intact.  Reflexes: 2+ generally.  Cerebellum: No dysmetria. Gait deferred. This is a 48 yo M with history of CIDP followed by Dr. Mitchell in the office who receives IVIg maintenance therapy. He recently had bilateral hip replacements complicated by left hip septic arthritis and was sent for admission related to persistently elevated ESR, CRP and pain concerning for persistent infection. Neurology is consulted for recommendations as to whether to continue CIDP treatment as an inpatient.    Past Medical History:  1. CIDP  2. Coronary heart disease  3. MI  4. Depression  5. Diabetes  6. GERD  Past Surgical History:  1. Bilateral hip replacement  2. Cholecystectomy  3. CABG  Family History:  Non-contributory  Social History:  Presently in nursing home after recent hip replacements. Denies tobacco, alcohol, drugs. Chronically immobile. mRS V at present.  Allergies:  MEDICATIONS  (STANDING):  ammonium lactate 12% Lotion 1 Application(s) Topical two times a day  aspirin  chewable 81 milliGRAM(s) Oral daily  atorvastatin 80 milliGRAM(s) Oral at bedtime  baclofen 10 milliGRAM(s) Oral two times a day  busPIRone 5 milliGRAM(s) Oral two times a day  carvedilol 3.125 milliGRAM(s) Oral every 12 hours  chlorhexidine 4% Liquid 1 Application(s) Topical <User Schedule>  clonazePAM  Tablet 1 milliGRAM(s) Oral two times a day  clopidogrel Tablet 75 milliGRAM(s) Oral daily  dextrose 5%. 1000 milliLiter(s) (50 mL/Hr) IV Continuous <Continuous>  dextrose 50% Injectable 12.5 Gram(s) IV Push once  dextrose 50% Injectable 25 Gram(s) IV Push once  dextrose 50% Injectable 25 Gram(s) IV Push once  DULoxetine 60 milliGRAM(s) Oral two times a day  enoxaparin Injectable 40 milliGRAM(s) SubCutaneous daily  ergocalciferol 83831 Unit(s) Oral <User Schedule>  gabapentin 800 milliGRAM(s) Oral three times a day  insulin glargine Injectable (LANTUS) 27 Unit(s) SubCutaneous every morning  insulin lispro (HumaLOG) corrective regimen sliding scale   SubCutaneous three times a day before meals  insulin lispro Injectable (HumaLOG) 9 Unit(s) SubCutaneous before breakfast  insulin lispro Injectable (HumaLOG) 9 Unit(s) SubCutaneous before lunch  insulin lispro Injectable (HumaLOG) 9 Unit(s) SubCutaneous before dinner  melatonin 5 milliGRAM(s) Oral at bedtime  meropenem  IVPB 1000 milliGRAM(s) IV Intermittent every 8 hours  morphine ER Tablet 60 milliGRAM(s) Oral two times a day  multivitamin 1 Tablet(s) Oral daily  nystatin Powder 1 Application(s) Topical two times a day  pantoprazole    Tablet 40 milliGRAM(s) Oral before breakfast  pramipexole 0.5 milliGRAM(s) Oral every 12 hours  senna 2 Tablet(s) Oral at bedtime  tamsulosin 0.4 milliGRAM(s) Oral at bedtime  traZODone 50 milliGRAM(s) Oral at bedtime  vancomycin  IVPB 1000 milliGRAM(s) IV Intermittent every 12 hours    MEDICATIONS  (PRN):  bisacodyl 5 milliGRAM(s) Oral at bedtime PRN Constipation  dextrose 40% Gel 15 Gram(s) Oral once PRN Blood Glucose LESS THAN 70 milliGRAM(s)/deciliter  diclofenac 75 milliGRAM(s) Oral two times a day PRN Moderate Pain (4 - 6)  glucagon  Injectable 1 milliGRAM(s) IntraMuscular once PRN Glucose LESS THAN 70 milligrams/deciliter  hydrOXYzine  Oral Tab/Cap - Peds 50 milliGRAM(s) Oral at bedtime PRN pruritis  lactulose Syrup 20 Gram(s) Oral four times a day PRN constipation  morphine  IR 15 milliGRAM(s) Oral every 6 hours PRN Severe Pain (7 - 10)    Neurologic Exam:  Mentation: Awake, alert, oriented to person, place, and time. Speech is clear and fluent. No neglect.  Cranial Nerves:  	II – Visual fields full.  	III/IV/VI – Pupils 4 mm. PERRLA. EOMI.  	V – Intact  	VII – No palsy.  	VIII – No nystagmus.  	IX/X - Symmetric palate rise. Uvula midline.  XI – SCM strong b/l.  XII - Tongue protrusion midline.  Motor: 5/5 UE, trace movement toes b/l distal LE.  externally rotated LLE.    Sensory: Intact.  Reflexes: 0+ generally.  Cerebellum: No dysmetria. Gait deferred.

## 2019-11-14 NOTE — PROGRESS NOTE ADULT - SUBJECTIVE AND OBJECTIVE BOX
NAPOLEON CAST  49y, Male    All available historical data reviewed    OVERNIGHT EVENTS:    none  ROS:  General: Denies rigors, nightsweats  HEENT: Denies headache, rhinorrhea, sore throat, eye pain  CV: Denies CP, palpitations  PULM: Denies wheezing, hemoptysis  GI: Denies hematemesis, hematochezia, melena  : Denies discharge, hematuria  MSK: pain hips bilaterally  SKIN: Denies new rash  NEURO: + weakness  PSYCH: Denies depression, anxiety    VITALS:  T(F): 97.3, Max: 99 (11-13-19 @ 18:13)  HR: 69  BP: 140/65  RR: 18Vital Signs Last 24 Hrs  T(C): 36.3 (14 Nov 2019 05:30), Max: 37.2 (13 Nov 2019 18:13)  T(F): 97.3 (14 Nov 2019 05:30), Max: 99 (13 Nov 2019 18:13)  HR: 69 (14 Nov 2019 05:30) (69 - 85)  BP: 140/65 (14 Nov 2019 05:30) (116/63 - 140/65)  BP(mean): --  RR: 18 (14 Nov 2019 05:30) (17 - 18)  SpO2: 98% (13 Nov 2019 16:03) (98% - 98%)    TESTS & MEASUREMENTS:                        10.8   6.95  )-----------( 174      ( 14 Nov 2019 05:35 )             33.7     11-14    139  |  96<L>  |  17  ----------------------------<  149<H>  4.1   |  31  |  0.7    Ca    9.0      14 Nov 2019 05:35  Phos  3.9     11-13  Mg     1.6     11-13    TPro  6.5  /  Alb  3.7  /  TBili  0.3  /  DBili  x   /  AST  24  /  ALT  22  /  AlkPhos  111  11-14    LIVER FUNCTIONS - ( 14 Nov 2019 05:35 )  Alb: 3.7 g/dL / Pro: 6.5 g/dL / ALK PHOS: 111 U/L / ALT: 22 U/L / AST: 24 U/L / GGT: x             Culture - Blood (collected 11-12-19 @ 20:30)  Source: .Blood Blood  Preliminary Report (11-14-19 @ 04:01):    No growth to date.            RADIOLOGY & ADDITIONAL TESTS:  Personal review of radiological diagnostics performed  Echo and EKG results noted when applicable.     ANTIBIOTICS:  meropenem  IVPB 1000 milliGRAM(s) IV Intermittent every 8 hours  vancomycin  IVPB 1000 milliGRAM(s) IV Intermittent every 12 hours

## 2019-11-14 NOTE — PROGRESS NOTE ADULT - SUBJECTIVE AND OBJECTIVE BOX
NAPOLEON CAST 49y Male  MRN#: [id number]   CODE STATUS:____FULL____      SUBJECTIVE  Patient is a 49y old Male who presents with a chief complaint of Septic Arthritis (14 Nov 2019 11:06)  Currently admitted to medicine with the primary diagnosis of Septic arthritis    Patient will go for eye appt at 3:30 then receive IVIG this afternoon.  Overall, feeling well, no complaints.      OBJECTIVE  PAST MEDICAL & SURGICAL HISTORY  GERD (gastroesophageal reflux disease)  BPH (benign prostatic hyperplasia)  Myocardial infarct, old  ASHD (arteriosclerotic heart disease)  Depression  Anxiety  Diabetes  CIDP (chronic inflammatory demyelinating polyneuropathy)  History of cholecystectomy  History of total left hip replacement  History of total right hip replacement: bilateral  S/P CABG x 5    ALLERGIES:  penicillins (Unknown)    MEDICATIONS:  STANDING MEDICATIONS  ammonium lactate 12% Lotion 1 Application(s) Topical two times a day  aspirin  chewable 81 milliGRAM(s) Oral daily  atorvastatin 80 milliGRAM(s) Oral at bedtime  baclofen 10 milliGRAM(s) Oral two times a day  busPIRone 5 milliGRAM(s) Oral two times a day  carvedilol 3.125 milliGRAM(s) Oral every 12 hours  chlorhexidine 4% Liquid 1 Application(s) Topical <User Schedule>  clonazePAM  Tablet 1 milliGRAM(s) Oral two times a day  clopidogrel Tablet 75 milliGRAM(s) Oral daily  dextrose 5%. 1000 milliLiter(s) IV Continuous <Continuous>  dextrose 50% Injectable 12.5 Gram(s) IV Push once  dextrose 50% Injectable 25 Gram(s) IV Push once  dextrose 50% Injectable 25 Gram(s) IV Push once  DULoxetine 60 milliGRAM(s) Oral two times a day  enoxaparin Injectable 40 milliGRAM(s) SubCutaneous daily  ergocalciferol 25418 Unit(s) Oral <User Schedule>  gabapentin 800 milliGRAM(s) Oral three times a day  immune   globulin 10% (GAMMAGARD) IVPB 90 Gram(s) IV Intermittent once  insulin glargine Injectable (LANTUS) 27 Unit(s) SubCutaneous every morning  insulin lispro (HumaLOG) corrective regimen sliding scale   SubCutaneous three times a day before meals  insulin lispro Injectable (HumaLOG) 9 Unit(s) SubCutaneous before breakfast  insulin lispro Injectable (HumaLOG) 9 Unit(s) SubCutaneous before lunch  insulin lispro Injectable (HumaLOG) 9 Unit(s) SubCutaneous before dinner  magnesium oxide 400 milliGRAM(s) Oral three times a day with meals  melatonin 5 milliGRAM(s) Oral at bedtime  meropenem  IVPB 1000 milliGRAM(s) IV Intermittent every 8 hours  morphine ER Tablet 60 milliGRAM(s) Oral two times a day  multivitamin 1 Tablet(s) Oral daily  nystatin Powder 1 Application(s) Topical two times a day  pantoprazole    Tablet 40 milliGRAM(s) Oral before breakfast  pramipexole 0.5 milliGRAM(s) Oral every 12 hours  senna 2 Tablet(s) Oral at bedtime  tamsulosin 0.4 milliGRAM(s) Oral at bedtime  traZODone 50 milliGRAM(s) Oral at bedtime  vancomycin  IVPB 1000 milliGRAM(s) IV Intermittent every 12 hours    PRN MEDICATIONS  bisacodyl 5 milliGRAM(s) Oral at bedtime PRN  dextrose 40% Gel 15 Gram(s) Oral once PRN  diclofenac 75 milliGRAM(s) Oral two times a day PRN  glucagon  Injectable 1 milliGRAM(s) IntraMuscular once PRN  hydrOXYzine  Oral Tab/Cap - Peds 50 milliGRAM(s) Oral at bedtime PRN  lactulose Syrup 20 Gram(s) Oral four times a day PRN  morphine  IR 15 milliGRAM(s) Oral every 6 hours PRN      VITAL SIGNS: Last 24 Hours  T(C): 36.3 (14 Nov 2019 05:30), Max: 37.2 (13 Nov 2019 18:13)  T(F): 97.3 (14 Nov 2019 05:30), Max: 99 (13 Nov 2019 18:13)  HR: 69 (14 Nov 2019 05:30) (69 - 85)  BP: 140/65 (14 Nov 2019 05:30) (116/63 - 140/65)  BP(mean): --  RR: 18 (14 Nov 2019 05:30) (17 - 18)  SpO2: 98% (13 Nov 2019 16:03) (98% - 98%)    LABS:                        10.8   6.95  )-----------( 174      ( 14 Nov 2019 05:35 )             33.7     11-14    139  |  96<L>  |  17  ----------------------------<  149<H>  4.1   |  31  |  0.7    Ca    9.0      14 Nov 2019 05:35  Phos  3.9     11-13  Mg     1.6     11-13    TPro  6.5  /  Alb  3.7  /  TBili  0.3  /  DBili  x   /  AST  24  /  ALT  22  /  AlkPhos  111  11-14    PT/INR - ( 13 Nov 2019 08:09 )   PT: 13.60 sec;   INR: 1.18 ratio         PTT - ( 13 Nov 2019 08:09 )  PTT:60.4 sec      Sedimentation Rate, Erythrocyte: 60 mm/Hr <H> (11-14-19 @ 10:47)      Culture - Blood (collected 12 Nov 2019 20:30)  Source: .Blood Blood  Preliminary Report (14 Nov 2019 04:01):    No growth to date.          RADIOLOGY:      PHYSICAL EXAM:  GENERAL: NAD, well-developed, AAOx3  HEENT:  Atraumatic, Normocephalic.  PULMONARY: Clear to auscultation bilaterally  CARDIOVASCULAR: Regular rate and rhythm  GASTROINTESTINAL: Soft, Nontender  EXTREMITIES:  +1 edema, legs wrapped in dressings.   NEUROLOGY: non-focal  SKIN: No rashes or lesions    ASSESSMENT & PLAN  49 year old M with PMHx of CIDP (followed by Dr. Mitchell on IVIG therapy), B/L hip replacements complicated by L hip septic arthritis, currently on Vancomycin and Cefepime, [facility] concern for persistent infection.    #) B/L hip replacements complicated by L hip septic arthritis secondary to CoNS, pseudomonas aeruginosa s/pexplantation and antibiotic spacer  -9/13 joint fluid cultures CoNS, pseudomonas  -Orthopedics recalled. MRI of the left hip ordered.   -ESR/CRP persistently elevated. Cont to trend.  -Continue with Meropenem and continue Vancomycin for now as per ID.    -vanc trough after 4th dose (ordered for 4PM before 8PM dose)  -f/up MRI.  If MRI shows fluid, ID recommends diagnostic tap.  -ortho and ID following    #) CIDP (chronic inflammatory demyelinating polyneuropathy)   -cont home medications  -neurology, Dr. Mitchell, following.  will receive IVIG today.  -receives IVIG every 2 weeks    #) BPH (benign prostatic hyperplasia)   -cont Flomax     #) Depression, Anxiety  -continue home medications    #) Diabetes Mellitus  -On home dose insulin    #) GERD (gastroesophageal reflux disease)   -cont PPI     #) CAD s/p CABG  -cont ASA, Plavix     DVT ppx: Lovenox   GI ppx: protonix  DASH/CC NAPOLEON CAST 49y Male  MRN#: [id number]   CODE STATUS:____FULL____      SUBJECTIVE  Patient is a 49y old Male who presents with a chief complaint of Septic Arthritis (14 Nov 2019 11:06)  Currently admitted to medicine with the primary diagnosis of Septic arthritis    Patient will go for eye appt at 3:30 then receive IVIG this afternoon.  Overall, feeling well, no complaints.      OBJECTIVE  PAST MEDICAL & SURGICAL HISTORY  GERD (gastroesophageal reflux disease)  BPH (benign prostatic hyperplasia)  Myocardial infarct, old  ASHD (arteriosclerotic heart disease)  Depression  Anxiety  Diabetes  CIDP (chronic inflammatory demyelinating polyneuropathy)  History of cholecystectomy  History of total left hip replacement  History of total right hip replacement: bilateral  S/P CABG x 5    ALLERGIES:  penicillins (Unknown)    MEDICATIONS:  STANDING MEDICATIONS  ammonium lactate 12% Lotion 1 Application(s) Topical two times a day  aspirin  chewable 81 milliGRAM(s) Oral daily  atorvastatin 80 milliGRAM(s) Oral at bedtime  baclofen 10 milliGRAM(s) Oral two times a day  busPIRone 5 milliGRAM(s) Oral two times a day  carvedilol 3.125 milliGRAM(s) Oral every 12 hours  chlorhexidine 4% Liquid 1 Application(s) Topical <User Schedule>  clonazePAM  Tablet 1 milliGRAM(s) Oral two times a day  clopidogrel Tablet 75 milliGRAM(s) Oral daily  dextrose 5%. 1000 milliLiter(s) IV Continuous <Continuous>  dextrose 50% Injectable 12.5 Gram(s) IV Push once  dextrose 50% Injectable 25 Gram(s) IV Push once  dextrose 50% Injectable 25 Gram(s) IV Push once  DULoxetine 60 milliGRAM(s) Oral two times a day  enoxaparin Injectable 40 milliGRAM(s) SubCutaneous daily  ergocalciferol 25969 Unit(s) Oral <User Schedule>  gabapentin 800 milliGRAM(s) Oral three times a day  immune   globulin 10% (GAMMAGARD) IVPB 90 Gram(s) IV Intermittent once  insulin glargine Injectable (LANTUS) 27 Unit(s) SubCutaneous every morning  insulin lispro (HumaLOG) corrective regimen sliding scale   SubCutaneous three times a day before meals  insulin lispro Injectable (HumaLOG) 9 Unit(s) SubCutaneous before breakfast  insulin lispro Injectable (HumaLOG) 9 Unit(s) SubCutaneous before lunch  insulin lispro Injectable (HumaLOG) 9 Unit(s) SubCutaneous before dinner  magnesium oxide 400 milliGRAM(s) Oral three times a day with meals  melatonin 5 milliGRAM(s) Oral at bedtime  meropenem  IVPB 1000 milliGRAM(s) IV Intermittent every 8 hours  morphine ER Tablet 60 milliGRAM(s) Oral two times a day  multivitamin 1 Tablet(s) Oral daily  nystatin Powder 1 Application(s) Topical two times a day  pantoprazole    Tablet 40 milliGRAM(s) Oral before breakfast  pramipexole 0.5 milliGRAM(s) Oral every 12 hours  senna 2 Tablet(s) Oral at bedtime  tamsulosin 0.4 milliGRAM(s) Oral at bedtime  traZODone 50 milliGRAM(s) Oral at bedtime  vancomycin  IVPB 1000 milliGRAM(s) IV Intermittent every 12 hours    PRN MEDICATIONS  bisacodyl 5 milliGRAM(s) Oral at bedtime PRN  dextrose 40% Gel 15 Gram(s) Oral once PRN  diclofenac 75 milliGRAM(s) Oral two times a day PRN  glucagon  Injectable 1 milliGRAM(s) IntraMuscular once PRN  hydrOXYzine  Oral Tab/Cap - Peds 50 milliGRAM(s) Oral at bedtime PRN  lactulose Syrup 20 Gram(s) Oral four times a day PRN  morphine  IR 15 milliGRAM(s) Oral every 6 hours PRN      VITAL SIGNS: Last 24 Hours  T(C): 36.3 (14 Nov 2019 05:30), Max: 37.2 (13 Nov 2019 18:13)  T(F): 97.3 (14 Nov 2019 05:30), Max: 99 (13 Nov 2019 18:13)  HR: 69 (14 Nov 2019 05:30) (69 - 85)  BP: 140/65 (14 Nov 2019 05:30) (116/63 - 140/65)  BP(mean): --  RR: 18 (14 Nov 2019 05:30) (17 - 18)  SpO2: 98% (13 Nov 2019 16:03) (98% - 98%)    LABS:                        10.8   6.95  )-----------( 174      ( 14 Nov 2019 05:35 )             33.7     11-14    139  |  96<L>  |  17  ----------------------------<  149<H>  4.1   |  31  |  0.7    Ca    9.0      14 Nov 2019 05:35  Phos  3.9     11-13  Mg     1.6     11-13    TPro  6.5  /  Alb  3.7  /  TBili  0.3  /  DBili  x   /  AST  24  /  ALT  22  /  AlkPhos  111  11-14    PT/INR - ( 13 Nov 2019 08:09 )   PT: 13.60 sec;   INR: 1.18 ratio         PTT - ( 13 Nov 2019 08:09 )  PTT:60.4 sec      Sedimentation Rate, Erythrocyte: 60 mm/Hr <H> (11-14-19 @ 10:47)      Culture - Blood (collected 12 Nov 2019 20:30)  Source: .Blood Blood  Preliminary Report (14 Nov 2019 04:01):    No growth to date.          RADIOLOGY:      PHYSICAL EXAM:  GENERAL: NAD, well-developed, AAOx3  HEENT:  Atraumatic, Normocephalic.  PULMONARY: Clear to auscultation bilaterally  CARDIOVASCULAR: Regular rate and rhythm  GASTROINTESTINAL: Soft, Nontender  EXTREMITIES:  +1 edema, legs wrapped in dressings.   NEUROLOGY:   SKIN: No rashes or lesions    ASSESSMENT & PLAN  49 year old M with PMHx of CIDP (followed by Dr. Mitchell on IVIG therapy), B/L hip replacements complicated by L hip septic arthritis, currently on Vancomycin and Cefepime, presenting from [facility] for persistently elevated ESR/CRP and concern for persistent infection.    #) B/L hip replacements complicated by L hip septic arthritis secondary to CoNS, pseudomonas aeruginosa s/pexplantation and antibiotic spacer  -9/13 joint fluid cultures CoNS, pseudomonas  -Orthopedics recalled. MRI of the left hip ordered.   -ESR/CRP persistently elevated. Cont to trend.  -Continue with Meropenem and continue Vancomycin for now as per ID.    -vanc trough after 4th dose (ordered for 4PM before 8PM dose)  -f/up MRI.  If MRI shows fluid, ID recommends diagnostic tap.  -ortho and ID following    #) CIDP (chronic inflammatory demyelinating polyneuropathy)   -cont home medications  -neurology, Dr. Mitchell, following.  will receive IVIG today.  -receives IVIG every 2 weeks    #) BPH (benign prostatic hyperplasia)   -cont Flomax     #) Depression, Anxiety  -continue home medications    #) Diabetes Mellitus  -On home dose insulin    #) GERD (gastroesophageal reflux disease)   -cont PPI     #) CAD s/p CABG  -cont ASA, Plavix     DVT ppx: Lovenox   GI ppx: protonix  DASH/CC

## 2019-11-15 NOTE — OCCUPATIONAL THERAPY INITIAL EVALUATION ADULT - PLANNED THERAPY INTERVENTIONS, OT EVAL
balance training/fine motor coordination training/strengthening/transfer training/ADL retraining/bed mobility training

## 2019-11-15 NOTE — PROGRESS NOTE ADULT - SUBJECTIVE AND OBJECTIVE BOX
NAPOLOEN CAST 49y Male  MRN#: [id number]   CODE STATUS:__FULL______      SUBJECTIVE  Patient is a 49y old Male who presents with a chief complaint of Septic Arthritis (15 Nov 2019 10:34)  Currently admitted to medicine with the primary diagnosis of Septic arthritis    Patient will go for MRI today (needs ativan prior to going).   Also went down to eye clinic.      OBJECTIVE  PAST MEDICAL & SURGICAL HISTORY  GERD (gastroesophageal reflux disease)  BPH (benign prostatic hyperplasia)  Myocardial infarct, old  ASHD (arteriosclerotic heart disease)  Depression  Anxiety  Diabetes  CIDP (chronic inflammatory demyelinating polyneuropathy)  History of cholecystectomy  History of total left hip replacement  History of total right hip replacement: bilateral  S/P CABG x 5    ALLERGIES:  penicillins (Unknown)    MEDICATIONS:  STANDING MEDICATIONS  ammonium lactate 12% Lotion 1 Application(s) Topical two times a day  aspirin  chewable 81 milliGRAM(s) Oral daily  atorvastatin 80 milliGRAM(s) Oral at bedtime  baclofen 10 milliGRAM(s) Oral two times a day  busPIRone 5 milliGRAM(s) Oral two times a day  carvedilol 3.125 milliGRAM(s) Oral every 12 hours  chlorhexidine 4% Liquid 1 Application(s) Topical <User Schedule>  clonazePAM  Tablet 1 milliGRAM(s) Oral two times a day  clopidogrel Tablet 75 milliGRAM(s) Oral daily  dextrose 5%. 1000 milliLiter(s) IV Continuous <Continuous>  dextrose 50% Injectable 12.5 Gram(s) IV Push once  dextrose 50% Injectable 25 Gram(s) IV Push once  dextrose 50% Injectable 25 Gram(s) IV Push once  DULoxetine 60 milliGRAM(s) Oral two times a day  enoxaparin Injectable 40 milliGRAM(s) SubCutaneous daily  ergocalciferol 44564 Unit(s) Oral <User Schedule>  gabapentin 800 milliGRAM(s) Oral three times a day  insulin glargine Injectable (LANTUS) 27 Unit(s) SubCutaneous every morning  insulin lispro (HumaLOG) corrective regimen sliding scale   SubCutaneous three times a day before meals  insulin lispro Injectable (HumaLOG) 9 Unit(s) SubCutaneous before breakfast  insulin lispro Injectable (HumaLOG) 9 Unit(s) SubCutaneous before lunch  insulin lispro Injectable (HumaLOG) 9 Unit(s) SubCutaneous before dinner  LORazepam   Injectable 2 milliGRAM(s) IV Push once  magnesium oxide 400 milliGRAM(s) Oral three times a day with meals  melatonin 5 milliGRAM(s) Oral at bedtime  meropenem  IVPB 1000 milliGRAM(s) IV Intermittent every 8 hours  morphine ER Tablet 60 milliGRAM(s) Oral two times a day  multivitamin 1 Tablet(s) Oral daily  nystatin Powder 1 Application(s) Topical two times a day  pantoprazole    Tablet 40 milliGRAM(s) Oral before breakfast  pramipexole 0.5 milliGRAM(s) Oral every 12 hours  senna 2 Tablet(s) Oral at bedtime  tamsulosin 0.4 milliGRAM(s) Oral at bedtime  traZODone 50 milliGRAM(s) Oral at bedtime  vancomycin  IVPB 1000 milliGRAM(s) IV Intermittent every 12 hours    PRN MEDICATIONS  bisacodyl 5 milliGRAM(s) Oral at bedtime PRN  dextrose 40% Gel 15 Gram(s) Oral once PRN  diclofenac 75 milliGRAM(s) Oral two times a day PRN  glucagon  Injectable 1 milliGRAM(s) IntraMuscular once PRN  hydrOXYzine  Oral Tab/Cap - Peds 50 milliGRAM(s) Oral at bedtime PRN  lactulose Syrup 20 Gram(s) Oral four times a day PRN  LORazepam     Tablet 1 milliGRAM(s) Oral every 6 hours PRN  morphine  IR 15 milliGRAM(s) Oral every 6 hours PRN      VITAL SIGNS: Last 24 Hours  T(C): 37.1 (15 Nov 2019 13:13), Max: 37.4 (14 Nov 2019 14:33)  T(F): 98.8 (15 Nov 2019 13:13), Max: 99.4 (14 Nov 2019 14:33)  HR: 89 (15 Nov 2019 13:13) (73 - 89)  BP: 129/61 (15 Nov 2019 13:13) (119/56 - 133/76)  BP(mean): --  RR: 18 (15 Nov 2019 13:13) (17 - 19)  SpO2: --    LABS:                        10.4   5.46  )-----------( 158      ( 15 Nov 2019 06:07 )             32.3     11-15    135  |  95<L>  |  23<H>  ----------------------------<  178<H>  4.5   |  31  |  0.8    Ca    8.9      15 Nov 2019 06:07  Mg     1.7     11-15    TPro  6.5  /  Alb  3.7  /  TBili  0.3  /  DBili  x   /  AST  24  /  ALT  22  /  AlkPhos  111  11-14          Sedimentation Rate, Erythrocyte: 93 mm/Hr <H> (11-15-19 @ 06:07)      Culture - Blood (collected 13 Nov 2019 08:09)  Source: .Blood None  Preliminary Report (15 Nov 2019 01:02):    No growth to date.    Culture - Blood (collected 12 Nov 2019 20:30)  Source: .Blood Blood  Preliminary Report (14 Nov 2019 04:01):    No growth to date.          RADIOLOGY:      PHYSICAL EXAM:    GENERAL: NAD, well-developed, AAOx3  HEENT:  Atraumatic, Normocephalic.  PULMONARY: Clear to auscultation bilaterally  CARDIOVASCULAR: Regular rate and rhythm  GASTROINTESTINAL: Soft, Nontender  EXTREMITIES:  +1 edema, cast on LLE, strength 1/5 LLE, no sensation below the knee.  NEUROLOGY:   SKIN: No rashes or lesions    ASSESSMENT & PLAN  49 year old M with PMHx of CIDP (followed by Dr. Mitchell on IVIG therapy), B/L hip replacements complicated by L hip septic arthritis, currently on Vancomycin and Cefepime, presenting from [facility] for persistently elevated ESR/CRP and concern for persistent infection.    #) B/L hip replacements complicated by L hip septic arthritis secondary to CoNS, pseudomonas aeruginosa s/pexplantation and antibiotic spacer  -afebrile, no WBC  -9/13 joint fluid cultures CoNS, pseudomonas  -Orthopedics recalled. MRI of the left hip ordered.   -ESR/CRP persistently elevated. Cont to trend.  -Continue with Meropenem and continue Vancomycin for now as per ID.    -vanc trough 18.9. repeat after four more doses.  -f/up MRI.  If MRI shows fluid, ID recommends diagnostic tap.  -ortho and ID following  -blood cx 11/13 no growth    #) CIDP (chronic inflammatory demyelinating polyneuropathy)   -cont home medications  -neurology, Dr. Mitchell, following.  received IVIG yesterday.  -receives IVIG every 2 weeks    #) BPH (benign prostatic hyperplasia)   -cont Flomax     #) Depression, Anxiety  -continue home medications    #) Diabetes Mellitus  -On home dose insulin    #) GERD (gastroesophageal reflux disease)   -cont PPI     #) CAD s/p CABG  -cont ASA, Plavix     DVT ppx: Lovenox   GI ppx: protonix  DASH/CC

## 2019-11-15 NOTE — PROGRESS NOTE ADULT - SUBJECTIVE AND OBJECTIVE BOX
NAPOLEON CAST  49y, Male    All available historical data reviewed    OVERNIGHT EVENTS:  no fevers, clinically no change    ROS:  General: Denies rigors, night sweats  HEENT: Denies headache, rhinorrhea, sore throat, eye pain  CV: Denies CP, palpitations  PULM: Denies wheezing, hemoptysis  GI: Denies hematemesis, hematochezia, melena  : Denies discharge, hematuria  MSK: pain LEs left >right  SKIN: Denies rash, lesions  NEURO: + weakness  PSYCH: + anxiety    VITALS:  T(F): 97.2, Max: 99.4 (11-14-19 @ 14:33)  HR: 79  BP: 133/76  RR: 18Vital Signs Last 24 Hrs  T(C): 36.2 (15 Nov 2019 05:42), Max: 37.4 (14 Nov 2019 14:33)  T(F): 97.2 (15 Nov 2019 05:42), Max: 99.4 (14 Nov 2019 14:33)  HR: 79 (15 Nov 2019 05:42) (73 - 84)  BP: 133/76 (15 Nov 2019 05:42) (119/56 - 133/76)  BP(mean): --  RR: 18 (15 Nov 2019 05:42) (17 - 19)  SpO2: --    TESTS & MEASUREMENTS:                        10.4   5.46  )-----------( 158      ( 15 Nov 2019 06:07 )             32.3     11-15    135  |  95<L>  |  23<H>  ----------------------------<  178<H>  4.5   |  31  |  0.8    Ca    8.9      15 Nov 2019 06:07  Mg     1.7     11-15    TPro  6.5  /  Alb  3.7  /  TBili  0.3  /  DBili  x   /  AST  24  /  ALT  22  /  AlkPhos  111  11-14    LIVER FUNCTIONS - ( 14 Nov 2019 05:35 )  Alb: 3.7 g/dL / Pro: 6.5 g/dL / ALK PHOS: 111 U/L / ALT: 22 U/L / AST: 24 U/L / GGT: x             Culture - Blood (collected 11-13-19 @ 08:09)  Source: .Blood None  Preliminary Report (11-15-19 @ 01:02):    No growth to date.    Culture - Blood (collected 11-12-19 @ 20:30)  Source: .Blood Blood  Preliminary Report (11-14-19 @ 04:01):    No growth to date.            RADIOLOGY & ADDITIONAL TESTS:  Personal review of radiological diagnostics performed  Echo and EKG results noted when applicable.     ANTIBIOTICS:  meropenem  IVPB 1000 milliGRAM(s) IV Intermittent every 8 hours  vancomycin  IVPB 1000 milliGRAM(s) IV Intermittent every 12 hours

## 2019-11-15 NOTE — CONSULT NOTE ADULT - ASSESSMENT
IMPRESSION: Rehab of    CIDP, paraplegia, s/p left girdlestone; nonambulatory for 6 years    PRECAUTIONS: [  ] Cardiac  [  ] Respiratory  [  ] Seizures [  ] Contact Isolation  [  ] Droplet Isolation  [  ] Other    Weight Bearing Status:     RECOMMENDATION:   Tremendous snf care needs. He has poor insight into his disability. He doesn't require acute care PT. OT for ADL's can be done. He requires a hina lift for transfers. He is on IVIG. He could be a candidate for a power mobility device for mobility.     Out of Bed to Chair     DVT/Decubiti Prophylaxis    REHAB PLAN:     [   ] Bedside P/T 3-5 times a week   [ x  ]   Bedside O/T  2-3 times a week             [   ] No Rehab Therapy Indicated                   [   ]  Speech Therapy   Conditioning/ROM                                    ADL  Bed Mobility                                               Conditioning/ROM  Transfers                                                     Bed Mobility  Sitting /Standing Balance                            Gait Training                                               Sitting  bal.  Stair Training [   ]Applicable                    equipment Eval                                                                              GOALS:   ADL   [   ]   Independent                    Transfers  [   ] Independent                          Ambulation  [   ] Independent     [    ] With device                            [   ]  CG                                                         [   ]  CG                                                                  [   ] CG                            [    ] Min A                                                   [   ] Min A                                                              [   ] Min  A          DISCHARGE PLAN:   [   ]  Good candidate for Intensive Rehabilitation/Hospital based-[facility]                                             Will tolerate 3hrs Intensive Rehab Daily                                       [ xx   ] return for  Short Term Rehab in Skilled Nursing Facility; on IV antibiotics.                                       [    ]  Home with Outpatient or VN services                                         [    ]  Possible Candidate for Intensive Hospital based Rehab

## 2019-11-15 NOTE — CONSULT NOTE ADULT - SUBJECTIVE AND OBJECTIVE BOX
HPI:  49 year old M with PMHx of CIDP (followed by Dr. Mitchell on IVIG therapy), B/L hip replacements complicated by L hip septic arthritis, currently on Vancomycin and Cefepime, presenting from [facility] for persistently elevated ESR/CRP and concern for persistent infection. Per notes ID physician and PMD discussed ESR/CRP and though patient should be admitted for bone Biopsy. He had left hip arthroplasty.   Also has LLE fractures from september hospitalization which is being followed by  ortho.  Patient is bedbound at baseline and has been for the past 5 years.  Patient was admitted in September for hip pain due to dislocation of his previous arthroplasty. During his hospital stay he developed RUE cellulitis treated with clindamycin. During that time workup revealed superolateral dislocation of the left hip and he is underwent explant of failed hip replacement and insertion of antibiotic spacer.  Intraoperative cultures grew pseudomonas and he was diagnosed with septic arthritis. Patient was started on cefepime and was to receive a 6 week course of antibiotics to be given via PICC.  He was started on Vancomycin as well while in the NH. During that admission CT was done for left knee pain and it revealed acute nondisplaced fibular neck impaction fracture. Ortho stated no surgical intervention for the knee. Patient states that he has chronic pain that is unchanged, but that his left hip has been giving him more pain.     In the ED Left Hip XR showed unchanged septic arthritis left hip status post explantation and antibiotics replacement.  XR of the Left Femur showed Left proximal femoral metaphyseal fracture with increased callus since prior consistent with partial healing, New acute or subacute distal left femoral metaphyseal fracture with 1.7 cm bony override and Diffuse osteopenia.  ESR was 64 and lactate was 2.8.  Ortho evaluated the patient and recommended Continue IV abx and follow up BCx, CRP. (12 Nov 2019 22:19)      PAST MEDICAL & SURGICAL HISTORY:  GERD (gastroesophageal reflux disease)  BPH (benign prostatic hyperplasia)  Myocardial infarct, old  ASHD (arteriosclerotic heart disease)  Depression  Anxiety  Diabetes  CIDP (chronic inflammatory demyelinating polyneuropathy)  History of cholecystectomy  History of total left hip replacement  History of total right hip replacement: bilateral  S/P CABG x 5      Hospital Course:  he has CIDP which can cause both prox and distal weakness. He is nonambulatory for 6 years. He has no strength in the LE's. He has skilled nursing care needs. he requires a hina lift to transfer to a specialed reclining wheelchair.   TODAY'S SUBJECTIVE & REVIEW OF SYMPTOMS:     Constitutional WNL   Cardio WNL   Resp WNL   GI WNL  Heme WNL  Endo WNL  Skin WNL  MSK WNL  Neuro WNL  Cognitive WNL  Psych WNL      MEDICATIONS  (STANDING):  ammonium lactate 12% Lotion 1 Application(s) Topical two times a day  aspirin  chewable 81 milliGRAM(s) Oral daily  atorvastatin 80 milliGRAM(s) Oral at bedtime  baclofen 10 milliGRAM(s) Oral two times a day  busPIRone 5 milliGRAM(s) Oral two times a day  carvedilol 3.125 milliGRAM(s) Oral every 12 hours  chlorhexidine 4% Liquid 1 Application(s) Topical <User Schedule>  clonazePAM  Tablet 1 milliGRAM(s) Oral two times a day  clopidogrel Tablet 75 milliGRAM(s) Oral daily  dextrose 5%. 1000 milliLiter(s) (50 mL/Hr) IV Continuous <Continuous>  dextrose 50% Injectable 12.5 Gram(s) IV Push once  dextrose 50% Injectable 25 Gram(s) IV Push once  dextrose 50% Injectable 25 Gram(s) IV Push once  DULoxetine 60 milliGRAM(s) Oral two times a day  enoxaparin Injectable 40 milliGRAM(s) SubCutaneous daily  ergocalciferol 38639 Unit(s) Oral <User Schedule>  gabapentin 800 milliGRAM(s) Oral three times a day  insulin glargine Injectable (LANTUS) 27 Unit(s) SubCutaneous every morning  insulin lispro (HumaLOG) corrective regimen sliding scale   SubCutaneous three times a day before meals  insulin lispro Injectable (HumaLOG) 9 Unit(s) SubCutaneous before breakfast  insulin lispro Injectable (HumaLOG) 9 Unit(s) SubCutaneous before lunch  insulin lispro Injectable (HumaLOG) 9 Unit(s) SubCutaneous before dinner  LORazepam   Injectable 2 milliGRAM(s) IV Push once  magnesium oxide 400 milliGRAM(s) Oral three times a day with meals  magnesium sulfate  IVPB 2 Gram(s) IV Intermittent once  melatonin 5 milliGRAM(s) Oral at bedtime  meropenem  IVPB 1000 milliGRAM(s) IV Intermittent every 8 hours  morphine ER Tablet 60 milliGRAM(s) Oral two times a day  multivitamin 1 Tablet(s) Oral daily  nystatin Powder 1 Application(s) Topical two times a day  pantoprazole    Tablet 40 milliGRAM(s) Oral before breakfast  pramipexole 0.5 milliGRAM(s) Oral every 12 hours  senna 2 Tablet(s) Oral at bedtime  tamsulosin 0.4 milliGRAM(s) Oral at bedtime  traZODone 50 milliGRAM(s) Oral at bedtime  vancomycin  IVPB 1000 milliGRAM(s) IV Intermittent every 12 hours    MEDICATIONS  (PRN):  bisacodyl 5 milliGRAM(s) Oral at bedtime PRN Constipation  dextrose 40% Gel 15 Gram(s) Oral once PRN Blood Glucose LESS THAN 70 milliGRAM(s)/deciliter  diclofenac 75 milliGRAM(s) Oral two times a day PRN Moderate Pain (4 - 6)  glucagon  Injectable 1 milliGRAM(s) IntraMuscular once PRN Glucose LESS THAN 70 milligrams/deciliter  hydrOXYzine  Oral Tab/Cap - Peds 50 milliGRAM(s) Oral at bedtime PRN pruritis  lactulose Syrup 20 Gram(s) Oral four times a day PRN constipation  LORazepam     Tablet 1 milliGRAM(s) Oral every 6 hours PRN Agitation  morphine  IR 15 milliGRAM(s) Oral every 6 hours PRN Severe Pain (7 - 10)      FAMILY HISTORY:      Allergies    penicillins (Unknown)    Intolerances        SOCIAL HISTORY:    [  ] Etoh  [  ] Smoking  [  ] Substance abuse     Home Environment:  [  ] Home Alone  [  ] Lives with Family  [  ] Home Health Aid    Dwelling:  [  ] Apartment  [  ] Private House  [  ] Adult Home  [  ] Skilled Nursing Facility      [  ] Short Term  [  ] Long Term  [  ] Stairs       Elevator [  ]    FUNCTIONAL STATUS PTA: (Check all that apply)  Ambulation: [   ]Independent    [  ] Dependent     [  ] Non-Ambulatory  Assistive Device: [  ] SA Cane  [  ]  Q Cane  [  ] Walker  [  ]  Wheelchair  ADL : [  ] Independent  [  ]  Dependent       Vital Signs Last 24 Hrs  T(C): 37.1 (15 Nov 2019 13:13), Max: 37.4 (14 Nov 2019 14:33)  T(F): 98.8 (15 Nov 2019 13:13), Max: 99.4 (14 Nov 2019 14:33)  HR: 89 (15 Nov 2019 13:13) (73 - 89)  BP: 129/61 (15 Nov 2019 13:13) (119/56 - 133/76)  BP(mean): --  RR: 18 (15 Nov 2019 13:13) (17 - 19)  SpO2: --      PHYSICAL EXAM: Alert & Oriented X3  GENERAL: NAD, well-groomed, well-developed  HEAD:  Atraumatic, Normocephalic  EYES: EOMI, PERRLA, conjunctiva and sclera clear  NECK: Supple, No JVD, Normal thyroid  CHEST/LUNG: Clear to percussion bilaterally; No rales, rhonchi, wheezing, or rubs  HEART: Regular rate and rhythm; No murmurs, rubs, or gallops  ABDOMEN: Soft, Nontender, Nondistended; Bowel sounds present  EXTREMITIES:  2+ Peripheral Pulses, No clubbing, cyanosis, or edema    NERVOUS SYSTEM:  Cranial Nerves 2-12 intact [  ] Abnormal  [  ]  ROM: WFL all extremities [  ]  Abnormal [  ]  Motor Strength: WFL all extremities  [  ]  Abnormal [ x ]   RLE 1/5; LLE 0/5; Bilat UE's prox 4/5; distal 2-3/5  Sensation: intact to light touch [  ] Abnormal [  ]  Reflexes: Symmetric [  ]  Abnormal [  ]    FUNCTIONAL STATUS:  Bed Mobility: Independent [  ]  Supervision [  ]  Needs Assistance [  ]  N/A [  ]  Transfers: Independent [  ]  Supervision [  ]  Needs Assistance [  ]  N/A [  ]   Ambulation: Independent [  ]  Supervision [  ]  Needs Assistance [  ]  N/A [  ]  ADL: Independent [  ] Requires Assistance [  ] N/A [  ]      LABS:                        10.4   5.46  )-----------( 158      ( 15 Nov 2019 06:07 )             32.3     11-15    135  |  95<L>  |  23<H>  ----------------------------<  178<H>  4.5   |  31  |  0.8    Ca    8.9      15 Nov 2019 06:07  Mg     1.7     11-15    TPro  6.5  /  Alb  3.7  /  TBili  0.3  /  DBili  x   /  AST  24  /  ALT  22  /  AlkPhos  111  11-14          RADIOLOGY & ADDITIONAL STUDIES:    Assesment:

## 2019-11-15 NOTE — CDI QUERY NOTE - NSCDIOTHERTXTBX_GEN_ALL_CORE_HH
49y M  presents for persistently elevated esr/crp and wbc count with concerns for persistent infection. pt also has LLE fractures  bedbound at baseline and has been for the past 5 years.  MS:  +myalgia, muscle weakness, joint pain and back pain.    History of chronic left ОЛЬГА infection s/p L ОЛЬГА explantation and girdlestone, chronic left distal femur fracture, Moderate swelling noted in RLE, Ankle flexion contracture . XR of the Left Femur showed Left proximal femoral metaphyseal fracture with increased callus since prior consistent with partial healing, New acute or subacute distal left femoral metaphyseal fracture with 1.7 cm bony override and Diffuse osteopenia.    Admitted for Septic arthritis left hip secondary to CoNS, Pseudomonas aeruginosa, CIDP (chronic inflammatory demyelinating polyneuropathy.    Per nursing assessment, requires 2 person assist, complete care, requires turning , feeding.    In order to capture the severity of illness, please indicate if additional diagnosis is applicable reflective of above clinical findings  ?	Functional quadriplegia due to CIDP and Chronic Left hip FX  ?	Immobility syndrome  ?	Other, Please Specify  ?	Clinically unable to determine

## 2019-11-15 NOTE — PROGRESS NOTE ADULT - SUBJECTIVE AND OBJECTIVE BOX
Orthopaedics Progress Note    NAPOLEON SERENE  [id number]    Patient is a 49y year old Male with chronic left ОЛЬГА infection s/p L ОЛЬГА explantation and girdlestone procedure in September 2019  Also has left distal femur fracture treated in splint  Has been treated with chronic abx at [facility]  Admitted for observation    ammonium lactate 12% Lotion 1 Application(s) Topical two times a day  aspirin  chewable 81 milliGRAM(s) Oral daily  atorvastatin 80 milliGRAM(s) Oral at bedtime  baclofen 10 milliGRAM(s) Oral two times a day  bisacodyl 5 milliGRAM(s) Oral at bedtime PRN  busPIRone 5 milliGRAM(s) Oral two times a day  carvedilol 3.125 milliGRAM(s) Oral every 12 hours  chlorhexidine 4% Liquid 1 Application(s) Topical <User Schedule>  clonazePAM  Tablet 1 milliGRAM(s) Oral two times a day  clopidogrel Tablet 75 milliGRAM(s) Oral daily  dextrose 40% Gel 15 Gram(s) Oral once PRN  dextrose 5%. 1000 milliLiter(s) IV Continuous <Continuous>  dextrose 50% Injectable 12.5 Gram(s) IV Push once  dextrose 50% Injectable 25 Gram(s) IV Push once  dextrose 50% Injectable 25 Gram(s) IV Push once  diclofenac 75 milliGRAM(s) Oral two times a day PRN  DULoxetine 60 milliGRAM(s) Oral two times a day  enoxaparin Injectable 40 milliGRAM(s) SubCutaneous daily  ergocalciferol 65768 Unit(s) Oral <User Schedule>  gabapentin 800 milliGRAM(s) Oral three times a day  glucagon  Injectable 1 milliGRAM(s) IntraMuscular once PRN  hydrOXYzine  Oral Tab/Cap - Peds 50 milliGRAM(s) Oral at bedtime PRN  insulin glargine Injectable (LANTUS) 27 Unit(s) SubCutaneous every morning  insulin lispro (HumaLOG) corrective regimen sliding scale   SubCutaneous three times a day before meals  insulin lispro Injectable (HumaLOG) 9 Unit(s) SubCutaneous before breakfast  insulin lispro Injectable (HumaLOG) 9 Unit(s) SubCutaneous before lunch  insulin lispro Injectable (HumaLOG) 9 Unit(s) SubCutaneous before dinner  lactulose Syrup 20 Gram(s) Oral four times a day PRN  LORazepam   Injectable 2 milliGRAM(s) IV Push once  magnesium oxide 400 milliGRAM(s) Oral three times a day with meals  melatonin 5 milliGRAM(s) Oral at bedtime  meropenem  IVPB 1000 milliGRAM(s) IV Intermittent every 8 hours  morphine  IR 15 milliGRAM(s) Oral every 6 hours PRN  morphine ER Tablet 60 milliGRAM(s) Oral two times a day  multivitamin 1 Tablet(s) Oral daily  nystatin Powder 1 Application(s) Topical two times a day  pantoprazole    Tablet 40 milliGRAM(s) Oral before breakfast  pramipexole 0.5 milliGRAM(s) Oral every 12 hours  senna 2 Tablet(s) Oral at bedtime  tamsulosin 0.4 milliGRAM(s) Oral at bedtime  traZODone 50 milliGRAM(s) Oral at bedtime  vancomycin  IVPB 1000 milliGRAM(s) IV Intermittent every 12 hours      T(C): 36.2 (11-15-19 @ 05:42), Max: 37.4 (11-14-19 @ 14:33)  HR: 79 (11-15-19 @ 05:42) (73 - 84)  BP: 133/76 (11-15-19 @ 05:42) (119/56 - 133/76)  RR: 18 (11-15-19 @ 05:42) (17 - 19)  SpO2: --    Physical Exam  NAD, AAOx3  Breathing comfortably on RA  Resting comfortably  RLE  Moderate swelling noted in RLE  Ankle flexion contracture noted  LLE  Incision intact  No erythema/warmth  L knee splint in place  Diffuse swelling in LLE    Labs                        10.4   5.46  )-----------( 158      ( 15 Nov 2019 06:07 )             32.3     11-14    139  |  96<L>  |  17  ----------------------------<  149<H>  4.1   |  31  |  0.7    Ca    9.0      14 Nov 2019 05:35  Phos  3.9     11-13  Mg     1.6     11-13    TPro  6.5  /  Alb  3.7  /  TBili  0.3  /  DBili  x   /  AST  24  /  ALT  22  /  AlkPhos  111  11-14    LIVER FUNCTIONS - ( 14 Nov 2019 05:35 )  Alb: 3.7 g/dL / Pro: 6.5 g/dL / ALK PHOS: 111 U/L / ALT: 22 U/L / AST: 24 U/L / GGT: x           PT/INR - ( 13 Nov 2019 08:09 )   PT: 13.60 sec;   INR: 1.18 ratio         PTT - ( 13 Nov 2019 08:09 )  PTT:60.4 sec    A/P: Patient is a 49y year old Male as above    No sign of sepsis at this time  No hardware present in L hip  Continue IV abx per  Follow up BCx, CRP  Further plan pending attending evaluation

## 2019-11-16 NOTE — PROGRESS NOTE ADULT - SUBJECTIVE AND OBJECTIVE BOX
NAPOLEON CAST  49y, Male    All available historical data reviewed    OVERNIGHT EVENTS:  no fevers    ROS:  General: Denies rigors, nightsweats  HEENT: Denies headache, rhinorrhea, sore throat, eye pain  CV: Denies CP, palpitations  PULM: Denies wheezing, hemoptysis  GI: Denies hematemesis, hematochezia, melena  : Denies discharge, hematuria  MSK: + pain LEs bilaterally  SKIN: Denies rash, lesions  NEURO: + weakness  PSYCH: + anxiety    VITALS:  T(F): 98.1, Max: 99.4 (11-15-19 @ 21:34)  HR: 74  BP: 111/56  RR: 18Vital Signs Last 24 Hrs  T(C): 36.7 (16 Nov 2019 04:56), Max: 37.4 (15 Nov 2019 21:34)  T(F): 98.1 (16 Nov 2019 04:56), Max: 99.4 (15 Nov 2019 21:34)  HR: 74 (16 Nov 2019 04:56) (74 - 89)  BP: 111/56 (16 Nov 2019 04:56) (111/56 - 129/61)  BP(mean): --  RR: 18 (16 Nov 2019 04:56) (18 - 19)  SpO2: 96% (15 Nov 2019 19:40) (96% - 96%)    TESTS & MEASUREMENTS:                        10.4   5.46  )-----------( 158      ( 15 Nov 2019 06:07 )             32.3     11-15    135  |  95<L>  |  23<H>  ----------------------------<  178<H>  4.5   |  31  |  0.8    Ca    8.9      15 Nov 2019 06:07  Mg     1.7     11-15          Culture - Blood (collected 11-13-19 @ 08:09)  Source: .Blood None  Preliminary Report (11-15-19 @ 01:02):    No growth to date.    Culture - Blood (collected 11-12-19 @ 20:30)  Source: .Blood Blood  Preliminary Report (11-14-19 @ 04:01):    No growth to date.            RADIOLOGY & ADDITIONAL TESTS:  Personal review of radiological diagnostics performed  Echo and EKG results noted when applicable.     ANTIBIOTICS:  meropenem  IVPB 1000 milliGRAM(s) IV Intermittent every 8 hours  vancomycin  IVPB 1000 milliGRAM(s) IV Intermittent every 12 hours

## 2019-11-16 NOTE — PROGRESS NOTE ADULT - SUBJECTIVE AND OBJECTIVE BOX
NAPOLEON CAST 49y Male  MRN#: [id number]   CODE STATUS:__FULL______      SUBJECTIVE: Did not tolerate MRI with Ativan. Also c/o Rt knee pain, also says that he was told by ortho that he needs Lt splint to be changed. No other complaints    ROS otherwise unremarkable    Patient is a 49y old Male who presents with a chief complaint of Septic Arthritis (15 Nov 2019 10:34)  Currently admitted to medicine with the primary diagnosis of Septic arthritis  .  HPI:  49 year old M with PMHx of CIDP (followed by Dr. Mitchell on IVIG therapy), B/L hip replacements complicated by L hip septic arthritis, currently on Vancomycin and Cefepime, presenting from [facility] for persistently elevated ESR/CRP and concern for persistent infection. Per notes ID physician and PMD discussed ESR/CRP and though patient should be admitted for bone Biopsy. He had left hip arthroplasty.   Also has LLE fractures from september hospitalization which is being followed by  ortho.  Patient is bedbound at baseline and has been for the past 5 years.  Patient was admitted in September for hip pain due to dislocation of his previous arthroplasty. During his hospital stay he developed RUE cellulitis treated with clindamycin. During that time workup revealed superolateral dislocation of the left hip and he is underwent explant of failed hip replacement and insertion of antibiotic spacer.  Intraoperative cultures grew pseudomonas and he was diagnosed with septic arthritis. Patient was started on cefepime and was to receive a 6 week course of antibiotics to be given via PICC.  He was started on Vancomycin as well while in the NH. During that admission CT was done for left knee pain and it revealed acute nondisplaced fibular neck impaction fracture. Ortho stated no surgical intervention for the knee. Patient states that he has chronic pain that is unchanged, but that his left hip has been giving him more pain.     In the ED Left Hip XR showed unchanged septic arthritis left hip status post explantation and antibiotics replacement.  XR of the Left Femur showed Left proximal femoral metaphyseal fracture with increased callus since prior consistent with partial healing, New acute or subacute distal left femoral metaphyseal fracture with 1.7 cm bony override and Diffuse osteopenia.  ESR was 64 and lactate was 2.8.  Ortho evaluated the patient and recommended Continue IV abx and follow up BCx, CRP. (12 Nov 2019 22:19)        OBJECTIVE  PAST MEDICAL & SURGICAL HISTORY  GERD (gastroesophageal reflux disease)  BPH (benign prostatic hyperplasia)  Myocardial infarct, old  ASHD (arteriosclerotic heart disease)  Depression  Anxiety  Diabetes  CIDP (chronic inflammatory demyelinating polyneuropathy)  History of cholecystectomy  History of total left hip replacement  History of total right hip replacement: bilateral  S/P CABG x 5    ALLERGIES:  penicillins (Unknown)      GENERAL: NAD, well-developed, AAOx3  HEENT:  Atraumatic, Normocephalic.  PULMONARY: Decreased BS bilaterally  CARDIOVASCULAR: Regular rate and rhythm  GASTROINTESTINAL: Soft, Nontender  EXTREMITIES:  +1 edema, cast on LLE,   NEUROLOGY: strength 1-5 LLE, no sensation below the knee. UE 4/5 b/l  SKIN: No rashes or lesions    ASSESSMENT & PLAN  49 year old M with PMHx of CIDP (followed by Dr. Mitchell on IVIG therapy), B/L hip replacements complicated by L hip septic arthritis, currently on Vancomycin and Cefepime, presenting from [facility] for persistently elevated ESR/CRP and concern for persistent infection.    #) B/L hip replacements complicated by L hip septic arthritis secondary to CoNS, pseudomonas aeruginosa s/pexplantation and antibiotic spacer  -afebrile, no WBC  -9/13 joint fluid cultures CoNS, pseudomonas  -Orthopedics recalled for Lt splint change.   -MRI of the left hip ordered. Needs MRI with anesthesia  -ESR/CRP persistently elevated. Cont to trend.  -Continue with Meropenem and continue Vancomycin for now as per ID.    -vanc trough 18.9. repeat after four more doses.  -f/up MRI.  If MRI shows fluid, ID recommends diagnostic tap.  -ortho and ID following  -blood cx 11/13 no growth    #) CIDP (chronic inflammatory demyelinating polyneuropathy)   -cont home medications  -neurology, Dr. Mitchell, following.  received IVIG yesterday.  -receives IVIG every 2 weeks    #Rt knee pain  -obtain XRs    #) BPH (benign prostatic hyperplasia)   -cont Flomax     #) Depression, Anxiety  -continue home medications    #) Diabetes Mellitus  -On home dose insulin    #) GERD (gastroesophageal reflux disease)   -cont PPI     #) CAD s/p CABG  -cont ASA, Plavix     DVT ppx: Lovenox   GI ppx: protonix  DASH/CC  Very high risk pt. Prognosis is guarded. Cont aggressive decub prevention and treatment protocols.    #Progress Note Handoff  Pending (specify):  Consults_________, Tests__MRI______,Medical stability___x______, PT___x_____  Pt/Family discussion: Pt informed and agrees with the current plan  Disposition: Home______/SNF__x_____/4A________/To be determined________/Waiting for Auth_____ .     35 minutes spent on total encounter; more than 50% of the visit was spent counseling and/or coordinating care by the attending physician.

## 2019-11-17 NOTE — PROGRESS NOTE ADULT - SUBJECTIVE AND OBJECTIVE BOX
NAPOLEON CAST 49y Male  MRN#: [id number]   CODE STATUS:___FULL_____      SUBJECTIVE  Patient is a 49y old Male who presents with a chief complaint of Septic Arthritis (17 Nov 2019 07:14)  Currently admitted to medicine with the primary diagnosis of Septic arthritis    Patient could not tolerate MRI with ativan, needs anesthesia.      OBJECTIVE  PAST MEDICAL & SURGICAL HISTORY  GERD (gastroesophageal reflux disease)  BPH (benign prostatic hyperplasia)  Myocardial infarct, old  ASHD (arteriosclerotic heart disease)  Depression  Anxiety  Diabetes  CIDP (chronic inflammatory demyelinating polyneuropathy)  History of cholecystectomy  History of total left hip replacement  History of total right hip replacement: bilateral  S/P CABG x 5    ALLERGIES:  penicillins (Unknown)    MEDICATIONS:  STANDING MEDICATIONS  ammonium lactate 12% Lotion 1 Application(s) Topical two times a day  aspirin  chewable 81 milliGRAM(s) Oral daily  atorvastatin 80 milliGRAM(s) Oral at bedtime  baclofen 10 milliGRAM(s) Oral two times a day  bisacodyl Suppository 10 milliGRAM(s) Rectal once  busPIRone 5 milliGRAM(s) Oral two times a day  carvedilol 3.125 milliGRAM(s) Oral every 12 hours  chlorhexidine 4% Liquid 1 Application(s) Topical <User Schedule>  clopidogrel Tablet 75 milliGRAM(s) Oral daily  dextrose 5%. 1000 milliLiter(s) IV Continuous <Continuous>  dextrose 50% Injectable 12.5 Gram(s) IV Push once  dextrose 50% Injectable 25 Gram(s) IV Push once  dextrose 50% Injectable 25 Gram(s) IV Push once  DULoxetine 60 milliGRAM(s) Oral two times a day  enoxaparin Injectable 40 milliGRAM(s) SubCutaneous daily  ergocalciferol 53297 Unit(s) Oral <User Schedule>  gabapentin 800 milliGRAM(s) Oral three times a day  insulin glargine Injectable (LANTUS) 27 Unit(s) SubCutaneous every morning  insulin lispro (HumaLOG) corrective regimen sliding scale   SubCutaneous three times a day before meals  insulin lispro Injectable (HumaLOG) 9 Unit(s) SubCutaneous before breakfast  insulin lispro Injectable (HumaLOG) 9 Unit(s) SubCutaneous before lunch  insulin lispro Injectable (HumaLOG) 9 Unit(s) SubCutaneous before dinner  melatonin 5 milliGRAM(s) Oral at bedtime  meropenem  IVPB 1000 milliGRAM(s) IV Intermittent every 8 hours  morphine ER Tablet 60 milliGRAM(s) Oral two times a day  multivitamin 1 Tablet(s) Oral daily  nystatin Powder 1 Application(s) Topical two times a day  pantoprazole    Tablet 40 milliGRAM(s) Oral before breakfast  pramipexole 0.5 milliGRAM(s) Oral every 12 hours  senna 2 Tablet(s) Oral at bedtime  tamsulosin 0.4 milliGRAM(s) Oral at bedtime  traZODone 50 milliGRAM(s) Oral at bedtime  vancomycin  IVPB 1000 milliGRAM(s) IV Intermittent every 12 hours    PRN MEDICATIONS  ALPRAZolam 1 milliGRAM(s) Oral every 6 hours PRN  bisacodyl 5 milliGRAM(s) Oral at bedtime PRN  dextrose 40% Gel 15 Gram(s) Oral once PRN  diclofenac 75 milliGRAM(s) Oral two times a day PRN  glucagon  Injectable 1 milliGRAM(s) IntraMuscular once PRN  hydrOXYzine  Oral Tab/Cap - Peds 50 milliGRAM(s) Oral at bedtime PRN  lactulose Syrup 20 Gram(s) Oral four times a day PRN  morphine  IR 15 milliGRAM(s) Oral every 6 hours PRN      VITAL SIGNS: Last 24 Hours  T(C): 36.8 (17 Nov 2019 05:26), Max: 37.8 (16 Nov 2019 21:00)  T(F): 98.3 (17 Nov 2019 05:26), Max: 100 (16 Nov 2019 21:00)  HR: 89 (17 Nov 2019 05:26) (80 - 89)  BP: 159/70 (17 Nov 2019 05:26) (121/66 - 159/70)  BP(mean): --  RR: 18 (17 Nov 2019 05:26) (18 - 18)  SpO2: 94% (16 Nov 2019 19:40) (94% - 94%)    LABS:                        11.1   7.86  )-----------( 178      ( 17 Nov 2019 05:35 )             35.0     11-17    135  |  92<L>  |  19  ----------------------------<  171<H>  4.7   |  28  |  0.8    Ca    8.9      17 Nov 2019 05:35  Mg     1.9     11-17                    RADIOLOGY:      PHYSICAL EXAM:  GENERAL: NAD, well-developed, AAOx3  HEENT:  Atraumatic, Normocephalic.  PULMONARY: Clear to auscultation bilaterally  CARDIOVASCULAR: Regular rate and rhythm  GASTROINTESTINAL: Soft, Nontender  EXTREMITIES:  +1 edema, cast on LLE, strength 1/5 LLE, no sensation below the knee.  NEUROLOGY:   SKIN: No rashes or lesions        ADMISSION SUMMARY    49 year old M with PMHx of CIDP (followed by Dr. Mitchell on IVIG therapy), B/L hip replacements complicated by L hip septic arthritis, currently on Vancomycin and Cefepime, presenting from [facility] for persistently elevated ESR/CRP and concern for persistent infection.    #) B/L hip replacements complicated by L hip septic arthritis secondary to CoNS, pseudomonas aeruginosa s/pexplantation and antibiotic spacer  -afebrile, no WBC  -9/13 joint fluid cultures CoNS, pseudomonas  -Orthopedics recalled for Lt splint change.   -MRI of the left hip ordered. Needs MRI with anesthesia. If MRI shows fluid, will get diagnostic tap.  -ESR/CRP persistently elevated. Cont to trend.  -Continue with Meropenem and continue Vancomycin for now as per ID.    -vanc trough 18.9- no need to repeat  -ortho and ID following  -blood cx 11/13 no growth    #) CIDP (chronic inflammatory demyelinating polyneuropathy)   -cont home medications  -neurology, Dr. Mitchell, following.  received IVIG yesterday.  -receives IVIG every 2 weeks    #Rt knee pain  -obtain XRs    #) BPH (benign prostatic hyperplasia)   -cont Flomax     #) Depression, Anxiety  -continue home medications    #) Diabetes Mellitus  -On home dose insulin    #) GERD (gastroesophageal reflux disease)   -cont PPI     #) CAD s/p CABG  -cont ASA, Plavix     DVT ppx: Lovenox   GI ppx: protonix  DASH/CC

## 2019-11-17 NOTE — PROGRESS NOTE ADULT - SUBJECTIVE AND OBJECTIVE BOX
NAPOLEON CAST 49y Male  MRN#: [id number]   CODE STATUS:__FULL______      SUBJECTIVE: No new complaints. Awaiting MRI Lt hip with anesthesia    ROS otherwise unremarkable    Patient is a 49y old Male who presents with a chief complaint of Septic Arthritis (15 Nov 2019 10:34)  Currently admitted to medicine with the primary diagnosis of Septic arthritis  .  HPI:  49 year old M with PMHx of CIDP (followed by Dr. Mitchell on IVIG therapy), B/L hip replacements complicated by L hip septic arthritis, currently on Vancomycin and Cefepime, presenting from [facility] for persistently elevated ESR/CRP and concern for persistent infection. Per notes ID physician and PMD discussed ESR/CRP and though patient should be admitted for bone Biopsy. He had left hip arthroplasty.   Also has LLE fractures from september hospitalization which is being followed by  ortho.  Patient is bedbound at baseline and has been for the past 5 years.  Patient was admitted in September for hip pain due to dislocation of his previous arthroplasty. During his hospital stay he developed RUE cellulitis treated with clindamycin. During that time workup revealed superolateral dislocation of the left hip and he is underwent explant of failed hip replacement and insertion of antibiotic spacer.  Intraoperative cultures grew pseudomonas and he was diagnosed with septic arthritis. Patient was started on cefepime and was to receive a 6 week course of antibiotics to be given via PICC.  He was started on Vancomycin as well while in the NH. During that admission CT was done for left knee pain and it revealed acute nondisplaced fibular neck impaction fracture. Ortho stated no surgical intervention for the knee. Patient states that he has chronic pain that is unchanged, but that his left hip has been giving him more pain.     In the ED Left Hip XR showed unchanged septic arthritis left hip status post explantation and antibiotics replacement.  XR of the Left Femur showed Left proximal femoral metaphyseal fracture with increased callus since prior consistent with partial healing, New acute or subacute distal left femoral metaphyseal fracture with 1.7 cm bony override and Diffuse osteopenia.  ESR was 64 and lactate was 2.8.  Ortho evaluated the patient and recommended Continue IV abx and follow up BCx, CRP. (12 Nov 2019 22:19)        OBJECTIVE  PAST MEDICAL & SURGICAL HISTORY  GERD (gastroesophageal reflux disease)  BPH (benign prostatic hyperplasia)  Myocardial infarct, old  ASHD (arteriosclerotic heart disease)  Depression  Anxiety  Diabetes  CIDP (chronic inflammatory demyelinating polyneuropathy)  History of cholecystectomy  History of total left hip replacement  History of total right hip replacement: bilateral  S/P CABG x 5    ALLERGIES:  penicillins (Unknown)      GENERAL: NAD, well-developed, AAOx3  HEENT:  Atraumatic, Normocephalic.  PULMONARY: Decreased BS bilaterally  CARDIOVASCULAR: Regular rate and rhythm  GASTROINTESTINAL: Soft, Nontender  EXTREMITIES:  +1 edema, cast on LLE,   NEUROLOGY: strength 1-5 LLE, no sensation below the knee. UE 4/5 b/l  SKIN: No rashes or lesions            MEDICATIONS  (STANDING):  ammonium lactate 12% Lotion 1 Application(s) Topical two times a day  aspirin  chewable 81 milliGRAM(s) Oral daily  atorvastatin 80 milliGRAM(s) Oral at bedtime  baclofen 10 milliGRAM(s) Oral two times a day  bisacodyl Suppository 10 milliGRAM(s) Rectal once  busPIRone 5 milliGRAM(s) Oral two times a day  carvedilol 3.125 milliGRAM(s) Oral every 12 hours  chlorhexidine 4% Liquid 1 Application(s) Topical <User Schedule>  clopidogrel Tablet 75 milliGRAM(s) Oral daily  dextrose 5%. 1000 milliLiter(s) (50 mL/Hr) IV Continuous <Continuous>  dextrose 50% Injectable 12.5 Gram(s) IV Push once  dextrose 50% Injectable 25 Gram(s) IV Push once  dextrose 50% Injectable 25 Gram(s) IV Push once  DULoxetine 60 milliGRAM(s) Oral two times a day  enoxaparin Injectable 40 milliGRAM(s) SubCutaneous daily  ergocalciferol 00975 Unit(s) Oral <User Schedule>  gabapentin 800 milliGRAM(s) Oral three times a day  insulin glargine Injectable (LANTUS) 27 Unit(s) SubCutaneous every morning  insulin lispro (HumaLOG) corrective regimen sliding scale   SubCutaneous three times a day before meals  insulin lispro Injectable (HumaLOG) 9 Unit(s) SubCutaneous before breakfast  insulin lispro Injectable (HumaLOG) 9 Unit(s) SubCutaneous before lunch  insulin lispro Injectable (HumaLOG) 9 Unit(s) SubCutaneous before dinner  melatonin 5 milliGRAM(s) Oral at bedtime  meropenem  IVPB 1000 milliGRAM(s) IV Intermittent every 8 hours  morphine ER Tablet 60 milliGRAM(s) Oral two times a day  multivitamin 1 Tablet(s) Oral daily  nystatin Powder 1 Application(s) Topical two times a day  pantoprazole    Tablet 40 milliGRAM(s) Oral before breakfast  pramipexole 0.5 milliGRAM(s) Oral every 12 hours  senna 2 Tablet(s) Oral at bedtime  tamsulosin 0.4 milliGRAM(s) Oral at bedtime  traZODone 50 milliGRAM(s) Oral at bedtime  vancomycin  IVPB 1000 milliGRAM(s) IV Intermittent every 12 hours    MEDICATIONS  (PRN):  ALPRAZolam 1 milliGRAM(s) Oral every 6 hours PRN anxiety  benzocaine 15 mG/menthol 3.6 mG (Sugar-Free) Lozenge 1 Lozenge Oral two times a day PRN Sore Throat  bisacodyl 5 milliGRAM(s) Oral at bedtime PRN Constipation  dextrose 40% Gel 15 Gram(s) Oral once PRN Blood Glucose LESS THAN 70 milliGRAM(s)/deciliter  diclofenac 75 milliGRAM(s) Oral two times a day PRN Moderate Pain (4 - 6)  glucagon  Injectable 1 milliGRAM(s) IntraMuscular once PRN Glucose LESS THAN 70 milligrams/deciliter  hydrOXYzine  Oral Tab/Cap - Peds 50 milliGRAM(s) Oral at bedtime PRN pruritis  lactulose Syrup 20 Gram(s) Oral four times a day PRN constipation  morphine  IR 15 milliGRAM(s) Oral every 6 hours PRN Severe Pain (7 - 10)    Home Medications:  Admelog SoloStar 100 units/mL injectable solution: 9 unit(s) injectable 3 times a day (12 Nov 2019 23:41)  ammonium lactate 12% topical lotion: Apply topically to affected area 2 times a day (12 Nov 2019 23:53)  aspirin 81 mg oral tablet, chewable: 1 tab(s) orally once a day (03 Oct 2019 07:52)  atorvastatin 80 mg oral tablet: 1 tab(s) orally once a day (at bedtime) (03 Oct 2019 07:52)  baclofen 10 mg oral tablet: 1 tab(s) orally 2 times a day (03 Oct 2019 07:52)  bisacodyl 5 mg oral delayed release tablet: 1 tab(s) orally once a day (at bedtime), As Needed (03 Oct 2019 07:52)  busPIRone 5 mg oral tablet: 1 tab(s) orally 2 times a day (03 Oct 2019 07:52)  carvedilol 3.125 mg oral tablet: 1 tab(s) orally every 12 hours (03 Oct 2019 07:52)  cefepime: 2 gram(s) intravenous every 8 hours  End date: 11/13 (12 Nov 2019 23:55)  clonazePAM 1 mg oral tablet: 1 tab(s) orally 2 times a day (12 Nov 2019 23:45)  clopidogrel 75 mg oral tablet: 1 tab(s) orally once a day (03 Oct 2019 07:52)  diclofenac sodium 75 mg oral delayed release tablet: 1 tab(s) orally 2 times a day, As needed, Moderate Pain (4 - 6) (03 Oct 2019 07:52)  DULoxetine 30 mg oral delayed release capsule: 2 cap(s) orally 2 times a day (12 Nov 2019 23:45)  enoxaparin: 40 milligram(s) subcutaneous once a day (03 Oct 2019 07:52)  Enulose 10 g/15 mL oral and rectal liquid: 30 milliliter(s) oral and rectal 3 times a day (12 Nov 2019 23:42)  Flomax 0.4 mg oral capsule: 1 cap(s) orally once a day (03 Oct 2019 07:52)  gabapentin 800 mg oral tablet: 1 tab(s) orally 3 times a day (03 Oct 2019 07:52)  hydrOXYzine hydrochloride 50 mg oral tablet: 1 tab(s) orally once a day (at bedtime) (03 Oct 2019 07:52)  insulin glargine: 30 unit(s) subcutaneously once a day (at bedtime) (12 Nov 2019 23:51)  melatonin 10 mg oral capsule: 1 cap(s) orally once a day (at bedtime) (12 Nov 2019 23:44)  morphine 60 mg/12 hours oral tablet, extended release: 1 tab(s) orally 2 times a day (12 Nov 2019 23:54)  Morphine IR 15 mg oral tablet: 1 tab(s) orally every 6 hours, As Needed (12 Nov 2019 23:48)  Multiple Vitamins oral tablet: 1 tab(s) orally once a day (12 Nov 2019 23:45)  nystatin 100,000 units/g topical cream:  topically twice daily to affected area (03 Oct 2019 07:52)  pantoprazole 40 mg oral delayed release tablet: 1 tab(s) orally once a day (before a meal) (03 Oct 2019 07:52)  pramipexole 0.5 mg oral tablet: 1 tab(s) orally every 12 hours (03 Oct 2019 07:52)  senna oral tablet: 2 tab(s) orally once a day (at bedtime) (03 Oct 2019 07:52)  traZODone 50 mg oral tablet: 1 tab(s) orally once a day (at bedtime) (12 Nov 2019 23:47)  Trintellix 5 mg oral tablet: 0.5 tab(s) orally once a day (12 Nov 2019 23:46)  vancomycin 1 g intravenous injection: intravenous 2 times a day until 11/13 (12 Nov 2019 23:56)  Vitamin B-100 oral tablet: 1 tab(s) orally once a day (12 Nov 2019 23:50)  Vitamin D2 50,000 intl units (1.25 mg) oral capsule: 1 cap(s) orally once a week on Mondays (12 Nov 2019 23:31)    Vital Signs Last 24 Hrs  T(C): 37.3 (17 Nov 2019 15:39), Max: 37.8 (16 Nov 2019 21:00)  T(F): 99.1 (17 Nov 2019 15:39), Max: 100 (16 Nov 2019 21:00)  HR: 80 (17 Nov 2019 15:39) (80 - 89)  BP: 110/58 (17 Nov 2019 15:39) (110/58 - 159/70)  BP(mean): --  RR: 19 (17 Nov 2019 15:39) (18 - 19)  SpO2: 94% (16 Nov 2019 19:40) (94% - 94%)  CAPILLARY BLOOD GLUCOSE      POCT Blood Glucose.: 188 mg/dL (17 Nov 2019 16:46)  POCT Blood Glucose.: 195 mg/dL (17 Nov 2019 11:48)  POCT Blood Glucose.: 199 mg/dL (17 Nov 2019 07:43)    LABS:                        11.1   7.86  )-----------( 178      ( 17 Nov 2019 05:35 )             35.0     11-17    135  |  92<L>  |  19  ----------------------------<  171<H>  4.7   |  28  |  0.8    Ca    8.9      17 Nov 2019 05:35  Mg     1.9     11-17                        Consultant Notes Reviewed:  [x ] YES  [ ] NO  Care Discussed with Consultants/Other Providers/ Housestaff [ x] YES  [ ] NO  Radiology, labs, new studies personally reviewed.                            ASSESSMENT & PLAN  49 year old M with PMHx of CIDP (followed by Dr. Mitchell on IVIG therapy), B/L hip replacements complicated by L hip septic arthritis, currently on Vancomycin and Cefepime, presenting from [facility] for persistently elevated ESR/CRP and concern for persistent infection.    #) B/L hip replacements complicated by L hip septic arthritis secondary to CoNS, pseudomonas aeruginosa s/p explantation and antibiotic spacer  -afebrile, no WBC  -9/13 joint fluid cultures CoNS, pseudomonas  -Orthopedics recalled for Lt splint change.   -MRI of the left hip ordered. Needs MRI with anesthesia  -ESR/CRP persistently elevated. Cont to trend.  -Continue with Meropenem and continue Vancomycin for now as per ID.    -vanc trough 18.9. repeat after four more doses.  -f/up MRI.  If MRI shows fluid, ID recommends diagnostic tap.  -ortho and ID following  -blood cx 11/13 no growth    #) CIDP (chronic inflammatory demyelinating polyneuropathy)   -cont home medications  -neurology, Dr. Mitchell, following.  received IVIG on 11/13 or 11/14  -receives IVIG every 2 weeks    #Lt distal femur fx with splint  -awaiting ortho f/u for splint change as per pt    #Rt knee pain  -XRs unremarkable    #) BPH (benign prostatic hyperplasia)   -cont Flomax     #) Depression, Anxiety  -continue home medications    #) Diabetes Mellitus  -On home dose insulin    #) GERD (gastroesophageal reflux disease)   -cont PPI     #) CAD s/p CABG  -cont ASA, Plavix     DVT ppx: Lovenox   GI ppx: protonix  DASH/CC  Very high risk pt. Prognosis is guarded. Cont aggressive decub prevention and treatment protocols.    #Progress Note Handoff  Pending (specify):  Consults_________, Tests__MRI______,Medical stability___x______, PT___x_____  Pt/Family discussion: Pt informed and agrees with the current plan  Disposition: Home______/SNF__x_____/4A________/To be determined________/Waiting for Auth_____ .     35 minutes spent on total encounter; more than 50% of the visit was spent counseling and/or coordinating care by the attending physician.

## 2019-11-17 NOTE — PROGRESS NOTE ADULT - SUBJECTIVE AND OBJECTIVE BOX
Orthopaedic Surgery Progress Note    NAPOLEON CAST  [id number]    Pt S&E at bedside w/ attending.          T(C): 37.4 (11-17-19 @ 21:24), Max: 37.4 (11-17-19 @ 21:24)  HR: 91 (11-17-19 @ 21:24) (80 - 91)  BP: 134/72 (11-17-19 @ 21:24) (110/58 - 159/70)  RR: 19 (11-17-19 @ 15:39) (18 - 19)  SpO2: 94% (11-17-19 @ 20:21) (94% - 94%)    Physical Exam  NAD, AAOx3  Breathing comfortably on RA  Resting comfortably  RLE  Moderate swelling noted  Ankle flexion contracture, cavus foot  LLE  Incision intact  No erythema/warmth  New splint applied to LLE  Moderate swelling noted      Labs                        11.1   7.86  )-----------( 178      ( 17 Nov 2019 05:35 )             35.0     11-17    135  |  92<L>  |  19  ----------------------------<  171<H>  4.7   |  28  |  0.8    Ca    8.9      17 Nov 2019 05:35  Mg     1.9     11-17        A/P:   50yo Male w/ chronic left ОЛЬГА infection s/p L ОЛЬГА explantation and girdlestone procedure in September 2019  Also has left distal femur fracture being treated in splint    No sign of sepsis at this time  No hardware present in L hip  Continue IV abx per  New splint applied to LLE today; will be in place for approximately 2 weeks, will obtain xrays then  Plan discussed w/ pt today w/ attending surgeon present

## 2019-11-17 NOTE — PROGRESS NOTE ADULT - SUBJECTIVE AND OBJECTIVE BOX
NAPOLEON CAST  49y, Male    All available historical data reviewed    OVERNIGHT EVENTS:  low grade fevers    ROS:  General: Denies rigors, night sweats  HEENT: Denies headache, rhinorrhea, sore throat, eye pain  CV: Denies CP, palpitations  PULM: Denies wheezing, hemoptysis  GI: Denies hematemesis, hematochezia, melena  : Denies discharge, hematuria  MSK: + pain Rajan bilaterally  SKIN: Denies rash, lesions  NEURO: + weakness  PSYCH: + depression, + anxiety    VITALS:  T(F): 98.3, Max: 100 (11-16-19 @ 21:00)  HR: 89  BP: 159/70  RR: 18Vital Signs Last 24 Hrs  T(C): 36.8 (17 Nov 2019 05:26), Max: 37.8 (16 Nov 2019 21:00)  T(F): 98.3 (17 Nov 2019 05:26), Max: 100 (16 Nov 2019 21:00)  HR: 89 (17 Nov 2019 05:26) (80 - 89)  BP: 159/70 (17 Nov 2019 05:26) (121/66 - 159/70)  BP(mean): --  RR: 18 (17 Nov 2019 05:26) (18 - 18)  SpO2: 94% (16 Nov 2019 19:40) (94% - 94%)    TESTS & MEASUREMENTS:                        10.7   6.30  )-----------( 166      ( 16 Nov 2019 06:51 )             33.2     11-16    136  |  94<L>  |  20  ----------------------------<  184<H>  4.5   |  30  |  0.7    Ca    8.9      16 Nov 2019 06:51  Mg     2.0     11-16          Culture - Blood (collected 11-13-19 @ 08:09)  Source: .Blood None  Preliminary Report (11-15-19 @ 01:02):    No growth to date.    Culture - Blood (collected 11-12-19 @ 20:30)  Source: .Blood Blood  Preliminary Report (11-14-19 @ 04:01):    No growth to date.            RADIOLOGY & ADDITIONAL TESTS:  Personal review of radiological diagnostics performed  Echo and EKG results noted when applicable.     ANTIBIOTICS:  meropenem  IVPB 1000 milliGRAM(s) IV Intermittent every 8 hours  vancomycin  IVPB 1000 milliGRAM(s) IV Intermittent every 12 hours

## 2019-11-18 NOTE — PROGRESS NOTE ADULT - SUBJECTIVE AND OBJECTIVE BOX
NAPOLEON CAST 49y Male  MRN#: [id number]   CODE STATUS:___FULL_____      SUBJECTIVE  Patient is a 49y old Male who presents with a chief complaint of Septic Arthritis (17 Nov 2019 17:10)  Currently admitted to medicine with the primary diagnosis of Septic arthritis    NPO, scheduled for MRI with anesthesia today.      OBJECTIVE  PAST MEDICAL & SURGICAL HISTORY  GERD (gastroesophageal reflux disease)  BPH (benign prostatic hyperplasia)  Myocardial infarct, old  ASHD (arteriosclerotic heart disease)  Depression  Anxiety  Diabetes  CIDP (chronic inflammatory demyelinating polyneuropathy)  History of cholecystectomy  History of total left hip replacement  History of total right hip replacement: bilateral  S/P CABG x 5    ALLERGIES:  penicillins (Unknown)    MEDICATIONS:  STANDING MEDICATIONS  ammonium lactate 12% Lotion 1 Application(s) Topical two times a day  aspirin  chewable 81 milliGRAM(s) Oral daily  atorvastatin 80 milliGRAM(s) Oral at bedtime  baclofen 10 milliGRAM(s) Oral two times a day  busPIRone 5 milliGRAM(s) Oral two times a day  carvedilol 3.125 milliGRAM(s) Oral every 12 hours  chlorhexidine 4% Liquid 1 Application(s) Topical <User Schedule>  clopidogrel Tablet 75 milliGRAM(s) Oral daily  dextrose 5%. 1000 milliLiter(s) IV Continuous <Continuous>  dextrose 50% Injectable 12.5 Gram(s) IV Push once  dextrose 50% Injectable 25 Gram(s) IV Push once  dextrose 50% Injectable 25 Gram(s) IV Push once  DULoxetine 60 milliGRAM(s) Oral two times a day  enoxaparin Injectable 40 milliGRAM(s) SubCutaneous daily  ergocalciferol 38704 Unit(s) Oral <User Schedule>  gabapentin 800 milliGRAM(s) Oral three times a day  insulin glargine Injectable (LANTUS) 27 Unit(s) SubCutaneous every morning  insulin lispro (HumaLOG) corrective regimen sliding scale   SubCutaneous three times a day before meals  insulin lispro Injectable (HumaLOG) 9 Unit(s) SubCutaneous before breakfast  insulin lispro Injectable (HumaLOG) 9 Unit(s) SubCutaneous before lunch  insulin lispro Injectable (HumaLOG) 9 Unit(s) SubCutaneous before dinner  melatonin 5 milliGRAM(s) Oral at bedtime  meropenem  IVPB 1000 milliGRAM(s) IV Intermittent every 8 hours  morphine ER Tablet 60 milliGRAM(s) Oral two times a day  multivitamin 1 Tablet(s) Oral daily  nystatin Powder 1 Application(s) Topical two times a day  pantoprazole    Tablet 40 milliGRAM(s) Oral before breakfast  pramipexole 0.5 milliGRAM(s) Oral every 12 hours  senna 2 Tablet(s) Oral at bedtime  tamsulosin 0.4 milliGRAM(s) Oral at bedtime  traZODone 50 milliGRAM(s) Oral at bedtime  vancomycin  IVPB 1000 milliGRAM(s) IV Intermittent every 12 hours    PRN MEDICATIONS  ALPRAZolam 1 milliGRAM(s) Oral every 6 hours PRN  benzocaine 15 mG/menthol 3.6 mG (Sugar-Free) Lozenge 1 Lozenge Oral two times a day PRN  bisacodyl 5 milliGRAM(s) Oral at bedtime PRN  dextrose 40% Gel 15 Gram(s) Oral once PRN  diclofenac 75 milliGRAM(s) Oral two times a day PRN  glucagon  Injectable 1 milliGRAM(s) IntraMuscular once PRN  hydrOXYzine  Oral Tab/Cap - Peds 50 milliGRAM(s) Oral at bedtime PRN  lactulose Syrup 20 Gram(s) Oral four times a day PRN  morphine  IR 15 milliGRAM(s) Oral every 6 hours PRN      VITAL SIGNS: Last 24 Hours  T(C): 36.9 (18 Nov 2019 13:04), Max: 37.4 (17 Nov 2019 21:24)  T(F): 98.5 (18 Nov 2019 13:04), Max: 99.4 (17 Nov 2019 21:24)  HR: 83 (18 Nov 2019 13:04) (80 - 91)  BP: 97/54 (18 Nov 2019 13:04) (97/54 - 134/72)  BP(mean): --  RR: 18 (18 Nov 2019 13:04) (18 - 19)  SpO2: 94% (17 Nov 2019 20:21) (94% - 94%)    LABS:                        12.2   6.92  )-----------( 207      ( 18 Nov 2019 04:57 )             37.6     11-18    137  |  93<L>  |  22<H>  ----------------------------<  178<H>  4.5   |  32  |  0.8    Ca    9.5      18 Nov 2019 04:57  Mg     1.8     11-18                    RADIOLOGY:      PHYSICAL EXAM:  GENERAL: NAD, well-developed, AAOx3  HEENT:  Atraumatic, Normocephalic.  PULMONARY: Clear to auscultation bilaterally  CARDIOVASCULAR: Regular rate and rhythm  GASTROINTESTINAL: Soft, Nontender  EXTREMITIES:  +1 edema, cast on LLE, strength 1/5 LLE, no sensation below the knee.  NEUROLOGY:   SKIN: No rashes or lesions        ADMISSION SUMMARY    49 year old M with PMHx of CIDP (followed by Dr. Mitchell on IVIG therapy), B/L hip replacements complicated by L hip septic arthritis, currently on Vancomycin and Cefepime, presenting from [facility] for persistently elevated ESR/CRP and concern for persistent infection.    #) B/L hip replacements complicated by L hip septic arthritis secondary to CoNS, pseudomonas aeruginosa s/p explantation and antibiotic spacer  -afebrile, no WBC  -9/13 joint fluid cultures CoNS, pseudomonas  -Orthopedics recalled for Lt splint change (new splint 11/17)- will keep for 2 weeks then xrays as per ortho  -MRI of the left hip ordered. Needs MRI with anesthesia. If MRI shows fluid, will get diagnostic tap.  -ESR/CRP persistently elevated. Cont to trend.  -Continue with Meropenem and continue Vancomycin for now as per ID.    -vanc trough 18.9- no need to repeat  -ortho and ID following  -blood cx 11/13 no growth    #) CIDP (chronic inflammatory demyelinating polyneuropathy)   -cont home medications  -neurology, Dr. Mitchell, following.  received IVIG yesterday.  -receives IVIG every 2 weeks    #Rt knee pain  -obtain XRs    #) BPH (benign prostatic hyperplasia)   -cont Flomax     #) Depression, Anxiety  -continue home medications    #) Diabetes Mellitus  -On home dose insulin    #) GERD (gastroesophageal reflux disease)   -cont PPI     #) CAD s/p CABG  -cont ASA, Plavix     DVT ppx: Lovenox   GI ppx: protonix  DASH/CC  Pending MRI, iv abx

## 2019-11-19 NOTE — PROGRESS NOTE ADULT - SUBJECTIVE AND OBJECTIVE BOX
NAPOLEON CAST  49y, Male    All available historical data reviewed    OVERNIGHT EVENTS:  no fevers, clinically no change    ROS:  General: Denies rigors, night sweats  HEENT: Denies headache, rhinorrhea, sore throat, eye pain  CV: Denies CP, palpitations  PULM: Denies wheezing, hemoptysis  GI: Denies hematemesis, hematochezia, melena  : Denies discharge, hematuria  MSK: pain Rajan bilaterally  SKIN: Denies rash, lesions  NEURO: Denies paresthesias, weakness  PSYCH: + depression, + anxiety    VITALS:  T(F): 96.6, Max: 99 (11-18-19 @ 22:56)  HR: 82  BP: 138/63  RR: 18Vital Signs Last 24 Hrs  T(C): 35.9 (19 Nov 2019 05:53), Max: 37.2 (18 Nov 2019 20:20)  T(F): 96.6 (19 Nov 2019 05:53), Max: 99 (18 Nov 2019 22:56)  HR: 82 (19 Nov 2019 05:53) (82 - 86)  BP: 138/63 (19 Nov 2019 05:53) (97/54 - 138/63)  BP(mean): --  RR: 18 (19 Nov 2019 05:53) (18 - 20)  SpO2: 98% (18 Nov 2019 20:20) (98% - 98%)    TESTS & MEASUREMENTS:                        11.3   7.89  )-----------( 221      ( 19 Nov 2019 06:22 )             35.1     11-18    137  |  93<L>  |  22<H>  ----------------------------<  178<H>  4.5   |  32  |  0.8    Ca    9.5      18 Nov 2019 04:57  Mg     1.8     11-18          Culture - Blood (collected 11-13-19 @ 08:09)  Source: .Blood None  Final Report (11-19-19 @ 01:01):    No growth at 5 days.    Culture - Blood (collected 11-12-19 @ 20:30)  Source: .Blood Blood  Final Report (11-18-19 @ 04:00):    No growth at 5 days.            RADIOLOGY & ADDITIONAL TESTS:  Personal review of radiological diagnostics performed  Echo and EKG results noted when applicable.     ANTIBIOTICS:  meropenem  IVPB 1000 milliGRAM(s) IV Intermittent every 8 hours  vancomycin  IVPB 1000 milliGRAM(s) IV Intermittent every 12 hours

## 2019-11-19 NOTE — DIETITIAN INITIAL EVALUATION ADULT. - OTHER INFO
Pt admitted for management of L hip septic arthritis s/p b/l hip replacements. Pending MRI results. Noted hx DM, GERD, CAD s/p CABG.

## 2019-11-19 NOTE — DIETITIAN INITIAL EVALUATION ADULT. - ENERGY NEEDS
estimated energy needs:2120-2300kcal (MSJx1.2-1.3AF)  estimated protein needs: 89-107g (1.0-1.2g/kg) lean towards lower end  estimated fluid needS: 1ml/kcal

## 2019-11-19 NOTE — PROGRESS NOTE ADULT - SUBJECTIVE AND OBJECTIVE BOX
NAPOLEON CAST 49y Male  MRN#: [id number]   CODE STATUS:___FULL_____      SUBJECTIVE  Patient is a 49y old Male who presents with a chief complaint of Septic Arthritis (19 Nov 2019 14:00)  Currently admitted to medicine with the primary diagnosis of Septic arthritis      MRI completed, will have arthrocentesis with IR tomorrow. NPO after midnight.    OBJECTIVE  PAST MEDICAL & SURGICAL HISTORY  GERD (gastroesophageal reflux disease)  BPH (benign prostatic hyperplasia)  Myocardial infarct, old  ASHD (arteriosclerotic heart disease)  Depression  Anxiety  Diabetes  CIDP (chronic inflammatory demyelinating polyneuropathy)  History of cholecystectomy  History of total left hip replacement  History of total right hip replacement: bilateral  S/P CABG x 5    ALLERGIES:  penicillins (Unknown)  strawberry (Hives)    MEDICATIONS:  STANDING MEDICATIONS  ammonium lactate 12% Lotion 1 Application(s) Topical two times a day  aspirin  chewable 81 milliGRAM(s) Oral daily  atorvastatin 80 milliGRAM(s) Oral at bedtime  baclofen 10 milliGRAM(s) Oral two times a day  busPIRone 5 milliGRAM(s) Oral two times a day  carvedilol 3.125 milliGRAM(s) Oral every 12 hours  chlorhexidine 4% Liquid 1 Application(s) Topical <User Schedule>  clopidogrel Tablet 75 milliGRAM(s) Oral daily  dextrose 5%. 1000 milliLiter(s) IV Continuous <Continuous>  dextrose 50% Injectable 12.5 Gram(s) IV Push once  dextrose 50% Injectable 25 Gram(s) IV Push once  dextrose 50% Injectable 25 Gram(s) IV Push once  DULoxetine 60 milliGRAM(s) Oral two times a day  enoxaparin Injectable 40 milliGRAM(s) SubCutaneous daily  ergocalciferol 91171 Unit(s) Oral <User Schedule>  gabapentin 800 milliGRAM(s) Oral three times a day  insulin glargine Injectable (LANTUS) 27 Unit(s) SubCutaneous every morning  insulin lispro (HumaLOG) corrective regimen sliding scale   SubCutaneous three times a day before meals  insulin lispro Injectable (HumaLOG) 9 Unit(s) SubCutaneous before breakfast  insulin lispro Injectable (HumaLOG) 9 Unit(s) SubCutaneous before lunch  insulin lispro Injectable (HumaLOG) 9 Unit(s) SubCutaneous before dinner  melatonin 5 milliGRAM(s) Oral at bedtime  meropenem  IVPB 1000 milliGRAM(s) IV Intermittent every 8 hours  morphine ER Tablet 60 milliGRAM(s) Oral two times a day  multivitamin 1 Tablet(s) Oral daily  nystatin Powder 1 Application(s) Topical two times a day  pantoprazole    Tablet 40 milliGRAM(s) Oral before breakfast  pramipexole 0.5 milliGRAM(s) Oral every 12 hours  senna 2 Tablet(s) Oral at bedtime  tamsulosin 0.4 milliGRAM(s) Oral at bedtime  traZODone 50 milliGRAM(s) Oral at bedtime  vancomycin  IVPB 1000 milliGRAM(s) IV Intermittent every 12 hours    PRN MEDICATIONS  ALPRAZolam 1 milliGRAM(s) Oral every 6 hours PRN  benzocaine 15 mG/menthol 3.6 mG (Sugar-Free) Lozenge 1 Lozenge Oral two times a day PRN  bisacodyl 5 milliGRAM(s) Oral at bedtime PRN  dextrose 40% Gel 15 Gram(s) Oral once PRN  diclofenac 75 milliGRAM(s) Oral two times a day PRN  glucagon  Injectable 1 milliGRAM(s) IntraMuscular once PRN  hydrOXYzine  Oral Tab/Cap - Peds 50 milliGRAM(s) Oral at bedtime PRN  hydrOXYzine hydrochloride 25 milliGRAM(s) Oral every 6 hours PRN  lactulose Syrup 20 Gram(s) Oral four times a day PRN  morphine  IR 15 milliGRAM(s) Oral every 6 hours PRN      VITAL SIGNS: Last 24 Hours  T(C): 37.6 (19 Nov 2019 14:41), Max: 37.6 (19 Nov 2019 14:41)  T(F): 99.6 (19 Nov 2019 14:41), Max: 99.6 (19 Nov 2019 14:41)  HR: 94 (19 Nov 2019 14:41) (82 - 94)  BP: 121/79 (19 Nov 2019 14:41) (121/79 - 138/63)  BP(mean): --  RR: 18 (19 Nov 2019 14:41) (18 - 20)  SpO2: 98% (18 Nov 2019 20:20) (98% - 98%)    LABS:                        11.3   7.89  )-----------( 221      ( 19 Nov 2019 06:22 )             35.1     11-19    133<L>  |  91<L>  |  24<H>  ----------------------------<  219<H>  4.8   |  29  |  0.9    Ca    9.1      19 Nov 2019 06:22  Mg     1.8     11-19                    RADIOLOGY:      PHYSICAL EXAM:  GENERAL: NAD, well-developed, AAOx3  HEENT:  Atraumatic, Normocephalic.  PULMONARY: Clear to auscultation bilaterally  CARDIOVASCULAR: Regular rate and rhythm  GASTROINTESTINAL: Soft, Nontender  EXTREMITIES:  +1 edema, cast on LLE, strength 1/5 LLE, no sensation below the knee.  NEUROLOGY:   SKIN: No rashes or lesions        ADMISSION SUMMARY    49 year old M with PMHx of CIDP (followed by Dr. Mitchell on IVIG therapy), B/L hip replacements complicated by L hip septic arthritis, currently on Vancomycin and Cefepime, presenting from [facility] for persistently elevated ESR/CRP and concern for persistent infection.    #) B/L hip replacements complicated by L hip septic arthritis secondary to CoNS, pseudomonas aeruginosa s/p explantation and antibiotic spacer  -afebrile, no WBC  -9/13 joint fluid cultures CoNS, pseudomonas  -Orthopedics recalled for Lt splint change (new splint 11/17)- will keep for 2 weeks then xrays as per ortho  -MRI of the left hip ordered:   Findingsconsistent with sequela of septic arthritis of the left hip,   with an enhancing phlegmon measuring 7 x 5 x 16 cm surrounding the   proximal left femur, with small amount of drainable fluid collection,   contiguous with the lateral proximal thigh subcutaneous 10 cm collection.   The differential includes an abscess versus a seroma. Consider tissue   sampling/aspiration.  -joint aspiration with IR tomorrow. NPO after midnight. OK to cont aspirin/plavix/dvt ppx.   -ESR/CRP persistently elevated. Cont to trend.  -Continue with Meropenem and continue Vancomycin for now as per ID.    -vanc trough 18.9- no need to repeat  -ortho and ID following  -blood cx 11/13 no growth    #) CIDP (chronic inflammatory demyelinating polyneuropathy)   -cont home medications  -neurology, Dr. Mitchell, following.  received IVIG yesterday.  -receives IVIG every 2 weeks    #Rt knee pain  -obtain XRs    #) BPH (benign prostatic hyperplasia)   -cont Flomax     #) Depression, Anxiety  -continue home medications    #) Diabetes Mellitus  -On home dose insulin    #) GERD (gastroesophageal reflux disease)   -cont PPI     #) CAD s/p CABG  -cont ASA, Plavix     DVT ppx: Lovenox   GI ppx: protonix  DASH/CC  Pending arthrocentesis NAPOLEON CAST 49y Male  MRN#: [id number]   CODE STATUS:___FULL_____      SUBJECTIVE  Patient is a 49y old Male who presents with a chief complaint of Septic Arthritis (19 Nov 2019 14:00)  Currently admitted to medicine with the primary diagnosis of Septic arthritis      MRI completed, will have arthrocentesis with IR tomorrow. NPO after midnight.    OBJECTIVE  PAST MEDICAL & SURGICAL HISTORY  GERD (gastroesophageal reflux disease)  BPH (benign prostatic hyperplasia)  Myocardial infarct, old  ASHD (arteriosclerotic heart disease)  Depression  Anxiety  Diabetes  CIDP (chronic inflammatory demyelinating polyneuropathy)  History of cholecystectomy  History of total left hip replacement  History of total right hip replacement: bilateral  S/P CABG x 5    ALLERGIES:  penicillins (Unknown)  strawberry (Hives)    MEDICATIONS:  STANDING MEDICATIONS  ammonium lactate 12% Lotion 1 Application(s) Topical two times a day  aspirin  chewable 81 milliGRAM(s) Oral daily  atorvastatin 80 milliGRAM(s) Oral at bedtime  baclofen 10 milliGRAM(s) Oral two times a day  busPIRone 5 milliGRAM(s) Oral two times a day  carvedilol 3.125 milliGRAM(s) Oral every 12 hours  chlorhexidine 4% Liquid 1 Application(s) Topical <User Schedule>  clopidogrel Tablet 75 milliGRAM(s) Oral daily  dextrose 5%. 1000 milliLiter(s) IV Continuous <Continuous>  dextrose 50% Injectable 12.5 Gram(s) IV Push once  dextrose 50% Injectable 25 Gram(s) IV Push once  dextrose 50% Injectable 25 Gram(s) IV Push once  DULoxetine 60 milliGRAM(s) Oral two times a day  enoxaparin Injectable 40 milliGRAM(s) SubCutaneous daily  ergocalciferol 04605 Unit(s) Oral <User Schedule>  gabapentin 800 milliGRAM(s) Oral three times a day  insulin glargine Injectable (LANTUS) 27 Unit(s) SubCutaneous every morning  insulin lispro (HumaLOG) corrective regimen sliding scale   SubCutaneous three times a day before meals  insulin lispro Injectable (HumaLOG) 9 Unit(s) SubCutaneous before breakfast  insulin lispro Injectable (HumaLOG) 9 Unit(s) SubCutaneous before lunch  insulin lispro Injectable (HumaLOG) 9 Unit(s) SubCutaneous before dinner  melatonin 5 milliGRAM(s) Oral at bedtime  meropenem  IVPB 1000 milliGRAM(s) IV Intermittent every 8 hours  morphine ER Tablet 60 milliGRAM(s) Oral two times a day  multivitamin 1 Tablet(s) Oral daily  nystatin Powder 1 Application(s) Topical two times a day  pantoprazole    Tablet 40 milliGRAM(s) Oral before breakfast  pramipexole 0.5 milliGRAM(s) Oral every 12 hours  senna 2 Tablet(s) Oral at bedtime  tamsulosin 0.4 milliGRAM(s) Oral at bedtime  traZODone 50 milliGRAM(s) Oral at bedtime  vancomycin  IVPB 1000 milliGRAM(s) IV Intermittent every 12 hours    PRN MEDICATIONS  ALPRAZolam 1 milliGRAM(s) Oral every 6 hours PRN  benzocaine 15 mG/menthol 3.6 mG (Sugar-Free) Lozenge 1 Lozenge Oral two times a day PRN  bisacodyl 5 milliGRAM(s) Oral at bedtime PRN  dextrose 40% Gel 15 Gram(s) Oral once PRN  diclofenac 75 milliGRAM(s) Oral two times a day PRN  glucagon  Injectable 1 milliGRAM(s) IntraMuscular once PRN  hydrOXYzine  Oral Tab/Cap - Peds 50 milliGRAM(s) Oral at bedtime PRN  hydrOXYzine hydrochloride 25 milliGRAM(s) Oral every 6 hours PRN  lactulose Syrup 20 Gram(s) Oral four times a day PRN  morphine  IR 15 milliGRAM(s) Oral every 6 hours PRN      VITAL SIGNS: Last 24 Hours  T(C): 37.6 (19 Nov 2019 14:41), Max: 37.6 (19 Nov 2019 14:41)  T(F): 99.6 (19 Nov 2019 14:41), Max: 99.6 (19 Nov 2019 14:41)  HR: 94 (19 Nov 2019 14:41) (82 - 94)  BP: 121/79 (19 Nov 2019 14:41) (121/79 - 138/63)  BP(mean): --  RR: 18 (19 Nov 2019 14:41) (18 - 20)  SpO2: 98% (18 Nov 2019 20:20) (98% - 98%)    LABS:                        11.3   7.89  )-----------( 221      ( 19 Nov 2019 06:22 )             35.1     11-19    133<L>  |  91<L>  |  24<H>  ----------------------------<  219<H>  4.8   |  29  |  0.9    Ca    9.1      19 Nov 2019 06:22  Mg     1.8     11-19                    RADIOLOGY:      PHYSICAL EXAM:  GENERAL: NAD, well-developed, AAOx3  HEENT:  Atraumatic, Normocephalic.  PULMONARY: Clear to auscultation bilaterally  CARDIOVASCULAR: Regular rate and rhythm  GASTROINTESTINAL: Soft, Nontender  EXTREMITIES:  +1 edema, cast on LLE, strength 1/5 LLE, no sensation below the knee.  NEUROLOGY: 0/5 LE b/l  SKIN: No rashes or lesions        ADMISSION SUMMARY    49 year old M with PMHx of CIDP (followed by Dr. Mitchell on IVIG therapy), B/L hip replacements complicated by L hip septic arthritis, currently on Vancomycin and Cefepime, presenting from [facility] for persistently elevated ESR/CRP and concern for persistent infection.    #) B/L hip replacements complicated by L hip septic arthritis secondary to CoNS, pseudomonas aeruginosa s/p explantation and antibiotic spacer  -afebrile, no WBC  -9/13 joint fluid cultures CoNS, pseudomonas  -Orthopedics recalled for Lt splint change (new splint 11/17)- will keep for 2 weeks then xrays as per ortho  -MRI of the left hip ordered:   Findingsconsistent with sequela of septic arthritis of the left hip,   with an enhancing phlegmon measuring 7 x 5 x 16 cm surrounding the   proximal left femur, with small amount of drainable fluid collection,   contiguous with the lateral proximal thigh subcutaneous 10 cm collection.   The differential includes an abscess versus a seroma. Consider tissue   sampling/aspiration.  -joint aspiration with IR tomorrow. NPO after midnight. OK to cont aspirin/plavix/dvt ppx.   -ESR/CRP persistently elevated. Cont to trend.  -Continue with Meropenem and continue Vancomycin for now as per ID.    -vanc trough 18.9- no need to repeat  -ortho and ID following  -blood cx 11/13 no growth    #) CIDP (chronic inflammatory demyelinating polyneuropathy)   -cont home medications  -neurology, Dr. Mitchell, following.  received IVIG yesterday.  -receives IVIG every 2 weeks    #Rt knee pain  -obtain XRs    #) BPH (benign prostatic hyperplasia)   -cont Flomax     #) Depression, Anxiety  -continue home medications    #) Diabetes Mellitus  -On home dose insulin    #) GERD (gastroesophageal reflux disease)   -cont PPI     #) CAD s/p CABG  -cont ASA, Plavix     DVT ppx: Lovenox   GI ppx: protonix  DASH/CC  Pending arthrocentesis

## 2019-11-19 NOTE — CONSULT NOTE ADULT - SUBJECTIVE AND OBJECTIVE BOX
INTERVENTIONAL RADIOLOGY CONSULT:     Procedure Requested: hip aspiration - diagnostic     HPI:  49 year old M with PMHx of CIDP (followed by Dr. Mitchell on IVIG therapy), B/L hip replacements complicated by L hip septic arthritis, currently on Vancomycin and Cefepime, presenting from [facility] for persistently elevated ESR/CRP and concern for persistent infection. Per notes ID physician and PMD discussed ESR/CRP and though patient should be admitted for bone Biopsy. He had left hip arthroplasty.   Also has LLE fractures from september hospitalization which is being followed by  ortho.  Patient is bedbound at baseline and has been for the past 5 years.  Patient was admitted in September for hip pain due to dislocation of his previous arthroplasty. During his hospital stay he developed RUE cellulitis treated with clindamycin. During that time workup revealed superolateral dislocation of the left hip and he is underwent explant of failed hip replacement and insertion of antibiotic spacer.  Intraoperative cultures grew pseudomonas and he was diagnosed with septic arthritis. Patient was started on cefepime and was to receive a 6 week course of antibiotics to be given via PICC.  He was started on Vancomycin as well while in the NH. During that admission CT was done for left knee pain and it revealed acute nondisplaced fibular neck impaction fracture. Ortho stated no surgical intervention for the knee. Patient states that he has chronic pain that is unchanged, but that his left hip has been giving him more pain.     In the ED Left Hip XR showed unchanged septic arthritis left hip status post explantation and antibiotics replacement.  XR of the Left Femur showed Left proximal femoral metaphyseal fracture with increased callus since prior consistent with partial healing, New acute or subacute distal left femoral metaphyseal fracture with 1.7 cm bony override and Diffuse osteopenia.  ESR was 64 and lactate was 2.8.  Ortho evaluated the patient and recommended Continue IV abx and follow up BCx, CRP. (12 Nov 2019 22:19)      PAST MEDICAL & SURGICAL HISTORY:  GERD (gastroesophageal reflux disease)  BPH (benign prostatic hyperplasia)  Myocardial infarct, old  ASHD (arteriosclerotic heart disease)  Depression  Anxiety  Diabetes  CIDP (chronic inflammatory demyelinating polyneuropathy)  History of cholecystectomy  History of total left hip replacement  History of total right hip replacement: bilateral  S/P CABG x 5      MEDICATIONS  (STANDING):  ammonium lactate 12% Lotion 1 Application(s) Topical two times a day  aspirin  chewable 81 milliGRAM(s) Oral daily  atorvastatin 80 milliGRAM(s) Oral at bedtime  baclofen 10 milliGRAM(s) Oral two times a day  busPIRone 5 milliGRAM(s) Oral two times a day  carvedilol 3.125 milliGRAM(s) Oral every 12 hours  chlorhexidine 4% Liquid 1 Application(s) Topical <User Schedule>  clopidogrel Tablet 75 milliGRAM(s) Oral daily  dextrose 5%. 1000 milliLiter(s) (50 mL/Hr) IV Continuous <Continuous>  dextrose 50% Injectable 12.5 Gram(s) IV Push once  dextrose 50% Injectable 25 Gram(s) IV Push once  dextrose 50% Injectable 25 Gram(s) IV Push once  DULoxetine 60 milliGRAM(s) Oral two times a day  enoxaparin Injectable 40 milliGRAM(s) SubCutaneous daily  ergocalciferol 68583 Unit(s) Oral <User Schedule>  gabapentin 800 milliGRAM(s) Oral three times a day  insulin glargine Injectable (LANTUS) 27 Unit(s) SubCutaneous every morning  insulin lispro (HumaLOG) corrective regimen sliding scale   SubCutaneous three times a day before meals  insulin lispro Injectable (HumaLOG) 9 Unit(s) SubCutaneous before breakfast  insulin lispro Injectable (HumaLOG) 9 Unit(s) SubCutaneous before lunch  insulin lispro Injectable (HumaLOG) 9 Unit(s) SubCutaneous before dinner  melatonin 5 milliGRAM(s) Oral at bedtime  meropenem  IVPB 1000 milliGRAM(s) IV Intermittent every 8 hours  morphine ER Tablet 60 milliGRAM(s) Oral two times a day  multivitamin 1 Tablet(s) Oral daily  nystatin Powder 1 Application(s) Topical two times a day  pantoprazole    Tablet 40 milliGRAM(s) Oral before breakfast  pramipexole 0.5 milliGRAM(s) Oral every 12 hours  senna 2 Tablet(s) Oral at bedtime  tamsulosin 0.4 milliGRAM(s) Oral at bedtime  traZODone 50 milliGRAM(s) Oral at bedtime  vancomycin  IVPB 1000 milliGRAM(s) IV Intermittent every 12 hours    MEDICATIONS  (PRN):  ALPRAZolam 1 milliGRAM(s) Oral every 6 hours PRN anxiety  benzocaine 15 mG/menthol 3.6 mG (Sugar-Free) Lozenge 1 Lozenge Oral two times a day PRN Sore Throat  bisacodyl 5 milliGRAM(s) Oral at bedtime PRN Constipation  dextrose 40% Gel 15 Gram(s) Oral once PRN Blood Glucose LESS THAN 70 milliGRAM(s)/deciliter  diclofenac 75 milliGRAM(s) Oral two times a day PRN Moderate Pain (4 - 6)  glucagon  Injectable 1 milliGRAM(s) IntraMuscular once PRN Glucose LESS THAN 70 milligrams/deciliter  hydrOXYzine  Oral Tab/Cap - Peds 50 milliGRAM(s) Oral at bedtime PRN pruritis  hydrOXYzine hydrochloride 25 milliGRAM(s) Oral every 6 hours PRN Anxiety  lactulose Syrup 20 Gram(s) Oral four times a day PRN constipation  morphine  IR 15 milliGRAM(s) Oral every 6 hours PRN Severe Pain (7 - 10)      Allergies    penicillins (Unknown)    Intolerances    FAMILY HISTORY:      Physical Exam:   Vital Signs Last 24 Hrs  T(C): 35.9 (19 Nov 2019 05:53), Max: 37.2 (18 Nov 2019 20:20)  T(F): 96.6 (19 Nov 2019 05:53), Max: 99 (18 Nov 2019 22:56)  HR: 82 (19 Nov 2019 05:53) (82 - 86)  BP: 138/63 (19 Nov 2019 05:53) (124/60 - 138/63)  BP(mean): --  RR: 18 (19 Nov 2019 05:53) (18 - 20)  SpO2: 98% (18 Nov 2019 20:20) (98% - 98%)    Labs:                         11.3   7.89  )-----------( 221      ( 19 Nov 2019 06:22 )             35.1     11-19    133<L>  |  91<L>  |  24<H>  ----------------------------<  219<H>  4.8   |  29  |  0.9    Ca    9.1      19 Nov 2019 06:22  Mg     1.8     11-19          Pertinent labs:                      11.3   7.89  )-----------( 221      ( 19 Nov 2019 06:22 )             35.1       11-19    133<L>  |  91<L>  |  24<H>  ----------------------------<  219<H>  4.8   |  29  |  0.9    Ca    9.1      19 Nov 2019 06:22  Mg     1.8     11-19      Radiology & Additional Studies:     < from: MR Hip w/wo IV Cont, Left (11.18.19 @ 18:59) >  Impression:    Again noted left hip hemiarthroplasty followed by explantation and   placement of antibiotic spacers.    Comminuted left proximal femoral metadiaphyseal fracture with   intertrochanteric fragmentation, unchanged.    Findingsconsistent with sequela of septic arthritis of the left hip,   with an enhancing phlegmon measuring 7 x 5 x 16 cm surrounding the   proximal left femur, with small amount of drainable fluid collection,   contiguous with the lateral proximal thigh subcutaneous 10 cm collection.   The differential includes an abscess versus a seroma. Consider tissue   sampling/aspiration.    Osteitis of the proximal femoral shaft with periosteal reaction and   cortical enhancement.    < end of copied text >      Radiology imaging reviewed.       ASSESSMENT/ PLAN:   49 year old M with PMHx of CIDP (followed by Dr. Mitchell on IVIG therapy), B/L hip replacements complicated by L hip septic arthritis, currently on Vancomycin and Cefepime, presenting from [facility] for persistently elevated ESR/CRP and concern for persistent infection. Per notes ID physician and PMD discussed ESR/CRP and though patient should be admitted for bone Biopsy. He had left hip arthroplasty.   - consulted for diagnostic hip aspiration - r/o abscess vs seroma  - MRI confirms enhancing phlegmon surrounding the proximal left femur with small amount of drainable fluid collection contiguous with the lateral proximal thigh subcutaneous 10cm collection  - plan for image guided aspiration tomorrow 11/20  - please keep NPO after midnight  - place lab orders for fluid sample in [place] prior to procedure    Thank you for the courtesy of this consult, please call [phone] with any further questions.

## 2019-11-19 NOTE — DIETITIAN INITIAL EVALUATION ADULT. - PERTINENT LABORATORY DATA
(11/19) H/H 11.3/35.1, Na 133, Cl 91, BUN 24, s gluc 219  recent  207 308 145 150 176 207 188  hba1c 11.4 in Sept

## 2019-11-19 NOTE — DIETITIAN INITIAL EVALUATION ADULT. - PERTINENT MEDS FT
lovenox, aspirin, plavix, abx, lantus, humalog, lipitor, dulcolax, buspar, coreg, vit D, lactulose, morphine, MVI, protonix, senna, flomax, desyrel

## 2019-11-19 NOTE — DIETITIAN INITIAL EVALUATION ADULT. - DIET TYPE
Continue DASH/TLC diet. Add CHO consistent diet modifier. Continue insulin regimen- pt noncompliant PTA. DMII education provided. No further diet interventions.

## 2019-11-19 NOTE — DIETITIAN INITIAL EVALUATION ADULT. - RD TO REMAIN AVAILABLE
RD to monitor diet order, energy intake, NFPF, body comp, glucose profile. Pt not at risk f/u 7 days

## 2019-11-19 NOTE — DIETITIAN INITIAL EVALUATION ADULT. - PHYSICAL APPEARANCE
BMI: 27.3 using # which pt says is accurate. Reports intentional gradual wt loss. IBW: 172#+/-10%. 3+ b/l le edema noted, BS 15. No physical signs of wasting observed.

## 2019-11-19 NOTE — DIETITIAN INITIAL EVALUATION ADULT. - FACTORS AFF FOOD INTAKE
Pt reports good appetite and PO intake, consumes 100% of breakfast meals and 50-75% of lunch/dinner meals. Reports attempting intentional wt loss. Last BM 11/18, denies GI s/s. NKFA. Denies chew/swallow difficulty, dentures noted. Previously residing in [facility].

## 2019-11-20 NOTE — PROGRESS NOTE ADULT - SUBJECTIVE AND OBJECTIVE BOX
Interventional Radiology Brief- Operative Note    Procedure: left hip aspiration - diagnostic     Pre-Op Diagnosis: abscess vs seroma     Post-Op Diagnosis: same     Attending: MD Michael  Performing Provider: JUDITH Mills    Anesthesia (type):  [ ] General Anesthesia  [ ] Sedation  [ ] Spinal Anesthesia  [ x ] Local/Regional    Contrast: none    Estimated Blood Loss: none    Condition:   [ ] Critical  [ ] Serious  [ ] Fair   [ x ] Good    Findings/Follow up Plan of Care: 60cc serous fluid removed - see full report in PACS    Specimens Removed: specimen sent to lab - orders placed by requesting provider via [place]     Implants: none    Complications: none    Condition/Disposition: d/c to floor       Please call Interventional Radiology [phone] with any questions, concerns, or issues.

## 2019-11-20 NOTE — PROCEDURE NOTE - NSPOSTCAREGUIDE_GEN_A_CORE
Verbal/written post procedure instructions were given to patient/caregiver/Instructed patient/caregiver regarding signs and symptoms of infection/Instructed patient/caregiver to follow-up with primary care physician/Keep the cast/splint/dressing clean and dry/Care for catheter as per unit/ICU protocols

## 2019-11-20 NOTE — PROGRESS NOTE ADULT - SUBJECTIVE AND OBJECTIVE BOX
NAPOLEON CAST 49y Male  MRN#: [id number]   CODE STATUS:___FULL_____      SUBJECTIVE  Patient is a 49y old Male who presents with a chief complaint of Septic Arthritis (20 Nov 2019 11:55)  Currently admitted to medicine with the primary diagnosis of Septic arthritis    Patient went for joint aspiration today. Follow up specimens.      OBJECTIVE  PAST MEDICAL & SURGICAL HISTORY  GERD (gastroesophageal reflux disease)  BPH (benign prostatic hyperplasia)  Myocardial infarct, old  ASHD (arteriosclerotic heart disease)  Depression  Anxiety  Diabetes  CIDP (chronic inflammatory demyelinating polyneuropathy)  History of cholecystectomy  History of total left hip replacement  History of total right hip replacement: bilateral  S/P CABG x 5    ALLERGIES:  penicillins (Unknown)  strawberry (Hives)    MEDICATIONS:  STANDING MEDICATIONS  ammonium lactate 12% Lotion 1 Application(s) Topical two times a day  aspirin  chewable 81 milliGRAM(s) Oral daily  atorvastatin 80 milliGRAM(s) Oral at bedtime  baclofen 10 milliGRAM(s) Oral two times a day  busPIRone 5 milliGRAM(s) Oral two times a day  carvedilol 3.125 milliGRAM(s) Oral every 12 hours  chlorhexidine 4% Liquid 1 Application(s) Topical <User Schedule>  clopidogrel Tablet 75 milliGRAM(s) Oral daily  dextrose 5%. 1000 milliLiter(s) IV Continuous <Continuous>  dextrose 50% Injectable 12.5 Gram(s) IV Push once  dextrose 50% Injectable 25 Gram(s) IV Push once  dextrose 50% Injectable 25 Gram(s) IV Push once  DULoxetine 60 milliGRAM(s) Oral two times a day  enoxaparin Injectable 40 milliGRAM(s) SubCutaneous daily  ergocalciferol 13068 Unit(s) Oral <User Schedule>  gabapentin 800 milliGRAM(s) Oral three times a day  insulin glargine Injectable (LANTUS) 27 Unit(s) SubCutaneous every morning  insulin lispro (HumaLOG) corrective regimen sliding scale   SubCutaneous three times a day before meals  insulin lispro Injectable (HumaLOG) 9 Unit(s) SubCutaneous before breakfast  insulin lispro Injectable (HumaLOG) 9 Unit(s) SubCutaneous before lunch  insulin lispro Injectable (HumaLOG) 9 Unit(s) SubCutaneous before dinner  melatonin 5 milliGRAM(s) Oral at bedtime  meropenem  IVPB 1000 milliGRAM(s) IV Intermittent every 8 hours  morphine ER Tablet 60 milliGRAM(s) Oral two times a day  multivitamin 1 Tablet(s) Oral daily  nystatin Powder 1 Application(s) Topical two times a day  pantoprazole    Tablet 40 milliGRAM(s) Oral before breakfast  pramipexole 0.5 milliGRAM(s) Oral every 12 hours  senna 2 Tablet(s) Oral at bedtime  tamsulosin 0.4 milliGRAM(s) Oral at bedtime  traZODone 50 milliGRAM(s) Oral at bedtime  vancomycin  IVPB 1000 milliGRAM(s) IV Intermittent every 12 hours    PRN MEDICATIONS  ALPRAZolam 1 milliGRAM(s) Oral every 6 hours PRN  benzocaine 15 mG/menthol 3.6 mG (Sugar-Free) Lozenge 1 Lozenge Oral two times a day PRN  bisacodyl 5 milliGRAM(s) Oral at bedtime PRN  dextrose 40% Gel 15 Gram(s) Oral once PRN  diclofenac 75 milliGRAM(s) Oral two times a day PRN  glucagon  Injectable 1 milliGRAM(s) IntraMuscular once PRN  hydrOXYzine  Oral Tab/Cap - Peds 50 milliGRAM(s) Oral at bedtime PRN  hydrOXYzine hydrochloride 25 milliGRAM(s) Oral every 6 hours PRN  lactulose Syrup 20 Gram(s) Oral four times a day PRN  morphine  IR 15 milliGRAM(s) Oral every 6 hours PRN      VITAL SIGNS: Last 24 Hours  T(C): 36.2 (20 Nov 2019 05:46), Max: 37.6 (19 Nov 2019 14:41)  T(F): 97.1 (20 Nov 2019 05:46), Max: 99.6 (19 Nov 2019 14:41)  HR: 87 (20 Nov 2019 05:46) (85 - 94)  BP: 128/64 (20 Nov 2019 05:46) (119/61 - 128/64)  BP(mean): --  RR: 18 (20 Nov 2019 05:46) (18 - 18)  SpO2: --    LABS:                        11.0   7.06  )-----------( 217      ( 20 Nov 2019 07:40 )             33.4     11-20    137  |  94<L>  |  26<H>  ----------------------------<  224<H>  4.3   |  30  |  0.8    Ca    9.0      20 Nov 2019 07:40  Mg     1.8     11-20                    RADIOLOGY:      PHYSICAL EXAM:  GENERAL: NAD, well-developed, AAOx3  HEENT:  Atraumatic, Normocephalic.  PULMONARY: Clear to auscultation bilaterally  CARDIOVASCULAR: Regular rate and rhythm  GASTROINTESTINAL: Soft, Nontender  EXTREMITIES:  +1 edema, cast on LLE, strength 1/5 LLE, no sensation below the knee.  NEUROLOGY: 0/5 LE b/l  SKIN: No rashes or lesions        ADMISSION SUMMARY    49 year old M with PMHx of CIDP (followed by Dr. Mitchell on IVIG therapy), B/L hip replacements complicated by L hip septic arthritis, currently on Vancomycin and Cefepime, presenting from [facility] for persistently elevated ESR/CRP and concern for persistent infection.    #) B/L hip replacements complicated by L hip septic arthritis secondary to CoNS, pseudomonas aeruginosa s/p explantation and antibiotic spacer  -afebrile, no WBC  -9/13 joint fluid cultures CoNS, pseudomonas  -Orthopedics recalled for Lt splint change (new splint 11/17)- will keep for 2 weeks then xrays as per ortho  -MRI of the left hip ordered:   Findingsconsistent with sequela of septic arthritis of the left hip,   with an enhancing phlegmon measuring 7 x 5 x 16 cm surrounding the   proximal left femur, with small amount of drainable fluid collection,   contiguous with the lateral proximal thigh subcutaneous 10 cm collection.   The differential includes an abscess versus a seroma. Consider tissue   sampling/aspiration.  -joint aspiration with IR today: 600 ccs serous fluid drained. follow up fluid cx.  -ESR/CRP persistently elevated. Cont to trend.  -Continue with Meropenem and continue Vancomycin for now as per ID.    -vanc trough 18.9- no need to repeat  -ortho and ID following  -blood cx 11/13 no growth    #) CIDP (chronic inflammatory demyelinating polyneuropathy)   -cont home medications  -neurology, Dr. Mitchell, following.    -receives IVIG every 2 weeks    #Rt knee pain  -obtain XRs    #) BPH (benign prostatic hyperplasia)   -cont Flomax     #) Depression, Anxiety  -continue home medications    #) Diabetes Mellitus  -On home dose insulin    #) GERD (gastroesophageal reflux disease)   -cont PPI     #) CAD s/p CABG  -cont ASA, Plavix     DVT ppx: Lovenox   GI ppx: protonix  [facility]  Dispo SNF  Pending arthrocentesis NAPOLEON CAST 49y Male  MRN#: [id number]   CODE STATUS:___FULL_____      SUBJECTIVE  Patient is a 49y old Male who presents with a chief complaint of Septic Arthritis (20 Nov 2019 11:55)  Currently admitted to medicine with the primary diagnosis of Septic arthritis    Patient went for joint aspiration today. cell count 342.  will follow up with ID and prep for d/c tomorrow.       OBJECTIVE  PAST MEDICAL & SURGICAL HISTORY  GERD (gastroesophageal reflux disease)  BPH (benign prostatic hyperplasia)  Myocardial infarct, old  ASHD (arteriosclerotic heart disease)  Depression  Anxiety  Diabetes  CIDP (chronic inflammatory demyelinating polyneuropathy)  History of cholecystectomy  History of total left hip replacement  History of total right hip replacement: bilateral  S/P CABG x 5    ALLERGIES:  penicillins (Unknown)  strawberry (Hives)    MEDICATIONS:  STANDING MEDICATIONS  ammonium lactate 12% Lotion 1 Application(s) Topical two times a day  aspirin  chewable 81 milliGRAM(s) Oral daily  atorvastatin 80 milliGRAM(s) Oral at bedtime  baclofen 10 milliGRAM(s) Oral two times a day  busPIRone 5 milliGRAM(s) Oral two times a day  carvedilol 3.125 milliGRAM(s) Oral every 12 hours  chlorhexidine 4% Liquid 1 Application(s) Topical <User Schedule>  clopidogrel Tablet 75 milliGRAM(s) Oral daily  dextrose 5%. 1000 milliLiter(s) IV Continuous <Continuous>  dextrose 50% Injectable 12.5 Gram(s) IV Push once  dextrose 50% Injectable 25 Gram(s) IV Push once  dextrose 50% Injectable 25 Gram(s) IV Push once  DULoxetine 60 milliGRAM(s) Oral two times a day  enoxaparin Injectable 40 milliGRAM(s) SubCutaneous daily  ergocalciferol 91939 Unit(s) Oral <User Schedule>  gabapentin 800 milliGRAM(s) Oral three times a day  insulin glargine Injectable (LANTUS) 27 Unit(s) SubCutaneous every morning  insulin lispro (HumaLOG) corrective regimen sliding scale   SubCutaneous three times a day before meals  insulin lispro Injectable (HumaLOG) 9 Unit(s) SubCutaneous before breakfast  insulin lispro Injectable (HumaLOG) 9 Unit(s) SubCutaneous before lunch  insulin lispro Injectable (HumaLOG) 9 Unit(s) SubCutaneous before dinner  melatonin 5 milliGRAM(s) Oral at bedtime  meropenem  IVPB 1000 milliGRAM(s) IV Intermittent every 8 hours  morphine ER Tablet 60 milliGRAM(s) Oral two times a day  multivitamin 1 Tablet(s) Oral daily  nystatin Powder 1 Application(s) Topical two times a day  pantoprazole    Tablet 40 milliGRAM(s) Oral before breakfast  pramipexole 0.5 milliGRAM(s) Oral every 12 hours  senna 2 Tablet(s) Oral at bedtime  tamsulosin 0.4 milliGRAM(s) Oral at bedtime  traZODone 50 milliGRAM(s) Oral at bedtime  vancomycin  IVPB 1000 milliGRAM(s) IV Intermittent every 12 hours    PRN MEDICATIONS  ALPRAZolam 1 milliGRAM(s) Oral every 6 hours PRN  benzocaine 15 mG/menthol 3.6 mG (Sugar-Free) Lozenge 1 Lozenge Oral two times a day PRN  bisacodyl 5 milliGRAM(s) Oral at bedtime PRN  dextrose 40% Gel 15 Gram(s) Oral once PRN  diclofenac 75 milliGRAM(s) Oral two times a day PRN  glucagon  Injectable 1 milliGRAM(s) IntraMuscular once PRN  hydrOXYzine  Oral Tab/Cap - Peds 50 milliGRAM(s) Oral at bedtime PRN  hydrOXYzine hydrochloride 25 milliGRAM(s) Oral every 6 hours PRN  lactulose Syrup 20 Gram(s) Oral four times a day PRN  morphine  IR 15 milliGRAM(s) Oral every 6 hours PRN      VITAL SIGNS: Last 24 Hours  T(C): 36.2 (20 Nov 2019 05:46), Max: 37.6 (19 Nov 2019 14:41)  T(F): 97.1 (20 Nov 2019 05:46), Max: 99.6 (19 Nov 2019 14:41)  HR: 87 (20 Nov 2019 05:46) (85 - 94)  BP: 128/64 (20 Nov 2019 05:46) (119/61 - 128/64)  BP(mean): --  RR: 18 (20 Nov 2019 05:46) (18 - 18)  SpO2: --    LABS:                        11.0   7.06  )-----------( 217      ( 20 Nov 2019 07:40 )             33.4     11-20    137  |  94<L>  |  26<H>  ----------------------------<  224<H>  4.3   |  30  |  0.8    Ca    9.0      20 Nov 2019 07:40  Mg     1.8     11-20                    RADIOLOGY:      PHYSICAL EXAM:  GENERAL: NAD, well-developed, AAOx3  HEENT:  Atraumatic, Normocephalic.  PULMONARY: Clear to auscultation bilaterally  CARDIOVASCULAR: Regular rate and rhythm  GASTROINTESTINAL: Soft, Nontender  EXTREMITIES:  +1 edema, cast on LLE, strength 1/5 LLE, no sensation below the knee.  NEUROLOGY: 0/5 LE b/l  SKIN: No rashes or lesions        ADMISSION SUMMARY    49 year old M with PMHx of CIDP (followed by Dr. Mitchell on IVIG therapy), B/L hip replacements complicated by L hip septic arthritis, currently on Vancomycin and Cefepime, presenting from [facility] for persistently elevated ESR/CRP and concern for persistent infection.    #) B/L hip replacements complicated by L hip septic arthritis secondary to CoNS, pseudomonas aeruginosa s/p explantation and antibiotic spacer  -afebrile, no WBC  -9/13 joint fluid cultures CoNS, pseudomonas  -Orthopedics recalled for Lt splint change (new splint 11/17)- will keep for 2 weeks then xrays as per ortho  -MRI of the left hip ordered:   Findingsconsistent with sequela of septic arthritis of the left hip,   with an enhancing phlegmon measuring 7 x 5 x 16 cm surrounding the   proximal left femur, with small amount of drainable fluid collection,   contiguous with the lateral proximal thigh subcutaneous 10 cm collection.   The differential includes an abscess versus a seroma. Consider tissue   sampling/aspiration.  -joint aspiration with IR today: 600 ccs serous fluid drained. follow up fluid cx. cell count 342  -ESR/CRP persistently elevated. Cont to trend.  -Continue with Meropenem and continue Vancomycin for now as per ID.    -vanc trough 18.9- no need to repeat  -ortho and ID following  -blood cx 11/13 no growth    #) CIDP (chronic inflammatory demyelinating polyneuropathy)   -cont home medications  -neurology, Dr. Mitchell, following.    -receives IVIG every 2 weeks    #Rt knee pain  -obtain XRs    #) BPH (benign prostatic hyperplasia)   -cont Flomax     #) Depression, Anxiety  -continue home medications    #) Diabetes Mellitus  -On home dose insulin    #) GERD (gastroesophageal reflux disease)   -cont PPI     #) CAD s/p CABG  -cont ASA, Plavix     DVT ppx: Lovenox   GI ppx: protonix  DASH/CC  Dispo SNF NAPOLEON CAST 49y Male  MRN#: [id number]   CODE STATUS:___FULL_____      SUBJECTIVE  Patient is a 49y old Male who presents with a chief complaint of Septic Arthritis (20 Nov 2019 11:55)  Currently admitted to medicine with the primary diagnosis of Septic arthritis    Patient went for joint aspiration today. cell count 342.  will follow up with ID and prep for d/c tomorrow.       OBJECTIVE  PAST MEDICAL & SURGICAL HISTORY  GERD (gastroesophageal reflux disease)  BPH (benign prostatic hyperplasia)  Myocardial infarct, old  ASHD (arteriosclerotic heart disease)  Depression  Anxiety  Diabetes  CIDP (chronic inflammatory demyelinating polyneuropathy)  History of cholecystectomy  History of total left hip replacement  History of total right hip replacement: bilateral  S/P CABG x 5    ALLERGIES:  penicillins (Unknown)  strawberry (Hives)    MEDICATIONS:  STANDING MEDICATIONS  ammonium lactate 12% Lotion 1 Application(s) Topical two times a day  aspirin  chewable 81 milliGRAM(s) Oral daily  atorvastatin 80 milliGRAM(s) Oral at bedtime  baclofen 10 milliGRAM(s) Oral two times a day  busPIRone 5 milliGRAM(s) Oral two times a day  carvedilol 3.125 milliGRAM(s) Oral every 12 hours  chlorhexidine 4% Liquid 1 Application(s) Topical <User Schedule>  clopidogrel Tablet 75 milliGRAM(s) Oral daily  dextrose 5%. 1000 milliLiter(s) IV Continuous <Continuous>  dextrose 50% Injectable 12.5 Gram(s) IV Push once  dextrose 50% Injectable 25 Gram(s) IV Push once  dextrose 50% Injectable 25 Gram(s) IV Push once  DULoxetine 60 milliGRAM(s) Oral two times a day  enoxaparin Injectable 40 milliGRAM(s) SubCutaneous daily  ergocalciferol 79956 Unit(s) Oral <User Schedule>  gabapentin 800 milliGRAM(s) Oral three times a day  insulin glargine Injectable (LANTUS) 27 Unit(s) SubCutaneous every morning  insulin lispro (HumaLOG) corrective regimen sliding scale   SubCutaneous three times a day before meals  insulin lispro Injectable (HumaLOG) 9 Unit(s) SubCutaneous before breakfast  insulin lispro Injectable (HumaLOG) 9 Unit(s) SubCutaneous before lunch  insulin lispro Injectable (HumaLOG) 9 Unit(s) SubCutaneous before dinner  melatonin 5 milliGRAM(s) Oral at bedtime  meropenem  IVPB 1000 milliGRAM(s) IV Intermittent every 8 hours  morphine ER Tablet 60 milliGRAM(s) Oral two times a day  multivitamin 1 Tablet(s) Oral daily  nystatin Powder 1 Application(s) Topical two times a day  pantoprazole    Tablet 40 milliGRAM(s) Oral before breakfast  pramipexole 0.5 milliGRAM(s) Oral every 12 hours  senna 2 Tablet(s) Oral at bedtime  tamsulosin 0.4 milliGRAM(s) Oral at bedtime  traZODone 50 milliGRAM(s) Oral at bedtime  vancomycin  IVPB 1000 milliGRAM(s) IV Intermittent every 12 hours    PRN MEDICATIONS  ALPRAZolam 1 milliGRAM(s) Oral every 6 hours PRN  benzocaine 15 mG/menthol 3.6 mG (Sugar-Free) Lozenge 1 Lozenge Oral two times a day PRN  bisacodyl 5 milliGRAM(s) Oral at bedtime PRN  dextrose 40% Gel 15 Gram(s) Oral once PRN  diclofenac 75 milliGRAM(s) Oral two times a day PRN  glucagon  Injectable 1 milliGRAM(s) IntraMuscular once PRN  hydrOXYzine  Oral Tab/Cap - Peds 50 milliGRAM(s) Oral at bedtime PRN  hydrOXYzine hydrochloride 25 milliGRAM(s) Oral every 6 hours PRN  lactulose Syrup 20 Gram(s) Oral four times a day PRN  morphine  IR 15 milliGRAM(s) Oral every 6 hours PRN      VITAL SIGNS: Last 24 Hours  T(C): 36.2 (20 Nov 2019 05:46), Max: 37.6 (19 Nov 2019 14:41)  T(F): 97.1 (20 Nov 2019 05:46), Max: 99.6 (19 Nov 2019 14:41)  HR: 87 (20 Nov 2019 05:46) (85 - 94)  BP: 128/64 (20 Nov 2019 05:46) (119/61 - 128/64)  BP(mean): --  RR: 18 (20 Nov 2019 05:46) (18 - 18)  SpO2: --    LABS:                        11.0   7.06  )-----------( 217      ( 20 Nov 2019 07:40 )             33.4     11-20    137  |  94<L>  |  26<H>  ----------------------------<  224<H>  4.3   |  30  |  0.8    Ca    9.0      20 Nov 2019 07:40  Mg     1.8     11-20                    RADIOLOGY:      PHYSICAL EXAM:  GENERAL: NAD, well-developed, AAOx3  HEENT:  Atraumatic, Normocephalic.  PULMONARY: Clear to auscultation bilaterally  CARDIOVASCULAR: Regular rate and rhythm  GASTROINTESTINAL: Soft, Nontender  EXTREMITIES:  +1 edema, cast on LLE, strength 1/5 LLE, no sensation below the knee.  NEUROLOGY: 0/5 LE b/l  SKIN: No rashes or lesions        ADMISSION SUMMARY    49 year old M with PMHx of CIDP (followed by Dr. Mitchell on IVIG therapy), B/L hip replacements complicated by L hip septic arthritis, currently on Vancomycin and Cefepime, presenting from [facility] for persistently elevated ESR/CRP and concern for persistent infection.    #) B/L hip replacements complicated by L hip septic arthritis secondary to CoNS, pseudomonas aeruginosa s/p explantation and antibiotic spacer  -afebrile, no WBC  -9/13 joint fluid cultures CoNS, pseudomonas  -Orthopedics recalled for Lt splint change (new splint 11/17)- will keep for 2 weeks then xrays as per ortho  -MRI of the left hip ordered:   Findingsconsistent with sequela of septic arthritis of the left hip,   with an enhancing phlegmon measuring 7 x 5 x 16 cm surrounding the   proximal left femur, with small amount of drainable fluid collection,   contiguous with the lateral proximal thigh subcutaneous 10 cm collection.   The differential includes an abscess versus a seroma. Consider tissue   sampling/aspiration.  -joint aspiration with IR today: 600 ccs serous fluid drained. follow up fluid cx. cell count 342  -ESR/CRP persistently elevated. Cont to trend.  -Continue with Meropenem and continue Vancomycin for now as per ID.    -vanc trough 18.9- no need to repeat  -ortho and ID following  -blood cx 11/13 no growth    #) CIDP (chronic inflammatory demyelinating polyneuropathy)   -cont home medications  -neurology, Dr. Mitchell, following.    -receives IVIG every 2 weeks    #Rt knee pain  -obtain XRs    #) BPH (benign prostatic hyperplasia)   -cont Flomax     #) Depression, Anxiety  -continue home medications    #) Diabetes Mellitus  -On home dose insulin    #) GERD (gastroesophageal reflux disease)   -cont PPI     #) CAD s/p CABG  -cont ASA, Plavix     #Immobility/Bedbound status  -cont nsg mgmg  -Cont aggressive decub prevention and treatment protocols.    DVT ppx: Lovenox   GI ppx: protonix  DASH/CC  Dispo SNF

## 2019-11-20 NOTE — PROGRESS NOTE ADULT - SUBJECTIVE AND OBJECTIVE BOX
NAPOLEON CAST  49y, Male    All available historical data reviewed    OVERNIGHT EVENTS:  IVR 11/20 : 60cc serous fluid removed   ROS:  General: Denies rigors, night sweats  HEENT: Denies headache, rhinorrhea, sore throat, eye pain   60cc serous fluid removed CV: Denies CP, palpitations  PULM: Denies wheezing, hemoptysis  GI: Denies hematemesis, hematochezia, melena  : Denies discharge, hematuria  MSK: + arthralgias, myalgias  SKIN: Denies rash, lesions  NEURO: Denies paresthesias, weakness  PSYCH: + depression, anxiety    VITALS:  T(F): 100.4, Max: 100.4 (11-20-19 @ 14:46)  HR: 92  BP: 128/62  RR: 18Vital Signs Last 24 Hrs  T(C): 38 (20 Nov 2019 14:46), Max: 38 (20 Nov 2019 14:46)  T(F): 100.4 (20 Nov 2019 14:46), Max: 100.4 (20 Nov 2019 14:46)  HR: 92 (20 Nov 2019 14:46) (85 - 92)  BP: 128/62 (20 Nov 2019 14:46) (119/61 - 128/64)  BP(mean): --  RR: 18 (20 Nov 2019 14:46) (18 - 18)  SpO2: --    TESTS & MEASUREMENTS:                        11.0   7.06  )-----------( 217      ( 20 Nov 2019 07:40 )             33.4     11-20    137  |  94<L>  |  26<H>  ----------------------------<  224<H>  4.3   |  30  |  0.8    Ca    9.0      20 Nov 2019 07:40  Mg     1.8     11-20                RADIOLOGY & ADDITIONAL TESTS:  Personal review of radiological diagnostics performed  Echo and EKG results noted when applicable.     ANTIBIOTICS:  meropenem  IVPB 1000 milliGRAM(s) IV Intermittent every 8 hours  vancomycin  IVPB 1000 milliGRAM(s) IV Intermittent every 12 hours

## 2019-11-20 NOTE — PHYSICAL EXAM
Plan of care reviewed with patient. Patient AAOx4 follows all commands and moves all extremities. On BiPAP 14/5 and 35%. Ok to have 5L NC for two hour breaks intermittently. SR. SBP maintained <180. Oliguric. HD planned for today. Skin remains free from break down. VSS, will continue to monitor.    [Sclera] : the sclera and conjunctiva were normal [PERRL With Normal Accommodation] : pupils were equal in size, round, and reactive to light [Extraocular Movements] : extraocular movements were intact [Outer Ear] : the ears and nose were normal in appearance [Oropharynx] : the oropharynx was normal [Neck Appearance] : the appearance of the neck was normal [Neck Cervical Mass (___cm)] : no neck mass was observed [Jugular Venous Distention Increased] : there was no jugular-venous distention [Thyroid Diffuse Enlargement] : the thyroid was not enlarged [Thyroid Nodule] : there were no palpable thyroid nodules [Auscultation Breath Sounds / Voice Sounds] : lungs were clear to auscultation bilaterally [Heart Rate And Rhythm] : heart rate was normal and rhythm regular [Heart Sounds] : normal S1 and S2 [Heart Sounds Gallop] : no gallops [Murmurs] : no murmurs [Heart Sounds Pericardial Friction Rub] : no pericardial rub [Bowel Sounds] : normal bowel sounds [Abdomen Soft] : soft [Abdomen Tenderness] : non-tender [Abdomen Mass (___ Cm)] : no abdominal mass palpated [Skin Color & Pigmentation] : normal skin color and pigmentation [Skin Turgor] : normal skin turgor [] : no rash [FreeTextEntry1] : fross motor and sensory deficit

## 2019-11-20 NOTE — PROCEDURE NOTE - ADDITIONAL PROCEDURE DETAILS
[employer] PICC used  Tip in SVC, 47cm, ok to use     *patient had existing left PICC line placed ~6 weeks ago in nursing home. Patient stated every time medication was administered he felt pressure in his neck and a tingling in his ear. Referred back to last CXR in house, PICC line tip was not in the SVC. PICC line was replaced for 5 [place] single lumen 47cm - tip now in SVC8

## 2019-11-20 NOTE — PROGRESS NOTE ADULT - ATTENDING COMMENTS
Patient seen and examined independently earlier today. Case discussed with housestaff, nursing, social work, patient. I agree with most of the resident's note, physical exam, and plan except as below. Patient without new complaints. ROS unremarkable. On exam: b/l LE 0/5, 1+ edema b/l, no sensation below the knee. LLE splint. B/l UE 4/5. Awaiting MRI hip. Cont ABx as per ID. Check Vanco trough.
49 year old M with PMHx of CIDP (followed by Dr. Mitchell on IVIG therapy), B/L hip replacements complicated by L hip septic arthritis, currently on Vancomycin and Cefepime, presenting from [facility] for persistently elevated ESR/CRP and concern for persistent infection.    #B/L hip replacements complicated by L hip septic arthritis secondary to CoNS, pseudomonas aeruginosa s/p explantation and antibiotic spacer  afebrile, no WBC  -MRI of the left hip ordered. Needs MRI with anesthesia-today  -f/up MRI.  If MRI shows fluid, ID recommends diagnostic tap.  -ESR/CRP persistently elevated. Cont to trend daily  -Continue with Meropenem and continue Vancomycin for now as per ID.    -ortho and ID following  -blood cx 11/13 no growth    #CIDP (chronic inflammatory demyelinating polyneuropathy)   -cont home medications  -neurology, Dr. Mitchell, following.  received IVIG on 11/13 or 11/14  -receives IVIG every 2 weeks    #Lt distal femur fx with splint  -ortho following    #Rt knee pain  -XRs unremarkable    #BPH   -cont Flomax     #Depression, Anxiety  -continue home medications    #Diabetes Mellitus  -On home dose insulin    #GERD (gastroesophageal reflux disease)   -cont PPI     #CAD s/p CABG  -cont ASA, Plavix     DVT ppx: Lovenox   GI ppx: protonix  DASH/CC  Very high risk pt. Prognosis is guarded. Cont aggressive decub prevention and treatment protocols.    #Progress Note Handoff  Pending (specify): MRI  Pt/Family discussion: Pt and wife informed and agree with the current plan  Disposition: SNF
Patient seen and examined independently earlier today. Case discussed with housestaff, nursing, social work, patient. I agree with most of the resident's note, physical exam, and plan except as below. Patient without new complaints. ROS unremarkable. On exam: b/l LE 0/5, 1+ edema b/l, no sensation below the knee. LLE splint. B/l UE 4/5. Awaiting MRI hip. Cont ABx as per ID. Check Vanco trough. Very high risk pt. Prognosis is guarded. Cont aggressive decub prevention and treatment protocols.    #Progress Note Handoff  Pending (specify):  Consults_________, Tests__MRI______,Medical stability___x______, PT___x_____  Pt/Family discussion: Pt informed and agrees with the current plan  Disposition: Home______/SNF__x_____/4A________/To be determined________/Waiting for Auth_____ .     35 minutes spent on total encounter; more than 50% of the visit was spent counseling and/or coordinating care by the attending physician.
Patient seen and examined independently earlier today. Case discussed with housestaff, nursing, social work, patient, ID, ortho attendings. I agree with most of the resident's note, physical exam, and plan except as below. Patient without new complaints. ROS unremarkable. MRI results noted. S/p IR drainage this am. No evidence of persistent infection. Will cont ABx as per ID. D/c planning.    #Progress Note Handoff  Pending (specify):  Possible d/c back to SNF in am  Pt/Family discussion: Pt informed and agrees with the current plan  Disposition: Home______/SNF__x_____/4A________/To be determined________/Waiting for Auth_____ .     35 minutes spent on total encounter; more than 50% of the visit was spent counseling and/or coordinating care by the attending physician.
Patient seen and examined independently earlier today. Case discussed with housestaff, nursing, social work, patient. I agree with most of the resident's note, physical exam, and plan except as below. Patient without new complaints. ROS unremarkable. MRI results noted. Plan for IR drainage in am.    #Progress Note Handoff  Pending (specify):  Consults_________, Tests___IR drainage of fluid collection_____,Medical stability____x_____, PT________  Pt/Family discussion: Pt informed and agrees with the current plan  Disposition: Home______/SNF__x_____/4A________/To be determined________/Waiting for Auth_____
Patient seen and examined independently earlier today. Case discussed with housestaff, nursing, social work, patient. I agree with most of the resident's note, physical exam, and plan except as below. Patient without new complaints. ROS unremarkable. On exam: b/l LE 0/5, no sensation below the knee. LLE cast. B/l UE 4/5. Awaiting MRI hip. Cont ABx as per ID. Check Vanco trough. Very high risk pt. Prognosis is guarded. Cont aggressive decub prevention and treatment protocols.    #Progress Note Handoff  Pending (specify):  Consults_________, Tests__MRI______,Medical stability___x______, PT___x_____  Pt/Family discussion: Pt informed and agrees with the current plan  Disposition: Home______/SNF__x_____/4A________/To be determined________/Waiting for Auth_____

## 2019-11-20 NOTE — PROCEDURE NOTE - NSPROCDETAILS_GEN_ALL_CORE
supine position/sterile dressing applied/sterile technique, catheter placed/ultrasound guidance/location identified, draped/prepped, sterile technique used

## 2019-11-20 NOTE — PROCEDURE NOTE - NSPOSTPRCRAD_GEN_A_CORE
fluoroscopy/chest radiograph/depth of insertion/line adjusted to depth of insertion/line in appropriate postion

## 2019-11-21 NOTE — PROGRESS NOTE ADULT - GASTROINTESTINAL
Soft, non-tender, no hepatosplenomegaly, normal bowel sounds
detailed exam
Soft, non-tender, no hepatosplenomegaly, normal bowel sounds
Soft, non-tender, no hepatosplenomegaly, normal bowel sounds
detailed exam

## 2019-11-21 NOTE — DISCHARGE NOTE PROVIDER - NSDCMRMEDTOKEN_GEN_ALL_CORE_FT
Admelog SoloStar 100 units/mL injectable solution: 9 unit(s) injectable 3 times a day  ALPRAZolam 1 mg oral tablet: 1 tab(s) orally every 6 hours, As needed, anxiety  ammonium lactate 12% topical lotion: Apply topically to affected area 2 times a day  aspirin 81 mg oral tablet, chewable: 1 tab(s) orally once a day  atorvastatin 80 mg oral tablet: 1 tab(s) orally once a day (at bedtime)  baclofen 10 mg oral tablet: 1 tab(s) orally 2 times a day  bisacodyl 5 mg oral delayed release tablet: 1 tab(s) orally once a day (at bedtime), As Needed  busPIRone 5 mg oral tablet: 1 tab(s) orally 2 times a day  carvedilol 3.125 mg oral tablet: 1 tab(s) orally every 12 hours  clopidogrel 75 mg oral tablet: 1 tab(s) orally once a day  diclofenac sodium 75 mg oral delayed release tablet: 1 tab(s) orally 2 times a day, As needed, Moderate Pain (4 - 6)  DULoxetine 30 mg oral delayed release capsule: 2 cap(s) orally 2 times a day  Enulose 10 g/15 mL oral and rectal liquid: 30 milliliter(s) oral and rectal 3 times a day  Flomax 0.4 mg oral capsule: 1 cap(s) orally once a day  gabapentin 800 mg oral tablet: 1 tab(s) orally 3 times a day  hydrOXYzine hydrochloride 25 mg oral tablet: 1 tab(s) orally every 6 hours, As needed, Anxiety  hydrOXYzine hydrochloride 50 mg oral tablet: 1 tab(s) orally once a day (at bedtime), As needed, pruritis  insulin glargine: 30 unit(s) subcutaneously once a day (at bedtime)  melatonin 10 mg oral capsule: 1 cap(s) orally once a day (at bedtime)  meropenem 1000 mg intravenous injection: 1000 milligram(s) intravenous every 8 hours  morphine 60 mg/12 hours oral tablet, extended release: 1 tab(s) orally 2 times a day  Morphine IR 15 mg oral tablet: 1 tab(s) orally every 6 hours, As Needed  Multiple Vitamins oral tablet: 1 tab(s) orally once a day  nystatin 100,000 units/g topical cream:  topically twice daily to affected area  pantoprazole 40 mg oral delayed release tablet: 1 tab(s) orally once a day (before a meal)  pramipexole 0.5 mg oral tablet: 1 tab(s) orally every 12 hours  senna oral tablet: 2 tab(s) orally once a day (at bedtime)  traZODone 50 mg oral tablet: 1 tab(s) orally once a day (at bedtime)  Trintellix 5 mg oral tablet: 0.5 tab(s) orally once a day  vancomycin 1 g intravenous injection: 1 gram(s) intravenous every 12 hours  Vitamin B-100 oral tablet: 1 tab(s) orally once a day  Vitamin D2 50,000 intl units (1.25 mg) oral capsule: 1 cap(s) orally once a week on Mondays Admelog SoloStar 100 units/mL injectable solution: 9 unit(s) injectable 3 times a day  ALPRAZolam 1 mg oral tablet: 1 tab(s) orally every 6 hours, As needed, anxiety  ammonium lactate 12% topical lotion: Apply topically to affected area 2 times a day  aspirin 81 mg oral tablet, chewable: 1 tab(s) orally once a day  atorvastatin 80 mg oral tablet: 1 tab(s) orally once a day (at bedtime)  baclofen 10 mg oral tablet: 1 tab(s) orally 2 times a day  bisacodyl 5 mg oral delayed release tablet: 1 tab(s) orally once a day (at bedtime), As Needed  busPIRone 5 mg oral tablet: 1 tab(s) orally 2 times a day  carvedilol 3.125 mg oral tablet: 1 tab(s) orally every 12 hours  clopidogrel 75 mg oral tablet: 1 tab(s) orally once a day  diclofenac sodium 75 mg oral delayed release tablet: 1 tab(s) orally 2 times a day, As needed, Moderate Pain (4 - 6)  DULoxetine 30 mg oral delayed release capsule: 2 cap(s) orally 2 times a day  enoxaparin: 40 milligram(s) subcutaneous once a day  Enulose 10 g/15 mL oral and rectal liquid: 30 milliliter(s) oral and rectal 3 times a day  Flomax 0.4 mg oral capsule: 1 cap(s) orally once a day  gabapentin 800 mg oral tablet: 1 tab(s) orally 3 times a day  hydrOXYzine hydrochloride 50 mg oral tablet: 1 tab(s) orally every 6 hours, As Needed  insulin glargine: 30 unit(s) subcutaneously once a day (at bedtime)  melatonin 10 mg oral capsule: 1 cap(s) orally once a day (at bedtime)  meropenem 1000 mg intravenous injection: 1000 milligram(s) intravenous every 8 hours  morphine 60 mg/12 hours oral tablet, extended release: 1 tab(s) orally 2 times a day  Morphine IR 15 mg oral tablet: 1 tab(s) orally every 4-6 hours, As Needed  Multiple Vitamins oral tablet: 1 tab(s) orally once a day  nystatin 100,000 units/g topical cream:  topically twice daily to affected area  pantoprazole 40 mg oral delayed release tablet: 1 tab(s) orally once a day (before a meal)  pramipexole 0.5 mg oral tablet: 1 tab(s) orally every 12 hours  senna oral tablet: 2 tab(s) orally once a day (at bedtime)  traZODone 50 mg oral tablet: 1 tab(s) orally once a day (at bedtime)  vancomycin 1 g intravenous injection: 1 gram(s) intravenous every 12 hours  Vitamin B-100 oral tablet: 1 tab(s) orally once a day  Vitamin D2 50,000 intl units (1.25 mg) oral capsule: 1 cap(s) orally once a week on Mondays Admelog SoloStar 100 units/mL injectable solution: 9 unit(s) injectable 3 times a day  ALPRAZolam 1 mg oral tablet: 1 tab(s) orally every 6 hours, As needed, anxiety  ammonium lactate 12% topical lotion: Apply topically to affected area 2 times a day  aspirin 81 mg oral tablet, chewable: 1 tab(s) orally once a day  atorvastatin 80 mg oral tablet: 1 tab(s) orally once a day (at bedtime)  baclofen 10 mg oral tablet: 1 tab(s) orally 2 times a day  bisacodyl 5 mg oral delayed release tablet: 1 tab(s) orally once a day (at bedtime), As Needed  busPIRone 5 mg oral tablet: 1 tab(s) orally 2 times a day  carvedilol 3.125 mg oral tablet: 1 tab(s) orally every 12 hours  clopidogrel 75 mg oral tablet: 1 tab(s) orally once a day  diclofenac sodium 75 mg oral delayed release tablet: 1 tab(s) orally 2 times a day, As needed, Moderate Pain (4 - 6)  DULoxetine 30 mg oral delayed release capsule: 2 cap(s) orally 2 times a day  enoxaparin: 40 milligram(s) subcutaneous once a day  Enulose 10 g/15 mL oral and rectal liquid: 30 milliliter(s) oral and rectal 3 times a day  Flomax 0.4 mg oral capsule: 1 cap(s) orally once a day  gabapentin 800 mg oral tablet: 1 tab(s) orally 3 times a day  hydrOXYzine hydrochloride 50 mg oral tablet: 1 tab(s) orally every 6 hours, As Needed  insulin glargine: 30 unit(s) subcutaneously once a day (at bedtime)  melatonin 10 mg oral capsule: 1 cap(s) orally once a day (at bedtime)  meropenem 1000 mg intravenous injection: 1000 milligram(s) intravenous every 8 hours  morphine 60 mg/12 hours oral tablet, extended release: 1 tab(s) orally 2 times a day  Morphine IR 15 mg oral tablet: 1 tab(s) orally every 4 hours, as needed.  Multiple Vitamins oral tablet: 1 tab(s) orally once a day  nystatin 100,000 units/g topical cream:  topically twice daily to affected area  pantoprazole 40 mg oral delayed release tablet: 1 tab(s) orally once a day (before a meal)  pramipexole 0.5 mg oral tablet: 1 tab(s) orally every 12 hours  senna oral tablet: 2 tab(s) orally once a day (at bedtime)  traZODone 50 mg oral tablet: 1 tab(s) orally once a day (at bedtime)  vancomycin 1 g intravenous injection: 1 gram(s) intravenous every 12 hours  Vitamin B-100 oral tablet: 1 tab(s) orally once a day  Vitamin D2 50,000 intl units (1.25 mg) oral capsule: 1 cap(s) orally once a week on Mondays

## 2019-11-21 NOTE — DISCHARGE NOTE NURSING/CASE MANAGEMENT/SOCIAL WORK - PATIENT PORTAL LINK FT
You can access the FollowMyHealth Patient Portal offered by  by registering at the following website: [email or web address]. By joining [facility]’s FollowMyHealth portal, you will also be able to view your health information using other applications (apps) compatible with our system.

## 2019-11-21 NOTE — PROGRESS NOTE ADULT - GASTROINTESTINAL DETAILS
no guarding/no rebound tenderness/nontender/soft/no rigidity
no rigidity/nontender/no guarding/soft/no rebound tenderness
soft/nontender
no guarding/soft/nontender/no rebound tenderness/no rigidity

## 2019-11-21 NOTE — PROGRESS NOTE ADULT - REASON FOR ADMISSION
Septic Arthritis

## 2019-11-21 NOTE — PROGRESS NOTE ADULT - RESPIRATORY
detailed exam
Breath Sounds equal & clear to percussion & auscultation, no accessory muscle use
Breath Sounds equal & clear to percussion & auscultation, no accessory muscle use
detailed exam
Breath Sounds equal & clear to percussion & auscultation, no accessory muscle use

## 2019-11-21 NOTE — DISCHARGE NOTE PROVIDER - NSDCCPCAREPLAN_GEN_ALL_CORE_FT
PRINCIPAL DISCHARGE DIAGNOSIS  Diagnosis: Septic arthritis  Assessment and Plan of Treatment: You came into the hospital due to concern of persistent infection of your left hip joint.  Your white blood cell count has not been elevated and your blood cultures showed no growth of bacteria.  Two markers (ESR and CRP) have been elevated but are trending down due to improvement with antibiotic treatment.  You had fluid drained from your hip joint and the findings were consistent with an inflammatory response from surgery/recent infection.  There is no sign of an abscess.  As per the infectious disease team, you need two more weeks of antibiotics. You are medically stable for discharge.  Please follow up outpatient with your primary care doctor in 1-2 weeks.      SECONDARY DISCHARGE DIAGNOSES  Diagnosis: CIDP (chronic inflammatory demyelinating polyneuropathy)  Assessment and Plan of Treatment: Continue your home medications.  You are due to receive your IVIG on 11/28.  Please follow up with Dr. Mitchell. PRINCIPAL DISCHARGE DIAGNOSIS  Diagnosis: Septic arthritis  Assessment and Plan of Treatment: You came into the hospital due to concern of persistent infection of your left hip joint.  Your white blood cell count has not been elevated and your blood cultures showed no growth of bacteria.  Two markers (ESR and CRP) have been elevated but are trending down due to improvement with antibiotic treatment.  You had fluid drained from your hip joint and the findings were consistent with an inflammatory response from surgery/recent infection.  There is no sign of an abscess.  As per the infectious disease team, you need two more weeks of antibiotics. You are medically stable for discharge.  Please follow up outpatient with your primary care doctor in 1-2 weeks.      SECONDARY DISCHARGE DIAGNOSES  Diagnosis: CIDP (chronic inflammatory demyelinating polyneuropathy)  Assessment and Plan of Treatment: Continue your home medications.  You are due to receive your IVIG every 3 weeks.  You last received it on 11/14.  Follow up with Dr. Mitchell.

## 2019-11-21 NOTE — DISCHARGE NOTE NURSING/CASE MANAGEMENT/SOCIAL WORK - NSDCPELOVENOX_GEN_ALL_CORE
Enoxaparin/Lovenox - Potential for adverse drug reactions and interactions/Enoxaparin/Lovenox - Dietary Advice/Enoxaparin/Lovenox - Follow up monitoring/Enoxaparin/Lovenox - Compliance

## 2019-11-21 NOTE — DISCHARGE NOTE PROVIDER - CARE PROVIDER_API CALL
Lillie Camarena (DO)  Geriatric Medicine; Internal Medicine  [address] [place] [address]  Phone: [phone]  Fax: [phone]  Follow Up Time: 1 week    Armando Mitchell)  Neuromuscular Medicine  [address] [place] [address], [address]  [place], [place] [id number]  Phone: [phone]  Fax: [phone]  Follow Up Time: 1 week    Femi Lebron)  Orthopaedic Surgery  [address] [place] [id number]  Phone: [phone]  Fax: [phone]  Follow Up Time: 1 week

## 2019-11-21 NOTE — PROGRESS NOTE ADULT - MS EXT PE MLT D E PC
no cyanosis/pedal edema
pedal edema/no cyanosis
no cyanosis/pedal edema
pedal edema
pedal edema/no cyanosis
pedal edema

## 2019-11-21 NOTE — DISCHARGE NOTE PROVIDER - PROVIDER TOKENS
PROVIDER:[TOKEN:[[id number]],FOLLOWUP:[1 week]],PROVIDER:[TOKEN:[[id number]],FOLLOWUP:[1 week]],PROVIDER:[TOKEN:[[id number]],FOLLOWUP:[1 week]]

## 2019-11-21 NOTE — DISCHARGE NOTE PROVIDER - NSDCFUSCHEDAPPT_GEN_ALL_CORE_FT
NAPOLEON CAST ; 12/03/2019 ; [facility] [address] [facility] NAPOLEON CAST ; 12/03/2019 ; [facility] [address] [facility] NAPOLEON CAST ; 12/03/2019 ; [facility] [address] [facility]

## 2019-11-21 NOTE — DISCHARGE NOTE PROVIDER - CARE PROVIDERS DIRECT ADDRESSES
[email or web address]

## 2019-11-21 NOTE — DISCHARGE NOTE PROVIDER - HOSPITAL COURSE
49 year old M with PMHx of CIDP (followed by Dr. Mitchell on IVIG therapy), B/L hip replacements complicated by L hip septic arthritis, currently on Vancomycin and Cefepime, presenting from [facility] for persistently elevated ESR/CRP and concern for persistent infection. Per notes ID physician and PMD discussed ESR/CRP and though patient should be admitted for bone Biopsy. He had left hip arthroplasty.   Also has LLE fractures from september hospitalization which is being followed by  ortho.  Patient is bedbound at baseline and has been for the past 5 years.  Patient was admitted in September for hip pain due to dislocation of his previous arthroplasty. During his hospital stay he developed RUE cellulitis treated with clindamycin. During that time workup revealed superolateral dislocation of the left hip and he is underwent explant of failed hip replacement and insertion of antibiotic spacer.  Intraoperative cultures grew pseudomonas and he was diagnosed with septic arthritis. Patient was started on cefepime and was to receive a 6 week course of antibiotics to be given via PICC.  He was started on Vancomycin as well while in the NH. During that admission CT was done for left knee pain and it revealed acute nondisplaced fibular neck impaction fracture. Ortho stated no surgical intervention for the knee. Patient states that he has chronic pain that is unchanged, but that his left hip has been giving him more pain.         In the ED Left Hip XR showed unchanged septic arthritis left hip status post explantation and antibiotics replacement.  XR of the Left Femur showed Left proximal femoral metaphyseal fracture with increased callus since prior consistent with partial healing, New acute or subacute distal left femoral metaphyseal fracture with 1.7 cm bony override and Diffuse osteopenia.  ESR was 64 and lactate was 2.8.  Ortho evaluated the patient and recommended Continue IV abx and follow up BCx, CRP.         He was admitted for septic arthritis of the left hip secondary to CoNS, Pseudomonas aeurginosa. ID and ortho followed the case.  ID recommended tony and vanc.    Patient received MRI to further evaluate hip.  MRI showed findings consistent with sequela of septic arthritis of the left hip, with an enhancing phlegmon measuring 7 x 5 x 16 cm surrounding the proximal left femur, with small amount of drainable fluid collection, contiguous with the lateral proximal thigh subcutaneous 10 cm collection.  The differential includes an abscess versus a seroma. Consider tissue sampling/aspiration.  Patient then went for arthrocentesis with IR. They drained 60 ccs serous fluid.  Fluid wbc count was 342 with 3% PMNS.  Findings consistent with inflammatory condition/seroma and not an abscess.  Patient will receive two more weeks of antibiotics and is cleared by infectious disease and medicine for discharge. 49 year old M with PMHx of CIDP (followed by Dr. Mitchell on IVIG therapy), B/L hip replacements complicated by L hip septic arthritis, currently on Vancomycin and Cefepime, presenting from [facility] for persistently elevated ESR/CRP and concern for persistent infection. Per notes ID physician and PMD discussed ESR/CRP and though patient should be admitted for bone Biopsy. He had left hip arthroplasty.   Also has LLE fractures from september hospitalization which is being followed by  ortho.  Patient is bedbound at baseline and has been for the past 5 years.  Patient was admitted in September for hip pain due to dislocation of his previous arthroplasty. During his hospital stay he developed RUE cellulitis treated with clindamycin. During that time workup revealed superolateral dislocation of the left hip and he is underwent explant of failed hip replacement and insertion of antibiotic spacer.  Intraoperative cultures grew pseudomonas and he was diagnosed with septic arthritis. Patient was started on cefepime and was to receive a 6 week course of antibiotics to be given via PICC.  He was started on Vancomycin as well while in the NH. During that admission CT was done for left knee pain and it revealed acute nondisplaced fibular neck impaction fracture. Ortho stated no surgical intervention for the knee. Patient states that he has chronic pain that is unchanged, but that his left hip has been giving him more pain.         In the ED Left Hip XR showed unchanged septic arthritis left hip status post explantation and antibiotics replacement.  XR of the Left Femur showed Left proximal femoral metaphyseal fracture with increased callus since prior consistent with partial healing, New acute or subacute distal left femoral metaphyseal fracture with 1.7 cm bony override and Diffuse osteopenia.  ESR was 64 and lactate was 2.8.  Ortho evaluated the patient and recommended Continue IV abx and follow up BCx, CRP.         He was admitted for chronic septic arthritis of the left hip secondary to CoNS, Pseudomonas aeurginosa. ID and ortho followed the case.  ID recommended tony and vanc.    Patient received MRI to further evaluate hip.  MRI showed findings consistent with sequela of septic arthritis of the left hip, with an enhancing phlegmon measuring 7 x 5 x 16 cm surrounding the proximal left femur, with small amount of drainable fluid collection, contiguous with the lateral proximal thigh subcutaneous 10 cm collection.  The differential includes an abscess versus a seroma. Consider tissue sampling/aspiration.  Patient then went for arthrocentesis with IR. They drained 60 ccs serous fluid.  Fluid wbc count was 342 with 3% PMNS.  Findings consistent with inflammatory condition/seroma and not an abscess.  Patient will receive two more weeks of antibiotics and is cleared by infectious disease and medicine for discharge.

## 2019-11-21 NOTE — PROGRESS NOTE ADULT - SUBJECTIVE AND OBJECTIVE BOX
NAPOLEON CAST 49y Male  MRN#: [id number]   CODE STATUS:___FULL_____      SUBJECTIVE  Patient is a 49y old Male who presents with a chief complaint of Septic Arthritis (20 Nov 2019 11:55)  Currently admitted to medicine with the primary diagnosis of Septic arthritis    No new complaints.       OBJECTIVE  PAST MEDICAL & SURGICAL HISTORY  GERD (gastroesophageal reflux disease)  BPH (benign prostatic hyperplasia)  Myocardial infarct, old  ASHD (arteriosclerotic heart disease)  Depression  Anxiety  Diabetes  CIDP (chronic inflammatory demyelinating polyneuropathy)  History of cholecystectomy  History of total left hip replacement  History of total right hip replacement: bilateral  S/P CABG x 5    ALLERGIES:  penicillins (Unknown)  strawberry (Hives)      PHYSICAL EXAM:  GENERAL: NAD, well-developed, AAOx3  HEENT:  Atraumatic, Normocephalic.  PULMONARY: Clear to auscultation bilaterally  CARDIOVASCULAR: Regular rate and rhythm  GASTROINTESTINAL: Soft, Nontender  EXTREMITIES:  +trace edema, cast on LLE, no sensation below the knee.  NEUROLOGY: 0/5 LE b/l  SKIN: No rashes or lesions            MEDICATIONS  (STANDING):  ammonium lactate 12% Lotion 1 Application(s) Topical two times a day  aspirin  chewable 81 milliGRAM(s) Oral daily  atorvastatin 80 milliGRAM(s) Oral at bedtime  baclofen 10 milliGRAM(s) Oral two times a day  bisacodyl Suppository 10 milliGRAM(s) Rectal once  busPIRone 5 milliGRAM(s) Oral two times a day  carvedilol 3.125 milliGRAM(s) Oral every 12 hours  chlorhexidine 4% Liquid 1 Application(s) Topical <User Schedule>  clopidogrel Tablet 75 milliGRAM(s) Oral daily  dextrose 5%. 1000 milliLiter(s) (50 mL/Hr) IV Continuous <Continuous>  dextrose 50% Injectable 12.5 Gram(s) IV Push once  dextrose 50% Injectable 25 Gram(s) IV Push once  dextrose 50% Injectable 25 Gram(s) IV Push once  DULoxetine 60 milliGRAM(s) Oral two times a day  enoxaparin Injectable 40 milliGRAM(s) SubCutaneous daily  ergocalciferol 61645 Unit(s) Oral <User Schedule>  gabapentin 800 milliGRAM(s) Oral three times a day  insulin glargine Injectable (LANTUS) 27 Unit(s) SubCutaneous every morning  insulin lispro (HumaLOG) corrective regimen sliding scale   SubCutaneous three times a day before meals  insulin lispro Injectable (HumaLOG) 9 Unit(s) SubCutaneous before breakfast  insulin lispro Injectable (HumaLOG) 9 Unit(s) SubCutaneous before lunch  insulin lispro Injectable (HumaLOG) 9 Unit(s) SubCutaneous before dinner  melatonin 5 milliGRAM(s) Oral at bedtime  meropenem  IVPB 1000 milliGRAM(s) IV Intermittent every 8 hours  morphine ER Tablet 60 milliGRAM(s) Oral two times a day  multivitamin 1 Tablet(s) Oral daily  nystatin Powder 1 Application(s) Topical two times a day  pantoprazole    Tablet 40 milliGRAM(s) Oral before breakfast  pramipexole 0.5 milliGRAM(s) Oral every 12 hours  senna 2 Tablet(s) Oral at bedtime  tamsulosin 0.4 milliGRAM(s) Oral at bedtime  traZODone 50 milliGRAM(s) Oral at bedtime  vancomycin  IVPB 1000 milliGRAM(s) IV Intermittent every 12 hours    MEDICATIONS  (PRN):  ALPRAZolam 1 milliGRAM(s) Oral every 6 hours PRN anxiety  benzocaine 15 mG/menthol 3.6 mG (Sugar-Free) Lozenge 1 Lozenge Oral two times a day PRN Sore Throat  bisacodyl 5 milliGRAM(s) Oral at bedtime PRN Constipation  dextrose 40% Gel 15 Gram(s) Oral once PRN Blood Glucose LESS THAN 70 milliGRAM(s)/deciliter  diclofenac 75 milliGRAM(s) Oral two times a day PRN Moderate Pain (4 - 6)  glucagon  Injectable 1 milliGRAM(s) IntraMuscular once PRN Glucose LESS THAN 70 milligrams/deciliter  hydrOXYzine hydrochloride 50 milliGRAM(s) Oral every 6 hours PRN Anxiety, itching  lactulose Syrup 20 Gram(s) Oral four times a day PRN constipation  morphine  IR 15 milliGRAM(s) Oral every 6 hours PRN Severe Pain (7 - 10)    Home Medications:  Admelog SoloStar 100 units/mL injectable solution: 9 unit(s) injectable 3 times a day (12 Nov 2019 23:41)  ALPRAZolam 1 mg oral tablet: 1 tab(s) orally every 6 hours, As needed, anxiety (21 Nov 2019 10:26)  ammonium lactate 12% topical lotion: Apply topically to affected area 2 times a day (12 Nov 2019 23:53)  aspirin 81 mg oral tablet, chewable: 1 tab(s) orally once a day (03 Oct 2019 07:52)  atorvastatin 80 mg oral tablet: 1 tab(s) orally once a day (at bedtime) (03 Oct 2019 07:52)  baclofen 10 mg oral tablet: 1 tab(s) orally 2 times a day (03 Oct 2019 07:52)  bisacodyl 5 mg oral delayed release tablet: 1 tab(s) orally once a day (at bedtime), As Needed (03 Oct 2019 07:52)  busPIRone 5 mg oral tablet: 1 tab(s) orally 2 times a day (03 Oct 2019 07:52)  carvedilol 3.125 mg oral tablet: 1 tab(s) orally every 12 hours (03 Oct 2019 07:52)  clopidogrel 75 mg oral tablet: 1 tab(s) orally once a day (03 Oct 2019 07:52)  diclofenac sodium 75 mg oral delayed release tablet: 1 tab(s) orally 2 times a day, As needed, Moderate Pain (4 - 6) (03 Oct 2019 07:52)  DULoxetine 30 mg oral delayed release capsule: 2 cap(s) orally 2 times a day (12 Nov 2019 23:45)  enoxaparin: 40 milligram(s) subcutaneous once a day (21 Nov 2019 13:33)  Enulose 10 g/15 mL oral and rectal liquid: 30 milliliter(s) oral and rectal 3 times a day (12 Nov 2019 23:42)  Flomax 0.4 mg oral capsule: 1 cap(s) orally once a day (03 Oct 2019 07:52)  gabapentin 800 mg oral tablet: 1 tab(s) orally 3 times a day (03 Oct 2019 07:52)  hydrOXYzine hydrochloride 50 mg oral tablet: 1 tab(s) orally every 6 hours, As Needed (21 Nov 2019 13:33)  insulin glargine: 30 unit(s) subcutaneously once a day (at bedtime) (12 Nov 2019 23:51)  melatonin 10 mg oral capsule: 1 cap(s) orally once a day (at bedtime) (12 Nov 2019 23:44)  meropenem 1000 mg intravenous injection: 1000 milligram(s) intravenous every 8 hours (21 Nov 2019 10:26)  morphine 60 mg/12 hours oral tablet, extended release: 1 tab(s) orally 2 times a day (12 Nov 2019 23:54)  Morphine IR 15 mg oral tablet: 1 tab(s) orally every 4 hours, as needed. (21 Nov 2019 14:48)  Multiple Vitamins oral tablet: 1 tab(s) orally once a day (12 Nov 2019 23:45)  nystatin 100,000 units/g topical cream:  topically twice daily to affected area (03 Oct 2019 07:52)  pantoprazole 40 mg oral delayed release tablet: 1 tab(s) orally once a day (before a meal) (03 Oct 2019 07:52)  pramipexole 0.5 mg oral tablet: 1 tab(s) orally every 12 hours (03 Oct 2019 07:52)  senna oral tablet: 2 tab(s) orally once a day (at bedtime) (03 Oct 2019 07:52)  traZODone 50 mg oral tablet: 1 tab(s) orally once a day (at bedtime) (12 Nov 2019 23:47)  vancomycin 1 g intravenous injection: 1 gram(s) intravenous every 12 hours (21 Nov 2019 10:26)  Vitamin B-100 oral tablet: 1 tab(s) orally once a day (12 Nov 2019 23:50)  Vitamin D2 50,000 intl units (1.25 mg) oral capsule: 1 cap(s) orally once a week on Mondays (12 Nov 2019 23:31)    Vital Signs Last 24 Hrs  T(C): 37.5 (21 Nov 2019 13:39), Max: 37.5 (21 Nov 2019 13:39)  T(F): 99.5 (21 Nov 2019 13:39), Max: 99.5 (21 Nov 2019 13:39)  HR: 95 (21 Nov 2019 13:39) (78 - 95)  BP: 155/76 (21 Nov 2019 13:39) (108/63 - 155/76)  BP(mean): --  RR: 18 (21 Nov 2019 13:39) (18 - 18)  SpO2: --  CAPILLARY BLOOD GLUCOSE      POCT Blood Glucose.: 195 mg/dL (21 Nov 2019 12:21)  POCT Blood Glucose.: 183 mg/dL (21 Nov 2019 07:41)  POCT Blood Glucose.: 112 mg/dL (20 Nov 2019 21:36)    LABS:                        11.0   7.06  )-----------( 217      ( 20 Nov 2019 07:40 )             33.4     11-20    137  |  94<L>  |  26<H>  ----------------------------<  224<H>  4.3   |  30  |  0.8    Ca    9.0      20 Nov 2019 07:40  Mg     1.8     11-20                        Culture - Fungal, Body Fluid (collected 20 Nov 2019 11:20)  Source: Joint Fl Joint Fluid  Preliminary Report (21 Nov 2019 12:15):    Testing in progress    Culture - Body Fluid with Gram Stain (collected 20 Nov 2019 11:20)  Source: Joint Fl Joint Fluid  Gram Stain (20 Nov 2019 23:00):    Rare polymorphonuclear leukocytes per low power field    No organisms seen by cytocentrifuge  Preliminary Report (21 Nov 2019 11:28):    No growth      Consultant Notes Reviewed:  [x ] YES  [ ] NO  Care Discussed with Consultants/Other Providers/ Housestaff [ x] YES  [ ] NO  Radiology, labs, new studies personally reviewed.

## 2019-11-21 NOTE — PROGRESS NOTE ADULT - SUBJECTIVE AND OBJECTIVE BOX
NAPOLEON CAST  49y, Male    All available historical data reviewed    OVERNIGHT EVENTS:  none    ROS:  General: Denies rigors, nightsweats  HEENT: Denies headache, rhinorrhea, sore throat, eye pain  CV: Denies CP, palpitations  PULM: Denies wheezing, hemoptysis  GI: Denies hematemesis, hematochezia, melena  : Denies discharge, hematuria  MSK: pain LEs bilaterally left >right  SKIN: Denies rash, lesions  NEURO: + weakness  PSYCH: + anxiety    VITALS:  T(F): 99.5, Max: 99.5 (11-21-19 @ 13:39)  HR: 95  BP: 155/76  RR: 18Vital Signs Last 24 Hrs  T(C): 37.5 (21 Nov 2019 13:39), Max: 37.5 (21 Nov 2019 13:39)  T(F): 99.5 (21 Nov 2019 13:39), Max: 99.5 (21 Nov 2019 13:39)  HR: 95 (21 Nov 2019 13:39) (78 - 95)  BP: 155/76 (21 Nov 2019 13:39) (108/63 - 155/76)  BP(mean): --  RR: 18 (21 Nov 2019 13:39) (18 - 18)  SpO2: --    TESTS & MEASUREMENTS:                        11.0   7.06  )-----------( 217      ( 20 Nov 2019 07:40 )             33.4     11-20    137  |  94<L>  |  26<H>  ----------------------------<  224<H>  4.3   |  30  |  0.8    Ca    9.0      20 Nov 2019 07:40  Mg     1.8     11-20          Culture - Fungal, Body Fluid (collected 11-20-19 @ 11:20)  Source: Joint Fl Joint Fluid  Preliminary Report (11-21-19 @ 12:15):    Testing in progress    Culture - Body Fluid with Gram Stain (collected 11-20-19 @ 11:20)  Source: Joint Fl Joint Fluid  Gram Stain (11-20-19 @ 23:00):    Rare polymorphonuclear leukocytes per low power field    No organisms seen by cytocentrifuge  Preliminary Report (11-21-19 @ 11:28):    No growth            RADIOLOGY & ADDITIONAL TESTS:  Personal review of radiological diagnostics performed  Echo and EKG results noted when applicable.     ANTIBIOTICS:  meropenem  IVPB 1000 milliGRAM(s) IV Intermittent every 8 hours  vancomycin  IVPB 1000 milliGRAM(s) IV Intermittent every 12 hours

## 2019-11-21 NOTE — PROGRESS NOTE ADULT - ASSESSMENT
No evidence of persistent infection. Pt's symptoms likely due to seroma. Counselled pt to f/u with ortho, neuro, pain mgmt, psych as outpt    Discharge instructions discussed and patient knows when to seek immediate medical attention.  Patient has proper follow up.  All results discussed and patient aware they may require further work up.  Stressed importance of proper follow up.  Medications prescribed discussed.  All questions and concerns from patient and spouse addressed. Understanding of instructions verbalized.    Time spent in completing discharge process and coordinating care 35 minutes.    Discussed with housestaff, nursing, social work, spouse
· Assessment		  49 year old M with PMHx of CIDP (followed by Dr. Mitchell on IVIG therapy), B/L hip replacements complicated by chronic left hip infection, s/p ОЛЬГА explant with girdlestone procedure sept 19  Currently on Vancomycin and Cefepime, presenting from [facility] for persistently elevated ESR/CRP and concern for persistent infection.    IMPRESSION:  # Septic arthritis left hip secondary to CoNS, Pseudomonas aeruginosa  -no prosthesis left hip  -s/p girdlestone procedure  -9/13 joint fluid cultures CoNS, Pseudomonas  -BCx 9/11 CoNS  -BCx 9/18,19 NG  -WBC 5.4  -ESR/CRP 93/1.29  -vancomycin trough 18.9. No need to repeat trough if there is no change in the Cr    Duration of ABx essentially 6 weeks  Will presume ABx started shortly after 9/13  Essentially duration of ABx  has been enough  If MRI shows fluid will recommend a diagnostic tap and base further recommendations on those results    RECOMMENDATIONS:  MRI p  Vancomycin 1 gm iv q12h  Meropenem 1 gm iv q8h
· Assessment		  49 year old M with PMHx of CIDP (followed by Dr. Mitchell on IVIG therapy), B/L hip replacements complicated by L hip septic arthritis, currently on Vancomycin and Cefepime, presenting from [facility] for persistently elevated ESR/CRP and concern for persistent infection.    IMPRESSION:  # Septic arthritis left hip secondary to CoNS, Pseudomonas aeruginosa  -no prosthesis left hip  -9/13 joint fluid cultures CoNS, Pseudomonas  -BCx 9/11 CoNS  -BCx 9/18,19 NG  -WBC 6.9    Duration of ABx essentially 6 weeks  Will presume ABx started shortly thereafter  If MRI shows fluid will recommend a diagnostic tap and base further recommendations on those results    RECOMMENDATIONS:  ESR/ CRP  MRI  Vancomycin 1 gm iv q12h  Vanco trough  Meropenem 1 gm iv q8h
· Assessment		  49 year old M with PMHx of CIDP (followed by Dr. Mitchell on IVIG therapy), B/L hip replacements complicated by chronic left hip infection, s/p ОЛЬГА explant with girdlestone procedure sept 19  Currently on Vancomycin and Cefepime, presenting from [facility] for persistently elevated ESR/CRP and concern for persistent infection.    IMPRESSION:  # Septic arthritis left hip secondary to CoNS, Pseudomonas aeruginosa  -Left distal femur Fx with splint  -no prosthesis left hip  -s/p girdlestone procedure  -9/13 joint fluid cultures CoNS, Pseudomonas  -BCx 9/11 CoNS  -BCx 9/18,19 NG  -BCx 11/12,13 NG  -WBC 7.0  -ESR/CRP 93/1.29 >90/0.86  -vancomycin trough 18.9. No need to repeat trough if there is no change in the Cr  < from: MR Hip w/wo IV Cont, Left (11.18.19 @ 18:59) >  Again noted left hip hemiarthroplasty followed by explantation and   placement of antibiotic spacers.  Comminuted left proximal femoral metadiaphyseal fracture with   intertrochanteric fragmentation, unchanged.  Findingsconsistent with sequela of septic arthritis of the left hip,   with an enhancing phlegmon measuring 7 x 5 x 16 cm surrounding the   proximal left femur, with small amount of drainable fluid collection,   contiguous with the lateral proximal thigh subcutaneous 10 cm collection.   The differential includes an abscess versus a seroma. Consider tissue   sampling/aspiration.  Osteitis of the proximal femoral shaft with periosteal reaction and   cortical enhancement.    -s/p drainage of 60 ccs serous fluid with cultures being negative  -Fluid  with 3% PMNs more consistent with an inflammatory condition/ seroma and not an abscess    Duration of ABx essentially 6 weeks  Will presume ABx started shortly after 9/13  Essentially duration of ABx  has been enough      RECOMMENDATIONS:  Vancomycin 1 gm iv q12h  Meropenem 1 gm iv q8h  Duration of ABx 2 more weeks  recall prn please
· Assessment		  49 year old M with PMHx of CIDP (followed by Dr. Mitchell on IVIG therapy), B/L hip replacements complicated by chronic left hip infection, s/p ОЛЬГА explant with girdlestone procedure sept 19  Currently on Vancomycin and Cefepime, presenting from [facility] for persistently elevated ESR/CRP and concern for persistent infection.    IMPRESSION:  # Septic arthritis left hip secondary to CoNS, Pseudomonas aeruginosa  -no prosthesis left hip  -s/p girdlestone procedure  -9/13 joint fluid cultures CoNS, Pseudomonas  -BCx 9/11 CoNS  -BCx 9/18,19 NG  -WBC 6.9  -ESR/CRP 93/1.29  -vancomycin trough 18.9. No need to repeat trough if there is no change in the Cr    Duration of ABx essentially 6 weeks  Will presume ABx started shortly after 9/13  Essentially duration of ABx  has been enough  If MRI shows fluid will recommend a diagnostic tap and base further recommendations on those results    RECOMMENDATIONS:  MRI p  Vancomycin 1 gm iv q12h  Meropenem 1 gm iv q8h
· Assessment		  49 year old M with PMHx of CIDP (followed by Dr. Mitchell on IVIG therapy), B/L hip replacements complicated by chronic left hip infection, s/p ОЛЬГА explant with girdlestone procedure sept 19  Currently on Vancomycin and Cefepime, presenting from [facility] for persistently elevated ESR/CRP and concern for persistent infection.    IMPRESSION:  # Septic arthritis left hip secondary to CoNS, Pseudomonas aeruginosa  -Left distal femur Fx with splint  -no prosthesis left hip  -s/p girdlestone procedure  -9/13 joint fluid cultures CoNS, Pseudomonas  -BCx 9/11 CoNS  -BCx 9/18,19 NG  -BCx 11/12,13 NG  -WBC 7.8  -ESR/CRP 93/1.29  -vancomycin trough 18.9. No need to repeat trough if there is no change in the Cr  < from: MR Hip w/wo IV Cont, Left (11.18.19 @ 18:59) >  Again noted left hip hemiarthroplasty followed by explantation and   placement of antibiotic spacers.  Comminuted left proximal femoral metadiaphyseal fracture with   intertrochanteric fragmentation, unchanged.  Findingsconsistent with sequela of septic arthritis of the left hip,   with an enhancing phlegmon measuring 7 x 5 x 16 cm surrounding the   proximal left femur, with small amount of drainable fluid collection,   contiguous with the lateral proximal thigh subcutaneous 10 cm collection.   The differential includes an abscess versus a seroma. Consider tissue   sampling/aspiration.  Osteitis of the proximal femoral shaft with periosteal reaction and   cortical enhancement.    -s/p drainage of 60 ccs serous fluid  -Fluid  with 3% PMNs more consistent with an inflammatory condition/ seroma and not an abscess    Duration of ABx essentially 6 weeks  Will presume ABx started shortly after 9/13  Essentially duration of ABx  has been enough      RECOMMENDATIONS:  Vancomycin 1 gm iv q12h  Meropenem 1 gm iv q8h  Duration of ABx 2 more weeks
· Assessment		  49 year old M with PMHx of CIDP (followed by Dr. Mitchell on IVIG therapy), B/L hip replacements complicated by chronic left hip infection, s/p ОЛЬГА explant with girdlestone procedure sept 19  Currently on Vancomycin and Cefepime, presenting from [facility] for persistently elevated ESR/CRP and concern for persistent infection.    IMPRESSION:  # Septic arthritis left hip secondary to CoNS, Pseudomonas aeruginosa  -Left distal femur Fx with splint  -no prosthesis left hip  -s/p girdlestone procedure  -9/13 joint fluid cultures CoNS, Pseudomonas  -BCx 9/11 CoNS  -BCx 9/18,19 NG  -BCx 11/12,13 NG  -WBC 7.8  -ESR/CRP 93/1.29  -vancomycin trough 18.9. No need to repeat trough if there is no change in the Cr    Duration of ABx essentially 6 weeks  Will presume ABx started shortly after 9/13  Essentially duration of ABx  has been enough  If MRI shows fluid will recommend a diagnostic tap and base further recommendations on those results    RECOMMENDATIONS:  MRI11/18 pending  Vancomycin 1 gm iv q12h  Meropenem 1 gm iv q8h
· Assessment		  49 year old M with PMHx of CIDP (followed by Dr. Mitchell on IVIG therapy), B/L hip replacements complicated by chronic left hip infection, s/p ОЛЬГА explant with girdlestone procedure sept 19  Currently on Vancomycin and Cefepime, presenting from [facility] for persistently elevated ESR/CRP and concern for persistent infection.    IMPRESSION:  # Septic arthritis left hip secondary to CoNS, Pseudomonas aeruginosa  -no prosthesis left hip  -s/p girdlestone procedure  -9/13 joint fluid cultures CoNS, Pseudomonas  -BCx 9/11 CoNS  -BCx 9/18,19 NG  -WBC 6.9  -ESR/CRP 93/1.29  -vancomycin trough 18.9    Duration of ABx essentially 6 weeks  Will presume ABx started shortly after 9/13  Essentially duration of ABx  has been enough  If MRI shows fluid will recommend a diagnostic tap and base further recommendations on those results    RECOMMENDATIONS:  MRI results pending  Vancomycin 1 gm iv q12h  Meropenem 1 gm iv q8h

## 2019-11-21 NOTE — PROGRESS NOTE ADULT - CARDIOVASCULAR
Regular rate & rhythm, normal S1, S2; no murmurs, gallops or rubs; no S3, S4
detailed exam

## 2019-11-22 NOTE — CHART NOTE - NSCHARTNOTEFT_GEN_A_CORE
Patient discharged on three psych medications for depression: cymbalta, trazodone, and trintellix, all three which work on the serotonin receptor.  concern for serotonin syndrome.  Called [facility] and spoke with patient's nurse.  She will reach out to the psychiatrist at the facility (Dr. Mcdonald) about reviewing/adjusting these medications.

## 2019-12-03 NOTE — PHYSICAL EXAM
[FreeTextEntry1] : General: Pt is non ambulatory, Gross skin survey within normal limits. Pt has slow speech \par Cognitive/Language:  The patient is oriented to person, place, time and date.  Recent and remote memory intact.  Fund of knowledge is intact and normal.  Language with normal repetition, comprehension and naming. Slow tempo of speech\par Eyes: intact VA, VFF.  EOMI w/o nystagmus, skew or reported double vision.  PERRL.  No ptosis/weakness of eyelid closure.  \par Face:  Facial sensation normal V1 - 3, no facial asymmetry.  \par Ears/Nose/Throat:  Hearing grossly intact b/l.  Palate elevates midline.  Tongue and uvula midline. \par Motor examination:  Clonus of right lower extremity Normal ROM of UE, absent ROM LE with increased tone RLE > LLE with hyperextension RLE.  \par Formal Muscle Strength Testing: diminished motor strength both LE bilaterally (1/5 R > L). Upper extremity motor strength is 5/5 bilaterally. \par Reflexes: 1+ reflexes UE bilaterally, absent reflexes LLE, clonus of RLE. No plantar response bilaterally \par Sensory examination:  decrease sensation in LE bilaterally, sensation intact upper extremities \par Cerebellum:   FTN/HKS intact with normal YOLANDA in upper arms.  No dysmetria or dysdiadokinesia.  Gait not assessed, pt non-ambulatory\par

## 2019-12-03 NOTE — HISTORY OF PRESENT ILLNESS
[FreeTextEntry1] : 48 yo RHM w/ h/o CAD, BPD, CIDP, claustrophobia, B/L hip replacements after recurrent falls well known to our service previously treated for CIDP with PLEX x 6 months, long-term steroids and steroid-sparing agents with minimal efficacy secondary to combination of disease progression, deconditioning and inability to participate with PT off DMT for the last several years with poor followup recently seen at [facility] in September with slowly progressive motor and sensory deficits w/ b/l LE pain found with spasticity and clonus of the RLE which is inconsistent with longstanding CIDP.  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  Has not followed with neurology in > 3 years.  During hospital course patient was found to have septic arthritis of the L hip s/p longterm antiobiotics and hip replacement.  While in hospital, pt IgA levels were re-examined and low normal and pt was started on IVIG induction course without complications.  During hospitalization pt was noted to have slight improvement in LE strength particularly in the R distal LE.  Neuroimaging of the central CNS for increased tonicity revealed chronic R BG lacunar infarct and punctate enhancement of the distal cord c/w CIDP.  Pt also found to have chronic compression fractures of T11 - L1.  Pt was subsequently discharged to [facility] for additional Abx and therapy.  Since discharge has had 2 maintenance IVIG doses Q3wks apart without complication.  Is otherwise in his usual state of health.  Is motivated to participate with PT and is currently seeing pain management for chronic back and LE pain.  \par

## 2019-12-03 NOTE — ASSESSMENT
[FreeTextEntry1] : 50 yo RHM w/ h/o CAD, BPD, claustrophobia, anxiety, depression, multiple falls with fractures and B/L hip replacements w/ CIDP currently slightly improved with IVIG treatments.  Pt has incidental stroke on neuroimaging likely secondary to vascular risks.  Tolerating IVIG for now.  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  However, pt currently tolerating IVIG and since had recent septic arthritis with bacteremia, would avoid additional DMTs for now and continue IVIG maintenance.  Recommend aggressive PT as tolerated.  \par \par Plan:\par - wheelchair clinic assessment\par - continue IVIG 1g/kg Q3 wks- pt to consider home infusion- will start PA\par - continue analgesics as per pain management\par - aggressive PT as tolerated\par - continue antibiotics as per ID\par - orthopedic g/u \par - f/u in Neuromuscular Clinic at next visit

## 2019-12-05 NOTE — PROGRESS NOTE ADULT - SUBJECTIVE AND OBJECTIVE BOX
history of known left distal femur fracture and left [place]  here today for infusion   pt reports falling from ambulance on tuesday and having pain lower ext    pt in left side slab on medial side    pain and swelling of b/l lower ext  no movement of lower ext at baseline     xrays of b/l lower ext performed     left femur fx ok alignment     remaining xrays reviewed no fx seen   ankle xrays appears to be chronic changes    new side slab placed on left lower ext with ace wrap     discussed with dr juanpablo beltran in office 2 weeks   will review official read on xrays

## 2019-12-16 PROBLEM — M86.60 OSTEOMYELITIS, CHRONIC: Status: ACTIVE | Noted: 2019-01-01

## 2019-12-16 PROBLEM — K21.9 GERD (GASTROESOPHAGEAL REFLUX DISEASE): Status: ACTIVE | Noted: 2018-08-27

## 2019-12-16 PROBLEM — Z00.00 ENCOUNTER FOR PREVENTIVE HEALTH EXAMINATION: Status: ACTIVE | Noted: 2018-08-27

## 2019-12-16 PROBLEM — M00.859: Status: RESOLVED | Noted: 2019-01-01 | Resolved: 2019-01-01

## 2019-12-16 PROBLEM — K59.09 CONSTIPATION, CHRONIC: Status: ACTIVE | Noted: 2018-08-27

## 2019-12-16 PROBLEM — G47.00 INSOMNIA: Status: ACTIVE | Noted: 2018-08-27

## 2019-12-16 PROBLEM — R05 COUGH WITH SPUTUM: Status: RESOLVED | Noted: 2019-05-27 | Resolved: 2019-01-01

## 2019-12-16 PROBLEM — I10 HYPERTENSION: Status: ACTIVE | Noted: 2019-01-01

## 2019-12-16 PROBLEM — K59.00 CONSTIPATION: Status: ACTIVE | Noted: 2019-01-01

## 2019-12-16 PROBLEM — R33.9 URINARY RETENTION: Status: ACTIVE | Noted: 2018-08-27

## 2019-12-16 NOTE — REASON FOR VISIT
[Follow-Up] : a follow-up visit [Consultation] : a consultation visit [Formal Caregiver] : formal caregiver [Family Member] : family member

## 2019-12-19 NOTE — PHYSICAL EXAM
[General Appearance - Alert] : alert [General Appearance - In No Acute Distress] : in no acute distress [General Appearance - Well Nourished] : well nourished [General Appearance - Well Developed] : well developed [Sclera] : the sclera and conjunctiva were normal [Normal Oral Mucosa] : normal oral mucosa [No Oral Cyanosis] : no oral cyanosis [No Oral Pallor] : no oral pallor [Outer Ear] : the ears and nose were normal in appearance [Examination Of The Oral Cavity] : the lips and gums were normal [Hearing Threshold Finger Rub Not [place]] : hearing was normal [Exaggerated Use Of Accessory Muscles For Inspiration] : no accessory muscle use [] : no respiratory distress [Respiration, Rhythm And Depth] : normal respiratory rhythm and effort [Heart Sounds] : normal S1 and S2 [Heart Rate And Rhythm] : heart rate was normal and rhythm regular [Auscultation Breath Sounds / Voice Sounds] : lungs were clear to auscultation bilaterally [Abdomen Soft] : soft [Edema] : there was no peripheral edema [Abdomen Tenderness] : non-tender [Cranial Nerves] : cranial nerves 2-12 were intact [Oriented To Time, Place, And Person] : oriented to person, place, and time [No Focal Deficits] : no focal deficits [FreeTextEntry1] : General: Pt is non ambulatory, Gross skin survey within normal limits. Pt has slow speech \par Cognitive/Language:  The patient is oriented to person, place, time and date.  Recent and remote memory intact.  Fund of knowledge is intact and normal.  Language with normal repetition, comprehension and naming. Slow tempo of speech\par Eyes: intact VA, VFF.  EOMI w/o nystagmus, skew or reported double vision.  PERRL.  No ptosis/weakness of eyelid closure.  \par Face:  Facial sensation normal V1 - 3, no facial asymmetry.  \par Ears/Nose/Throat:  Hearing grossly intact b/l.  Palate elevates midline.  Tongue and uvula midline. \par Motor examination:  Clonus of right lower extremity Normal ROM of UE, absent ROM LE with increased tone RLE > LLE with hyperextension RLE.  \par Formal Muscle Strength Testing: diminished motor strength both LE bilaterally (1/5 R > L). Upper extremity motor strength is 5/5 bilaterally. \par Reflexes: 1+ reflexes UE bilaterally, absent reflexes LLE, clonus of RLE. No plantar response bilaterally \par Sensory examination:  decrease sensation in LE bilaterally, sensation intact upper extremities \par Cerebellum:   FTN/HKS intact with normal YOLANDA in upper arms.  No dysmetria or dysdiadokinesia.  Gait not assessed, pt non-ambulatory\par

## 2019-12-19 NOTE — ASSESSMENT
[FreeTextEntry1] : \par CIDP with chronic pain syndrome, weakness - continue morphine and meds as ordered, VNS service for IVIG infusion, Continue pT/OT referral, W/C  for ambulation\par \par Fibula fx- follow up with ortho\par \par Hypertension- Continue coreg, advised diet education\par \par DM- continue admelog and basaglar, pt education to monitor for hypo/hyperglycemia\par \par Anxiety, depression- continue meds and psych ASAP  \par \par Constipation- meds as directed\par \par

## 2019-12-19 NOTE — HISTORY OF PRESENT ILLNESS
[FreeTextEntry1] : 48 yo  was seen at home  with Telemedicine visit, recently admitted  at [facility] for Left hip arthritis,Left upper and lower end fibula fracture.Pt was then discharged to [facility] for  additional ABT  and therapy and discharged home after.,Pt seen by ortho yesterday and next visit scheduled in 2 weeks, splint with ace bandage on. Pt to continue  IVIG doses by VNS. PICC line intact and no s/s of infection., denies CP, SOB, No abdominal discomfort.  Pt was started on Trintellix 5mg and Trazadone, pt questioning about Quetapine since on prior to hospital admission, will have Psych FLU ASAP to clarify meds.Pt cont on Morpine for pain\par

## 2019-12-20 NOTE — PROGRESS NOTE ADULT - SUBJECTIVE AND OBJECTIVE BOX
Interventional Radiology Follow- Up Note      49y Male s/p PICC line placement  on 11/20/19  in Interventional Radiology with CHIQUITA Mills        Plan:  Patient came to IR for PICC line evaluation   PICC placed 11/20 in IR - patient was sent to nursing facility - upon discharge no home care was ordered for continued medication  Primary care doctor requested PICC evaluation d/t lack of routine flushes done post discharge  PICC evaluated at bedside - clean, dry, intact, - flushed and aspirated without resistance   ok to use - 5Fr single lumen 47cm line - tip in SVC     Please call Interventional Radiology [phone] with any questions, concerns, or issues regarding above.

## 2019-12-31 PROBLEM — F41.9 ANXIETY AND DEPRESSION: Status: ACTIVE | Noted: 2018-08-27

## 2019-12-31 PROBLEM — E11.9 DIABETES MELLITUS: Status: ACTIVE | Noted: 2018-08-27

## 2019-12-31 PROBLEM — S82.409A FRACTURE, FIBULA: Status: ACTIVE | Noted: 2019-01-01

## 2019-12-31 PROBLEM — E55.9 VITAMIN D DEFICIENCY: Status: ACTIVE | Noted: 2018-10-31

## 2019-12-31 PROBLEM — I25.10 CAD (CORONARY ARTERY DISEASE): Status: ACTIVE | Noted: 2019-08-09

## 2020-01-01 ENCOUNTER — APPOINTMENT (OUTPATIENT)
Dept: NEUROLOGY | Facility: CLINIC | Age: 50
End: 2020-01-01

## 2020-01-01 ENCOUNTER — LABORATORY RESULT (OUTPATIENT)
Age: 50
End: 2020-01-01

## 2020-01-01 ENCOUNTER — RESULT REVIEW (OUTPATIENT)
Age: 50
End: 2020-01-01

## 2020-01-01 ENCOUNTER — RX RENEWAL (OUTPATIENT)
Age: 50
End: 2020-01-01

## 2020-01-01 ENCOUNTER — OUTPATIENT (OUTPATIENT)
Dept: OUTPATIENT SERVICES | Facility: HOSPITAL | Age: 50
LOS: 1 days | Discharge: HOME | End: 2020-01-01

## 2020-01-01 ENCOUNTER — OTHER (OUTPATIENT)
Age: 50
End: 2020-01-01

## 2020-01-01 ENCOUNTER — APPOINTMENT (OUTPATIENT)
Dept: NEUROLOGY | Facility: CLINIC | Age: 50
End: 2020-01-01
Payer: MEDICARE

## 2020-01-01 ENCOUNTER — OUTPATIENT (OUTPATIENT)
Dept: OUTPATIENT SERVICES | Facility: HOSPITAL | Age: 50
LOS: 1 days | Discharge: HOME | End: 2020-01-01
Payer: MEDICARE

## 2020-01-01 ENCOUNTER — RX CHANGE (OUTPATIENT)
Age: 50
End: 2020-01-01

## 2020-01-01 ENCOUNTER — INPATIENT (INPATIENT)
Facility: HOSPITAL | Age: 50
LOS: 15 days | End: 2020-09-16
Attending: INTERNAL MEDICINE | Admitting: INTERNAL MEDICINE
Payer: MEDICARE

## 2020-01-01 VITALS
TEMPERATURE: 100 F | SYSTOLIC BLOOD PRESSURE: 188 MMHG | HEART RATE: 74 BPM | RESPIRATION RATE: 18 BRPM | OXYGEN SATURATION: 96 % | DIASTOLIC BLOOD PRESSURE: 90 MMHG

## 2020-01-01 VITALS
OXYGEN SATURATION: 95 % | BODY MASS INDEX: 27.44 KG/M2 | HEART RATE: 69 BPM | TEMPERATURE: 98.5 F | WEIGHT: 196 LBS | HEIGHT: 71 IN | SYSTOLIC BLOOD PRESSURE: 105 MMHG | DIASTOLIC BLOOD PRESSURE: 69 MMHG

## 2020-01-01 VITALS — SYSTOLIC BLOOD PRESSURE: 141 MMHG | RESPIRATION RATE: 18 BRPM | DIASTOLIC BLOOD PRESSURE: 80 MMHG | HEART RATE: 79 BPM

## 2020-01-01 VITALS
TEMPERATURE: 99 F | RESPIRATION RATE: 20 BRPM | HEART RATE: 67 BPM | SYSTOLIC BLOOD PRESSURE: 116 MMHG | DIASTOLIC BLOOD PRESSURE: 72 MMHG

## 2020-01-01 VITALS
TEMPERATURE: 103 F | RESPIRATION RATE: 20 BRPM | DIASTOLIC BLOOD PRESSURE: 66 MMHG | SYSTOLIC BLOOD PRESSURE: 101 MMHG | HEART RATE: 129 BPM

## 2020-01-01 DIAGNOSIS — G61.81 CHRONIC INFLAMMATORY DEMYELINATING POLYNEURITIS: ICD-10-CM

## 2020-01-01 DIAGNOSIS — Z96.641 PRESENCE OF RIGHT ARTIFICIAL HIP JOINT: Chronic | ICD-10-CM

## 2020-01-01 DIAGNOSIS — Z79.82 LONG TERM (CURRENT) USE OF ASPIRIN: ICD-10-CM

## 2020-01-01 DIAGNOSIS — Z95.1 PRESENCE OF AORTOCORONARY BYPASS GRAFT: Chronic | ICD-10-CM

## 2020-01-01 DIAGNOSIS — Z79.1 LONG TERM (CURRENT) USE OF NON-STEROIDAL ANTI-INFLAMMATORIES (NSAID): ICD-10-CM

## 2020-01-01 DIAGNOSIS — N40.0 BENIGN PROSTATIC HYPERPLASIA WITHOUT LOWER URINARY TRACT SYMPTOMS: ICD-10-CM

## 2020-01-01 DIAGNOSIS — R26.0 ATAXIC GAIT: ICD-10-CM

## 2020-01-01 DIAGNOSIS — Z96.643 PRESENCE OF ARTIFICIAL HIP JOINT, BILATERAL: ICD-10-CM

## 2020-01-01 DIAGNOSIS — Z96.642 PRESENCE OF LEFT ARTIFICIAL HIP JOINT: Chronic | ICD-10-CM

## 2020-01-01 DIAGNOSIS — Z90.49 ACQUIRED ABSENCE OF OTHER SPECIFIED PARTS OF DIGESTIVE TRACT: Chronic | ICD-10-CM

## 2020-01-01 DIAGNOSIS — Z88.0 ALLERGY STATUS TO PENICILLIN: ICD-10-CM

## 2020-01-01 DIAGNOSIS — Z79.4 LONG TERM (CURRENT) USE OF INSULIN: ICD-10-CM

## 2020-01-01 DIAGNOSIS — Z79.02 LONG TERM (CURRENT) USE OF ANTITHROMBOTICS/ANTIPLATELETS: ICD-10-CM

## 2020-01-01 DIAGNOSIS — R53.82 CHRONIC FATIGUE, UNSPECIFIED: ICD-10-CM

## 2020-01-01 DIAGNOSIS — F41.8 OTHER SPECIFIED ANXIETY DISORDERS: ICD-10-CM

## 2020-01-01 DIAGNOSIS — G71.9 PRIMARY DISORDER OF MUSCLE, UNSPECIFIED: ICD-10-CM

## 2020-01-01 DIAGNOSIS — I25.2 OLD MYOCARDIAL INFARCTION: ICD-10-CM

## 2020-01-01 DIAGNOSIS — Z79.2 LONG TERM (CURRENT) USE OF ANTIBIOTICS: ICD-10-CM

## 2020-01-01 DIAGNOSIS — H95.112: ICD-10-CM

## 2020-01-01 DIAGNOSIS — E11.9 TYPE 2 DIABETES MELLITUS WITHOUT COMPLICATIONS: ICD-10-CM

## 2020-01-01 DIAGNOSIS — Z79.891 LONG TERM (CURRENT) USE OF OPIATE ANALGESIC: ICD-10-CM

## 2020-01-01 DIAGNOSIS — Z95.1 PRESENCE OF AORTOCORONARY BYPASS GRAFT: ICD-10-CM

## 2020-01-01 DIAGNOSIS — G89.4 CHRONIC PAIN SYNDROME: ICD-10-CM

## 2020-01-01 LAB
A1C WITH ESTIMATED AVERAGE GLUCOSE RESULT: 7.3 % — HIGH (ref 4–5.6)
ALBUMIN SERPL ELPH-MCNC: 3.4 G/DL — LOW (ref 3.5–5.2)
ALBUMIN SERPL ELPH-MCNC: 3.4 G/DL — LOW (ref 3.5–5.2)
ALBUMIN SERPL ELPH-MCNC: 3.5 G/DL — SIGNIFICANT CHANGE UP (ref 3.5–5.2)
ALBUMIN SERPL ELPH-MCNC: 3.5 G/DL — SIGNIFICANT CHANGE UP (ref 3.5–5.2)
ALBUMIN SERPL ELPH-MCNC: 3.6 G/DL — SIGNIFICANT CHANGE UP (ref 3.5–5.2)
ALBUMIN SERPL ELPH-MCNC: 3.7 G/DL — SIGNIFICANT CHANGE UP (ref 3.5–5.2)
ALBUMIN SERPL ELPH-MCNC: 3.7 G/DL — SIGNIFICANT CHANGE UP (ref 3.5–5.2)
ALBUMIN SERPL ELPH-MCNC: 3.8 G/DL — SIGNIFICANT CHANGE UP (ref 3.5–5.2)
ALBUMIN SERPL ELPH-MCNC: 4 G/DL — SIGNIFICANT CHANGE UP (ref 3.5–5.2)
ALBUMIN SERPL ELPH-MCNC: 4.1 G/DL — SIGNIFICANT CHANGE UP (ref 3.5–5.2)
ALBUMIN SERPL ELPH-MCNC: 4.1 G/DL — SIGNIFICANT CHANGE UP (ref 3.5–5.2)
ALBUMIN SERPL ELPH-MCNC: 4.2 G/DL — SIGNIFICANT CHANGE UP (ref 3.5–5.2)
ALBUMIN SERPL ELPH-MCNC: 4.4 G/DL — SIGNIFICANT CHANGE UP (ref 3.5–5.2)
ALP SERPL-CCNC: 100 U/L — SIGNIFICANT CHANGE UP (ref 30–115)
ALP SERPL-CCNC: 101 U/L — SIGNIFICANT CHANGE UP (ref 30–115)
ALP SERPL-CCNC: 103 U/L — SIGNIFICANT CHANGE UP (ref 30–115)
ALP SERPL-CCNC: 106 U/L — SIGNIFICANT CHANGE UP (ref 30–115)
ALP SERPL-CCNC: 106 U/L — SIGNIFICANT CHANGE UP (ref 30–115)
ALP SERPL-CCNC: 107 U/L — SIGNIFICANT CHANGE UP (ref 30–115)
ALP SERPL-CCNC: 114 U/L — SIGNIFICANT CHANGE UP (ref 30–115)
ALP SERPL-CCNC: 125 U/L — HIGH (ref 30–115)
ALP SERPL-CCNC: 159 U/L — HIGH (ref 30–115)
ALP SERPL-CCNC: 167 U/L — HIGH (ref 30–115)
ALP SERPL-CCNC: 172 U/L — HIGH (ref 30–115)
ALP SERPL-CCNC: 84 U/L — SIGNIFICANT CHANGE UP (ref 30–115)
ALP SERPL-CCNC: 87 U/L — SIGNIFICANT CHANGE UP (ref 30–115)
ALP SERPL-CCNC: 88 U/L — SIGNIFICANT CHANGE UP (ref 30–115)
ALP SERPL-CCNC: 99 U/L — SIGNIFICANT CHANGE UP (ref 30–115)
ALT FLD-CCNC: 11 U/L — SIGNIFICANT CHANGE UP (ref 0–41)
ALT FLD-CCNC: 12 U/L — SIGNIFICANT CHANGE UP (ref 0–41)
ALT FLD-CCNC: 12 U/L — SIGNIFICANT CHANGE UP (ref 0–41)
ALT FLD-CCNC: 13 U/L — SIGNIFICANT CHANGE UP (ref 0–41)
ALT FLD-CCNC: 134 U/L — HIGH (ref 0–41)
ALT FLD-CCNC: 14 U/L — SIGNIFICANT CHANGE UP (ref 0–41)
ALT FLD-CCNC: 19 U/L — SIGNIFICANT CHANGE UP (ref 0–41)
ANION GAP SERPL CALC-SCNC: 10 MMOL/L — SIGNIFICANT CHANGE UP (ref 7–14)
ANION GAP SERPL CALC-SCNC: 10 MMOL/L — SIGNIFICANT CHANGE UP (ref 7–14)
ANION GAP SERPL CALC-SCNC: 11 MMOL/L — SIGNIFICANT CHANGE UP (ref 7–14)
ANION GAP SERPL CALC-SCNC: 11 MMOL/L — SIGNIFICANT CHANGE UP (ref 7–14)
ANION GAP SERPL CALC-SCNC: 12 MMOL/L — SIGNIFICANT CHANGE UP (ref 7–14)
ANION GAP SERPL CALC-SCNC: 12 MMOL/L — SIGNIFICANT CHANGE UP (ref 7–14)
ANION GAP SERPL CALC-SCNC: 13 MMOL/L — SIGNIFICANT CHANGE UP (ref 7–14)
ANION GAP SERPL CALC-SCNC: 14 MMOL/L — SIGNIFICANT CHANGE UP (ref 7–14)
ANION GAP SERPL CALC-SCNC: 15 MMOL/L — HIGH (ref 7–14)
ANION GAP SERPL CALC-SCNC: 16 MMOL/L — HIGH (ref 7–14)
ANION GAP SERPL CALC-SCNC: 17 MMOL/L — HIGH (ref 7–14)
ANION GAP SERPL CALC-SCNC: 18 MMOL/L — HIGH (ref 7–14)
ANION GAP SERPL CALC-SCNC: 20 MMOL/L — HIGH (ref 7–14)
APPEARANCE CSF: CLEAR — SIGNIFICANT CHANGE UP
APPEARANCE SPUN FLD: COLORLESS — SIGNIFICANT CHANGE UP
APPEARANCE UR: ABNORMAL
APPEARANCE UR: CLEAR — SIGNIFICANT CHANGE UP
APTT BLD: 59.1 SEC — HIGH (ref 27–39.2)
AST SERPL-CCNC: 15 U/L — SIGNIFICANT CHANGE UP (ref 0–41)
AST SERPL-CCNC: 17 U/L — SIGNIFICANT CHANGE UP (ref 0–41)
AST SERPL-CCNC: 18 U/L — SIGNIFICANT CHANGE UP (ref 0–41)
AST SERPL-CCNC: 18 U/L — SIGNIFICANT CHANGE UP (ref 0–41)
AST SERPL-CCNC: 19 U/L — SIGNIFICANT CHANGE UP (ref 0–41)
AST SERPL-CCNC: 19 U/L — SIGNIFICANT CHANGE UP (ref 0–41)
AST SERPL-CCNC: 20 U/L — SIGNIFICANT CHANGE UP (ref 0–41)
AST SERPL-CCNC: 22 U/L — SIGNIFICANT CHANGE UP (ref 0–41)
AST SERPL-CCNC: 23 U/L — SIGNIFICANT CHANGE UP (ref 0–41)
AST SERPL-CCNC: 24 U/L — SIGNIFICANT CHANGE UP (ref 0–41)
AST SERPL-CCNC: 25 U/L — SIGNIFICANT CHANGE UP (ref 0–41)
AST SERPL-CCNC: 27 U/L — SIGNIFICANT CHANGE UP (ref 0–41)
AST SERPL-CCNC: 81 U/L — HIGH (ref 0–41)
B BURGDOR DNA SPEC QL NAA+PROBE: NEGATIVE — SIGNIFICANT CHANGE UP
B-OH-BUTYR SERPL-SCNC: 0.2 MMOL/L — SIGNIFICANT CHANGE UP
BACTERIA # UR AUTO: 0 — SIGNIFICANT CHANGE UP
BACTERIA # UR AUTO: ABNORMAL
BACTERIA # UR AUTO: NEGATIVE — SIGNIFICANT CHANGE UP
BACTERIA # UR AUTO: NEGATIVE — SIGNIFICANT CHANGE UP
BASE EXCESS BLDA CALC-SCNC: 2 MMOL/L — SIGNIFICANT CHANGE UP (ref -2–2)
BASE EXCESS BLDA CALC-SCNC: 5.6 MMOL/L — HIGH (ref -2–2)
BASOPHILS # BLD AUTO: 0.03 K/UL — SIGNIFICANT CHANGE UP (ref 0–0.2)
BASOPHILS # BLD AUTO: 0.04 K/UL — SIGNIFICANT CHANGE UP (ref 0–0.2)
BASOPHILS # BLD AUTO: 0.05 K/UL — SIGNIFICANT CHANGE UP (ref 0–0.2)
BASOPHILS # BLD AUTO: 0.05 K/UL — SIGNIFICANT CHANGE UP (ref 0–0.2)
BASOPHILS # BLD AUTO: 0.06 K/UL — SIGNIFICANT CHANGE UP (ref 0–0.2)
BASOPHILS # BLD AUTO: 0.07 K/UL — SIGNIFICANT CHANGE UP (ref 0–0.2)
BASOPHILS # BLD AUTO: 0.08 K/UL — SIGNIFICANT CHANGE UP (ref 0–0.2)
BASOPHILS # BLD AUTO: 0.09 K/UL — SIGNIFICANT CHANGE UP (ref 0–0.2)
BASOPHILS # BLD AUTO: 0.11 K/UL — SIGNIFICANT CHANGE UP (ref 0–0.2)
BASOPHILS NFR BLD AUTO: 0.3 % — SIGNIFICANT CHANGE UP (ref 0–1)
BASOPHILS NFR BLD AUTO: 0.4 % — SIGNIFICANT CHANGE UP (ref 0–1)
BASOPHILS NFR BLD AUTO: 0.4 % — SIGNIFICANT CHANGE UP (ref 0–1)
BASOPHILS NFR BLD AUTO: 0.5 % — SIGNIFICANT CHANGE UP (ref 0–1)
BASOPHILS NFR BLD AUTO: 0.5 % — SIGNIFICANT CHANGE UP (ref 0–1)
BASOPHILS NFR BLD AUTO: 0.6 % — SIGNIFICANT CHANGE UP (ref 0–1)
BASOPHILS NFR BLD AUTO: 0.6 % — SIGNIFICANT CHANGE UP (ref 0–1)
BASOPHILS NFR BLD AUTO: 0.7 % — SIGNIFICANT CHANGE UP (ref 0–1)
BASOPHILS NFR BLD AUTO: 0.7 % — SIGNIFICANT CHANGE UP (ref 0–1)
BILIRUB SERPL-MCNC: 0.7 MG/DL — SIGNIFICANT CHANGE UP (ref 0.2–1.2)
BILIRUB SERPL-MCNC: 0.7 MG/DL — SIGNIFICANT CHANGE UP (ref 0.2–1.2)
BILIRUB SERPL-MCNC: 0.8 MG/DL — SIGNIFICANT CHANGE UP (ref 0.2–1.2)
BILIRUB SERPL-MCNC: 0.9 MG/DL — SIGNIFICANT CHANGE UP (ref 0.2–1.2)
BILIRUB SERPL-MCNC: 0.9 MG/DL — SIGNIFICANT CHANGE UP (ref 0.2–1.2)
BILIRUB SERPL-MCNC: 1 MG/DL — SIGNIFICANT CHANGE UP (ref 0.2–1.2)
BILIRUB SERPL-MCNC: 1.1 MG/DL — SIGNIFICANT CHANGE UP (ref 0.2–1.2)
BILIRUB SERPL-MCNC: 1.2 MG/DL — SIGNIFICANT CHANGE UP (ref 0.2–1.2)
BILIRUB UR-MCNC: ABNORMAL
BILIRUB UR-MCNC: NEGATIVE — SIGNIFICANT CHANGE UP
BUN SERPL-MCNC: 10 MG/DL — SIGNIFICANT CHANGE UP (ref 10–20)
BUN SERPL-MCNC: 11 MG/DL — SIGNIFICANT CHANGE UP (ref 10–20)
BUN SERPL-MCNC: 12 MG/DL — SIGNIFICANT CHANGE UP (ref 10–20)
BUN SERPL-MCNC: 13 MG/DL — SIGNIFICANT CHANGE UP (ref 10–20)
BUN SERPL-MCNC: 13 MG/DL — SIGNIFICANT CHANGE UP (ref 10–20)
BUN SERPL-MCNC: 14 MG/DL — SIGNIFICANT CHANGE UP (ref 10–20)
BUN SERPL-MCNC: 14 MG/DL — SIGNIFICANT CHANGE UP (ref 10–20)
BUN SERPL-MCNC: 15 MG/DL — SIGNIFICANT CHANGE UP (ref 10–20)
BUN SERPL-MCNC: 15 MG/DL — SIGNIFICANT CHANGE UP (ref 10–20)
BUN SERPL-MCNC: 16 MG/DL — SIGNIFICANT CHANGE UP (ref 10–20)
BUN SERPL-MCNC: 17 MG/DL — SIGNIFICANT CHANGE UP (ref 10–20)
BUN SERPL-MCNC: 20 MG/DL — SIGNIFICANT CHANGE UP (ref 10–20)
BUN SERPL-MCNC: 22 MG/DL — HIGH (ref 10–20)
BUN SERPL-MCNC: 22 MG/DL — HIGH (ref 10–20)
BUN SERPL-MCNC: 24 MG/DL — HIGH (ref 10–20)
BUN SERPL-MCNC: 32 MG/DL — HIGH (ref 10–20)
BUN SERPL-MCNC: 33 MG/DL — HIGH (ref 10–20)
BUN SERPL-MCNC: 56 MG/DL — HIGH (ref 10–20)
BUN SERPL-MCNC: 8 MG/DL — LOW (ref 10–20)
CALCIUM SERPL-MCNC: 7.6 MG/DL — LOW (ref 8.5–10.1)
CALCIUM SERPL-MCNC: 7.9 MG/DL — LOW (ref 8.5–10.1)
CALCIUM SERPL-MCNC: 8 MG/DL — LOW (ref 8.5–10.1)
CALCIUM SERPL-MCNC: 8.2 MG/DL — LOW (ref 8.5–10.1)
CALCIUM SERPL-MCNC: 8.2 MG/DL — LOW (ref 8.5–10.1)
CALCIUM SERPL-MCNC: 8.3 MG/DL — LOW (ref 8.5–10.1)
CALCIUM SERPL-MCNC: 8.4 MG/DL — LOW (ref 8.5–10.1)
CALCIUM SERPL-MCNC: 8.4 MG/DL — LOW (ref 8.5–10.1)
CALCIUM SERPL-MCNC: 8.5 MG/DL — SIGNIFICANT CHANGE UP (ref 8.5–10.1)
CALCIUM SERPL-MCNC: 8.7 MG/DL — SIGNIFICANT CHANGE UP (ref 8.5–10.1)
CALCIUM SERPL-MCNC: 8.9 MG/DL — SIGNIFICANT CHANGE UP (ref 8.5–10.1)
CALCIUM SERPL-MCNC: 9 MG/DL — SIGNIFICANT CHANGE UP (ref 8.5–10.1)
CALCIUM SERPL-MCNC: 9 MG/DL — SIGNIFICANT CHANGE UP (ref 8.5–10.1)
CALCIUM SERPL-MCNC: 9.1 MG/DL — SIGNIFICANT CHANGE UP (ref 8.5–10.1)
CALCIUM SERPL-MCNC: 9.2 MG/DL — SIGNIFICANT CHANGE UP (ref 8.5–10.1)
CALCIUM SERPL-MCNC: 9.2 MG/DL — SIGNIFICANT CHANGE UP (ref 8.5–10.1)
CHLORIDE SERPL-SCNC: 100 MMOL/L — SIGNIFICANT CHANGE UP (ref 98–110)
CHLORIDE SERPL-SCNC: 101 MMOL/L — SIGNIFICANT CHANGE UP (ref 98–110)
CHLORIDE SERPL-SCNC: 102 MMOL/L — SIGNIFICANT CHANGE UP (ref 98–110)
CHLORIDE SERPL-SCNC: 102 MMOL/L — SIGNIFICANT CHANGE UP (ref 98–110)
CHLORIDE SERPL-SCNC: 103 MMOL/L — SIGNIFICANT CHANGE UP (ref 98–110)
CHLORIDE SERPL-SCNC: 104 MMOL/L — SIGNIFICANT CHANGE UP (ref 98–110)
CHLORIDE SERPL-SCNC: 96 MMOL/L — LOW (ref 98–110)
CHLORIDE SERPL-SCNC: 97 MMOL/L — LOW (ref 98–110)
CHLORIDE SERPL-SCNC: 97 MMOL/L — LOW (ref 98–110)
CHLORIDE SERPL-SCNC: 98 MMOL/L — SIGNIFICANT CHANGE UP (ref 98–110)
CHLORIDE SERPL-SCNC: 99 MMOL/L — SIGNIFICANT CHANGE UP (ref 98–110)
CHLORIDE SERPL-SCNC: 99 MMOL/L — SIGNIFICANT CHANGE UP (ref 98–110)
CHOLEST SERPL-MCNC: 113 MG/DL — SIGNIFICANT CHANGE UP (ref 100–200)
CO2 SERPL-SCNC: 19 MMOL/L — SIGNIFICANT CHANGE UP (ref 17–32)
CO2 SERPL-SCNC: 21 MMOL/L — SIGNIFICANT CHANGE UP (ref 17–32)
CO2 SERPL-SCNC: 22 MMOL/L — SIGNIFICANT CHANGE UP (ref 17–32)
CO2 SERPL-SCNC: 23 MMOL/L — SIGNIFICANT CHANGE UP (ref 17–32)
CO2 SERPL-SCNC: 24 MMOL/L — SIGNIFICANT CHANGE UP (ref 17–32)
CO2 SERPL-SCNC: 26 MMOL/L — SIGNIFICANT CHANGE UP (ref 17–32)
CO2 SERPL-SCNC: 27 MMOL/L — SIGNIFICANT CHANGE UP (ref 17–32)
CO2 SERPL-SCNC: 28 MMOL/L — SIGNIFICANT CHANGE UP (ref 17–32)
CO2 SERPL-SCNC: 29 MMOL/L — SIGNIFICANT CHANGE UP (ref 17–32)
CO2 SERPL-SCNC: 30 MMOL/L — SIGNIFICANT CHANGE UP (ref 17–32)
COLOR CSF: SIGNIFICANT CHANGE UP
COLOR SPEC: YELLOW — SIGNIFICANT CHANGE UP
CREAT SERPL-MCNC: 0.7 MG/DL — SIGNIFICANT CHANGE UP (ref 0.7–1.5)
CREAT SERPL-MCNC: 0.8 MG/DL — SIGNIFICANT CHANGE UP (ref 0.7–1.5)
CREAT SERPL-MCNC: 0.9 MG/DL — SIGNIFICANT CHANGE UP (ref 0.7–1.5)
CREAT SERPL-MCNC: 1 MG/DL — SIGNIFICANT CHANGE UP (ref 0.7–1.5)
CREAT SERPL-MCNC: 1.1 MG/DL — SIGNIFICANT CHANGE UP (ref 0.7–1.5)
CREAT SERPL-MCNC: 1.2 MG/DL — SIGNIFICANT CHANGE UP (ref 0.7–1.5)
CREAT SERPL-MCNC: 1.2 MG/DL — SIGNIFICANT CHANGE UP (ref 0.7–1.5)
CREAT SERPL-MCNC: 2.1 MG/DL — HIGH (ref 0.7–1.5)
CRYPTOC AG CSF-ACNC: NEGATIVE — SIGNIFICANT CHANGE UP
CSF PCR RESULT: SIGNIFICANT CHANGE UP
CULTURE RESULTS: NO GROWTH — SIGNIFICANT CHANGE UP
CULTURE RESULTS: SIGNIFICANT CHANGE UP
DIFF PNL FLD: ABNORMAL
DIFF PNL FLD: NEGATIVE — SIGNIFICANT CHANGE UP
EOSINOPHIL # BLD AUTO: 0 K/UL — SIGNIFICANT CHANGE UP (ref 0–0.7)
EOSINOPHIL # BLD AUTO: 0.01 K/UL — SIGNIFICANT CHANGE UP (ref 0–0.7)
EOSINOPHIL # BLD AUTO: 0.01 K/UL — SIGNIFICANT CHANGE UP (ref 0–0.7)
EOSINOPHIL # BLD AUTO: 0.06 K/UL — SIGNIFICANT CHANGE UP (ref 0–0.7)
EOSINOPHIL # BLD AUTO: 0.08 K/UL — SIGNIFICANT CHANGE UP (ref 0–0.7)
EOSINOPHIL # BLD AUTO: 0.12 K/UL — SIGNIFICANT CHANGE UP (ref 0–0.7)
EOSINOPHIL # BLD AUTO: 0.29 K/UL — SIGNIFICANT CHANGE UP (ref 0–0.7)
EOSINOPHIL # BLD AUTO: 0.32 K/UL — SIGNIFICANT CHANGE UP (ref 0–0.7)
EOSINOPHIL # BLD AUTO: 0.47 K/UL — SIGNIFICANT CHANGE UP (ref 0–0.7)
EOSINOPHIL # BLD AUTO: 0.49 K/UL — SIGNIFICANT CHANGE UP (ref 0–0.7)
EOSINOPHIL NFR BLD AUTO: 0 % — SIGNIFICANT CHANGE UP (ref 0–8)
EOSINOPHIL NFR BLD AUTO: 0.1 % — SIGNIFICANT CHANGE UP (ref 0–8)
EOSINOPHIL NFR BLD AUTO: 0.1 % — SIGNIFICANT CHANGE UP (ref 0–8)
EOSINOPHIL NFR BLD AUTO: 0.4 % — SIGNIFICANT CHANGE UP (ref 0–8)
EOSINOPHIL NFR BLD AUTO: 0.9 % — SIGNIFICANT CHANGE UP (ref 0–8)
EOSINOPHIL NFR BLD AUTO: 1 % — SIGNIFICANT CHANGE UP (ref 0–8)
EOSINOPHIL NFR BLD AUTO: 3 % — SIGNIFICANT CHANGE UP (ref 0–8)
EOSINOPHIL NFR BLD AUTO: 3 % — SIGNIFICANT CHANGE UP (ref 0–8)
EOSINOPHIL NFR BLD AUTO: 3.6 % — SIGNIFICANT CHANGE UP (ref 0–8)
EOSINOPHIL NFR BLD AUTO: 5.2 % — SIGNIFICANT CHANGE UP (ref 0–8)
EPI CELLS # UR: 1 /HPF — SIGNIFICANT CHANGE UP (ref 0–5)
EPI CELLS # UR: 1 /HPF — SIGNIFICANT CHANGE UP (ref 0–5)
EPI CELLS # UR: 2 /HPF — SIGNIFICANT CHANGE UP (ref 0–5)
EPI CELLS # UR: 7 /HPF — HIGH (ref 0–5)
ESTIMATED AVERAGE GLUCOSE: 163 MG/DL — HIGH (ref 68–114)
GAS PNL BLDA: SIGNIFICANT CHANGE UP
GAS PNL BLDA: SIGNIFICANT CHANGE UP
GLUCOSE BLDC GLUCOMTR-MCNC: 110 MG/DL — HIGH (ref 70–99)
GLUCOSE BLDC GLUCOMTR-MCNC: 112 MG/DL — HIGH (ref 70–99)
GLUCOSE BLDC GLUCOMTR-MCNC: 118 MG/DL — HIGH (ref 70–99)
GLUCOSE BLDC GLUCOMTR-MCNC: 118 MG/DL — HIGH (ref 70–99)
GLUCOSE BLDC GLUCOMTR-MCNC: 121 MG/DL — HIGH (ref 70–99)
GLUCOSE BLDC GLUCOMTR-MCNC: 124 MG/DL — HIGH (ref 70–99)
GLUCOSE BLDC GLUCOMTR-MCNC: 127 MG/DL — HIGH (ref 70–99)
GLUCOSE BLDC GLUCOMTR-MCNC: 127 MG/DL — HIGH (ref 70–99)
GLUCOSE BLDC GLUCOMTR-MCNC: 133 MG/DL — HIGH (ref 70–99)
GLUCOSE BLDC GLUCOMTR-MCNC: 134 MG/DL — HIGH (ref 70–99)
GLUCOSE BLDC GLUCOMTR-MCNC: 136 MG/DL — HIGH (ref 70–99)
GLUCOSE BLDC GLUCOMTR-MCNC: 137 MG/DL — HIGH (ref 70–99)
GLUCOSE BLDC GLUCOMTR-MCNC: 139 MG/DL — HIGH (ref 70–99)
GLUCOSE BLDC GLUCOMTR-MCNC: 143 MG/DL — HIGH (ref 70–99)
GLUCOSE BLDC GLUCOMTR-MCNC: 147 MG/DL — HIGH (ref 70–99)
GLUCOSE BLDC GLUCOMTR-MCNC: 150 MG/DL — HIGH (ref 70–99)
GLUCOSE BLDC GLUCOMTR-MCNC: 152 MG/DL — HIGH (ref 70–99)
GLUCOSE BLDC GLUCOMTR-MCNC: 153 MG/DL — HIGH (ref 70–99)
GLUCOSE BLDC GLUCOMTR-MCNC: 162 MG/DL — HIGH (ref 70–99)
GLUCOSE BLDC GLUCOMTR-MCNC: 167 MG/DL — HIGH (ref 70–99)
GLUCOSE BLDC GLUCOMTR-MCNC: 168 MG/DL — HIGH (ref 70–99)
GLUCOSE BLDC GLUCOMTR-MCNC: 168 MG/DL — HIGH (ref 70–99)
GLUCOSE BLDC GLUCOMTR-MCNC: 181 MG/DL — HIGH (ref 70–99)
GLUCOSE BLDC GLUCOMTR-MCNC: 185 MG/DL — HIGH (ref 70–99)
GLUCOSE BLDC GLUCOMTR-MCNC: 185 MG/DL — HIGH (ref 70–99)
GLUCOSE BLDC GLUCOMTR-MCNC: 188 MG/DL — HIGH (ref 70–99)
GLUCOSE BLDC GLUCOMTR-MCNC: 189 MG/DL — HIGH (ref 70–99)
GLUCOSE BLDC GLUCOMTR-MCNC: 190 MG/DL — HIGH (ref 70–99)
GLUCOSE BLDC GLUCOMTR-MCNC: 190 MG/DL — HIGH (ref 70–99)
GLUCOSE BLDC GLUCOMTR-MCNC: 192 MG/DL — HIGH (ref 70–99)
GLUCOSE BLDC GLUCOMTR-MCNC: 195 MG/DL — HIGH (ref 70–99)
GLUCOSE BLDC GLUCOMTR-MCNC: 197 MG/DL — HIGH (ref 70–99)
GLUCOSE BLDC GLUCOMTR-MCNC: 198 MG/DL — HIGH (ref 70–99)
GLUCOSE BLDC GLUCOMTR-MCNC: 199 MG/DL — HIGH (ref 70–99)
GLUCOSE BLDC GLUCOMTR-MCNC: 199 MG/DL — HIGH (ref 70–99)
GLUCOSE BLDC GLUCOMTR-MCNC: 200 MG/DL — HIGH (ref 70–99)
GLUCOSE BLDC GLUCOMTR-MCNC: 201 MG/DL — HIGH (ref 70–99)
GLUCOSE BLDC GLUCOMTR-MCNC: 201 MG/DL — HIGH (ref 70–99)
GLUCOSE BLDC GLUCOMTR-MCNC: 205 MG/DL — HIGH (ref 70–99)
GLUCOSE BLDC GLUCOMTR-MCNC: 206 MG/DL — HIGH (ref 70–99)
GLUCOSE BLDC GLUCOMTR-MCNC: 206 MG/DL — HIGH (ref 70–99)
GLUCOSE BLDC GLUCOMTR-MCNC: 207 MG/DL — HIGH (ref 70–99)
GLUCOSE BLDC GLUCOMTR-MCNC: 208 MG/DL — HIGH (ref 70–99)
GLUCOSE BLDC GLUCOMTR-MCNC: 210 MG/DL — HIGH (ref 70–99)
GLUCOSE BLDC GLUCOMTR-MCNC: 210 MG/DL — HIGH (ref 70–99)
GLUCOSE BLDC GLUCOMTR-MCNC: 212 MG/DL — HIGH (ref 70–99)
GLUCOSE BLDC GLUCOMTR-MCNC: 213 MG/DL — HIGH (ref 70–99)
GLUCOSE BLDC GLUCOMTR-MCNC: 214 MG/DL — HIGH (ref 70–99)
GLUCOSE BLDC GLUCOMTR-MCNC: 215 MG/DL — HIGH (ref 70–99)
GLUCOSE BLDC GLUCOMTR-MCNC: 215 MG/DL — HIGH (ref 70–99)
GLUCOSE BLDC GLUCOMTR-MCNC: 216 MG/DL — HIGH (ref 70–99)
GLUCOSE BLDC GLUCOMTR-MCNC: 220 MG/DL — HIGH (ref 70–99)
GLUCOSE BLDC GLUCOMTR-MCNC: 223 MG/DL — HIGH (ref 70–99)
GLUCOSE BLDC GLUCOMTR-MCNC: 227 MG/DL — HIGH (ref 70–99)
GLUCOSE BLDC GLUCOMTR-MCNC: 234 MG/DL — HIGH (ref 70–99)
GLUCOSE BLDC GLUCOMTR-MCNC: 238 MG/DL — HIGH (ref 70–99)
GLUCOSE BLDC GLUCOMTR-MCNC: 261 MG/DL — HIGH (ref 70–99)
GLUCOSE BLDC GLUCOMTR-MCNC: 271 MG/DL — HIGH (ref 70–99)
GLUCOSE BLDC GLUCOMTR-MCNC: 285 MG/DL — HIGH (ref 70–99)
GLUCOSE BLDC GLUCOMTR-MCNC: 289 MG/DL — HIGH (ref 70–99)
GLUCOSE BLDC GLUCOMTR-MCNC: 296 MG/DL — HIGH (ref 70–99)
GLUCOSE BLDC GLUCOMTR-MCNC: 305 MG/DL — HIGH (ref 70–99)
GLUCOSE BLDC GLUCOMTR-MCNC: 316 MG/DL — HIGH (ref 70–99)
GLUCOSE BLDC GLUCOMTR-MCNC: 318 MG/DL — HIGH (ref 70–99)
GLUCOSE BLDC GLUCOMTR-MCNC: 332 MG/DL — HIGH (ref 70–99)
GLUCOSE BLDC GLUCOMTR-MCNC: 340 MG/DL — HIGH (ref 70–99)
GLUCOSE BLDC GLUCOMTR-MCNC: 343 MG/DL — HIGH (ref 70–99)
GLUCOSE BLDC GLUCOMTR-MCNC: 345 MG/DL — HIGH (ref 70–99)
GLUCOSE BLDC GLUCOMTR-MCNC: 348 MG/DL — HIGH (ref 70–99)
GLUCOSE BLDC GLUCOMTR-MCNC: 349 MG/DL — HIGH (ref 70–99)
GLUCOSE BLDC GLUCOMTR-MCNC: 351 MG/DL — HIGH (ref 70–99)
GLUCOSE BLDC GLUCOMTR-MCNC: 355 MG/DL — HIGH (ref 70–99)
GLUCOSE BLDC GLUCOMTR-MCNC: 360 MG/DL — HIGH (ref 70–99)
GLUCOSE BLDC GLUCOMTR-MCNC: 368 MG/DL — HIGH (ref 70–99)
GLUCOSE BLDC GLUCOMTR-MCNC: 370 MG/DL — HIGH (ref 70–99)
GLUCOSE BLDC GLUCOMTR-MCNC: 403 MG/DL — HIGH (ref 70–99)
GLUCOSE BLDC GLUCOMTR-MCNC: 433 MG/DL — HIGH (ref 70–99)
GLUCOSE BLDC GLUCOMTR-MCNC: 437 MG/DL — HIGH (ref 70–99)
GLUCOSE BLDC GLUCOMTR-MCNC: 449 MG/DL — HIGH (ref 70–99)
GLUCOSE BLDC GLUCOMTR-MCNC: 470 MG/DL — CRITICAL HIGH (ref 70–99)
GLUCOSE BLDC GLUCOMTR-MCNC: 497 MG/DL — CRITICAL HIGH (ref 70–99)
GLUCOSE BLDC GLUCOMTR-MCNC: 72 MG/DL — SIGNIFICANT CHANGE UP (ref 70–99)
GLUCOSE BLDC GLUCOMTR-MCNC: 76 MG/DL — SIGNIFICANT CHANGE UP (ref 70–99)
GLUCOSE BLDC GLUCOMTR-MCNC: 93 MG/DL — SIGNIFICANT CHANGE UP (ref 70–99)
GLUCOSE BLDC GLUCOMTR-MCNC: 95 MG/DL — SIGNIFICANT CHANGE UP (ref 70–99)
GLUCOSE BLDC GLUCOMTR-MCNC: 99 MG/DL — SIGNIFICANT CHANGE UP (ref 70–99)
GLUCOSE CSF-MCNC: 102 MG/DL — HIGH (ref 45–75)
GLUCOSE SERPL-MCNC: 129 MG/DL — HIGH (ref 70–99)
GLUCOSE SERPL-MCNC: 154 MG/DL — HIGH (ref 70–99)
GLUCOSE SERPL-MCNC: 158 MG/DL — HIGH (ref 70–99)
GLUCOSE SERPL-MCNC: 165 MG/DL — HIGH (ref 70–99)
GLUCOSE SERPL-MCNC: 170 MG/DL — HIGH (ref 70–99)
GLUCOSE SERPL-MCNC: 175 MG/DL — HIGH (ref 70–99)
GLUCOSE SERPL-MCNC: 178 MG/DL — HIGH (ref 70–99)
GLUCOSE SERPL-MCNC: 183 MG/DL — HIGH (ref 70–99)
GLUCOSE SERPL-MCNC: 187 MG/DL — HIGH (ref 70–99)
GLUCOSE SERPL-MCNC: 207 MG/DL — HIGH (ref 70–99)
GLUCOSE SERPL-MCNC: 223 MG/DL — HIGH (ref 70–99)
GLUCOSE SERPL-MCNC: 226 MG/DL — HIGH (ref 70–99)
GLUCOSE SERPL-MCNC: 227 MG/DL — HIGH (ref 70–99)
GLUCOSE SERPL-MCNC: 237 MG/DL — HIGH (ref 70–99)
GLUCOSE SERPL-MCNC: 246 MG/DL — HIGH (ref 70–99)
GLUCOSE SERPL-MCNC: 252 MG/DL — HIGH (ref 70–99)
GLUCOSE SERPL-MCNC: 327 MG/DL — HIGH (ref 70–99)
GLUCOSE SERPL-MCNC: 343 MG/DL — HIGH (ref 70–99)
GLUCOSE SERPL-MCNC: 386 MG/DL — HIGH (ref 70–99)
GLUCOSE SERPL-MCNC: 471 MG/DL — CRITICAL HIGH (ref 70–99)
GLUCOSE SERPL-MCNC: 87 MG/DL — SIGNIFICANT CHANGE UP (ref 70–99)
GLUCOSE UR QL: ABNORMAL
GRAM STN FLD: SIGNIFICANT CHANGE UP
GRAM STN FLD: SIGNIFICANT CHANGE UP
HCO3 BLDA-SCNC: 25 MMOL/L — SIGNIFICANT CHANGE UP (ref 21–29)
HCO3 BLDA-SCNC: 29 MMOL/L — SIGNIFICANT CHANGE UP (ref 21–29)
HCT VFR BLD CALC: 42.7 % — SIGNIFICANT CHANGE UP (ref 42–52)
HCT VFR BLD CALC: 43 % — SIGNIFICANT CHANGE UP (ref 42–52)
HCT VFR BLD CALC: 43.1 % — SIGNIFICANT CHANGE UP (ref 42–52)
HCT VFR BLD CALC: 44 % — SIGNIFICANT CHANGE UP (ref 42–52)
HCT VFR BLD CALC: 44.3 % — SIGNIFICANT CHANGE UP (ref 42–52)
HCT VFR BLD CALC: 45.3 % — SIGNIFICANT CHANGE UP (ref 42–52)
HCT VFR BLD CALC: 45.3 % — SIGNIFICANT CHANGE UP (ref 42–52)
HCT VFR BLD CALC: 45.4 % — SIGNIFICANT CHANGE UP (ref 42–52)
HCT VFR BLD CALC: 45.8 % — SIGNIFICANT CHANGE UP (ref 42–52)
HCT VFR BLD CALC: 46 % — SIGNIFICANT CHANGE UP (ref 42–52)
HCT VFR BLD CALC: 46.6 % — SIGNIFICANT CHANGE UP (ref 42–52)
HCT VFR BLD CALC: 46.8 % — SIGNIFICANT CHANGE UP (ref 42–52)
HCT VFR BLD CALC: 47.4 % — SIGNIFICANT CHANGE UP (ref 42–52)
HCT VFR BLD CALC: 49.4 % — SIGNIFICANT CHANGE UP (ref 42–52)
HCT VFR BLD CALC: 50 % — SIGNIFICANT CHANGE UP (ref 42–52)
HCT VFR BLD CALC: 50 % — SIGNIFICANT CHANGE UP (ref 42–52)
HCT VFR BLD CALC: 55.9 % — HIGH (ref 42–52)
HDLC SERPL-MCNC: 28 MG/DL — LOW
HGB BLD-MCNC: 13.5 G/DL — LOW (ref 14–18)
HGB BLD-MCNC: 13.6 G/DL — LOW (ref 14–18)
HGB BLD-MCNC: 13.6 G/DL — LOW (ref 14–18)
HGB BLD-MCNC: 13.9 G/DL — LOW (ref 14–18)
HGB BLD-MCNC: 13.9 G/DL — LOW (ref 14–18)
HGB BLD-MCNC: 14.3 G/DL — SIGNIFICANT CHANGE UP (ref 14–18)
HGB BLD-MCNC: 14.4 G/DL — SIGNIFICANT CHANGE UP (ref 14–18)
HGB BLD-MCNC: 14.4 G/DL — SIGNIFICANT CHANGE UP (ref 14–18)
HGB BLD-MCNC: 14.5 G/DL — SIGNIFICANT CHANGE UP (ref 14–18)
HGB BLD-MCNC: 14.6 G/DL — SIGNIFICANT CHANGE UP (ref 14–18)
HGB BLD-MCNC: 14.6 G/DL — SIGNIFICANT CHANGE UP (ref 14–18)
HGB BLD-MCNC: 14.7 G/DL — SIGNIFICANT CHANGE UP (ref 14–18)
HGB BLD-MCNC: 15 G/DL — SIGNIFICANT CHANGE UP (ref 14–18)
HGB BLD-MCNC: 15.2 G/DL — SIGNIFICANT CHANGE UP (ref 14–18)
HGB BLD-MCNC: 15.2 G/DL — SIGNIFICANT CHANGE UP (ref 14–18)
HGB BLD-MCNC: 15.5 G/DL — SIGNIFICANT CHANGE UP (ref 14–18)
HGB BLD-MCNC: 17.1 G/DL — SIGNIFICANT CHANGE UP (ref 14–18)
HYALINE CASTS # UR AUTO: 0 /LPF — SIGNIFICANT CHANGE UP (ref 0–7)
HYALINE CASTS # UR AUTO: 0 /LPF — SIGNIFICANT CHANGE UP (ref 0–7)
HYALINE CASTS # UR AUTO: 1 /LPF — SIGNIFICANT CHANGE UP (ref 0–7)
HYALINE CASTS # UR AUTO: 33 /LPF — HIGH (ref 0–7)
IMM GRANULOCYTES NFR BLD AUTO: 0.3 % — SIGNIFICANT CHANGE UP (ref 0.1–0.3)
IMM GRANULOCYTES NFR BLD AUTO: 0.3 % — SIGNIFICANT CHANGE UP (ref 0.1–0.3)
IMM GRANULOCYTES NFR BLD AUTO: 0.4 % — HIGH (ref 0.1–0.3)
IMM GRANULOCYTES NFR BLD AUTO: 0.5 % — HIGH (ref 0.1–0.3)
IMM GRANULOCYTES NFR BLD AUTO: 0.6 % — HIGH (ref 0.1–0.3)
INR BLD: 1.33 RATIO — HIGH (ref 0.65–1.3)
KETONES UR-MCNC: ABNORMAL
KETONES UR-MCNC: ABNORMAL
KETONES UR-MCNC: SIGNIFICANT CHANGE UP
KETONES UR-MCNC: SIGNIFICANT CHANGE UP
LABORATORY COMMENT REPORT: SIGNIFICANT CHANGE UP
LACTATE SERPL-SCNC: 1.3 MMOL/L — SIGNIFICANT CHANGE UP (ref 0.7–2)
LACTATE SERPL-SCNC: 1.4 MMOL/L — SIGNIFICANT CHANGE UP (ref 0.7–2)
LACTATE SERPL-SCNC: 1.5 MMOL/L — SIGNIFICANT CHANGE UP (ref 0.7–2)
LACTATE SERPL-SCNC: 2.1 MMOL/L — HIGH (ref 0.7–2)
LDH CSF L TO P-CCNC: 22 U/L — SIGNIFICANT CHANGE UP
LDH FLD-CCNC: 22 U/L — SIGNIFICANT CHANGE UP
LEUKOCYTE ESTERASE UR-ACNC: ABNORMAL
LEUKOCYTE ESTERASE UR-ACNC: NEGATIVE — SIGNIFICANT CHANGE UP
LIPID PNL WITH DIRECT LDL SERPL: 64 MG/DL — SIGNIFICANT CHANGE UP (ref 4–129)
LYMPHOCYTES # BLD AUTO: 1.05 K/UL — LOW (ref 1.2–3.4)
LYMPHOCYTES # BLD AUTO: 1.21 K/UL — SIGNIFICANT CHANGE UP (ref 1.2–3.4)
LYMPHOCYTES # BLD AUTO: 1.27 K/UL — SIGNIFICANT CHANGE UP (ref 1.2–3.4)
LYMPHOCYTES # BLD AUTO: 1.28 K/UL — SIGNIFICANT CHANGE UP (ref 1.2–3.4)
LYMPHOCYTES # BLD AUTO: 1.4 K/UL — SIGNIFICANT CHANGE UP (ref 1.2–3.4)
LYMPHOCYTES # BLD AUTO: 1.44 K/UL — SIGNIFICANT CHANGE UP (ref 1.2–3.4)
LYMPHOCYTES # BLD AUTO: 1.44 K/UL — SIGNIFICANT CHANGE UP (ref 1.2–3.4)
LYMPHOCYTES # BLD AUTO: 1.68 K/UL — SIGNIFICANT CHANGE UP (ref 1.2–3.4)
LYMPHOCYTES # BLD AUTO: 1.74 K/UL — SIGNIFICANT CHANGE UP (ref 1.2–3.4)
LYMPHOCYTES # BLD AUTO: 11.5 % — LOW (ref 20.5–51.1)
LYMPHOCYTES # BLD AUTO: 11.9 % — LOW (ref 20.5–51.1)
LYMPHOCYTES # BLD AUTO: 12.7 % — LOW (ref 20.5–51.1)
LYMPHOCYTES # BLD AUTO: 14 % — LOW (ref 20.5–51.1)
LYMPHOCYTES # BLD AUTO: 14 % — LOW (ref 20.5–51.1)
LYMPHOCYTES # BLD AUTO: 14.9 % — LOW (ref 20.5–51.1)
LYMPHOCYTES # BLD AUTO: 15 % — LOW (ref 20.5–51.1)
LYMPHOCYTES # BLD AUTO: 15.7 % — LOW (ref 20.5–51.1)
LYMPHOCYTES # BLD AUTO: 17 % — LOW (ref 20.5–51.1)
LYMPHOCYTES # BLD AUTO: 18.6 % — LOW (ref 20.5–51.1)
LYMPHOCYTES # BLD AUTO: 2.02 K/UL — SIGNIFICANT CHANGE UP (ref 1.2–3.4)
LYMPHOCYTES # BLD AUTO: 2.08 K/UL — SIGNIFICANT CHANGE UP (ref 1.2–3.4)
LYMPHOCYTES # BLD AUTO: 2.14 K/UL — SIGNIFICANT CHANGE UP (ref 1.2–3.4)
LYMPHOCYTES # BLD AUTO: 2.23 K/UL — SIGNIFICANT CHANGE UP (ref 1.2–3.4)
LYMPHOCYTES # BLD AUTO: 8.9 % — LOW (ref 20.5–51.1)
LYMPHOCYTES # BLD AUTO: 9.1 % — LOW (ref 20.5–51.1)
LYMPHOCYTES # BLD AUTO: 9.1 % — LOW (ref 20.5–51.1)
MAGNESIUM SERPL-MCNC: 1.5 MG/DL — LOW (ref 1.8–2.4)
MAGNESIUM SERPL-MCNC: 1.5 MG/DL — LOW (ref 1.8–2.4)
MAGNESIUM SERPL-MCNC: 1.6 MG/DL — LOW (ref 1.8–2.4)
MAGNESIUM SERPL-MCNC: 1.6 MG/DL — LOW (ref 1.8–2.4)
MAGNESIUM SERPL-MCNC: 1.7 MG/DL — LOW (ref 1.8–2.4)
MAGNESIUM SERPL-MCNC: 1.8 MG/DL — SIGNIFICANT CHANGE UP (ref 1.8–2.4)
MAGNESIUM SERPL-MCNC: 1.8 MG/DL — SIGNIFICANT CHANGE UP (ref 1.8–2.4)
MAGNESIUM SERPL-MCNC: 1.9 MG/DL — SIGNIFICANT CHANGE UP (ref 1.8–2.4)
MAGNESIUM SERPL-MCNC: 1.9 MG/DL — SIGNIFICANT CHANGE UP (ref 1.8–2.4)
MAGNESIUM SERPL-MCNC: 2 MG/DL — SIGNIFICANT CHANGE UP (ref 1.8–2.4)
MAGNESIUM SERPL-MCNC: 2 MG/DL — SIGNIFICANT CHANGE UP (ref 1.8–2.4)
MAGNESIUM SERPL-MCNC: 2.2 MG/DL — SIGNIFICANT CHANGE UP (ref 1.8–2.4)
MAGNESIUM SERPL-MCNC: 2.2 MG/DL — SIGNIFICANT CHANGE UP (ref 1.8–2.4)
MAGNESIUM SERPL-MCNC: 2.3 MG/DL — SIGNIFICANT CHANGE UP (ref 1.8–2.4)
MAGNESIUM SERPL-MCNC: 2.3 MG/DL — SIGNIFICANT CHANGE UP (ref 1.8–2.4)
MAGNESIUM SERPL-MCNC: 2.5 MG/DL — HIGH (ref 1.8–2.4)
MCHC RBC-ENTMCNC: 23.8 PG — LOW (ref 27–31)
MCHC RBC-ENTMCNC: 23.9 PG — LOW (ref 27–31)
MCHC RBC-ENTMCNC: 24 PG — LOW (ref 27–31)
MCHC RBC-ENTMCNC: 24.1 PG — LOW (ref 27–31)
MCHC RBC-ENTMCNC: 24.2 PG — LOW (ref 27–31)
MCHC RBC-ENTMCNC: 24.2 PG — LOW (ref 27–31)
MCHC RBC-ENTMCNC: 24.3 PG — LOW (ref 27–31)
MCHC RBC-ENTMCNC: 24.3 PG — LOW (ref 27–31)
MCHC RBC-ENTMCNC: 24.4 PG — LOW (ref 27–31)
MCHC RBC-ENTMCNC: 24.5 PG — LOW (ref 27–31)
MCHC RBC-ENTMCNC: 30.4 G/DL — LOW (ref 32–37)
MCHC RBC-ENTMCNC: 30.6 G/DL — LOW (ref 32–37)
MCHC RBC-ENTMCNC: 30.8 G/DL — LOW (ref 32–37)
MCHC RBC-ENTMCNC: 31 G/DL — LOW (ref 32–37)
MCHC RBC-ENTMCNC: 31.2 G/DL — LOW (ref 32–37)
MCHC RBC-ENTMCNC: 31.3 G/DL — LOW (ref 32–37)
MCHC RBC-ENTMCNC: 31.4 G/DL — LOW (ref 32–37)
MCHC RBC-ENTMCNC: 31.5 G/DL — LOW (ref 32–37)
MCHC RBC-ENTMCNC: 31.5 G/DL — LOW (ref 32–37)
MCHC RBC-ENTMCNC: 31.6 G/DL — LOW (ref 32–37)
MCHC RBC-ENTMCNC: 31.7 G/DL — LOW (ref 32–37)
MCHC RBC-ENTMCNC: 31.8 G/DL — LOW (ref 32–37)
MCHC RBC-ENTMCNC: 31.9 G/DL — LOW (ref 32–37)
MCHC RBC-ENTMCNC: 31.9 G/DL — LOW (ref 32–37)
MCV RBC AUTO: 75.1 FL — LOW (ref 80–94)
MCV RBC AUTO: 75.5 FL — LOW (ref 80–94)
MCV RBC AUTO: 75.9 FL — LOW (ref 80–94)
MCV RBC AUTO: 76 FL — LOW (ref 80–94)
MCV RBC AUTO: 76.1 FL — LOW (ref 80–94)
MCV RBC AUTO: 76.1 FL — LOW (ref 80–94)
MCV RBC AUTO: 76.4 FL — LOW (ref 80–94)
MCV RBC AUTO: 76.6 FL — LOW (ref 80–94)
MCV RBC AUTO: 76.8 FL — LOW (ref 80–94)
MCV RBC AUTO: 76.9 FL — LOW (ref 80–94)
MCV RBC AUTO: 77.5 FL — LOW (ref 80–94)
MCV RBC AUTO: 77.9 FL — LOW (ref 80–94)
MCV RBC AUTO: 78.1 FL — LOW (ref 80–94)
MCV RBC AUTO: 78.7 FL — LOW (ref 80–94)
MCV RBC AUTO: 78.9 FL — LOW (ref 80–94)
MCV RBC AUTO: 79.5 FL — LOW (ref 80–94)
MCV RBC AUTO: 80 FL — SIGNIFICANT CHANGE UP (ref 80–94)
MONOCYTES # BLD AUTO: 0.46 K/UL — SIGNIFICANT CHANGE UP (ref 0.1–0.6)
MONOCYTES # BLD AUTO: 0.47 K/UL — SIGNIFICANT CHANGE UP (ref 0.1–0.6)
MONOCYTES # BLD AUTO: 0.69 K/UL — HIGH (ref 0.1–0.6)
MONOCYTES # BLD AUTO: 0.8 K/UL — HIGH (ref 0.1–0.6)
MONOCYTES # BLD AUTO: 0.85 K/UL — HIGH (ref 0.1–0.6)
MONOCYTES # BLD AUTO: 0.99 K/UL — HIGH (ref 0.1–0.6)
MONOCYTES # BLD AUTO: 0.99 K/UL — HIGH (ref 0.1–0.6)
MONOCYTES # BLD AUTO: 1.01 K/UL — HIGH (ref 0.1–0.6)
MONOCYTES # BLD AUTO: 1.08 K/UL — HIGH (ref 0.1–0.6)
MONOCYTES # BLD AUTO: 1.09 K/UL — HIGH (ref 0.1–0.6)
MONOCYTES # BLD AUTO: 1.1 K/UL — HIGH (ref 0.1–0.6)
MONOCYTES # BLD AUTO: 1.19 K/UL — HIGH (ref 0.1–0.6)
MONOCYTES # BLD AUTO: 1.47 K/UL — HIGH (ref 0.1–0.6)
MONOCYTES NFR BLD AUTO: 10.3 % — HIGH (ref 1.7–9.3)
MONOCYTES NFR BLD AUTO: 11.7 % — HIGH (ref 1.7–9.3)
MONOCYTES NFR BLD AUTO: 4 % — SIGNIFICANT CHANGE UP (ref 1.7–9.3)
MONOCYTES NFR BLD AUTO: 4.5 % — SIGNIFICANT CHANGE UP (ref 1.7–9.3)
MONOCYTES NFR BLD AUTO: 6.3 % — SIGNIFICANT CHANGE UP (ref 1.7–9.3)
MONOCYTES NFR BLD AUTO: 6.4 % — SIGNIFICANT CHANGE UP (ref 1.7–9.3)
MONOCYTES NFR BLD AUTO: 7.1 % — SIGNIFICANT CHANGE UP (ref 1.7–9.3)
MONOCYTES NFR BLD AUTO: 7.2 % — SIGNIFICANT CHANGE UP (ref 1.7–9.3)
MONOCYTES NFR BLD AUTO: 7.5 % — SIGNIFICANT CHANGE UP (ref 1.7–9.3)
MONOCYTES NFR BLD AUTO: 7.5 % — SIGNIFICANT CHANGE UP (ref 1.7–9.3)
MONOCYTES NFR BLD AUTO: 7.6 % — SIGNIFICANT CHANGE UP (ref 1.7–9.3)
MONOCYTES NFR BLD AUTO: 9.7 % — HIGH (ref 1.7–9.3)
MONOCYTES NFR BLD AUTO: 9.8 % — HIGH (ref 1.7–9.3)
NEUTROPHILS # BLD AUTO: 10.12 K/UL — HIGH (ref 1.4–6.5)
NEUTROPHILS # BLD AUTO: 11.06 K/UL — HIGH (ref 1.4–6.5)
NEUTROPHILS # BLD AUTO: 11.14 K/UL — HIGH (ref 1.4–6.5)
NEUTROPHILS # BLD AUTO: 11.67 K/UL — HIGH (ref 1.4–6.5)
NEUTROPHILS # BLD AUTO: 11.87 K/UL — HIGH (ref 1.4–6.5)
NEUTROPHILS # BLD AUTO: 13.16 K/UL — HIGH (ref 1.4–6.5)
NEUTROPHILS # BLD AUTO: 5.95 K/UL — SIGNIFICANT CHANGE UP (ref 1.4–6.5)
NEUTROPHILS # BLD AUTO: 6.82 K/UL — HIGH (ref 1.4–6.5)
NEUTROPHILS # BLD AUTO: 6.89 K/UL — HIGH (ref 1.4–6.5)
NEUTROPHILS # BLD AUTO: 8.21 K/UL — HIGH (ref 1.4–6.5)
NEUTROPHILS # BLD AUTO: 8.71 K/UL — HIGH (ref 1.4–6.5)
NEUTROPHILS # BLD AUTO: 8.77 K/UL — HIGH (ref 1.4–6.5)
NEUTROPHILS # BLD AUTO: 9.99 K/UL — HIGH (ref 1.4–6.5)
NEUTROPHILS # CSF: SIGNIFICANT CHANGE UP % (ref 0–6)
NEUTROPHILS NFR BLD AUTO: 63.6 % — SIGNIFICANT CHANGE UP (ref 42.2–75.2)
NEUTROPHILS NFR BLD AUTO: 70.8 % — SIGNIFICANT CHANGE UP (ref 42.2–75.2)
NEUTROPHILS NFR BLD AUTO: 71.1 % — SIGNIFICANT CHANGE UP (ref 42.2–75.2)
NEUTROPHILS NFR BLD AUTO: 74.2 % — SIGNIFICANT CHANGE UP (ref 42.2–75.2)
NEUTROPHILS NFR BLD AUTO: 75.1 % — SIGNIFICANT CHANGE UP (ref 42.2–75.2)
NEUTROPHILS NFR BLD AUTO: 76.5 % — HIGH (ref 42.2–75.2)
NEUTROPHILS NFR BLD AUTO: 76.9 % — HIGH (ref 42.2–75.2)
NEUTROPHILS NFR BLD AUTO: 77.2 % — HIGH (ref 42.2–75.2)
NEUTROPHILS NFR BLD AUTO: 77.8 % — HIGH (ref 42.2–75.2)
NEUTROPHILS NFR BLD AUTO: 82.8 % — HIGH (ref 42.2–75.2)
NEUTROPHILS NFR BLD AUTO: 83.2 % — HIGH (ref 42.2–75.2)
NEUTROPHILS NFR BLD AUTO: 83.7 % — HIGH (ref 42.2–75.2)
NEUTROPHILS NFR BLD AUTO: 86.2 % — HIGH (ref 42.2–75.2)
NIGHT BLUE STAIN TISS: SIGNIFICANT CHANGE UP
NITRITE UR-MCNC: NEGATIVE — SIGNIFICANT CHANGE UP
NRBC # BLD: 0 /100 WBCS — SIGNIFICANT CHANGE UP (ref 0–0)
NRBC NFR CSF: 0 /UL — SIGNIFICANT CHANGE UP (ref 0–5)
PCO2 BLDA: 34 MMHG — LOW (ref 38–42)
PCO2 BLDA: 37 MMHG — LOW (ref 38–42)
PH BLDA: 7.48 — HIGH (ref 7.38–7.42)
PH BLDA: 7.5 — HIGH (ref 7.38–7.42)
PH UR: 6 — SIGNIFICANT CHANGE UP (ref 5–8)
PH UR: 6 — SIGNIFICANT CHANGE UP (ref 5–8)
PH UR: 7 — SIGNIFICANT CHANGE UP (ref 5–8)
PH UR: 7.5 — SIGNIFICANT CHANGE UP (ref 5–8)
PHOSPHATE SERPL-MCNC: 2.8 MG/DL — SIGNIFICANT CHANGE UP (ref 2.1–4.9)
PHOSPHATE SERPL-MCNC: 3 MG/DL — SIGNIFICANT CHANGE UP (ref 2.1–4.9)
PLATELET # BLD AUTO: 231 K/UL — SIGNIFICANT CHANGE UP (ref 130–400)
PLATELET # BLD AUTO: 238 K/UL — SIGNIFICANT CHANGE UP (ref 130–400)
PLATELET # BLD AUTO: 238 K/UL — SIGNIFICANT CHANGE UP (ref 130–400)
PLATELET # BLD AUTO: 239 K/UL — SIGNIFICANT CHANGE UP (ref 130–400)
PLATELET # BLD AUTO: 242 K/UL — SIGNIFICANT CHANGE UP (ref 130–400)
PLATELET # BLD AUTO: 251 K/UL — SIGNIFICANT CHANGE UP (ref 130–400)
PLATELET # BLD AUTO: 267 K/UL — SIGNIFICANT CHANGE UP (ref 130–400)
PLATELET # BLD AUTO: 269 K/UL — SIGNIFICANT CHANGE UP (ref 130–400)
PLATELET # BLD AUTO: 269 K/UL — SIGNIFICANT CHANGE UP (ref 130–400)
PLATELET # BLD AUTO: 279 K/UL — SIGNIFICANT CHANGE UP (ref 130–400)
PLATELET # BLD AUTO: 282 K/UL — SIGNIFICANT CHANGE UP (ref 130–400)
PLATELET # BLD AUTO: 284 K/UL — SIGNIFICANT CHANGE UP (ref 130–400)
PLATELET # BLD AUTO: 301 K/UL — SIGNIFICANT CHANGE UP (ref 130–400)
PLATELET # BLD AUTO: 346 K/UL — SIGNIFICANT CHANGE UP (ref 130–400)
PLATELET # BLD AUTO: 354 K/UL — SIGNIFICANT CHANGE UP (ref 130–400)
PLATELET # BLD AUTO: 365 K/UL — SIGNIFICANT CHANGE UP (ref 130–400)
PLATELET # BLD AUTO: 371 K/UL — SIGNIFICANT CHANGE UP (ref 130–400)
PO2 BLDA: 70 MMHG — LOW (ref 78–95)
PO2 BLDA: 73 MMHG — LOW (ref 78–95)
POTASSIUM SERPL-MCNC: 2.9 MMOL/L — LOW (ref 3.5–5)
POTASSIUM SERPL-MCNC: 2.9 MMOL/L — LOW (ref 3.5–5)
POTASSIUM SERPL-MCNC: 3.1 MMOL/L — LOW (ref 3.5–5)
POTASSIUM SERPL-MCNC: 3.2 MMOL/L — LOW (ref 3.5–5)
POTASSIUM SERPL-MCNC: 3.2 MMOL/L — LOW (ref 3.5–5)
POTASSIUM SERPL-MCNC: 3.3 MMOL/L — LOW (ref 3.5–5)
POTASSIUM SERPL-MCNC: 3.3 MMOL/L — LOW (ref 3.5–5)
POTASSIUM SERPL-MCNC: 3.4 MMOL/L — LOW (ref 3.5–5)
POTASSIUM SERPL-MCNC: 3.7 MMOL/L — SIGNIFICANT CHANGE UP (ref 3.5–5)
POTASSIUM SERPL-MCNC: 4 MMOL/L — SIGNIFICANT CHANGE UP (ref 3.5–5)
POTASSIUM SERPL-MCNC: 4.1 MMOL/L — SIGNIFICANT CHANGE UP (ref 3.5–5)
POTASSIUM SERPL-MCNC: 4.2 MMOL/L — SIGNIFICANT CHANGE UP (ref 3.5–5)
POTASSIUM SERPL-MCNC: 4.3 MMOL/L — SIGNIFICANT CHANGE UP (ref 3.5–5)
POTASSIUM SERPL-MCNC: 4.6 MMOL/L — SIGNIFICANT CHANGE UP (ref 3.5–5)
POTASSIUM SERPL-MCNC: 4.6 MMOL/L — SIGNIFICANT CHANGE UP (ref 3.5–5)
POTASSIUM SERPL-MCNC: 5 MMOL/L — SIGNIFICANT CHANGE UP (ref 3.5–5)
POTASSIUM SERPL-MCNC: 5.1 MMOL/L — HIGH (ref 3.5–5)
POTASSIUM SERPL-SCNC: 2.9 MMOL/L — LOW (ref 3.5–5)
POTASSIUM SERPL-SCNC: 2.9 MMOL/L — LOW (ref 3.5–5)
POTASSIUM SERPL-SCNC: 3.1 MMOL/L — LOW (ref 3.5–5)
POTASSIUM SERPL-SCNC: 3.2 MMOL/L — LOW (ref 3.5–5)
POTASSIUM SERPL-SCNC: 3.2 MMOL/L — LOW (ref 3.5–5)
POTASSIUM SERPL-SCNC: 3.3 MMOL/L — LOW (ref 3.5–5)
POTASSIUM SERPL-SCNC: 3.3 MMOL/L — LOW (ref 3.5–5)
POTASSIUM SERPL-SCNC: 3.4 MMOL/L — LOW (ref 3.5–5)
POTASSIUM SERPL-SCNC: 3.7 MMOL/L — SIGNIFICANT CHANGE UP (ref 3.5–5)
POTASSIUM SERPL-SCNC: 4 MMOL/L — SIGNIFICANT CHANGE UP (ref 3.5–5)
POTASSIUM SERPL-SCNC: 4.1 MMOL/L — SIGNIFICANT CHANGE UP (ref 3.5–5)
POTASSIUM SERPL-SCNC: 4.2 MMOL/L — SIGNIFICANT CHANGE UP (ref 3.5–5)
POTASSIUM SERPL-SCNC: 4.3 MMOL/L — SIGNIFICANT CHANGE UP (ref 3.5–5)
POTASSIUM SERPL-SCNC: 4.6 MMOL/L — SIGNIFICANT CHANGE UP (ref 3.5–5)
POTASSIUM SERPL-SCNC: 4.6 MMOL/L — SIGNIFICANT CHANGE UP (ref 3.5–5)
POTASSIUM SERPL-SCNC: 5 MMOL/L — SIGNIFICANT CHANGE UP (ref 3.5–5)
POTASSIUM SERPL-SCNC: 5.1 MMOL/L — HIGH (ref 3.5–5)
PROT CSF-MCNC: 75 MG/DL — HIGH (ref 15–45)
PROT SERPL-MCNC: 6 G/DL — SIGNIFICANT CHANGE UP (ref 6–8)
PROT SERPL-MCNC: 6.4 G/DL — SIGNIFICANT CHANGE UP (ref 6–8)
PROT SERPL-MCNC: 6.5 G/DL — SIGNIFICANT CHANGE UP (ref 6–8)
PROT SERPL-MCNC: 6.6 G/DL — SIGNIFICANT CHANGE UP (ref 6–8)
PROT SERPL-MCNC: 6.8 G/DL — SIGNIFICANT CHANGE UP (ref 6–8)
PROT SERPL-MCNC: 6.9 G/DL — SIGNIFICANT CHANGE UP (ref 6–8)
PROT SERPL-MCNC: 7.1 G/DL — SIGNIFICANT CHANGE UP (ref 6–8)
PROT SERPL-MCNC: 7.4 G/DL — SIGNIFICANT CHANGE UP (ref 6–8)
PROT SERPL-MCNC: 7.5 G/DL — SIGNIFICANT CHANGE UP (ref 6–8)
PROT SERPL-MCNC: 7.7 G/DL — SIGNIFICANT CHANGE UP (ref 6–8)
PROT SERPL-MCNC: 7.7 G/DL — SIGNIFICANT CHANGE UP (ref 6–8)
PROT SERPL-MCNC: 7.8 G/DL — SIGNIFICANT CHANGE UP (ref 6–8)
PROT UR-MCNC: ABNORMAL
PROTHROM AB SERPL-ACNC: 15.3 SEC — HIGH (ref 9.95–12.87)
RBC # BLD: 5.53 M/UL — SIGNIFICANT CHANGE UP (ref 4.7–6.1)
RBC # BLD: 5.59 M/UL — SIGNIFICANT CHANGE UP (ref 4.7–6.1)
RBC # BLD: 5.62 M/UL — SIGNIFICANT CHANGE UP (ref 4.7–6.1)
RBC # BLD: 5.67 M/UL — SIGNIFICANT CHANGE UP (ref 4.7–6.1)
RBC # BLD: 5.78 M/UL — SIGNIFICANT CHANGE UP (ref 4.7–6.1)
RBC # BLD: 5.9 M/UL — SIGNIFICANT CHANGE UP (ref 4.7–6.1)
RBC # BLD: 5.93 M/UL — SIGNIFICANT CHANGE UP (ref 4.7–6.1)
RBC # BLD: 5.95 M/UL — SIGNIFICANT CHANGE UP (ref 4.7–6.1)
RBC # BLD: 5.97 M/UL — SIGNIFICANT CHANGE UP (ref 4.7–6.1)
RBC # BLD: 6.02 M/UL — SIGNIFICANT CHANGE UP (ref 4.7–6.1)
RBC # BLD: 6.1 M/UL — SIGNIFICANT CHANGE UP (ref 4.7–6.1)
RBC # BLD: 6.17 M/UL — HIGH (ref 4.7–6.1)
RBC # BLD: 6.21 M/UL — HIGH (ref 4.7–6.1)
RBC # BLD: 6.23 M/UL — HIGH (ref 4.7–6.1)
RBC # BLD: 6.34 M/UL — HIGH (ref 4.7–6.1)
RBC # BLD: 6.45 M/UL — HIGH (ref 4.7–6.1)
RBC # BLD: 6.99 M/UL — HIGH (ref 4.7–6.1)
RBC # CSF: 1 /UL — SIGNIFICANT CHANGE UP (ref 0–0)
RBC # FLD: 15.9 % — HIGH (ref 11.5–14.5)
RBC # FLD: 16 % — HIGH (ref 11.5–14.5)
RBC # FLD: 16.1 % — HIGH (ref 11.5–14.5)
RBC # FLD: 16.9 % — HIGH (ref 11.5–14.5)
RBC # FLD: 17 % — HIGH (ref 11.5–14.5)
RBC # FLD: 17.2 % — HIGH (ref 11.5–14.5)
RBC # FLD: 17.4 % — HIGH (ref 11.5–14.5)
RBC # FLD: 17.4 % — HIGH (ref 11.5–14.5)
RBC # FLD: 17.5 % — HIGH (ref 11.5–14.5)
RBC # FLD: 17.7 % — HIGH (ref 11.5–14.5)
RBC # FLD: 17.9 % — HIGH (ref 11.5–14.5)
RBC # FLD: 18.4 % — HIGH (ref 11.5–14.5)
RBC # FLD: 19 % — HIGH (ref 11.5–14.5)
RBC # FLD: 19 % — HIGH (ref 11.5–14.5)
RBC # FLD: 19.3 % — HIGH (ref 11.5–14.5)
RBC # FLD: 19.6 % — HIGH (ref 11.5–14.5)
RBC # FLD: 20.2 % — HIGH (ref 11.5–14.5)
RBC CASTS # UR COMP ASSIST: 0 /HPF — SIGNIFICANT CHANGE UP (ref 0–4)
RBC CASTS # UR COMP ASSIST: 1 /HPF — SIGNIFICANT CHANGE UP (ref 0–4)
RBC CASTS # UR COMP ASSIST: 3 /HPF — SIGNIFICANT CHANGE UP (ref 0–4)
RBC CASTS # UR COMP ASSIST: 5 /HPF — HIGH (ref 0–4)
SAO2 % BLDA: 95 % — SIGNIFICANT CHANGE UP (ref 92–96)
SAO2 % BLDA: 96 % — SIGNIFICANT CHANGE UP (ref 92–96)
SARS-COV-2 RNA SPEC QL NAA+PROBE: SIGNIFICANT CHANGE UP
SODIUM SERPL-SCNC: 135 MMOL/L — SIGNIFICANT CHANGE UP (ref 135–146)
SODIUM SERPL-SCNC: 135 MMOL/L — SIGNIFICANT CHANGE UP (ref 135–146)
SODIUM SERPL-SCNC: 136 MMOL/L — SIGNIFICANT CHANGE UP (ref 135–146)
SODIUM SERPL-SCNC: 137 MMOL/L — SIGNIFICANT CHANGE UP (ref 135–146)
SODIUM SERPL-SCNC: 138 MMOL/L — SIGNIFICANT CHANGE UP (ref 135–146)
SODIUM SERPL-SCNC: 138 MMOL/L — SIGNIFICANT CHANGE UP (ref 135–146)
SODIUM SERPL-SCNC: 139 MMOL/L — SIGNIFICANT CHANGE UP (ref 135–146)
SODIUM SERPL-SCNC: 140 MMOL/L — SIGNIFICANT CHANGE UP (ref 135–146)
SODIUM SERPL-SCNC: 140 MMOL/L — SIGNIFICANT CHANGE UP (ref 135–146)
SODIUM SERPL-SCNC: 141 MMOL/L — SIGNIFICANT CHANGE UP (ref 135–146)
SODIUM SERPL-SCNC: 144 MMOL/L — SIGNIFICANT CHANGE UP (ref 135–146)
SOURCE HSV 1/2: SIGNIFICANT CHANGE UP
SP GR SPEC: 1.02 — SIGNIFICANT CHANGE UP (ref 1.01–1.02)
SP GR SPEC: 1.03 — HIGH (ref 1.01–1.02)
SP GR SPEC: 1.04 — HIGH (ref 1.01–1.02)
SP GR SPEC: 1.04 — HIGH (ref 1.01–1.02)
SPECIMEN SOURCE: SIGNIFICANT CHANGE UP
TOTAL CHOLESTEROL/HDL RATIO MEASUREMENT: 4 RATIO — SIGNIFICANT CHANGE UP (ref 4–5.5)
TRIGL SERPL-MCNC: 105 MG/DL — SIGNIFICANT CHANGE UP (ref 10–149)
TROPONIN T SERPL-MCNC: <0.01 NG/ML — SIGNIFICANT CHANGE UP
TUBE TYPE: SIGNIFICANT CHANGE UP
UROBILINOGEN FLD QL: ABNORMAL
VDRL CSF-TITR: NEGATIVE — SIGNIFICANT CHANGE UP
WBC # BLD: 10.53 K/UL — SIGNIFICANT CHANGE UP (ref 4.8–10.8)
WBC # BLD: 10.67 K/UL — SIGNIFICANT CHANGE UP (ref 4.8–10.8)
WBC # BLD: 11.58 K/UL — HIGH (ref 4.8–10.8)
WBC # BLD: 12.24 K/UL — HIGH (ref 4.8–10.8)
WBC # BLD: 13.22 K/UL — HIGH (ref 4.8–10.8)
WBC # BLD: 13.3 K/UL — HIGH (ref 4.8–10.8)
WBC # BLD: 13.34 K/UL — HIGH (ref 4.8–10.8)
WBC # BLD: 14.08 K/UL — HIGH (ref 4.8–10.8)
WBC # BLD: 14.42 K/UL — HIGH (ref 4.8–10.8)
WBC # BLD: 14.93 K/UL — HIGH (ref 4.8–10.8)
WBC # BLD: 15.24 K/UL — HIGH (ref 4.8–10.8)
WBC # BLD: 15.72 K/UL — HIGH (ref 4.8–10.8)
WBC # BLD: 18.69 K/UL — HIGH (ref 4.8–10.8)
WBC # BLD: 9.18 K/UL — SIGNIFICANT CHANGE UP (ref 4.8–10.8)
WBC # BLD: 9.37 K/UL — SIGNIFICANT CHANGE UP (ref 4.8–10.8)
WBC # BLD: 9.63 K/UL — SIGNIFICANT CHANGE UP (ref 4.8–10.8)
WBC # BLD: 9.72 K/UL — SIGNIFICANT CHANGE UP (ref 4.8–10.8)
WBC # FLD AUTO: 10.53 K/UL — SIGNIFICANT CHANGE UP (ref 4.8–10.8)
WBC # FLD AUTO: 10.67 K/UL — SIGNIFICANT CHANGE UP (ref 4.8–10.8)
WBC # FLD AUTO: 11.58 K/UL — HIGH (ref 4.8–10.8)
WBC # FLD AUTO: 12.24 K/UL — HIGH (ref 4.8–10.8)
WBC # FLD AUTO: 13.22 K/UL — HIGH (ref 4.8–10.8)
WBC # FLD AUTO: 13.3 K/UL — HIGH (ref 4.8–10.8)
WBC # FLD AUTO: 13.34 K/UL — HIGH (ref 4.8–10.8)
WBC # FLD AUTO: 14.08 K/UL — HIGH (ref 4.8–10.8)
WBC # FLD AUTO: 14.42 K/UL — HIGH (ref 4.8–10.8)
WBC # FLD AUTO: 14.93 K/UL — HIGH (ref 4.8–10.8)
WBC # FLD AUTO: 15.24 K/UL — HIGH (ref 4.8–10.8)
WBC # FLD AUTO: 15.72 K/UL — HIGH (ref 4.8–10.8)
WBC # FLD AUTO: 18.69 K/UL — HIGH (ref 4.8–10.8)
WBC # FLD AUTO: 9.18 K/UL — SIGNIFICANT CHANGE UP (ref 4.8–10.8)
WBC # FLD AUTO: 9.37 K/UL — SIGNIFICANT CHANGE UP (ref 4.8–10.8)
WBC # FLD AUTO: 9.63 K/UL — SIGNIFICANT CHANGE UP (ref 4.8–10.8)
WBC # FLD AUTO: 9.72 K/UL — SIGNIFICANT CHANGE UP (ref 4.8–10.8)
WBC UR QL: 1 /HPF — SIGNIFICANT CHANGE UP (ref 0–5)
WBC UR QL: 1 /HPF — SIGNIFICANT CHANGE UP (ref 0–5)
WBC UR QL: 2 /HPF — SIGNIFICANT CHANGE UP (ref 0–5)
WBC UR QL: 33 /HPF — HIGH (ref 0–5)

## 2020-01-01 PROCEDURE — 95720 EEG PHY/QHP EA INCR W/VEEG: CPT

## 2020-01-01 PROCEDURE — 99215 OFFICE O/P EST HI 40 MIN: CPT

## 2020-01-01 PROCEDURE — 70551 MRI BRAIN STEM W/O DYE: CPT | Mod: 26

## 2020-01-01 PROCEDURE — 93010 ELECTROCARDIOGRAM REPORT: CPT

## 2020-01-01 PROCEDURE — 71045 X-RAY EXAM CHEST 1 VIEW: CPT | Mod: 26

## 2020-01-01 PROCEDURE — 99291 CRITICAL CARE FIRST HOUR: CPT

## 2020-01-01 PROCEDURE — 93306 TTE W/DOPPLER COMPLETE: CPT | Mod: 26

## 2020-01-01 PROCEDURE — 70450 CT HEAD/BRAIN W/O DYE: CPT | Mod: 26

## 2020-01-01 PROCEDURE — 99285 EMERGENCY DEPT VISIT HI MDM: CPT

## 2020-01-01 PROCEDURE — 93970 EXTREMITY STUDY: CPT | Mod: 26

## 2020-01-01 PROCEDURE — 99223 1ST HOSP IP/OBS HIGH 75: CPT

## 2020-01-01 PROCEDURE — 99223 1ST HOSP IP/OBS HIGH 75: CPT | Mod: 25

## 2020-01-01 PROCEDURE — 99233 SBSQ HOSP IP/OBS HIGH 50: CPT

## 2020-01-01 PROCEDURE — 99213 OFFICE O/P EST LOW 20 MIN: CPT

## 2020-01-01 PROCEDURE — 74018 RADEX ABDOMEN 1 VIEW: CPT | Mod: 26

## 2020-01-01 PROCEDURE — 99231 SBSQ HOSP IP/OBS SF/LOW 25: CPT

## 2020-01-01 PROCEDURE — 70544 MR ANGIOGRAPHY HEAD W/O DYE: CPT | Mod: 26,59

## 2020-01-01 PROCEDURE — 70548 MR ANGIOGRAPHY NECK W/DYE: CPT | Mod: 26

## 2020-01-01 PROCEDURE — 99497 ADVNCD CARE PLAN 30 MIN: CPT | Mod: 25

## 2020-01-01 PROCEDURE — 99232 SBSQ HOSP IP/OBS MODERATE 35: CPT

## 2020-01-01 PROCEDURE — 99497 ADVNCD CARE PLAN 30 MIN: CPT

## 2020-01-01 PROCEDURE — 99239 HOSP IP/OBS DSCHRG MGMT >30: CPT

## 2020-01-01 PROCEDURE — 36410 VNPNXR 3YR/> PHY/QHP DX/THER: CPT

## 2020-01-01 PROCEDURE — 71045 X-RAY EXAM CHEST 1 VIEW: CPT | Mod: 26,77

## 2020-01-01 RX ORDER — GABAPENTIN 300 MG/1
300 CAPSULE ORAL
Qty: 90 | Refills: 5 | Status: ACTIVE | COMMUNITY
Start: 2020-01-01 | End: 1900-01-01

## 2020-01-01 RX ORDER — MORPHINE SULFATE 50 MG/1
2 CAPSULE, EXTENDED RELEASE ORAL EVERY 6 HOURS
Refills: 0 | Status: DISCONTINUED | OUTPATIENT
Start: 2020-01-01 | End: 2020-01-01

## 2020-01-01 RX ORDER — DEXTROSE 50 % IN WATER 50 %
12.5 SYRINGE (ML) INTRAVENOUS ONCE
Refills: 0 | Status: DISCONTINUED | OUTPATIENT
Start: 2020-01-01 | End: 2020-01-01

## 2020-01-01 RX ORDER — TRAZODONE HYDROCHLORIDE 50 MG/1
50 TABLET ORAL
Refills: 0 | Status: DISCONTINUED | COMMUNITY
Start: 2019-01-01 | End: 2020-01-01

## 2020-01-01 RX ORDER — CEFTRIAXONE 500 MG/1
2000 INJECTION, POWDER, FOR SOLUTION INTRAMUSCULAR; INTRAVENOUS EVERY 12 HOURS
Refills: 0 | Status: DISCONTINUED | OUTPATIENT
Start: 2020-01-01 | End: 2020-01-01

## 2020-01-01 RX ORDER — MORPHINE SULFATE 50 MG/1
5 CAPSULE, EXTENDED RELEASE ORAL ONCE
Refills: 0 | Status: DISCONTINUED | OUTPATIENT
Start: 2020-01-01 | End: 2020-01-01

## 2020-01-01 RX ORDER — MORPHINE SULFATE 50 MG/1
15 CAPSULE, EXTENDED RELEASE ORAL ONCE
Refills: 0 | Status: DISCONTINUED | OUTPATIENT
Start: 2020-01-01 | End: 2020-01-01

## 2020-01-01 RX ORDER — VORTIOXETINE 5 MG/1
1 TABLET, FILM COATED ORAL
Qty: 0 | Refills: 0 | DISCHARGE

## 2020-01-01 RX ORDER — ATORVASTATIN CALCIUM 80 MG/1
80 TABLET, FILM COATED ORAL AT BEDTIME
Refills: 0 | Status: DISCONTINUED | OUTPATIENT
Start: 2020-01-01 | End: 2020-01-01

## 2020-01-01 RX ORDER — INSULIN LISPRO 100/ML
6 VIAL (ML) SUBCUTANEOUS ONCE
Refills: 0 | Status: COMPLETED | OUTPATIENT
Start: 2020-01-01 | End: 2020-01-01

## 2020-01-01 RX ORDER — ACYCLOVIR SODIUM 500 MG
1000 VIAL (EA) INTRAVENOUS EVERY 8 HOURS
Refills: 0 | Status: DISCONTINUED | OUTPATIENT
Start: 2020-01-01 | End: 2020-01-01

## 2020-01-01 RX ORDER — LANOLIN ALCOHOL/MO/W.PET/CERES
1 CREAM (GRAM) TOPICAL
Qty: 0 | Refills: 0 | DISCHARGE

## 2020-01-01 RX ORDER — DULOXETINE HYDROCHLORIDE 30 MG/1
30 CAPSULE, DELAYED RELEASE PELLETS ORAL DAILY
Qty: 90 | Refills: 2 | Status: DISCONTINUED | COMMUNITY
Start: 2020-01-01 | End: 2020-01-01

## 2020-01-01 RX ORDER — MEROPENEM 1 G/30ML
2000 INJECTION INTRAVENOUS EVERY 8 HOURS
Refills: 0 | Status: DISCONTINUED | OUTPATIENT
Start: 2020-01-01 | End: 2020-01-01

## 2020-01-01 RX ORDER — PANTOPRAZOLE SODIUM 20 MG/1
40 TABLET, DELAYED RELEASE ORAL
Refills: 0 | Status: DISCONTINUED | OUTPATIENT
Start: 2020-01-01 | End: 2020-01-01

## 2020-01-01 RX ORDER — INSULIN HUMAN 100 [IU]/ML
15 INJECTION, SOLUTION SUBCUTANEOUS ONCE
Refills: 0 | Status: DISCONTINUED | OUTPATIENT
Start: 2020-01-01 | End: 2020-01-01

## 2020-01-01 RX ORDER — QUETIAPINE FUMARATE 200 MG/1
1 TABLET, FILM COATED ORAL
Qty: 0 | Refills: 0 | DISCHARGE

## 2020-01-01 RX ORDER — HYDRALAZINE HCL 50 MG
5 TABLET ORAL ONCE
Refills: 0 | Status: COMPLETED | OUTPATIENT
Start: 2020-01-01 | End: 2020-01-01

## 2020-01-01 RX ORDER — MORPHINE SULFATE 50 MG/1
1 CAPSULE, EXTENDED RELEASE ORAL
Qty: 0 | Refills: 0 | DISCHARGE

## 2020-01-01 RX ORDER — INSULIN LISPRO 100/ML
9 VIAL (ML) SUBCUTANEOUS
Qty: 0 | Refills: 0 | DISCHARGE

## 2020-01-01 RX ORDER — VANCOMYCIN HCL 1 G
1000 VIAL (EA) INTRAVENOUS ONCE
Refills: 0 | Status: COMPLETED | OUTPATIENT
Start: 2020-01-01 | End: 2020-01-01

## 2020-01-01 RX ORDER — MORPHINE SULFATE 50 MG/1
2 CAPSULE, EXTENDED RELEASE ORAL ONCE
Refills: 0 | Status: DISCONTINUED | OUTPATIENT
Start: 2020-01-01 | End: 2020-01-01

## 2020-01-01 RX ORDER — BLOOD SUGAR DIAGNOSTIC
STRIP MISCELLANEOUS
Qty: 100 | Refills: 3 | Status: ACTIVE | COMMUNITY
Start: 2020-01-01 | End: 1900-01-01

## 2020-01-01 RX ORDER — CALCIUM GLUCONATE 100 MG/ML
2 VIAL (ML) INTRAVENOUS ONCE
Refills: 0 | Status: COMPLETED | OUTPATIENT
Start: 2020-01-01 | End: 2020-01-01

## 2020-01-01 RX ORDER — METOCLOPRAMIDE 5 MG/1
5 TABLET ORAL
Qty: 15 | Refills: 0 | Status: DISCONTINUED | COMMUNITY
Start: 2020-01-01 | End: 2020-01-01

## 2020-01-01 RX ORDER — POTASSIUM CHLORIDE 20 MEQ
20 PACKET (EA) ORAL ONCE
Refills: 0 | Status: COMPLETED | OUTPATIENT
Start: 2020-01-01 | End: 2020-01-01

## 2020-01-01 RX ORDER — EMPAGLIFLOZIN 10 MG/1
10 TABLET, FILM COATED ORAL
Qty: 30 | Refills: 3 | Status: DISCONTINUED | COMMUNITY
Start: 2020-01-01 | End: 2020-01-01

## 2020-01-01 RX ORDER — TRAZODONE HCL 50 MG
1 TABLET ORAL
Qty: 0 | Refills: 0 | DISCHARGE

## 2020-01-01 RX ORDER — QUETIAPINE FUMARATE 200 MG/1
200 TABLET, FILM COATED ORAL
Refills: 0 | Status: DISCONTINUED | OUTPATIENT
Start: 2020-01-01 | End: 2020-01-01

## 2020-01-01 RX ORDER — LOSARTAN POTASSIUM 100 MG/1
50 TABLET, FILM COATED ORAL ONCE
Refills: 0 | Status: COMPLETED | OUTPATIENT
Start: 2020-01-01 | End: 2020-01-01

## 2020-01-01 RX ORDER — CLEVIDIPINE BUTYRATE 50MG/100ML
2 VIAL (ML) INTRAVENOUS
Qty: 25 | Refills: 0 | Status: DISCONTINUED | OUTPATIENT
Start: 2020-01-01 | End: 2020-01-01

## 2020-01-01 RX ORDER — MEROPENEM 1 G/30ML
1000 INJECTION INTRAVENOUS EVERY 8 HOURS
Refills: 0 | Status: DISCONTINUED | OUTPATIENT
Start: 2020-01-01 | End: 2020-01-01

## 2020-01-01 RX ORDER — POTASSIUM CHLORIDE 20 MEQ
20 PACKET (EA) ORAL
Refills: 0 | Status: COMPLETED | OUTPATIENT
Start: 2020-01-01 | End: 2020-01-01

## 2020-01-01 RX ORDER — OXYCODONE HYDROCHLORIDE 5 MG/1
15 TABLET ORAL ONCE
Refills: 0 | Status: DISCONTINUED | OUTPATIENT
Start: 2020-01-01 | End: 2020-01-01

## 2020-01-01 RX ORDER — VANCOMYCIN HCL 1 G
VIAL (EA) INTRAVENOUS
Refills: 0 | Status: DISCONTINUED | OUTPATIENT
Start: 2020-01-01 | End: 2020-01-01

## 2020-01-01 RX ORDER — HYDROXYZINE HYDROCHLORIDE 50 MG/1
50 TABLET ORAL
Refills: 0 | Status: ACTIVE | COMMUNITY

## 2020-01-01 RX ORDER — INSULIN LISPRO 100/ML
9 VIAL (ML) SUBCUTANEOUS
Refills: 0 | Status: DISCONTINUED | OUTPATIENT
Start: 2020-01-01 | End: 2020-01-01

## 2020-01-01 RX ORDER — HYDROXYZINE HYDROCHLORIDE 25 MG/1
25 TABLET ORAL
Qty: 270 | Refills: 1 | Status: DISCONTINUED | COMMUNITY
Start: 2018-08-27 | End: 2020-01-01

## 2020-01-01 RX ORDER — DEXTROSE 50 % IN WATER 50 %
15 SYRINGE (ML) INTRAVENOUS ONCE
Refills: 0 | Status: DISCONTINUED | OUTPATIENT
Start: 2020-01-01 | End: 2020-01-01

## 2020-01-01 RX ORDER — MAGNESIUM SULFATE 500 MG/ML
2 VIAL (ML) INJECTION
Refills: 0 | Status: COMPLETED | OUTPATIENT
Start: 2020-01-01 | End: 2020-01-01

## 2020-01-01 RX ORDER — INSULIN LISPRO 100/ML
12 VIAL (ML) SUBCUTANEOUS ONCE
Refills: 0 | Status: COMPLETED | OUTPATIENT
Start: 2020-01-01 | End: 2020-01-01

## 2020-01-01 RX ORDER — MORPHINE SULFATE 50 MG/1
15 CAPSULE, EXTENDED RELEASE ORAL EVERY 4 HOURS
Refills: 0 | Status: DISCONTINUED | OUTPATIENT
Start: 2020-01-01 | End: 2020-01-01

## 2020-01-01 RX ORDER — ACETAMINOPHEN 500 MG
650 TABLET ORAL ONCE
Refills: 0 | Status: COMPLETED | OUTPATIENT
Start: 2020-01-01 | End: 2020-01-01

## 2020-01-01 RX ORDER — LEVETIRACETAM 250 MG/1
1000 TABLET, FILM COATED ORAL EVERY 12 HOURS
Refills: 0 | Status: DISCONTINUED | OUTPATIENT
Start: 2020-01-01 | End: 2020-01-01

## 2020-01-01 RX ORDER — DIPHENHYDRAMINE HCL 50 MG
25 CAPSULE ORAL EVERY 4 HOURS
Refills: 0 | Status: DISCONTINUED | OUTPATIENT
Start: 2020-01-01 | End: 2020-01-01

## 2020-01-01 RX ORDER — GABAPENTIN 800 MG/1
800 TABLET, COATED ORAL 3 TIMES DAILY
Qty: 90 | Refills: 0 | Status: DISCONTINUED | COMMUNITY
Start: 2020-01-01 | End: 2020-01-01

## 2020-01-01 RX ORDER — BACLOFEN 100 %
10 POWDER (GRAM) MISCELLANEOUS
Refills: 0 | Status: DISCONTINUED | OUTPATIENT
Start: 2020-01-01 | End: 2020-01-01

## 2020-01-01 RX ORDER — LEVETIRACETAM 250 MG/1
1500 TABLET, FILM COATED ORAL ONCE
Refills: 0 | Status: COMPLETED | OUTPATIENT
Start: 2020-01-01 | End: 2020-01-01

## 2020-01-01 RX ORDER — INSULIN GLARGINE 100 [IU]/ML
38 INJECTION, SOLUTION SUBCUTANEOUS AT BEDTIME
Refills: 0 | Status: DISCONTINUED | OUTPATIENT
Start: 2020-01-01 | End: 2020-01-01

## 2020-01-01 RX ORDER — POTASSIUM CHLORIDE 20 MEQ
40 PACKET (EA) ORAL ONCE
Refills: 0 | Status: COMPLETED | OUTPATIENT
Start: 2020-01-01 | End: 2020-01-01

## 2020-01-01 RX ORDER — ACETAMINOPHEN 500 MG
650 TABLET ORAL EVERY 6 HOURS
Refills: 0 | Status: DISCONTINUED | OUTPATIENT
Start: 2020-01-01 | End: 2020-01-01

## 2020-01-01 RX ORDER — DEXTROSE 50 % IN WATER 50 %
25 SYRINGE (ML) INTRAVENOUS ONCE
Refills: 0 | Status: DISCONTINUED | OUTPATIENT
Start: 2020-01-01 | End: 2020-01-01

## 2020-01-01 RX ORDER — ACYCLOVIR SODIUM 500 MG
1000 VIAL (EA) INTRAVENOUS ONCE
Refills: 0 | Status: COMPLETED | OUTPATIENT
Start: 2020-01-01 | End: 2020-01-01

## 2020-01-01 RX ORDER — ACETAMINOPHEN 500 MG
650 TABLET ORAL EVERY 4 HOURS
Refills: 0 | Status: DISCONTINUED | OUTPATIENT
Start: 2020-01-01 | End: 2020-01-01

## 2020-01-01 RX ORDER — MORPHINE SULFATE 50 MG/1
5 CAPSULE, EXTENDED RELEASE ORAL EVERY 6 HOURS
Refills: 0 | Status: DISCONTINUED | OUTPATIENT
Start: 2020-01-01 | End: 2020-01-01

## 2020-01-01 RX ORDER — ASPIRIN/CALCIUM CARB/MAGNESIUM 324 MG
81 TABLET ORAL DAILY
Refills: 0 | Status: DISCONTINUED | OUTPATIENT
Start: 2020-01-01 | End: 2020-01-01

## 2020-01-01 RX ORDER — ATORVASTATIN CALCIUM 80 MG/1
80 TABLET, FILM COATED ORAL
Qty: 90 | Refills: 0 | Status: ACTIVE | COMMUNITY
Start: 2019-01-01 | End: 1900-01-01

## 2020-01-01 RX ORDER — FOLIC ACID 0.8 MG
500 TABLET ORAL
Refills: 0 | Status: ACTIVE | COMMUNITY

## 2020-01-01 RX ORDER — ONDANSETRON 8 MG/1
4 TABLET, FILM COATED ORAL ONCE
Refills: 0 | Status: COMPLETED | OUTPATIENT
Start: 2020-01-01 | End: 2020-01-01

## 2020-01-01 RX ORDER — CARVEDILOL PHOSPHATE 80 MG/1
6.25 CAPSULE, EXTENDED RELEASE ORAL ONCE
Refills: 0 | Status: COMPLETED | OUTPATIENT
Start: 2020-01-01 | End: 2020-01-01

## 2020-01-01 RX ORDER — PROPOFOL 10 MG/ML
10 INJECTION, EMULSION INTRAVENOUS
Qty: 500 | Refills: 0 | Status: DISCONTINUED | OUTPATIENT
Start: 2020-01-01 | End: 2020-01-01

## 2020-01-01 RX ORDER — METOPROLOL TARTRATE 50 MG
25 TABLET ORAL ONCE
Refills: 0 | Status: COMPLETED | OUTPATIENT
Start: 2020-01-01 | End: 2020-01-01

## 2020-01-01 RX ORDER — BUSPIRONE HYDROCHLORIDE 5 MG/1
5 TABLET ORAL TWICE DAILY
Qty: 180 | Refills: 3 | Status: DISCONTINUED | COMMUNITY
Start: 2018-08-27 | End: 2020-01-01

## 2020-01-01 RX ORDER — SODIUM CHLORIDE 9 MG/ML
1000 INJECTION, SOLUTION INTRAVENOUS
Refills: 0 | Status: DISCONTINUED | OUTPATIENT
Start: 2020-01-01 | End: 2020-01-01

## 2020-01-01 RX ORDER — NOREPINEPHRINE BITARTRATE/D5W 8 MG/250ML
0.05 PLASTIC BAG, INJECTION (ML) INTRAVENOUS
Qty: 16 | Refills: 0 | Status: DISCONTINUED | OUTPATIENT
Start: 2020-01-01 | End: 2020-01-01

## 2020-01-01 RX ORDER — CLOPIDOGREL BISULFATE 75 MG/1
75 TABLET, FILM COATED ORAL DAILY
Refills: 0 | Status: DISCONTINUED | OUTPATIENT
Start: 2020-01-01 | End: 2020-01-01

## 2020-01-01 RX ORDER — CARVEDILOL PHOSPHATE 80 MG/1
25 CAPSULE, EXTENDED RELEASE ORAL EVERY 12 HOURS
Refills: 0 | Status: DISCONTINUED | OUTPATIENT
Start: 2020-01-01 | End: 2020-01-01

## 2020-01-01 RX ORDER — INSULIN HUMAN 100 [IU]/ML
12 INJECTION, SOLUTION SUBCUTANEOUS ONCE
Refills: 0 | Status: COMPLETED | OUTPATIENT
Start: 2020-01-01 | End: 2020-01-01

## 2020-01-01 RX ORDER — LOSARTAN POTASSIUM 100 MG/1
50 TABLET, FILM COATED ORAL DAILY
Refills: 0 | Status: DISCONTINUED | OUTPATIENT
Start: 2020-01-01 | End: 2020-01-01

## 2020-01-01 RX ORDER — BACLOFEN 10 MG/1
10 TABLET ORAL
Qty: 180 | Refills: 3 | Status: ACTIVE | COMMUNITY
Start: 2018-08-27

## 2020-01-01 RX ORDER — DULOXETINE HYDROCHLORIDE 30 MG/1
60 CAPSULE, DELAYED RELEASE ORAL
Refills: 0 | Status: DISCONTINUED | OUTPATIENT
Start: 2020-01-01 | End: 2020-01-01

## 2020-01-01 RX ORDER — INSULIN LISPRO 100/ML
VIAL (ML) SUBCUTANEOUS
Refills: 0 | Status: DISCONTINUED | OUTPATIENT
Start: 2020-01-01 | End: 2020-01-01

## 2020-01-01 RX ORDER — TESTOSTERONE 30 MG/1.5ML
SOLUTION TOPICAL
Refills: 0 | Status: ACTIVE | COMMUNITY

## 2020-01-01 RX ORDER — INSULIN HUMAN 100 [IU]/ML
15 INJECTION, SOLUTION SUBCUTANEOUS ONCE
Refills: 0 | Status: COMPLETED | OUTPATIENT
Start: 2020-01-01 | End: 2020-01-01

## 2020-01-01 RX ORDER — SODIUM CHLORIDE 9 MG/ML
1000 INJECTION INTRAMUSCULAR; INTRAVENOUS; SUBCUTANEOUS
Refills: 0 | Status: DISCONTINUED | OUTPATIENT
Start: 2020-01-01 | End: 2020-01-01

## 2020-01-01 RX ORDER — DEXMEDETOMIDINE HYDROCHLORIDE IN 0.9% SODIUM CHLORIDE 4 UG/ML
0.2 INJECTION INTRAVENOUS
Qty: 200 | Refills: 0 | Status: DISCONTINUED | OUTPATIENT
Start: 2020-01-01 | End: 2020-01-01

## 2020-01-01 RX ORDER — GLUCAGON INJECTION, SOLUTION 0.5 MG/.1ML
1 INJECTION, SOLUTION SUBCUTANEOUS ONCE
Refills: 0 | Status: DISCONTINUED | OUTPATIENT
Start: 2020-01-01 | End: 2020-01-01

## 2020-01-01 RX ORDER — PRAMIPEXOLE DIHYDROCHLORIDE 0.12 MG/1
0.5 TABLET ORAL EVERY 12 HOURS
Refills: 0 | Status: DISCONTINUED | OUTPATIENT
Start: 2020-01-01 | End: 2020-01-01

## 2020-01-01 RX ORDER — TAMSULOSIN HYDROCHLORIDE 0.4 MG/1
0.4 CAPSULE ORAL AT BEDTIME
Refills: 0 | Status: DISCONTINUED | OUTPATIENT
Start: 2020-01-01 | End: 2020-01-01

## 2020-01-01 RX ORDER — VANCOMYCIN HCL 1 G
1000 VIAL (EA) INTRAVENOUS EVERY 12 HOURS
Refills: 0 | Status: DISCONTINUED | OUTPATIENT
Start: 2020-01-01 | End: 2020-01-01

## 2020-01-01 RX ORDER — SENNOSIDES 8.6 MG/1
8.6 CAPSULE, GELATIN COATED ORAL
Qty: 60 | Refills: 3 | Status: ACTIVE | COMMUNITY
Start: 2019-01-01 | End: 1900-01-01

## 2020-01-01 RX ORDER — DULOXETINE HYDROCHLORIDE 60 MG/1
60 CAPSULE, DELAYED RELEASE PELLETS ORAL TWICE DAILY
Qty: 60 | Refills: 5 | Status: ACTIVE | COMMUNITY
Start: 2019-01-01 | End: 1900-01-01

## 2020-01-01 RX ORDER — CARVEDILOL PHOSPHATE 80 MG/1
6.25 CAPSULE, EXTENDED RELEASE ORAL EVERY 12 HOURS
Refills: 0 | Status: DISCONTINUED | OUTPATIENT
Start: 2020-01-01 | End: 2020-01-01

## 2020-01-01 RX ORDER — ALPRAZOLAM 1 MG/1
1 TABLET ORAL 3 TIMES DAILY
Refills: 0 | Status: ACTIVE | COMMUNITY

## 2020-01-01 RX ORDER — ALPRAZOLAM 0.25 MG
1 TABLET ORAL EVERY 6 HOURS
Refills: 0 | Status: DISCONTINUED | OUTPATIENT
Start: 2020-01-01 | End: 2020-01-01

## 2020-01-01 RX ORDER — IMMUNE GLOBULIN (HUMAN) 10 G/100ML
90 INJECTION INTRAVENOUS; SUBCUTANEOUS ONCE
Refills: 0 | Status: COMPLETED | OUTPATIENT
Start: 2020-01-01 | End: 2020-01-01

## 2020-01-01 RX ORDER — MORPHINE SULFATE 50 MG/1
60 CAPSULE, EXTENDED RELEASE ORAL
Refills: 0 | Status: DISCONTINUED | OUTPATIENT
Start: 2020-01-01 | End: 2020-01-01

## 2020-01-01 RX ORDER — SENNA PLUS 8.6 MG/1
2 TABLET ORAL AT BEDTIME
Refills: 0 | Status: DISCONTINUED | OUTPATIENT
Start: 2020-01-01 | End: 2020-01-01

## 2020-01-01 RX ORDER — ENOXAPARIN SODIUM 100 MG/ML
40 INJECTION SUBCUTANEOUS DAILY
Refills: 0 | Status: DISCONTINUED | OUTPATIENT
Start: 2020-01-01 | End: 2020-01-01

## 2020-01-01 RX ORDER — TAMSULOSIN HYDROCHLORIDE 0.4 MG/1
0.4 CAPSULE ORAL
Qty: 90 | Refills: 3 | Status: ACTIVE | COMMUNITY
Start: 2018-08-27 | End: 1900-01-01

## 2020-01-01 RX ORDER — PANTOPRAZOLE SODIUM 20 MG/1
40 TABLET, DELAYED RELEASE ORAL DAILY
Refills: 0 | Status: DISCONTINUED | OUTPATIENT
Start: 2020-01-01 | End: 2020-01-01

## 2020-01-01 RX ORDER — LOSARTAN POTASSIUM 100 MG/1
100 TABLET, FILM COATED ORAL DAILY
Refills: 0 | Status: DISCONTINUED | OUTPATIENT
Start: 2020-01-01 | End: 2020-01-01

## 2020-01-01 RX ORDER — CARVEDILOL PHOSPHATE 80 MG/1
3.12 CAPSULE, EXTENDED RELEASE ORAL ONCE
Refills: 0 | Status: COMPLETED | OUTPATIENT
Start: 2020-01-01 | End: 2020-01-01

## 2020-01-01 RX ORDER — LANOLIN ALCOHOL/MO/W.PET/CERES
10 CREAM (GRAM) TOPICAL AT BEDTIME
Refills: 0 | Status: DISCONTINUED | OUTPATIENT
Start: 2020-01-01 | End: 2020-01-01

## 2020-01-01 RX ORDER — SOD,AMMONIUM,POTASSIUM LACTATE
1 CREAM (GRAM) TOPICAL
Qty: 0 | Refills: 0 | DISCHARGE

## 2020-01-01 RX ORDER — CLOPIDOGREL BISULFATE 75 MG/1
75 TABLET, FILM COATED ORAL DAILY
Qty: 90 | Refills: 3 | Status: ACTIVE | COMMUNITY
Start: 2018-08-27 | End: 1900-01-01

## 2020-01-01 RX ORDER — MIDAZOLAM HYDROCHLORIDE 1 MG/ML
2 INJECTION, SOLUTION INTRAMUSCULAR; INTRAVENOUS ONCE
Refills: 0 | Status: DISCONTINUED | OUTPATIENT
Start: 2020-01-01 | End: 2020-01-01

## 2020-01-01 RX ORDER — DOXAZOSIN MESYLATE 4 MG
2 TABLET ORAL AT BEDTIME
Refills: 0 | Status: DISCONTINUED | OUTPATIENT
Start: 2020-01-01 | End: 2020-01-01

## 2020-01-01 RX ORDER — MORPHINE SULFATE 50 MG/1
2 CAPSULE, EXTENDED RELEASE ORAL EVERY 4 HOURS
Refills: 0 | Status: DISCONTINUED | OUTPATIENT
Start: 2020-01-01 | End: 2020-01-01

## 2020-01-01 RX ORDER — LACTULOSE 10 G/15ML
30 SOLUTION ORAL
Qty: 0 | Refills: 0 | DISCHARGE

## 2020-01-01 RX ORDER — CLEVIDIPINE BUTYRATE 50MG/100ML
32 VIAL (ML) INTRAVENOUS
Qty: 25 | Refills: 0 | Status: DISCONTINUED | OUTPATIENT
Start: 2020-01-01 | End: 2020-01-01

## 2020-01-01 RX ORDER — CARVEDILOL 3.12 MG/1
3.12 TABLET, FILM COATED ORAL
Qty: 180 | Refills: 2 | Status: ACTIVE | COMMUNITY
Start: 2019-01-01 | End: 1900-01-01

## 2020-01-01 RX ORDER — INSULIN LISPRO 100/ML
7 VIAL (ML) SUBCUTANEOUS ONCE
Refills: 0 | Status: COMPLETED | OUTPATIENT
Start: 2020-01-01 | End: 2020-01-01

## 2020-01-01 RX ORDER — ACYCLOVIR SODIUM 500 MG
VIAL (EA) INTRAVENOUS
Refills: 0 | Status: DISCONTINUED | OUTPATIENT
Start: 2020-01-01 | End: 2020-01-01

## 2020-01-01 RX ORDER — MORPHINE SULFATE 50 MG/1
2 CAPSULE, EXTENDED RELEASE ORAL EVERY 8 HOURS
Refills: 0 | Status: DISCONTINUED | OUTPATIENT
Start: 2020-01-01 | End: 2020-01-01

## 2020-01-01 RX ORDER — ERGOCALCIFEROL 1.25 MG/1
1 CAPSULE ORAL
Qty: 0 | Refills: 0 | DISCHARGE

## 2020-01-01 RX ORDER — INSULIN LISPRO 100/ML
9 VIAL (ML) SUBCUTANEOUS EVERY 6 HOURS
Refills: 0 | Status: DISCONTINUED | OUTPATIENT
Start: 2020-01-01 | End: 2020-01-01

## 2020-01-01 RX ORDER — METOPROLOL TARTRATE 50 MG
10 TABLET ORAL ONCE
Refills: 0 | Status: COMPLETED | OUTPATIENT
Start: 2020-01-01 | End: 2020-01-01

## 2020-01-01 RX ORDER — HYDROXYZINE HCL 10 MG
50 TABLET ORAL EVERY 6 HOURS
Refills: 0 | Status: DISCONTINUED | OUTPATIENT
Start: 2020-01-01 | End: 2020-01-01

## 2020-01-01 RX ORDER — PRAMIPEXOLE DIHYDROCHLORIDE 0.5 MG/1
0.5 TABLET ORAL
Refills: 0 | Status: DISCONTINUED | COMMUNITY
Start: 2019-01-01 | End: 2020-01-01

## 2020-01-01 RX ORDER — CARVEDILOL PHOSPHATE 80 MG/1
3.12 CAPSULE, EXTENDED RELEASE ORAL EVERY 12 HOURS
Refills: 0 | Status: DISCONTINUED | OUTPATIENT
Start: 2020-01-01 | End: 2020-01-01

## 2020-01-01 RX ORDER — CARVEDILOL PHOSPHATE 80 MG/1
12.5 CAPSULE, EXTENDED RELEASE ORAL EVERY 12 HOURS
Refills: 0 | Status: DISCONTINUED | OUTPATIENT
Start: 2020-01-01 | End: 2020-01-01

## 2020-01-01 RX ORDER — METFORMIN HYDROCHLORIDE 1000 MG/1
1000 TABLET, COATED ORAL
Qty: 60 | Refills: 3 | Status: DISCONTINUED | COMMUNITY
Start: 2020-01-01 | End: 2020-01-01

## 2020-01-01 RX ORDER — CHLORHEXIDINE GLUCONATE 213 G/1000ML
1 SOLUTION TOPICAL
Refills: 0 | Status: DISCONTINUED | OUTPATIENT
Start: 2020-01-01 | End: 2020-01-01

## 2020-01-01 RX ORDER — SODIUM CHLORIDE 9 MG/ML
1000 INJECTION, SOLUTION INTRAVENOUS ONCE
Refills: 0 | Status: COMPLETED | OUTPATIENT
Start: 2020-01-01 | End: 2020-01-01

## 2020-01-01 RX ORDER — FLASH GLUCOSE SENSOR
KIT MISCELLANEOUS
Qty: 1 | Refills: 0 | Status: ACTIVE | COMMUNITY
Start: 2020-01-01 | End: 1900-01-01

## 2020-01-01 RX ORDER — ASPIRIN 81 MG/1
81 TABLET ORAL DAILY
Qty: 90 | Refills: 3 | Status: ACTIVE | COMMUNITY
Start: 2019-01-01 | End: 1900-01-01

## 2020-01-01 RX ORDER — TAMSULOSIN HYDROCHLORIDE 0.4 MG/1
1 CAPSULE ORAL
Qty: 0 | Refills: 0 | DISCHARGE

## 2020-01-01 RX ORDER — CHOLECALCIFEROL (VITAMIN D3) 1250 MCG
1.25 MG CAPSULE ORAL
Qty: 3 | Refills: 3 | Status: ACTIVE | COMMUNITY
Start: 2018-10-31 | End: 1900-01-01

## 2020-01-01 RX ORDER — FLASH GLUCOSE SENSOR
KIT MISCELLANEOUS
Qty: 6 | Refills: 1 | Status: ACTIVE | COMMUNITY
Start: 2020-01-01 | End: 1900-01-01

## 2020-01-01 RX ORDER — VORTIOXETINE 10 MG/1
10 TABLET, FILM COATED ORAL
Refills: 0 | Status: ACTIVE | COMMUNITY
Start: 2019-01-01

## 2020-01-01 RX ORDER — INSULIN LISPRO 100/ML
10 VIAL (ML) SUBCUTANEOUS ONCE
Refills: 0 | Status: COMPLETED | OUTPATIENT
Start: 2020-01-01 | End: 2020-01-01

## 2020-01-01 RX ORDER — INSULIN GLARGINE 100 [IU]/ML
30 INJECTION, SOLUTION SUBCUTANEOUS AT BEDTIME
Refills: 0 | Status: DISCONTINUED | OUTPATIENT
Start: 2020-01-01 | End: 2020-01-01

## 2020-01-01 RX ORDER — LEVETIRACETAM 250 MG/1
1000 TABLET, FILM COATED ORAL
Refills: 0 | Status: DISCONTINUED | OUTPATIENT
Start: 2020-01-01 | End: 2020-01-01

## 2020-01-01 RX ORDER — INSULIN HUMAN 100 [IU]/ML
10 INJECTION, SOLUTION SUBCUTANEOUS ONCE
Refills: 0 | Status: COMPLETED | OUTPATIENT
Start: 2020-01-01 | End: 2020-01-01

## 2020-01-01 RX ORDER — LEVETIRACETAM 250 MG/1
500 TABLET, FILM COATED ORAL EVERY 12 HOURS
Refills: 0 | Status: DISCONTINUED | OUTPATIENT
Start: 2020-01-01 | End: 2020-01-01

## 2020-01-01 RX ADMIN — Medication 270 MILLIGRAM(S): at 21:12

## 2020-01-01 RX ADMIN — MORPHINE SULFATE 2 MILLIGRAM(S): 50 CAPSULE, EXTENDED RELEASE ORAL at 23:49

## 2020-01-01 RX ADMIN — LEVETIRACETAM 1000 MILLIGRAM(S): 250 TABLET, FILM COATED ORAL at 17:12

## 2020-01-01 RX ADMIN — LEVETIRACETAM 400 MILLIGRAM(S): 250 TABLET, FILM COATED ORAL at 05:36

## 2020-01-01 RX ADMIN — MORPHINE SULFATE 2 MILLIGRAM(S): 50 CAPSULE, EXTENDED RELEASE ORAL at 05:38

## 2020-01-01 RX ADMIN — MORPHINE SULFATE 2 MILLIGRAM(S): 50 CAPSULE, EXTENDED RELEASE ORAL at 17:30

## 2020-01-01 RX ADMIN — MORPHINE SULFATE 2 MILLIGRAM(S): 50 CAPSULE, EXTENDED RELEASE ORAL at 23:12

## 2020-01-01 RX ADMIN — Medication 9 UNIT(S): at 05:43

## 2020-01-01 RX ADMIN — PANTOPRAZOLE SODIUM 40 MILLIGRAM(S): 20 TABLET, DELAYED RELEASE ORAL at 12:07

## 2020-01-01 RX ADMIN — Medication 81 MILLIGRAM(S): at 11:21

## 2020-01-01 RX ADMIN — PRAMIPEXOLE DIHYDROCHLORIDE 0.5 MILLIGRAM(S): 0.12 TABLET ORAL at 17:25

## 2020-01-01 RX ADMIN — LEVETIRACETAM 1000 MILLIGRAM(S): 250 TABLET, FILM COATED ORAL at 05:46

## 2020-01-01 RX ADMIN — PANTOPRAZOLE SODIUM 40 MILLIGRAM(S): 20 TABLET, DELAYED RELEASE ORAL at 11:47

## 2020-01-01 RX ADMIN — Medication 81 MILLIGRAM(S): at 11:50

## 2020-01-01 RX ADMIN — PRAMIPEXOLE DIHYDROCHLORIDE 0.5 MILLIGRAM(S): 0.12 TABLET ORAL at 05:35

## 2020-01-01 RX ADMIN — MORPHINE SULFATE 15 MILLIGRAM(S): 50 CAPSULE, EXTENDED RELEASE ORAL at 15:14

## 2020-01-01 RX ADMIN — Medication 9 UNIT(S): at 06:34

## 2020-01-01 RX ADMIN — Medication 650 MILLIGRAM(S): at 17:41

## 2020-01-01 RX ADMIN — Medication 270 MILLIGRAM(S): at 05:32

## 2020-01-01 RX ADMIN — Medication 250 MILLIGRAM(S): at 05:23

## 2020-01-01 RX ADMIN — MORPHINE SULFATE 2 MILLIGRAM(S): 50 CAPSULE, EXTENDED RELEASE ORAL at 06:44

## 2020-01-01 RX ADMIN — Medication 9 UNIT(S): at 11:43

## 2020-01-01 RX ADMIN — Medication 25 MILLIGRAM(S): at 10:00

## 2020-01-01 RX ADMIN — LEVETIRACETAM 420 MILLIGRAM(S): 250 TABLET, FILM COATED ORAL at 11:17

## 2020-01-01 RX ADMIN — LEVETIRACETAM 1000 MILLIGRAM(S): 250 TABLET, FILM COATED ORAL at 21:20

## 2020-01-01 RX ADMIN — MORPHINE SULFATE 2 MILLIGRAM(S): 50 CAPSULE, EXTENDED RELEASE ORAL at 09:57

## 2020-01-01 RX ADMIN — MORPHINE SULFATE 2 MILLIGRAM(S): 50 CAPSULE, EXTENDED RELEASE ORAL at 10:45

## 2020-01-01 RX ADMIN — Medication 270 MILLIGRAM(S): at 23:22

## 2020-01-01 RX ADMIN — Medication 2: at 11:52

## 2020-01-01 RX ADMIN — PRAMIPEXOLE DIHYDROCHLORIDE 0.5 MILLIGRAM(S): 0.12 TABLET ORAL at 06:00

## 2020-01-01 RX ADMIN — PRAMIPEXOLE DIHYDROCHLORIDE 0.5 MILLIGRAM(S): 0.12 TABLET ORAL at 05:45

## 2020-01-01 RX ADMIN — MEROPENEM 200 MILLIGRAM(S): 1 INJECTION INTRAVENOUS at 05:23

## 2020-01-01 RX ADMIN — DULOXETINE HYDROCHLORIDE 60 MILLIGRAM(S): 30 CAPSULE, DELAYED RELEASE ORAL at 06:00

## 2020-01-01 RX ADMIN — Medication 9 UNIT(S): at 05:35

## 2020-01-01 RX ADMIN — MORPHINE SULFATE 2 MILLIGRAM(S): 50 CAPSULE, EXTENDED RELEASE ORAL at 22:45

## 2020-01-01 RX ADMIN — Medication 650 MILLIGRAM(S): at 08:07

## 2020-01-01 RX ADMIN — CARVEDILOL PHOSPHATE 3.12 MILLIGRAM(S): 80 CAPSULE, EXTENDED RELEASE ORAL at 17:51

## 2020-01-01 RX ADMIN — LEVETIRACETAM 400 MILLIGRAM(S): 250 TABLET, FILM COATED ORAL at 06:22

## 2020-01-01 RX ADMIN — Medication 9 UNIT(S): at 16:27

## 2020-01-01 RX ADMIN — CARVEDILOL PHOSPHATE 6.25 MILLIGRAM(S): 80 CAPSULE, EXTENDED RELEASE ORAL at 18:34

## 2020-01-01 RX ADMIN — Medication 3: at 11:43

## 2020-01-01 RX ADMIN — Medication 81 MILLIGRAM(S): at 18:25

## 2020-01-01 RX ADMIN — MORPHINE SULFATE 2 MILLIGRAM(S): 50 CAPSULE, EXTENDED RELEASE ORAL at 06:54

## 2020-01-01 RX ADMIN — MORPHINE SULFATE 2 MILLIGRAM(S): 50 CAPSULE, EXTENDED RELEASE ORAL at 21:34

## 2020-01-01 RX ADMIN — CHLORHEXIDINE GLUCONATE 1 APPLICATION(S): 213 SOLUTION TOPICAL at 05:24

## 2020-01-01 RX ADMIN — LOSARTAN POTASSIUM 50 MILLIGRAM(S): 100 TABLET, FILM COATED ORAL at 10:44

## 2020-01-01 RX ADMIN — ATORVASTATIN CALCIUM 80 MILLIGRAM(S): 80 TABLET, FILM COATED ORAL at 21:09

## 2020-01-01 RX ADMIN — Medication 3: at 05:59

## 2020-01-01 RX ADMIN — QUETIAPINE FUMARATE 200 MILLIGRAM(S): 200 TABLET, FILM COATED ORAL at 17:22

## 2020-01-01 RX ADMIN — MORPHINE SULFATE 2 MILLIGRAM(S): 50 CAPSULE, EXTENDED RELEASE ORAL at 17:45

## 2020-01-01 RX ADMIN — Medication 2 MILLIGRAM(S): at 21:19

## 2020-01-01 RX ADMIN — CARVEDILOL PHOSPHATE 3.12 MILLIGRAM(S): 80 CAPSULE, EXTENDED RELEASE ORAL at 17:32

## 2020-01-01 RX ADMIN — Medication 81 MILLIGRAM(S): at 11:46

## 2020-01-01 RX ADMIN — LEVETIRACETAM 420 MILLIGRAM(S): 250 TABLET, FILM COATED ORAL at 17:27

## 2020-01-01 RX ADMIN — Medication 9 UNIT(S): at 18:31

## 2020-01-01 RX ADMIN — DULOXETINE HYDROCHLORIDE 60 MILLIGRAM(S): 30 CAPSULE, DELAYED RELEASE ORAL at 05:22

## 2020-01-01 RX ADMIN — MORPHINE SULFATE 2 MILLIGRAM(S): 50 CAPSULE, EXTENDED RELEASE ORAL at 06:58

## 2020-01-01 RX ADMIN — Medication 270 MILLIGRAM(S): at 13:10

## 2020-01-01 RX ADMIN — PANTOPRAZOLE SODIUM 40 MILLIGRAM(S): 20 TABLET, DELAYED RELEASE ORAL at 17:22

## 2020-01-01 RX ADMIN — Medication 9 UNIT(S): at 12:37

## 2020-01-01 RX ADMIN — Medication 9 UNIT(S): at 11:55

## 2020-01-01 RX ADMIN — Medication 10 MILLIGRAM(S): at 17:12

## 2020-01-01 RX ADMIN — Medication 650 MILLIGRAM(S): at 21:09

## 2020-01-01 RX ADMIN — Medication 650 MILLIGRAM(S): at 14:00

## 2020-01-01 RX ADMIN — MORPHINE SULFATE 2 MILLIGRAM(S): 50 CAPSULE, EXTENDED RELEASE ORAL at 18:29

## 2020-01-01 RX ADMIN — MORPHINE SULFATE 2 MILLIGRAM(S): 50 CAPSULE, EXTENDED RELEASE ORAL at 10:52

## 2020-01-01 RX ADMIN — Medication 2 MILLIGRAM(S): at 21:17

## 2020-01-01 RX ADMIN — Medication 2 MILLIGRAM(S): at 15:25

## 2020-01-01 RX ADMIN — MORPHINE SULFATE 2 MILLIGRAM(S): 50 CAPSULE, EXTENDED RELEASE ORAL at 20:00

## 2020-01-01 RX ADMIN — ATORVASTATIN CALCIUM 80 MILLIGRAM(S): 80 TABLET, FILM COATED ORAL at 23:17

## 2020-01-01 RX ADMIN — LEVETIRACETAM 1000 MILLIGRAM(S): 250 TABLET, FILM COATED ORAL at 05:06

## 2020-01-01 RX ADMIN — SENNA PLUS 2 TABLET(S): 8.6 TABLET ORAL at 23:22

## 2020-01-01 RX ADMIN — CARVEDILOL PHOSPHATE 25 MILLIGRAM(S): 80 CAPSULE, EXTENDED RELEASE ORAL at 05:45

## 2020-01-01 RX ADMIN — INSULIN HUMAN 10 UNIT(S): 100 INJECTION, SOLUTION SUBCUTANEOUS at 01:01

## 2020-01-01 RX ADMIN — DULOXETINE HYDROCHLORIDE 60 MILLIGRAM(S): 30 CAPSULE, DELAYED RELEASE ORAL at 17:11

## 2020-01-01 RX ADMIN — MORPHINE SULFATE 2 MILLIGRAM(S): 50 CAPSULE, EXTENDED RELEASE ORAL at 12:28

## 2020-01-01 RX ADMIN — PRAMIPEXOLE DIHYDROCHLORIDE 0.5 MILLIGRAM(S): 0.12 TABLET ORAL at 06:23

## 2020-01-01 RX ADMIN — SODIUM CHLORIDE 1000 MILLILITER(S): 9 INJECTION, SOLUTION INTRAVENOUS at 17:00

## 2020-01-01 RX ADMIN — Medication 81 MILLIGRAM(S): at 12:27

## 2020-01-01 RX ADMIN — Medication 2 MILLIGRAM(S): at 21:15

## 2020-01-01 RX ADMIN — MORPHINE SULFATE 2 MILLIGRAM(S): 50 CAPSULE, EXTENDED RELEASE ORAL at 02:11

## 2020-01-01 RX ADMIN — MORPHINE SULFATE 2 MILLIGRAM(S): 50 CAPSULE, EXTENDED RELEASE ORAL at 09:04

## 2020-01-01 RX ADMIN — LOSARTAN POTASSIUM 50 MILLIGRAM(S): 100 TABLET, FILM COATED ORAL at 15:50

## 2020-01-01 RX ADMIN — Medication 5 MILLIGRAM(S): at 03:30

## 2020-01-01 RX ADMIN — PRAMIPEXOLE DIHYDROCHLORIDE 0.5 MILLIGRAM(S): 0.12 TABLET ORAL at 05:24

## 2020-01-01 RX ADMIN — LEVETIRACETAM 420 MILLIGRAM(S): 250 TABLET, FILM COATED ORAL at 05:33

## 2020-01-01 RX ADMIN — SENNA PLUS 2 TABLET(S): 8.6 TABLET ORAL at 21:15

## 2020-01-01 RX ADMIN — Medication 650 MILLIGRAM(S): at 02:16

## 2020-01-01 RX ADMIN — Medication 270 MILLIGRAM(S): at 23:39

## 2020-01-01 RX ADMIN — Medication 10 MILLIGRAM(S): at 18:27

## 2020-01-01 RX ADMIN — PANTOPRAZOLE SODIUM 40 MILLIGRAM(S): 20 TABLET, DELAYED RELEASE ORAL at 11:10

## 2020-01-01 RX ADMIN — LEVETIRACETAM 1000 MILLIGRAM(S): 250 TABLET, FILM COATED ORAL at 05:35

## 2020-01-01 RX ADMIN — Medication 50 MILLIEQUIVALENT(S): at 10:39

## 2020-01-01 RX ADMIN — SENNA PLUS 2 TABLET(S): 8.6 TABLET ORAL at 21:17

## 2020-01-01 RX ADMIN — CHLORHEXIDINE GLUCONATE 1 APPLICATION(S): 213 SOLUTION TOPICAL at 06:00

## 2020-01-01 RX ADMIN — Medication 2: at 06:34

## 2020-01-01 RX ADMIN — MORPHINE SULFATE 2 MILLIGRAM(S): 50 CAPSULE, EXTENDED RELEASE ORAL at 11:00

## 2020-01-01 RX ADMIN — Medication 650 MILLIGRAM(S): at 00:00

## 2020-01-01 RX ADMIN — LEVETIRACETAM 1000 MILLIGRAM(S): 250 TABLET, FILM COATED ORAL at 05:14

## 2020-01-01 RX ADMIN — LEVETIRACETAM 400 MILLIGRAM(S): 250 TABLET, FILM COATED ORAL at 05:30

## 2020-01-01 RX ADMIN — PRAMIPEXOLE DIHYDROCHLORIDE 0.5 MILLIGRAM(S): 0.12 TABLET ORAL at 17:45

## 2020-01-01 RX ADMIN — DULOXETINE HYDROCHLORIDE 60 MILLIGRAM(S): 30 CAPSULE, DELAYED RELEASE ORAL at 17:21

## 2020-01-01 RX ADMIN — MORPHINE SULFATE 15 MILLIGRAM(S): 50 CAPSULE, EXTENDED RELEASE ORAL at 15:13

## 2020-01-01 RX ADMIN — LEVETIRACETAM 1000 MILLIGRAM(S): 250 TABLET, FILM COATED ORAL at 05:55

## 2020-01-01 RX ADMIN — CHLORHEXIDINE GLUCONATE 1 APPLICATION(S): 213 SOLUTION TOPICAL at 05:36

## 2020-01-01 RX ADMIN — Medication 1: at 17:09

## 2020-01-01 RX ADMIN — LOSARTAN POTASSIUM 50 MILLIGRAM(S): 100 TABLET, FILM COATED ORAL at 05:14

## 2020-01-01 RX ADMIN — PRAMIPEXOLE DIHYDROCHLORIDE 0.5 MILLIGRAM(S): 0.12 TABLET ORAL at 05:07

## 2020-01-01 RX ADMIN — INSULIN GLARGINE 30 UNIT(S): 100 INJECTION, SOLUTION SUBCUTANEOUS at 23:12

## 2020-01-01 RX ADMIN — CARVEDILOL PHOSPHATE 3.12 MILLIGRAM(S): 80 CAPSULE, EXTENDED RELEASE ORAL at 05:26

## 2020-01-01 RX ADMIN — MORPHINE SULFATE 5 MILLIGRAM(S): 50 CAPSULE, EXTENDED RELEASE ORAL at 18:00

## 2020-01-01 RX ADMIN — ATORVASTATIN CALCIUM 80 MILLIGRAM(S): 80 TABLET, FILM COATED ORAL at 22:42

## 2020-01-01 RX ADMIN — CARVEDILOL PHOSPHATE 25 MILLIGRAM(S): 80 CAPSULE, EXTENDED RELEASE ORAL at 05:56

## 2020-01-01 RX ADMIN — MORPHINE SULFATE 2 MILLIGRAM(S): 50 CAPSULE, EXTENDED RELEASE ORAL at 14:25

## 2020-01-01 RX ADMIN — Medication 10 MILLIGRAM(S): at 05:27

## 2020-01-01 RX ADMIN — MORPHINE SULFATE 2 MILLIGRAM(S): 50 CAPSULE, EXTENDED RELEASE ORAL at 17:18

## 2020-01-01 RX ADMIN — CHLORHEXIDINE GLUCONATE 1 APPLICATION(S): 213 SOLUTION TOPICAL at 05:16

## 2020-01-01 RX ADMIN — Medication 2 MILLIGRAM(S): at 23:17

## 2020-01-01 RX ADMIN — PRAMIPEXOLE DIHYDROCHLORIDE 0.5 MILLIGRAM(S): 0.12 TABLET ORAL at 18:27

## 2020-01-01 RX ADMIN — Medication 3: at 11:19

## 2020-01-01 RX ADMIN — CARVEDILOL PHOSPHATE 3.12 MILLIGRAM(S): 80 CAPSULE, EXTENDED RELEASE ORAL at 17:24

## 2020-01-01 RX ADMIN — Medication 650 MILLIGRAM(S): at 05:54

## 2020-01-01 RX ADMIN — Medication 2: at 06:12

## 2020-01-01 RX ADMIN — ATORVASTATIN CALCIUM 80 MILLIGRAM(S): 80 TABLET, FILM COATED ORAL at 21:17

## 2020-01-01 RX ADMIN — ENOXAPARIN SODIUM 40 MILLIGRAM(S): 100 INJECTION SUBCUTANEOUS at 12:07

## 2020-01-01 RX ADMIN — SENNA PLUS 2 TABLET(S): 8.6 TABLET ORAL at 21:12

## 2020-01-01 RX ADMIN — Medication 10 MILLIGRAM(S): at 06:00

## 2020-01-01 RX ADMIN — QUETIAPINE FUMARATE 200 MILLIGRAM(S): 200 TABLET, FILM COATED ORAL at 05:41

## 2020-01-01 RX ADMIN — LEVETIRACETAM 1000 MILLIGRAM(S): 250 TABLET, FILM COATED ORAL at 06:00

## 2020-01-01 RX ADMIN — MORPHINE SULFATE 2 MILLIGRAM(S): 50 CAPSULE, EXTENDED RELEASE ORAL at 11:30

## 2020-01-01 RX ADMIN — Medication 10 MILLIGRAM(S): at 23:38

## 2020-01-01 RX ADMIN — Medication 4: at 16:27

## 2020-01-01 RX ADMIN — Medication 81 MILLIGRAM(S): at 11:09

## 2020-01-01 RX ADMIN — PRAMIPEXOLE DIHYDROCHLORIDE 0.5 MILLIGRAM(S): 0.12 TABLET ORAL at 17:51

## 2020-01-01 RX ADMIN — Medication 10 MILLIGRAM(S): at 18:18

## 2020-01-01 RX ADMIN — Medication 10 MILLIGRAM(S): at 06:22

## 2020-01-01 RX ADMIN — Medication 10 MILLIGRAM(S): at 18:34

## 2020-01-01 RX ADMIN — CHLORHEXIDINE GLUCONATE 1 APPLICATION(S): 213 SOLUTION TOPICAL at 05:33

## 2020-01-01 RX ADMIN — Medication 650 MILLIGRAM(S): at 23:34

## 2020-01-01 RX ADMIN — Medication 6 UNIT(S): at 02:16

## 2020-01-01 RX ADMIN — MORPHINE SULFATE 2 MILLIGRAM(S): 50 CAPSULE, EXTENDED RELEASE ORAL at 18:33

## 2020-01-01 RX ADMIN — ONDANSETRON 4 MILLIGRAM(S): 8 TABLET, FILM COATED ORAL at 10:04

## 2020-01-01 RX ADMIN — Medication 650 MILLIGRAM(S): at 18:44

## 2020-01-01 RX ADMIN — MORPHINE SULFATE 2 MILLIGRAM(S): 50 CAPSULE, EXTENDED RELEASE ORAL at 15:10

## 2020-01-01 RX ADMIN — SENNA PLUS 2 TABLET(S): 8.6 TABLET ORAL at 21:10

## 2020-01-01 RX ADMIN — Medication 10 MILLIGRAM(S): at 17:32

## 2020-01-01 RX ADMIN — Medication 9 UNIT(S): at 18:05

## 2020-01-01 RX ADMIN — LEVETIRACETAM 420 MILLIGRAM(S): 250 TABLET, FILM COATED ORAL at 18:18

## 2020-01-01 RX ADMIN — Medication 9 UNIT(S): at 11:51

## 2020-01-01 RX ADMIN — Medication 1: at 17:25

## 2020-01-01 RX ADMIN — Medication 10 MILLIGRAM(S): at 05:22

## 2020-01-01 RX ADMIN — Medication 650 MILLIGRAM(S): at 15:30

## 2020-01-01 RX ADMIN — INSULIN GLARGINE 38 UNIT(S): 100 INJECTION, SOLUTION SUBCUTANEOUS at 22:55

## 2020-01-01 RX ADMIN — PRAMIPEXOLE DIHYDROCHLORIDE 0.5 MILLIGRAM(S): 0.12 TABLET ORAL at 05:22

## 2020-01-01 RX ADMIN — PRAMIPEXOLE DIHYDROCHLORIDE 0.5 MILLIGRAM(S): 0.12 TABLET ORAL at 05:27

## 2020-01-01 RX ADMIN — Medication 25 MILLIGRAM(S): at 14:00

## 2020-01-01 RX ADMIN — MORPHINE SULFATE 2 MILLIGRAM(S): 50 CAPSULE, EXTENDED RELEASE ORAL at 23:19

## 2020-01-01 RX ADMIN — Medication 270 MILLIGRAM(S): at 22:42

## 2020-01-01 RX ADMIN — Medication 10 MILLIGRAM(S): at 21:22

## 2020-01-01 RX ADMIN — INSULIN HUMAN 12 UNIT(S): 100 INJECTION, SOLUTION SUBCUTANEOUS at 08:28

## 2020-01-01 RX ADMIN — LOSARTAN POTASSIUM 50 MILLIGRAM(S): 100 TABLET, FILM COATED ORAL at 06:23

## 2020-01-01 RX ADMIN — ENOXAPARIN SODIUM 40 MILLIGRAM(S): 100 INJECTION SUBCUTANEOUS at 11:47

## 2020-01-01 RX ADMIN — PANTOPRAZOLE SODIUM 40 MILLIGRAM(S): 20 TABLET, DELAYED RELEASE ORAL at 11:22

## 2020-01-01 RX ADMIN — ATORVASTATIN CALCIUM 80 MILLIGRAM(S): 80 TABLET, FILM COATED ORAL at 21:20

## 2020-01-01 RX ADMIN — MORPHINE SULFATE 5 MILLIGRAM(S): 50 CAPSULE, EXTENDED RELEASE ORAL at 05:41

## 2020-01-01 RX ADMIN — MORPHINE SULFATE 2 MILLIGRAM(S): 50 CAPSULE, EXTENDED RELEASE ORAL at 14:29

## 2020-01-01 RX ADMIN — CARVEDILOL PHOSPHATE 3.12 MILLIGRAM(S): 80 CAPSULE, EXTENDED RELEASE ORAL at 18:18

## 2020-01-01 RX ADMIN — MORPHINE SULFATE 2 MILLIGRAM(S): 50 CAPSULE, EXTENDED RELEASE ORAL at 02:00

## 2020-01-01 RX ADMIN — Medication 10 MILLIGRAM(S): at 05:24

## 2020-01-01 RX ADMIN — Medication 2: at 12:21

## 2020-01-01 RX ADMIN — DULOXETINE HYDROCHLORIDE 60 MILLIGRAM(S): 30 CAPSULE, DELAYED RELEASE ORAL at 17:17

## 2020-01-01 RX ADMIN — Medication 10 MILLIGRAM(S): at 05:33

## 2020-01-01 RX ADMIN — Medication 2 MILLIGRAM(S): at 21:26

## 2020-01-01 RX ADMIN — Medication 650 MILLIGRAM(S): at 02:36

## 2020-01-01 RX ADMIN — Medication 650 MILLIGRAM(S): at 12:00

## 2020-01-01 RX ADMIN — CARVEDILOL PHOSPHATE 12.5 MILLIGRAM(S): 80 CAPSULE, EXTENDED RELEASE ORAL at 05:14

## 2020-01-01 RX ADMIN — SENNA PLUS 2 TABLET(S): 8.6 TABLET ORAL at 22:09

## 2020-01-01 RX ADMIN — MORPHINE SULFATE 2 MILLIGRAM(S): 50 CAPSULE, EXTENDED RELEASE ORAL at 13:27

## 2020-01-01 RX ADMIN — DULOXETINE HYDROCHLORIDE 60 MILLIGRAM(S): 30 CAPSULE, DELAYED RELEASE ORAL at 05:33

## 2020-01-01 RX ADMIN — INSULIN GLARGINE 30 UNIT(S): 100 INJECTION, SOLUTION SUBCUTANEOUS at 22:15

## 2020-01-01 RX ADMIN — PRAMIPEXOLE DIHYDROCHLORIDE 0.5 MILLIGRAM(S): 0.12 TABLET ORAL at 17:11

## 2020-01-01 RX ADMIN — Medication 10 MILLIGRAM(S): at 17:25

## 2020-01-01 RX ADMIN — MIDAZOLAM HYDROCHLORIDE 2 MILLIGRAM(S): 1 INJECTION, SOLUTION INTRAMUSCULAR; INTRAVENOUS at 17:25

## 2020-01-01 RX ADMIN — MORPHINE SULFATE 5 MILLIGRAM(S): 50 CAPSULE, EXTENDED RELEASE ORAL at 12:50

## 2020-01-01 RX ADMIN — LOSARTAN POTASSIUM 50 MILLIGRAM(S): 100 TABLET, FILM COATED ORAL at 05:22

## 2020-01-01 RX ADMIN — MORPHINE SULFATE 5 MILLIGRAM(S): 50 CAPSULE, EXTENDED RELEASE ORAL at 00:03

## 2020-01-01 RX ADMIN — MORPHINE SULFATE 2 MILLIGRAM(S): 50 CAPSULE, EXTENDED RELEASE ORAL at 13:55

## 2020-01-01 RX ADMIN — Medication 4: at 05:35

## 2020-01-01 RX ADMIN — Medication 650 MILLIGRAM(S): at 19:30

## 2020-01-01 RX ADMIN — ENOXAPARIN SODIUM 40 MILLIGRAM(S): 100 INJECTION SUBCUTANEOUS at 12:04

## 2020-01-01 RX ADMIN — SENNA PLUS 2 TABLET(S): 8.6 TABLET ORAL at 21:33

## 2020-01-01 RX ADMIN — MORPHINE SULFATE 2 MILLIGRAM(S): 50 CAPSULE, EXTENDED RELEASE ORAL at 05:39

## 2020-01-01 RX ADMIN — Medication 0.25 MILLIGRAM(S): at 18:31

## 2020-01-01 RX ADMIN — MORPHINE SULFATE 2 MILLIGRAM(S): 50 CAPSULE, EXTENDED RELEASE ORAL at 18:20

## 2020-01-01 RX ADMIN — MORPHINE SULFATE 2 MILLIGRAM(S): 50 CAPSULE, EXTENDED RELEASE ORAL at 22:23

## 2020-01-01 RX ADMIN — MEROPENEM 100 MILLIGRAM(S): 1 INJECTION INTRAVENOUS at 23:22

## 2020-01-01 RX ADMIN — ENOXAPARIN SODIUM 40 MILLIGRAM(S): 100 INJECTION SUBCUTANEOUS at 12:34

## 2020-01-01 RX ADMIN — Medication 650 MILLIGRAM(S): at 22:00

## 2020-01-01 RX ADMIN — TAMSULOSIN HYDROCHLORIDE 0.4 MILLIGRAM(S): 0.4 CAPSULE ORAL at 23:11

## 2020-01-01 RX ADMIN — Medication 3: at 18:05

## 2020-01-01 RX ADMIN — DULOXETINE HYDROCHLORIDE 60 MILLIGRAM(S): 30 CAPSULE, DELAYED RELEASE ORAL at 05:27

## 2020-01-01 RX ADMIN — Medication 50 MILLIGRAM(S): at 05:33

## 2020-01-01 RX ADMIN — ATORVASTATIN CALCIUM 80 MILLIGRAM(S): 80 TABLET, FILM COATED ORAL at 21:10

## 2020-01-01 RX ADMIN — Medication 9 UNIT(S): at 06:12

## 2020-01-01 RX ADMIN — LOSARTAN POTASSIUM 100 MILLIGRAM(S): 100 TABLET, FILM COATED ORAL at 05:56

## 2020-01-01 RX ADMIN — Medication 9 UNIT(S): at 17:34

## 2020-01-01 RX ADMIN — Medication 50 MILLIGRAM(S): at 17:22

## 2020-01-01 RX ADMIN — DULOXETINE HYDROCHLORIDE 60 MILLIGRAM(S): 30 CAPSULE, DELAYED RELEASE ORAL at 18:34

## 2020-01-01 RX ADMIN — INSULIN HUMAN 15 UNIT(S): 100 INJECTION, SOLUTION SUBCUTANEOUS at 10:00

## 2020-01-01 RX ADMIN — Medication 10 MILLIGRAM(S): at 17:11

## 2020-01-01 RX ADMIN — Medication 2: at 10:44

## 2020-01-01 RX ADMIN — LEVETIRACETAM 1000 MILLIGRAM(S): 250 TABLET, FILM COATED ORAL at 18:30

## 2020-01-01 RX ADMIN — Medication 9 UNIT(S): at 17:49

## 2020-01-01 RX ADMIN — PRAMIPEXOLE DIHYDROCHLORIDE 0.5 MILLIGRAM(S): 0.12 TABLET ORAL at 18:31

## 2020-01-01 RX ADMIN — MORPHINE SULFATE 2 MILLIGRAM(S): 50 CAPSULE, EXTENDED RELEASE ORAL at 01:05

## 2020-01-01 RX ADMIN — Medication 10 MILLIGRAM(S): at 05:36

## 2020-01-01 RX ADMIN — DULOXETINE HYDROCHLORIDE 60 MILLIGRAM(S): 30 CAPSULE, DELAYED RELEASE ORAL at 05:36

## 2020-01-01 RX ADMIN — CARVEDILOL PHOSPHATE 3.12 MILLIGRAM(S): 80 CAPSULE, EXTENDED RELEASE ORAL at 17:22

## 2020-01-01 RX ADMIN — Medication 9 UNIT(S): at 11:31

## 2020-01-01 RX ADMIN — SODIUM CHLORIDE 75 MILLILITER(S): 9 INJECTION INTRAMUSCULAR; INTRAVENOUS; SUBCUTANEOUS at 00:20

## 2020-01-01 RX ADMIN — Medication 40 MILLIEQUIVALENT(S): at 12:36

## 2020-01-01 RX ADMIN — Medication 50 MILLIGRAM(S): at 18:26

## 2020-01-01 RX ADMIN — Medication 1 DROP(S): at 05:56

## 2020-01-01 RX ADMIN — Medication 2: at 17:34

## 2020-01-01 RX ADMIN — INSULIN GLARGINE 30 UNIT(S): 100 INJECTION, SOLUTION SUBCUTANEOUS at 22:41

## 2020-01-01 RX ADMIN — MORPHINE SULFATE 2 MILLIGRAM(S): 50 CAPSULE, EXTENDED RELEASE ORAL at 17:19

## 2020-01-01 RX ADMIN — MORPHINE SULFATE 2 MILLIGRAM(S): 50 CAPSULE, EXTENDED RELEASE ORAL at 21:18

## 2020-01-01 RX ADMIN — Medication 2 MILLIGRAM(S): at 21:33

## 2020-01-01 RX ADMIN — Medication 650 MILLIGRAM(S): at 22:56

## 2020-01-01 RX ADMIN — Medication 2: at 07:00

## 2020-01-01 RX ADMIN — Medication 650 MILLIGRAM(S): at 02:45

## 2020-01-01 RX ADMIN — MORPHINE SULFATE 2 MILLIGRAM(S): 50 CAPSULE, EXTENDED RELEASE ORAL at 01:39

## 2020-01-01 RX ADMIN — Medication 9 UNIT(S): at 17:09

## 2020-01-01 RX ADMIN — INSULIN GLARGINE 30 UNIT(S): 100 INJECTION, SOLUTION SUBCUTANEOUS at 22:34

## 2020-01-01 RX ADMIN — CARVEDILOL PHOSPHATE 3.12 MILLIGRAM(S): 80 CAPSULE, EXTENDED RELEASE ORAL at 05:25

## 2020-01-01 RX ADMIN — MORPHINE SULFATE 15 MILLIGRAM(S): 50 CAPSULE, EXTENDED RELEASE ORAL at 09:18

## 2020-01-01 RX ADMIN — ATORVASTATIN CALCIUM 80 MILLIGRAM(S): 80 TABLET, FILM COATED ORAL at 21:26

## 2020-01-01 RX ADMIN — Medication 1 MILLIGRAM(S): at 18:48

## 2020-01-01 RX ADMIN — INSULIN GLARGINE 30 UNIT(S): 100 INJECTION, SOLUTION SUBCUTANEOUS at 23:23

## 2020-01-01 RX ADMIN — MORPHINE SULFATE 2 MILLIGRAM(S): 50 CAPSULE, EXTENDED RELEASE ORAL at 21:17

## 2020-01-01 RX ADMIN — SODIUM CHLORIDE 125 MILLILITER(S): 9 INJECTION INTRAMUSCULAR; INTRAVENOUS; SUBCUTANEOUS at 12:38

## 2020-01-01 RX ADMIN — Medication 9 UNIT(S): at 12:39

## 2020-01-01 RX ADMIN — MORPHINE SULFATE 4 MILLIGRAM(S): 50 CAPSULE, EXTENDED RELEASE ORAL at 17:46

## 2020-01-01 RX ADMIN — ATORVASTATIN CALCIUM 80 MILLIGRAM(S): 80 TABLET, FILM COATED ORAL at 23:11

## 2020-01-01 RX ADMIN — MORPHINE SULFATE 2 MILLIGRAM(S): 50 CAPSULE, EXTENDED RELEASE ORAL at 19:18

## 2020-01-01 RX ADMIN — MEROPENEM 200 MILLIGRAM(S): 1 INJECTION INTRAVENOUS at 13:44

## 2020-01-01 RX ADMIN — MORPHINE SULFATE 2 MILLIGRAM(S): 50 CAPSULE, EXTENDED RELEASE ORAL at 05:08

## 2020-01-01 RX ADMIN — TAMSULOSIN HYDROCHLORIDE 0.4 MILLIGRAM(S): 0.4 CAPSULE ORAL at 21:10

## 2020-01-01 RX ADMIN — Medication 9 UNIT(S): at 06:18

## 2020-01-01 RX ADMIN — IMMUNE GLOBULIN (HUMAN) 150 GRAM(S): 10 INJECTION INTRAVENOUS; SUBCUTANEOUS at 10:01

## 2020-01-01 RX ADMIN — INSULIN GLARGINE 30 UNIT(S): 100 INJECTION, SOLUTION SUBCUTANEOUS at 21:34

## 2020-01-01 RX ADMIN — Medication 2 MILLIGRAM(S): at 22:55

## 2020-01-01 RX ADMIN — PRAMIPEXOLE DIHYDROCHLORIDE 0.5 MILLIGRAM(S): 0.12 TABLET ORAL at 05:36

## 2020-01-01 RX ADMIN — MORPHINE SULFATE 2 MILLIGRAM(S): 50 CAPSULE, EXTENDED RELEASE ORAL at 02:04

## 2020-01-01 RX ADMIN — ATORVASTATIN CALCIUM 80 MILLIGRAM(S): 80 TABLET, FILM COATED ORAL at 21:15

## 2020-01-01 RX ADMIN — CARVEDILOL PHOSPHATE 6.25 MILLIGRAM(S): 80 CAPSULE, EXTENDED RELEASE ORAL at 10:44

## 2020-01-01 RX ADMIN — MORPHINE SULFATE 2 MILLIGRAM(S): 50 CAPSULE, EXTENDED RELEASE ORAL at 15:38

## 2020-01-01 RX ADMIN — Medication 650 MILLIGRAM(S): at 21:11

## 2020-01-01 RX ADMIN — Medication 650 MILLIGRAM(S): at 02:43

## 2020-01-01 RX ADMIN — MORPHINE SULFATE 2 MILLIGRAM(S): 50 CAPSULE, EXTENDED RELEASE ORAL at 22:15

## 2020-01-01 RX ADMIN — CHLORHEXIDINE GLUCONATE 1 APPLICATION(S): 213 SOLUTION TOPICAL at 05:23

## 2020-01-01 RX ADMIN — CHLORHEXIDINE GLUCONATE 1 APPLICATION(S): 213 SOLUTION TOPICAL at 05:56

## 2020-01-01 RX ADMIN — Medication 40 MILLIEQUIVALENT(S): at 05:54

## 2020-01-01 RX ADMIN — Medication 5: at 05:55

## 2020-01-01 RX ADMIN — CEFTRIAXONE 100 MILLIGRAM(S): 500 INJECTION, POWDER, FOR SOLUTION INTRAMUSCULAR; INTRAVENOUS at 17:40

## 2020-01-01 RX ADMIN — DULOXETINE HYDROCHLORIDE 60 MILLIGRAM(S): 30 CAPSULE, DELAYED RELEASE ORAL at 05:35

## 2020-01-01 RX ADMIN — MORPHINE SULFATE 2 MILLIGRAM(S): 50 CAPSULE, EXTENDED RELEASE ORAL at 10:30

## 2020-01-01 RX ADMIN — Medication 10 MILLIGRAM(S): at 22:09

## 2020-01-01 RX ADMIN — Medication 10 MILLIGRAM(S): at 05:06

## 2020-01-01 RX ADMIN — MORPHINE SULFATE 2 MILLIGRAM(S): 50 CAPSULE, EXTENDED RELEASE ORAL at 21:30

## 2020-01-01 RX ADMIN — Medication 1: at 17:45

## 2020-01-01 RX ADMIN — Medication 4: at 11:55

## 2020-01-01 RX ADMIN — PANTOPRAZOLE SODIUM 40 MILLIGRAM(S): 20 TABLET, DELAYED RELEASE ORAL at 12:26

## 2020-01-01 RX ADMIN — MORPHINE SULFATE 2 MILLIGRAM(S): 50 CAPSULE, EXTENDED RELEASE ORAL at 22:30

## 2020-01-01 RX ADMIN — Medication 650 MILLIGRAM(S): at 18:49

## 2020-01-01 RX ADMIN — Medication 50 MILLIEQUIVALENT(S): at 13:07

## 2020-01-01 RX ADMIN — Medication 650 MILLIGRAM(S): at 13:21

## 2020-01-01 RX ADMIN — Medication 650 MILLIGRAM(S): at 19:08

## 2020-01-01 RX ADMIN — Medication 2 MILLIGRAM(S): at 15:30

## 2020-01-01 RX ADMIN — Medication 5: at 12:39

## 2020-01-01 RX ADMIN — SENNA PLUS 2 TABLET(S): 8.6 TABLET ORAL at 22:55

## 2020-01-01 RX ADMIN — Medication 2 MILLIGRAM(S): at 21:10

## 2020-01-01 RX ADMIN — Medication 40 MILLIEQUIVALENT(S): at 10:46

## 2020-01-01 RX ADMIN — Medication 650 MILLIGRAM(S): at 13:03

## 2020-01-01 RX ADMIN — DULOXETINE HYDROCHLORIDE 60 MILLIGRAM(S): 30 CAPSULE, DELAYED RELEASE ORAL at 05:45

## 2020-01-01 RX ADMIN — Medication 2 MILLIGRAM(S): at 09:41

## 2020-01-01 RX ADMIN — CHLORHEXIDINE GLUCONATE 1 APPLICATION(S): 213 SOLUTION TOPICAL at 05:26

## 2020-01-01 RX ADMIN — MEROPENEM 200 MILLIGRAM(S): 1 INJECTION INTRAVENOUS at 21:12

## 2020-01-01 RX ADMIN — Medication 7 UNIT(S): at 01:35

## 2020-01-01 RX ADMIN — PRAMIPEXOLE DIHYDROCHLORIDE 0.5 MILLIGRAM(S): 0.12 TABLET ORAL at 17:12

## 2020-01-01 RX ADMIN — SENNA PLUS 2 TABLET(S): 8.6 TABLET ORAL at 23:12

## 2020-01-01 RX ADMIN — LOSARTAN POTASSIUM 100 MILLIGRAM(S): 100 TABLET, FILM COATED ORAL at 05:45

## 2020-01-01 RX ADMIN — TAMSULOSIN HYDROCHLORIDE 0.4 MILLIGRAM(S): 0.4 CAPSULE ORAL at 22:09

## 2020-01-01 RX ADMIN — INSULIN GLARGINE 38 UNIT(S): 100 INJECTION, SOLUTION SUBCUTANEOUS at 22:50

## 2020-01-01 RX ADMIN — MORPHINE SULFATE 5 MILLIGRAM(S): 50 CAPSULE, EXTENDED RELEASE ORAL at 12:58

## 2020-01-01 RX ADMIN — MORPHINE SULFATE 2 MILLIGRAM(S): 50 CAPSULE, EXTENDED RELEASE ORAL at 05:23

## 2020-01-01 RX ADMIN — Medication 10 MILLIGRAM(S): at 17:45

## 2020-01-01 RX ADMIN — LEVETIRACETAM 400 MILLIGRAM(S): 250 TABLET, FILM COATED ORAL at 18:38

## 2020-01-01 RX ADMIN — DULOXETINE HYDROCHLORIDE 60 MILLIGRAM(S): 30 CAPSULE, DELAYED RELEASE ORAL at 17:12

## 2020-01-01 RX ADMIN — CHLORHEXIDINE GLUCONATE 1 APPLICATION(S): 213 SOLUTION TOPICAL at 06:26

## 2020-01-01 RX ADMIN — Medication 9 UNIT(S): at 12:21

## 2020-01-01 RX ADMIN — Medication 81 MILLIGRAM(S): at 11:54

## 2020-01-01 RX ADMIN — Medication 5 MILLIGRAM(S): at 08:35

## 2020-01-01 RX ADMIN — MORPHINE SULFATE 2 MILLIGRAM(S): 50 CAPSULE, EXTENDED RELEASE ORAL at 21:45

## 2020-01-01 RX ADMIN — Medication 10 UNIT(S): at 22:55

## 2020-01-01 RX ADMIN — MEROPENEM 200 MILLIGRAM(S): 1 INJECTION INTRAVENOUS at 12:10

## 2020-01-01 RX ADMIN — Medication 50 GRAM(S): at 11:52

## 2020-01-01 RX ADMIN — MORPHINE SULFATE 5 MILLIGRAM(S): 50 CAPSULE, EXTENDED RELEASE ORAL at 13:14

## 2020-01-01 RX ADMIN — LEVETIRACETAM 1000 MILLIGRAM(S): 250 TABLET, FILM COATED ORAL at 17:50

## 2020-01-01 RX ADMIN — MORPHINE SULFATE 60 MILLIGRAM(S): 50 CAPSULE, EXTENDED RELEASE ORAL at 05:41

## 2020-01-01 RX ADMIN — MORPHINE SULFATE 2 MILLIGRAM(S): 50 CAPSULE, EXTENDED RELEASE ORAL at 17:49

## 2020-01-01 RX ADMIN — LEVETIRACETAM 420 MILLIGRAM(S): 250 TABLET, FILM COATED ORAL at 17:28

## 2020-01-01 RX ADMIN — CARVEDILOL PHOSPHATE 3.12 MILLIGRAM(S): 80 CAPSULE, EXTENDED RELEASE ORAL at 05:24

## 2020-01-01 RX ADMIN — MORPHINE SULFATE 2 MILLIGRAM(S): 50 CAPSULE, EXTENDED RELEASE ORAL at 21:20

## 2020-01-01 RX ADMIN — Medication 1 DROP(S): at 18:29

## 2020-01-01 RX ADMIN — LEVETIRACETAM 400 MILLIGRAM(S): 250 TABLET, FILM COATED ORAL at 05:22

## 2020-01-01 RX ADMIN — Medication 10 MILLIGRAM(S): at 17:51

## 2020-01-01 RX ADMIN — Medication 9 UNIT(S): at 05:38

## 2020-01-01 RX ADMIN — Medication 270 MILLIGRAM(S): at 05:54

## 2020-01-01 RX ADMIN — ENOXAPARIN SODIUM 40 MILLIGRAM(S): 100 INJECTION SUBCUTANEOUS at 12:26

## 2020-01-01 RX ADMIN — CARVEDILOL PHOSPHATE 6.25 MILLIGRAM(S): 80 CAPSULE, EXTENDED RELEASE ORAL at 05:22

## 2020-01-01 RX ADMIN — MORPHINE SULFATE 2 MILLIGRAM(S): 50 CAPSULE, EXTENDED RELEASE ORAL at 18:53

## 2020-01-01 RX ADMIN — CHLORHEXIDINE GLUCONATE 1 APPLICATION(S): 213 SOLUTION TOPICAL at 05:57

## 2020-01-01 RX ADMIN — ATORVASTATIN CALCIUM 80 MILLIGRAM(S): 80 TABLET, FILM COATED ORAL at 21:12

## 2020-01-01 RX ADMIN — Medication 650 MILLIGRAM(S): at 11:30

## 2020-01-01 RX ADMIN — PRAMIPEXOLE DIHYDROCHLORIDE 0.5 MILLIGRAM(S): 0.12 TABLET ORAL at 18:18

## 2020-01-01 RX ADMIN — Medication 270 MILLIGRAM(S): at 05:23

## 2020-01-01 RX ADMIN — MORPHINE SULFATE 2 MILLIGRAM(S): 50 CAPSULE, EXTENDED RELEASE ORAL at 06:22

## 2020-01-01 RX ADMIN — CARVEDILOL PHOSPHATE 25 MILLIGRAM(S): 80 CAPSULE, EXTENDED RELEASE ORAL at 18:30

## 2020-01-01 RX ADMIN — CARVEDILOL PHOSPHATE 25 MILLIGRAM(S): 80 CAPSULE, EXTENDED RELEASE ORAL at 17:33

## 2020-01-01 RX ADMIN — DULOXETINE HYDROCHLORIDE 60 MILLIGRAM(S): 30 CAPSULE, DELAYED RELEASE ORAL at 17:51

## 2020-01-01 RX ADMIN — PANTOPRAZOLE SODIUM 40 MILLIGRAM(S): 20 TABLET, DELAYED RELEASE ORAL at 11:54

## 2020-01-01 RX ADMIN — TAMSULOSIN HYDROCHLORIDE 0.4 MILLIGRAM(S): 0.4 CAPSULE ORAL at 23:39

## 2020-01-01 RX ADMIN — CEFTRIAXONE 100 MILLIGRAM(S): 500 INJECTION, POWDER, FOR SOLUTION INTRAMUSCULAR; INTRAVENOUS at 05:24

## 2020-01-01 RX ADMIN — MORPHINE SULFATE 2 MILLIGRAM(S): 50 CAPSULE, EXTENDED RELEASE ORAL at 13:30

## 2020-01-01 RX ADMIN — MORPHINE SULFATE 2 MILLIGRAM(S): 50 CAPSULE, EXTENDED RELEASE ORAL at 12:54

## 2020-01-01 RX ADMIN — Medication 1: at 16:52

## 2020-01-01 RX ADMIN — ATORVASTATIN CALCIUM 80 MILLIGRAM(S): 80 TABLET, FILM COATED ORAL at 21:33

## 2020-01-01 RX ADMIN — MORPHINE SULFATE 2 MILLIGRAM(S): 50 CAPSULE, EXTENDED RELEASE ORAL at 02:05

## 2020-01-01 RX ADMIN — MORPHINE SULFATE 2 MILLIGRAM(S): 50 CAPSULE, EXTENDED RELEASE ORAL at 17:42

## 2020-01-01 RX ADMIN — LEVETIRACETAM 400 MILLIGRAM(S): 250 TABLET, FILM COATED ORAL at 17:18

## 2020-01-01 RX ADMIN — Medication 650 MILLIGRAM(S): at 22:23

## 2020-01-01 RX ADMIN — CARVEDILOL PHOSPHATE 3.12 MILLIGRAM(S): 80 CAPSULE, EXTENDED RELEASE ORAL at 18:27

## 2020-01-01 RX ADMIN — PRAMIPEXOLE DIHYDROCHLORIDE 0.5 MILLIGRAM(S): 0.12 TABLET ORAL at 18:33

## 2020-01-01 RX ADMIN — Medication 270 MILLIGRAM(S): at 05:36

## 2020-01-01 RX ADMIN — MORPHINE SULFATE 2 MILLIGRAM(S): 50 CAPSULE, EXTENDED RELEASE ORAL at 22:21

## 2020-01-01 RX ADMIN — Medication 270 MILLIGRAM(S): at 16:28

## 2020-01-01 RX ADMIN — LOSARTAN POTASSIUM 50 MILLIGRAM(S): 100 TABLET, FILM COATED ORAL at 15:22

## 2020-01-01 RX ADMIN — TAMSULOSIN HYDROCHLORIDE 0.4 MILLIGRAM(S): 0.4 CAPSULE ORAL at 23:22

## 2020-01-01 RX ADMIN — LEVETIRACETAM 420 MILLIGRAM(S): 250 TABLET, FILM COATED ORAL at 05:54

## 2020-01-01 RX ADMIN — MORPHINE SULFATE 4 MILLIGRAM(S): 50 CAPSULE, EXTENDED RELEASE ORAL at 12:26

## 2020-01-01 RX ADMIN — MORPHINE SULFATE 2 MILLIGRAM(S): 50 CAPSULE, EXTENDED RELEASE ORAL at 09:25

## 2020-01-01 RX ADMIN — MORPHINE SULFATE 5 MILLIGRAM(S): 50 CAPSULE, EXTENDED RELEASE ORAL at 06:00

## 2020-01-01 RX ADMIN — CARVEDILOL PHOSPHATE 25 MILLIGRAM(S): 80 CAPSULE, EXTENDED RELEASE ORAL at 17:11

## 2020-01-01 RX ADMIN — SENNA PLUS 2 TABLET(S): 8.6 TABLET ORAL at 23:18

## 2020-01-01 RX ADMIN — Medication 650 MILLIGRAM(S): at 18:30

## 2020-01-01 RX ADMIN — Medication 10 MILLIGRAM(S): at 05:35

## 2020-01-01 RX ADMIN — MORPHINE SULFATE 2 MILLIGRAM(S): 50 CAPSULE, EXTENDED RELEASE ORAL at 14:18

## 2020-01-01 RX ADMIN — PRAMIPEXOLE DIHYDROCHLORIDE 0.5 MILLIGRAM(S): 0.12 TABLET ORAL at 05:56

## 2020-01-01 RX ADMIN — Medication 81 MILLIGRAM(S): at 11:04

## 2020-01-01 RX ADMIN — PRAMIPEXOLE DIHYDROCHLORIDE 0.5 MILLIGRAM(S): 0.12 TABLET ORAL at 05:42

## 2020-01-01 RX ADMIN — Medication 650 MILLIGRAM(S): at 13:56

## 2020-01-01 RX ADMIN — Medication 9 UNIT(S): at 05:55

## 2020-01-01 RX ADMIN — MORPHINE SULFATE 2 MILLIGRAM(S): 50 CAPSULE, EXTENDED RELEASE ORAL at 05:57

## 2020-01-01 RX ADMIN — SENNA PLUS 2 TABLET(S): 8.6 TABLET ORAL at 22:43

## 2020-01-01 RX ADMIN — PRAMIPEXOLE DIHYDROCHLORIDE 0.5 MILLIGRAM(S): 0.12 TABLET ORAL at 05:33

## 2020-01-01 RX ADMIN — Medication 10 MILLIGRAM(S): at 05:25

## 2020-01-01 RX ADMIN — Medication 25 GRAM(S): at 12:44

## 2020-01-01 RX ADMIN — Medication 10 MILLIGRAM(S): at 17:16

## 2020-01-01 RX ADMIN — PANTOPRAZOLE SODIUM 40 MILLIGRAM(S): 20 TABLET, DELAYED RELEASE ORAL at 12:19

## 2020-01-01 RX ADMIN — LOSARTAN POTASSIUM 50 MILLIGRAM(S): 100 TABLET, FILM COATED ORAL at 11:23

## 2020-01-01 RX ADMIN — MORPHINE SULFATE 2 MILLIGRAM(S): 50 CAPSULE, EXTENDED RELEASE ORAL at 10:20

## 2020-01-01 RX ADMIN — Medication 81 MILLIGRAM(S): at 12:09

## 2020-01-01 RX ADMIN — MORPHINE SULFATE 5 MILLIGRAM(S): 50 CAPSULE, EXTENDED RELEASE ORAL at 17:51

## 2020-01-01 RX ADMIN — Medication 12 UNIT(S): at 07:30

## 2020-01-01 RX ADMIN — Medication 270 MILLIGRAM(S): at 13:44

## 2020-01-01 RX ADMIN — CARVEDILOL PHOSPHATE 3.12 MILLIGRAM(S): 80 CAPSULE, EXTENDED RELEASE ORAL at 05:33

## 2020-01-01 RX ADMIN — Medication 0.25 MILLIGRAM(S): at 06:35

## 2020-01-01 RX ADMIN — MORPHINE SULFATE 2 MILLIGRAM(S): 50 CAPSULE, EXTENDED RELEASE ORAL at 10:02

## 2020-01-01 RX ADMIN — CARVEDILOL PHOSPHATE 3.12 MILLIGRAM(S): 80 CAPSULE, EXTENDED RELEASE ORAL at 05:54

## 2020-01-01 RX ADMIN — CARVEDILOL PHOSPHATE 25 MILLIGRAM(S): 80 CAPSULE, EXTENDED RELEASE ORAL at 17:50

## 2020-01-01 RX ADMIN — Medication 1: at 05:37

## 2020-01-01 RX ADMIN — MORPHINE SULFATE 2 MILLIGRAM(S): 50 CAPSULE, EXTENDED RELEASE ORAL at 18:01

## 2020-01-01 RX ADMIN — ATORVASTATIN CALCIUM 80 MILLIGRAM(S): 80 TABLET, FILM COATED ORAL at 22:55

## 2020-01-01 RX ADMIN — DULOXETINE HYDROCHLORIDE 60 MILLIGRAM(S): 30 CAPSULE, DELAYED RELEASE ORAL at 05:07

## 2020-01-01 RX ADMIN — PANTOPRAZOLE SODIUM 40 MILLIGRAM(S): 20 TABLET, DELAYED RELEASE ORAL at 12:09

## 2020-01-01 RX ADMIN — Medication 81 MILLIGRAM(S): at 12:19

## 2020-01-01 RX ADMIN — ATORVASTATIN CALCIUM 80 MILLIGRAM(S): 80 TABLET, FILM COATED ORAL at 22:35

## 2020-01-01 RX ADMIN — Medication 1 DROP(S): at 17:51

## 2020-01-01 RX ADMIN — MORPHINE SULFATE 2 MILLIGRAM(S): 50 CAPSULE, EXTENDED RELEASE ORAL at 02:15

## 2020-01-01 RX ADMIN — CARVEDILOL PHOSPHATE 12.5 MILLIGRAM(S): 80 CAPSULE, EXTENDED RELEASE ORAL at 17:16

## 2020-01-01 RX ADMIN — Medication 2: at 09:37

## 2020-01-01 RX ADMIN — Medication 650 MILLIGRAM(S): at 08:37

## 2020-01-01 RX ADMIN — ATORVASTATIN CALCIUM 80 MILLIGRAM(S): 80 TABLET, FILM COATED ORAL at 23:22

## 2020-01-01 RX ADMIN — Medication 40 MILLIEQUIVALENT(S): at 10:39

## 2020-01-01 RX ADMIN — PRAMIPEXOLE DIHYDROCHLORIDE 0.5 MILLIGRAM(S): 0.12 TABLET ORAL at 17:21

## 2020-01-01 RX ADMIN — Medication 6 UNIT(S): at 22:56

## 2020-01-01 RX ADMIN — MORPHINE SULFATE 2 MILLIGRAM(S): 50 CAPSULE, EXTENDED RELEASE ORAL at 13:23

## 2020-01-01 RX ADMIN — CARVEDILOL PHOSPHATE 3.12 MILLIGRAM(S): 80 CAPSULE, EXTENDED RELEASE ORAL at 17:25

## 2020-01-01 RX ADMIN — CHLORHEXIDINE GLUCONATE 1 APPLICATION(S): 213 SOLUTION TOPICAL at 05:25

## 2020-01-01 RX ADMIN — Medication 650 MILLIGRAM(S): at 22:57

## 2020-01-01 RX ADMIN — Medication 9 UNIT(S): at 11:19

## 2020-01-01 RX ADMIN — Medication 650 MILLIGRAM(S): at 06:00

## 2020-01-01 RX ADMIN — CARVEDILOL PHOSPHATE 25 MILLIGRAM(S): 80 CAPSULE, EXTENDED RELEASE ORAL at 06:00

## 2020-01-01 RX ADMIN — CARVEDILOL PHOSPHATE 25 MILLIGRAM(S): 80 CAPSULE, EXTENDED RELEASE ORAL at 05:06

## 2020-01-01 RX ADMIN — Medication 200 GRAM(S): at 16:35

## 2020-01-01 RX ADMIN — PRAMIPEXOLE DIHYDROCHLORIDE 0.5 MILLIGRAM(S): 0.12 TABLET ORAL at 05:54

## 2020-01-01 RX ADMIN — MORPHINE SULFATE 5 MILLIGRAM(S): 50 CAPSULE, EXTENDED RELEASE ORAL at 17:50

## 2020-01-01 RX ADMIN — CARVEDILOL PHOSPHATE 3.12 MILLIGRAM(S): 80 CAPSULE, EXTENDED RELEASE ORAL at 05:35

## 2020-01-01 RX ADMIN — PRAMIPEXOLE DIHYDROCHLORIDE 0.5 MILLIGRAM(S): 0.12 TABLET ORAL at 17:17

## 2020-01-01 RX ADMIN — MORPHINE SULFATE 2 MILLIGRAM(S): 50 CAPSULE, EXTENDED RELEASE ORAL at 10:27

## 2020-01-01 RX ADMIN — CHLORHEXIDINE GLUCONATE 1 APPLICATION(S): 213 SOLUTION TOPICAL at 05:54

## 2020-01-01 RX ADMIN — PRAMIPEXOLE DIHYDROCHLORIDE 0.5 MILLIGRAM(S): 0.12 TABLET ORAL at 17:33

## 2020-01-01 RX ADMIN — MORPHINE SULFATE 2 MILLIGRAM(S): 50 CAPSULE, EXTENDED RELEASE ORAL at 18:00

## 2020-01-01 RX ADMIN — SENNA PLUS 2 TABLET(S): 8.6 TABLET ORAL at 21:19

## 2020-01-01 RX ADMIN — Medication 650 MILLIGRAM(S): at 12:33

## 2020-01-01 RX ADMIN — PANTOPRAZOLE SODIUM 40 MILLIGRAM(S): 20 TABLET, DELAYED RELEASE ORAL at 11:41

## 2020-01-01 RX ADMIN — PANTOPRAZOLE SODIUM 40 MILLIGRAM(S): 20 TABLET, DELAYED RELEASE ORAL at 11:04

## 2020-01-01 RX ADMIN — INSULIN GLARGINE 30 UNIT(S): 100 INJECTION, SOLUTION SUBCUTANEOUS at 23:43

## 2020-01-01 RX ADMIN — LOSARTAN POTASSIUM 100 MILLIGRAM(S): 100 TABLET, FILM COATED ORAL at 05:36

## 2020-01-01 RX ADMIN — Medication 650 MILLIGRAM(S): at 06:02

## 2020-01-01 RX ADMIN — Medication 650 MILLIGRAM(S): at 10:01

## 2020-01-01 RX ADMIN — INSULIN GLARGINE 30 UNIT(S): 100 INJECTION, SOLUTION SUBCUTANEOUS at 21:20

## 2020-01-01 RX ADMIN — Medication 9 UNIT(S): at 10:45

## 2020-01-01 RX ADMIN — ENOXAPARIN SODIUM 40 MILLIGRAM(S): 100 INJECTION SUBCUTANEOUS at 11:50

## 2020-01-01 RX ADMIN — Medication 650 MILLIGRAM(S): at 20:53

## 2020-01-01 RX ADMIN — MORPHINE SULFATE 2 MILLIGRAM(S): 50 CAPSULE, EXTENDED RELEASE ORAL at 20:15

## 2020-01-01 RX ADMIN — Medication 25 GRAM(S): at 10:46

## 2020-01-01 RX ADMIN — INSULIN GLARGINE 38 UNIT(S): 100 INJECTION, SOLUTION SUBCUTANEOUS at 21:14

## 2020-01-01 RX ADMIN — Medication 5: at 18:32

## 2020-01-01 RX ADMIN — PANTOPRAZOLE SODIUM 40 MILLIGRAM(S): 20 TABLET, DELAYED RELEASE ORAL at 11:50

## 2020-01-01 RX ADMIN — Medication 81 MILLIGRAM(S): at 12:34

## 2020-01-01 RX ADMIN — CHLORHEXIDINE GLUCONATE 1 APPLICATION(S): 213 SOLUTION TOPICAL at 05:43

## 2020-01-01 RX ADMIN — TAMSULOSIN HYDROCHLORIDE 0.4 MILLIGRAM(S): 0.4 CAPSULE ORAL at 22:43

## 2020-01-01 RX ADMIN — Medication 650 MILLIGRAM(S): at 22:38

## 2020-01-01 RX ADMIN — Medication 650 MILLIGRAM(S): at 06:58

## 2020-01-01 RX ADMIN — Medication 270 MILLIGRAM(S): at 14:00

## 2020-01-01 RX ADMIN — TAMSULOSIN HYDROCHLORIDE 0.4 MILLIGRAM(S): 0.4 CAPSULE ORAL at 22:33

## 2020-01-01 RX ADMIN — LEVETIRACETAM 400 MILLIGRAM(S): 250 TABLET, FILM COATED ORAL at 17:44

## 2020-01-01 RX ADMIN — Medication 250 MILLIGRAM(S): at 12:11

## 2020-01-01 RX ADMIN — Medication 2: at 12:04

## 2020-01-01 RX ADMIN — INSULIN GLARGINE 30 UNIT(S): 100 INJECTION, SOLUTION SUBCUTANEOUS at 22:01

## 2020-01-01 RX ADMIN — Medication 650 MILLIGRAM(S): at 05:40

## 2020-01-01 RX ADMIN — Medication 10 MILLIGRAM(S): at 17:33

## 2020-01-01 RX ADMIN — CARVEDILOL PHOSPHATE 25 MILLIGRAM(S): 80 CAPSULE, EXTENDED RELEASE ORAL at 05:36

## 2020-01-01 RX ADMIN — Medication 650 MILLIGRAM(S): at 18:06

## 2020-01-01 RX ADMIN — ATORVASTATIN CALCIUM 80 MILLIGRAM(S): 80 TABLET, FILM COATED ORAL at 22:08

## 2020-01-01 RX ADMIN — Medication 10 MILLIGRAM(S): at 17:22

## 2020-01-01 RX ADMIN — MORPHINE SULFATE 2 MILLIGRAM(S): 50 CAPSULE, EXTENDED RELEASE ORAL at 22:06

## 2020-01-01 RX ADMIN — LEVETIRACETAM 1000 MILLIGRAM(S): 250 TABLET, FILM COATED ORAL at 17:47

## 2020-01-01 RX ADMIN — PANTOPRAZOLE SODIUM 40 MILLIGRAM(S): 20 TABLET, DELAYED RELEASE ORAL at 12:43

## 2020-01-01 RX ADMIN — Medication 10 MILLIGRAM(S): at 05:54

## 2020-01-01 RX ADMIN — MORPHINE SULFATE 2 MILLIGRAM(S): 50 CAPSULE, EXTENDED RELEASE ORAL at 09:49

## 2020-01-01 RX ADMIN — SENNA PLUS 2 TABLET(S): 8.6 TABLET ORAL at 23:39

## 2020-01-01 RX ADMIN — CARVEDILOL PHOSPHATE 25 MILLIGRAM(S): 80 CAPSULE, EXTENDED RELEASE ORAL at 17:12

## 2020-01-01 RX ADMIN — LEVETIRACETAM 420 MILLIGRAM(S): 250 TABLET, FILM COATED ORAL at 05:24

## 2020-01-01 RX ADMIN — DULOXETINE HYDROCHLORIDE 60 MILLIGRAM(S): 30 CAPSULE, DELAYED RELEASE ORAL at 17:25

## 2020-01-01 RX ADMIN — ENOXAPARIN SODIUM 40 MILLIGRAM(S): 100 INJECTION SUBCUTANEOUS at 11:21

## 2020-01-01 RX ADMIN — Medication 650 MILLIGRAM(S): at 18:53

## 2020-01-01 RX ADMIN — Medication 50 GRAM(S): at 10:42

## 2020-01-01 RX ADMIN — Medication 12: at 05:43

## 2020-01-01 RX ADMIN — Medication 50 MILLIEQUIVALENT(S): at 10:46

## 2020-01-01 RX ADMIN — ENOXAPARIN SODIUM 40 MILLIGRAM(S): 100 INJECTION SUBCUTANEOUS at 12:11

## 2020-01-01 RX ADMIN — Medication 9 UNIT(S): at 17:25

## 2020-01-01 RX ADMIN — MORPHINE SULFATE 2 MILLIGRAM(S): 50 CAPSULE, EXTENDED RELEASE ORAL at 13:08

## 2020-01-01 RX ADMIN — CARVEDILOL PHOSPHATE 3.12 MILLIGRAM(S): 80 CAPSULE, EXTENDED RELEASE ORAL at 10:44

## 2020-01-01 RX ADMIN — LEVETIRACETAM 400 MILLIGRAM(S): 250 TABLET, FILM COATED ORAL at 15:45

## 2020-01-01 RX ADMIN — Medication 650 MILLIGRAM(S): at 05:26

## 2020-01-01 RX ADMIN — Medication 1: at 15:10

## 2020-01-01 RX ADMIN — MORPHINE SULFATE 5 MILLIGRAM(S): 50 CAPSULE, EXTENDED RELEASE ORAL at 23:38

## 2020-01-01 RX ADMIN — MORPHINE SULFATE 2 MILLIGRAM(S): 50 CAPSULE, EXTENDED RELEASE ORAL at 13:46

## 2020-01-01 RX ADMIN — DULOXETINE HYDROCHLORIDE 60 MILLIGRAM(S): 30 CAPSULE, DELAYED RELEASE ORAL at 06:22

## 2020-01-01 RX ADMIN — Medication 50 MILLIEQUIVALENT(S): at 07:57

## 2020-01-01 RX ADMIN — CARVEDILOL PHOSPHATE 12.5 MILLIGRAM(S): 80 CAPSULE, EXTENDED RELEASE ORAL at 06:23

## 2020-01-01 RX ADMIN — Medication 81 MILLIGRAM(S): at 12:07

## 2020-01-01 RX ADMIN — CARVEDILOL PHOSPHATE 12.5 MILLIGRAM(S): 80 CAPSULE, EXTENDED RELEASE ORAL at 17:45

## 2020-01-01 RX ADMIN — MORPHINE SULFATE 2 MILLIGRAM(S): 50 CAPSULE, EXTENDED RELEASE ORAL at 10:21

## 2020-01-01 RX ADMIN — INSULIN GLARGINE 30 UNIT(S): 100 INJECTION, SOLUTION SUBCUTANEOUS at 21:24

## 2020-01-01 RX ADMIN — MORPHINE SULFATE 2 MILLIGRAM(S): 50 CAPSULE, EXTENDED RELEASE ORAL at 05:17

## 2020-01-01 RX ADMIN — MEROPENEM 100 MILLIGRAM(S): 1 INJECTION INTRAVENOUS at 05:32

## 2020-01-01 RX ADMIN — Medication 81 MILLIGRAM(S): at 11:41

## 2020-01-01 RX ADMIN — MORPHINE SULFATE 2 MILLIGRAM(S): 50 CAPSULE, EXTENDED RELEASE ORAL at 13:12

## 2020-01-01 RX ADMIN — SODIUM CHLORIDE 75 MILLILITER(S): 9 INJECTION INTRAMUSCULAR; INTRAVENOUS; SUBCUTANEOUS at 13:03

## 2020-01-01 RX ADMIN — MORPHINE SULFATE 2 MILLIGRAM(S): 50 CAPSULE, EXTENDED RELEASE ORAL at 13:00

## 2020-01-01 RX ADMIN — PRAMIPEXOLE DIHYDROCHLORIDE 0.5 MILLIGRAM(S): 0.12 TABLET ORAL at 17:32

## 2020-01-01 RX ADMIN — Medication 6 UNIT(S): at 23:42

## 2020-01-01 RX ADMIN — Medication 650 MILLIGRAM(S): at 15:14

## 2020-01-01 RX ADMIN — DULOXETINE HYDROCHLORIDE 60 MILLIGRAM(S): 30 CAPSULE, DELAYED RELEASE ORAL at 18:26

## 2020-01-01 RX ADMIN — Medication 10 MILLIGRAM(S): at 17:24

## 2020-01-01 RX ADMIN — PRAMIPEXOLE DIHYDROCHLORIDE 0.5 MILLIGRAM(S): 0.12 TABLET ORAL at 05:14

## 2020-01-01 RX ADMIN — INSULIN GLARGINE 30 UNIT(S): 100 INJECTION, SOLUTION SUBCUTANEOUS at 22:42

## 2020-01-01 RX ADMIN — Medication 81 MILLIGRAM(S): at 12:43

## 2020-01-01 RX ADMIN — ENOXAPARIN SODIUM 40 MILLIGRAM(S): 100 INJECTION SUBCUTANEOUS at 11:55

## 2020-01-01 RX ADMIN — Medication 270 MILLIGRAM(S): at 13:19

## 2020-01-01 RX ADMIN — DULOXETINE HYDROCHLORIDE 60 MILLIGRAM(S): 30 CAPSULE, DELAYED RELEASE ORAL at 18:30

## 2020-01-01 RX ADMIN — Medication 270 MILLIGRAM(S): at 23:14

## 2020-01-01 RX ADMIN — DULOXETINE HYDROCHLORIDE 60 MILLIGRAM(S): 30 CAPSULE, DELAYED RELEASE ORAL at 05:54

## 2020-01-01 RX ADMIN — Medication 10 MILLIGRAM(S): at 05:14

## 2020-01-01 RX ADMIN — Medication 270 MILLIGRAM(S): at 05:24

## 2020-01-01 RX ADMIN — ATORVASTATIN CALCIUM 80 MILLIGRAM(S): 80 TABLET, FILM COATED ORAL at 23:38

## 2020-01-02 NOTE — PHYSICAL EXAM
[General Appearance - Alert] : alert [General Appearance - Well Developed] : well developed [General Appearance - Well Nourished] : well nourished [General Appearance - In No Acute Distress] : in no acute distress [Sclera] : the sclera and conjunctiva were normal [Normal Oral Mucosa] : normal oral mucosa [No Oral Pallor] : no oral pallor [No Oral Cyanosis] : no oral cyanosis [Hearing Threshold Finger Rub Not [place]] : hearing was normal [Outer Ear] : the ears and nose were normal in appearance [Examination Of The Oral Cavity] : the lips and gums were normal [Neck Cervical Mass (___cm)] : no neck mass was observed [Jugular Venous Distention Increased] : there was no jugular-venous distention [] : no respiratory distress [Exaggerated Use Of Accessory Muscles For Inspiration] : no accessory muscle use [Respiration, Rhythm And Depth] : normal respiratory rhythm and effort [Heart Rate And Rhythm] : heart rate was normal and rhythm regular [Auscultation Breath Sounds / Voice Sounds] : lungs were clear to auscultation bilaterally [Heart Sounds] : normal S1 and S2 [Abdomen Soft] : soft [Abdomen Tenderness] : non-tender [Cranial Nerves] : cranial nerves 2-12 were intact [No Focal Deficits] : no focal deficits [Oriented To Time, Place, And Person] : oriented to person, place, and time [FreeTextEntry1] : + weakness x 4;  Split to Left femur

## 2020-01-02 NOTE — HISTORY OF PRESENT ILLNESS
[FreeTextEntry1] : Patient seen and examined at home.  Patient is homebound 2/2 CIDP and flaccid paralysis w/chronic pain.  49M w/Hx of CAD, CIDP, DM2, MDD/MARGUERITE, Chronic pain, BPH, recently admitted 2/2 left hip septic arthritis complicated w/distal left femur fracture currently following with Ortho and with splint in place.  Patient reports continued chronic pain at this time along with several other social difficulties including not receiving PT, inability to FU w/PM, also not taking insulin 2/2 inability to monitor blood sugar along with other medication discrepancies.  Patient still w/PICC LUE, ABX have since finished (early December) but I'm told patient refused removal sa he wanted to use it for IVIg infusion.  Recent note from Neuro noted, risk outweigh benefit for continued PICC line use for that indication.  No other indications present and line needs to be removed.  Discussed this at length with the patient.  No acute complaints today, reporting worsening constipation.  Reports was on Movantik prior to hospitalization however does not currently hve.  Also reporting that he used to be on Seroquel 100mg QHS prior to hospitalization but this was not on his d/c list and he has run out.  Unable to FU w/Psych.  No CP/SOB.  No urinary complaints.

## 2020-01-16 NOTE — ASU DISCHARGE PLAN (ADULT/PEDIATRIC) - CALL YOUR DOCTOR IF YOU HAVE ANY OF THE FOLLOWING:
Pain not relieved by Medications/Increased irritability or sluggishness/Fever greater than (need to indicate Fahrenheit or Celsius)/Swelling that gets worse/Numbness, tingling, color or temperature change to extremity/Nausea and vomiting that does not stop/Bleeding that does not stop

## 2020-03-06 PROBLEM — G89.4 CHRONIC PAIN SYNDROME: Status: ACTIVE | Noted: 2018-08-27

## 2020-03-06 NOTE — PHYSICAL EXAM
[FreeTextEntry1] : Reflexes:\par right: biceps, triceps, brachioradialis 1+\par left: bicep triceps trace, brachioradialis 0\par absent knee and ankle jerks bilaterally.\par Left LE is externally rotated.\par Bilateral heel cords. 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

## 2020-03-06 NOTE — HISTORY OF PRESENT ILLNESS
[FreeTextEntry1] : Pt is 49 yo LH male c/o neurologic symptoms beginning in 2011 while recovering from 5-vessel CABG.  He reports getting weak over 2-3 months Oct-Nov 2011.  Hands affected first then started not being able to feel the floor.  Patient diagnosed with CIDP in 2013.  Started treatment in 2014 with plasmapheresis after short course of steroids failed to improve.  Patient states he never had remission, was constantly symptomatic and in pain.  He started IVIG in December 2019.  He reports having a good response to IVIG  - states his right leg can move his ankle and toes and slight his leg left and right.  Left leg while in hospital waiting for surgery.\par \par 2013 Danika day fell and broke right hip so underwent hip replacement, in [facility] for rehab through end of March.  Was home for 16 hours, using walker had tremor and fell and broke left hip.  States left hip never healed properly.  Notes his left foot kept externally rotating.  \par \par States had fracture left tibia, left fibula, left distal femur.  Recently cast came.\par \par Has undergone imaging of lumbar spine showing enhancement of nerve roots.  Had MRI brain showing basal ganglia lacune.  No spinal tap.  \par \par Currently getting IVIG every 3 weeks 1 gram/kg.\par \par Biggest concerns - \par 1.  Nonstop constant pain 9/10 intensity in legs L > R, hands (hands in clawed position).  Pain is different in different places - throbbing, sharp, burning, electrical pain.  Nothing is helping it except long acting morphine 60 mg twice a day, prescribed through pain managements.  Is also on 15 mg Morphine IR q6h for breakthrough pain.  Had been taking it more frequently but current pain management wanted to cut back.  Had been on gabapentin 800 mg three times a day.  States gabapentin didn't really help.  A few years back was on 800 mg four times a day.  Lyrica was tried.  Currently also on Cymbalta 60 mg twice a day.\par 2.  Lack of mobility.  Has been bed bound for 6 years due to leg weakness.  Has hospital bed at home semielectronic.  Trying to get a powerwheelchair.  Met with wheelchair clinic yesterday.  He is looking for new place to live.  Too many stairs in house and can't a wheelchair upstairs.  Can't shower so gets washed in bed.  States can't bend his legs so can't sit in a regular wheelchair, has to have one with rising legs.  They need to have a special adapter made so leg and calf are in some type of box because can't keep his legs  since they would slip right off of the foot rests.\par 3.  Has a lot of problems with his memory and getting words out and getting confused.  States has been going on for 5-6 years.  States he was getting speech therapy and used [facility] but they [place] have one speech therapist [place] and they don't have anyone to do rehab with him.  (His speech is clear but mainly cognitive issues).  States he has trouble at night time when he takes his dentures out.  Feels memory has worsened over time.  Last MRI brain was October.  States no dementia runs in family.  \par 4.  Depression, states being treated for it now.  When in nursing home was seeing a psychiatrist and a psychologist.  Right now has a psychologist that comes to his home and doesn't have a psychiatrist that can come to his home.  States difficult to get transportation to psychiatrist.

## 2020-03-06 NOTE — ASSESSMENT
[FreeTextEntry1] : CIDP with extensive weakness confining him to bed.  He is unable to use manual wheelchair due to arm weakness.  He was seen today for face to face evaluation for power wheelchair.  His is nonambulatory due to diagnosis of CIDP and would benefit from powerwheelchair evaluation in wheelchair clinic.  Heel protection boots to prevent breakdown recommended and will be ordered for patient.\par \par PT - signifcant LE weakness, may be able to use UE's to pull self up/adjust in bed with use with use of trapeze (is not covered item according to OT).\par \par No speech swallow concerns.  Has Mary device and encouraged to set reminders with it.  Able to communicate well.\par \par Will continue IVIG for CIDP.  Continue treatment of diabetes.\par \par Return in 3 months for next neuromuscular clinic visit.\par \par

## 2020-05-15 PROBLEM — R53.82 CHRONIC FATIGUE: Status: ACTIVE | Noted: 2019-05-03

## 2020-05-15 PROBLEM — G61.81 CHRONIC INFLAMMATORY DEMYELINATING POLYRADICULONEUROPATHY: Status: ACTIVE | Noted: 2018-08-27

## 2020-06-30 NOTE — HISTORY OF PRESENT ILLNESS
[Verbal consent obtained from patient] : the patient, [unfilled] [Time Spent: ___ minutes] : I have spent [unfilled] minutes with the patient on the telephone [FreeTextEntry1] : Since his last visit, Mr. Nicole continues to have severe pain in b/l LE likely combination of chronic necrosis of the left hip with distal burning dyesthesias despite increasing cymbalta to 90 mg QD.  Is currently still on Trilintix prescribed for anxiety/depression by Dr. Araya and has yet to see Psychiatrist.  Denies any homicidal or suicidal ideation.  LE swelling has improved but still occurs occasionally.  Denies any worsening weakness but remains paraplegic.  Had wheelchair clinic evaluation yesterday for motorized wheelchair and awaiting relocation for housing since currently resides on the 2nd floor.  Has HHA which helps him and has noted improved UE strenght with ability to lift himself briefly using orthopedic bar off bed.  Able to sit for 30 minutes when transferred.  Still has minimal movement in the LE.  Saw Orthopedist Dr. Castro recently for possible hip replacement and undergoing evaluation.  LLE remains externally rotated and uncomfortable.  Continues to receive IVIG Q3 weeks without any significant side effects and tolerating treatments well.  Overall has noted improvement especially in the UE since reinitiating IVIG several months ago.  Denies any SOB/SOTO, no cough or orthopnea.  No swallowing issues.  \par \par Daughter recently tested positive for COVID 2 weeks ago but has not been in contact with patient despite living in the same house.  Denies any flu-like symptoms currently.

## 2020-06-30 NOTE — ASSESSMENT
[FreeTextEntry1] : 49 yo M w/ h/o longstanding CIDP w/ residual paraplegia complicated by L hip necrosis currently improving on IVIG maintenance with improvement in UE strength.  PT currently awaiting motorized wheelchair and recommend continue current treatment regimen since has had significant improvement since reinitiating IVIG.  Has not had any side effects despite low IgA levels and additional DMTs may have additional side effects so will continue current treatment..  In meantime, pt requesting psychiatry outpt service and will refer to hospital outpt clinic.  Recommend increasing cymbalta to 120 mg QD for neuropathic pain and recommend f/u with orthopedics for possible L hip repair although cautioned pt that any repair would likely be palliative only which he understands.  \par \par Recommendations:\par - continue IVIG 1g/kg Q3 weeks\par - increase cymbalta to 120 mg QD\par - f/u wheelchair clinic\par - f/u orthopedics for L hip surgery- no neurologic contraindication for surgery at this time\par - RTC in 3 months\par - advised pt if experiencing any COVID symptoms to go to nearest hospital since pt considered high-risk\par \par \par No speech swallow concerns.  Has Mary device and encouraged to set reminders with it.  Able to communicate well.\par \par Will continue IVIG for CIDP.  Continue treatment of diabetes.\par \par Return in 3 months for next neuromuscular clinic visit.\par \par

## 2020-06-30 NOTE — PHYSICAL EXAM
[FreeTextEntry1] : NAD.  AOx3.  Intact memory.  Speech fluent, nondysarthric.  CN 2 – 12 normal.\par Strength 4+/4+ b/l UE; 2/1 proximal LE, 0/0 distal LE.  LLE externally rotated.  atrophy b/l LE.  DTRs 1+ UE, 0+ LE.  Plantar response silent b/l.  (-) Hoffmans, clonus.  Sensory grossly decreased in LE.  NL FTN/HKS.  No dysdiadokinesia.  Gait bedbound\par \par

## 2020-06-30 NOTE — HISTORY OF PRESENT ILLNESS
[FreeTextEntry1] : Since his last visit, Mr. Nicole continues to have severe pain in b/l LE likely combination of chronic necrosis of the left hip with distal burning dyesthesias despite increasing cymbalta to 90 mg QD.  Is currently still on Trilintix prescribed for anxiety/depression by Dr. Araya and has yet to see Psychiatrist.  Denies any homicidal or suicidal ideation.  LE swelling has improved but still occurs occasionally.  Denies any worsening weakness but remains paraplegic.  Had wheelchair clinic evaluation yesterday for motorized wheelchair and awaiting relocation for housing since currently resides on the 2nd floor.  Has HHA which helps him and has noted improved UE strenght with ability to lift himself briefly using orthopedic bar off bed.  Able to sit for 30 minutes when transferred.  Still has minimal movement in the LE.  Saw Orthopedist Dr. Castro recently for possible hip replacement and undergoing evaluation.  LLE remains externally rotated and uncomfortable.  Continues to receive IVIG Q3 weeks without any significant side effects and tolerating treatments well.  Overall has noted improvement especially in the UE since reinitiating IVIG several months ago.  Denies any SOB/SOTO, no cough or orthopnea.  No swallowing issues.  \par \par Daughter recently tested positive for COVID 2 weeks ago but has not been in contact with patient despite living in the same house.  Denies any flu-like symptoms currently.   [Verbal consent obtained from patient] : the patient, [unfilled] [Time Spent: ___ minutes] : I have spent [unfilled] minutes with the patient on the telephone

## 2020-06-30 NOTE — ASSESSMENT
[FreeTextEntry1] : 51 yo M w/ h/o longstanding CIDP w/ residual paraplegia complicated by L hip necrosis currently improving on IVIG maintenance with improvement in UE strength.  PT currently awaiting motorized wheelchair and recommend continue current treatment regimen since has had significant improvement since reinitiating IVIG.  Has not had any side effects despite low IgA levels and additional DMTs may have additional side effects so will continue current treatment..  In meantime, pt requesting psychiatry outpt service and will refer to hospital outpt clinic.  Recommend increasing cymbalta to 120 mg QD for neuropathic pain and recommend f/u with orthopedics for possible L hip repair although cautioned pt that any repair would likely be palliative only which he understands.  \par \par Recommendations:\par - continue IVIG 1g/kg Q3 weeks\par - increase cymbalta to 120 mg QD\par - f/u wheelchair clinic\par - f/u orthopedics for L hip surgery- no neurologic contraindication for surgery at this time\par - RTC in 3 months in Neuromuscular Clinic if reopened\par - advised pt if experiencing any COVID symptoms to go to nearest hospital since pt considered high-risk\par

## 2020-07-13 NOTE — PRE-OP CHECKLIST - WEIGHT IN LBS
S/P splenectomy.  To re evaluate treatment regimen as outpatient. Chemotherapy is on hold pending resolution of acute issues.  F/U with dental upon discharge.    --F/U with Primary Oncologist upon discharge  --Daily CBC, CMP     211.6

## 2020-08-31 NOTE — ED ADULT NURSE NOTE - OBJECTIVE STATEMENT
As per aide, patient became lethargic and stopped speaking two days ago. facial droop also noted. Pt unable to answer at questions at this time. Pt does not ambulate at baseline. As per aide, patient has not been able to swallow or eat for 2 days. cough/ congestion also noted.

## 2020-08-31 NOTE — H&P ADULT - NSHPLABSRESULTS_GEN_ALL_CORE
14.3   9.18  )-----------( 242      ( 31 Aug 2020 14:15 )             45.3     08-31    135  |  96<L>  |  17  ----------------------------<  187<H>  4.6   |  24  |  0.9    Ca    9.0      31 Aug 2020 14:15    TPro  7.7  /  Alb  4.2  /  TBili  0.8  /  DBili  x   /  AST  27  /  ALT  11  /  AlkPhos  114  08-31    < from: CT Head No Cont (08.31.20 @ 14:33) >      Moderate-sized area of diminished attenuation involving the left basal ganglia/periventricular white matter resulting in slight mass effect upon the adjacent left lateral ventricle consistent with an acute/subacute infarct.    The remainder of the ventricular system is otherwise unremarkable.    Punctate focus of diminished attenuation in the right caudate nucleus consistent with chronic ischemic change.    Vascular calcifications are noted.    Mucosal thickening in the right maxillary and left sphenoid sinuses.    IMPRESSION:    In comparison with the prior noncontrast CT scan of the brain dated September 3, 2019:    Interval development of an acute/subacute infarct in the left basal ganglia/periventricular white matter.    < end of copied text >

## 2020-08-31 NOTE — H&P ADULT - CONVERSATION DETAILS
aware of acute cva and debility from it - wishes for pt to remain full code incuding intubation and cpr if needed

## 2020-08-31 NOTE — ED PROVIDER NOTE - NS ED ROS FT
Review of Systems:  	•	CONSTITUTIONAL: no fever, no diaphoresis, no chills  	•	SKIN: no rash  	•	HEMATOLOGIC: no bleeding, no bruising  	•	EYES: no blurry vision  	•	ENT: no drooling, no sore throat   	•	RESPIRATORY: no shortness of breath, no cough  	•	CARDIAC: no chest pain, no palpitations  	•	GI: no abd pain, no nausea, no diarrhea  	•	GENITO-URINARY: no dysuria; no hematuria, no increased urinary frequency  	•	MUSCULOSKELETAL: no joint paint, no swelling, no redness  	•	NEUROLOGIC: +R sided facial droop, +generalized weakness, no slurred speech, no paresthesias

## 2020-08-31 NOTE — ED ADULT NURSE NOTE - NSIMPLEMENTINTERV_GEN_ALL_ED
Implemented All Fall with Harm Risk Interventions:  [place] to call system. Call bell, personal items and telephone within reach. Instruct patient to call for assistance. Room bathroom lighting operational. Non-slip footwear when patient is off stretcher. Physically safe environment: no spills, clutter or unnecessary equipment. Stretcher in lowest position, wheels locked, appropriate side rails in place. Provide visual cue, wrist band, yellow gown, etc. Monitor gait and stability. Monitor for mental status changes and reorient to person, place, and time. Review medications for side effects contributing to fall risk. Reinforce activity limits and safety measures with patient and family. Provide visual clues: red socks.

## 2020-08-31 NOTE — H&P ADULT - ASSESSMENT
50 year old male PMH of cabg 2011, CIDP follows Dr. Mitchell has been getting IVIG q3 weeks at home, depression, anxiety, Diabetes, BPH, presents to ED for facial droop accompanied by wife.    #AMS with decreased speech low tone found with acute/subacute infarct in the left basal ganglia/ periventricular white matter, NIHSS 10 on admission  -seen by neuro f/u any new recs  -stroke unit q4 hour neuro checks  -echo with bubble  -MRI head non con, MRA head/neck Pt. (has left hip replacement 5 years ago)  -lipid, a1c in am  -on asa/ plavix,  and statin     #Hx of CIDP since 2011 per wife had deterioration [phone] and is bedbound since then, follows DR. Mitchell currently getting IVIG every three weeks with RN administering at home. Was also started on gabapentin a few months ago per wife Pt. gets very sleepy after gabapentin will hold for now. Also on baclofen and pramipexole will continue.  -F/u any other recs by neuro    #diabetes- c/w home insulin regimen 30 lantus bedtime 9 lispro with meals     #Hx of cabg c/w asa/plavix, caredilol and statin    #Hx of anxiety and depression Pt. is on many meds confirmed with wife- c/w home meds- duloxetine, quetiapine, hydroxyzine, alprazolam prn    #chronic pain - on morphine 60mg q12 continue home regimen    #Hx of BPH c/w tamsulosin    #Hx of chronic constipation c/w senna dulcolox as needed    #Hx of gerd on pantoprazole    #diet will keep npo till seen by speech and swallow then diabetic  #dvt ppx- lovenox  #gi ppx on pantoprzole 50 year old male PMH of cabg 2011, CIDP follows Dr. Mitchell has been getting IVIG q3 weeks at home, depression, anxiety, Diabetes, BPH, presents to ED for facial droop accompanied by wife.    #AMS with decreased speech low tone found with acute/subacute infarct in the left basal ganglia/ periventricular white matter, NIHSS 10 on admission  -seen by neuro f/u any new recs  -stroke unit q4 hour neuro checks  -permissive htn  -echo with bubble  -MRI head non con, MRA head/neck Pt. (has left hip replacement 5 years ago)  -lipid, a1c in am  -on asa/ plavix,  and statin     #Hx of CIDP since 2011 per wife had deterioration [phone] and is bedbound since then, follows DR. Mitchell currently getting IVIG every three weeks with RN administering at home. Was also started on gabapentin a few months ago per wife Pt. gets very sleepy after gabapentin will hold for now. Also on baclofen and pramipexole will continue.  -F/u any other recs by neuro    #diabetes- c/w home insulin regimen 30 lantus bedtime 9 lispro with meals     #Hx of cabg c/w asa/plavix, caredilol and statin    #Hx of anxiety and depression Pt. is on many meds confirmed with wife- c/w home meds- duloxetine, quetiapine, hydroxyzine, alprazolam prn    #chronic pain - on morphine 60mg q12 continue home regimen    #Hx of BPH c/w tamsulosin    #Hx of chronic constipation c/w senna dulcolox as needed    #Hx of gerd on pantoprazole    #diet will keep npo till seen by speech and swallow then diabetic  #dvt ppx- lovenox  #gi ppx on pantoprzole

## 2020-08-31 NOTE — CONSULT NOTE ADULT - SUBJECTIVE AND OBJECTIVE BOX
Stroke Progress Note:    NAPOLEON CAST    1. Chief Complaint:    HPI:  50 year old gentleman with a PMH of CIDP presents from his nursing home with AMS and 3 days of lethargy. Found to have right facial asymmetry on arrival.     2. Relevant PMH:   Prior ischemic stroke/TIA[ ], Afib [ ], CAD [ ], HTN [ ], DLD [ ], DM [ ], PVD [ ], Obesity [ ],   Sedentary lifestyle [ ], CHF [ ], IVORY [ ], Cancer Hx [ ].    3. Social History: Smoking [ ], Drug Use [ ], Alcohol Use [ ], Other [ ]    4. Possible Location of Stroke:  see below  5. Relevant Brain Tissue Imaging:  < from: CT Head No Cont (08.31.20 @ 14:33) >  IMPRESSION:    In comparison with the prior noncontrast CT scan of the brain dated September 3, 2019:    Interval development of an acute/subacute infarct in the left basal ganglia/periventricular white matter.      < end of copied text >    6. Relevant Cerebrovascular Imaging:       pending         7. Relevant blood tests:    pending  8. Relevant cardiac rhythm monitoring:  pending  9. Relevant Cardiac Structure: (TTE/CURT +/-):[ ]No intracardiac thrombus/[ ] no vegetation/[ ]no [place]/EF:  pending  Home Medications:  acetaminophen 325 mg oral tablet: 2 tab(s) orally once (16 Jan 2020 08:55)  Admelog SoloStar 100 units/mL injectable solution: 9 unit(s) injectable 3 times a day (16 Jan 2020 08:55)  ALPRAZolam 1 mg oral tablet: 1 tab(s) orally every 6 hours, As needed, anxiety (16 Jan 2020 08:55)  ammonium lactate 12% topical lotion: Apply topically to affected area 2 times a day (16 Jan 2020 08:55)  aspirin 81 mg oral tablet, chewable: 1 tab(s) orally once a day (16 Jan 2020 08:55)  atorvastatin 80 mg oral tablet: 1 tab(s) orally once a day (at bedtime) (16 Jan 2020 08:55)  baclofen 10 mg oral tablet: 1 tab(s) orally 2 times a day (16 Jan 2020 08:55)  bisacodyl 5 mg oral delayed release tablet: 1 tab(s) orally once a day (at bedtime), As Needed (16 Jan 2020 08:55)  busPIRone 5 mg oral tablet: 1 tab(s) orally 2 times a day (16 Jan 2020 08:55)  carvedilol 3.125 mg oral tablet: 1 tab(s) orally every 12 hours (16 Jan 2020 08:55)  clopidogrel 75 mg oral tablet: 1 tab(s) orally once a day (16 Jan 2020 08:55)  diclofenac sodium 75 mg oral delayed release tablet: 1 tab(s) orally 2 times a day, As needed, Moderate Pain (4 - 6) (16 Jan 2020 08:55)  DULoxetine 30 mg oral delayed release capsule: 2 cap(s) orally 2 times a day (16 Jan 2020 08:55)  enoxaparin: 40 milligram(s) subcutaneous once a day (16 Jan 2020 08:55)  Enulose 10 g/15 mL oral and rectal liquid: 30 milliliter(s) oral and rectal 3 times a day (16 Jan 2020 08:55)  Flomax 0.4 mg oral capsule: 1 cap(s) orally once a day (16 Jan 2020 08:55)  gabapentin 800 mg oral tablet: 1 tab(s) orally 3 times a day (16 Jan 2020 08:55)  hydrOXYzine hydrochloride 50 mg oral tablet: 1 tab(s) orally every 6 hours, As Needed (16 Jan 2020 08:55)  insulin glargine: 30 unit(s) subcutaneously once a day (at bedtime) (16 Jan 2020 08:55)  melatonin 10 mg oral capsule: 1 cap(s) orally once a day (at bedtime) (16 Jan 2020 08:55)  morphine 60 mg/12 hours oral tablet, extended release: 1 tab(s) orally 2 times a day (16 Jan 2020 08:55)  Morphine IR 15 mg oral tablet: 1 tab(s) orally every 4 hours, as needed. (16 Jan 2020 08:55)  Multiple Vitamins oral tablet: 1 tab(s) orally once a day (16 Jan 2020 08:55)  nystatin 100,000 units/g topical cream:  topically twice daily to affected area (16 Jan 2020 08:55)  pantoprazole 40 mg oral delayed release tablet: 1 tab(s) orally once a day (before a meal) (16 Jan 2020 08:55)  pramipexole 0.5 mg oral tablet: 1 tab(s) orally every 12 hours (16 Jan 2020 08:55)  senna oral tablet: 2 tab(s) orally once a day (at bedtime) (16 Jan 2020 08:55)  traZODone 50 mg oral tablet: 1 tab(s) orally once a day (at bedtime) (16 Jan 2020 08:55)  Vitamin B-100 oral tablet: 1 tab(s) orally once a day (16 Jan 2020 08:55)  Vitamin D2 50,000 intl units (1.25 mg) oral capsule: 1 cap(s) orally once a week on Mondays (16 Jan 2020 08:55)      MEDICATIONS  (STANDING):      10. PT/OT/Speech/Rehab/S&Sw/ Cognitive eval results and recommendations:    11. Exam:    Vital Signs Last 24 Hrs  T(C): 37.2 (31 Aug 2020 15:18), Max: 37.7 (31 Aug 2020 14:04)  T(F): 99 (31 Aug 2020 15:18), Max: 99.9 (31 Aug 2020 14:04)  HR: 78 (31 Aug 2020 15:18) (74 - 78)  BP: 179/91 (31 Aug 2020 15:18) (179/91 - 188/90)  BP(mean): --  RR: 18 (31 Aug 2020 15:18) (18 - 18)  SpO2: 95% (31 Aug 2020 15:18) (95% - 96%)    12.   NIH STROKE SCALE  Item	                                                        Score  1 a.	Level of Consciousness	               	0  1 b. LOC Questions	                                0  1 c.	LOC Commands	                               	0  2.	Best Gaze	                                        0  3.	Visual	                                                0  4.	Facial Palsy	                                       1r  5 a.	Motor Arm - Left	                                0  5 b.	Motor Arm - Right	                        0  6 a.	Motor Leg - Left	                            3chronic  6 b.	Motor Leg - Right	                           3chronic  7.	Limb Ataxia	                                        0  8.	Sensory	                                                2  9.	Language	                                        0  10.	Dysarthria	                                        1  11.	Extinction and Inattention  	        0  ______________________________________  TOTAL	                                                 10        mRS:4  0 No symptoms at all  1 No significant disability despite symptoms; able to carry out all usual duties and activities without assistance  2 Slight disability; unable to carry out all previous activities, but able to look after own affairs  3 Moderate disability; requiring some help, but able to walk without assistance  4 Moderately severe disability; unable to walk without assistance and unable to attend to own bodily needs without assistance  5 Severe disability; bedridden, incontinent and requiring constant nursing care and attention  6 Dead

## 2020-08-31 NOTE — ED ADULT TRIAGE NOTE - CHIEF COMPLAINT QUOTE
BIBA for altered mental status and lethargy x3 days at home. 911 called by pt's wife. decreased PO intake x3 days, increased weakness, vomiting this morning. as per pt's home health aide, right sided facial drooping was noticed 2 days ago  as per HHA, pt does not walk at baseline, but can normally talk and swallow. HHA states pt has been unable to swallow x2 days

## 2020-08-31 NOTE — CONSULT NOTE ADULT - ASSESSMENT
13. Impression:  50 year old gentleman with a PMH of CIDP presents from his nursing home with AMS and 3 days of lethargy. Found to have right facial asymmetry on arrival.  CTH with left basal ganglia acute subacute stroke.     14. Probable cause/s of Stroke:  will have a better understanding post stroke workup.     15. Suggestions:   Routine stroke workup including:  MRI brain without sandra  MRA head and neck  TTE  TElemetry monitoring  LDL A1C  PT OT Rehab  q4 neuro checks    16. Disposition: Stroke unit.     Rad Glez NP  [phone]

## 2020-08-31 NOTE — ED PROVIDER NOTE - PROGRESS NOTE DETAILS
spoke with neurology, they are aware of patient and will see in er. likely dispo to stroke unit BH: The patient was reassessed prior to admission and the deficits remain stable.

## 2020-08-31 NOTE — H&P ADULT - NSHPREVIEWOFSYSTEMS_GEN_ALL_CORE
CONSTITUTIONAL: +weakness, no fevers or chills  EYES/ENT: No visual changes;  No vertigo or throat pain   NECK: No pain or stiffness  RESPIRATORY: No cough, wheezing, hemoptysis; No shortness of breath  CARDIOVASCULAR: No chest pain or palpitations  GASTROINTESTINAL: No abdominal or epigastric pain. No nausea, vomiting, or hematemesis; No diarrhea + constipation. No melena or hematochezia.  GENITOURINARY: No dysuria, frequency or hematuria  NEUROLOGICAL: chronic demyelinating disease with low extremity weakness   SKIN: No itching, rashes

## 2020-08-31 NOTE — ED PROVIDER NOTE - PHYSICAL EXAMINATION
CONSTITUTIONAL: Well-developed; well-nourished; in no acute distress, nontoxic appearing  SKIN: skin exam is warm and dry,  HEAD: Normocephalic; atraumatic.  EYES: conjunctiva and sclera clear.  ENT: oropharynx non-erythematous, uvula midline  NECK:  ROM intact.  CARD: S1, S2 normal, no murmur  RESP: No wheezes, rales or rhonchi. Good air movement bilaterally  ABD: soft; non-distended; non-tender. No Rebound, No guarding  EXT: Normal ROM of b/l UE. No cyanosis/edema/tenderness.   NEURO: awake, alert, following commands, oriented. R-sided facial droop. CN 2-12 intact. Moving b/l UE.   PSYCH: Cooperative, appropriate.

## 2020-08-31 NOTE — ED PROVIDER NOTE - ATTENDING CONTRIBUTION TO CARE
51 y/o male h/o CPDI with B/L LE paralysis, DM, CAD s/p CABG on ASA, TIA, anxiety, depression, cholecystectomy, B/L hip replacement now presents with AMS x several day, aide at bedside and family characterize this as speaking slowly and making confused statements (states he will walk to the bathroom), also report anorexia, generalized weakness, nbnb vomiting and rt facial droop, denies other associated complaints at present.    Old chart reviewed.  I have reviewed and agree with the nursing note, except as documented in my note.    VSS, awake, alert, non-toxic appearing, lying comfortably in stretcher, in NAD, ears clear, oropharynx clear, mmm, no JVD or bruit, no acute skin rash, decubiti or lesions, chest CTAB, non-labored breathing, no w/r/r, +S1/S2, RRR, no m/r/g, abdomen soft, NT, ND, +BS, no peripheral edema or deformities, alert, slow but clear speech, rt facial droop, otherwise cranial nerves II-XII are intact,

## 2020-08-31 NOTE — H&P ADULT - NSHPPHYSICALEXAM_GEN_ALL_CORE
GENERAL: NAD, speaks 1-2 words softly, no signs of respiratory distress  HEAD:  Atraumatic, Normocephalic, right facial droop,   CHEST/LUNG: Clear to auscultation bilaterally; No wheeze; No crackles; No accessory muscles used  HEART: Regular rate and rhythm; No murmurs;   ABDOMEN: Soft, Nontender, Nondistended; Bowel sounds present; No guarding  PSYCH: AAOx1 baseline x3  NEUROLOGY:  0/5 lower extremity upper extremity 2/5 per wife this is baseline, right facial droop  SKIN: No rashes or lesions

## 2020-08-31 NOTE — ED PROVIDER NOTE - OBJECTIVE STATEMENT
50 year old male, past medical history CPDI, DM, Bypass on ASA, BIB wife with decreased PO intake and R-sided facial droop x3 days. Patient with hx TIAs in past, followed by Dr Mitchell. No recent fever, chills, nausea, urinary/bowel changes. No recent falls or trauma.

## 2020-08-31 NOTE — H&P ADULT - HISTORY OF PRESENT ILLNESS
50 year old male PMH of cabg 2011, CIDP follows Dr. Mitchell has been getting IVIG q3 weeks at home, depression, anxiety, Diabetes, BPH, presents to ED for facial droop accompanied by wife.  Pt. is A0x1 baseline A0x3 has HHA 12 hours daily, coherent and can feed himself, and with home PT can sit up in bed has been unable to walk since 2013 from CIDP. Per wife at bedside Pt. has been acting different past five days, with low speech, barely talking and recently a right facial droop x 1 day. No focal muscle or sensory changes from baseline. Pt. able to follow commands and responds softly to simple questions but unaware of where he is or his name.  Wife reports gabapentin started a few months ago which causes  to be more sleepy, no other new meds started or stopped.   Per wife no fever/ chills/ headache, has chronic constipation but no acute changes. Denies chest pain, sob.  Pt. had CT head in ED which showed acute/subacute infarct in the left basal ganglia/ periventricular white matter with NIHSS 10 on admission. Pt to be admitted to stroke unit fir futher work up.

## 2020-09-01 NOTE — SWALLOW BEDSIDE ASSESSMENT ADULT - SLP PERTINENT HISTORY OF CURRENT PROBLEM
P admitted w/ R facial droop, found to have acute/subacute L BG infarct, Pt was rapid this am with AMS and vomiting repeat CTH revealed evolving L BG and CR infarct w/ greater midline shirt 7mm increased to 9mm shift on repeat CTH. PMH: CIDP dx in 2011, wheelchair bound since 2013 , unable to bend knee, unable to stand s/p b/l hip replacement and removal of hardware

## 2020-09-01 NOTE — CHART NOTE - NSCHARTNOTEFT_GEN_A_CORE
Around 8:30AM 9/1/2020 RRT was called for patient episode of lethargy and vomitus. Patient vitals were remarkable for a /90. PE did not show any new neurological deficits. Patient was given 5mg IV hydralazine and taken for STAT CT. Neurology team notified of episode. STAT CT head pending official read, but does not appear to show any new infarcts/ hemorrhage.     Updated patient wife of clinical status shortly after RRT concluded

## 2020-09-01 NOTE — PROGRESS NOTE ADULT - ASSESSMENT
A 50 years old left handed man with past medical history of MI, CABG x 5, DM, arteriosclerotic heart disease, chronic inflammatory demyelinating polyneuropathy (follows Neurologist Dr. Mitchell), prior chronic right caudate nucleus infarction, who presents from nursing home for AMS x 3 days, vomiting, decreased PO intake, and right sided facial droop. In ED, T 99.6F, /90, HR 74, RR 18, SpO2 96% RA. CTH reports of acute/subacute left basal ganglia/periventricular white matter. RRT this morning for episode of lethargy and vomiting. BP at time 230/90, and no new focal neurological deficit per chart review. He received hydralazine 5 mg x 1 and CT head reports of evolving left BG/CR with mild mass effect, and rightward midline shift measuring 9 mm (previously 7 mm). Further work up warranted for ischemic stroke etiology.     SUGGESTIONS:  - Transfer to Stroke Unit once bed available  - Routine neuro checks and tele monitoring   - Obtain Brain MRI NC and MRA H/N   - C/w ASA 81 mg mg for now  - C/w high dose statin therapy   - Maintain glycemic control with POCT goal < 100   - Obtain ECHO  - Maintain HOB elevated at least 40 degrees  - Obtain rEEG  - PT/OT/SLP/Physiatry evaluation and management   - AVOID infection in patients with ischemic stroke for it may increase mortality/morbidity rate  - Maximize medical management including toxic encephalopathy and optimized risk factors/lifestyle modification    Updated plan with primary team Dr. Cano x [phone]  Case discussed with attending physician. Please see attestation for final plan.   Ju Martin, LOTUS  [id number] A 50 years old left handed man with past medical history of MI, CABG x 5, DM, arteriosclerotic heart disease, chronic inflammatory demyelinating polyneuropathy (follows Neurologist Dr. Mitchell), prior chronic right caudate nucleus infarction, who presents from nursing home for AMS x 3 days, vomiting, decreased PO intake, and right sided facial droop. In ED, T 99.6F, /90, HR 74, RR 18, SpO2 96% RA. CTH reports of acute/subacute left basal ganglia/periventricular white matter. RRT this morning for episode of lethargy and vomiting. BP at time 230/90, and no new focal neurological deficit per chart review. He received hydralazine 5 mg x 1 and CT head reports of evolving left BG/CR with mild mass effect, and rightward midline shift measuring 9 mm (previously 7 mm). Further work up warranted for ischemic stroke etiology.     SUGGESTIONS:  - Transfer to Stroke Unit once bed available  - Routine neuro checks and tele monitoring   - Obtain Brain MRI NC and MRA H/N   - C/w ASA 81 mg mg for now  - C/w high dose statin therapy   - Maintain glycemic control with POCT goal < 100   - Obtain ECHO  - Maintain HOB elevated at least 40 degrees  - Maintain Na level in the upper limit of normal 140-145 and replete intravascular volume  - Obtain rEEG  - PT/OT/SLP/Physiatry evaluation and management   - AVOID infection in patients with ischemic stroke for it may increase mortality/morbidity rate  - Maximize medical management including toxic encephalopathy and optimized risk factors/lifestyle modification    Updated plan with primary team Dr. Cano x [phone]  Case discussed with attending physician. Please see attestation for final plan.   Ju Martin, LOTUS  [id number]

## 2020-09-01 NOTE — PROGRESS NOTE ADULT - ASSESSMENT
50 year old male PMH CIDP on IVIG, CAD s/p CABG, depression, anxiety, Diabetes, BPH, presents to ED for facial droop accompanied by wife.    #Worsening Lethargy in AM s/p rapid response   -repeat CT performed pending official read (follow up)  -ABG did not show CO2 retention  -f/u repeat labs  -zofran PRN  -EKG    #Left Basal Ganglia Stroke  -seen by neuro f/u any new recs  -stroke unit q4 hour neuro checks  -permissive htn  -echo with bubble  -MRI head non con, MRA head/neck Pt. (has left hip replacement 5 years ago)  -on asa/ plavix,  and statin     #CIDP-follows with Dr. Mitchell  -c/w home meds (baclofen, pramipexole)  -IVIG Q3 weeks  -morphine 60mg q12 continue home regimen for pain     #CABG-c/w ASA/PLAVIX/STATIN/CARVEDILOL   #DM-c/w home insulin (30 lantus, 9 lantus w/ meals)  #Anxiety /Depression-c/w home meds- duloxetine, quetiapine, hydroxyzine, alprazolam prn  #BPH- c/w tamsulosin  #Chronic constipation- c/w senna dulcolox as needed  #GERD-c/w protonix   ___________________________  DIET-NPO, f/u S+S  DVT ppx- lovenox 40  GI ppx- protonix 40mg   Dispo-acute

## 2020-09-01 NOTE — PROGRESS NOTE ADULT - SUBJECTIVE AND OBJECTIVE BOX
Stroke Progress Note:    1. Chief Complaint: AMS    HPI: This is a 50 years old left handed man with past medical history of MI, CABG x 5, DM, arteriosclerotic heart disease, chronic inflammatory demyelinating polyneuropathy (follows Neurologist Dr. Mitchell), prior chronic right caudate nucleus infarction, who presents from nursing home for AMS x 3 days, vomiting, decreased PO intake, and right sided facial droop. In ED, T 99.6F, /90, HR 74, RR 18, SpO2 96% RA. CTH reports of acute/subacute left basal ganglia/periventricular white matter.     INTERVAL HISTORY:  : Pt seen in ED Main. He had RRT this morning for episode of lethargy and vomiting. BP at time 230/90, and no new focal neurological deficit per chart review. He received hydralazine 5 mg x 1 and CT head reports of evolving left BG/CR with mild mass effect, and rightward midline shift measuring 9 mm (previously 7 mm).     2. Relevant PMH:   Prior ischemic stroke/TIA[ ], Afib [ ], CAD [ ], HTN [ ], DLD [ ], DM [x ], PVD [ ], Obesity [ ],   Sedentary lifestyle [ ], CHF [ ], IVORY [ ], Cancer Hx [ ].    3. Social History: Smoking [ ], Drug Use [ ], Alcohol Use [ ], Other [ ]    4. Possible Location of Stroke: left basal ganglia/corona radiata    5. Relevant Brain Tissue Imaging:  < from: CT Head No Cont (20 @ 09:10) >  IMPRESSION:  Re-demonstration of evolving acute/subacute infarcts involving the left basal ganglia/corona radiata with mild mass effect. Slightly increased rightward midline shift measuring 9 mm (previously measured 7 mm).  No evidence of intracranial hemorrhage, hydrocephalus, or extra-axial fluid collection..    < from: CT Head No Cont (20 @ 14:33) >  IMPRESSION:  In comparison with the prior noncontrast CT scan of the brain dated September 3, 2019:  Interval development of an acute/subacute infarct in the left basal ganglia/periventricular white matter.    6. Relevant Cerebrovascular Imagin. Relevant blood tests:                          14.7   11.58 )-----------( 267      ( 01 Sep 2020 11:10 )             46.6           137  |  96<L>  |  17  ----------------------------<  237<H>  4.0   |  24  |  1.0    Ca    9.2      01 Sep 2020 11:10  Mg     1.8         TPro  7.8  /  Alb  4.4  /  TBili  0.9  /  DBili  x   /  AST  20  /  ALT  11  /  AlkPhos  106      Lipid Profile in AM (20 @ 05:26)    Total Cholesterol/HDL Ratio Measurement: 4.0 Ratio    Cholesterol, Serum: 113 mg/dL    Triglycerides, Serum: 105 mg/dL    HDL Cholesterol, Serum: 28: HDL Levels >/= 60 mg/dL are considered beneficial and a "negative" risk  factor.  Effective 08/15/2018: New reference range and interpretive comment. mg/dL    Direct LDL: 64: LDL Cholesterol (mg/dL) --- Interpretive Comment (for adults 18 and over)    A1C with Estimated Average Glucose in AM (20 @ 05:26)    A1C with Estimated Average Glucose Result: 7.3: Method: Immunoassay    8. Relevant cardiac rhythm monitoring:  < from: 12 Lead ECG (20 @ 14:40) >  Ventricular Rate 79 BPM  Atrial Rate 79 BPM  P-R Interval 140 ms  QRS Duration 82 ms  Q-T Interval 376 ms  QTC Calculation(Bezet) 431 ms  P Axis 57 degrees  R Axis 37 degrees  T Axis 68 degrees  Diagnosis Line Normal sinus rhythm  Normal ECG    9. Relevant Cardiac Structure: (TTE/CURT +/-):[ ]No intracardiac thrombus/[ ] no vegetation/[ ]no [place]/EF:    Home Medications:  acetaminophen 325 mg oral tablet: 2 tab(s) orally once (2020 08:55)  Admelog SoloStar 100 units/mL injectable solution: 9 unit(s) injectable 3 times a day (2020 08:55)  ALPRAZolam 1 mg oral tablet: 1 tab(s) orally every 6 hours, As needed, anxiety (2020 08:55)  ammonium lactate 12% topical lotion: Apply topically to affected area 2 times a day (2020 08:55)  aspirin 81 mg oral tablet, chewable: 1 tab(s) orally once a day (2020 08:55)  atorvastatin 80 mg oral tablet: 1 tab(s) orally once a day (at bedtime) (:)  baclofen 10 mg oral tablet: 1 tab(s) orally 2 times a day ()  bisacodyl 5 mg oral delayed release tablet: 1 tab(s) orally once a day (at bedtime), As Needed (:)  busPIRone 5 mg oral tablet: 1 tab(s) orally 2 times a day ()  carvedilol 3.125 mg oral tablet: 1 tab(s) orally every 12 hours (:)  clopidogrel 75 mg oral tablet: 1 tab(s) orally once a day (:)  diclofenac sodium 75 mg oral delayed release tablet: 1 tab(s) orally 2 times a day, As needed, Moderate Pain (4 - 6) ()  DULoxetine 30 mg oral delayed release capsule: 2 cap(s) orally 2 times a day (:)  enoxaparin: 40 milligram(s) subcutaneous once a day ()  Enulose 10 g/15 mL oral and rectal liquid: 30 milliliter(s) oral and rectal 3 times a day (:)  Flomax 0.4 mg oral capsule: 1 cap(s) orally once a day (:)  gabapentin 800 mg oral tablet: 1 tab(s) orally 3 times a day (:)  hydrOXYzine hydrochloride 50 mg oral tablet: 1 tab(s) orally every 6 hours, As Needed (:)  insulin glargine: 30 unit(s) subcutaneously once a day (at bedtime) (:)  melatonin 10 mg oral capsule: 1 cap(s) orally once a day (at bedtime) (:55)  morphine 60 mg/12 hours oral tablet, extended release: 1 tab(s) orally 2 times a day ()  Morphine IR 15 mg oral tablet: 1 tab(s) orally every 4 hours, as needed. (:)  Multiple Vitamins oral tablet: 1 tab(s) orally once a day (:)  nystatin 100,000 units/g topical cream:  topically twice daily to affected area (2020 08:55)  pantoprazole 40 mg oral delayed release tablet: 1 tab(s) orally once a day (before a meal) (2020 08:55)  pramipexole 0.5 mg oral tablet: 1 tab(s) orally every 12 hours (2020 08:55)  senna oral tablet: 2 tab(s) orally once a day (at bedtime) (2020 08:55)  traZODone 50 mg oral tablet: 1 tab(s) orally once a day (at bedtime) (2020 08:55)  Vitamin B-100 oral tablet: 1 tab(s) orally once a day (2020 08:55)  Vitamin D2 50,000 intl units (1.25 mg) oral capsule: 1 cap(s) orally once a week on  (2020 08:55)    MEDICATIONS  (STANDING):  aspirin  chewable 81 milliGRAM(s) Oral daily  atorvastatin 80 milliGRAM(s) Oral at bedtime  baclofen 10 milliGRAM(s) Oral two times a day  carvedilol 3.125 milliGRAM(s) Oral every 12 hours  chlorhexidine 4% Liquid 1 Application(s) Topical <User Schedule>  clopidogrel Tablet 75 milliGRAM(s) Oral daily  dextrose 5%. 1000 milliLiter(s) (50 mL/Hr) IV Continuous <Continuous>  dextrose 50% Injectable 12.5 Gram(s) IV Push once  dextrose 50% Injectable 25 Gram(s) IV Push once  dextrose 50% Injectable 25 Gram(s) IV Push once  DULoxetine 60 milliGRAM(s) Oral two times a day  enoxaparin Injectable 40 milliGRAM(s) SubCutaneous daily  hydrOXYzine  Oral Tab/Cap - Peds 50 milliGRAM(s) Oral every 6 hours  insulin glargine Injectable (LANTUS) 30 Unit(s) SubCutaneous at bedtime  insulin lispro (HumaLOG) corrective regimen sliding scale   SubCutaneous three times a day before meals  insulin lispro Injectable (HumaLOG) 9 Unit(s) SubCutaneous three times a day before meals  melatonin 10 milliGRAM(s) Oral at bedtime  morphine ER Tablet 60 milliGRAM(s) Oral two times a day  pantoprazole    Tablet 40 milliGRAM(s) Oral before breakfast  pramipexole 0.5 milliGRAM(s) Oral every 12 hours  QUEtiapine Oral Tab/Cap - Peds 200 milliGRAM(s) Oral two times a day  senna 2 Tablet(s) Oral at bedtime  tamsulosin 0.4 milliGRAM(s) Oral at bedtime    10. PT/OT/Speech/Rehab/S&Sw/ Cognitive eval results and recommendations:    11. Exam:    Vital Signs Last 24 Hrs  T(C): 37.1 (20 @ 07:30), Max: 37.7 (20 @ 14:04)  T(F): 98.7 (20 @ 07:30), Max: 99.9 (20 @ 14:04)  HR: 81 (20 @ 10:22) (66 - 87)  BP: 188/87 (20 @ 10:22) (150/67 - 225/93)  BP(mean): --  RR: 18 (20 @ 09:11) (18 - 18)  SpO2: 99% (20 @ 09:11) (95% - 99%)    12. Neurologic Exam:  Mental status: Awake, alert and oriented x 2 to self and hospital. Reports "July."  Language: Follows 1-step simple commands. Hypophonic voice. Naming, repetition intact. Delayed fluency. + dysarthria, no aphasia.  Cranial nerves: B/l pupils equally round and reactive to light 5->4 mm, blinks to threat bilaterally, no nystagmus, extraocular muscles intact, V1 through V3 intact bilaterally and symmetric, right facial asymmetry.  Motor: Able to maintain b/l UE against gravity. Increased tone of b/l LE but no clonus. B/l LE no spontaneous movement.  strength 2/5 bilaterally  Sensation: Intact to light touch, proprioception, and pinprick.  Neglects to both LE to noxious stimuli.    Coordination: Deferred  Reflexes: Mute Babinski  Gait: Deferred    NIH STROKE SCALE  Item	                                                        Score  1 a.	Level of Consciousness	               	0  1 b. LOC Questions	                                    0  1 c.	LOC Commands	                               	0  2.	Best Gaze	                                    0  3.	Visual	                                                0  4.	Facial Palsy	                                    1  5 a.	Motor Arm - Left	                        0  5 b.	Motor Arm - Right	                        0  6 a.	Motor Leg - Left	                        4   6 b.	Motor Leg - Right	                        4   7.	Limb Ataxia	                                    0  8.	Sensory	                                                2  9.	Language	                                    0  10.	Dysarthria	                                    1  11.	Extinction and Inattention  	            0  _______________________________________________________________________  TOTAL	                                                           12    NIHSS INITIAL:     10            NIHSS Yesterday: 10         NIHSS Today: 12    mRS: baseline 4 cannot walk per HHA -> 5 Severe disability; bedridden, incontinent and requiring constant nursing care and attention

## 2020-09-01 NOTE — PROGRESS NOTE ADULT - SUBJECTIVE AND OBJECTIVE BOX
SUBJECTIVE:    Patient is a 50y old Male who presents with a chief complaint of ams/ facial droop (31 Aug 2020 17:40)  Currently admitted to medicine with the primary diagnosis of Facial droop  Today is hospital day 1d.  This AM patient was rapid response     Around 8:30AM 2020 RRT was called for patient episode of lethargy and vomitus. Patient vitals were remarkable for a /90. PE did not show any new neurological deficits. Patient was given 5mg IV hydralazine and taken for STAT CT. Neurology team notified of episode. STAT CT head pending official read, but does not appear to show any new infarcts/ hemorrhage.    PAST MEDICAL & SURGICAL HISTORY  GERD (gastroesophageal reflux disease)  BPH (benign prostatic hyperplasia)  Myocardial infarct, old  ASHD (arteriosclerotic heart disease)  Depression  Anxiety  Diabetes  CIDP (chronic inflammatory demyelinating polyneuropathy)  History of cholecystectomy  History of total left hip replacement  History of total right hip replacement: bilateral  S/P CABG x 5      ALLERGIES:  penicillins (Unknown)  strawberry (Hives)    MEDICATIONS:  STANDING MEDICATIONS  aspirin  chewable 81 milliGRAM(s) Oral daily  atorvastatin 80 milliGRAM(s) Oral at bedtime  baclofen 10 milliGRAM(s) Oral two times a day  carvedilol 3.125 milliGRAM(s) Oral every 12 hours  chlorhexidine 4% Liquid 1 Application(s) Topical <User Schedule>  clopidogrel Tablet 75 milliGRAM(s) Oral daily  dextrose 5%. 1000 milliLiter(s) IV Continuous <Continuous>  dextrose 50% Injectable 12.5 Gram(s) IV Push once  dextrose 50% Injectable 25 Gram(s) IV Push once  dextrose 50% Injectable 25 Gram(s) IV Push once  DULoxetine 60 milliGRAM(s) Oral two times a day  enoxaparin Injectable 40 milliGRAM(s) SubCutaneous daily  hydrALAZINE Injectable 5 milliGRAM(s) IV Push once  hydrOXYzine  Oral Tab/Cap - Peds 50 milliGRAM(s) Oral every 6 hours  insulin glargine Injectable (LANTUS) 30 Unit(s) SubCutaneous at bedtime  insulin lispro (HumaLOG) corrective regimen sliding scale   SubCutaneous three times a day before meals  insulin lispro Injectable (HumaLOG) 9 Unit(s) SubCutaneous three times a day before meals  melatonin 10 milliGRAM(s) Oral at bedtime  morphine ER Tablet 60 milliGRAM(s) Oral two times a day  pantoprazole    Tablet 40 milliGRAM(s) Oral before breakfast  pramipexole 0.5 milliGRAM(s) Oral every 12 hours  QUEtiapine Oral Tab/Cap - Peds 200 milliGRAM(s) Oral two times a day  senna 2 Tablet(s) Oral at bedtime  tamsulosin 0.4 milliGRAM(s) Oral at bedtime    PRN MEDICATIONS  ALPRAZolam 1 milliGRAM(s) Oral every 6 hours PRN  bisacodyl 5 milliGRAM(s) Oral at bedtime PRN  dextrose 40% Gel 15 Gram(s) Oral once PRN  glucagon  Injectable 1 milliGRAM(s) IntraMuscular once PRN  morphine  IR 15 milliGRAM(s) Oral every 4 hours PRN    VITALS:   T(F): 98.7  HR: 77  BP: 210/93  RR: 18  SpO2: 99%    LABS:                        14.4   10.53 )-----------( 239      ( 01 Sep 2020 05:26 )             45.3     09-    135  |  96<L>  |  15  ----------------------------<  207<H>  4.1   |  26  |  0.9    Ca    9.1      01 Sep 2020 05:26  Mg     1.8     09-    TPro  7.4  /  Alb  4.1  /  TBili  1.0  /  DBili  x   /  AST  20  /  ALT  11  /  AlkPhos  106  09-01      Urinalysis Basic - ( 31 Aug 2020 14:47 )    Color: Yellow / Appearance: Clear / S.024 / pH: x  Gluc: x / Ketone: Small  / Bili: Negative / Urobili: 6 mg/dL   Blood: x / Protein: 100 mg/dL / Nitrite: Negative   Leuk Esterase: Negative / RBC: 5 /HPF / WBC 1 /HPF   Sq Epi: x / Non Sq Epi: 1 /HPF / Bacteria: Negative      ABG - ( 01 Sep 2020 09:02 )  pH, Arterial: 7.47  pH, Blood: x     /  pCO2: 35    /  pO2: 70    / HCO3: 25    / Base Excess: 2.0   /  SaO2: 95      Lactate, Blood: 1.3 mmol/L (20 @ 14:15)    RADIOLOGY:  < from: CT Head No Cont (20 @ 14:33) >  EXAM:  CT BRAIN        PROCEDURE DATE:  2020    INTERPRETATION:  Clinical History / Reason for exam: Right facial droop and generalized weakness for several days.  CT SCAN OF THE BRAIN WITHOUT CONTRAST  TECHNIQUE:  Multiple transaxial noncontrast CT images of the brain were obtained from base to vertex. Sagittal and coronal reformatted images were submitted.  COMPARISON:  Noncontrast CT scan of the brain dated 2019.  FINDINGS:  Moderate-sized area of diminished attenuation involving the left basal ganglia/periventricular white matter resulting in slight mass effect upon the adjacent left lateral ventricle consistent with an acute/subacute infarct.  The remainder of the ventricular system is otherwise unremarkable.  Punctate focus of diminished attenuation in the right caudate nucleus consistent with chronic ischemic change.  Vascular calcifications are noted.  Mucosal thickening in the right maxillary and left sphenoid sinuses.  IMPRESSION:  In comparison with the prior noncontrast CT scan of the brain dated September 3, 2019:  Interval development of an acute/subacute infarct in the left basal ganglia/periventricular white matter.  Spoke with ARNOLDO GRAHAM MD on 2020 2:39 PM with readback.  RALPH GRAHAM M.D., ATTENDING RADIOLOGIST  This document has been electronically signed. Aug 31 2020  2:48PM  < end of copied text >    PHYSICAL EXAM:  GEN: lethargic  HEENT: atraumatic, normocephalic, facial droop noted  LUNGS: no resp distress  HEART: S1/S2 present. RRR.   ABD: Soft, non-tender, non-distended.   EXT: trace edema, 2/5 MS LE  NEURO: lethargic, responds to name and painful stimuli, following some commands     Intravenous access:   NG tube:   Carter cathter: Indwelling Urethral Catheter:     Connect To:  Leg Bag    Indication:  Improved Comfort Care (20 @ 14:05) (not performed) [active]

## 2020-09-02 NOTE — PROGRESS NOTE ADULT - SUBJECTIVE AND OBJECTIVE BOX
Stroke Progress Note: patient is examined at the bedside in stroke unit, he continues to be very lethargic and had fever overnight he was not able to keep his PO medicines including Plavix over the past 2 days.    NAPOLEON CAST    1. Chief Complaint: lethargy    HPI:  50 year old male PMH of cabg , CIDP follows Dr. Mitchell has been getting IVIG q3 weeks at home, depression, anxiety, Diabetes, BPH, presents to ED for facial droop accompanied by wife.  Pt. is A0x1 baseline A0x3 has HHA 12 hours daily, coherent and can feed himself, and with home PT can sit up in bed has been unable to walk since  from CIDP. Per wife at bedside Pt. has been acting different past five days, with low speech, barely talking and recently a right facial droop x 1 day. No focal muscle or sensory changes from baseline. Pt. able to follow commands and responds softly to simple questions but unaware of where he is or his name.  Wife reports gabapentin started a few months ago which causes  to be more sleepy, no other new meds started or stopped.   Per wife no fever/ chills/ headache, has chronic constipation but no acute changes. Denies chest pain, sob.  Pt. had CT head in ED which showed acute/subacute infarct in the left basal ganglia/ periventricular white matter with NIHSS 10 on admission. Pt to be admitted to stroke unit fir futher work up. (31 Aug 2020 17:40)      2. Relevant PMH:   Prior ischemic stroke/TIA[ ], Afib [ ], CAD [ ], HTN [ ], DLD [ ], DM [ ], PVD [ ], Obesity [ ],   Sedentary lifestyle [ ], CHF [ ], IVORY [ ], Cancer Hx [ ].    3. Social History: Smoking [ ], Drug Use [ ], Alcohol Use [ ], Other [ ]    4. Possible Location of Stroke:    5. Relevant Brain Tissue Imagin. Relevant Cerebrovascular Imaging:            MR Angio Neck w/ IV Cont:   EXAM:  MR ANGIO NECK IC        EXAM:  MR ANGIO BRAIN        EXAM:  MR BRAIN            PROCEDURE DATE:  2020        INTERPRETATION:  Clinical History / Reason for exam: Right-sided facial droop and altered mental status.    Technique: Contrast brain MRI, non-contrast brain MRA and contrast neck MRA was performed.    Through the brain, sagittal and axial T1, axial T2, FLAIR, diffusion weighted images and an ADC map were obtained. Axial T1 post-contrast images were obtained and reconstructed in sagittal and coronal planes. 9  cc of intravenous gadolinium was administered and 1 discarded,  for contrast MRI brain and contrast enhanced MR angiography of the extracranial circulation.    MR angiography of intracranial and extracranial circulation was performed with time of flight imaging technique. Maximal intensity projection images were reviewed in multiple planes.    Correlation is made with accompanying brain MRI and noncontrast CT head 2020.    Findings:    BRAIN MRI:    There is a region of restricted diffusion involving the left basal ganglia and corona radiata associated with mild mass effect consistent with an acute infarct. Stable mild left to right midline shift measuring 6 mm.    Chronic right caudate lacunar infarction.    The ventricles and cortical sulci are enlarged compatible mild diffuse parenchymal volume loss.    There is no evidence of acute intracranial hemorrhage or extra-axial fluid collection.    The orbits and mastoid complexes are unremarkable. There is mucosal thickening of the right maxillary sinus.    The visualized osseous structures and soft tissues are unremarkable.      MRA BRAIN:    The distal segments of the internal carotid arteries are patent bilaterally.    There is mild short segmental stenosis involving the distal M1 of the left middle cerebral artery, as well as the left anterior temporal artery. There is mild narrowing of the left A1 segments of the anterior cerebral artery.    The right anterior and middle cerebral arteries are patent cerebral arteries are patent.    The right vertebral artery terminates as right posterior inferior cerebellar artery. The left vertebral artery is patent and forms the basilar artery. The posterior cerebral arteries are patent.      MRA NECK:    The visualized portions of the great vessels are patent. The common, internal and external carotid arteries are patent bilaterally. The vertebral arteries are patent throughout their cervical course.      IMPRESSION:    MRI BRAIN:  Redemonstration of acute infarct involving the left basal ganglia/corona radiata.  Stable mild midline shift to the right.    MRA BRAIN:  Mild short segmental stenosis involving the distal left M1, and the proximal left anterior temporal artery.  Mildly narrowed left A1.  No evidence of large vessel occlusion, vascular malformation, or aneurysm.    MRA NECK:  No evidence of carotid or vertebral artery stenosis.                LEANN YANCEY M.D., RESIDENT RADIOLOGIST  This document has been electronically signed.  TRISTIN NIX M.D., ATTENDING RADIOLOGIST  This document has been electronically signed. Sep  2 2020 10:50AM             (20 @ 19:33)      7. Relevant blood tests: Direct LDL: 64 mg/dL [4 - 129] (20 @ 05:26)       8. Relevant cardiac rhythm monitorin. Relevant Cardiac Structure: (TTE/CURT +/-):[ ]No intracardiac thrombus/[ ] no vegetation/[ ]no [place]/EF:    Home Medications:  Admelog SoloStar 100 units/mL injectable solution: 9 unit(s) injectable 3 times a day (2020 08:55)  ALPRAZolam 1 mg oral tablet: 1 tab(s) orally every 6 hours, As needed, anxiety (:55)  aspirin 81 mg oral tablet, chewable: 1 tab(s) orally once a day (:)  atorvastatin 80 mg oral tablet: 1 tab(s) orally once a day (at bedtime) (:)  baclofen 10 mg oral tablet: 1 tab(s) orally 2 times a day (:)  bisacodyl 5 mg oral delayed release tablet: 1 tab(s) orally once a day (at bedtime), As Needed (:)  carvedilol 3.125 mg oral tablet: 1 tab(s) orally every 12 hours (:55)  clopidogrel 75 mg oral tablet: 1 tab(s) orally once a day (:)  diclofenac sodium 75 mg oral delayed release tablet: 1 tab(s) orally 2 times a day, As needed, Moderate Pain (4 - 6) (:55)  DULoxetine 30 mg oral delayed release capsule: 2 cap(s) orally 2 times a day (:)  Flomax 0.4 mg oral capsule: 1 cap(s) orally once a day (:55)  gabapentin 800 mg oral tablet: 1 tab(s) orally 3 times a day (:55)  hydrOXYzine hydrochloride 50 mg oral tablet: 1 tab(s) orally every 6 hours, As Needed (:)  insulin glargine: 30 unit(s) subcutaneously once a day (at bedtime) (:55)  melatonin 10 mg oral capsule: 1 cap(s) orally once a day (at bedtime) (:55)  morphine 60 mg/12 hours oral tablet, extended release: 1 tab(s) orally 2 times a day (2020 08:55)  Multiple Vitamins oral tablet: 1 tab(s) orally once a day (2020 08:55)  pantoprazole 40 mg oral delayed release tablet: 1 tab(s) orally once a day (before a meal) (2020 08:55)  pramipexole 0.5 mg oral tablet: 1 tab(s) orally every 12 hours (2020 08:55)  QUEtiapine 200 mg oral tablet: 1 tab(s) orally 2 times a day (31 Aug 2020 17:59)  senna oral tablet: 2 tab(s) orally once a day (at bedtime) (2020 08:55)  Trintellix 10 mg oral tablet: 1 tab(s) orally once a day (31 Aug 2020 17:59)  Vitamin B-100 oral tablet: 1 tab(s) orally once a day (2020 08:55)  Vitamin D2 50,000 intl units (1.25 mg) oral capsule: 1 cap(s) orally once a week on  (2020 08:55)      MEDICATIONS  (STANDING):  aspirin  chewable 81 milliGRAM(s) Oral daily  atorvastatin 80 milliGRAM(s) Oral at bedtime  baclofen 10 milliGRAM(s) Oral two times a day  carvedilol 3.125 milliGRAM(s) Oral every 12 hours  chlorhexidine 4% Liquid 1 Application(s) Topical <User Schedule>  dextrose 50% Injectable 12.5 Gram(s) IV Push once  dextrose 50% Injectable 25 Gram(s) IV Push once  dextrose 50% Injectable 25 Gram(s) IV Push once  DULoxetine 60 milliGRAM(s) Oral two times a day  enoxaparin Injectable 40 milliGRAM(s) SubCutaneous daily  hydrOXYzine  Oral Tab/Cap - Peds 50 milliGRAM(s) Oral every 6 hours  insulin glargine Injectable (LANTUS) 30 Unit(s) SubCutaneous at bedtime  insulin lispro (HumaLOG) corrective regimen sliding scale   SubCutaneous three times a day before meals  insulin lispro Injectable (HumaLOG) 9 Unit(s) SubCutaneous three times a day before meals  pantoprazole    Tablet 40 milliGRAM(s) Oral before breakfast  pramipexole 0.5 milliGRAM(s) Oral every 12 hours  senna 2 Tablet(s) Oral at bedtime  tamsulosin 0.4 milliGRAM(s) Oral at bedtime      10. PT/OT/Speech/Rehab/S&Sw/ Cognitive eval results and recommendations:    11. Exam:    Vital Signs Last 24 Hrs  T(C): 37.5 (02 Sep 2020 07:37), Max: 38.1 (02 Sep 2020 05:21)  T(F): 99.5 (02 Sep 2020 07:37), Max: 100.5 (02 Sep 2020 05:21)  HR: 94 (02 Sep 2020 05:59) (94 - 124)  BP: 187/99 (02 Sep 2020 05:59) (135/80 - 187/99)  BP(mean): --  RR: 18 (02 Sep 2020 05:59) (18 - 20)  SpO2: 96% (01 Sep 2020 16:00) (96% - 96%)    12.   NIH STROKE SCALE  Item	                                                        Score  1 a.	Level of Consciousness	               	2  1 b. LOC Questions	                                2  1 c.	LOC Commands	                               	1  2.	Best Gaze	                                        0  3.	Visual	                                                0  4.	Facial Palsy	                                        0  5 a.	Motor Arm - Left	                                3  5 b.	Motor Arm - Right	                        3  6 a.	Motor Leg - Left	                                4  6 b.	Motor Leg - Right	                                4  7.	Limb Ataxia	                                        0  8.	Sensory	                                                0  9.	Language	                                        3  10.	Dysarthria	                                        2  11.	Extinction and Inattention  	        0  ______________________________________  TOTAL	                                                        0    Total NIHSS on admission:      NIHSS yesterday:      NIHSS today: 24 including baseline deficits    mRS:  0 No symptoms at all  1 No significant disability despite symptoms; able to carry out all usual duties and activities without assistance  2 Slight disability; unable to carry out all previous activities, but able to look after own affairs  3 Moderate disability; requiring some help, but able to walk without assistance  4 Moderately severe disability; unable to walk without assistance and unable to attend to own bodily needs without assistance baseline  5 Severe disability; bedridden, incontinent and requiring constant nursing care and attention  6 Dead

## 2020-09-02 NOTE — SWALLOW BEDSIDE ASSESSMENT ADULT - SLP PERTINENT HISTORY OF CURRENT PROBLEM
P admitted w/ R facial droop, found to have acute/subacute L BG infarct, Pt was rapid this am with AMS and vomiting repeat CTH revealed evolving L BG and CR infarct w/ greater midline shirt 7mm increased to 9mm shift on repeat CTH. PMH: CIDP dx in 2011, wheelchair bound since 2013 , unable to bend knee, unable to stand s/p b/l hip replacement and removal of hardware P admitted w/ R facial droop, found to have acute/subacute L BG infarct. Rapid response 9/1 w/ AMS and vomiting. Repeat CTH revealed evolving L BG and CR infarct w/ greater midline shift 7mm increased to 9mm shift on repeat CTH. PMH: CIDP dx in 2011, wheelchair bound since 2013 , unable to bend knee, unable to stand s/p b/l hip replacement and removal of hardware. MRI (+) redemonstration of acute infarct involving the left basal ganglia/corona radiata. stable right midline shift.

## 2020-09-02 NOTE — CHART NOTE - NSCHARTNOTEFT_GEN_A_CORE
Addendum:  Examined patient overnight.   Patient diaphroetic tachycardic and febrile 101.5 rectally.   Patient with Lethargy and no verbal output.   BIlateral upper extremities with minimal movement and   lower extremities chronically weak.   Exam limited by aphasia  Right facial asymmetry.    NIH STROKE SCALE  Item	                                                        Score  1 a.	Level of Consciousness	               	0  1 b. LOC Questions	                                    2  1 c.	LOC Commands	                               	2  2.	Best Gaze	                                    0  3.	Visual	                                                0  4.	Facial Palsy	                                    2  5 a.	Motor Arm - Left	                        3  5 b.	Motor Arm - Right	                        3  6 a.	Motor Leg - Left	                        4   6 b.	Motor Leg - Right	                        4   7.	Limb Ataxia	                                    0  8.	Sensory	                                                2  9.	Language	                                   3  10.	Dysarthria	                                   2  11.	Extinction and Inattention  	            0  _______________________________________________________________________  TOTAL	                                                           27    Discussed with Dr. Mitchell.   [facility]  Routine EEG  Seizure precautions.   Keep Magnesium >2  Workup for cause of encephalopathy.     Rad Glez NP  [phone]

## 2020-09-02 NOTE — PROGRESS NOTE ADULT - SUBJECTIVE AND OBJECTIVE BOX
24H events:    Patient is a 50y old Male who presents with a chief complaint of ams/ facial droop (02 Sep 2020 11:03)    Primary diagnosis of Facial droop     Today is hospital day 2d. This morning patient was seen and examined at bedside, resting comfortably in bed.    He is a decrease in his mental status this morning.  He was febrile overnight. He is to be cultured and CXR ordered.    PAST MEDICAL & SURGICAL HISTORY  GERD (gastroesophageal reflux disease)  BPH (benign prostatic hyperplasia)  Myocardial infarct, old  ASHD (arteriosclerotic heart disease)  Depression  Anxiety  Diabetes  CIDP (chronic inflammatory demyelinating polyneuropathy)  History of cholecystectomy  History of total left hip replacement  History of total right hip replacement: bilateral  S/P CABG x 5    SOCIAL HISTORY:  Social History:  no etoh/ smoking/ drugs (31 Aug 2020 17:40)      ALLERGIES:  penicillins (Unknown)  strawberry (Hives)    MEDICATIONS:  STANDING MEDICATIONS  aspirin  chewable 81 milliGRAM(s) Oral daily  atorvastatin 80 milliGRAM(s) Oral at bedtime  baclofen 10 milliGRAM(s) Oral two times a day  carvedilol 3.125 milliGRAM(s) Oral every 12 hours  chlorhexidine 4% Liquid 1 Application(s) Topical <User Schedule>  dextrose 50% Injectable 12.5 Gram(s) IV Push once  dextrose 50% Injectable 25 Gram(s) IV Push once  dextrose 50% Injectable 25 Gram(s) IV Push once  DULoxetine 60 milliGRAM(s) Oral two times a day  enoxaparin Injectable 40 milliGRAM(s) SubCutaneous daily  hydrOXYzine  Oral Tab/Cap - Peds 50 milliGRAM(s) Oral every 6 hours  insulin glargine Injectable (LANTUS) 30 Unit(s) SubCutaneous at bedtime  insulin lispro (HumaLOG) corrective regimen sliding scale   SubCutaneous three times a day before meals  insulin lispro Injectable (HumaLOG) 9 Unit(s) SubCutaneous three times a day before meals  meropenem  IVPB 2000 milliGRAM(s) IV Intermittent every 8 hours  pantoprazole    Tablet 40 milliGRAM(s) Oral before breakfast  pramipexole 0.5 milliGRAM(s) Oral every 12 hours  senna 2 Tablet(s) Oral at bedtime  tamsulosin 0.4 milliGRAM(s) Oral at bedtime  vancomycin  IVPB      vancomycin  IVPB 1000 milliGRAM(s) IV Intermittent once  vancomycin  IVPB 1000 milliGRAM(s) IV Intermittent every 12 hours    PRN MEDICATIONS  ALPRAZolam 1 milliGRAM(s) Oral every 6 hours PRN  bisacodyl 5 milliGRAM(s) Oral at bedtime PRN  dextrose 40% Gel 15 Gram(s) Oral once PRN  glucagon  Injectable 1 milliGRAM(s) IntraMuscular once PRN    VITALS:   T(F): 99.5  HR: 94  BP: 187/99  RR: 18  SpO2: 96%    PHYSICAL EXAM:  GENERAL: NAD, well-groomed, well-developed  HEAD:  Atraumatic, Normocephalic  EYES: EOMI  NECK: Supple  NERVOUS SYSTEM:  Alert & Oriented X1, lethargic   CHEST/LUNG: Clear to auscultation bilaterally; No rales, rhonchi, wheezing, or rubs  HEART: Regular rate and rhythm; No murmurs, rubs, or gallops  ABDOMEN: Soft, Nontender, Nondistended; Bowel sounds present  EXTREMITIES:  2+ Peripheral Pulses, No clubbing, cyanosis, or edema  LYMPH: No lymphadenopathy noted  SKIN: No rashes or lesions  LABS:                        14.6   14.08 )-----------( 282      ( 02 Sep 2020 04:36 )             45.8     09-02    138  |  100  |  22<H>  ----------------------------<  223<H>  4.0   |  24  |  1.0    Ca    9.2      02 Sep 2020 04:36  Mg     1.9     09-    TPro  7.7  /  Alb  4.2  /  TBili  0.9  /  DBili  x   /  AST  18  /  ALT  11  /  AlkPhos  107  09-02      Urinalysis Basic - ( 02 Sep 2020 10:21 )    Color: Yellow / Appearance: Turbid / S.038 / pH: x  Gluc: x / Ketone: Large  / Bili: Small / Urobili: 6 mg/dL   Blood: x / Protein: 300 mg/dL / Nitrite: Negative   Leuk Esterase: Moderate / RBC: 0 /HPF / WBC 33 /HPF   Sq Epi: x / Non Sq Epi: 7 /HPF / Bacteria: Few      ABG - ( 01 Sep 2020 09:02 )  pH, Arterial: 7.47  pH, Blood: x     /  pCO2: 35    /  pO2: 70    / HCO3: 25    / Base Excess: 2.0   /  SaO2: 95          Culture - Urine (collected 31 Aug 2020 14:47)  Source: .Urine Clean Catch (Midstream)  Final Report (01 Sep 2020 18:01):    No growth    Culture - Blood (collected 31 Aug 2020 14:15)  Source: .Blood Blood  Preliminary Report (01 Sep 2020 23:):    No growth to date.    Culture - Blood (collected 31 Aug 2020 14:15)  Source: .Blood Blood  Preliminary Report (01 Sep 2020 23:01):    No growth to date.      CARDIAC MARKERS ( 01 Sep 2020 11:10 )  x     / <0.01 ng/mL / x     / x     / x          RADIOLOGY: 24H events:    Patient is a 50y old Male who presents with a chief complaint of ams/ facial droop (02 Sep 2020 11:03)    Primary diagnosis of Facial droop     Today is hospital day 2d. This morning patient was seen and examined at bedside.  He is a decrease in his mental status this morning.  He was febrile overnight. He is to be cultured and CXR ordered.    PAST MEDICAL & SURGICAL HISTORY  GERD (gastroesophageal reflux disease)  BPH (benign prostatic hyperplasia)  Myocardial infarct, old  ASHD (arteriosclerotic heart disease)  Depression  Anxiety  Diabetes  CIDP (chronic inflammatory demyelinating polyneuropathy)  History of cholecystectomy  History of total left hip replacement  History of total right hip replacement: bilateral  S/P CABG x 5    SOCIAL HISTORY:  Social History:  no etoh/ smoking/ drugs (31 Aug 2020 17:40)      ALLERGIES:  penicillins (Unknown)  strawberry (Hives)    MEDICATIONS:  STANDING MEDICATIONS  aspirin  chewable 81 milliGRAM(s) Oral daily  atorvastatin 80 milliGRAM(s) Oral at bedtime  baclofen 10 milliGRAM(s) Oral two times a day  carvedilol 3.125 milliGRAM(s) Oral every 12 hours  chlorhexidine 4% Liquid 1 Application(s) Topical <User Schedule>  dextrose 50% Injectable 12.5 Gram(s) IV Push once  dextrose 50% Injectable 25 Gram(s) IV Push once  dextrose 50% Injectable 25 Gram(s) IV Push once  DULoxetine 60 milliGRAM(s) Oral two times a day  enoxaparin Injectable 40 milliGRAM(s) SubCutaneous daily  hydrOXYzine  Oral Tab/Cap - Peds 50 milliGRAM(s) Oral every 6 hours  insulin glargine Injectable (LANTUS) 30 Unit(s) SubCutaneous at bedtime  insulin lispro (HumaLOG) corrective regimen sliding scale   SubCutaneous three times a day before meals  insulin lispro Injectable (HumaLOG) 9 Unit(s) SubCutaneous three times a day before meals  meropenem  IVPB 2000 milliGRAM(s) IV Intermittent every 8 hours  pantoprazole    Tablet 40 milliGRAM(s) Oral before breakfast  pramipexole 0.5 milliGRAM(s) Oral every 12 hours  senna 2 Tablet(s) Oral at bedtime  tamsulosin 0.4 milliGRAM(s) Oral at bedtime  vancomycin  IVPB      vancomycin  IVPB 1000 milliGRAM(s) IV Intermittent once  vancomycin  IVPB 1000 milliGRAM(s) IV Intermittent every 12 hours    PRN MEDICATIONS  ALPRAZolam 1 milliGRAM(s) Oral every 6 hours PRN  bisacodyl 5 milliGRAM(s) Oral at bedtime PRN  dextrose 40% Gel 15 Gram(s) Oral once PRN  glucagon  Injectable 1 milliGRAM(s) IntraMuscular once PRN    VITALS:   T(F): 99.5  HR: 94  BP: 187/99  RR: 18  SpO2: 96%    PHYSICAL EXAM:  GENERAL: NAD, well-groomed, well-developed  HEAD:  Atraumatic, Normocephalic  EYES: EOMI  NECK: Supple  NERVOUS SYSTEM:  Alert & Oriented X0, lethargic   CHEST/LUNG: Clear to auscultation bilaterally; No rales, rhonchi, wheezing, or rubs  HEART: Regular rate and rhythm; No murmurs, rubs, or gallops  ABDOMEN: Soft, Nontender, Nondistended; Bowel sounds present  EXTREMITIES:  2+ Peripheral Pulses, No clubbing, cyanosis, or edema  LYMPH: No lymphadenopathy noted  SKIN: No rashes or lesions  LABS:                        14.6   14.08 )-----------( 282      ( 02 Sep 2020 04:36 )             45.8     09-02    138  |  100  |  22<H>  ----------------------------<  223<H>  4.0   |  24  |  1.0    Ca    9.2      02 Sep 2020 04:36  Mg     1.9     09-    TPro  7.7  /  Alb  4.2  /  TBili  0.9  /  DBili  x   /  AST  18  /  ALT  11  /  AlkPhos  107  09-02      Urinalysis Basic - ( 02 Sep 2020 10:21 )    Color: Yellow / Appearance: Turbid / S.038 / pH: x  Gluc: x / Ketone: Large  / Bili: Small / Urobili: 6 mg/dL   Blood: x / Protein: 300 mg/dL / Nitrite: Negative   Leuk Esterase: Moderate / RBC: 0 /HPF / WBC 33 /HPF   Sq Epi: x / Non Sq Epi: 7 /HPF / Bacteria: Few      ABG - ( 01 Sep 2020 09:02 )  pH, Arterial: 7.47  pH, Blood: x     /  pCO2: 35    /  pO2: 70    / HCO3: 25    / Base Excess: 2.0   /  SaO2: 95          Culture - Urine (collected 31 Aug 2020 14:47)  Source: .Urine Clean Catch (Midstream)  Final Report (01 Sep 2020 18:01):    No growth    Culture - Blood (collected 31 Aug 2020 14:15)  Source: .Blood Blood  Preliminary Report (01 Sep 2020 23:01):    No growth to date.    Culture - Blood (collected 31 Aug 2020 14:15)  Source: .Blood Blood  Preliminary Report (01 Sep 2020 23:01):    No growth to date.      CARDIAC MARKERS ( 01 Sep 2020 11:10 )  x     / <0.01 ng/mL / x     / x     / x          RADIOLOGY:

## 2020-09-02 NOTE — CDI QUERY NOTE - NSCDIOTHERTXTBX_GEN_ALL_CORE_HH
8/31   50M w/AMS,  lethargy ,decreased PO intake PMH of CIDP, depression, anxiety, Diabetes, BPH,     Admitted for acute/subacute infarct in the left basal ganglia.    MRI> mild mass effect consistent with an acute infarct. Stable mild left to right midline shift measuring 6 mm.    RX: Lovenox, ASA, NGT, Neuro Checks,     Based on above clinical findings and your professional judgment, the please indicate if additional DX is applicable     < Cerebral Compression  <  Other please specify  < Clinically unable to determine

## 2020-09-02 NOTE — PROGRESS NOTE ADULT - ASSESSMENT
A 50 years old left handed man with past medical history   1. CAD s/p MI and CABG x 5,   2. DM  3. CIDP chronic inflammatory demyelinating polyneuropathy (follows Neurologist Dr. Mitchell), prior chronic right caudate nucleus infarction on IVIG q3 weeks   4. DM  5. Depression  6. Anxiety  7. BPH     , who presents from nursing home for AMS x 3 days, vomiting, decreased PO intake, and right sided facial droop.     - Infectious work up including UA/blood cultures, CXR, CSF  - Start empiric treatment with Vanco, meropenem, and acyclovir  - Maximize medical management including toxic encephalopathy and optimized risk factors/lifestyle modification    #Stroke  #AMS with decreased speech low tone found with acute/subacute infarct in the left basal ganglia/ periventricular white matter, NIHSS 10 on admission  CT today: Evolving acute/subacute infarct in the left basal ganglia, with mild mass effect. Stable mild midline shift to the right.  -permissive htn (150-180 SBP)  -echo with bubble  -lipid profile WNL   - a1c 7.3  -keep aspirin and high dose statin    # R/o Meningitis  -decrease Level of consciouness today    -->hold plavix for LP tomorrow, hold morphine and benzo     -->vanco, tony, acyclovir to cover for meningitis    -->Consult ID      # CIDP since 2011 per wife had deterioration [phone] and is bedbound since then, follows DR. Mitchell currently getting IVIG every three weeks with RN administering at home. Was also started on gabapentin a few months ago per wife Pt. gets very sleepy after gabapentin will hold for now.   -c/w baclofen and pramipexole    #diabetes  - c/w home insulin regimen 30 lantus bedtime 9 lispro with meals     #Hx of cabg  - c/w asa, carvedilol and statin  - hold plavix    #Hx of anxiety and depression Pt. is on many meds confirmed with wife- c/w home meds- duloxetine, quetiapine, hydroxyzine, alprazolam prn    #chronic pain   - hold morphine 60mg q12 continue home regimen      #Hx of BPH   -c/w tamsulosin    #Hx of chronic constipation   -c/w senna dulcolox as needed    #Hx of gerd   -on pantoprazole    #diet will keep npo till seen by speech and swallow then diabetic  #dvt ppx- lovenox  #gi ppx on pantoprzole

## 2020-09-02 NOTE — PROGRESS NOTE ADULT - ASSESSMENT
A 50 years old left handed man with past medical history of MI, CABG x 5, DM, arteriosclerotic heart disease, chronic inflammatory demyelinating polyneuropathy (follows Neurologist Dr. Mitchell), prior chronic right caudate nucleus infarction, who presents from nursing home for AMS x 3 days, vomiting, decreased PO intake, and right sided facial droop. In ED, T 99.6F, /90, HR 74, RR 18, SpO2 96% RA. CTH reports of acute/subacute left basal ganglia/periventricular white matter. RRT this morning for episode of lethargy and vomiting. BP at time 230/90, and no new focal neurological deficit per chart review. He received hydralazine 5 mg x 1 and CT head reports of evolving left BG/CR with mild mass effect, and rightward midline shift measuring 9 mm (previously 7 mm). Currently in stroke unit, continues to be lethargic and weak. MRI brain with acute left CR in correlation with CTH. MRA brain with mild  intracranial stenosis in left M1 region.    SUGGESTIONS:  - Routine neuro checks and tele monitoring   - C/w ASA 81 mg, but hold Plavix for LP  - C/w high dose statin therapy   - Maintain glycemic control with POCT goal < 100   - Obtain rEEG  - Infectious work up including UA/blood cultures, CXR, CSF  - Start empiric treatment with Vanco, meropenem, and acyclovir  - Maximize medical management including toxic encephalopathy and optimized risk factors/lifestyle modification      Plan discussed with neuroattending and medical  team

## 2020-09-03 NOTE — CHART NOTE - NSCHARTNOTEFT_GEN_A_CORE
ICU Transfer Note    Transfer from: ICU    Transfer to: (  ) Medicine    (  ) Telemetry    (  ) RCU                               (  ) Palliative    (  ) Stroke Unit    (X) MICU    (  ) __________________    Accepting Physician: Dr Demetrius Guy    HPI / hospital COURSE:    HPI: This is a 50 years old left handed man with past medical history of MI, CABG x 5, DM, arteriosclerotic heart disease, chronic inflammatory demyelinating polyneuropathy (follows Neurologist Dr. Mitchell) diagnosed in , prior chronic right caudate nucleus infarction, who presents from nursing home for AMS x 3 days, vomiting, decreased PO intake, and right sided facial droop. In ED, T 99.6F, /90, HR 74, RR 18, SpO2 96% RA.   He presented to the ED for facial droop accompanied by wife. He was A0x1. Per wife, he had been acting different past five days, with low speech, barely talking and recently a right facial droop x 1 day.  CTH showed evidence of an acute/subacute left basal ganglia/periventricular white matter. Patient was outside window for tpa. He was admitted to stroke unite      INTERVAL HISTORY:  : In ED Main. He had RRT for an episode of lethargy and vomiting. BP at the time 230/90, and no new focal neurological deficit per chart review. He received hydralazine 5 mg x 1 and CT head reports of evolving left BG/CR with mild mass effect, and rightward midline shift measuring 9 mm (previously 7 mm).   9/3: Pt seen in 3E Stroke Unit. Overnight, pt with temp spike 38.4C and hypertensive, responded to tylenol. This morning at 9:12am, during Neuro exam patient found to have b/l LE clonus then developed generalized rhythmic tremors of b/l UE. Temp 98.7F per primary RN. Notified EEG tech for VEEG hook up. Patient was given STAT 2mg of Ativan and responded. Patient also to start Keppra 500 mg BID for now. , /84 this morning. WBC continues to trend up 18.69. Pending IR-guided LP as well; unsuccessful attempt at bedside yesterday.   Patient is lethargic and somnolent, unarousable and responding to pain.  He has an NG inserted, febrile.    2. Relevant PMH:   Prior ischemic stroke/TIA[x ], Afib [ ], CAD [ ], HTN [ ], DLD [ ], DM [x ], PVD [ ], Obesity [ ],   Sedentary lifestyle [ ], CHF [ ], IVORY [ ], Cancer Hx [ ].        Vital Signs Last 24 Hrs  T(C): 37.4 (03 Sep 2020 13:28), Max: 38.4 (02 Sep 2020 21:20)  T(F): 99.4 (03 Sep 2020 13:28), Max: 101.1 (02 Sep 2020 21:20)  HR: 114 (03 Sep 2020 13:28) (98 - 120)  BP: 189/107 (03 Sep 2020 13:28) (171/95 - 202/78)  BP(mean): --  RR: 18 (03 Sep 2020 13:28) (18 - 18)  SpO2: 98% (03 Sep 2020 11:10) (94% - 98%)    I&O's Summary    02 Sep 2020 07:01  -  03 Sep 2020 07:00  --------------------------------------------------------  IN: 1750 mL / OUT: 750 mL / NET: 1000 mL        Physical Exam:       LABS:   HEENT:  PASHA  +NGT            Lymphatic system: No cervical LN   Lungs: CTA bilaterally, no crackles.  Cardiovascular: Regular, tachycardia  Gastrointestinal: Soft, Positive BS  Musculoskeletal: exam limited due to mental status. +muscle wasting in bilateral LE  Skin: Warm.  Intact  Neurological: responds minimally to pain. does not respond to verbal stimuli. limited neuro exam. + pupil reaction                            15.0   18.69 )-----------( 354      ( 03 Sep 2020 05:10 )             47.4       09-03    140  |  99  |  22<H>  ----------------------------<  183<H>  3.7   |  24  |  1.1    Ca    8.9      03 Sep 2020 05:10  Mg     1.8     09-03    TPro  7.5  /  Alb  4.2  /  TBili  1.2  /  DBili  x   /  AST  19  /  ALT  11  /  AlkPhos  99  09-03          ABG - ( 03 Sep 2020 10:10 )  pH, Arterial: 7.50  pH, Blood: x     /  pCO2: 37    /  pO2: 73    / HCO3: 29    / Base Excess: 5.6   /  SaO2: 96                    EC Lead ECG:   Ventricular Rate 121 BPM    Atrial Rate 121 BPM    P-R Interval 136 ms    QRS Duration 82 ms    Q-T Interval 322 ms    QTC Calculation(Bezet) 457 ms    P Axis 39 degrees    R Axis 22 degrees    T Axis 127 degrees    Diagnosis Line Sinus tachycardia  Nonspecific ST and T wave abnormality  Abnormal ECG    Confirmed by Bong Cid ([id number]) on 2020 6:35:03 AM (20 @ 02:22)    Telemetry:    Imaging:      ASSESSMENT & PLAN:     A 50 years old left handed man with past medical history   1. CAD s/p MI and CABG x 5,   2. DM  3. CIDP chronic inflammatory demyelinating polyneuropathy (follows Neurologist Dr. Mitchell), prior chronic right caudate nucleus infarction on IVIG q3 weeks   4. DM  5. Depression  6. Anxiety  7. BPH     presented from nursing home for AMS x 3 days, vomiting, decreased PO intake, and right sided facial droop.       #Stroke  #AMS with decreased speech low tone found with acute/subacute infarct in the left basal ganglia/ periventricular white matter, NIHSS 10 on admission  CT yesterday: Evolving acute/subacute infarct in the left basal ganglia, with mild mass effect. Stable mild midline shift to the right.  - Infectious work up including UA/blood cultures, CXR, CSF  - Start empiric treatment with Vanco, meropenem, and acyclovir  - LP IR guided to be done on monday because patient took plavix on 2020  - Maximize medical management including toxic encephalopathy and optimized risk factors/lifestyle modification  -permissive htn (150-180 SBP)  -echo with bubble  -lipid profile WNL   -a1c 7.3  -keep aspirin and high dose statin    # R/o Meningitis  -decrease Level of consciouness today    -->LP monday. hold morphine and benzo     -->vanco, tony, acyclovir to cover for meningitis    -->Consult ID      # CIDP since  per wife had deterioration [phone] and is bedbound since then, follows DR. Mitchell currently getting IVIG every three weeks with RN administering at home. Was also started on gabapentin a few months ago per wife Pt. gets very sleepy after gabapentin will hold for now.   -c/w baclofen and pramipexole    #diabetes  - c/w home insulin regimen 30 lantus bedtime 9 lispro with meals     #Hx of cabg  - c/w asa, carvedilol and statin  - hold plavix    #Hx of anxiety and depression Pt. is on many meds confirmed with wife- c/w home meds- duloxetine, quetiapine, hydroxyzine, alprazolam prn    #chronic pain   - hold morphine 60mg q12 continue home regimen      #Hx of BPH   -c/w tamsulosin    #Hx of chronic constipation   -c/w senna dulcolox as needed    #Hx of gerd   -on pantoprazole    #diet will keep npo till seen by speech and swallow then diabetic  #dvt ppx- lovenox  #gi ppx on pantoprzole        FOR FOLLOW UP:  [1] LP and CSF studies   [2] ID consult recs   [3] neurological status  [4] Consider intubation for airway protection as per GCS      Savannah Stanton  PGY-1 ICU Transfer Note    Transfer from: ICU    Transfer to: (  ) Medicine    (  ) Telemetry    (  ) RCU                               (  ) Palliative    (  ) Stroke Unit    (X) MICU    (  ) __________________    Accepting Physician: Dr Demetrius Guy    HPI / hospital COURSE:    HPI: This is a 50 years old left handed man with past medical history of MI, CABG x 5, DM, arteriosclerotic heart disease, chronic inflammatory demyelinating polyneuropathy (follows Neurologist Dr. Mitchell) diagnosed in , prior chronic right caudate nucleus infarction, who presents from nursing home for AMS x 3 days, vomiting, decreased PO intake, and right sided facial droop. In ED, T 99.6F, /90, HR 74, RR 18, SpO2 96% RA.   He presented to the ED for facial droop accompanied by wife. He was A0x1. Per wife, he had been acting different past five days, with low speech, barely talking and recently a right facial droop x 1 day.  CTH showed evidence of an acute/subacute left basal ganglia/periventricular white matter. Patient was outside window for tpa. He was admitted to stroke unite      INTERVAL HISTORY:  : In ED Main. He had RRT for an episode of lethargy and vomiting. BP at the time 230/90, and no new focal neurological deficit per chart review. He received hydralazine 5 mg x 1 and CT head reports of evolving left BG/CR with mild mass effect, and rightward midline shift measuring 9 mm (previously 7 mm).   9/3: Pt seen in 3E Stroke Unit. Overnight, pt with temp spike 38.4C and hypertensive, responded to tylenol. This morning at 9:12am, during Neuro exam patient found to have b/l LE clonus then developed generalized rhythmic tremors of b/l UE. Temp 98.7F per primary RN. Notified EEG tech for VEEG hook up. Patient was given STAT 2mg of Ativan and responded. Patient also to start Keppra 500 mg BID for now. , /84 this morning. WBC continues to trend up 18.69. Pending IR-guided LP as well; unsuccessful attempt at bedside yesterday.   Patient is lethargic and somnolent, unarousable and responding to pain.  He has an NG inserted, febrile.    2. Relevant PMH:   Prior ischemic stroke/TIA[x ], Afib [ ], CAD [ ], HTN [ ], DLD [ ], DM [x ], PVD [ ], Obesity [ ],   Sedentary lifestyle [ ], CHF [ ], IVORY [ ], Cancer Hx [ ].        Vital Signs Last 24 Hrs  T(C): 37.4 (03 Sep 2020 13:28), Max: 38.4 (02 Sep 2020 21:20)  T(F): 99.4 (03 Sep 2020 13:28), Max: 101.1 (02 Sep 2020 21:20)  HR: 114 (03 Sep 2020 13:28) (98 - 120)  BP: 189/107 (03 Sep 2020 13:28) (171/95 - 202/78)  BP(mean): --  RR: 18 (03 Sep 2020 13:28) (18 - 18)  SpO2: 98% (03 Sep 2020 11:10) (94% - 98%)    I&O's Summary    02 Sep 2020 07:01  -  03 Sep 2020 07:00  --------------------------------------------------------  IN: 1750 mL / OUT: 750 mL / NET: 1000 mL        Physical Exam:       LABS:   HEENT:  PASHA  +NGT            Lymphatic system: No cervical LN   Lungs: CTA bilaterally, no crackles.  Cardiovascular: Regular, tachycardia  Gastrointestinal: Soft, Positive BS  Musculoskeletal: exam limited due to mental status. +muscle wasting in bilateral LE  Skin: Warm.  Intact  Neurological: responds minimally to pain. does not respond to verbal stimuli. limited neuro exam. + pupil reaction                            15.0   18.69 )-----------( 354      ( 03 Sep 2020 05:10 )             47.4       09-03    140  |  99  |  22<H>  ----------------------------<  183<H>  3.7   |  24  |  1.1    Ca    8.9      03 Sep 2020 05:10  Mg     1.8     09-03    TPro  7.5  /  Alb  4.2  /  TBili  1.2  /  DBili  x   /  AST  19  /  ALT  11  /  AlkPhos  99  09-03          ABG - ( 03 Sep 2020 10:10 )  pH, Arterial: 7.50  pH, Blood: x     /  pCO2: 37    /  pO2: 73    / HCO3: 29    / Base Excess: 5.6   /  SaO2: 96                    EC Lead ECG:   Ventricular Rate 121 BPM    Atrial Rate 121 BPM    P-R Interval 136 ms    QRS Duration 82 ms    Q-T Interval 322 ms    QTC Calculation(Bezet) 457 ms    P Axis 39 degrees    R Axis 22 degrees    T Axis 127 degrees    Diagnosis Line Sinus tachycardia  Nonspecific ST and T wave abnormality  Abnormal ECG    Confirmed by Bong Cid ([id number]) on 2020 6:35:03 AM (20 @ 02:22)    Telemetry:    Imaging:      ASSESSMENT & PLAN:     A 50 years old left handed man with past medical history   1. CAD s/p MI and CABG x 5,   2. DM  3. CIDP chronic inflammatory demyelinating polyneuropathy (follows Neurologist Dr. Mitchell), prior chronic right caudate nucleus infarction on IVIG q3 weeks   4. DM  5. Depression  6. Anxiety  7. BPH     presented from nursing home for AMS x 3 days, vomiting, decreased PO intake, and right sided facial droop.       #Stroke  #AMS with decreased speech low tone found with acute/subacute infarct in the left basal ganglia/ periventricular white matter, NIHSS 10 on admission  CT yesterday: Evolving acute/subacute infarct in the left basal ganglia, with mild mass effect. Stable mild midline shift to the right.  - Infectious work up including UA/blood cultures, CXR, CSF  - Start empiric treatment with Vanco, meropenem, and acyclovir  - LP IR guided to be tomorrow, discussion with IR and nursing team took place (PATIENT DID NOT RECEIVE PLAVIX)  - Maximize medical management including toxic encephalopathy and optimized risk factors/lifestyle modification  -permissive htn (150-180 SBP)  -echo with bubble  -lipid profile WNL   -a1c 7.3  -keep aspirin and high dose statin    # R/o Meningitis  -decrease Level of consciouness today    -->LP TOMORROW. hold morphine and benzo     -->vanco, tony, acyclovir to cover for meningitis    -->Consult ID      # CIDP since  per wife had deterioration [phone] and is bedbound since then, follows DR. Mitchell currently getting IVIG every three weeks with RN administering at home. Was also started on gabapentin a few months ago per wife Pt. gets very sleepy after gabapentin will hold for now.   -c/w baclofen and pramipexole    #diabetes  - c/w home insulin regimen 30 lantus bedtime 9 lispro with meals     #Hx of CAD s/p cabg  - c/w asa, carvedilol and statin  - hold plavix    #Hx of anxiety and depression Pt. is on many meds confirmed with wife- c/w home meds- duloxetine, quetiapine, hydroxyzine, alprazolam prn    #chronic pain   - hold morphine 60mg q12 continue home regimen      #Hx of BPH   -c/w tamsulosin    #Hx of chronic constipation   -c/w senna dulcolox as needed    #Hx of gerd   -on pantoprazole    #diet will keep npo till seen by speech and swallow then diabetic  #dvt ppx- lovenox  #gi ppx on pantoprzole        FOR FOLLOW UP:  [1] LP and CSF studies   [2] ID consult recs   [3] neurological status  [4] Consider intubation for airway protection as per GCS  [5] Heparin SubQ for prophylaxis is held today, please resume after LP      Savannah Stanton  PGY-1

## 2020-09-03 NOTE — CONSULT NOTE ADULT - ASSESSMENT
IMPRESSION:    Acute encephalopathy likely metabolic  Sepsis likely secondary to CNS source, r/o meningitis vs encephalitis  Acute CVA basal ganglia  Seizures  HO CIDP s/p IVIG [id number]  HO CAD s/p CABG    PLAN:    CNS: q1h neuro checks. Neuro following. Repeat CT Head stat if worsening neurological status. Continue Keppra. Seizure precautions. FU VEEG result, hold gabapentin for now. Will need LP with opening pressure. IR for possible LP, if bedside attempt unsuccessful    HEENT: oral care. aspiration precautions    PULMONARY: HOB >30. Low threshold for intubation if signs of airway compromise. O2 if needed to maintain pulse ox >92%    CARDIOVASCULAR: Keep i=o. bp control. keep systolic bp around 160-180. obtain 2d echo. IVF at 75cc/hr for now    GI: GI prophylaxis. NGT Feeding as tolerated     RENAL: fu lytes. flush graham. repeat BMP. monitor urine output    INFECTIOUS DISEASE: Continue current antibx. ID eval. will need LP.    HEMATOLOGICAL:  DVT prophylaxis. hold plavix, contiue ASA    ENDOCRINE:  Follow up FS.  Insulin protocol if needed.    MUSCULOSKELETAL: bedrest    MICU monitoring  Full code  Will discuss with attending IMPRESSION:    Altered MS/ CVA/ ? Seizure, ? meningoencephalilis  Acute CVA basal ganglia  Seizures  HO CIDP s/p IVIG [id number]  HO CAD s/p CABG    PLAN:    CNS: q1h neuro checks. Neuro following. Repeat CT Head stat if worsening neurological status. Continue Keppra. Seizure precautions. FU VEEG result, hold gabapentin for now. Will need LP with opening pressure.     HEENT: oral care. aspiration precautions    PULMONARY: HOB >30. Low threshold for intubation if signs of airway compromise. O2 if needed to maintain pulse ox >92%    CARDIOVASCULAR: Keep i=o. bp control. keep systolic bp around 160-180. obtain 2d echo. IVF at 75cc/hr for now    GI: GI prophylaxis. NGT Feeding as tolerated     RENAL: fu lytes. flush graham. repeat BMP. monitor urine output    INFECTIOUS DISEASE: Continue current antibx per ID    HEMATOLOGICAL:  DVT prophylaxis. hold plavix, contiue ASA    ENDOCRINE:  Follow up FS.  Insulin protocol if needed.    MUSCULOSKELETAL: bedrest    MICU monitoring  Full code  Very poor prognosis IMPRESSION:    Altered MS/ CVA/ ? Seizure, ? meningoencephalilis  Acute CVA basal ganglia  Seizures  HO CIDP s/p IVIG [id number]  Paraplegia  HO CAD s/p CABG    PLAN:    CNS: q1h neuro checks. Neuro following. Repeat CT Head stat if worsening neurological status. Continue Keppra. Seizure precautions. FU VEEG result, hold gabapentin for now. Will need LP with opening pressure.     HEENT: oral care. aspiration precautions    PULMONARY: HOB >30. Low threshold for intubation if signs of airway compromise. O2 if needed to maintain pulse ox >92%    CARDIOVASCULAR: Keep i=o. bp control. keep systolic bp around 160-180. obtain 2d echo. IVF at 75cc/hr for now    GI: GI prophylaxis. NGT Feeding as tolerated     RENAL: fu lytes. flush graham. repeat BMP. monitor urine output    INFECTIOUS DISEASE: Continue current antibx per ID    HEMATOLOGICAL:  DVT prophylaxis. hold plavix, contiue ASA    ENDOCRINE:  Follow up FS.  Insulin protocol if needed.    MUSCULOSKELETAL: bedrest    MICU monitoring  Full code  Very poor prognosis

## 2020-09-03 NOTE — CDI QUERY NOTE - NSCDIOTHERTXTBX_GEN_ALL_CORE_HH
8/31   50M w/AMS,  lethargy ,decreased PO intake PMH of CIDP, depression, anxiety, Diabetes, BPH,    Admitted for acute/subacute infarct in the left basal ganglia.    MRI> mild mass effect consistent with an acute infarct. Stable mild left to right midline shift measuring 6 mm.    9/3< CNS: q1h neuro checks. Neuro following. Repeat CT Head stat if worsening neurological status.  Patient is lethargic and somnolent, unarousable and responding to pain.    RX: Lovenox, ASA, NGT, Neuro Checks, PT    Based on above clinical findings and your professional judgment, the please indicate if additional DX is applicable   < Cerebral Compression  <  Other please specify  < Clinically unable to determine.

## 2020-09-03 NOTE — CONSULT NOTE ADULT - SUBJECTIVE AND OBJECTIVE BOX
NAPOLEON CAST  50y, Male  Allergy: penicillins (Unknown)  strawberry (Hives)      CHIEF COMPLAINT: ams/ facial droop (03 Sep 2020 11:52)      HPI:  50yMale with a past medical history of CIDP, DM, CABG presents with 4 days of decreased mentation. Per his wife he has had low speech and right facial droop. He has been unable to walk since . He normally is alert, coherent and able to feed himself. He is now unable to move more than his eyes and mouth but is unable to verbalize. No focal muscle or sensory changes from baseline. His wife reports no fever, chills, HA, CP, SOB. In the ED he had no fever and had WBC of 9.8. CT showed left basal ganglia infarct. He had a fever of 103 two nights ago and 101.4 last night, currently afebrile. Current WBC 18.69. Blood and urine cultures negative. LP not yet performed.         Infectious Diseases History:  Old Micro Data/Cultures:     FAMILY HISTORY:    PAST MEDICAL & SURGICAL HISTORY:  GERD (gastroesophageal reflux disease)  BPH (benign prostatic hyperplasia)  Myocardial infarct, old  ASHD (arteriosclerotic heart disease)  Depression  Anxiety  Diabetes  CIDP (chronic inflammatory demyelinating polyneuropathy)  History of cholecystectomy  History of total left hip replacement  History of total right hip replacement: bilateral  S/P CABG x 5      SOCIAL HISTORY  Social History:  no etoh/ smoking/ drugs (31 Aug 2020 17:40)      Recent Travel:  Other Exposures:     ROS  General: Denies rigors, nightsweats  HEENT: Denies headache, rhinorrhea, sore throat, eye pain  CV: Denies CP, palpitations  PULM: Denies wheezing, hemoptysis  GI: Denies hematemesis, hematochezia, melena  : Denies discharge, hematuria  MSK: Denies arthralgias, myalgias  SKIN: Denies rash, lesions  NEURO: Right facial droop, decreased speech, decreased alertness.  PSYCH: Denies depression, anxiety    VITALS:  T(F): 99.4, Max: 101.1 (20 @ 21:20)  HR: 114  BP: 189/107  RR: 18Vital Signs Last 24 Hrs  T(C): 37.4 (03 Sep 2020 13:28), Max: 38.4 (02 Sep 2020 21:20)  T(F): 99.4 (03 Sep 2020 13:28), Max: 101.1 (02 Sep 2020 21:20)  HR: 114 (03 Sep 2020 13:28) (98 - 120)  BP: 189/107 (03 Sep 2020 13:28) (171/95 - 202/78)  BP(mean): --  RR: 18 (03 Sep 2020 13:28) (18 - 18)  SpO2: 98% (03 Sep 2020 11:10) (94% - 98%)    PHYSICAL EXAM:  Gen: NAD, resting in bed  HEENT: Normocephalic, atraumatic  Neck: supple, no lymphadenopathy  CV: Regular rate & regular rhythm  Lungs: CTAB  Abdomen: Soft, BS present  Ext: Warm, well perfused, Pedal edema.  Neuro: opens eyes, does not follow simple commands. Tries to verbalize.  Skin: no rash, no lesions  Lines: no phlebitis    TESTS & MEASUREMENTS:                        15.0   18.69 )-----------( 354      ( 03 Sep 2020 05:10 )             47.4         140  |  99  |  22<H>  ----------------------------<  183<H>  3.7   |  24  |  1.1    Ca    8.9      03 Sep 2020 05:10  Mg     1.8         TPro  7.5  /  Alb  4.2  /  TBili  1.2  /  DBili  x   /  AST  19  /  ALT  11  /  AlkPhos  99      eGFR if Non African American: 77 mL/min/1.73M2 (20 @ 05:10)  eGFR if : 89 mL/min/1.73M2 (20 @ 05:10)    LIVER FUNCTIONS - ( 03 Sep 2020 05:10 )  Alb: 4.2 g/dL / Pro: 7.5 g/dL / ALK PHOS: 99 U/L / ALT: 11 U/L / AST: 19 U/L / GGT: x           Urinalysis Basic - ( 02 Sep 2020 10:21 )    Color: Yellow / Appearance: Turbid / S.038 / pH: x  Gluc: x / Ketone: Large  / Bili: Small / Urobili: 6 mg/dL   Blood: x / Protein: 300 mg/dL / Nitrite: Negative   Leuk Esterase: Moderate / RBC: 0 /HPF / WBC 33 /HPF   Sq Epi: x / Non Sq Epi: 7 /HPF / Bacteria: Few        Culture - Urine (collected 20 @ 14:47)  Source: .Urine Clean Catch (Midstream)  Final Report (20 @ 18:01):    No growth    Culture - Blood (collected 20 @ 14:15)  Source: .Blood Blood  Preliminary Report (20 @ 23:01):    No growth to date.    Culture - Blood (collected 20 @ 14:15)  Source: .Blood Blood  Preliminary Report (20 @ 23:01):    No growth to date.        Lactate, Blood: 2.1 mmol/L (20 @ 05:10)  Lactate, Blood: 1.4 mmol/L (20 @ 21:04)  Lactate, Blood: 1.3 mmol/L (20 @ 14:15)      INFECTIOUS DISEASES TESTING  MRSA PCR Result.: Positive (19 @ 11:30)      RADIOLOGY & ADDITIONAL TESTS:  I have personally reviewed the last available Chest xray  CXR  Xray Chest 1 View AP/PA:   EXAM:  XR CHEST FRONTAL 1V            PROCEDURE DATE:  2020            INTERPRETATION:  Clinical History / Reason for exam: NG tube placement    Comparison : Chest radiograph will AP chest x-ray dated 2020 9:11 AM.    Technique/Positioning: Portable AP chest x-ray.    Findings:    Support devices: A weighted nasogastric tube is in place with its tip at the level of the gastric fundus..    Cardiac/mediastinum/hilum: Sternotomy wires and surgical clips are noted in place. Low lung volumes therefore the heart size cannot be fully evaluated. Vascular markings within normal limits.    Lung parenchyma/Pleura: Lungs are clear of infiltrate. Costophrenic angles clear.    Skeleton/soft tissues: Surgical clips noted in the right upper quadrant.    Impression:    Nasogastric tube is in place with its tip at the level of the gastric fundus.                    TAMMY PEGUERO M.D., ATTENDING RADIOLOGIST  This document has been electronically signed. Sep  2 2020  6:00PM             (20 @ 17:10)  Xray Chest 1 View AP/PA:   EXAM:  XR CHEST FRONTAL 1V            PROCEDURE DATE:  2020            INTERPRETATION:  Clinical History / Reason for exam: Fever, altered mental status.    Comparison : Chest radiograph 2020.    Technique/Positioning: Frontal view of thechest was obtained.    Findings:    Support devices: Overlying telemetry leads.    Cardiac/mediastinum/hilum: Stable cardiomediastinal silhouette.    Lung parenchyma/Pleura: Low lung lungs. No focal consolidation, pneumothorax or pleural effusion.    Skeleton/soft tissues: Stable.    Impression:    No significant interval change. Consider follow-up with CT if the patient continues to have symptoms.                    STEFANIE CHRISTIANSON M.D., ATTENDING RADIOLOGIST  This document has been electronically signed. Sep  2 2020 11:17AM             (20 @ 10:21)  Xray Chest 1 View- PORTABLE-Urgent:   EXAM:  XR CHEST PORTABLE URGENT 1V            PROCEDURE DATE:  2020            INTERPRETATION:  Clinical History / Reason for exam: Change in mental status.    Comparison : Chest radiograph AP portable dated 2020 time 2:27 PM.    Technique/Positioning: Frontal view time 11:47 AM, patient rotated to the right of midline, poor inspiratory effort.    Findings:    Support devices: Sternal wires surgical clips overlie the mediastinum. EKG leads overlie the thorax.    Cardiac/mediastinum/hilum: Unremarkable.    Lung parenchyma/Pleura: Right lower lobe atelectasis.    Impression:    In comparison with the prior chest x-ray dated 2020 time 12:27 PM:    Right lower lobe atelectasis, unchanged.                RALPH GRAHAM M.D., ATTENDING RADIOLOGIST  This document has been electronically signed. Sep  1 2020  3:35PM             (20 @ 13:41)      CT      CARDIOLOGY TESTING  12 Lead ECG:   Ventricular Rate 121 BPM    Atrial Rate 121 BPM    P-R Interval 136 ms    QRS Duration 82 ms    Q-T Interval 322 ms    QTC Calculation(Bezet) 457 ms    P Axis 39 degrees    R Axis 22 degrees    T Axis 127 degrees    Diagnosis Line Sinus tachycardia  Nonspecific ST and T wave abnormality  Abnormal ECG    Confirmed by Bong Cid ([id number]) on 2020 6:35:03 AM (20 @ 02:22)  12 Lead ECG:   Ventricular Rate 79 BPM    Atrial Rate 79 BPM    P-R Interval 140 ms    QRS Duration 82 ms    Q-T Interval 376 ms    QTC Calculation(Bezet) 431 ms    P Axis 57 degrees    R Axis 37 degrees    T Axis 68 degrees    Diagnosis Line Normal sinus rhythm  Normal ECG    Confirmed by DEBRA CORCORAN MD ([id number]) on 2020 9:04:42 PM (20 @ 14:40)      All available historical records have been reviewed    MEDICATIONS  acyclovir IVPB   acyclovir IVPB 1000  aspirin  chewable 81  atorvastatin 80  baclofen 10  carvedilol 3.125  chlorhexidine 4% Liquid 1  dextrose 50% Injectable 12.5  dextrose 50% Injectable 25  dextrose 50% Injectable 25  DULoxetine 60  insulin glargine Injectable (LANTUS) 30  insulin lispro (HumaLOG) corrective regimen sliding scale   insulin lispro Injectable (HumaLOG) 9  levETIRAcetam  IVPB 500  pantoprazole   Suspension 40  pramipexole 0.5  senna 2  sodium chloride 0.9%. 1000  tamsulosin 0.4      ANTIBIOTICS:  acyclovir IVPB      acyclovir IVPB 1000 milliGRAM(s) IV Intermittent every 8 hours      All available historical data has been reviewed    ASSESSMENT  50yMale with a PMH of CABG, CIDP, DM presenting with 4 days of decreased mentation. Not verbal, does not follow commands. CT showing basal ganglia infarct. Discussed prognosis with wife.    IMPRESSION  # No clinical evidence of meningitis or encephalitis. Normal WBC and temperature on admission.  # Ischemic basal ganglia lesion and mass effect likely causing central fever, non infectious.    RECOMMENDATIONS  - Discontinue meropenem and vancomycin  - Start ceftriaxone 2g q12  - Get lumbar puncture    This is a pended note. All final recommendations to follow pending discussion with ID Attending NAPOLEON CAST  50y, Male  Allergy: penicillins (Unknown)  strawberry (Hives)      CHIEF COMPLAINT: ams/ facial droop (03 Sep 2020 11:52)      HPI:  50yMale with a past medical history of CIDP, DM, CABG presents with 4 days of decreased mentation. Per his wife he has had low speech and right facial droop. He has been unable to walk since . He normally is alert, coherent and able to feed himself. He is now unable to move more than his eyes and mouth but is unable to verbalize. No focal muscle or sensory changes from baseline. His wife reports no fever, chills, HA, CP, SOB. In the ED he had no fever and had WBC of 9.8. CT showed left basal ganglia infarct. He had a fever of 103 two nights ago and 101.4 last night, currently afebrile. Current WBC 18.69. Blood and urine cultures negative. LP not yet performed.         Infectious Diseases History:  Old Micro Data/Cultures:     FAMILY HISTORY:    PAST MEDICAL & SURGICAL HISTORY:  GERD (gastroesophageal reflux disease)  BPH (benign prostatic hyperplasia)  Myocardial infarct, old  ASHD (arteriosclerotic heart disease)  Depression  Anxiety  Diabetes  CIDP (chronic inflammatory demyelinating polyneuropathy)  History of cholecystectomy  History of total left hip replacement  History of total right hip replacement: bilateral  S/P CABG x 5      SOCIAL HISTORY  Social History:  no etoh/ smoking/ drugs (31 Aug 2020 17:40)      Recent Travel:  Other Exposures:     ROS  General: Denies rigors, nightsweats  HEENT: Denies headache, rhinorrhea, sore throat, eye pain  CV: Denies CP, palpitations  PULM: Denies wheezing, hemoptysis  GI: Denies hematemesis, hematochezia, melena  : Denies discharge, hematuria  MSK: Denies arthralgias, myalgias  SKIN: Denies rash, lesions  NEURO: Right facial droop, decreased speech, decreased alertness.  PSYCH: Denies depression, anxiety    VITALS:  T(F): 99.4, Max: 101.1 (20 @ 21:20)  HR: 114  BP: 189/107  RR: 18Vital Signs Last 24 Hrs  T(C): 37.4 (03 Sep 2020 13:28), Max: 38.4 (02 Sep 2020 21:20)  T(F): 99.4 (03 Sep 2020 13:28), Max: 101.1 (02 Sep 2020 21:20)  HR: 114 (03 Sep 2020 13:28) (98 - 120)  BP: 189/107 (03 Sep 2020 13:28) (171/95 - 202/78)  BP(mean): --  RR: 18 (03 Sep 2020 13:28) (18 - 18)  SpO2: 98% (03 Sep 2020 11:10) (94% - 98%)    PHYSICAL EXAM:  Gen: NAD, resting in bed  HEENT: Normocephalic, atraumatic  Neck: supple, no lymphadenopathy  CV: Regular rate & regular rhythm  Lungs: CTAB  Abdomen: Soft, BS present  Ext: Warm, well perfused, Pedal edema.  Neuro: opens eyes, does not follow simple commands. Tries to verbalize.  Skin: no rash, no lesions  Lines: no phlebitis    TESTS & MEASUREMENTS:                        15.0   18.69 )-----------( 354      ( 03 Sep 2020 05:10 )             47.4         140  |  99  |  22<H>  ----------------------------<  183<H>  3.7   |  24  |  1.1    Ca    8.9      03 Sep 2020 05:10  Mg     1.8         TPro  7.5  /  Alb  4.2  /  TBili  1.2  /  DBili  x   /  AST  19  /  ALT  11  /  AlkPhos  99      eGFR if Non African American: 77 mL/min/1.73M2 (20 @ 05:10)  eGFR if : 89 mL/min/1.73M2 (20 @ 05:10)    LIVER FUNCTIONS - ( 03 Sep 2020 05:10 )  Alb: 4.2 g/dL / Pro: 7.5 g/dL / ALK PHOS: 99 U/L / ALT: 11 U/L / AST: 19 U/L / GGT: x           Urinalysis Basic - ( 02 Sep 2020 10:21 )    Color: Yellow / Appearance: Turbid / S.038 / pH: x  Gluc: x / Ketone: Large  / Bili: Small / Urobili: 6 mg/dL   Blood: x / Protein: 300 mg/dL / Nitrite: Negative   Leuk Esterase: Moderate / RBC: 0 /HPF / WBC 33 /HPF   Sq Epi: x / Non Sq Epi: 7 /HPF / Bacteria: Few        Culture - Urine (collected 20 @ 14:47)  Source: .Urine Clean Catch (Midstream)  Final Report (20 @ 18:01):    No growth    Culture - Blood (collected 20 @ 14:15)  Source: .Blood Blood  Preliminary Report (20 @ 23:01):    No growth to date.    Culture - Blood (collected 20 @ 14:15)  Source: .Blood Blood  Preliminary Report (20 @ 23:01):    No growth to date.        Lactate, Blood: 2.1 mmol/L (20 @ 05:10)  Lactate, Blood: 1.4 mmol/L (20 @ 21:04)  Lactate, Blood: 1.3 mmol/L (20 @ 14:15)      INFECTIOUS DISEASES TESTING  MRSA PCR Result.: Positive (19 @ 11:30)      RADIOLOGY & ADDITIONAL TESTS:  I have personally reviewed the last available Chest xray  CXR  Xray Chest 1 View AP/PA:   EXAM:  XR CHEST FRONTAL 1V            PROCEDURE DATE:  2020            INTERPRETATION:  Clinical History / Reason for exam: NG tube placement    Comparison : Chest radiograph will AP chest x-ray dated 2020 9:11 AM.    Technique/Positioning: Portable AP chest x-ray.    Findings:    Support devices: A weighted nasogastric tube is in place with its tip at the level of the gastric fundus..    Cardiac/mediastinum/hilum: Sternotomy wires and surgical clips are noted in place. Low lung volumes therefore the heart size cannot be fully evaluated. Vascular markings within normal limits.    Lung parenchyma/Pleura: Lungs are clear of infiltrate. Costophrenic angles clear.    Skeleton/soft tissues: Surgical clips noted in the right upper quadrant.    Impression:    Nasogastric tube is in place with its tip at the level of the gastric fundus.                    TAMMY PEGUERO M.D., ATTENDING RADIOLOGIST  This document has been electronically signed. Sep  2 2020  6:00PM             (20 @ 17:10)  Xray Chest 1 View AP/PA:   EXAM:  XR CHEST FRONTAL 1V            PROCEDURE DATE:  2020            INTERPRETATION:  Clinical History / Reason for exam: Fever, altered mental status.    Comparison : Chest radiograph 2020.    Technique/Positioning: Frontal view of thechest was obtained.    Findings:    Support devices: Overlying telemetry leads.    Cardiac/mediastinum/hilum: Stable cardiomediastinal silhouette.    Lung parenchyma/Pleura: Low lung lungs. No focal consolidation, pneumothorax or pleural effusion.    Skeleton/soft tissues: Stable.    Impression:    No significant interval change. Consider follow-up with CT if the patient continues to have symptoms.                    STEFANIE CHRISTIANSON M.D., ATTENDING RADIOLOGIST  This document has been electronically signed. Sep  2 2020 11:17AM             (20 @ 10:21)  Xray Chest 1 View- PORTABLE-Urgent:   EXAM:  XR CHEST PORTABLE URGENT 1V            PROCEDURE DATE:  2020            INTERPRETATION:  Clinical History / Reason for exam: Change in mental status.    Comparison : Chest radiograph AP portable dated 2020 time 2:27 PM.    Technique/Positioning: Frontal view time 11:47 AM, patient rotated to the right of midline, poor inspiratory effort.    Findings:    Support devices: Sternal wires surgical clips overlie the mediastinum. EKG leads overlie the thorax.    Cardiac/mediastinum/hilum: Unremarkable.    Lung parenchyma/Pleura: Right lower lobe atelectasis.    Impression:    In comparison with the prior chest x-ray dated 2020 time 12:27 PM:    Right lower lobe atelectasis, unchanged.                RALPH GRAHAM M.D., ATTENDING RADIOLOGIST  This document has been electronically signed. Sep  1 2020  3:35PM             (20 @ 13:41)      CT      CARDIOLOGY TESTING  12 Lead ECG:   Ventricular Rate 121 BPM    Atrial Rate 121 BPM    P-R Interval 136 ms    QRS Duration 82 ms    Q-T Interval 322 ms    QTC Calculation(Bezet) 457 ms    P Axis 39 degrees    R Axis 22 degrees    T Axis 127 degrees    Diagnosis Line Sinus tachycardia  Nonspecific ST and T wave abnormality  Abnormal ECG    Confirmed by Bong Cid ([id number]) on 2020 6:35:03 AM (20 @ 02:22)  12 Lead ECG:   Ventricular Rate 79 BPM    Atrial Rate 79 BPM    P-R Interval 140 ms    QRS Duration 82 ms    Q-T Interval 376 ms    QTC Calculation(Bezet) 431 ms    P Axis 57 degrees    R Axis 37 degrees    T Axis 68 degrees    Diagnosis Line Normal sinus rhythm  Normal ECG    Confirmed by DEBRA CORCORAN MD ([id number]) on 2020 9:04:42 PM (20 @ 14:40)      All available historical records have been reviewed    MEDICATIONS  acyclovir IVPB   acyclovir IVPB 1000  aspirin  chewable 81  atorvastatin 80  baclofen 10  carvedilol 3.125  chlorhexidine 4% Liquid 1  dextrose 50% Injectable 12.5  dextrose 50% Injectable 25  dextrose 50% Injectable 25  DULoxetine 60  insulin glargine Injectable (LANTUS) 30  insulin lispro (HumaLOG) corrective regimen sliding scale   insulin lispro Injectable (HumaLOG) 9  levETIRAcetam  IVPB 500  pantoprazole   Suspension 40  pramipexole 0.5  senna 2  sodium chloride 0.9%. 1000  tamsulosin 0.4      ANTIBIOTICS:  acyclovir IVPB      acyclovir IVPB 1000 milliGRAM(s) IV Intermittent every 8 hours      All available historical data has been reviewed    ASSESSMENT  50yMale with a PMH of CABG, CIDP, DM presenting with 4 days of decreased mentation. Not verbal, does not follow commands. CT showing basal ganglia infarct. Discussed prognosis with wife.    IMPRESSION  # No clinical evidence of meningitis or encephalitis. Normal WBC and temperature on admission.  # Ischemic basal ganglia lesion and mass effect likely causing central fever, non infectious.  #No endocarditis    RECOMMENDATIONS  - Discontinue meropenem and vancomycin  - Start ceftriaxone 2g q12  -Acyclovir 1 gm iv q8h over 1h  -lumbar puncture with  PCR for HSV1, VZV, IgM WNV

## 2020-09-03 NOTE — CONSULT NOTE ADULT - SUBJECTIVE AND OBJECTIVE BOX
Patient is a 50y old  Male who presents with a chief complaint of ams/ facial droop (03 Sep 2020 10:51)      HPI:  50 year old male PMH of cabg , CIDP follows Dr. Mitchell has been getting IVIG q3 weeks at home, depression, anxiety, Diabetes, BPH, presents to ED for facial droop accompanied by wife.  Pt. is A0x1 baseline A0x3 has HHA 12 hours daily, coherent and can feed himself, and with home PT can sit up in bed has been unable to walk since  from CIDP. Per wife at bedside Pt. has been acting different past five days, with low speech, barely talking and recently a right facial droop x 1 day. No focal muscle or sensory changes from baseline. Pt. able to follow commands and responds softly to simple questions but unaware of where he is or his name.  Wife reports gabapentin started a few months ago which causes  to be more sleepy, no other new meds started or stopped.   Per wife no fever/ chills/ headache, has chronic constipation but no acute changes. Denies chest pain, sob.  Pt. had CT head in ED which showed acute/subacute infarct in the left basal ganglia/ periventricular white matter with NIHSS 10 on admission. Pt to be admitted to stroke unit fir futher work up. (31 Aug 2020 17:40)      PAST MEDICAL & SURGICAL HISTORY:  GERD (gastroesophageal reflux disease)  BPH (benign prostatic hyperplasia)  Myocardial infarct, old  ASHD (arteriosclerotic heart disease)  Depression  Anxiety  Diabetes  CIDP (chronic inflammatory demyelinating polyneuropathy)  History of cholecystectomy  History of total left hip replacement  History of total right hip replacement: bilateral  S/P CABG x 5      SOCIAL HX:   nonsmoker, no etoh    FAMILY HISTORY:  :  No known cardiovascular family history    ROS:  See HPI     Allergies    penicillins (Unknown)  strawberry (Hives)    Intolerances          PHYSICAL EXAM    ICU Vital Signs Last 24 Hrs  T(C): 38.2 (03 Sep 2020 11:10), Max: 38.4 (02 Sep 2020 21:20)  T(F): 100.8 (03 Sep 2020 11:10), Max: 101.1 (02 Sep 2020 21:20)  HR: 106 (03 Sep 2020 11:10) (96 - 120)  BP: 171/95 (03 Sep 2020 11:10) (171/95 - 218/106)  BP(mean): --  ABP: --  ABP(mean): --  RR: 18 (03 Sep 2020 11:10) (18 - 20)  SpO2: 98% (03 Sep 2020 11:10) (94% - 98%)      General: In NAD.   HEENT:  PASHA  +NGT            Lymphatic system: No cervical LN   Lungs: CTA bilaterally, no crackles.  Cardiovascular: Regular, tachycardia  Gastrointestinal: Soft, Positive BS  Musculoskeletal: exam limited due to mental status. +muscle wasting in bilateral LE  Skin: Warm.  Intact  Neurological: responds minimally to pain. does not respond to verbal stimuli. limited neuro exam. + pupil reaction      20 @ 07:01  -  20 @ 07:00  --------------------------------------------------------  IN:    IV PiggyBack: 600 mL    sodium chloride 0.9%: 1150 mL  Total IN: 1750 mL    OUT:    Indwelling Catheter - Urethral: 750 mL  Total OUT: 750 mL    Total NET: 1000 mL          LABS:                          15.0   18.69 )-----------( 354      ( 03 Sep 2020 05:10 )             47.4                                               09-03    140  |  99  |  22<H>  ----------------------------<  183<H>  3.7   |  24  |  1.1    Ca    8.9      03 Sep 2020 05:10  Mg     1.8         TPro  7.5  /  Alb  4.2  /  TBili  1.2  /  DBili  x   /  AST  19  /  ALT  11  /  AlkPhos  99  09-03                                             Urinalysis Basic - ( 02 Sep 2020 10:21 )    Color: Yellow / Appearance: Turbid / S.038 / pH: x  Gluc: x / Ketone: Large  / Bili: Small / Urobili: 6 mg/dL   Blood: x / Protein: 300 mg/dL / Nitrite: Negative   Leuk Esterase: Moderate / RBC: 0 /HPF / WBC 33 /HPF   Sq Epi: x / Non Sq Epi: 7 /HPF / Bacteria: Few                                                  LIVER FUNCTIONS - ( 03 Sep 2020 05:10 )  Alb: 4.2 g/dL / Pro: 7.5 g/dL / ALK PHOS: 99 U/L / ALT: 11 U/L / AST: 19 U/L / GGT: x                                                  Culture - Urine (collected 31 Aug 2020 14:47)  Source: .Urine Clean Catch (Midstream)  Final Report (01 Sep 2020 18:01):    No growth    Culture - Blood (collected 31 Aug 2020 14:15)  Source: .Blood Blood  Preliminary Report (01 Sep 2020 23:01):    No growth to date.    Culture - Blood (collected 31 Aug 2020 14:15)  Source: .Blood Blood  Preliminary Report (01 Sep 2020 23:01):    No growth to date.                                                                                       ABG - ( 03 Sep 2020 10:10 )  pH, Arterial: 7.50  pH, Blood: x     /  pCO2: 37    /  pO2: 73    / HCO3: 29    / Base Excess: 5.6   /  SaO2: 96                  X-Rays                                                                                     ECHO    MEDICATIONS  (STANDING):  acyclovir IVPB      acyclovir IVPB 1000 milliGRAM(s) IV Intermittent every 8 hours  aspirin  chewable 81 milliGRAM(s) Oral daily  atorvastatin 80 milliGRAM(s) Oral at bedtime  baclofen 10 milliGRAM(s) Oral two times a day  carvedilol 3.125 milliGRAM(s) Oral every 12 hours  chlorhexidine 4% Liquid 1 Application(s) Topical <User Schedule>  dextrose 50% Injectable 12.5 Gram(s) IV Push once  dextrose 50% Injectable 25 Gram(s) IV Push once  dextrose 50% Injectable 25 Gram(s) IV Push once  DULoxetine 60 milliGRAM(s) Oral two times a day  enoxaparin Injectable 40 milliGRAM(s) SubCutaneous daily  hydrOXYzine  Oral Tab/Cap - Peds 50 milliGRAM(s) Oral every 6 hours  insulin glargine Injectable (LANTUS) 30 Unit(s) SubCutaneous at bedtime  insulin lispro (HumaLOG) corrective regimen sliding scale   SubCutaneous three times a day before meals  insulin lispro Injectable (HumaLOG) 9 Unit(s) SubCutaneous three times a day before meals  levETIRAcetam  IVPB 500 milliGRAM(s) IV Intermittent every 12 hours  meropenem  IVPB 2000 milliGRAM(s) IV Intermittent every 8 hours  pantoprazole    Tablet 40 milliGRAM(s) Oral before breakfast  pramipexole 0.5 milliGRAM(s) Oral every 12 hours  senna 2 Tablet(s) Oral at bedtime  sodium chloride 0.9%. 1000 milliLiter(s) (100 mL/Hr) IV Continuous <Continuous>  tamsulosin 0.4 milliGRAM(s) Oral at bedtime  vancomycin  IVPB      vancomycin  IVPB 1000 milliGRAM(s) IV Intermittent every 12 hours    MEDICATIONS  (PRN):  acetaminophen  Suppository .. 650 milliGRAM(s) Rectal every 6 hours PRN Temp greater or equal to 38C (100.4F)  ALPRAZolam 1 milliGRAM(s) Oral every 6 hours PRN anxiety  bisacodyl 5 milliGRAM(s) Oral at bedtime PRN Constipation  dextrose 40% Gel 15 Gram(s) Oral once PRN Blood Glucose LESS THAN 70 milliGRAM(s)/deciliter  glucagon  Injectable 1 milliGRAM(s) IntraMuscular once PRN Glucose LESS THAN 70 milligrams/deciliter Patient is a 50y old  Male who presents with a chief complaint of ams/ facial droop (03 Sep 2020 10:51)      HPI:  50 year old male PMH of cabg , CIDP follows Dr. Mitchell has been getting IVIG q3 weeks at home, depression, anxiety, Diabetes, BPH, presents to ED for facial droop accompanied by wife.  Pt. is A0x1 baseline A0x3 has HHA 12 hours daily, coherent and can feed himself, and with home PT can sit up in bed has been unable to walk since  from CIDP. Per wife at bedside Pt. has been acting different past five days, with low speech, barely talking and recently a right facial droop x 1 day. No focal muscle or sensory changes from baseline. Pt. able to follow commands and responds softly to simple questions but unaware of where he is or his name.  Wife reports gabapentin started a few months ago which causes  to be more sleepy, no other new meds started or stopped.   Per wife no fever/ chills/ headache, has chronic constipation but no acute changes. Denies chest pain, sob.  Pt. had CT head in ED which showed acute/subacute infarct in the left basal ganglia/ periventricular white matter with NIHSS 10 on admission. Pt admitted to stroke unit fir futher work up. (31 Aug 2020 17:40), seen by neuro, called for MICU for worsening MS, ? seizure, evolving, stroke, for frequent Neuro checks      PAST MEDICAL & SURGICAL HISTORY:  GERD (gastroesophageal reflux disease)  BPH (benign prostatic hyperplasia)  Myocardial infarct, old  ASHD (arteriosclerotic heart disease)  Depression  Anxiety  Diabetes  CIDP (chronic inflammatory demyelinating polyneuropathy)  History of cholecystectomy  History of total left hip replacement  History of total right hip replacement: bilateral  S/P CABG x 5      SOCIAL HX:   nonsmoker, no etoh    FAMILY HISTORY:  :  No known cardiovascular family history    ROS:  See HPI     Allergies    penicillins (Unknown)  strawberry (Hives)    Intolerances          PHYSICAL EXAM    ICU Vital Signs Last 24 Hrs  T(C): 38.2 (03 Sep 2020 11:10), Max: 38.4 (02 Sep 2020 21:20)  T(F): 100.8 (03 Sep 2020 11:10), Max: 101.1 (02 Sep 2020 21:20)  HR: 106 (03 Sep 2020 11:10) (96 - 120)  BP: 171/95 (03 Sep 2020 11:10) (171/95 - 218/106)  RR: 18 (03 Sep 2020 11:10) (18 - 20)  SpO2: 98% (03 Sep 2020 11:10) (94% - 98%)      General: Ill looking not following commands  HEENT:  PASHA  +NGT            Lymphatic system: No cervical LN   Lungs: CTA bilaterally, no crackles.  Cardiovascular: Regular, tachycardia  Gastrointestinal: Soft, Positive BS  Musculoskeletal: exam limited due to mental status. +muscle wasting in bilateral LE  Skin: Warm.  Intact  Neurological: responds minimally to pain. does not respond to verbal stimuli.  neck stiffness      20 @ 07:01  -  20 @ 07:00  --------------------------------------------------------  IN:    IV PiggyBack: 600 mL    sodium chloride 0.9%: 1150 mL  Total IN: 1750 mL    OUT:    Indwelling Catheter - Urethral: 750 mL  Total OUT: 750 mL    Total NET: 1000 mL          LABS:                          15.0   18.69 )-----------( 354      ( 03 Sep 2020 05:10 )             47.4                                               09-03    140  |  99  |  22<H>  ----------------------------<  183<H>  3.7   |  24  |  1.1    Ca    8.9      03 Sep 2020 05:10  Mg     1.8         TPro  7.5  /  Alb  4.2  /  TBili  1.2  /  DBili  x   /  AST  19  /  ALT  11  /  AlkPhos  99  09-03                                             Urinalysis Basic - ( 02 Sep 2020 10:21 )    Color: Yellow / Appearance: Turbid / S.038 / pH: x  Gluc: x / Ketone: Large  / Bili: Small / Urobili: 6 mg/dL   Blood: x / Protein: 300 mg/dL / Nitrite: Negative   Leuk Esterase: Moderate / RBC: 0 /HPF / WBC 33 /HPF   Sq Epi: x / Non Sq Epi: 7 /HPF / Bacteria: Few                                                  LIVER FUNCTIONS - ( 03 Sep 2020 05:10 )  Alb: 4.2 g/dL / Pro: 7.5 g/dL / ALK PHOS: 99 U/L / ALT: 11 U/L / AST: 19 U/L / GGT: x                                                  Culture - Urine (collected 31 Aug 2020 14:47)  Source: .Urine Clean Catch (Midstream)  Final Report (01 Sep 2020 18:01):    No growth    Culture - Blood (collected 31 Aug 2020 14:15)  Source: .Blood Blood  Preliminary Report (01 Sep 2020 23:01):    No growth to date.    Culture - Blood (collected 31 Aug 2020 14:15)  Source: .Blood Blood  Preliminary Report (01 Sep 2020 23:01):    No growth to date.                                                                                       ABG - ( 03 Sep 2020 10:10 )  pH, Arterial: 7.50  pH, Blood: x     /  pCO2: 37    /  pO2: 73    / HCO3: 29    / Base Excess: 5.6   /  SaO2: 96                  X-Rays    reviewed    MEDICATIONS  (STANDING):  acyclovir IVPB      acyclovir IVPB 1000 milliGRAM(s) IV Intermittent every 8 hours  aspirin  chewable 81 milliGRAM(s) Oral daily  atorvastatin 80 milliGRAM(s) Oral at bedtime  baclofen 10 milliGRAM(s) Oral two times a day  carvedilol 3.125 milliGRAM(s) Oral every 12 hours  chlorhexidine 4% Liquid 1 Application(s) Topical <User Schedule>  dextrose 50% Injectable 12.5 Gram(s) IV Push once  dextrose 50% Injectable 25 Gram(s) IV Push once  dextrose 50% Injectable 25 Gram(s) IV Push once  DULoxetine 60 milliGRAM(s) Oral two times a day  enoxaparin Injectable 40 milliGRAM(s) SubCutaneous daily  hydrOXYzine  Oral Tab/Cap - Peds 50 milliGRAM(s) Oral every 6 hours  insulin glargine Injectable (LANTUS) 30 Unit(s) SubCutaneous at bedtime  insulin lispro (HumaLOG) corrective regimen sliding scale   SubCutaneous three times a day before meals  insulin lispro Injectable (HumaLOG) 9 Unit(s) SubCutaneous three times a day before meals  levETIRAcetam  IVPB 500 milliGRAM(s) IV Intermittent every 12 hours  meropenem  IVPB 2000 milliGRAM(s) IV Intermittent every 8 hours  pantoprazole    Tablet 40 milliGRAM(s) Oral before breakfast  pramipexole 0.5 milliGRAM(s) Oral every 12 hours  senna 2 Tablet(s) Oral at bedtime  sodium chloride 0.9%. 1000 milliLiter(s) (100 mL/Hr) IV Continuous <Continuous>  tamsulosin 0.4 milliGRAM(s) Oral at bedtime  vancomycin  IVPB      vancomycin  IVPB 1000 milliGRAM(s) IV Intermittent every 12 hours    MEDICATIONS  (PRN):  acetaminophen  Suppository .. 650 milliGRAM(s) Rectal every 6 hours PRN Temp greater or equal to 38C (100.4F)  ALPRAZolam 1 milliGRAM(s) Oral every 6 hours PRN anxiety  bisacodyl 5 milliGRAM(s) Oral at bedtime PRN Constipation  dextrose 40% Gel 15 Gram(s) Oral once PRN Blood Glucose LESS THAN 70 milliGRAM(s)/deciliter  glucagon  Injectable 1 milliGRAM(s) IntraMuscular once PRN Glucose LESS THAN 70 milligrams/deciliter

## 2020-09-03 NOTE — CHART NOTE - NSCHARTNOTESELECT_GEN_ALL_CORE
Transfer Note What Is The Reason For Today's Visit?: History of Non-Melanoma Skin Cancer How Many Skin Cancers Have You Had?: more than one

## 2020-09-03 NOTE — SWALLOW BEDSIDE ASSESSMENT ADULT - SLP PERTINENT HISTORY OF CURRENT PROBLEM
P admitted w/ R facial droop, found to have acute/subacute L BG infarct. Rapid response 9/1 w/ AMS and vomiting. Repeat CTH revealed evolving L BG and CR infarct w/ greater midline shift 7mm increased to 9mm shift on repeat CTH. PMH: CIDP dx in 2011, wheelchair bound since 2013 , unable to bend knee, unable to stand s/p b/l hip replacement and removal of hardware. MRI (+) redemonstration of acute infarct involving the left basal ganglia/corona radiata. stable right midline shift. 9/3 AM pt w/ B/L LE clonus, generalized rhythmic tremors of B/L UE, responded to ativan. WBC increasing, BP increased, pt spiking fevers. Pt pending upgrade to ICU.

## 2020-09-03 NOTE — PROGRESS NOTE ADULT - SUBJECTIVE AND OBJECTIVE BOX
Neurology Follow up note      Name  NAPOLEON CAST    HPI:  50 year old male PMH of cabg 2011, CIDP follows Dr. Mitchell has been getting IVIG q3 weeks at home, depression, anxiety, Diabetes, BPH, presents to ED for facial droop accompanied by wife.  Pt. is A0x1 baseline A0x3 has HHA 12 hours daily, coherent and can feed himself, and with home PT can sit up in bed has been unable to walk since 2013 from CIDP. Per wife at bedside Pt. has been acting different past five days, with low speech, barely talking and recently a right facial droop x 1 day. No focal muscle or sensory changes from baseline. Pt. able to follow commands and responds softly to simple questions but unaware of where he is or his name.  Wife reports gabapentin started a few months ago which causes  to be more sleepy, no other new meds started or stopped.   Per wife no fever/ chills/ headache, has chronic constipation but no acute changes. Denies chest pain, sob.  Pt. had CT head in ED which showed acute/subacute infarct in the left basal ganglia/ periventricular white matter with NIHSS 10 on admission. Pt to be admitted to stroke unit fir futher work up. (31 Aug 2020 17:40)      Interval History - Pt continues to have fever Tmax 38.4 and very Hypertensive. Tylenol given as well as home meds with good results.          Vital Signs Last 24 Hrs  T(C): 36.9 (03 Sep 2020 01:08), Max: 38.4 (02 Sep 2020 21:20)  T(F): 98.4 (03 Sep 2020 01:08), Max: 101.1 (02 Sep 2020 21:20)  HR: 104 (03 Sep 2020 01:08) (94 - 120)  BP: 173/84 (03 Sep 2020 01:08) (173/84 - 218/106)  BP(mean): --  RR: 18 (03 Sep 2020 01:08) (18 - 20)  SpO2: 96% (03 Sep 2020 01:08) (94% - 96%)          Neurological Exam:   Mental status  lethargic requires tactile stimualtion Opens eyes  Non verbal  Lifts arms slightly off bed  LE' minimal movement    IH STROKE SCALE  Item	                                                        Score  1 a.	Level of Consciousness	               	2  1 b. LOC Questions	                                2  1 c.	LOC Commands	                               	1  2.	Best Gaze	                                        0  3.	Visual	                                                0  4.	Facial Palsy	                                        0  5 a.	Motor Arm - Left	                                3  5 b.	Motor Arm - Right	                        3  6 a.	Motor Leg - Left	                                4  6 b.	Motor Leg - Right	                                4  7.	Limb Ataxia	                                        0  8.	Sensory	                                                0  9.	Language	                                        3  10.	Dysarthria	                                        2  11.	Extinction and Inattention  	        0  ______________________________________  TOTAL	                                                        24              Medications  acetaminophen  Suppository .. 650 milliGRAM(s) Rectal every 6 hours PRN  acyclovir IVPB      acyclovir IVPB 1000 milliGRAM(s) IV Intermittent every 8 hours  ALPRAZolam 1 milliGRAM(s) Oral every 6 hours PRN  aspirin  chewable 81 milliGRAM(s) Oral daily  atorvastatin 80 milliGRAM(s) Oral at bedtime  baclofen 10 milliGRAM(s) Oral two times a day  bisacodyl 5 milliGRAM(s) Oral at bedtime PRN  carvedilol 3.125 milliGRAM(s) Oral every 12 hours  chlorhexidine 4% Liquid 1 Application(s) Topical <User Schedule>  dextrose 40% Gel 15 Gram(s) Oral once PRN  dextrose 50% Injectable 12.5 Gram(s) IV Push once  dextrose 50% Injectable 25 Gram(s) IV Push once  dextrose 50% Injectable 25 Gram(s) IV Push once  DULoxetine 60 milliGRAM(s) Oral two times a day  enoxaparin Injectable 40 milliGRAM(s) SubCutaneous daily  glucagon  Injectable 1 milliGRAM(s) IntraMuscular once PRN  hydrOXYzine  Oral Tab/Cap - Peds 50 milliGRAM(s) Oral every 6 hours  insulin glargine Injectable (LANTUS) 30 Unit(s) SubCutaneous at bedtime  insulin lispro (HumaLOG) corrective regimen sliding scale   SubCutaneous three times a day before meals  insulin lispro Injectable (HumaLOG) 9 Unit(s) SubCutaneous three times a day before meals  meropenem  IVPB 2000 milliGRAM(s) IV Intermittent every 8 hours  pantoprazole    Tablet 40 milliGRAM(s) Oral before breakfast  pramipexole 0.5 milliGRAM(s) Oral every 12 hours  senna 2 Tablet(s) Oral at bedtime  sodium chloride 0.9%. 1000 milliLiter(s) IV Continuous <Continuous>  tamsulosin 0.4 milliGRAM(s) Oral at bedtime  vancomycin  IVPB      vancomycin  IVPB 1000 milliGRAM(s) IV Intermittent every 12 hours        A 50 years old left handed man with past medical history of MI, CABG x 5, DM, arteriosclerotic heart disease, chronic inflammatory demyelinating polyneuropathy (follows Neurologist Dr. Mitchell), prior chronic right caudate nucleus infarction, who presents from nursing home for AMS x 3 days, vomiting, decreased PO intake, and right sided facial droop. In ED, T 99.6F, /90, HR 74, RR 18, SpO2 96% RA. CTH reports of acute/subacute left basal ganglia/periventricular white matter. RRT this morning for episode of lethargy and vomiting. BP at time 230/90, and no new focal neurological deficit per chart review. He received hydralazine 5 mg x 1 and CT head reports of evolving left BG/CR with mild mass effect, and rightward midline shift measuring 9 mm (previously 7 mm). Currently in stroke unit, continues to be lethargic and weak. MRI brain with acute left CR in correlation with CTH. MRA brain with mild  intracranial stenosis in left M1 region.    SUGGESTIONS:  - Routine neuro checks and tele monitoring   - C/w ASA 81 mg, but hold Plavix for LP  - Obtain rEEG  - Infectious work up including UA/blood cultures, CXR,       ContinueVanco, meropenem, and acyclovir        Aggressively treat fevers - Tylenol ATC

## 2020-09-03 NOTE — CDI QUERY NOTE - NSCDIOTHERTXTBX2_GEN_ALL_CORE_FT
9/3  Progress Note  reflect  mRS: baseline 4 cannot walk per HHA -> 5 Severe disability; bedridden, incontinent and requiring constant nursing care and attention    Stage 1 pressure ulcer sacral     Musculoskeletal: exam limited due to mental status. +muscle wasting in bilateral LE     Based on above clinical findings and your professional judgment  please indicate if additional DX is applicable  ?	 Functional Quadriplegia   ?	Other please specify  ?	 Clinically unable to determine 9/3  Progress Note  reflect  mRS: baseline 4 cannot walk per HHA -> 5 Severe disability; bedridden, incontinent and requiring constant nursing care and attention    Musculoskeletal: exam limited due to mental status. +muscle wasting in bilateral LE     Based on above clinical findings and your professional judgment  please indicate if additional DX is applicable  	 Functional Quadriplegia   	Other please specify  	 Clinically unable to determine

## 2020-09-03 NOTE — PROGRESS NOTE ADULT - ASSESSMENT
A 50 years old left handed man with past medical history of MI, CABG x 5, DM, arteriosclerotic heart disease, chronic inflammatory demyelinating polyneuropathy (follows Neurologist Dr. Mitchell), prior chronic right caudate nucleus infarction, who presents from nursing home for AMS x 3 days, vomiting, decreased PO intake, and right sided facial droop. In ED, T 99.6F, /90, HR 74, RR 18, SpO2 96% RA. CTH reports of acute/subacute left basal ganglia/periventricular white matter. S/p RRT on 9/1 for episode of lethargy and vomiting. BP at time 230/90, and STAT CT head reports of evolving left BG/CR with mild mass effect, and rightward midline shift measuring 9 mm (previously 7 mm). MRI brain with acute left CR in correlation with CTH. MRA brain with mild  intracranial stenosis in left M1 region. Currently, etiology of ischemic infarction unclear, considering his infectious and encephalopathic state underlying meningitis cannot be excluded. Of note, patient with tremors this morning, responded to Ativan 2 mg x 1 IVP.     SUGGESTIONS:  - Recommend frequent neuro checks and upgrade to ICU   - STAT VEEG hook for tremors 9/3rd this morning   - Start Keppra 500 mg BID  - C/w ASA 81 mg mg for now, holding Plavix 75 mg for pending IR guided LP   - Agree to c/w meningitis dosing acyclovir, meropenam, vanco. Monitor Scr with acyclovir   - Repeat STAT ABG/VBG  - C/w high dose statin therapy   - Maintain glycemic control with POCT goal < 100   - Obtain ECHO  - Maintain HOB elevated at least 40 degrees  - Maintain Na level in the upper limit of normal 140-145 and replete intravascular volume  - STRICT I&O monitoring, may consider increasing IVF NS   - PT/OT/SLP/Physiatry evaluation and management   - AVOID infection in patients with ischemic stroke for it may increase mortality/morbidity rate  - Maximize medical management including toxic encephalopathy and optimized risk factors/lifestyle modification    Updated plan with primary team  Case discussed and patient seen with attending physician  Ju Martin, LOTUS  [id number] A 50 years old left handed man with past medical history of MI, CABG x 5, DM, arteriosclerotic heart disease, chronic inflammatory demyelinating polyneuropathy (follows Neurologist Dr. Mitchell), prior chronic right caudate nucleus infarction, who presents from nursing home for AMS x 3 days, vomiting, decreased PO intake, and right sided facial droop. In ED, T 99.6F, /90, HR 74, RR 18, SpO2 96% RA. CTH reports of acute/subacute left basal ganglia/periventricular white matter. S/p RRT on 9/1 for episode of lethargy and vomiting. BP at time 230/90, and STAT CT head reports of evolving left BG/CR with mild mass effect, and rightward midline shift measuring 9 mm (previously 7 mm). MRI brain with acute left CR in correlation with CTH. MRA brain with mild  intracranial stenosis in left M1 region. Currently, etiology of ischemic infarction unclear, considering his infectious and encephalopathic state underlying meningitis/encephalitis cannot be excluded. Of note, patient with tremors this morning, responded to Ativan 2 mg x 1 IVP.     SUGGESTIONS:  - Recommend frequent neuro checks and upgrade to ICU   - STAT VEEG hook for tremors 9/3rd this morning   - Start Keppra 500 mg BID  - C/w ASA 81 mg mg for now, holding Plavix 75 mg for pending IR guided LP   - Spinal Tap: Obtain opening pressure/routine studies: CSF Cell count, CSF protein, CSF glucose, CSF gram stain and culture. CSF VDRL, CSF cryptococcal antigen, encephalitis panel, CSF HSV, CSF/PCR Meningitis Panel including (EColi, haemophilus influenzae, Listeria moncytogenes, Neisseria meningitidis, Streptococcus agalactiae, Cytomegalovirus, Enterovirus.   - Agree to c/w meningitis dosing acyclovir, meropenam, vanco. Monitor Scr with acyclovir   - Repeat STAT ABG/VBG  - C/w high dose statin therapy   - Maintain glycemic control with POCT goal < 100   - Obtain ECHO  - Maintain HOB elevated at least 40 degrees  - Maintain Na level in the upper limit of normal 140-145 and replete intravascular volume  - STRICT I&O monitoring, may consider increasing IVF NS   - PT/OT/SLP/Physiatry evaluation and management   - AVOID infection in patients with ischemic stroke for it may increase mortality/morbidity rate  - Maximize medical management including toxic encephalopathy and optimized risk factors/lifestyle modification    Updated plan with primary team  Case discussed and patient seen with attending physician  Ju Martin NP  [phone] A 50 years old left handed man with past medical history of MI, CABG x 5, DM, arteriosclerotic heart disease, chronic inflammatory demyelinating polyneuropathy (follows Neurologist Dr. Mitchell), prior chronic right caudate nucleus infarction, who presents from nursing home for AMS x 3 days, vomiting, decreased PO intake, and right sided facial droop. In ED, T 99.6F, /90, HR 74, RR 18, SpO2 96% RA. CTH reports of acute/subacute left basal ganglia/periventricular white matter. S/p RRT on 9/1 for episode of lethargy and vomiting. BP at time 230/90, and STAT CT head reports of evolving left BG/CR with mild mass effect, and rightward midline shift measuring 9 mm (previously 7 mm), no mention of compression. MRI brain with acute left CR in correlation with CTH. MRA brain with mild  intracranial stenosis in left M1 region. Currently, etiology of ischemic infarction unclear, considering his infectious and encephalopathic state underlying meningitis/encephalitis cannot be excluded. Of note, patient with tremors this morning, responded to Ativan 2 mg x 1 IVP.     SUGGESTIONS:  - Recommend frequent neuro checks and upgrade to ICU   - STAT VEEG hook for tremors 9/3rd this morning   - Start Keppra 500 mg BID  - C/w ASA 81 mg mg for now, holding Plavix 75 mg for pending IR guided LP   - Spinal Tap: Obtain opening pressure/routine studies: CSF Cell count, CSF protein, CSF glucose, CSF gram stain and culture. CSF VDRL, CSF cryptococcal antigen, encephalitis panel, CSF HSV, CSF/PCR Meningitis Panel including (EColi, haemophilus influenzae, Listeria moncytogenes, Neisseria meningitidis, Streptococcus agalactiae, Cytomegalovirus, Enterovirus.   - Agree to c/w meningitis dosing acyclovir, meropenam, vanco. Monitor Scr with acyclovir   - Repeat STAT ABG/VBG  - C/w high dose statin therapy   - Maintain glycemic control with POCT goal < 100   - Obtain ECHO  - Maintain HOB elevated at least 40 degrees  - Maintain Na level in the upper limit of normal 140-145 and replete intravascular volume  - STRICT I&O monitoring, may consider increasing IVF NS   - PT/OT/SLP/Physiatry evaluation and management   - AVOID infection in patients with ischemic stroke for it may increase mortality/morbidity rate  - Maximize medical management including toxic encephalopathy and optimized risk factors/lifestyle modification    Updated plan with primary team  Case discussed and patient seen with attending physician  Ju Martin NP  [phone]    ADDENDUM 9/4: CDI Query suspects cerebral edema:  Based on CTH and Brain MRI no fluid collection, or hydrocephalus, and neurological examination without brainstem compression signs. No cerebral compression at this time.

## 2020-09-03 NOTE — PROGRESS NOTE ADULT - SUBJECTIVE AND OBJECTIVE BOX
Stroke Progress Note:    1. Chief Complaint: AMS    HPI: This is a 50 years old left handed man with past medical history of MI, CABG x 5, DM, arteriosclerotic heart disease, chronic inflammatory demyelinating polyneuropathy (follows Neurologist Dr. Mitchell), prior chronic right caudate nucleus infarction, who presents from nursing home for AMS x 3 days, vomiting, decreased PO intake, and right sided facial droop. In ED, T 99.6F, /90, HR 74, RR 18, SpO2 96% RA. CTH reports of acute/subacute left basal ganglia/periventricular white matter.     INTERVAL HISTORY:  9/1: Pt seen in ED Main. He had RRT this morning for episode of lethargy and vomiting. BP at time 230/90, and no new focal neurological deficit per chart review. He received hydralazine 5 mg x 1 and CT head reports of evolving left BG/CR with mild mass effect, and rightward midline shift measuring 9 mm (previously 7 mm).   9/3: Pt seen in 3E Stroke Unit. Overnight, pt with temp spike 38.4C and hypertensive, responded to tylenol. This morning at 9:12am, during Neuro exam patient found to have b/l LE clonus then developed generalized rhythmic tremors of b/l UE. Temp 98.7F per primary RN. Notified EEG tech for VEEG hook up. Patient was given STAT 2mg of Ativan and responded. Patient also to start Keppra 500 mg BID for now. , /84 this morning. WBC continues to trend up 18.69. Pending IR-guided LP as well; unsuccessful attempt at bedside yesterday.     2. Relevant PMH:   Prior ischemic stroke/TIA[x ], Afib [ ], CAD [ ], HTN [ ], DLD [ ], DM [x ], PVD [ ], Obesity [ ],   Sedentary lifestyle [ ], CHF [ ], IVORY [ ], Cancer Hx [ ].    3. Social History: Smoking [ ], Drug Use [ ], Alcohol Use [ ], Other [ ]    4. Possible Location of Stroke: left basal ganglia/corona radiata    5. Relevant Brain Tissue Imaging:  < from: CT Head No Cont (09.02.20 @ 05:41) >  IMPRESSION:  Evolving acute/subacute infarct in the left basal ganglia, with mild mass effect. Stable mild midline shift to the right.  No evidence of acute intracranial hemorrhage, extra-axial fluid collection, or hydrocephalus.    < from: CT Head No Cont (09.01.20 @ 09:10) >  IMPRESSION:  Re-demonstration of evolving acute/subacute infarcts involving the left basal ganglia/corona radiata with mild mass effect. Slightly increased rightward midline shift measuring 9 mm (previously measured 7 mm).  No evidence of intracranial hemorrhage, hydrocephalus, or extra-axial fluid collection..    < from: CT Head No Cont (08.31.20 @ 14:33) >  IMPRESSION:  In comparison with the prior noncontrast CT scan of the brain dated September 3, 2019:  Interval development of an acute/subacute infarct in the left basal ganglia/periventricular white matter.    6. Relevant Cerebrovascular Imaging:   < from: MR Angio Neck w/ IV Cont (09.01.20 @ 19:33) >  Findings:  BRAIN MRI:  There is a region of restricted diffusion involving the left basal ganglia and corona radiata associated with mild mass effect consistent with an acute infarct. Stable mild left to right midline shift measuring 6 mm.  Chronic right caudate lacunar infarction.  The ventricles and cortical sulci are enlarged compatible mild diffuse parenchymal volume loss.  There is no evidence of acute intracranial hemorrhage or extra-axial fluid collection.  The orbits and mastoid complexes are unremarkable. There is mucosal thickening of the right maxillary sinus.  The visualized osseous structures and soft tissues are unremarkable.    MRA BRAIN:  The distal segments of the internal carotid arteries are patent bilaterally.  There is mild short segmental stenosis involving the distal M1 of the left middle cerebral artery, as well as the left anterior temporal artery. There is mild narrowing of the left A1 segments of the anterior cerebral artery.  The right anterior and middle cerebral arteries are patent cerebral arteries are patent.  The right vertebral artery terminates as right posterior inferior cerebellar artery. The left vertebral artery is patent and forms the basilar artery. The posterior cerebral arteries are patent.    MRA NECK:  The visualized portions of the great vessels are patent. The common, internal and external carotid arteries are patent bilaterally. The vertebral arteries are patent throughout their cervical course.    IMPRESSION:  MRI BRAIN:  Redemonstration of acute infarct involving the left basal ganglia/corona radiata.  Stable mild midline shift to the right.    MRA BRAIN:  Mild short segmental stenosis involving the distal left M1, and the proximal left anterior temporal artery.  Mildly narrowed left A1.  No evidence of large vessel occlusion, vascular malformation, or aneurysm.    MRA NECK: No evidence of carotid or vertebral artery stenosis.    7. Relevant blood tests:      Lactate, Blood (09.03.20 @ 05:10)    Lactate, Blood: 2.1: Elevated lactate. Consider ordering follow-up lactate to trend. mmol/L    Blood Gas Arterial, Lactate (09.03.20 @ 10:10)    Blood Gas Arterial, Lactate: 1.2: Patient is on RA. mmoL/L                        15.0   18.69 )-----------( 354      ( 03 Sep 2020 05:10 )             47.4       09-03    140  |  99  |  22<H>  ----------------------------<  183<H>  3.7   |  24  |  1.1    Ca    8.9      03 Sep 2020 05:10  Mg     1.8     09-03    TPro  7.5  /  Alb  4.2  /  TBili  1.2  /  DBili  x   /  AST  19  /  ALT  11  /  AlkPhos  99  09-03    Lipid Profile in AM (09.01.20 @ 05:26)    Total Cholesterol/HDL Ratio Measurement: 4.0 Ratio    Cholesterol, Serum: 113 mg/dL    Triglycerides, Serum: 105 mg/dL    HDL Cholesterol, Serum: 28: HDL Levels >/= 60 mg/dL are considered beneficial and a "negative" risk  factor.  Effective 08/15/2018: New reference range and interpretive comment. mg/dL    Direct LDL: 64: LDL Cholesterol (mg/dL) --- Interpretive Comment (for adults 18 and over)    A1C with Estimated Average Glucose in AM (09.01.20 @ 05:26)    A1C with Estimated Average Glucose Result: 7.3: Method: Immunoassay    8. Relevant cardiac rhythm monitoring:  < from: 12 Lead ECG (08.31.20 @ 14:40) >  Ventricular Rate 79 BPM  Atrial Rate 79 BPM  P-R Interval 140 ms  QRS Duration 82 ms  Q-T Interval 376 ms  QTC Calculation(Bezet) 431 ms  P Axis 57 degrees  R Axis 37 degrees  T Axis 68 degrees  Diagnosis Line Normal sinus rhythm  Normal ECG    9. Relevant Cardiac Structure: (TTE/CURT +/-):[ ]No intracardiac thrombus/[ ] no vegetation/[ ]no [place]/EF      Home Medications:  acetaminophen 325 mg oral tablet: 2 tab(s) orally once (16 Jan 2020 08:55)  Admelog SoloStar 100 units/mL injectable solution: 9 unit(s) injectable 3 times a day (16 Jan 2020 08:55)  ALPRAZolam 1 mg oral tablet: 1 tab(s) orally every 6 hours, As needed, anxiety (16 Jan 2020 08:55)  ammonium lactate 12% topical lotion: Apply topically to affected area 2 times a day (16 Jan 2020 08:55)  aspirin 81 mg oral tablet, chewable: 1 tab(s) orally once a day (16 Jan 2020 08:55)  atorvastatin 80 mg oral tablet: 1 tab(s) orally once a day (at bedtime) (16 Jan 2020 08:55)  baclofen 10 mg oral tablet: 1 tab(s) orally 2 times a day (16 Jan 2020 08:55)  bisacodyl 5 mg oral delayed release tablet: 1 tab(s) orally once a day (at bedtime), As Needed (16 Jan 2020 08:55)  busPIRone 5 mg oral tablet: 1 tab(s) orally 2 times a day (16 Jan 2020 08:55)  carvedilol 3.125 mg oral tablet: 1 tab(s) orally every 12 hours (16 Jan 2020 08:55)  clopidogrel 75 mg oral tablet: 1 tab(s) orally once a day (16 Jan 2020 08:55)  diclofenac sodium 75 mg oral delayed release tablet: 1 tab(s) orally 2 times a day, As needed, Moderate Pain (4 - 6) (16 Jan 2020 08:55)  DULoxetine 30 mg oral delayed release capsule: 2 cap(s) orally 2 times a day (16 Jan 2020 08:55)  enoxaparin: 40 milligram(s) subcutaneous once a day (16 Jan 2020 08:55)  Enulose 10 g/15 mL oral and rectal liquid: 30 milliliter(s) oral and rectal 3 times a day (16 Jan 2020 08:55)  Flomax 0.4 mg oral capsule: 1 cap(s) orally once a day (16 Jan 2020 08:55)  gabapentin 800 mg oral tablet: 1 tab(s) orally 3 times a day (16 Jan 2020 08:55)  hydrOXYzine hydrochloride 50 mg oral tablet: 1 tab(s) orally every 6 hours, As Needed (16 Jan 2020 08:55)  insulin glargine: 30 unit(s) subcutaneously once a day (at bedtime) (16 Jan 2020 08:55)  melatonin 10 mg oral capsule: 1 cap(s) orally once a day (at bedtime) (16 Jan 2020 08:55)  morphine 60 mg/12 hours oral tablet, extended release: 1 tab(s) orally 2 times a day (16 Jan 2020 08:55)  Morphine IR 15 mg oral tablet: 1 tab(s) orally every 4 hours, as needed. (16 Jan 2020 08:55)  Multiple Vitamins oral tablet: 1 tab(s) orally once a day (16 Jan 2020 08:55)  nystatin 100,000 units/g topical cream:  topically twice daily to affected area (16 Jan 2020 08:55)  pantoprazole 40 mg oral delayed release tablet: 1 tab(s) orally once a day (before a meal) (16 Jan 2020 08:55)  pramipexole 0.5 mg oral tablet: 1 tab(s) orally every 12 hours (16 Jan 2020 08:55)  senna oral tablet: 2 tab(s) orally once a day (at bedtime) (16 Jan 2020 08:55)  traZODone 50 mg oral tablet: 1 tab(s) orally once a day (at bedtime) (16 Jan 2020 08:55)  Vitamin B-100 oral tablet: 1 tab(s) orally once a day (16 Jan 2020 08:55)  Vitamin D2 50,000 intl units (1.25 mg) oral capsule: 1 cap(s) orally once a week on Mondays (16 Jan 2020 08:55)    MEDICATIONS  (STANDING):  acyclovir IVPB      acyclovir IVPB 1000 milliGRAM(s) IV Intermittent every 8 hours  aspirin  chewable 81 milliGRAM(s) Oral daily  atorvastatin 80 milliGRAM(s) Oral at bedtime  baclofen 10 milliGRAM(s) Oral two times a day  carvedilol 3.125 milliGRAM(s) Oral every 12 hours  chlorhexidine 4% Liquid 1 Application(s) Topical <User Schedule>  dextrose 50% Injectable 12.5 Gram(s) IV Push once  dextrose 50% Injectable 25 Gram(s) IV Push once  dextrose 50% Injectable 25 Gram(s) IV Push once  DULoxetine 60 milliGRAM(s) Oral two times a day  enoxaparin Injectable 40 milliGRAM(s) SubCutaneous daily  hydrOXYzine  Oral Tab/Cap - Peds 50 milliGRAM(s) Oral every 6 hours  insulin glargine Injectable (LANTUS) 30 Unit(s) SubCutaneous at bedtime  insulin lispro (HumaLOG) corrective regimen sliding scale   SubCutaneous three times a day before meals  insulin lispro Injectable (HumaLOG) 9 Unit(s) SubCutaneous three times a day before meals  levETIRAcetam  IVPB 500 milliGRAM(s) IV Intermittent every 12 hours  LORazepam   Injectable 2 milliGRAM(s) IV Push once  meropenem  IVPB 2000 milliGRAM(s) IV Intermittent every 8 hours  pantoprazole    Tablet 40 milliGRAM(s) Oral before breakfast  pramipexole 0.5 milliGRAM(s) Oral every 12 hours  senna 2 Tablet(s) Oral at bedtime  sodium chloride 0.9%. 1000 milliLiter(s) (100 mL/Hr) IV Continuous <Continuous>  tamsulosin 0.4 milliGRAM(s) Oral at bedtime  vancomycin  IVPB      vancomycin  IVPB 1000 milliGRAM(s) IV Intermittent every 12 hours    10. PT/OT/Speech/Rehab/S&Sw/ Cognitive eval results and recommendations:    11. Exam:    Vital Signs Last 24 Hrs  T(C): 37.1 (09-03-20 @ 07:01), Max: 38.4 (09-02-20 @ 21:20)  T(F): 98.7 (09-03-20 @ 07:01), Max: 101.1 (09-02-20 @ 21:20)  HR: 104 (09-03-20 @ 05:17) (96 - 120)  BP: 188/84 (09-03-20 @ 05:17) (173/84 - 218/106)  BP(mean): --  RR: 18 (09-03-20 @ 05:17) (18 - 20)  SpO2: 96% (09-03-20 @ 01:08) (94% - 96%)    12. Neurologic Exam:  Mental status: Lethargic, eyes open to sternal rub  Language: Not following commands. Non verbal  Cranial nerves: B/l pupils equally round and reactive to light 5->4 mm, no blink to threat on right, no nystagmus, extraocular muscles intact, V1 through V3 intact bilaterally and symmetric, right facial asymmetry.  Motor: Able to maintain b/l UE against gravity but drifts. Increased tone of b/l LE with foot drop and + b/l LE clonus. B/l LE no spontaneous movement. B/l UE rhythmic tremors  Sensation: Intact to light touch, proprioception, and pinprick.  Neglects to both LE to noxious stimuli.    Coordination: Deferred  Reflexes: Mute Babinski  Gait: Deferred    NIH STROKE SCALE  Item	                                                        Score  1 a.	Level of Consciousness	               	2  1 b. LOC Questions	                                    2  1 c.	LOC Commands	                               	2  2.	Best Gaze	                                    1  3.	Visual	                                                1  4.	Facial Palsy	                                    1  5 a.	Motor Arm - Left	                        1  5 b.	Motor Arm - Right	                        1  6 a.	Motor Leg - Left	                        4   6 b.	Motor Leg - Right	                        4   7.	Limb Ataxia	                                    0  8.	Sensory	                                                2  9.	Language	                                    3  10.	Dysarthria	                                    2  11.	Extinction and Inattention  	            1  _______________________________________________________________________  TOTAL	                                                           12    NIHSS INITIAL:     10            NIHSS Yesterday:  24         NIHSS Today: 27    mRS: baseline 4 cannot walk per HHA -> 5 Severe disability; bedridden, incontinent and requiring constant nursing care and attention

## 2020-09-04 NOTE — DIETITIAN INITIAL EVALUATION ADULT. - FACTORS AFF FOOD INTAKE
Nutrition hx obtained via pt wife at bedside d/t pt mental status. She reports that pt typically has a good appetite pta and eats very well. Pt hasn't eaten a solid meal since pta at this time. Pt wife states that  pt has a strawberry allergy (noted per chart) and also to pepper (seasoning). No chewing/swallowing issues at baseline-- aside from the face that pt wears dentures. No vitamins/supplements pta. No food preferences r/t culture/Sikh. Pt has chronic issues with constipation and takes a weekly laxative pta ("movantic"). Last BM 9/1 per EMR + pt is currently on a bowel regimen. Pt wife denies any recent wt loss, and states that his DM medications have caused wt gain. She states that pt wt ranges from 200-220 lbs. No physical signs of muscle wasting/fat loss present upon RD observation. Pt followed by SLP this admission- they assessed pt today (9/4) "pt not appropriate for PO trials on this date 2' poor mental status and decreased secretion management." and recommended NPO with alternate means of nutrition/hydration. Per RN, pt has NGT in place for meds at this time. Discussed SLP recs with pt wife and the possible need for EN for this reason- she verbalizes understanding and has no further questions at this time./other (specify)/change in mental status

## 2020-09-04 NOTE — PROGRESS NOTE ADULT - SUBJECTIVE AND OBJECTIVE BOX
NAPOLEON CAST  50y, Male    All available historical data reviewed    OVERNIGHT EVENTS:  fevers  no pressors  no diarrhea  does try to respond    ROS:  unable to obtain history secondary to patient's mental status and/or sedation     VITALS:  T(F): 99.6, Max: 101.7 (09-03-20 @ 18:00)  HR: 96  BP: 186/92  RR: 16Vital Signs Last 24 Hrs  T(C): 37.6 (04 Sep 2020 08:00), Max: 38.7 (03 Sep 2020 18:00)  T(F): 99.6 (04 Sep 2020 08:00), Max: 101.7 (03 Sep 2020 18:00)  HR: 96 (04 Sep 2020 10:00) (92 - 132)  BP: 186/92 (04 Sep 2020 10:00) (136/77 - 194/83)  BP(mean): 124 (04 Sep 2020 10:00) (77 - 130)  RR: 16 (04 Sep 2020 10:00) (7 - 35)  SpO2: 97% (04 Sep 2020 10:00) (95% - 98%)    TESTS & MEASUREMENTS:                        14.5   13.34 )-----------( 269      ( 04 Sep 2020 04:32 )             46.0     09-04    140  |  101  |  20  ----------------------------<  170<H>  3.2<L>   |  22  |  1.0    Ca    8.3<L>      04 Sep 2020 04:32  Mg     1.6     09-04    TPro  7.1  /  Alb  3.8  /  TBili  1.2  /  DBili  x   /  AST  22  /  ALT  11  /  AlkPhos  88  09-04    LIVER FUNCTIONS - ( 04 Sep 2020 04:32 )  Alb: 3.8 g/dL / Pro: 7.1 g/dL / ALK PHOS: 88 U/L / ALT: 11 U/L / AST: 22 U/L / GGT: x             Culture - Blood (collected 09-02-20 @ 11:47)  Source: .Blood None  Preliminary Report (09-04-20 @ 01:01):    No growth to date.    Culture - Urine (collected 09-02-20 @ 10:21)  Source: .Urine Catheterized  Final Report (09-03-20 @ 23:09):    No growth    Culture - Urine (collected 08-31-20 @ 14:47)  Source: .Urine Clean Catch (Midstream)  Final Report (09-01-20 @ 18:01):    No growth    Culture - Blood (collected 08-31-20 @ 14:15)  Source: .Blood Blood  Preliminary Report (09-01-20 @ 23:01):    No growth to date.    Culture - Blood (collected 08-31-20 @ 14:15)  Source: .Blood Blood  Preliminary Report (09-01-20 @ 23:01):    No growth to date.            RADIOLOGY & ADDITIONAL TESTS:  Personal review of radiological diagnostics performed  Echo and EKG results noted when applicable.     MEDICATIONS:  acetaminophen   Tablet .. 650 milliGRAM(s) Oral every 6 hours PRN  acyclovir IVPB      acyclovir IVPB 1000 milliGRAM(s) IV Intermittent every 8 hours  aspirin  chewable 81 milliGRAM(s) Oral daily  atorvastatin 80 milliGRAM(s) Oral at bedtime  baclofen 10 milliGRAM(s) Oral two times a day  bisacodyl 5 milliGRAM(s) Oral at bedtime PRN  carvedilol 3.125 milliGRAM(s) Oral every 12 hours  cefTRIAXone   IVPB 2000 milliGRAM(s) IV Intermittent every 12 hours  chlorhexidine 4% Liquid 1 Application(s) Topical <User Schedule>  dextrose 40% Gel 15 Gram(s) Oral once PRN  dextrose 50% Injectable 12.5 Gram(s) IV Push once  dextrose 50% Injectable 25 Gram(s) IV Push once  dextrose 50% Injectable 25 Gram(s) IV Push once  DULoxetine 60 milliGRAM(s) Oral two times a day  glucagon  Injectable 1 milliGRAM(s) IntraMuscular once PRN  insulin glargine Injectable (LANTUS) 30 Unit(s) SubCutaneous at bedtime  insulin lispro (HumaLOG) corrective regimen sliding scale   SubCutaneous three times a day before meals  insulin lispro Injectable (HumaLOG) 9 Unit(s) SubCutaneous three times a day before meals  levETIRAcetam  IVPB 500 milliGRAM(s) IV Intermittent every 12 hours  pantoprazole   Suspension 40 milliGRAM(s) Oral daily  pramipexole 0.5 milliGRAM(s) Oral every 12 hours  senna 2 Tablet(s) Oral at bedtime  sodium chloride 0.9%. 1000 milliLiter(s) IV Continuous <Continuous>  tamsulosin 0.4 milliGRAM(s) Oral at bedtime      ANTIBIOTICS:  acyclovir IVPB      acyclovir IVPB 1000 milliGRAM(s) IV Intermittent every 8 hours  cefTRIAXone   IVPB 2000 milliGRAM(s) IV Intermittent every 12 hours

## 2020-09-04 NOTE — SWALLOW BEDSIDE ASSESSMENT ADULT - SWALLOW EVAL: FUNCTIONAL LEVEL AT TIME OF EVAL
lethargic, drooling
arousable, disoriented
asleep, unable to sustain arousal despite max verbal/tactile cues
asleep, unable to sustain arousal despite max verbal/tactile cues

## 2020-09-04 NOTE — DIETITIAN INITIAL EVALUATION ADULT. - ADD RECOMMEND
diet advancement per SLP recs, initiate the above EN regimen as medically feasible, replete electrolytes (K+ and Mg), maintain aspiration precautions diet advancement per SLP recs, initiate the above EN regimen as medically feasible, replete electrolytes (K+ and Mg), maintain aspiration precautions. RD awaited call back from covering resident [phone].

## 2020-09-04 NOTE — SWALLOW BEDSIDE ASSESSMENT ADULT - SWALLOW EVAL: ORAL MUSCULATURE
facial asymmetry
generalized weakness/anomalies present
unable to assess due to poor participation/comprehension
unable to assess due to poor participation/comprehension

## 2020-09-04 NOTE — DIETITIAN INITIAL EVALUATION ADULT. - ENTERAL
Initiate the following EN regimen when medically feasible: Glucerna 1.2 total volume 1440 mL/d. Bolus rate of 360 mL q6h. Initiate at half rate of 180 mL q6h for the first 24h and increase as tolerated to goal rate within 24-48h. At goal, this provides: 1728 kcal, 86 g protein and 1166 mL free H2O. Additional fluids per LIP

## 2020-09-04 NOTE — DIETITIAN INITIAL EVALUATION ADULT. - RD TO REMAIN AVAILABLE
INTERVENTION: EN, vitamin/mineral supplements, coordination of care. ME: RD to monitor diet order, energy intake, body composition, NFPF, glucose/electrolyte profiles/yes

## 2020-09-04 NOTE — EEG REPORT - NS EEG TEXT BOX
Epilepsy Attending Note:     NAPOLEON CAST    50y Male  MRN [id number]    Vital Signs Last 24 Hrs  T(C): 37.6 (04 Sep 2020 08:00), Max: 38.7 (03 Sep 2020 18:00)  T(F): 99.6 (04 Sep 2020 08:00), Max: 101.7 (03 Sep 2020 18:00)  HR: 110 (04 Sep 2020 11:00) (92 - 132)  BP: 158/101 (04 Sep 2020 11:00) (136/77 - 194/83)  BP(mean): 137 (04 Sep 2020 11:00) (77 - 137)  RR: 31 (04 Sep 2020 11:00) (7 - 35)  SpO2: 98% (04 Sep 2020 11:00) (95% - 98%)                          14.5   13.34 )-----------( 269      ( 04 Sep 2020 04:32 )             46.0       09-04    140  |  101  |  20  ----------------------------<  170<H>  3.2<L>   |  22  |  1.0    Ca    8.3<L>      04 Sep 2020 04:32  Mg     1.6     09-04    TPro  7.1  /  Alb  3.8  /  TBili  1.2  /  DBili  x   /  AST  22  /  ALT  11  /  AlkPhos  88  09-04      MEDICATIONS  (STANDING):  acyclovir IVPB      acyclovir IVPB 1000 milliGRAM(s) IV Intermittent every 8 hours  aspirin  chewable 81 milliGRAM(s) Oral daily  atorvastatin 80 milliGRAM(s) Oral at bedtime  baclofen 10 milliGRAM(s) Oral two times a day  carvedilol 3.125 milliGRAM(s) Oral every 12 hours  cefTRIAXone   IVPB 2000 milliGRAM(s) IV Intermittent every 12 hours  chlorhexidine 4% Liquid 1 Application(s) Topical <User Schedule>  dextrose 50% Injectable 12.5 Gram(s) IV Push once  dextrose 50% Injectable 25 Gram(s) IV Push once  dextrose 50% Injectable 25 Gram(s) IV Push once  DULoxetine 60 milliGRAM(s) Oral two times a day  insulin glargine Injectable (LANTUS) 30 Unit(s) SubCutaneous at bedtime  insulin lispro (HumaLOG) corrective regimen sliding scale   SubCutaneous three times a day before meals  insulin lispro Injectable (HumaLOG) 9 Unit(s) SubCutaneous three times a day before meals  levETIRAcetam  IVPB 500 milliGRAM(s) IV Intermittent every 12 hours  pantoprazole   Suspension 40 milliGRAM(s) Oral daily  pramipexole 0.5 milliGRAM(s) Oral every 12 hours  senna 2 Tablet(s) Oral at bedtime  sodium chloride 0.9%. 1000 milliLiter(s) (100 mL/Hr) IV Continuous <Continuous>  tamsulosin 0.4 milliGRAM(s) Oral at bedtime    MEDICATIONS  (PRN):  acetaminophen   Tablet .. 650 milliGRAM(s) Oral every 6 hours PRN Moderate Pain (4 - 6)  bisacodyl 5 milliGRAM(s) Oral at bedtime PRN Constipation  dextrose 40% Gel 15 Gram(s) Oral once PRN Blood Glucose LESS THAN 70 milliGRAM(s)/deciliter  glucagon  Injectable 1 milliGRAM(s) IntraMuscular once PRN Glucose LESS THAN 70 milligrams/deciliter            VEEG in the last 24 hours:    Background--------------------------7-8 hz    Focal and generalized slowing--------------  borderline to mild  left FT focal slowing                                                                  mild generalized slowing    Interictal activity--------------------------none    Events------------------------------ none    Seizures----------------------- none    Impression:-------------------- abnormal as above    Plan - discussed with the neuro-critical team

## 2020-09-04 NOTE — SWALLOW BEDSIDE ASSESSMENT ADULT - ASR SWALLOW LINGUAL MOBILITY
impaired anterior elevation/impaired left lateral movement/impaired right lateral movement/impaired protrusion
impaired protrusion/impaired right lateral movement/impaired left lateral movement

## 2020-09-04 NOTE — PROGRESS NOTE ADULT - SUBJECTIVE AND OBJECTIVE BOX
Neurocritical Care Progress Note:    1. Brief Presentation:    2. Today's Acute Problems:    3. Relevant brief History:    4-Yesterday's Plan:    5. Last 24 hour updates:    6. Medications:   acyclovir IVPB      acyclovir IVPB 1000 milliGRAM(s) IV Intermittent every 8 hours  aspirin  chewable 81 milliGRAM(s) Oral daily  atorvastatin 80 milliGRAM(s) Oral at bedtime  baclofen 10 milliGRAM(s) Oral two times a day  carvedilol 3.125 milliGRAM(s) Oral every 12 hours  cefTRIAXone   IVPB 2000 milliGRAM(s) IV Intermittent every 12 hours  chlorhexidine 4% Liquid 1 Application(s) Topical <User Schedule>  dextrose 50% Injectable 12.5 Gram(s) IV Push once  dextrose 50% Injectable 25 Gram(s) IV Push once  dextrose 50% Injectable 25 Gram(s) IV Push once  DULoxetine 60 milliGRAM(s) Oral two times a day  insulin glargine Injectable (LANTUS) 30 Unit(s) SubCutaneous at bedtime  insulin lispro (HumaLOG) corrective regimen sliding scale   SubCutaneous three times a day before meals  insulin lispro Injectable (HumaLOG) 9 Unit(s) SubCutaneous three times a day before meals  levETIRAcetam  IVPB 500 milliGRAM(s) IV Intermittent every 12 hours  pantoprazole   Suspension 40 milliGRAM(s) Oral daily  pramipexole 0.5 milliGRAM(s) Oral every 12 hours  senna 2 Tablet(s) Oral at bedtime  sodium chloride 0.9%. 1000 milliLiter(s) IV Continuous <Continuous>  tamsulosin 0.4 milliGRAM(s) Oral at bedtime      7. Ancillary Management:   Chest PT[ ]   Head of bed >35 [ ]   Out of bed to chair [ ]   PT/OT/SP Eval [ ]   Spirometry[ ]   DVT prophalaxis[ ]    8.Neuro:   Awake: Spontaneously[ ] Occasionally[ ] To Voice [ ] To painful stimuli [ ]   AIert [ ]. Following commands: 3 steps[ ], 2 steps[ ], 1 step [ ], None [ ]   Orientation: 0[ ], 1[ ], 2[ ], 3[ ]. Tracking objects with eyes: [ ]   Language:   Time off sedation for exam:   Pupils: Right   >   Left    >      Corneal:      Gag reflex:     EOMI:    NIH STROKE SCALE  Item	                                                        Score  1 a.	Level of Consciousness	               	0  1 b. LOC Questions	                                0  1 c.	LOC Commands	                               	0  2.	Best Gaze	                                        0  3.	Visual	                                                0  4.	Facial Palsy	                                        0  5 a.	Motor Arm - Left	                                0  5 b.	Motor Arm - Right	                        0  6 a.	Motor Leg - Left	                                0  6 b.	Motor Leg - Right	                                0  7.	Limb Ataxia	                                        0  8.	Sensory	                                                0  9.	Language	                                        0  10.	Dysarthria	                                        0  11.	Extinction and Inattention  	        0  ______________________________________  TOTAL	                                                        0      mRS:  0 No symptoms at all  1 No significant disability despite symptoms; able to carry out all usual duties and activities without assistance  2 Slight disability; unable to carry out all previous activities, but able to look after own affairs  3 Moderate disability; requiring some help, but able to walk without assistance  4 Moderately severe disability; unable to walk without assistance and unable to attend to own bodily needs without assistance  5 Severe disability; bedridden, incontinent and requiring constant nursing care and attention  6 Dead      Last CTH: < from: CT Head No Cont (20 @ 05:41) >  EXAM:  CT BRAIN            PROCEDURE DATE:  2020            INTERPRETATION:  CLINICAL HISTORY / REASON FOR EXAM: Altered mental status    COMPARISON: CT head without contrast from 2020    TECHNIQUE: Multiple axial CT images of the head were obtained with sagittal and coronal reformats from the base of the skull to the vertex without the administration of IV contrast.    FINDINGS:    Left basal ganglia focal hypodensity with associated mild mass effect with stable mild left-to-right midline shift measuring 6 mm. Chronic right caudate lacunar infarction.    The ventricles and cerebral sulci are age-appropriate.    There is no evidence of acute intracranial hemorrhage, extra-axial fluid collection, or space-occupying lesion.    There is no fracture to the calvarium. Mastoid air cells are well aerated bilaterally. Right maxillary sinus polyps versus mucus retention cyst.      IMPRESSION:    Evolving acute/subacute infarct in the left basal ganglia, with mild mass effect. Stable mild midline shift to the right.    No evidence of acute intracranial hemorrhage, extra-axial fluid collection, or hydrocephalus.      < end of copied text >      Last CTA/MRA:     Last CTP:    Last MRI:< from: MR Head No Cont (20 @ 19:33) >  EXAM:  MR ANGIO NECK IC        EXAM:  MR ANGIO BRAIN        EXAM:  MR BRAIN            PROCEDURE DATE:  2020            INTERPRETATION:  Clinical History / Reason for exam: Right-sided facial droop and altered mental status.    Technique: Contrast brain MRI, non-contrast brain MRA and contrast neck MRA was performed.    Through the brain, sagittal and axial T1, axial T2, FLAIR, diffusion weighted images and an ADC map were obtained. Axial T1 post-contrast images were obtained and reconstructed in sagittal and coronal planes. 9  cc of intravenous gadolinium was administered and 1 discarded,  for contrast MRI brain and contrast enhanced MR angiography of the extracranial circulation.    MR angiography of intracranial and extracranial circulation was performed with time of flight imaging technique. Maximal intensity projection images were reviewed in multiple planes.    Correlation is made with accompanying brain MRI and noncontrast CT head 2020.    Findings:    BRAIN MRI:    There is a region of restricted diffusion involving the left basal ganglia and corona radiata associated with mild mass effect consistent with an acute infarct. Stable mild left to right midline shift measuring 6 mm.    Chronic right caudate lacunar infarction.    The ventricles and cortical sulci are enlarged compatible mild diffuse parenchymal volume loss.    There is no evidence of acute intracranial hemorrhage or extra-axial fluid collection.    The orbits and mastoid complexes are unremarkable. There is mucosal thickening of the right maxillary sinus.    The visualized osseous structures and soft tissues are unremarkable.      MRA BRAIN:    The distal segments of the internal carotid arteries are patent bilaterally.    There is mild short segmental stenosis involving the distal M1 of the left middle cerebral artery, as well as the left anterior temporal artery. There is mild narrowing of the left A1 segments of the anterior cerebral artery.    The right anterior and middle cerebral arteries are patent cerebral arteries are patent.    The right vertebral artery terminates as right posterior inferior cerebellar artery. The left vertebral artery is patent and forms the basilar artery. The posterior cerebral arteries are patent.      MRA NECK:    The visualized portions of the great vessels are patent. The common, internal and external carotid arteries are patent bilaterally. The vertebral arteries are patent throughout their cervical course.      IMPRESSION:    MRI BRAIN:  Redemonstration of acute infarct involving the left basal ganglia/corona radiata.  Stable mild midline shift to the right.    MRA BRAIN:  Mild short segmental stenosis involving the distal left M1, and the proximal left anterior temporal artery.  Mildly narrowed left A1.  No evidence of large vessel occlusion, vascular malformation, or aneurysm.    MRA NECK:  No evidence of carotid or vertebral artery stenosis.          < end of copied text >      Last TCD:    Last EEG:    EVD: [ ] [place]: [ ]     ICP:     CPP:     Level(cm):     24hr(ml):     CSF:     W:     R:     C:     P:     G:     LA:    9. Cardiovascular:   Vital Signs Last 24 Hrs  T(C): 37.6 (04 Sep 2020 08:00), Max: 38.7 (03 Sep 2020 18:00)  T(F): 99.6 (04 Sep 2020 08:00), Max: 101.7 (03 Sep 2020 18:00)  HR: 100 (04 Sep 2020 09:00) (100 - 132)  BP: 194/83 (04 Sep 2020 09:00) (136/77 - 194/83)  BP(mean): 121 (04 Sep 2020 09:00) (77 - 130)  RR: 11 (04 Sep 2020 09:00) (7 - 35)  SpO2: 96% (04 Sep 2020 09:00) (95% - 98%)     Last Echo:    Last EKG:    CVP   MAP/CPP/SBP target:   CO:      CI:       Enzymes/Trop:    10. Respiratory:   ABG:ABG - ( 03 Sep 2020 10:10 )  pH, Arterial: 7.50  pH, Blood: x     /  pCO2: 37    /  pO2: 73    / HCO3: 29    / Base Excess: 5.6   /  SaO2: 96          VBG:    Chest Xray:        Peak Pressure/[place] Pressure:    11.GI:    Prophalaxis:     Bowel mvt:     Abd distension:   LIVER FUNCTIONS - ( 04 Sep 2020 04:32 )  Alb: 3.8 g/dL / Pro: 7.1 g/dL / ALK PHOS: 88 U/L / ALT: 11 U/L / AST: 22 U/L / GGT: x             12.Renal/Fluids/Electrolytes:        140  |  101  |  20  ----------------------------<  170<H>  3.2<L>   |  22  |  1.0    Ca    8.3<L>      04 Sep 2020 04:32  Mg     1.6     -    TPro  7.1  /  Alb  3.8  /  TBili  1.2  /  DBili  x   /  AST  22  /  ALT  11  /  AlkPhos  88  -04      I&O's Detail    03 Sep 2020 07:01  -  04 Sep 2020 07:00  --------------------------------------------------------  IN:    IV PiggyBack: 400 mL    sodium chloride 0.9%.: 1400 mL  Total IN: 1800 mL    OUT:    Indwelling Catheter - Urethral: 1250 mL  Total OUT: 1250 mL    Total NET: 550 mL      04 Sep 2020 07:01  -  04 Sep 2020 09:13  --------------------------------------------------------  IN:  Total IN: 0 mL    OUT:    Indwelling Catheter - Urethral: 125 mL  Total OUT: 125 mL    Total NET: -125 mL          13.ID:   TMax:   Vital Signs Last 24 Hrs  T(C): 37.6 (04 Sep 2020 08:00), Max: 38.7 (03 Sep 2020 18:00)  T(F): 99.6 (04 Sep 2020 08:00), Max: 101.7 (03 Sep 2020 18:00)  HR: 100 (04 Sep 2020 09:00) (100 - 132)  BP: 194/83 (04 Sep 2020 09:00) (136/77 - 194/83)  BP(mean): 121 (04 Sep 2020 09:00) (77 - 130)  RR: 11 (04 Sep 2020 09:00) (7 - 35)  SpO2: 96% (04 Sep 2020 09:00) (95% - 98%)   Lactate, Blood: 2.1 mmol/L ( @ 05:10)  Lactate, Blood: 1.4 mmol/L ( @ 21:04)  Lactate, Blood: 1.3 mmol/L ( @ 14:15)     Urinalysis Basic - ( 02 Sep 2020 10:21 )    Color: Yellow / Appearance: Turbid / S.038 / pH: x  Gluc: x / Ketone: Large  / Bili: Small / Urobili: 6 mg/dL   Blood: x / Protein: 300 mg/dL / Nitrite: Negative   Leuk Esterase: Moderate / RBC: 0 /HPF / WBC 33 /HPF   Sq Epi: x / Non Sq Epi: 7 /HPF / Bacteria: Few         Lines: Central[] Date inserted: Peripheral[]    14. Hematology:                         14.5   13.34 )-----------( 269      ( 04 Sep 2020 04:32 )             46.0      09-04    140  |  101  |  20  ----------------------------<  170<H>  3.2<L>   |  22  |  1.0    Ca    8.3<L>      04 Sep 2020 04:32  Mg     1.6     09-04    TPro  7.1  /  Alb  3.8  /  TBili  1.2  /  DBili  x   /  AST  22  /  ALT  11  /  AlkPhos  88  09-04     PT/INR - ( 04 Sep 2020 04:32 )   PT: 15.30 sec;   INR: 1.33 ratio         PTT - ( 04 Sep 2020 04:32 )  PTT:59.1 sec    DVT Prophylaxis Lovenox[ ] Heparin[ ] Venodynes[ ] SCD's[ ]    15. Impression:        16. Suggestions:        17. Disposition: Neurocritical Care Progress Note:    1. Brief Presentation: AMS, lethargy    2. Today's Acute Problems: lethargic, does not follow commands    3. Relevant brief History: This is a 50 years old left handed man with past medical history of MI, CABG x 5, DM, arteriosclerotic heart disease, chronic inflammatory demyelinating polyneuropathy (follows Neurologist Dr. Mitchell), prior chronic right caudate nucleus infarction, who presents from nursing home for AMS x 3 days, vomiting, decreased PO intake, and right sided facial droop. In ED, T 99.6F, /90, HR 74, RR 18, SpO2 96% RA. CTH reports of acute/subacute left basal ganglia/periventricular white matter.   Overnight, pt with temp spike 38.4C and hypertensive, responded to tylenol. This morning at 9:12am, during Neuro exam patient found to have b/l LE clonus then developed generalized rhythmic tremors of b/l UE. Temp 98.7F per primary RN. Notified EEG tech for VEEG hook up. Patient was given STAT 2mg of Ativan and responded. Patient also to start Keppra 500 mg BID for now. , /84 this morning. WBC continues to trend up 18.69. Pending IR-guided LP as well; unsuccessful attempt at bedside yesterday.     4-Yesterday's Plan:    5. Last 24 hour updates: patient was upgraded to ICU, still spiking fevers and remains lethargic    6. Medications:   acyclovir IVPB      acyclovir IVPB 1000 milliGRAM(s) IV Intermittent every 8 hours  aspirin  chewable 81 milliGRAM(s) Oral daily  atorvastatin 80 milliGRAM(s) Oral at bedtime  baclofen 10 milliGRAM(s) Oral two times a day  carvedilol 3.125 milliGRAM(s) Oral every 12 hours  cefTRIAXone   IVPB 2000 milliGRAM(s) IV Intermittent every 12 hours  chlorhexidine 4% Liquid 1 Application(s) Topical <User Schedule>  dextrose 50% Injectable 12.5 Gram(s) IV Push once  dextrose 50% Injectable 25 Gram(s) IV Push once  dextrose 50% Injectable 25 Gram(s) IV Push once  DULoxetine 60 milliGRAM(s) Oral two times a day  insulin glargine Injectable (LANTUS) 30 Unit(s) SubCutaneous at bedtime  insulin lispro (HumaLOG) corrective regimen sliding scale   SubCutaneous three times a day before meals  insulin lispro Injectable (HumaLOG) 9 Unit(s) SubCutaneous three times a day before meals  levETIRAcetam  IVPB 500 milliGRAM(s) IV Intermittent every 12 hours  pantoprazole   Suspension 40 milliGRAM(s) Oral daily  pramipexole 0.5 milliGRAM(s) Oral every 12 hours  senna 2 Tablet(s) Oral at bedtime  sodium chloride 0.9%. 1000 milliLiter(s) IV Continuous <Continuous>  tamsulosin 0.4 milliGRAM(s) Oral at bedtime      7. Ancillary Management:   Chest PT[ ]   Head of bed >35 [ ]   Out of bed to chair [ ]   PT/OT/SP Eval [ ]   Spirometry[ ]   DVT prophalaxis[ ]    8.Neuro:   Awake: Spontaneously[ ] Occasionally[x ] To Voice [ ] To painful stimuli [ ]   AIert [ ]. Following commands: 3 steps[ ], 2 steps[ ], 1 step [ x], None [ ]   Orientation: 0[ ], 1[ ], 2[ ], 3[ ]. Tracking objects with eyes: [x ]   Language: remains mute  Time off sedation for exam: N/A  Pupils: Right   >2.5   Left    > 2.5     Corneal:+      Gag reflex: +    EOMI: +_    NIH STROKE SCALE  Item	                                                        Score  1 a.	Level of Consciousness	               	2  1 b. LOC Questions	                                    2  1 c.	LOC Commands	                               	2  2.	Best Gaze	                                    1  3.	Visual	                                                1  4.	Facial Palsy	                                    1  5 a.	Motor Arm - Left	                        1  5 b.	Motor Arm - Right	                        1  6 a.	Motor Leg - Left	                        4   6 b.	Motor Leg - Right	                        4   7.	Limb Ataxia	                                    0  8.	Sensory	                                                2  9.	Language	                                    3  10.	Dysarthria	                                    2  11.	Extinction and Inattention  	            1  _______________________________________________________________________  TOTAL	                                                           12      mRS:  0 No symptoms at all  1 No significant disability despite symptoms; able to carry out all usual duties and activities without assistance  2 Slight disability; unable to carry out all previous activities, but able to look after own affairs  3 Moderate disability; requiring some help, but able to walk without assistance  4 Moderately severe disability; unable to walk without assistance and unable to attend to own bodily needs without assistance  5 Severe disability; bedridden, incontinent and requiring constant nursing care and attention  6 Dead        VEEG in the last 24 hours:    Background--------------------------7-8 hz    Focal and generalized slowing--------------  borderline to mild  left FT focal slowing                                                                  mild generalized slowing    Interictal activity--------------------------none    Events------------------------------ none    Seizures----------------------- none    Impression:-------------------- abnormal as above    Plan - discussed with the neuro-critical team      Last CTH: < from: CT Head No Cont (20 @ 05:41) >  EXAM:  CT BRAIN            PROCEDURE DATE:  2020            INTERPRETATION:  CLINICAL HISTORY / REASON FOR EXAM: Altered mental status    COMPARISON: CT head without contrast from 2020    TECHNIQUE: Multiple axial CT images of the head were obtained with sagittal and coronal reformats from the base of the skull to the vertex without the administration of IV contrast.    FINDINGS:    Left basal ganglia focal hypodensity with associated mild mass effect with stable mild left-to-right midline shift measuring 6 mm. Chronic right caudate lacunar infarction.    The ventricles and cerebral sulci are age-appropriate.    There is no evidence of acute intracranial hemorrhage, extra-axial fluid collection, or space-occupying lesion.    There is no fracture to the calvarium. Mastoid air cells are well aerated bilaterally. Right maxillary sinus polyps versus mucus retention cyst.      IMPRESSION:    Evolving acute/subacute infarct in the left basal ganglia, with mild mass effect. Stable mild midline shift to the right.    No evidence of acute intracranial hemorrhage, extra-axial fluid collection, or hydrocephalus.      < end of copied text >      Last CTA/MRA:     Last CTP:    Last MRI:< from: MR Head No Cont (20 @ 19:33) >  EXAM:  MR ANGIO NECK IC        EXAM:  MR ANGIO BRAIN        EXAM:  MR BRAIN            PROCEDURE DATE:  2020            INTERPRETATION:  Clinical History / Reason for exam: Right-sided facial droop and altered mental status.    Technique: Contrast brain MRI, non-contrast brain MRA and contrast neck MRA was performed.    Through the brain, sagittal and axial T1, axial T2, FLAIR, diffusion weighted images and an ADC map were obtained. Axial T1 post-contrast images were obtained and reconstructed in sagittal and coronal planes. 9  cc of intravenous gadolinium was administered and 1 discarded,  for contrast MRI brain and contrast enhanced MR angiography of the extracranial circulation.    MR angiography of intracranial and extracranial circulation was performed with time of flight imaging technique. Maximal intensity projection images were reviewed in multiple planes.    Correlation is made with accompanying brain MRI and noncontrast CT head 2020.    Findings:    BRAIN MRI:    There is a region of restricted diffusion involving the left basal ganglia and corona radiata associated with mild mass effect consistent with an acute infarct. Stable mild left to right midline shift measuring 6 mm.    Chronic right caudate lacunar infarction.    The ventricles and cortical sulci are enlarged compatible mild diffuse parenchymal volume loss.    There is no evidence of acute intracranial hemorrhage or extra-axial fluid collection.    The orbits and mastoid complexes are unremarkable. There is mucosal thickening of the right maxillary sinus.    The visualized osseous structures and soft tissues are unremarkable.      MRA BRAIN:    The distal segments of the internal carotid arteries are patent bilaterally.    There is mild short segmental stenosis involving the distal M1 of the left middle cerebral artery, as well as the left anterior temporal artery. There is mild narrowing of the left A1 segments of the anterior cerebral artery.    The right anterior and middle cerebral arteries are patent cerebral arteries are patent.    The right vertebral artery terminates as right posterior inferior cerebellar artery. The left vertebral artery is patent and forms the basilar artery. The posterior cerebral arteries are patent.      MRA NECK:    The visualized portions of the great vessels are patent. The common, internal and external carotid arteries are patent bilaterally. The vertebral arteries are patent throughout their cervical course.      IMPRESSION:    MRI BRAIN:  Redemonstration of acute infarct involving the left basal ganglia/corona radiata.  Stable mild midline shift to the right.    MRA BRAIN:  Mild short segmental stenosis involving the distal left M1, and the proximal left anterior temporal artery.  Mildly narrowed left A1.  No evidence of large vessel occlusion, vascular malformation, or aneurysm.    MRA NECK:  No evidence of carotid or vertebral artery stenosis.          < end of copied text >        CVP   MAP/CPP/SBP target:   CO:      CI:       Enzymes/Trop:    10. Respiratory:   ABG:ABG - ( 03 Sep 2020 10:10 )  pH, Arterial: 7.50  pH, Blood: x     /  pCO2: 37    /  pO2: 73    / HCO3: 29    / Base Excess: 5.6   /  SaO2: 96          VBG:    Chest Xray:        Peak Pressure/[place] Pressure:    11.GI:    Prophalaxis:     Bowel mvt:     Abd distension:   LIVER FUNCTIONS - ( 04 Sep 2020 04:32 )  Alb: 3.8 g/dL / Pro: 7.1 g/dL / ALK PHOS: 88 U/L / ALT: 11 U/L / AST: 22 U/L / GGT: x             12.Renal/Fluids/Electrolytes:        140  |  101  |  20  ----------------------------<  170<H>  3.2<L>   |  22  |  1.0    Ca    8.3<L>      04 Sep 2020 04:32  Mg     1.6         TPro  7.1  /  Alb  3.8  /  TBili  1.2  /  DBili  x   /  AST  22  /  ALT  11  /  AlkPhos  88  -04      I&O's Detail    03 Sep 2020 07:  -  04 Sep 2020 07:00  --------------------------------------------------------  IN:    IV PiggyBack: 400 mL    sodium chloride 0.9%.: 1400 mL  Total IN: 1800 mL    OUT:    Indwelling Catheter - Urethral: 1250 mL  Total OUT: 1250 mL    Total NET: 550 mL      04 Sep 2020 07:01  -  04 Sep 2020 09:13  --------------------------------------------------------  IN:  Total IN: 0 mL    OUT:    Indwelling Catheter - Urethral: 125 mL  Total OUT: 125 mL    Total NET: -125 mL          13.ID:   TMax:   Vital Signs Last 24 Hrs  T(C): 37.6 (04 Sep 2020 08:00), Max: 38.7 (03 Sep 2020 18:00)  T(F): 99.6 (04 Sep 2020 08:00), Max: 101.7 (03 Sep 2020 18:00)  HR: 100 (04 Sep 2020 09:00) (100 - 132)  BP: 194/83 (04 Sep 2020 09:00) (136/77 - 194/83)  BP(mean): 121 (04 Sep 2020 09:00) (77 - 130)  RR: 11 (04 Sep 2020 09:00) (7 - 35)  SpO2: 96% (04 Sep 2020 09:00) (95% - 98%)   Lactate, Blood: 2.1 mmol/L ( @ 05:10)  Lactate, Blood: 1.4 mmol/L ( @ 21:04)  Lactate, Blood: 1.3 mmol/L ( @ 14:15)     Urinalysis Basic - ( 02 Sep 2020 10:21 )    Color: Yellow / Appearance: Turbid / S.038 / pH: x  Gluc: x / Ketone: Large  / Bili: Small / Urobili: 6 mg/dL   Blood: x / Protein: 300 mg/dL / Nitrite: Negative   Leuk Esterase: Moderate / RBC: 0 /HPF / WBC 33 /HPF   Sq Epi: x / Non Sq Epi: 7 /HPF / Bacteria: Few         Lines: Central[] Date inserted: Peripheral[]    14. Hematology:                         14.5   13.34 )-----------( 269      ( 04 Sep 2020 04:32 )             46.0      09-04    140  |  101  |  20  ----------------------------<  170<H>  3.2<L>   |  22  |  1.0    Ca    8.3<L>      04 Sep 2020 04:32  Mg     1.6     09-04    TPro  7.1  /  Alb  3.8  /  TBili  1.2  /  DBili  x   /  AST  22  /  ALT  11  /  AlkPhos  88  09-04     PT/INR - ( 04 Sep 2020 04:32 )   PT: 15.30 sec;   INR: 1.33 ratio         PTT - ( 04 Sep 2020 04:32 )  PTT:59.1 sec    DVT Prophylaxis Lovenox[ ] Heparin[ ] Venodynes[ ] SCD's[ ]    15. Impression:        16. Suggestions:        17. Disposition:

## 2020-09-04 NOTE — DIETITIAN INITIAL EVALUATION ADULT. - PHYSICAL APPEARANCE
obese/BMI 31.3 using 99 kg (lowest wt)/other (specify) no edema noted; skin assessment on 9/4 shows ecchymosis WDL

## 2020-09-04 NOTE — PROGRESS NOTE ADULT - ASSESSMENT
IMPRESSION:    Altered MS/ CVA/ ? Seizure, ? meningoencephalilis  Acute CVA basal ganglia/ evolving infarct mild mass effect  Seizures  HO CIDP s/p IVIG [id number]  Paraplegia  HO CAD s/p CABG    PLAN:    CNS: q1h neuro checks. Neuro following. Continue Keppra. Seizure precautions. V EEG, IR for LP     HEENT: oral care. aspiration precautions    PULMONARY: HOB >30. O2 if needed to maintain pulse ox >92%    CARDIOVASCULAR: Keep i=o. bp control. keep systolic bp around 160-180. obtain 2d echo. IVF at 75cc/hr for now    GI: GI prophylaxis. NGT Feeding as tolerated     RENAL: fu lytes. flush graham. repeat BMP. monitor urine output    INFECTIOUS DISEASE: Continue current antibx per ID    HEMATOLOGICAL:  DVT prophylaxis. hold plavix, contiue ASA, le DOPPLER    ENDOCRINE:  Follow up FS.  Insulin protocol if needed.    MUSCULOSKELETAL: bedrest    MICU monitoring  Full code  Very poor prognosis

## 2020-09-04 NOTE — PROGRESS NOTE ADULT - SUBJECTIVE AND OBJECTIVE BOX
NAPOLEON CAST 50y Male  MRN#: [id number]       SUBJECTIVE  Patient is a 50y old Male who presents with a chief complaint of ams/ facial droop (04 Sep 2020 10:39)    This is a 50 years old left handed man with past medical history of MI, CABG x 5, DM, arteriosclerotic heart disease, chronic inflammatory demyelinating polyneuropathy (follows Neurologist Dr. Mitchell) diagnosed in 2011, prior chronic right caudate nucleus infarction, who presents from nursing home for AMS x 3 days, vomiting, decreased PO intake, and right sided facial droop. In ED, T 99.6F, /90, HR 74, RR 18, SpO2 96% RA.   He presented to the ED for facial droop accompanied by wife. He was A0x1. Per wife, he had been acting different past five days, with low speech, barely talking and recently a right facial droop x 1 day.  CTH showed evidence of an acute/subacute left basal ganglia/periventricular white matter. Patient was outside window for tpa. He was admitted to stroke unite    INTERVAL HISTORY:  9/1: In ED Main. He had RRT for an episode of lethargy and vomiting. BP at the time 230/90, and no new focal neurological deficit per chart review. He received hydralazine 5 mg x 1 and CT head reports of evolving left BG/CR with mild mass effect, and rightward midline shift measuring 9 mm (previously 7 mm).   9/3: Pt seen in 3E Stroke Unit. Overnight, pt with temp spike 38.4C and hypertensive, responded to tylenol. This morning at 9:12am, during Neuro exam patient found to have b/l LE clonus then developed generalized rhythmic tremors of b/l UE. Temp 98.7F per primary RN. Notified EEG tech for VEEG hook up. Patient was given STAT 2mg of Ativan and responded. Patient also to start Keppra 500 mg BID for now. , /84 this morning. WBC continues to trend up 18.69. Pending IR-guided LP as well; unsuccessful attempt at bedside yesterday.   Patient is lethargic and somnolent, unarousable and responding to pain.  He has an NG inserted, febrile.      Today is hospital day 4d, and this morning he is stable.  Slight increase in mentation. More verbal but still confused.  Tmax 101.7 overnight.  No seizures observed. No other acute overnight events.     OBJECTIVE  PAST MEDICAL & SURGICAL HISTORY  GERD (gastroesophageal reflux disease)  BPH (benign prostatic hyperplasia)  Myocardial infarct, old  ASHD (arteriosclerotic heart disease)  Depression  Anxiety  Diabetes  CIDP (chronic inflammatory demyelinating polyneuropathy)  History of cholecystectomy  History of total left hip replacement  History of total right hip replacement: bilateral  S/P CABG x 5    ALLERGIES:  penicillins (Unknown)  strawberry (Hives)    MEDICATIONS:  STANDING MEDICATIONS  acyclovir IVPB      acyclovir IVPB 1000 milliGRAM(s) IV Intermittent every 8 hours  aspirin  chewable 81 milliGRAM(s) Oral daily  atorvastatin 80 milliGRAM(s) Oral at bedtime  baclofen 10 milliGRAM(s) Oral two times a day  carvedilol 3.125 milliGRAM(s) Oral every 12 hours  cefTRIAXone   IVPB 2000 milliGRAM(s) IV Intermittent every 12 hours  chlorhexidine 4% Liquid 1 Application(s) Topical <User Schedule>  dextrose 50% Injectable 12.5 Gram(s) IV Push once  dextrose 50% Injectable 25 Gram(s) IV Push once  dextrose 50% Injectable 25 Gram(s) IV Push once  DULoxetine 60 milliGRAM(s) Oral two times a day  insulin glargine Injectable (LANTUS) 30 Unit(s) SubCutaneous at bedtime  insulin lispro (HumaLOG) corrective regimen sliding scale   SubCutaneous three times a day before meals  insulin lispro Injectable (HumaLOG) 9 Unit(s) SubCutaneous three times a day before meals  levETIRAcetam  IVPB 500 milliGRAM(s) IV Intermittent every 12 hours  pantoprazole   Suspension 40 milliGRAM(s) Oral daily  pramipexole 0.5 milliGRAM(s) Oral every 12 hours  senna 2 Tablet(s) Oral at bedtime  sodium chloride 0.9%. 1000 milliLiter(s) IV Continuous <Continuous>  tamsulosin 0.4 milliGRAM(s) Oral at bedtime    PRN MEDICATIONS  acetaminophen   Tablet .. 650 milliGRAM(s) Oral every 6 hours PRN  bisacodyl 5 milliGRAM(s) Oral at bedtime PRN  dextrose 40% Gel 15 Gram(s) Oral once PRN  glucagon  Injectable 1 milliGRAM(s) IntraMuscular once PRN      VITAL SIGNS: Last 24 Hours  T(C): 37.8 (04 Sep 2020 12:00), Max: 38.7 (03 Sep 2020 18:00)  T(F): 100.1 (04 Sep 2020 12:00), Max: 101.7 (03 Sep 2020 18:00)  HR: 108 (04 Sep 2020 14:00) (92 - 132)  BP: 188/76 (04 Sep 2020 14:00) (136/77 - 194/92)  BP(mean): 113 (04 Sep 2020 14:00) (77 - 137)  RR: 32 (04 Sep 2020 14:00) (7 - 35)  SpO2: 97% (04 Sep 2020 14:00) (95% - 98%)    LABS:                        14.5   13.34 )-----------( 269      ( 04 Sep 2020 04:32 )             46.0     09-04    140  |  101  |  20  ----------------------------<  170<H>  3.2<L>   |  22  |  1.0    Ca    8.3<L>      04 Sep 2020 04:32  Mg     1.6     09-04    TPro  7.1  /  Alb  3.8  /  TBili  1.2  /  DBili  x   /  AST  22  /  ALT  11  /  AlkPhos  88  09-04    PT/INR - ( 04 Sep 2020 04:32 )   PT: 15.30 sec;   INR: 1.33 ratio         PTT - ( 04 Sep 2020 04:32 )  PTT:59.1 sec    ABG - ( 03 Sep 2020 10:10 )  pH, Arterial: 7.50  pH, Blood: x     /  pCO2: 37    /  pO2: 73    / HCO3: 29    / Base Excess: 5.6   /  SaO2: 96                    Culture - Blood (collected 02 Sep 2020 11:47)  Source: .Blood None  Preliminary Report (04 Sep 2020 01:01):    No growth to date.    Culture - Urine (collected 02 Sep 2020 10:21)  Source: .Urine Catheterized  Final Report (03 Sep 2020 23:09):    No growth          RADIOLOGY:      PHYSICAL EXAM:    GENERAL: NAD, well-developed, AAOx3  HEENT:  Atraumatic, Normocephalic. EOMI, PERRLA, conjunctiva and sclera clear, No JVD  PULMONARY: Clear to auscultation bilaterally; No wheeze  CARDIOVASCULAR: Regular rate and rhythm; No murmurs, rubs, or gallops  GASTROINTESTINAL: Soft, Nontender, Nondistended; Bowel sounds present  MUSCULOSKELETAL:  2+ Peripheral Pulses, No clubbing, cyanosis, or edema  NEUROLOGY: non-focal  SKIN: No rashes or lesions      ADMISSION SUMMARY  Patient is a 50y old Male who presents with a chief complaint of ams/ facial droop (04 Sep 2020 10:39) NAPOLEON CAST 50y Male  MRN#: [id number]       SUBJECTIVE  Patient is a 50y old Male who presents with a chief complaint of ams/ facial droop (04 Sep 2020 10:39)    This is a 50 years old left handed man with past medical history of MI, CABG x 5, DM, arteriosclerotic heart disease, chronic inflammatory demyelinating polyneuropathy (follows Neurologist Dr. Mitchell) diagnosed in 2011, prior chronic right caudate nucleus infarction, who presents from nursing home for AMS x 3 days, vomiting, decreased PO intake, and right sided facial droop. In ED, T 99.6F, /90, HR 74, RR 18, SpO2 96% RA.   He presented to the ED for facial droop accompanied by wife. He was A0x1. Per wife, he had been acting different past five days, with low speech, barely talking and recently a right facial droop x 1 day.  CTH showed evidence of an acute/subacute left basal ganglia/periventricular white matter. Patient was outside window for tpa. He was admitted to stroke unite    INTERVAL HISTORY:  9/1: In ED Main. He had RRT for an episode of lethargy and vomiting. BP at the time 230/90, and no new focal neurological deficit per chart review. He received hydralazine 5 mg x 1 and CT head reports of evolving left BG/CR with mild mass effect, and rightward midline shift measuring 9 mm (previously 7 mm).   9/3: Pt seen in 3E Stroke Unit. Overnight, pt with temp spike 38.4C and hypertensive, responded to tylenol. This morning at 9:12am, during Neuro exam patient found to have b/l LE clonus then developed generalized rhythmic tremors of b/l UE. Temp 98.7F per primary RN. Notified EEG tech for VEEG hook up. Patient was given STAT 2mg of Ativan and responded. Patient also to start Keppra 500 mg BID for now. , /84 this morning. WBC continues to trend up 18.69. Pending IR-guided LP as well; unsuccessful attempt at bedside yesterday.   Patient is lethargic and somnolent, unarousable and responding to pain.  He has an NG inserted, febrile.      Today is hospital day 4d, and this morning he is stable.  Slight increase in mentation. More verbal but still confused.  Tmax 101.7 overnight.  No seizures observed. No other acute overnight events.     OBJECTIVE  PAST MEDICAL & SURGICAL HISTORY  GERD (gastroesophageal reflux disease)  BPH (benign prostatic hyperplasia)  Myocardial infarct, old  ASHD (arteriosclerotic heart disease)  Depression  Anxiety  Diabetes  CIDP (chronic inflammatory demyelinating polyneuropathy)  History of cholecystectomy  History of total left hip replacement  History of total right hip replacement: bilateral  S/P CABG x 5    ALLERGIES:  penicillins (Unknown)  strawberry (Hives)    MEDICATIONS:  STANDING MEDICATIONS  acyclovir IVPB      acyclovir IVPB 1000 milliGRAM(s) IV Intermittent every 8 hours  aspirin  chewable 81 milliGRAM(s) Oral daily  atorvastatin 80 milliGRAM(s) Oral at bedtime  baclofen 10 milliGRAM(s) Oral two times a day  carvedilol 3.125 milliGRAM(s) Oral every 12 hours  cefTRIAXone   IVPB 2000 milliGRAM(s) IV Intermittent every 12 hours  chlorhexidine 4% Liquid 1 Application(s) Topical <User Schedule>  dextrose 50% Injectable 12.5 Gram(s) IV Push once  dextrose 50% Injectable 25 Gram(s) IV Push once  dextrose 50% Injectable 25 Gram(s) IV Push once  DULoxetine 60 milliGRAM(s) Oral two times a day  insulin glargine Injectable (LANTUS) 30 Unit(s) SubCutaneous at bedtime  insulin lispro (HumaLOG) corrective regimen sliding scale   SubCutaneous three times a day before meals  insulin lispro Injectable (HumaLOG) 9 Unit(s) SubCutaneous three times a day before meals  levETIRAcetam  IVPB 500 milliGRAM(s) IV Intermittent every 12 hours  pantoprazole   Suspension 40 milliGRAM(s) Oral daily  pramipexole 0.5 milliGRAM(s) Oral every 12 hours  senna 2 Tablet(s) Oral at bedtime  sodium chloride 0.9%. 1000 milliLiter(s) IV Continuous <Continuous>  tamsulosin 0.4 milliGRAM(s) Oral at bedtime    PRN MEDICATIONS  acetaminophen   Tablet .. 650 milliGRAM(s) Oral every 6 hours PRN  bisacodyl 5 milliGRAM(s) Oral at bedtime PRN  dextrose 40% Gel 15 Gram(s) Oral once PRN  glucagon  Injectable 1 milliGRAM(s) IntraMuscular once PRN      VITAL SIGNS: Last 24 Hours  T(C): 37.8 (04 Sep 2020 12:00), Max: 38.7 (03 Sep 2020 18:00)  T(F): 100.1 (04 Sep 2020 12:00), Max: 101.7 (03 Sep 2020 18:00)  HR: 108 (04 Sep 2020 14:00) (92 - 132)  BP: 188/76 (04 Sep 2020 14:00) (136/77 - 194/92)  BP(mean): 113 (04 Sep 2020 14:00) (77 - 137)  RR: 32 (04 Sep 2020 14:00) (7 - 35)  SpO2: 97% (04 Sep 2020 14:00) (95% - 98%)    LABS:                        14.5   13.34 )-----------( 269      ( 04 Sep 2020 04:32 )             46.0     09-04    140  |  101  |  20  ----------------------------<  170<H>  3.2<L>   |  22  |  1.0    Ca    8.3<L>      04 Sep 2020 04:32  Mg     1.6     09-04    TPro  7.1  /  Alb  3.8  /  TBili  1.2  /  DBili  x   /  AST  22  /  ALT  11  /  AlkPhos  88  09-04    PT/INR - ( 04 Sep 2020 04:32 )   PT: 15.30 sec;   INR: 1.33 ratio         PTT - ( 04 Sep 2020 04:32 )  PTT:59.1 sec    ABG - ( 03 Sep 2020 10:10 )  pH, Arterial: 7.50  pH, Blood: x     /  pCO2: 37    /  pO2: 73    / HCO3: 29    / Base Excess: 5.6   /  SaO2: 96                    Culture - Blood (collected 02 Sep 2020 11:47)  Source: .Blood None  Preliminary Report (04 Sep 2020 01:01):    No growth to date.    Culture - Urine (collected 02 Sep 2020 10:21)  Source: .Urine Catheterized  Final Report (03 Sep 2020 23:09):    No growth          RADIOLOGY:      PHYSICAL EXAM:    GENERAL: NAD, well-developed, AAOx1  HEENT:  Atraumatic, Normocephalic. EOMI, PERRLA, conjunctiva and sclera clear, No JVD  PULMONARY: Clear to auscultation bilaterally; No wheeze  CARDIOVASCULAR: Tachycardic rate and regular rhythm; No murmurs, rubs, or gallops  GASTROINTESTINAL: Soft, Nontender, mildly distended; Bowel sounds present  MUSCULOSKELETAL:  2+ Peripheral Pulses, No clubbing, cyanosis, or edema  NEUROLOGY: Alert but only oriented to name;  3/5 strength in UE, R > L; paralysis of LE b/r  SKIN: No rashes or lesions    ADMISSION SUMMARY  Patient is a 50y old Male who presents with a chief complaint of ams/ facial droop (04 Sep 2020 10:39)

## 2020-09-04 NOTE — PROGRESS NOTE ADULT - SUBJECTIVE AND OBJECTIVE BOX
OVERNIGHT EVENTS: evens noted, more awake,  cc/h    Vital Signs Last 24 Hrs  T(C): 38.5 (04 Sep 2020 05:00), Max: 38.7 (03 Sep 2020 18:00)  T(F): 101.3 (04 Sep 2020 05:00), Max: 101.7 (03 Sep 2020 18:00)  HR: 112 (04 Sep 2020 07:00) (102 - 132)  BP: 176/79 (04 Sep 2020 07:00) (136/77 - 194/82)  BP(mean): 103 (04 Sep 2020 07:00) (77 - 130)  RR: 32 (04 Sep 2020 07:00) (16 - 35)  SpO2: 98% (04 Sep 2020 07:00) (95% - 98%)    PHYSICAL EXAMINATION:    GENERAL: more awake    HEENT: Head is normocephalic and atraumatic.    NECK: Supple.    LUNGS: dec bs both abses    HEART: Regular rate and rhythm without murmur.    ABDOMEN: Soft, nontender, and nondistended.      EXTREMITIES: Without any cyanosis, clubbing, rash, lesions or edema.    NEUROLOGIC: more awake    SKIN: No ulceration or induration present.      LABS:                        14.5   13.34 )-----------( 269      ( 04 Sep 2020 04:32 )             46.0     09-04    140  |  101  |  20  ----------------------------<  170<H>  3.2<L>   |  22  |  1.0    Ca    8.3<L>      04 Sep 2020 04:32  Mg     1.6     09-04    TPro  7.1  /  Alb  3.8  /  TBili  1.2  /  DBili  x   /  AST  22  /  ALT  11  /  AlkPhos  88  09-04    PT/INR - ( 04 Sep 2020 04:32 )   PT: 15.30 sec;   INR: 1.33 ratio         PTT - ( 04 Sep 2020 04:32 )  PTT:59.1 sec  Urinalysis Basic - ( 02 Sep 2020 10:21 )    Color: Yellow / Appearance: Turbid / S.038 / pH: x  Gluc: x / Ketone: Large  / Bili: Small / Urobili: 6 mg/dL   Blood: x / Protein: 300 mg/dL / Nitrite: Negative   Leuk Esterase: Moderate / RBC: 0 /HPF / WBC 33 /HPF   Sq Epi: x / Non Sq Epi: 7 /HPF / Bacteria: Few      ABG - ( 03 Sep 2020 10:10 )  pH, Arterial: 7.50  pH, Blood: x     /  pCO2: 37    /  pO2: 73    / HCO3: 29    / Base Excess: 5.6   /  SaO2: 96                                20 @ 07:01  -  20 @ 07:00  --------------------------------------------------------  IN: 1800 mL / OUT: 1250 mL / NET: 550 mL        MICROBIOLOGY:  Culture Results:   No growth to date. ( @ 11:47)  Culture Results:   No growth ( @ 10:21)  Culture Results:   No growth ( @ 14:47)  Culture Results:   No growth to date. ( @ 14:15)  Culture Results:   No growth to date. ( @ 14:15)      MEDICATIONS  (STANDING):  acyclovir IVPB      acyclovir IVPB 1000 milliGRAM(s) IV Intermittent every 8 hours  aspirin  chewable 81 milliGRAM(s) Oral daily  atorvastatin 80 milliGRAM(s) Oral at bedtime  baclofen 10 milliGRAM(s) Oral two times a day  carvedilol 3.125 milliGRAM(s) Oral every 12 hours  cefTRIAXone   IVPB 2000 milliGRAM(s) IV Intermittent every 12 hours  chlorhexidine 4% Liquid 1 Application(s) Topical <User Schedule>  dextrose 50% Injectable 12.5 Gram(s) IV Push once  dextrose 50% Injectable 25 Gram(s) IV Push once  dextrose 50% Injectable 25 Gram(s) IV Push once  DULoxetine 60 milliGRAM(s) Oral two times a day  insulin glargine Injectable (LANTUS) 30 Unit(s) SubCutaneous at bedtime  insulin lispro (HumaLOG) corrective regimen sliding scale   SubCutaneous three times a day before meals  insulin lispro Injectable (HumaLOG) 9 Unit(s) SubCutaneous three times a day before meals  levETIRAcetam  IVPB 500 milliGRAM(s) IV Intermittent every 12 hours  pantoprazole   Suspension 40 milliGRAM(s) Oral daily  pramipexole 0.5 milliGRAM(s) Oral every 12 hours  senna 2 Tablet(s) Oral at bedtime  sodium chloride 0.9%. 1000 milliLiter(s) (100 mL/Hr) IV Continuous <Continuous>  tamsulosin 0.4 milliGRAM(s) Oral at bedtime    MEDICATIONS  (PRN):  acetaminophen   Tablet .. 650 milliGRAM(s) Oral every 6 hours PRN Moderate Pain (4 - 6)  bisacodyl 5 milliGRAM(s) Oral at bedtime PRN Constipation  dextrose 40% Gel 15 Gram(s) Oral once PRN Blood Glucose LESS THAN 70 milliGRAM(s)/deciliter  glucagon  Injectable 1 milliGRAM(s) IntraMuscular once PRN Glucose LESS THAN 70 milligrams/deciliter      RADIOLOGY & ADDITIONAL STUDIES:

## 2020-09-04 NOTE — DIETITIAN INITIAL EVALUATION ADULT. - ENERGY NEEDS
kcal: 3910-0302 (MSJ x 1-1.1 AF) Obese BMI considered  protein: 75-90 g (1-1.2 g/kg IBW) same as above  fluid: per ICU team

## 2020-09-04 NOTE — SWALLOW BEDSIDE ASSESSMENT ADULT - SLP PERTINENT HISTORY OF CURRENT PROBLEM
P admitted w/ R facial droop, found to have acute/subacute L BG infarct. Rapid response 9/1 w/ AMS and vomiting. Repeat CTH revealed evolving L BG and CR infarct w/ greater midline shift 7mm increased to 9mm shift on repeat CTH. PMH: CIDP dx in 2011, wheelchair bound since 2013 , unable to bend knee, unable to stand s/p b/l hip replacement and removal of hardware. MRI (+) redemonstration of acute infarct involving the left basal ganglia/corona radiata. stable right midline shift. 9/3 AM pt w/ B/L LE clonus, generalized rhythmic tremors of B/L UE, responded to ativan. VEEG in progress. P admitted w/ R facial droop, found to have acute/subacute L BG infarct. Rapid response 9/1 w/ AMS and vomiting. Repeat CTH revealed evolving L BG and CR infarct w/ greater midline shift 7mm increased to 9mm shift on repeat CTH. PMH: CIDP dx in 2011, wheelchair bound since 2013. MRI (+) redemonstration of acute infarct involving the left basal ganglia/corona radiata. stable right midline shift. 9/3 AM pt w/ B/L LE clonus, generalized rhythmic tremors of B/L UE, responded to ativan. VEEG in progress.

## 2020-09-04 NOTE — DIETITIAN INITIAL EVALUATION ADULT. - OTHER INFO
Pt admitted d/t facial droop/AMS. Acute CVA basal ganglia/evolving infarct mild mass effect. Neuro checks every hour-- continues on keppra, seizure precautions; VEEG + IR for LP planned

## 2020-09-04 NOTE — PROGRESS NOTE ADULT - ASSESSMENT
#Stroke  #AMS with decreased speech low tone found with acute/subacute infarct in the left basal ganglia/ periventricular white matter, NIHSS 10 on admission  CT yesterday: Evolving acute/subacute infarct in the left basal ganglia, with mild mass effect. Stable mild midline shift to the right.  - Infectious work up including UA/blood cultures, CXR, CSF  - Vanco, meropenem, and acyclovir  - LP IR guided to be tomorrow, discussion with IR and nursing team took place (PATIENT DID NOT RECEIVE PLAVIX)  - Maximize medical management including toxic encephalopathy and optimized risk factors/lifestyle modification  -permissive htn (150-180 SBP)  -echo with bubble  -lipid profile WNL   -a1c 7.3  -keep aspirin and high dose statin    # R/o Meningitis  -decrease Level of consciouness today    -->LP TOMORROW. hold morphine and benzo     -->vanco, tony, acyclovir to cover for meningitis    -->Consult ID      # CIDP since 2011 per wife had deterioration [id number] and is bedbound since then, follows DR. Mitchell currently getting IVIG every three weeks with RN administering at home. Was also started on gabapentin a few months ago per wife Pt. gets very sleepy after gabapentin will hold for now.   -c/w baclofen and pramipexole    #diabetes  - c/w home insulin regimen 30 lantus bedtime 9 lispro with meals     #Hx of CAD s/p cabg  - c/w asa, carvedilol and statin  - hold plavix    #Hx of anxiety and depression Pt. is on many meds confirmed with wife- c/w home meds- duloxetine, quetiapine, hydroxyzine, alprazolam prn    #chronic pain   - hold morphine 60mg q12 continue home regimen      #Hx of BPH   -c/w tamsulosin    #Hx of chronic constipation   -c/w senna dulcolox as needed    #Hx of gerd   -on pantoprazole    #diet will keep npo till seen by speech and swallow then diabetic  #dvt ppx- lovenox  #gi ppx on pantoprzole #Stroke  #R/o Meningitis  #AMS with decreased speech low tone found with acute/subacute infarct in the left basal ganglia/ periventricular white matter, NIHSS 10 on admission  CT: Evolving acute/subacute infarct in the left basal ganglia, with mild mass effect. Stable mild midline shift to the right.  - decrease Level of consciousness  - Infectious work up including UA/blood cultures, CXR, CSF  - Ceftriaxone and Acyclovir  - LP successful today, 9/4/20  - f/u CSF results  - keep systolic 160-180  - IVF 75 cc/hr  -echo with bubble  -lipid profile WNL  -a1c 7.3  -keep aspirin and high dose statin  - ID consult  - Neuro checks q1  - f/u vEEG  - f/u LE Duplex    # CIDP since 2011 per wife had deterioration [phone] and is bedbound since then, follows DR. Mitchell currently getting IVIG every three weeks with RN administering at home. Was also started on gabapentin a few months ago per wife Pt. gets very sleepy after gabapentin will hold for now.   -c/w baclofen and pramipexole    #diabetes  - c/w home insulin regimen 30 lantus bedtime 9 lispro with meals     #Hx of CAD s/p cabg  - c/w asa, carvedilol and statin  - hold plavix    #Hx of anxiety and depression Pt. is on many meds confirmed with wife- c/w home meds- duloxetine, quetiapine, hydroxyzine, alprazolam prn    #chronic pain   - hold morphine 60mg q12 continue home regimen    #Hx of BPH   -c/w tamsulosin    #Hx of chronic constipation   -c/w senna dulcolox as needed    #Hx of gerd   -on pantoprazole    #Diet: NPO  #DVT ppx- lovenox  #GI ppx on pantoprzole  #Code status: Full  #Dispo: MICU

## 2020-09-04 NOTE — DIETITIAN INITIAL EVALUATION ADULT. - PERTINENT MEDS FT
rocephin, zovirax, NaCl 0.9%, lantus, humalog, keppra, protonix, lipitor, baclofen, dulcolax, coreg, mirapex, senna, flomax

## 2020-09-04 NOTE — PROCEDURE NOTE - NSPOSTCAREGUIDE_GEN_A_CORE
Instructed patient/caregiver regarding signs and symptoms of infection/Verbal/written post procedure instructions were given to patient/caregiver/Keep the cast/splint/dressing clean and dry

## 2020-09-04 NOTE — SWALLOW BEDSIDE ASSESSMENT ADULT - COMMENTS
@ baseline pt communicates needs and wants, is communicative, make his own dr cortez, eats a regular diet. Pt known to  service from distant past in 2016 w/ reccs for dys 2 w/ thin. Pt w/ food allergy to strawberries.
@ baseline pt communicates needs and wants, is communicative, make his own dr cortez, eats a regular diet. Pt known to ST service from distant past in 2016 w/ reccs for dys 2 w/ thin

## 2020-09-04 NOTE — PROGRESS NOTE ADULT - ASSESSMENT
A 50 years old left handed man with past medical history of MI, CABG x 5, DM, arteriosclerotic heart disease, chronic inflammatory demyelinating polyneuropathy (follows Neurologist Dr. Mitchell), prior chronic right caudate nucleus infarction, who presents from nursing home for AMS x 3 days, vomiting, decreased PO intake, and right sided facial droop. In ED, T 99.6F, /90, HR 74, RR 18, SpO2 96% RA. CTH reports of acute/subacute left basal ganglia/periventricular white matter. S/p RRT on 9/1 for episode of lethargy and vomiting. BP at time 230/90, and STAT CT head reports of evolving left BG/CR with mild mass effect, and rightward midline shift measuring 9 mm (previously 7 mm), no mention of compression. MRI brain with acute left CR in correlation with CTH. MRA brain with mild  intracranial stenosis in left M1 region. Currently, etiology of ischemic infarction unclear, considering his infectious and encephalopathic state underlying meningitis/encephalitis cannot be excluded. Patient was upgraded to ICU, VEEG was non epileptiform.    SUGGESTIONS:  - C/w Keppra 500 mg BID  - Spinal Tap: Obtain opening pressure/routine studies: CSF Cell count, CSF protein, CSF glucose, CSF gram stain and culture. CSF VDRL, CSF cryptococcal antigen, encephalitis panel, CSF HSV, CSF/PCR Meningitis Panel including (EColi, haemophilus influenzae, Listeria moncytogenes, Neisseria meningitidis, Streptococcus agalactiae, Cytomegalovirus, Enterovirus.   - C/w acyclovir, meropenem vanco Monitor Scr with acyclovir   - C/w high dose statin therapy   - Maintain glycemic control with POCT goal < 100   - Maintain HOB elevated at least 40 degrees  - Maintain Na level in the upper limit of normal 140-145 and replete intravascular volume  - STRICT I&O monitoring, may consider increasing IVF NS       Updated plan with primary team

## 2020-09-05 NOTE — PROGRESS NOTE ADULT - ASSESSMENT
ASSESSMENT  50yMale with a PMH of CABG, CIDP, DM presenting with 4 days of decreased mentation. Not verbal, does not follow commands. CT showing basal ganglia infarct. Discussed prognosis with wife.    IMPRESSION  # R/o menigitis/ infections encephalitis  - s/p LP 9/4 with 0 WBC     # Ischemic basal ganglia lesion and mass effect likely causing central fever, non infectious.  #No endocarditis  CXR no infiltrates  BCx 8/31, 9/2 NGTD  UCx 9/2 NG    RECOMMENDATIONS  ceftriaxone stopped  LP not consistent with viral meningitis as well; continue Acyclovir 1 gm iv q8h over 1h but stop once HSV PCR is negative  VZV PCR, IgM WNV, serum Cryptococcal ag, Lyme, and VDRL are pending   On Meropenem?      This is a preliminary incomplete pended note, all final recommendations to follow after interview and examination of the patient.    Please call or message on [employer] Teams if with any questions.  Spectra [phone] ASSESSMENT  50yMale with a PMH of CABG, CIDP, DM presenting with 4 days of decreased mentation. Not verbal, does not follow commands. CT showing basal ganglia infarct. Discussed prognosis with wife.    IMPRESSION  # R/o menigitis/ infections encephalitis  - s/p LP 9/4 with 0 WBC, 1 RBC; Protein, CSF: 75 mg/dL (09.04.20 @ 15:20), Glucose, CSF (09.04.20 @ 15:20)    # Ischemic basal ganglia lesion and mass effect likely causing central fever, non infectious.  #No endocarditis  CXR no infiltrates  BCx 8/31, 9/2 NGTD  UCx 9/2 NG    RECOMMENDATIONS  ceftriaxone stopped  Elevated protein on LP, but not consistent with viral meningitis as well; continue Acyclovir 1 gm iv q8h over 1h but stop once HSV PCR is negative  VZV PCR, IgM WNV, serum Cryptococcal ag, Lyme, and VDRL are pending   Recommend stopping meropenem as low suspicion for bacterial/gram negative infection  Repeat Blood cultures if febrile    Aspiration precautions    Please call or message on [employer] Teams if with any questions.  Spectra [phone]

## 2020-09-05 NOTE — PROGRESS NOTE ADULT - ASSESSMENT
A 50 years old left handed man with past medical history of MI, CABG x 5, DM, arteriosclerotic heart disease, chronic inflammatory demyelinating polyneuropathy (follows Neurologist Dr. Mitchell), prior chronic right caudate nucleus infarction, who presents from nursing home for AMS x 3 days, vomiting, decreased PO intake, and right sided facial droop. In ED, T 99.6F, /90, HR 74, RR 18, SpO2 96% RA. CTH reports of acute/subacute left basal ganglia/periventricular white matter. S/p RRT on 9/1 for episode of lethargy and vomiting. BP at time 230/90, and STAT CT head reports of evolving left BG/CR with mild mass effect, and rightward midline shift measuring 9 mm (previously 7 mm), no mention of compression. MRI brain with acute left CR in correlation with CTH. MRA brain with mild  intracranial stenosis in left M1 region. Currently, etiology of ischemic infarction unclear, considering his infectious and encephalopathic state underlying meningitis/encephalitis cannot be excluded. Patient was upgraded to ICU, VEEG was non epileptiform.    SUGGESTIONS:    - Continue Keppra  - CSF non-infectious appearing thus far; f/u HSV, encephalitis panel, VDRL, etc.  - Continue Acyclovir until HSV resulted  - D/C Merrem and Vanco --> CSF does not appear to be c/w bacterial infection  - Cont. 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 Mitchell), prior chronic right caudate nucleus infarction, who presents from nursing home for AMS x 3 days, vomiting, decreased PO intake, and right sided facial droop. In ED, T 99.6F, /90, HR 74, RR 18, SpO2 96% RA. CTH reports of acute/subacute left basal ganglia/periventricular white matter. S/p RRT on 9/1 for episode of lethargy and vomiting. BP at time 230/90, and STAT CT head reports of evolving left BG/CR with mild mass effect, and rightward midline shift measuring 9 mm (previously 7 mm), no mention of compression. MRI brain with acute left CR in correlation with CTH. MRA brain with mild  intracranial stenosis in left M1 region. Currently, etiology of ischemic infarction unclear, considering his infectious and encephalopathic state underlying meningitis/encephalitis cannot be excluded. Patient was upgraded to ICU,   SUGGESTIONS:    - Continue Keppra  - Start VEEG   - CSF non-infectious appearing thus far; f/u HSV, encephalitis panel, VDRL, etc.  - Continue Acyclovir until HSV resulted  - D/C Merrem and Vanco --> CSF does not appear to be c/w bacterial infection  - Cont. high dose statin therapy   - Maintain glycemic control with POCT goal < 100   - Maintain HOB elevated at least 40 degrees  - Maintain Na level in the upper limit of normal 140-145 and replete intravascular volume  - STRICT I&O monitoring, may consider increasing IVF NS

## 2020-09-05 NOTE — PROCEDURE NOTE - NSSITEPREP_SKIN_A_CORE
Adherence to aseptic technique: hand hygiene prior to donning barriers (gown, gloves), don cap and mask, sterile drape over patient
povidone iodine (if allergic to chlorhexidine)

## 2020-09-05 NOTE — PROGRESS NOTE ADULT - SUBJECTIVE AND OBJECTIVE BOX
CAST, NAPOLEON  50y, Male  Allergy: penicillins (Unknown)  strawberry (Hives)      LOS  5d    CHIEF COMPLAINT: ams/ facial droop (05 Sep 2020 05:22)      INTERVAL EVENTS/HPI  - No acute events overnight  - s/p LP 9/4  - Low grade temps, T(F): , Max: 100.1 (09-04-20 @ 12:00)  - WBC Count: 14.42 (09-05-20 @ 04:30)  WBC Count: 13.34 (09-04-20 @ 04:32)     - Creatinine, Serum: 0.9 (09-05-20 @ 04:30)  Creatinine, Serum: 1.0 (09-04-20 @ 04:32)       ROS  unable to obtain history secondary to patient's mental status and/or sedation    VITALS:  T(F): 99.2, Max: 100.1 (09-04-20 @ 12:00)  HR: 96  BP: 158/84  RR: 32Vital Signs Last 24 Hrs  T(C): 37.3 (05 Sep 2020 04:00), Max: 37.8 (04 Sep 2020 12:00)  T(F): 99.2 (05 Sep 2020 04:00), Max: 100.1 (04 Sep 2020 12:00)  HR: 96 (05 Sep 2020 07:00) (90 - 118)  BP: 158/84 (05 Sep 2020 06:30) (125/110 - 194/92)  BP(mean): 123 (05 Sep 2020 06:30) (97 - 156)  RR: 32 (05 Sep 2020 07:00) (7 - 36)  SpO2: 97% (05 Sep 2020 07:00) (96% - 98%)    PHYSICAL EXAM:  Gen: NAD, resting in bed  HEENT: Normocephalic, atraumatic  Neck: supple, no lymphadenopathy  CV: Regular rate & regular rhythm  Lungs: decreased BS at bases, no fremitus  Abdomen: Soft, BS present  Ext: Warm, well perfused  Neuro: non focal, awake  Skin: no rash, no erythema  Lines: no phlebitis    FH: Non-contributory  Social Hx: Non-contributory    TESTS & MEASUREMENTS:                        13.9   14.42 )-----------( 279      ( 05 Sep 2020 04:30 )             44.0     09-05    138  |  100  |  17  ----------------------------<  165<H>  2.9<L>   |  22  |  0.9    Ca    8.3<L>      05 Sep 2020 04:30  Mg     1.7     09-05    TPro  6.6  /  Alb  3.7  /  TBili  1.2  /  DBili  x   /  AST  24  /  ALT  12  /  AlkPhos  84  09-05    eGFR if Non African American: 98 mL/min/1.73M2 (09-05-20 @ 04:30)  eGFR if African American: 113 mL/min/1.73M2 (09-05-20 @ 04:30)    LIVER FUNCTIONS - ( 05 Sep 2020 04:30 )  Alb: 3.7 g/dL / Pro: 6.6 g/dL / ALK PHOS: 84 U/L / ALT: 12 U/L / AST: 24 U/L / GGT: x               Culture - CSF with Gram Stain (collected 09-04-20 @ 15:20)  Source: .CSF CSF  Gram Stain (09-05-20 @ 01:29):    No polymorphonuclear cells seen    No organisms seen    by cytocentrifuge    Culture - Blood (collected 09-02-20 @ 11:47)  Source: .Blood None  Preliminary Report (09-04-20 @ 01:01):    No growth to date.    Culture - Urine (collected 09-02-20 @ 10:21)  Source: .Urine Catheterized  Final Report (09-03-20 @ 23:09):    No growth    Culture - Urine (collected 08-31-20 @ 14:47)  Source: .Urine Clean Catch (Midstream)  Final Report (09-01-20 @ 18:01):    No growth    Culture - Blood (collected 08-31-20 @ 14:15)  Source: .Blood Blood  Preliminary Report (09-01-20 @ 23:01):    No growth to date.    Culture - Blood (collected 08-31-20 @ 14:15)  Source: .Blood Blood  Preliminary Report (09-01-20 @ 23:01):    No growth to date.        Lactate, Blood: 2.1 mmol/L (09-03-20 @ 05:10)  Lactate, Blood: 1.4 mmol/L (09-02-20 @ 21:04)  Lactate, Blood: 1.3 mmol/L (08-31-20 @ 14:15)      INFECTIOUS DISEASES TESTING  COVID-19 PCR: NotDetec (08-31-20 @ 16:38)  MRSA PCR Result.: Positive (09-14-19 @ 11:30)      INFLAMMATORY MARKERS      RADIOLOGY & ADDITIONAL TESTS:  I have personally reviewed the last available Chest xray  CXR  Xray Chest 1 View AP/PA:   EXAM:  XR CHEST FRONTAL 1V            PROCEDURE DATE:  09/02/2020            INTERPRETATION:  Clinical History / Reason for exam: NG tube placement    Comparison : Chest radiograph will AP chest x-ray dated 9/2/2020 9:11 AM.    Technique/Positioning: Portable AP chest x-ray.    Findings:    Support devices: A weighted nasogastric tube is in place with its tip at the level of the gastric fundus..    Cardiac/mediastinum/hilum: Sternotomy wires and surgical clips are noted in place. Low lung volumes therefore the heart size cannot be fully evaluated. Vascular markings within normal limits.    Lung parenchyma/Pleura: Lungs are clear of infiltrate. Costophrenic angles clear.    Skeleton/soft tissues: Surgical clips noted in the right upper quadrant.    Impression:    Nasogastric tube is in place with its tip at the level of the gastric fundus.                    TAMMY PEGUERO M.D., ATTENDING RADIOLOGIST  This document has been electronically signed. Sep  2 2020  6:00PM             (09-02-20 @ 17:10)      CT      CARDIOLOGY TESTING  Transthoracic Echocardiogram:    EXAM:  2-D ECHO (TTE) COMPLETE        PROCEDURE DATE:  09/04/2020      INTERPRETATION:  REPORT:  TRANSTHORACIC ECHOCARDIOGRAM REPORT        Patient Name:   NAPOLEON CAST Accession #: [id number]  Medical Rec #:  [id number]    Height:      71.0 in 180.3 cm  YOB: 1970   Weight:      218.0 lb 98.88 kg  Patient Age:    50 years    BSA:         2.19 m²  Patient Gender: M           BP:          194/83 mmHg      Date of Exam:        9/4/2020 9:34:10 AM  Referring Physician: [id number] THAO GARCIA  Sonographer:         Wilfred Richards  Fellow:              Niru Ly    Reading Physician:   Jimena Kim M.D.    Procedure:   2D Echo/Doppler/Color Doppler Complete.  Indications: R55 - Syncope and Collapse  Diagnosis:   Syncope and collapse - R55        Summary:   1. Normal global left ventricular systolic function.   2. Mildly increased LV wall thickness.   3. Spectral Doppler shows impaired relaxation pattern of left ventricular myocardial filling (Grade I diastolic dysfunction).  4. Mildly enlarged left atrium.   5. Normal right atrial size.   6. Trace mitral valve regurgitation.    PHYSICIAN INTERPRETATION:  Left Ventricle: The left ventricular internal cavity size is normal. Left ventricular wall thickness is mildly increased. Global LV systolic function was normal. Normal segmental left ventricular systolic function. Abnormal (paradoxical) septal motion consistent with post-operative status. Spectral Doppler shows impaired relaxation pattern of left ventricular myocardial filling (Grade I diastolic dysfunction).  Right Ventricle: Normal right ventricular size and function.  Left Atrium: Mildly enlarged left atrium.  Right Atrium: Normal right atrial size.  Pericardium: There is no evidence of pericardial effusion.  Mitral Valve: The mitral valve is normal in structure. Trace mitral valve regurgitation is seen.  Tricuspid Valve: The tricuspid valve is normal in structure. Trivial tricuspid regurgitation is visualized.  Aortic Valve: The aortic valve is trileaflet. No evidence of aortic stenosis. Aortic valve thickening with normal leaflet opening. No evidence of aortic valve regurgitation is seen.  Aorta: The aortic root is normal in size and structure.      2D AND M-MODE MEASUREMENTS (normal ranges within parentheses):  Left Ventricle:                  Normal   Aorta/Left Atrium:             Normal  IVSd (2D):              1.27 cm (0.7-1.1) AoV Cusp Separation: 2.17 cm (1.5-2.6)  LVPWd (2D):             1.23 cm (0.7-1.1) Left Atrium (Mmode): 4.28 cm (1.9-4.0)  LVIDd (2D):             4.69 cm (3.4-5.7)  LVIDs (2D):             3.16 cm  LV FS (2D):             32.6 %   (>25%)  Relative Wall Thickness  0.52    (<0.42)    SPECTRAL DOPPLER ANALYSIS:  LV DIASTOLIC FUNCTION:  MV Peak E: 0.64 m/s Decel Time: 146 msec  MV Peak A: 1.08 m/s  E/A Ratio: 0.59    Aortic Valve:  AoV VMax:    1.27 m/s AoV Area, Vmax: 2.92 cm² Vmax Indx: 1.34 cm²/m²  AoV Pk Grad: 6.5 mmHg    LVOT Vmax: 0.95 m/s  LVOT VTI:  0.17 m  LVOT Diam: 2.23 cm    Mitral Valve:  MV P1/2 Time: 42.38 msec  MV Area, PHT: 5.19 cm²      [id number] Jimena Kim M.D., Electronically signed on 9/4/2020 at 12:59:18 PM            *** Final ***                  JIMENA KIM MD  This document has been electronically signed. Sep  4 2020  9:34AM             (09-04-20 @ 09:34)  12 Lead ECG:   Ventricular Rate 121 BPM    Atrial Rate 121 BPM    P-R Interval 136 ms    QRS Duration 82 ms    Q-T Interval 322 ms    QTC Calculation(Bezet) 457 ms    P Axis 39 degrees    R Axis 22 degrees    T Axis 127 degrees    Diagnosis Line Sinus tachycardia  Nonspecific ST and T wave abnormality  Abnormal ECG    Confirmed by Bong Cid ([id number]) on 9/2/2020 6:35:03 AM (09-02-20 @ 02:22)      MEDICATIONS  acyclovir IVPB     acyclovir IVPB 1000 IV Intermittent every 8 hours  aspirin  chewable 81 Oral daily  atorvastatin 80 Oral at bedtime  baclofen 10 Oral two times a day  carvedilol 3.125 Oral every 12 hours  chlorhexidine 4% Liquid 1 Topical <User Schedule>  dextrose 50% Injectable 12.5 IV Push once  dextrose 50% Injectable 25 IV Push once  dextrose 50% Injectable 25 IV Push once  DULoxetine 60 Oral two times a day  insulin glargine Injectable (LANTUS) 30 SubCutaneous at bedtime  insulin lispro (HumaLOG) corrective regimen sliding scale  SubCutaneous three times a day before meals  insulin lispro Injectable (HumaLOG) 9 SubCutaneous three times a day before meals  levETIRAcetam  IVPB 500 IV Intermittent every 12 hours  meropenem  IVPB 1000 IV Intermittent every 8 hours  pantoprazole   Suspension 40 Oral daily  potassium chloride  20 mEq/100 mL IVPB 20 IV Intermittent once  pramipexole 0.5 Oral every 12 hours  senna 2 Oral at bedtime  sodium chloride 0.9%. 1000 IV Continuous <Continuous>  tamsulosin 0.4 Oral at bedtime      WEIGHT  Weight (kg): 99 (09-03-20 @ 18:00)  Creatinine, Serum: 0.9 mg/dL (09-05-20 @ 04:30)      ANTIBIOTICS:  acyclovir IVPB      acyclovir IVPB 1000 milliGRAM(s) IV Intermittent every 8 hours  meropenem  IVPB 1000 milliGRAM(s) IV Intermittent every 8 hours      All available historical records have been reviewed CAST, NAPOLEON  50y, Male  Allergy: penicillins (Unknown)  strawberry (Hives)      LOS  5d    CHIEF COMPLAINT: ams/ facial droop (05 Sep 2020 05:22)      INTERVAL EVENTS/HPI  - Remains obtunded, does not follow commands consistently  - s/p LP 9/4  - Low grade temps, T(F): , Max: 100.1 (09-04-20 @ 12:00)  - WBC Count: 14.42 (09-05-20 @ 04:30)  WBC Count: 13.34 (09-04-20 @ 04:32)     - Creatinine, Serum: 0.9 (09-05-20 @ 04:30)  Creatinine, Serum: 1.0 (09-04-20 @ 04:32)       ROS  unable to obtain history secondary to patient's mental status and/or sedation    VITALS:  T(F): 99.2, Max: 100.1 (09-04-20 @ 12:00)  HR: 96  BP: 158/84  RR: 32Vital Signs Last 24 Hrs  T(C): 37.3 (05 Sep 2020 04:00), Max: 37.8 (04 Sep 2020 12:00)  T(F): 99.2 (05 Sep 2020 04:00), Max: 100.1 (04 Sep 2020 12:00)  HR: 96 (05 Sep 2020 07:00) (90 - 118)  BP: 158/84 (05 Sep 2020 06:30) (125/110 - 194/92)  BP(mean): 123 (05 Sep 2020 06:30) (97 - 156)  RR: 32 (05 Sep 2020 07:00) (7 - 36)  SpO2: 97% (05 Sep 2020 07:00) (96% - 98%)    PHYSICAL EXAM:  Gen: NAD  HEENT: Normocephalic, atraumatic  Neck: supple, no lymphadenopathy  CV: Regular rate & regular rhythm  Lungs: decreased BS at bases, no fremitus  Abdomen: Soft, BS present  Ext: Warm, well perfused  Neuro: non focal  Skin: no rash, no erythema  Lines: no phlebitis    FH: Non-contributory  Social Hx: Non-contributory    TESTS & MEASUREMENTS:                        13.9   14.42 )-----------( 279      ( 05 Sep 2020 04:30 )             44.0     09-05    138  |  100  |  17  ----------------------------<  165<H>  2.9<L>   |  22  |  0.9    Ca    8.3<L>      05 Sep 2020 04:30  Mg     1.7     09-05    TPro  6.6  /  Alb  3.7  /  TBili  1.2  /  DBili  x   /  AST  24  /  ALT  12  /  AlkPhos  84  09-05    eGFR if Non African American: 98 mL/min/1.73M2 (09-05-20 @ 04:30)  eGFR if African American: 113 mL/min/1.73M2 (09-05-20 @ 04:30)    LIVER FUNCTIONS - ( 05 Sep 2020 04:30 )  Alb: 3.7 g/dL / Pro: 6.6 g/dL / ALK PHOS: 84 U/L / ALT: 12 U/L / AST: 24 U/L / GGT: x               Culture - CSF with Gram Stain (collected 09-04-20 @ 15:20)  Source: .CSF CSF  Gram Stain (09-05-20 @ 01:29):    No polymorphonuclear cells seen    No organisms seen    by cytocentrifuge    Culture - Blood (collected 09-02-20 @ 11:47)  Source: .Blood None  Preliminary Report (09-04-20 @ 01:01):    No growth to date.    Culture - Urine (collected 09-02-20 @ 10:21)  Source: .Urine Catheterized  Final Report (09-03-20 @ 23:09):    No growth    Culture - Urine (collected 08-31-20 @ 14:47)  Source: .Urine Clean Catch (Midstream)  Final Report (09-01-20 @ 18:01):    No growth    Culture - Blood (collected 08-31-20 @ 14:15)  Source: .Blood Blood  Preliminary Report (09-01-20 @ 23:01):    No growth to date.    Culture - Blood (collected 08-31-20 @ 14:15)  Source: .Blood Blood  Preliminary Report (09-01-20 @ 23:01):    No growth to date.        Lactate, Blood: 2.1 mmol/L (09-03-20 @ 05:10)  Lactate, Blood: 1.4 mmol/L (09-02-20 @ 21:04)  Lactate, Blood: 1.3 mmol/L (08-31-20 @ 14:15)      INFECTIOUS DISEASES TESTING  COVID-19 PCR: NotDetec (08-31-20 @ 16:38)  MRSA PCR Result.: Positive (09-14-19 @ 11:30)      INFLAMMATORY MARKERS      RADIOLOGY & ADDITIONAL TESTS:  I have personally reviewed the last available Chest xray  CXR  Xray Chest 1 View AP/PA:   EXAM:  XR CHEST FRONTAL 1V            PROCEDURE DATE:  09/02/2020            INTERPRETATION:  Clinical History / Reason for exam: NG tube placement    Comparison : Chest radiograph will AP chest x-ray dated 9/2/2020 9:11 AM.    Technique/Positioning: Portable AP chest x-ray.    Findings:    Support devices: A weighted nasogastric tube is in place with its tip at the level of the gastric fundus..    Cardiac/mediastinum/hilum: Sternotomy wires and surgical clips are noted in place. Low lung volumes therefore the heart size cannot be fully evaluated. Vascular markings within normal limits.    Lung parenchyma/Pleura: Lungs are clear of infiltrate. Costophrenic angles clear.    Skeleton/soft tissues: Surgical clips noted in the right upper quadrant.    Impression:    Nasogastric tube is in place with its tip at the level of the gastric fundus.                    TAMMY PEGUERO M.D., ATTENDING RADIOLOGIST  This document has been electronically signed. Sep  2 2020  6:00PM             (09-02-20 @ 17:10)      CT      CARDIOLOGY TESTING  Transthoracic Echocardiogram:    EXAM:  2-D ECHO (TTE) COMPLETE        PROCEDURE DATE:  09/04/2020      INTERPRETATION:  REPORT:  TRANSTHORACIC ECHOCARDIOGRAM REPORT        Patient Name:   NAPOLEON CAST Accession #: [id number]  Medical Rec #:  [id number]    Height:      71.0 in 180.3 cm  YOB: 1970   Weight:      218.0 lb 98.88 kg  Patient Age:    50 years    BSA:         2.19 m²  Patient Gender: M           BP:          194/83 mmHg      Date of Exam:        9/4/2020 9:34:10 AM  Referring Physician: [id number] THAO GARCIA  Sonographer:         Wilfred Richards  Fellow:              Niru Ly    Reading Physician:   Jimena Kim M.D.    Procedure:   2D Echo/Doppler/Color Doppler Complete.  Indications: R55 - Syncope and Collapse  Diagnosis:   Syncope and collapse - R55        Summary:   1. Normal global left ventricular systolic function.   2. Mildly increased LV wall thickness.   3. Spectral Doppler shows impaired relaxation pattern of left ventricular myocardial filling (Grade I diastolic dysfunction).  4. Mildly enlarged left atrium.   5. Normal right atrial size.   6. Trace mitral valve regurgitation.    PHYSICIAN INTERPRETATION:  Left Ventricle: The left ventricular internal cavity size is normal. Left ventricular wall thickness is mildly increased. Global LV systolic function was normal. Normal segmental left ventricular systolic function. Abnormal (paradoxical) septal motion consistent with post-operative status. Spectral Doppler shows impaired relaxation pattern of left ventricular myocardial filling (Grade I diastolic dysfunction).  Right Ventricle: Normal right ventricular size and function.  Left Atrium: Mildly enlarged left atrium.  Right Atrium: Normal right atrial size.  Pericardium: There is no evidence of pericardial effusion.  Mitral Valve: The mitral valve is normal in structure. Trace mitral valve regurgitation is seen.  Tricuspid Valve: The tricuspid valve is normal in structure. Trivial tricuspid regurgitation is visualized.  Aortic Valve: The aortic valve is trileaflet. No evidence of aortic stenosis. Aortic valve thickening with normal leaflet opening. No evidence of aortic valve regurgitation is seen.  Aorta: The aortic root is normal in size and structure.      2D AND M-MODE MEASUREMENTS (normal ranges within parentheses):  Left Ventricle:                  Normal   Aorta/Left Atrium:             Normal  IVSd (2D):              1.27 cm (0.7-1.1) AoV Cusp Separation: 2.17 cm (1.5-2.6)  LVPWd (2D):             1.23 cm (0.7-1.1) Left Atrium (Mmode): 4.28 cm (1.9-4.0)  LVIDd (2D):             4.69 cm (3.4-5.7)  LVIDs (2D):             3.16 cm  LV FS (2D):             32.6 %   (>25%)  Relative Wall Thickness  0.52    (<0.42)    SPECTRAL DOPPLER ANALYSIS:  LV DIASTOLIC FUNCTION:  MV Peak E: 0.64 m/s Decel Time: 146 msec  MV Peak A: 1.08 m/s  E/A Ratio: 0.59    Aortic Valve:  AoV VMax:    1.27 m/s AoV Area, Vmax: 2.92 cm² Vmax Indx: 1.34 cm²/m²  AoV Pk Grad: 6.5 mmHg    LVOT Vmax: 0.95 m/s  LVOT VTI:  0.17 m  LVOT Diam: 2.23 cm    Mitral Valve:  MV P1/2 Time: 42.38 msec  MV Area, PHT: 5.19 cm²      [id number] Jimena Kim M.D., Electronically signed on 9/4/2020 at 12:59:18 PM            *** Final ***                  JIMENA KIM MD  This document has been electronically signed. Sep  4 2020  9:34AM             (09-04-20 @ 09:34)  12 Lead ECG:   Ventricular Rate 121 BPM    Atrial Rate 121 BPM    P-R Interval 136 ms    QRS Duration 82 ms    Q-T Interval 322 ms    QTC Calculation(Bezet) 457 ms    P Axis 39 degrees    R Axis 22 degrees    T Axis 127 degrees    Diagnosis Line Sinus tachycardia  Nonspecific ST and T wave abnormality  Abnormal ECG    Confirmed by Bong Cid ([id number]) on 9/2/2020 6:35:03 AM (09-02-20 @ 02:22)      MEDICATIONS  acyclovir IVPB     acyclovir IVPB 1000 IV Intermittent every 8 hours  aspirin  chewable 81 Oral daily  atorvastatin 80 Oral at bedtime  baclofen 10 Oral two times a day  carvedilol 3.125 Oral every 12 hours  chlorhexidine 4% Liquid 1 Topical <User Schedule>  dextrose 50% Injectable 12.5 IV Push once  dextrose 50% Injectable 25 IV Push once  dextrose 50% Injectable 25 IV Push once  DULoxetine 60 Oral two times a day  insulin glargine Injectable (LANTUS) 30 SubCutaneous at bedtime  insulin lispro (HumaLOG) corrective regimen sliding scale  SubCutaneous three times a day before meals  insulin lispro Injectable (HumaLOG) 9 SubCutaneous three times a day before meals  levETIRAcetam  IVPB 500 IV Intermittent every 12 hours  meropenem  IVPB 1000 IV Intermittent every 8 hours  pantoprazole   Suspension 40 Oral daily  potassium chloride  20 mEq/100 mL IVPB 20 IV Intermittent once  pramipexole 0.5 Oral every 12 hours  senna 2 Oral at bedtime  sodium chloride 0.9%. 1000 IV Continuous <Continuous>  tamsulosin 0.4 Oral at bedtime      WEIGHT  Weight (kg): 99 (09-03-20 @ 18:00)  Creatinine, Serum: 0.9 mg/dL (09-05-20 @ 04:30)      ANTIBIOTICS:  acyclovir IVPB      acyclovir IVPB 1000 milliGRAM(s) IV Intermittent every 8 hours  meropenem  IVPB 1000 milliGRAM(s) IV Intermittent every 8 hours      All available historical records have been reviewed

## 2020-09-05 NOTE — CHART NOTE - NSCHARTNOTEFT_GEN_A_CORE
Patient has been at home on morphine extended release 60 mg Q12, on alprazolam 1mg PO Q6, diclofenac 75mg PO BID. Patient has not been receiving morphine and benzodiazepine since September 3rd.  Patient has history of b/l hip replacement, as well as h/o CIDP( chronic inflammatory demyelinating polyneuropathy), and has been chronically on this medications.   During the day patient had episodes of the left hand shakiness, which is signifficntly worse from before. Patient has been hypertensive. There is a concern for withdrawals, as well as pain secondary to his hip replacement and CIDP.   Currently restarted on 2 mg IV push morphine PRN, may do Ativan PRN. Pt. needs to be reassessed throughout the night.  Possible patient could be restarted on home medications pending attending approval.

## 2020-09-05 NOTE — PROGRESS NOTE ADULT - SUBJECTIVE AND OBJECTIVE BOX
This is a 50 years old left handed man with past medical history of MI, CABG x 5, DM, arteriosclerotic heart disease, chronic inflammatory demyelinating polyneuropathy (follows Neurologist Dr. Mitchell) diagnosed in 2011, prior chronic right caudate nucleus infarction, who presents from nursing home for AMS x 3 days, vomiting, decreased PO intake, and right sided facial droop.   He was A0x1. Per wife, he had been acting different past five days, with low speech, barely talking and recently a right facial droop x 1 day.  CTH showed evidence of an acute/subacute left basal ganglia/periventricular white matter. Patient was outside window for tpa. He was admitted to [facility]    INTERVAL HISTORY:  9/1: In ED Main. He had RRT for an episode of lethargy and vomiting. BP at the time 230/90, and no new focal neurological deficit per chart review. He received hydralazine 5 mg x 1 and CT head reports of evolving left BG/CR with mild mass effect, and rightward midline shift measuring 9 mm (previously 7 mm).   9/3: Pt seen in 3E Stroke Unit. Overnight, pt with temp spike 38.4C and hypertensive, responded to tylenol. This morning at 9:12am, during Neuro exam patient found to have b/l LE clonus then developed generalized rhythmic tremors of b/l UE. Temp 98.7F per primary RN. Notified EEG tech for VEEG hook up. Patient was given STAT 2mg of Ativan and responded. Patient also to start Keppra 500 mg BID for now. , /84 this morning. WBC continues to trend up 18.69. Pending IR-guided LP as well; unsuccessful attempt at bedside yesterday.   Patient is lethargic and somnolent, unarousable and responding to pain.  He has an NG inserted, febrile.      Slight increase in mentation. More verbal but still confused.  No seizures observed. No other acute overnight events.     OBJECTIVE  PAST MEDICAL & SURGICAL HISTORY  GERD (gastroesophageal reflux disease)  BPH (benign prostatic hyperplasia)  Myocardial infarct, old  ASHD (arteriosclerotic heart disease)  Depression  Anxiety  Diabetes  CIDP (chronic inflammatory demyelinating polyneuropathy)  History of cholecystectomy  History of total left hip replacement  History of total right hip replacement: bilateral  S/P CABG x 5      MEDICATIONS:  STANDING MEDICATIONS  acyclovir IVPB      acyclovir IVPB 1000 milliGRAM(s) IV Intermittent every 8 hours  aspirin  chewable 81 milliGRAM(s) Oral daily  atorvastatin 80 milliGRAM(s) Oral at bedtime  baclofen 10 milliGRAM(s) Oral two times a day  carvedilol 3.125 milliGRAM(s) Oral every 12 hours  cefTRIAXone   IVPB 2000 milliGRAM(s) IV Intermittent every 12 hours  chlorhexidine 4% Liquid 1 Application(s) Topical <User Schedule>  dextrose 50% Injectable 12.5 Gram(s) IV Push once  dextrose 50% Injectable 25 Gram(s) IV Push once  dextrose 50% Injectable 25 Gram(s) IV Push once  DULoxetine 60 milliGRAM(s) Oral two times a day  insulin glargine Injectable (LANTUS) 30 Unit(s) SubCutaneous at bedtime  insulin lispro (HumaLOG) corrective regimen sliding scale   SubCutaneous three times a day before meals  insulin lispro Injectable (HumaLOG) 9 Unit(s) SubCutaneous three times a day before meals  levETIRAcetam  IVPB 500 milliGRAM(s) IV Intermittent every 12 hours  pantoprazole   Suspension 40 milliGRAM(s) Oral daily  pramipexole 0.5 milliGRAM(s) Oral every 12 hours  senna 2 Tablet(s) Oral at bedtime  sodium chloride 0.9%. 1000 milliLiter(s) IV Continuous <Continuous>  tamsulosin 0.4 milliGRAM(s) Oral at bedtime    PRN MEDICATIONS  acetaminophen   Tablet .. 650 milliGRAM(s) Oral every 6 hours PRN  bisacodyl 5 milliGRAM(s) Oral at bedtime PRN  dextrose 40% Gel 15 Gram(s) Oral once PRN  glucagon  Injectable 1 milliGRAM(s) IntraMuscular once PRN      VITAL SIGNS: Last 24 Hours  T(C): 37.8 (04 Sep 2020 12:00), Max: 38.7 (03 Sep 2020 18:00)  T(F): 100.1 (04 Sep 2020 12:00), Max: 101.7 (03 Sep 2020 18:00)  HR: 108 (04 Sep 2020 14:00) (92 - 132)  BP: 188/76 (04 Sep 2020 14:00) (136/77 - 194/92)  BP(mean): 113 (04 Sep 2020 14:00) (77 - 137)  RR: 32 (04 Sep 2020 14:00) (7 - 35)  SpO2: 97% (04 Sep 2020 14:00) (95% - 98%)    HEENT: NC/AT pupils equal and reactive to light  Mouth:  No erythema  Chest clear  Heart: S1 S@  Abdomen: obese  extremities no edema    LABS:                        14.5   13.34 )-----------( 269      ( 04 Sep 2020 04:32 )             46.0     09-04    140  |  101  |  20  ----------------------------<  170<H>  3.2<L>   |  22  |  1.0    Ca    8.3<L>      04 Sep 2020 04:32  Mg     1.6     09-04    TPro  7.1  /  Alb  3.8  /  TBili  1.2  /  DBili  x   /  AST  22  /  ALT  11  /  AlkPhos  88  09-04    PT/INR - ( 04 Sep 2020 04:32 )   PT: 15.30 sec;   INR: 1.33 ratio         PTT - ( 04 Sep 2020 04:32 )  PTT:59.1 sec    ABG - ( 03 Sep 2020 10:10 )  pH, Arterial: 7.50  pH, Blood: x     /  pCO2: 37    /  pO2: 73    / HCO3: 29    / Base Excess: 5.6   /  SaO2: 96                    Culture - Blood (collected 02 Sep 2020 11:47)  Source: .Blood None  Preliminary Report (04 Sep 2020 01:01):    No growth to date.    Culture - Urine (collected 02 Sep 2020 10:21)  Source: .Urine Catheterized  Final Report (03 Sep 2020 23:09):    No growth          #Stroke  #R/o Meningitis  #AMS with decreased speech low tone found with acute/subacute infarct in the left basal ganglia/ periventricular white matter, NIHSS 10 on admission  CT: Evolving acute/subacute infarct in the left basal ganglia, with mild mass effect. Stable mild midline shift to the right.  - decrease Level of consciousness      PLAN:  1.Ceftriaxone and Acyclovir  2.LP successful today, 9/4/20  3. f/u CSF results  4. keep systolic 160-180  5. IVF 75 cc/hr  6.echo with bubble  7.a1c 7.3  8.keep aspirin and high dose statin  9.Neuro checks q1  10.f/u vEEG  11.f/u LE Duplex  12.c/w baclofen and pramipexole  13.hold plavix  14.DVT ppx- lovenox  15.GI ppx on pantoprzole  #Code status: Full

## 2020-09-05 NOTE — PROGRESS NOTE ADULT - SUBJECTIVE AND OBJECTIVE BOX
Neurocritical Care Progress Note:    1. Brief Presentation: AMS, lethargy    2. Today's Acute Problems: lethargic, does not follow commands    3. Relevant brief History: This is a 50 years old left handed man with past medical history of MI, CABG x 5, DM, arteriosclerotic heart disease, chronic inflammatory demyelinating polyneuropathy (follows Neurologist Dr. Mitchell), prior chronic right caudate nucleus infarction, who presents from nursing home for AMS x 3 days, vomiting, decreased PO intake, and right sided facial droop. In ED, T 99.6F, /90, HR 74, RR 18, SpO2 96% RA. CTH reports of acute/subacute left basal ganglia/periventricular white matter.   Overnight, pt with temp spike 38.4C and hypertensive, responded to tylenol. This morning at 9:12am, during Neuro exam patient found to have b/l LE clonus then developed generalized rhythmic tremors of b/l UE. Temp 98.7F per primary RN. Notified EEG tech for VEEG hook up. Patient was given STAT 2mg of Ativan and responded. Patient also to start Keppra 500 mg BID for now. , /84 this morning. WBC continues to trend up 18.69. Pending IR-guided LP as well; unsuccessful attempt at bedside yesterday.     4-Yesterday's Plan:    5. Last 24 hour updates: patient was upgraded to ICU, still spiking fevers and remains lethargic    6. Medications:   acyclovir IVPB      acyclovir IVPB 1000 milliGRAM(s) IV Intermittent every 8 hours  aspirin  chewable 81 milliGRAM(s) Oral daily  atorvastatin 80 milliGRAM(s) Oral at bedtime  baclofen 10 milliGRAM(s) Oral two times a day  carvedilol 3.125 milliGRAM(s) Oral every 12 hours  cefTRIAXone   IVPB 2000 milliGRAM(s) IV Intermittent every 12 hours  chlorhexidine 4% Liquid 1 Application(s) Topical <User Schedule>  dextrose 50% Injectable 12.5 Gram(s) IV Push once  dextrose 50% Injectable 25 Gram(s) IV Push once  dextrose 50% Injectable 25 Gram(s) IV Push once  DULoxetine 60 milliGRAM(s) Oral two times a day  insulin glargine Injectable (LANTUS) 30 Unit(s) SubCutaneous at bedtime  insulin lispro (HumaLOG) corrective regimen sliding scale   SubCutaneous three times a day before meals  insulin lispro Injectable (HumaLOG) 9 Unit(s) SubCutaneous three times a day before meals  levETIRAcetam  IVPB 500 milliGRAM(s) IV Intermittent every 12 hours  pantoprazole   Suspension 40 milliGRAM(s) Oral daily  pramipexole 0.5 milliGRAM(s) Oral every 12 hours  senna 2 Tablet(s) Oral at bedtime  sodium chloride 0.9%. 1000 milliLiter(s) IV Continuous <Continuous>  tamsulosin 0.4 milliGRAM(s) Oral at bedtime      7. Ancillary Management:   Chest PT[ ]   Head of bed >35 [ ]   Out of bed to chair [ ]   PT/OT/SP Eval [ ]   Spirometry[ ]   DVT prophalaxis[ ]    8.Neuro:   Awake: Spontaneously[ ] Occasionally[x ] To Voice [ ] To painful stimuli [ ]   AIert [ ]. Following commands: 3 steps[ ], 2 steps[ ], 1 step [ x], None [ ]   Orientation: 0[ ], 1[ ], 2[ ], 3[ ]. Tracking objects with eyes: [x ]   Language: remains mute  Time off sedation for exam: N/A  Pupils: Right   >2.5   Left    > 2.5     Corneal:+      Gag reflex: +    EOMI: +_    NIH STROKE SCALE  Item	                                                        Score  1 a.	Level of Consciousness	               	2  1 b. LOC Questions	                                    2  1 c.	LOC Commands	                               	2  2.	Best Gaze	                                    1  3.	Visual	                                                1  4.	Facial Palsy	                                    1  5 a.	Motor Arm - Left	                        1  5 b.	Motor Arm - Right	                        1  6 a.	Motor Leg - Left	                        4   6 b.	Motor Leg - Right	                        4   7.	Limb Ataxia	                                    0  8.	Sensory	                                                2  9.	Language	                                    3  10.	Dysarthria	                                    2  11.	Extinction and Inattention  	            1  _______________________________________________________________________  TOTAL	                                                           12      mRS:  0 No symptoms at all  1 No significant disability despite symptoms; able to carry out all usual duties and activities without assistance  2 Slight disability; unable to carry out all previous activities, but able to look after own affairs  3 Moderate disability; requiring some help, but able to walk without assistance  4 Moderately severe disability; unable to walk without assistance and unable to attend to own bodily needs without assistance  5 Severe disability; bedridden, incontinent and requiring constant nursing care and attention  6 Dead        VEEG in the last 24 hours:    Background--------------------------7-8 hz    Focal and generalized slowing--------------  borderline to mild  left FT focal slowing                                                                  mild generalized slowing    Interictal activity--------------------------none    Events------------------------------ none    Seizures----------------------- none    Impression:-------------------- abnormal as above    Plan - discussed with the neuro-critical team      Last CTH: < from: CT Head No Cont (20 @ 05:41) >  EXAM:  CT BRAIN            PROCEDURE DATE:  2020            INTERPRETATION:  CLINICAL HISTORY / REASON FOR EXAM: Altered mental status    COMPARISON: CT head without contrast from 2020    TECHNIQUE: Multiple axial CT images of the head were obtained with sagittal and coronal reformats from the base of the skull to the vertex without the administration of IV contrast.    FINDINGS:    Left basal ganglia focal hypodensity with associated mild mass effect with stable mild left-to-right midline shift measuring 6 mm. Chronic right caudate lacunar infarction.    The ventricles and cerebral sulci are age-appropriate.    There is no evidence of acute intracranial hemorrhage, extra-axial fluid collection, or space-occupying lesion.    There is no fracture to the calvarium. Mastoid air cells are well aerated bilaterally. Right maxillary sinus polyps versus mucus retention cyst.      IMPRESSION:    Evolving acute/subacute infarct in the left basal ganglia, with mild mass effect. Stable mild midline shift to the right.    No evidence of acute intracranial hemorrhage, extra-axial fluid collection, or hydrocephalus.      < end of copied text >      Last CTA/MRA:     Last CTP:    Last MRI:< from: MR Head No Cont (20 @ 19:33) >  EXAM:  MR ANGIO NECK IC        EXAM:  MR ANGIO BRAIN        EXAM:  MR BRAIN            PROCEDURE DATE:  2020            INTERPRETATION:  Clinical History / Reason for exam: Right-sided facial droop and altered mental status.    Technique: Contrast brain MRI, non-contrast brain MRA and contrast neck MRA was performed.    Through the brain, sagittal and axial T1, axial T2, FLAIR, diffusion weighted images and an ADC map were obtained. Axial T1 post-contrast images were obtained and reconstructed in sagittal and coronal planes. 9  cc of intravenous gadolinium was administered and 1 discarded,  for contrast MRI brain and contrast enhanced MR angiography of the extracranial circulation.    MR angiography of intracranial and extracranial circulation was performed with time of flight imaging technique. Maximal intensity projection images were reviewed in multiple planes.    Correlation is made with accompanying brain MRI and noncontrast CT head 2020.    Findings:    BRAIN MRI:    There is a region of restricted diffusion involving the left basal ganglia and corona radiata associated with mild mass effect consistent with an acute infarct. Stable mild left to right midline shift measuring 6 mm.    Chronic right caudate lacunar infarction.    The ventricles and cortical sulci are enlarged compatible mild diffuse parenchymal volume loss.    There is no evidence of acute intracranial hemorrhage or extra-axial fluid collection.    The orbits and mastoid complexes are unremarkable. There is mucosal thickening of the right maxillary sinus.    The visualized osseous structures and soft tissues are unremarkable.      MRA BRAIN:    The distal segments of the internal carotid arteries are patent bilaterally.    There is mild short segmental stenosis involving the distal M1 of the left middle cerebral artery, as well as the left anterior temporal artery. There is mild narrowing of the left A1 segments of the anterior cerebral artery.    The right anterior and middle cerebral arteries are patent cerebral arteries are patent.    The right vertebral artery terminates as right posterior inferior cerebellar artery. The left vertebral artery is patent and forms the basilar artery. The posterior cerebral arteries are patent.      MRA NECK:    The visualized portions of the great vessels are patent. The common, internal and external carotid arteries are patent bilaterally. The vertebral arteries are patent throughout their cervical course.      IMPRESSION:    MRI BRAIN:  Redemonstration of acute infarct involving the left basal ganglia/corona radiata.  Stable mild midline shift to the right.    MRA BRAIN:  Mild short segmental stenosis involving the distal left M1, and the proximal left anterior temporal artery.  Mildly narrowed left A1.  No evidence of large vessel occlusion, vascular malformation, or aneurysm.    MRA NECK:  No evidence of carotid or vertebral artery stenosis.          < end of copied text >        CVP   MAP/CPP/SBP target:   CO:      CI:       Enzymes/Trop:    10. Respiratory:   ABG:ABG - ( 03 Sep 2020 10:10 )  pH, Arterial: 7.50  pH, Blood: x     /  pCO2: 37    /  pO2: 73    / HCO3: 29    / Base Excess: 5.6   /  SaO2: 96          VBG:    Chest Xray:        Peak Pressure/[place] Pressure:    11.GI:    Prophalaxis:     Bowel mvt:     Abd distension:   LIVER FUNCTIONS - ( 04 Sep 2020 04:32 )  Alb: 3.8 g/dL / Pro: 7.1 g/dL / ALK PHOS: 88 U/L / ALT: 11 U/L / AST: 22 U/L / GGT: x             12.Renal/Fluids/Electrolytes:        140  |  101  |  20  ----------------------------<  170<H>  3.2<L>   |  22  |  1.0    Ca    8.3<L>      04 Sep 2020 04:32  Mg     1.6         TPro  7.1  /  Alb  3.8  /  TBili  1.2  /  DBili  x   /  AST  22  /  ALT  11  /  AlkPhos  88  -04      I&O's Detail    03 Sep 2020 07:  -  04 Sep 2020 07:00  --------------------------------------------------------  IN:    IV PiggyBack: 400 mL    sodium chloride 0.9%.: 1400 mL  Total IN: 1800 mL    OUT:    Indwelling Catheter - Urethral: 1250 mL  Total OUT: 1250 mL    Total NET: 550 mL      04 Sep 2020 07:01  -  04 Sep 2020 09:13  --------------------------------------------------------  IN:  Total IN: 0 mL    OUT:    Indwelling Catheter - Urethral: 125 mL  Total OUT: 125 mL    Total NET: -125 mL          13.ID:   TMax:   Vital Signs Last 24 Hrs  T(C): 37.6 (04 Sep 2020 08:00), Max: 38.7 (03 Sep 2020 18:00)  T(F): 99.6 (04 Sep 2020 08:00), Max: 101.7 (03 Sep 2020 18:00)  HR: 100 (04 Sep 2020 09:00) (100 - 132)  BP: 194/83 (04 Sep 2020 09:00) (136/77 - 194/83)  BP(mean): 121 (04 Sep 2020 09:00) (77 - 130)  RR: 11 (04 Sep 2020 09:00) (7 - 35)  SpO2: 96% (04 Sep 2020 09:00) (95% - 98%)   Lactate, Blood: 2.1 mmol/L ( @ 05:10)  Lactate, Blood: 1.4 mmol/L ( @ 21:04)  Lactate, Blood: 1.3 mmol/L ( @ 14:15)     Urinalysis Basic - ( 02 Sep 2020 10:21 )    Color: Yellow / Appearance: Turbid / S.038 / pH: x  Gluc: x / Ketone: Large  / Bili: Small / Urobili: 6 mg/dL   Blood: x / Protein: 300 mg/dL / Nitrite: Negative   Leuk Esterase: Moderate / RBC: 0 /HPF / WBC 33 /HPF   Sq Epi: x / Non Sq Epi: 7 /HPF / Bacteria: Few         Lines: Central[] Date inserted: Peripheral[]    14. Hematology:                         14.5   13.34 )-----------( 269      ( 04 Sep 2020 04:32 )             46.0      09-04    140  |  101  |  20  ----------------------------<  170<H>  3.2<L>   |  22  |  1.0    Ca    8.3<L>      04 Sep 2020 04:32  Mg     1.6     09-04    TPro  7.1  /  Alb  3.8  /  TBili  1.2  /  DBili  x   /  AST  22  /  ALT  11  /  AlkPhos  88  09-04     PT/INR - ( 04 Sep 2020 04:32 )   PT: 15.30 sec;   INR: 1.33 ratio         PTT - ( 04 Sep 2020 04:32 )  PTT:59.1 sec    DVT Prophylaxis Lovenox[ ] Heparin[ ] Venodynes[ ] SCD's[ ]

## 2020-09-05 NOTE — PROCEDURE NOTE - NSPROCDETAILS_GEN_ALL_CORE
ultrasound guidance/ultrasound assessment
location identified, draped/prepped, sterile technique used, needle inserted/introduced/CSF Obtained/area cleaned in sterile fashion

## 2020-09-06 NOTE — PROGRESS NOTE ADULT - ASSESSMENT
ASSESSMENT  50yMale with a PMH of CABG, CIDP, DM presenting with 4 days of decreased mentation. Not verbal, does not follow commands. CT showing basal ganglia infarct. Discussed prognosis with wife.    IMPRESSION  # R/o menigitis/ infections encephalitis  - s/p LP 9/4 with 0 WBC, 1 RBC; Protein, CSF: 75 mg/dL (09.04.20 @ 15:20), Glucose, CSF (09.04.20 @ 15:20)  - CSF PCR negative    # Ischemic basal ganglia lesion and mass effect likely causing central fever, non infectious.  #No endocarditis  CXR no infiltrates  BCx 8/31, 9/2 NGTD  UCx 9/2 NG    RECOMMENDATIONS  Can stop acyclovir today  WNV PCR, CSF Cryptococcal ag, Lyme, and VDRL are pending   Repeat Blood cultures if febrile    Aspiration precautions    Please call or message on [employer] Teams if with any questions.  Spectra [phone]    This is a preliminary incomplete pended note, all final recommendations to follow after interview and examination of the patient. ASSESSMENT  50yMale with a PMH of CABG, CIDP, DM presenting with 4 days of decreased mentation. Not verbal, does not follow commands. CT showing basal ganglia infarct. Discussed prognosis with wife.    IMPRESSION  # R/o menigitis/ infections encephalitis  - s/p LP 9/4 with 0 WBC, 1 RBC; Protein, CSF: 75 mg/dL (09.04.20 @ 15:20), Glucose, CSF (09.04.20 @ 15:20)  - CSF PCR negative    # Ischemic basal ganglia lesion and mass effect likely causing central fever, non infectious.  #No endocarditis  CXR no infiltrates  BCx 8/31, 9/2 NGTD  UCx 9/2 NG    RECOMMENDATIONS  Can stop acyclovir today  WNV PCR, CSF Cryptococcal ag, Lyme, and VDRL are pending   Repeat Blood cultures if febrile    Aspiration precautions    Please call or message on [employer] Teams if with any questions.  Spectra [phone]

## 2020-09-06 NOTE — PROGRESS NOTE ADULT - ASSESSMENT
Relevant brief History: This is a 50 years old left handed man with past medical history of MI, CABG x 5, DM, arteriosclerotic heart disease, chronic inflammatory demyelinating polyneuropathy (follows Neurologist Dr. Mitchell), prior chronic right caudate nucleus infarction, who presents from nursing home for AMS x 3 days, vomiting, decreased PO intake, and right sided facial droop. CTH reports of acute/subacute left basal ganglia/periventricular white matter. Brain MRI shows redemonstration of acute infarct involving the left basal ganglia/corona radiata. Stable mild midline shift to the right. MRA showed mild short segmental stenosis involving the distal left M1, and the proximal left anterior temporal artery and mildly narrowed left A1. Patient noted to be more lethargic and not following command. VEEG recommended but patient was hooked up today. He also noted to have shaking in the left arm. Recommended to Load with Keppra 1.5 mg once and increase Keppra to 1 gm BID. He is currently hooked up to monitor. Will try to send some images. Recommend to obtain CT head stat after the primary reading

## 2020-09-06 NOTE — CHART NOTE - NSCHARTNOTEFT_GEN_A_CORE
Neurology was called because patient was reported to have active tonic clonic seizures, was agitated, shaking his upper extremities. On my examination patient is currently somnolent after he received 2 mg of ativan. He is not moving around, has mild droop on the left side, not moving his extremities at all. He has fixed downward gaze bilaterally and pupils are reactive to light with mild jerky movements.    -Discussed with the attending.   -Use Ativan prn for breakthrough seizures.  -Load the patient with 1.5 gm of Keppra and switch to 1g of keppra BID.   -Follow up with video EEG.

## 2020-09-06 NOTE — PROGRESS NOTE ADULT - ASSESSMENT
#Stroke  #R/o Meningitis  #AMS with decreased speech low tone found with acute/subacute infarct in the left basal ganglia/ periventricular white matter, NIHSS 10 on admission  CT: Evolving acute/subacute infarct in the left basal ganglia, with mild mass effect. Stable mild midline shift to the right.  - decrease Level of consciousness  - Infectious work up including UA/blood cultures, CXR, CSF  - Ceftriaxone and Acyclovir  - LP successful 9/4/20  - f/u CSF results  - keep systolic 160-180  - IVF 75 cc/hr  -echo with bubble  -lipid profile WNL  -a1c 7.3  -keep aspirin and high dose statin  - ID consult  - Neuro checks q1  - f/u vEEG  - LE Duplex negative    # CIDP since 2011 per wife had deterioration [id number] and is bedbound since then, follows DR. Mitchell currently getting IVIG every three weeks with RN administering at home. Was also started on gabapentin a few months ago per wife Pt. gets very sleepy after gabapentin will hold for now.   -c/w baclofen and pramipexole    #diabetes  - c/w home insulin regimen 30 lantus bedtime 9 lispro with meals     #Hx of CAD s/p cabg  - c/w asa, carvedilol and statin  - hold plavix    #Hx of anxiety and depression Pt. is on many meds confirmed with wife- c/w home meds- duloxetine, quetiapine, hydroxyzine, alprazolam prn    #chronic pain   - hold morphine 60mg q12 continue home regimen    #Hx of BPH   -c/w tamsulosin    #Hx of chronic constipation   -c/w senna dulcolox as needed    #Hx of gerd   -on pantoprazole    #Diet: NPO  #DVT ppx- lovenox  #GI ppx on pantoprzole  #Code status: Full  #Dispo: MICU

## 2020-09-06 NOTE — PROGRESS NOTE ADULT - SUBJECTIVE AND OBJECTIVE BOX
Neurology Follow up note    Name  NAPOLEON CAST    HPI:  50 year old male PMH of cabg 2011, CIDP follows Dr. Mitchell has been getting IVIG q3 weeks at home, depression, anxiety, Diabetes, BPH, presents to ED for facial droop accompanied by wife.  Pt. is A0x1 baseline A0x3 has HHA 12 hours daily, coherent and can feed himself, and with home PT can sit up in bed has been unable to walk since 2013 from [facility]. Per wife at bedside Pt. has been acting different past five days, with low speech, barely talking and recently a right facial droop x 1 day. No focal muscle or sensory changes from baseline. Pt. able to follow commands and responds softly to simple questions but unaware of where he is or his name.  Wife reports gabapentin started a few months ago which causes  to be more sleepy, no other new meds started or stopped.   Per wife no fever/ chills/ headache, has chronic constipation but no acute changes. Denies chest pain, sob.  Pt. had CT head in ED which showed acute/subacute infarct in the left basal ganglia/ periventricular white matter with NIHSS 10 on admission. Pt to be admitted to stroke unit fir futher work up. (31 Aug 2020 17:40)      Interval History -  Patient remains lethargic. Not following commands. Intermittent shaking of left hand. VEEG pending until this morning.       Vital Signs Last 24 Hrs  T(C): 37.4 (06 Sep 2020 12:00), Max: 37.4 (06 Sep 2020 00:00)  T(F): 99.4 (06 Sep 2020 12:00), Max: 99.4 (06 Sep 2020 00:00)  HR: 82 (06 Sep 2020 15:00) (70 - 106)  BP: 196/82 (06 Sep 2020 15:00) (138/68 - 200/123)  BP(mean): 127 (06 Sep 2020 15:00) (96 - 164)  RR: 23 (06 Sep 2020 15:00) (18 - 30)  SpO2: 97% (06 Sep 2020 15:00) (96% - 99%)  ICU Vital Signs Last 24 Hrs  T(C): 37.4 (06 Sep 2020 12:00), Max: 37.4 (06 Sep 2020 00:00)  T(F): 99.4 (06 Sep 2020 12:00), Max: 99.4 (06 Sep 2020 00:00)  HR: 82 (06 Sep 2020 15:00) (70 - 106)  BP: 196/82 (06 Sep 2020 15:00) (138/68 - 200/123)  BP(mean): 127 (06 Sep 2020 15:00) (96 - 164)  ABP: --  ABP(mean): --  RR: 23 (06 Sep 2020 15:00) (18 - 30)  SpO2: 97% (06 Sep 2020 15:00) (96% - 99%)      Neurological Exam:   Lethargic. Not following commands   Facial grimacing to noxious stimulation   Midline gaze on primary gaze.   Mild spontaneous movement in extremities more in the left side     Medications  acetaminophen   Tablet .. 650 milliGRAM(s) Oral every 6 hours PRN  acyclovir IVPB      acyclovir IVPB 1000 milliGRAM(s) IV Intermittent every 8 hours  aspirin  chewable 81 milliGRAM(s) Oral daily  atorvastatin 80 milliGRAM(s) Oral at bedtime  baclofen 10 milliGRAM(s) Oral two times a day  bisacodyl 5 milliGRAM(s) Oral at bedtime PRN  carvedilol 3.125 milliGRAM(s) Oral every 12 hours  chlorhexidine 4% Liquid 1 Application(s) Topical <User Schedule>  dextrose 40% Gel 15 Gram(s) Oral once PRN  dextrose 50% Injectable 12.5 Gram(s) IV Push once  dextrose 50% Injectable 25 Gram(s) IV Push once  dextrose 50% Injectable 25 Gram(s) IV Push once  DULoxetine 60 milliGRAM(s) Oral two times a day  glucagon  Injectable 1 milliGRAM(s) IntraMuscular once PRN  insulin glargine Injectable (LANTUS) 30 Unit(s) SubCutaneous at bedtime  insulin lispro (HumaLOG) corrective regimen sliding scale   SubCutaneous three times a day before meals  insulin lispro Injectable (HumaLOG) 9 Unit(s) SubCutaneous three times a day before meals  levETIRAcetam  IVPB 1000 milliGRAM(s) IV Intermittent every 12 hours  morphine  - Injectable 2 milliGRAM(s) IV Push every 4 hours PRN  morphine  - Injectable 5 milliGRAM(s) IV Push every 6 hours  pantoprazole   Suspension 40 milliGRAM(s) Oral daily  pramipexole 0.5 milliGRAM(s) Oral every 12 hours  senna 2 Tablet(s) Oral at bedtime  sodium chloride 0.9%. 1000 milliLiter(s) IV Continuous <Continuous>  tamsulosin 0.4 milliGRAM(s) Oral at bedtime      Lab                        13.6   13.30 )-----------( 238      ( 06 Sep 2020 04:50 )             42.7     09-06    139  |  104  |  14  ----------------------------<  129<H>  2.9<L>   |  23  |  0.8    Ca    7.9<L>      06 Sep 2020 04:50  Phos  2.8     09-06  Mg     1.5     09-06    TPro  6.0  /  Alb  3.6  /  TBili  0.8  /  DBili  x   /  AST  25  /  ALT  13  /  AlkPhos  87  09-06    CAPILLARY BLOOD GLUCOSE      POCT Blood Glucose.: 136 mg/dL (06 Sep 2020 15:26)  POCT Blood Glucose.: 118 mg/dL (06 Sep 2020 10:14)  POCT Blood Glucose.: 133 mg/dL (06 Sep 2020 07:56)  POCT Blood Glucose.: 118 mg/dL (05 Sep 2020 22:57)    LIVER FUNCTIONS - ( 06 Sep 2020 04:50 )  Alb: 3.6 g/dL / Pro: 6.0 g/dL / ALK PHOS: 87 U/L / ALT: 13 U/L / AST: 25 U/L / GGT: x             CT head: 9/2  IMPRESSION:    Evolving acute/subacute infarct in the left basal ganglia, with mild mass effect. Stable mild midline shift to the right.    No evidence of acute intracranial hemorrhage, extra-axial fluid collection, or hydrocephalus.

## 2020-09-06 NOTE — PROGRESS NOTE ADULT - SUBJECTIVE AND OBJECTIVE BOX
SERENE, NAPOLEON  50y, Male  Allergy: penicillins (Unknown)  strawberry (Hives)      LOS  6d    CHIEF COMPLAINT: ams/ facial droop (06 Sep 2020 06:47)      INTERVAL EVENTS/HPI  - No acute events overnight  - T(F): , Max: 99.4 (09-06-20 @ 00:00)  - remains non-verbal with left arm tremor  - WBC Count: 13.30 (09-06-20 @ 04:50)  WBC Count: 14.42 (09-05-20 @ 04:30)     - Creatinine, Serum: 0.8 (09-06-20 @ 04:50)  Creatinine, Serum: 0.9 (09-05-20 @ 04:30)       ROS  General: Denies rigors, nightsweats  HEENT: Denies headache, rhinorrhea, sore throat, eye pain  CV: Denies CP, palpitations  PULM: Denies wheezing, hemoptysis  GI: Denies hematemesis, hematochezia, melena  : Denies discharge, hematuria  MSK: Denies arthralgias, myalgias  SKIN: Denies rash, lesions  NEURO: Denies paresthesias, weakness  PSYCH: Denies depression, anxiety    VITALS:  T(F): 99, Max: 99.4 (09-06-20 @ 00:00)  HR: 78  BP: 179/82  RR: 20Vital Signs Last 24 Hrs  T(C): 37.2 (06 Sep 2020 04:00), Max: 37.4 (06 Sep 2020 00:00)  T(F): 99 (06 Sep 2020 04:00), Max: 99.4 (06 Sep 2020 00:00)  HR: 78 (06 Sep 2020 04:30) (76 - 106)  BP: 179/82 (06 Sep 2020 04:00) (138/68 - 199/80)  BP(mean): 123 (06 Sep 2020 04:00) (96 - 139)  RR: 20 (06 Sep 2020 04:30) (20 - 33)  SpO2: 97% (06 Sep 2020 04:30) (96% - 99%)    PHYSICAL EXAM:  Gen: NAD, resting in bed  HEENT: Normocephalic, atraumatic  Neck: supple, no lymphadenopathy  CV: Regular rate & regular rhythm  Lungs: decreased BS at bases, no fremitus  Abdomen: Soft, BS present  Ext: Warm, well perfused  Neuro: non focal, awake  Skin: no rash, no erythema  Lines: no phlebitis    FH: Non-contributory  Social Hx: Non-contributory    TESTS & MEASUREMENTS:                        13.6   13.30 )-----------( 238      ( 06 Sep 2020 04:50 )             42.7     09-06    139  |  104  |  14  ----------------------------<  129<H>  2.9<L>   |  23  |  0.8    Ca    7.9<L>      06 Sep 2020 04:50  Phos  2.8     09-06  Mg     1.5     09-06    TPro  6.0  /  Alb  3.6  /  TBili  0.8  /  DBili  x   /  AST  25  /  ALT  13  /  AlkPhos  87  09-06    eGFR if Non African American: 103 mL/min/1.73M2 (09-06-20 @ 04:50)  eGFR if African American: 119 mL/min/1.73M2 (09-06-20 @ 04:50)    LIVER FUNCTIONS - ( 06 Sep 2020 04:50 )  Alb: 3.6 g/dL / Pro: 6.0 g/dL / ALK PHOS: 87 U/L / ALT: 13 U/L / AST: 25 U/L / GGT: x               Culture - Fungal, CSF (collected 09-04-20 @ 15:20)  Source: .CSF CSF  Preliminary Report (09-05-20 @ 15:20):    Testing in progress    Culture - Acid Fast - CSF (collected 09-04-20 @ 15:20)  Source: .CSF CSF    Culture - CSF with Gram Stain (collected 09-04-20 @ 15:20)  Source: .CSF CSF  Gram Stain (09-05-20 @ 01:29):    No polymorphonuclear cells seen    No organisms seen    by cytocentrifuge  Preliminary Report (09-05-20 @ 19:25):    No growth    Culture - Blood (collected 09-02-20 @ 11:47)  Source: .Blood None  Preliminary Report (09-04-20 @ 01:01):    No growth to date.    Culture - Urine (collected 09-02-20 @ 10:21)  Source: .Urine Catheterized  Final Report (09-03-20 @ 23:09):    No growth    Culture - Urine (collected 08-31-20 @ 14:47)  Source: .Urine Clean Catch (Midstream)  Final Report (09-01-20 @ 18:01):    No growth    Culture - Blood (collected 08-31-20 @ 14:15)  Source: .Blood Blood  Final Report (09-05-20 @ 23:00):    No Growth Final    Culture - Blood (collected 08-31-20 @ 14:15)  Source: .Blood Blood  Final Report (09-05-20 @ 23:00):    No Growth Final        Lactate, Blood: 2.1 mmol/L (09-03-20 @ 05:10)  Lactate, Blood: 1.4 mmol/L (09-02-20 @ 21:04)      INFECTIOUS DISEASES TESTING  COVID-19 PCR: NotDetec (08-31-20 @ 16:38)  MRSA PCR Result.: Positive (09-14-19 @ 11:30)      INFLAMMATORY MARKERS      RADIOLOGY & ADDITIONAL TESTS:  I have personally reviewed the last available Chest xray  CXR      CT      CARDIOLOGY TESTING  Transthoracic Echocardiogram:    EXAM:  2-D ECHO (TTE) COMPLETE        PROCEDURE DATE:  09/04/2020      INTERPRETATION:  REPORT:  TRANSTHORACIC ECHOCARDIOGRAM REPORT        Patient Name:   NAPOLEON CAST Accession #: [id number]  Medical Rec #:  [id number]    Height:      71.0 in 180.3 cm  YOB: 1970   Weight:      218.0 lb 98.88 kg  Patient Age:    50 years    BSA:         2.19 m²  Patient Gender: M           BP:          194/83 mmHg      Date of Exam:        9/4/2020 9:34:10 AM  Referring Physician: [id number] THAO GARCIA  Sonographer:         Wilfred Richards  Fellow:              Niru Ly    Reading Physician:   Jimena Kim M.D.    Procedure:   2D Echo/Doppler/Color Doppler Complete.  Indications: R55 - Syncope and Collapse  Diagnosis:   Syncope and collapse - R55        Summary:   1. Normal global left ventricular systolic function.   2. Mildly increased LV wall thickness.   3. Spectral Doppler shows impaired relaxation pattern of left ventricular myocardial filling (Grade I diastolic dysfunction).  4. Mildly enlarged left atrium.   5. Normal right atrial size.   6. Trace mitral valve regurgitation.    PHYSICIAN INTERPRETATION:  Left Ventricle: The left ventricular internal cavity size is normal. Left ventricular wall thickness is mildly increased. Global LV systolic function was normal. Normal segmental left ventricular systolic function. Abnormal (paradoxical) septal motion consistent with post-operative status. Spectral Doppler shows impaired relaxation pattern of left ventricular myocardial filling (Grade I diastolic dysfunction).  Right Ventricle: Normal right ventricular size and function.  Left Atrium: Mildly enlarged left atrium.  Right Atrium: Normal right atrial size.  Pericardium: There is no evidence of pericardial effusion.  Mitral Valve: The mitral valve is normal in structure. Trace mitral valve regurgitation is seen.  Tricuspid Valve: The tricuspid valve is normal in structure. Trivial tricuspid regurgitation is visualized.  Aortic Valve: The aortic valve is trileaflet. No evidence of aortic stenosis. Aortic valve thickening with normal leaflet opening. No evidence of aortic valve regurgitation is seen.  Aorta: The aortic root is normal in size and structure.      2D AND M-MODE MEASUREMENTS (normal ranges within parentheses):  Left Ventricle:                  Normal   Aorta/Left Atrium:             Normal  IVSd (2D):              1.27 cm (0.7-1.1) AoV Cusp Separation: 2.17 cm (1.5-2.6)  LVPWd (2D):             1.23 cm (0.7-1.1) Left Atrium (Mmode): 4.28 cm (1.9-4.0)  LVIDd (2D):             4.69 cm (3.4-5.7)  LVIDs (2D):             3.16 cm  LV FS (2D):             32.6 %   (>25%)  Relative Wall Thickness  0.52    (<0.42)    SPECTRAL DOPPLER ANALYSIS:  LV DIASTOLIC FUNCTION:  MV Peak E: 0.64 m/s Decel Time: 146 msec  MV Peak A: 1.08 m/s  E/A Ratio: 0.59    Aortic Valve:  AoV VMax:    1.27 m/s AoV Area, Vmax: 2.92 cm² Vmax Indx: 1.34 cm²/m²  AoV Pk Grad: 6.5 mmHg    LVOT Vmax: 0.95 m/s  LVOT VTI:  0.17 m  LVOT Diam: 2.23 cm    Mitral Valve:  MV P1/2 Time: 42.38 msec  MV Area, PHT: 5.19 cm²      [id number] Jimena Kim M.D., Electronically signed on 9/4/2020 at 12:59:18 PM            *** Final ***                  JIMENA KIM MD  This document has been electronically signed. Sep  4 2020  9:34AM             (09-04-20 @ 09:34)  12 Lead ECG:   Ventricular Rate 121 BPM    Atrial Rate 121 BPM    P-R Interval 136 ms    QRS Duration 82 ms    Q-T Interval 322 ms    QTC Calculation(Bezet) 457 ms    P Axis 39 degrees    R Axis 22 degrees    T Axis 127 degrees    Diagnosis Line Sinus tachycardia  Nonspecific ST and T wave abnormality  Abnormal ECG    Confirmed by Bong Cid ([id number]) on 9/2/2020 6:35:03 AM (09-02-20 @ 02:22)      MEDICATIONS  acyclovir IVPB     acyclovir IVPB 1000 IV Intermittent every 8 hours  aspirin  chewable 81 Oral daily  atorvastatin 80 Oral at bedtime  baclofen 10 Oral two times a day  carvedilol 3.125 Oral every 12 hours  chlorhexidine 4% Liquid 1 Topical <User Schedule>  dextrose 50% Injectable 12.5 IV Push once  dextrose 50% Injectable 25 IV Push once  dextrose 50% Injectable 25 IV Push once  DULoxetine 60 Oral two times a day  insulin glargine Injectable (LANTUS) 30 SubCutaneous at bedtime  insulin lispro (HumaLOG) corrective regimen sliding scale  SubCutaneous three times a day before meals  insulin lispro Injectable (HumaLOG) 9 SubCutaneous three times a day before meals  levETIRAcetam  IVPB 500 IV Intermittent every 12 hours  pantoprazole   Suspension 40 Oral daily  pramipexole 0.5 Oral every 12 hours  senna 2 Oral at bedtime  sodium chloride 0.9%. 1000 IV Continuous <Continuous>  tamsulosin 0.4 Oral at bedtime      WEIGHT  Weight (kg): 99 (09-03-20 @ 18:00)  Creatinine, Serum: 0.8 mg/dL (09-06-20 @ 04:50)      ANTIBIOTICS:  acyclovir IVPB      acyclovir IVPB 1000 milliGRAM(s) IV Intermittent every 8 hours      All available historical records have been reviewed

## 2020-09-06 NOTE — PROGRESS NOTE ADULT - SUBJECTIVE AND OBJECTIVE BOX
NAPOLEON CAST 50y Male  MRN#: [id number]       SUBJECTIVE  Patient is a 50y old Male who presents with a chief complaint of ams/ facial droop (05 Sep 2020 15:39)    This is a 50 years old left handed man with past medical history of MI, CABG x 5, DM, arteriosclerotic heart disease, chronic inflammatory demyelinating polyneuropathy (follows Neurologist Dr. Mitchell) diagnosed in 2011, prior chronic right caudate nucleus infarction, who presents from nursing home for AMS x 3 days, vomiting, decreased PO intake, and right sided facial droop. In ED, T 99.6F, /90, HR 74, RR 18, SpO2 96% RA.   He presented to the ED for facial droop accompanied by wife. He was A0x1. Per wife, he had been acting different past five days, with low speech, barely talking and recently a right facial droop x 1 day.  CTH showed evidence of an acute/subacute left basal ganglia/periventricular white matter. Patient was outside window for tpa. He was admitted to stroke unite    INTERVAL HISTORY:  9/1: In ED Main. He had RRT for an episode of lethargy and vomiting. BP at the time 230/90, and no new focal neurological deficit per chart review. He received hydralazine 5 mg x 1 and CT head reports of evolving left BG/CR with mild mass effect, and rightward midline shift measuring 9 mm (previously 7 mm).   9/3: Pt seen in 3E Stroke Unit. Overnight, pt with temp spike 38.4C and hypertensive, responded to tylenol. This morning at 9:12am, during Neuro exam patient found to have b/l LE clonus then developed generalized rhythmic tremors of b/l UE. Temp 98.7F per primary RN. Notified EEG tech for VEEG hook up. Patient was given STAT 2mg of Ativan and responded. Patient also to start Keppra 500 mg BID for now. , /84 this morning. WBC continues to trend up 18.69. Pending IR-guided LP as well; unsuccessful attempt at bedside yesterday.   Patient is lethargic and somnolent, unarousable and responding to pain.  He has an NG inserted, febrile.      Today is hospital day 6d, and this morning he is stable. Occasional LUE shaking. No other acute overnight events.     OBJECTIVE  PAST MEDICAL & SURGICAL HISTORY  GERD (gastroesophageal reflux disease)  BPH (benign prostatic hyperplasia)  Myocardial infarct, old  ASHD (arteriosclerotic heart disease)  Depression  Anxiety  Diabetes  CIDP (chronic inflammatory demyelinating polyneuropathy)  History of cholecystectomy  History of total left hip replacement  History of total right hip replacement: bilateral  S/P CABG x 5    ALLERGIES:  penicillins (Unknown)  strawberry (Hives)    MEDICATIONS:  STANDING MEDICATIONS  acyclovir IVPB      acyclovir IVPB 1000 milliGRAM(s) IV Intermittent every 8 hours  aspirin  chewable 81 milliGRAM(s) Oral daily  atorvastatin 80 milliGRAM(s) Oral at bedtime  baclofen 10 milliGRAM(s) Oral two times a day  carvedilol 3.125 milliGRAM(s) Oral every 12 hours  chlorhexidine 4% Liquid 1 Application(s) Topical <User Schedule>  dextrose 50% Injectable 12.5 Gram(s) IV Push once  dextrose 50% Injectable 25 Gram(s) IV Push once  dextrose 50% Injectable 25 Gram(s) IV Push once  DULoxetine 60 milliGRAM(s) Oral two times a day  insulin glargine Injectable (LANTUS) 30 Unit(s) SubCutaneous at bedtime  insulin lispro (HumaLOG) corrective regimen sliding scale   SubCutaneous three times a day before meals  insulin lispro Injectable (HumaLOG) 9 Unit(s) SubCutaneous three times a day before meals  levETIRAcetam  IVPB 500 milliGRAM(s) IV Intermittent every 12 hours  pantoprazole   Suspension 40 milliGRAM(s) Oral daily  pramipexole 0.5 milliGRAM(s) Oral every 12 hours  senna 2 Tablet(s) Oral at bedtime  sodium chloride 0.9%. 1000 milliLiter(s) IV Continuous <Continuous>  tamsulosin 0.4 milliGRAM(s) Oral at bedtime    PRN MEDICATIONS  acetaminophen   Tablet .. 650 milliGRAM(s) Oral every 6 hours PRN  bisacodyl 5 milliGRAM(s) Oral at bedtime PRN  dextrose 40% Gel 15 Gram(s) Oral once PRN  glucagon  Injectable 1 milliGRAM(s) IntraMuscular once PRN  morphine  - Injectable 2 milliGRAM(s) IV Push every 4 hours PRN      VITAL SIGNS: Last 24 Hours  T(C): 37.2 (06 Sep 2020 04:00), Max: 37.4 (06 Sep 2020 00:00)  T(F): 99 (06 Sep 2020 04:00), Max: 99.4 (06 Sep 2020 00:00)  HR: 78 (06 Sep 2020 04:30) (76 - 106)  BP: 179/82 (06 Sep 2020 04:00) (138/68 - 199/80)  BP(mean): 123 (06 Sep 2020 04:00) (96 - 139)  RR: 20 (06 Sep 2020 04:30) (20 - 33)  SpO2: 97% (06 Sep 2020 04:30) (96% - 99%)    LABS:                        13.6   13.30 )-----------( 238      ( 06 Sep 2020 04:50 )             42.7     09-06    139  |  104  |  14  ----------------------------<  129<H>  2.9<L>   |  23  |  0.8    Ca    7.9<L>      06 Sep 2020 04:50  Phos  2.8     09-06  Mg     1.5     09-06    TPro  6.0  /  Alb  3.6  /  TBili  0.8  /  DBili  x   /  AST  25  /  ALT  13  /  AlkPhos  87  09-06              Culture - Fungal, CSF (collected 04 Sep 2020 15:20)  Source: .CSF CSF  Preliminary Report (05 Sep 2020 15:20):    Testing in progress    Culture - Acid Fast - CSF (collected 04 Sep 2020 15:20)  Source: .CSF CSF    Culture - CSF with Gram Stain (collected 04 Sep 2020 15:20)  Source: .CSF CSF  Gram Stain (05 Sep 2020 01:29):    No polymorphonuclear cells seen    No organisms seen    by cytocentrifuge  Preliminary Report (05 Sep 2020 19:25):    No growth          RADIOLOGY:      PHYSICAL EXAM:    GENERAL: NAD, well-developed, AAOx2  HEENT:  Atraumatic, Normocephalic. EOMI, PERRLA, conjunctiva and sclera clear, No JVD  PULMONARY: Clear to auscultation bilaterally; No wheeze  CARDIOVASCULAR: Tachycardic rate and regular rhythm; No murmurs, rubs, or gallops  GASTROINTESTINAL: Soft, Nontender, mildly distended; Bowel sounds present  MUSCULOSKELETAL:  2+ Peripheral Pulses, No clubbing, cyanosis, or edema  NEUROLOGY: Alert but only oriented to name;  3/5 strength in UE, R > L; paralysis of LE b/r  SKIN: No rashes or lesions      ADMISSION SUMMARY  Patient is a 50y old Male who presents with a chief complaint of ams/ facial droop (05 Sep 2020 15:39)

## 2020-09-06 NOTE — PROGRESS NOTE ADULT - SUBJECTIVE AND OBJECTIVE BOX
ams/ facial droop (05 Sep 2020 15:39)    This is a 50 years old left handed man with past medical history of MI, CABG x 5, DM, arteriosclerotic heart disease, chronic inflammatory demyelinating polyneuropathy (follows Neurologist Dr. Mitchell) diagnosed in 2011, prior chronic right caudate nucleus infarction, who presents from nursing home for AMS x 3 days, vomiting, decreased PO intake, and right sided facial droop. In ED, T 99.6F, /90, HR 74, RR 18, SpO2 96% RA.   He presented to the ED for facial droop accompanied by wife. He was A0x1. Per wife, he had been acting different past five days, with low speech, barely talking and recently a right facial droop x 1 day.  CTH showed evidence of an acute/subacute left basal ganglia/periventricular white matter. Patient was outside window for tpa. He was admitted to stroke [facility]    INTERVAL HISTORY:  9/1: In ED Main. He had RRT for an episode of lethargy and vomiting. BP at the time 230/90, and no new focal neurological deficit per chart review. He received hydralazine 5 mg x 1 and CT head reports of evolving left BG/CR with mild mass effect, and rightward midline shift measuring 9 mm (previously 7 mm).   9/3: Pt seen in 3E Stroke Unit. Overnight, pt with temp spike 38.4C and hypertensive, responded to tylenol. This morning at 9:12am, during Neuro exam patient found to have b/l LE clonus then developed generalized rhythmic tremors of b/l UE. Temp 98.7F per primary RN. Notified EEG tech for VEEG hook up. Patient was given STAT 2mg of Ativan and responded. Patient also to start Keppra 500 mg BID for now. , /84 this morning. WBC continues to trend up 18.69. Pending IR-guided LP as well; unsuccessful attempt at bedside yesterday.   Patient is lethargic and somnolent, unarousable and responding to pain.  He has an NG inserted, febrile.    While undergoing a video EEQ this afternoon the patient had a grand mal seizure      MEDICATIONS  (STANDING):  acyclovir IVPB      acyclovir IVPB 1000 milliGRAM(s) IV Intermittent every 8 hours  aspirin  chewable 81 milliGRAM(s) Oral daily  atorvastatin 80 milliGRAM(s) Oral at bedtime  baclofen 10 milliGRAM(s) Oral two times a day  carvedilol 3.125 milliGRAM(s) Oral every 12 hours  chlorhexidine 4% Liquid 1 Application(s) Topical <User Schedule>  dextrose 50% Injectable 12.5 Gram(s) IV Push once  dextrose 50% Injectable 25 Gram(s) IV Push once  dextrose 50% Injectable 25 Gram(s) IV Push once  DULoxetine 60 milliGRAM(s) Oral two times a day  insulin glargine Injectable (LANTUS) 30 Unit(s) SubCutaneous at bedtime  insulin lispro (HumaLOG) corrective regimen sliding scale   SubCutaneous three times a day before meals  insulin lispro Injectable (HumaLOG) 9 Unit(s) SubCutaneous three times a day before meals  levETIRAcetam  IVPB 500 milliGRAM(s) IV Intermittent every 12 hours  morphine  - Injectable 5 milliGRAM(s) IV Push every 6 hours  pantoprazole   Suspension 40 milliGRAM(s) Oral daily  pramipexole 0.5 milliGRAM(s) Oral every 12 hours  senna 2 Tablet(s) Oral at bedtime  sodium chloride 0.9%. 1000 milliLiter(s) (100 mL/Hr) IV Continuous <Continuous>  tamsulosin 0.4 milliGRAM(s) Oral at bedtime    MEDICATIONS  (PRN):  acetaminophen   Tablet .. 650 milliGRAM(s) Oral every 6 hours PRN Moderate Pain (4 - 6)  bisacodyl 5 milliGRAM(s) Oral at bedtime PRN Constipation  dextrose 40% Gel 15 Gram(s) Oral once PRN Blood Glucose LESS THAN 70 milliGRAM(s)/deciliter  glucagon  Injectable 1 milliGRAM(s) IntraMuscular once PRN Glucose LESS THAN 70 milligrams/deciliter  morphine  - Injectable 2 milliGRAM(s) IV Push every 4 hours PRN Severe Pain (7 - 10)        VITAL SIGNS:  ICU Vital Signs Last 24 Hrs  T(C): 37.4 (06 Sep 2020 12:00), Max: 37.4 (06 Sep 2020 00:00)  T(F): 99.4 (06 Sep 2020 12:00), Max: 99.4 (06 Sep 2020 00:00)  HR: 82 (06 Sep 2020 15:00) (70 - 106)  BP: 196/82 (06 Sep 2020 15:00) (138/68 - 200/123)  BP(mean): 127 (06 Sep 2020 15:00) (96 - 164)  ABP: --  ABP(mean): --  RR: 23 (06 Sep 2020 15:00) (18 - 30)  SpO2: 97% (06 Sep 2020 15:00) (96% - 99%)        09-05 @ 07:01 - 09-06 @ 07:00  --------------------------------------------------------  IN: 2500 mL / OUT: 1762 mL / NET: 738 mL    09-06 @ 07:01  -  09-06 @ 15:26  --------------------------------------------------------  IN: 800 mL / OUT: 950 mL / NET: -150 mL      100 99 196/82       PHYSICAL EXAM:    General: NAD  HEENT: NC/AT; PERRL, clear conjunctiva  Neck: supple  Respiratory: CTA b/l  Cardiovascular: +S1/S2; RRR  Abdomen: soft, NT/ND; +BS x4  Extremities: WWP, 2+ peripheral pulses b/l; no LE edema  Skin: normal color and turgor; no rash  Neurological:    MEDICATIONS:  MEDICATIONS  (STANDING):  acyclovir IVPB      acyclovir IVPB 1000 milliGRAM(s) IV Intermittent every 8 hours  aspirin  chewable 81 milliGRAM(s) Oral daily  atorvastatin 80 milliGRAM(s) Oral at bedtime  baclofen 10 milliGRAM(s) Oral two times a day  carvedilol 3.125 milliGRAM(s) Oral every 12 hours  chlorhexidine 4% Liquid 1 Application(s) Topical <User Schedule>  dextrose 50% Injectable 12.5 Gram(s) IV Push once  dextrose 50% Injectable 25 Gram(s) IV Push once  dextrose 50% Injectable 25 Gram(s) IV Push once  DULoxetine 60 milliGRAM(s) Oral two times a day  insulin glargine Injectable (LANTUS) 30 Unit(s) SubCutaneous at bedtime  insulin lispro (HumaLOG) corrective regimen sliding scale   SubCutaneous three times a day before meals  insulin lispro Injectable (HumaLOG) 9 Unit(s) SubCutaneous three times a day before meals  levETIRAcetam  IVPB 500 milliGRAM(s) IV Intermittent every 12 hours  morphine  - Injectable 5 milliGRAM(s) IV Push every 6 hours  pantoprazole   Suspension 40 milliGRAM(s) Oral daily  pramipexole 0.5 milliGRAM(s) Oral every 12 hours  senna 2 Tablet(s) Oral at bedtime  sodium chloride 0.9%. 1000 milliLiter(s) (100 mL/Hr) IV Continuous <Continuous>  tamsulosin 0.4 milliGRAM(s) Oral at bedtime    MEDICATIONS  (PRN):  acetaminophen   Tablet .. 650 milliGRAM(s) Oral every 6 hours PRN Moderate Pain (4 - 6)  bisacodyl 5 milliGRAM(s) Oral at bedtime PRN Constipation  dextrose 40% Gel 15 Gram(s) Oral once PRN Blood Glucose LESS THAN 70 milliGRAM(s)/deciliter  glucagon  Injectable 1 milliGRAM(s) IntraMuscular once PRN Glucose LESS THAN 70 milligrams/deciliter  morphine  - Injectable 2 milliGRAM(s) IV Push every 4 hours PRN Severe Pain (7 - 10)              LABS:                        13.6   13.30 )-----------( 238      ( 06 Sep 2020 04:50 )             42.7     09-06    139  |  104  |  14  ----------------------------<  129<H>  2.9<L>   |  23  |  0.8    Ca    7.9<L>      06 Sep 2020 04:50  Phos  2.8     09-06  Mg     1.5     09-06    TPro  6.0  /  Alb  3.6  /  TBili  0.8  /  DBili  x   /  AST  25  /  ALT  13  /  AlkPhos  87  09-06 ams/ facial droop (05 Sep 2020 15:39)    This is a 50 years old left handed man with past medical history of MI, CABG x 5, DM, arteriosclerotic heart disease, chronic inflammatory demyelinating polyneuropathy (follows Neurologist Dr. Mitchell) diagnosed in 2011, prior chronic right caudate nucleus infarction, who presents from nursing home for AMS x 3 days, vomiting, decreased PO intake, and right sided facial droop. In ED, T 99.6F, /90, HR 74, RR 18, SpO2 96% RA.   He presented to the ED for facial droop accompanied by wife. He was A0x1. Per wife, he had been acting different past five days, with low speech, barely talking and recently a right facial droop x 1 day.  CTH showed evidence of an acute/subacute left basal ganglia/periventricular white matter. Patient was outside window for tpa. He was admitted to stroke [facility]    INTERVAL HISTORY:  9/1: In ED Main. He had RRT for an episode of lethargy and vomiting. BP at the time 230/90, and no new focal neurological deficit per chart review. He received hydralazine 5 mg x 1 and CT head reports of evolving left BG/CR with mild mass effect, and rightward midline shift measuring 9 mm (previously 7 mm).   9/3: Pt seen in 3E Stroke Unit. Overnight, pt with temp spike 38.4C and hypertensive, responded to tylenol. This morning at 9:12am, during Neuro exam patient found to have b/l LE clonus then developed generalized rhythmic tremors of b/l UE. Temp 98.7F per primary RN. Notified EEG tech for VEEG hook up. Patient was given STAT 2mg of Ativan and responded. Patient also to start Keppra 500 mg BID for now. , /84 this morning. WBC continues to trend up 18.69. Pending IR-guided LP as well; unsuccessful attempt at bedside yesterday.   Patient is lethargic and somnolent, unarousable and responding to pain.  He has an NG inserted, febrile.    While undergoing a video EEQ this afternoon the patient had a grand mal seizure      MEDICATIONS  (STANDING):  acyclovir IVPB      acyclovir IVPB 1000 milliGRAM(s) IV Intermittent every 8 hours  aspirin  chewable 81 milliGRAM(s) Oral daily  atorvastatin 80 milliGRAM(s) Oral at bedtime  baclofen 10 milliGRAM(s) Oral two times a day  carvedilol 3.125 milliGRAM(s) Oral every 12 hours  chlorhexidine 4% Liquid 1 Application(s) Topical <User Schedule>  dextrose 50% Injectable 12.5 Gram(s) IV Push once  dextrose 50% Injectable 25 Gram(s) IV Push once  dextrose 50% Injectable 25 Gram(s) IV Push once  DULoxetine 60 milliGRAM(s) Oral two times a day  insulin glargine Injectable (LANTUS) 30 Unit(s) SubCutaneous at bedtime  insulin lispro (HumaLOG) corrective regimen sliding scale   SubCutaneous three times a day before meals  insulin lispro Injectable (HumaLOG) 9 Unit(s) SubCutaneous three times a day before meals  levETIRAcetam  IVPB 500 milliGRAM(s) IV Intermittent every 12 hours  morphine  - Injectable 5 milliGRAM(s) IV Push every 6 hours  pantoprazole   Suspension 40 milliGRAM(s) Oral daily  pramipexole 0.5 milliGRAM(s) Oral every 12 hours  senna 2 Tablet(s) Oral at bedtime  sodium chloride 0.9%. 1000 milliLiter(s) (100 mL/Hr) IV Continuous <Continuous>  tamsulosin 0.4 milliGRAM(s) Oral at bedtime    MEDICATIONS  (PRN):  acetaminophen   Tablet .. 650 milliGRAM(s) Oral every 6 hours PRN Moderate Pain (4 - 6)  bisacodyl 5 milliGRAM(s) Oral at bedtime PRN Constipation  dextrose 40% Gel 15 Gram(s) Oral once PRN Blood Glucose LESS THAN 70 milliGRAM(s)/deciliter  glucagon  Injectable 1 milliGRAM(s) IntraMuscular once PRN Glucose LESS THAN 70 milligrams/deciliter  morphine  - Injectable 2 milliGRAM(s) IV Push every 4 hours PRN Severe Pain (7 - 10)        VITAL SIGNS:  ICU Vital Signs Last 24 Hrs  T(C): 37.4 (06 Sep 2020 12:00), Max: 37.4 (06 Sep 2020 00:00)  T(F): 99.4 (06 Sep 2020 12:00), Max: 99.4 (06 Sep 2020 00:00)  HR: 82 (06 Sep 2020 15:00) (70 - 106)  BP: 196/82 (06 Sep 2020 15:00) (138/68 - 200/123)  BP(mean): 127 (06 Sep 2020 15:00) (96 - 164)  ABP: --  ABP(mean): --  RR: 23 (06 Sep 2020 15:00) (18 - 30)  SpO2: 97% (06 Sep 2020 15:00) (96% - 99%)        09-05 @ 07:01 - 09-06 @ 07:00  --------------------------------------------------------  IN: 2500 mL / OUT: 1762 mL / NET: 738 mL    09-06 @ 07:01  -  09-06 @ 15:26  --------------------------------------------------------  IN: 800 mL / OUT: 950 mL / NET: -150 mL      100 99 196/82       PHYSICAL EXAM:    General: NAD  HEENT: NC/AT; PERRL, clear conjunctiva  Neck: supple  Respiratory: CTA b/l  Cardiovascular: +S1/S2; RRR  Abdomen: soft, NT/ND; +BS x4  Extremities: WWP, 2+ peripheral pulses b/l; no LE edema  Skin: normal color and turgor; no rash  Neurological:    MEDICATIONS:  MEDICATIONS  (STANDING):  acyclovir IVPB      acyclovir IVPB 1000 milliGRAM(s) IV Intermittent every 8 hours  aspirin  chewable 81 milliGRAM(s) Oral daily  atorvastatin 80 milliGRAM(s) Oral at bedtime  baclofen 10 milliGRAM(s) Oral two times a day  carvedilol 3.125 milliGRAM(s) Oral every 12 hours  chlorhexidine 4% Liquid 1 Application(s) Topical <User Schedule>  dextrose 50% Injectable 12.5 Gram(s) IV Push once  dextrose 50% Injectable 25 Gram(s) IV Push once  dextrose 50% Injectable 25 Gram(s) IV Push once  DULoxetine 60 milliGRAM(s) Oral two times a day  insulin glargine Injectable (LANTUS) 30 Unit(s) SubCutaneous at bedtime  insulin lispro (HumaLOG) corrective regimen sliding scale   SubCutaneous three times a day before meals  insulin lispro Injectable (HumaLOG) 9 Unit(s) SubCutaneous three times a day before meals  levETIRAcetam  IVPB 500 milliGRAM(s) IV Intermittent every 12 hours  morphine  - Injectable 5 milliGRAM(s) IV Push every 6 hours  pantoprazole   Suspension 40 milliGRAM(s) Oral daily  pramipexole 0.5 milliGRAM(s) Oral every 12 hours  senna 2 Tablet(s) Oral at bedtime  sodium chloride 0.9%. 1000 milliLiter(s) (100 mL/Hr) IV Continuous <Continuous>  tamsulosin 0.4 milliGRAM(s) Oral at bedtime    MEDICATIONS  (PRN):  acetaminophen   Tablet .. 650 milliGRAM(s) Oral every 6 hours PRN Moderate Pain (4 - 6)  bisacodyl 5 milliGRAM(s) Oral at bedtime PRN Constipation  dextrose 40% Gel 15 Gram(s) Oral once PRN Blood Glucose LESS THAN 70 milliGRAM(s)/deciliter  glucagon  Injectable 1 milliGRAM(s) IntraMuscular once PRN Glucose LESS THAN 70 milligrams/deciliter  morphine  - Injectable 2 milliGRAM(s) IV Push every 4 hours PRN Severe Pain (7 - 10)              LABS:                        13.6   13.30 )-----------( 238      ( 06 Sep 2020 04:50 )             42.7     09-06    139  |  104  |  14  ----------------------------<  129<H>  2.9<L>   |  23  |  0.8    Ca    7.9<L>      06 Sep 2020 04:50  Phos  2.8     09-06  Mg     1.5     09-06    TPro  6.0  /  Alb  3.6  /  TBili  0.8  /  DBili  x   /  AST  25  /  ALT  13  /  AlkPhos  87  09-06    A/P  1.Stroke  2.Seizures  3.R/o Meningitis  4.AMS with decreased speech low tone found with acute/subacute infarct in the left basal ganglia/ periventricular white matter,    5.Evolving acute/subacute infarct in the left basal ganglia, with mild mass effect. Stable mild midline shift to the right.  6.#Hx of CAD s/p cabg    Plan  1.increase tegretol 2 grams a day  2.video EEG  3,CT head  4.Ceftriaxone and Acyclovir  5.keep systolic 160-180  6.chronic pain  7.diabetes    # CIDP since 2011 per wife had deterioration [id number] and is bedbound since then, follows DR. Mitchell currently getting IVIG every three weeks with RN administering at home. Was also started on gabapentin a few months ago per wife Pt. gets very sleepy after gabapentin will hold for now.   -c/w baclofen and pramipexole ams/ facial droop (05 Sep 2020 15:39)    This is a 50 years old left handed man with past medical history of MI, CABG x 5, DM, arteriosclerotic heart disease, chronic inflammatory demyelinating polyneuropathy (follows Neurologist Dr. Mitchell) diagnosed in 2011, prior chronic right caudate nucleus infarction, who presents from nursing home for AMS x 3 days, vomiting, decreased PO intake, and right sided facial droop. In ED, T 99.6F, /90, HR 74, RR 18, SpO2 96% RA.   He presented to the ED for facial droop accompanied by wife. He was A0x1. Per wife, he had been acting different past five days, with low speech, barely talking and recently a right facial droop x 1 day.  CTH showed evidence of an acute/subacute left basal ganglia/periventricular white matter. Patient was outside window for tpa. He was admitted to stroke [facility]    INTERVAL HISTORY:  9/1: In ED Main. He had RRT for an episode of lethargy and vomiting. BP at the time 230/90, and no new focal neurological deficit per chart review. He received hydralazine 5 mg x 1 and CT head reports of evolving left BG/CR with mild mass effect, and rightward midline shift measuring 9 mm (previously 7 mm).   9/3: Pt seen in 3E Stroke Unit. Overnight, pt with temp spike 38.4C and hypertensive, responded to tylenol. This morning at 9:12am, during Neuro exam patient found to have b/l LE clonus then developed generalized rhythmic tremors of b/l UE. Temp 98.7F per primary RN. Notified EEG tech for VEEG hook up. Patient was given STAT 2mg of Ativan and responded. Patient also to start Keppra 500 mg BID for now. , /84 this morning. WBC continues to trend up 18.69. Pending IR-guided LP as well; unsuccessful attempt at bedside yesterday.   Patient is lethargic and somnolent, unarousable and responding to pain.  He has an NG inserted, febrile.    While undergoing a video EEQ this afternoon the patient had a grand mal seizure      MEDICATIONS  (STANDING):  acyclovir IVPB      acyclovir IVPB 1000 milliGRAM(s) IV Intermittent every 8 hours  aspirin  chewable 81 milliGRAM(s) Oral daily  atorvastatin 80 milliGRAM(s) Oral at bedtime  baclofen 10 milliGRAM(s) Oral two times a day  carvedilol 3.125 milliGRAM(s) Oral every 12 hours  chlorhexidine 4% Liquid 1 Application(s) Topical <User Schedule>  dextrose 50% Injectable 12.5 Gram(s) IV Push once  dextrose 50% Injectable 25 Gram(s) IV Push once  dextrose 50% Injectable 25 Gram(s) IV Push once  DULoxetine 60 milliGRAM(s) Oral two times a day  insulin glargine Injectable (LANTUS) 30 Unit(s) SubCutaneous at bedtime  insulin lispro (HumaLOG) corrective regimen sliding scale   SubCutaneous three times a day before meals  insulin lispro Injectable (HumaLOG) 9 Unit(s) SubCutaneous three times a day before meals  levETIRAcetam  IVPB 500 milliGRAM(s) IV Intermittent every 12 hours  morphine  - Injectable 5 milliGRAM(s) IV Push every 6 hours  pantoprazole   Suspension 40 milliGRAM(s) Oral daily  pramipexole 0.5 milliGRAM(s) Oral every 12 hours  senna 2 Tablet(s) Oral at bedtime  sodium chloride 0.9%. 1000 milliLiter(s) (100 mL/Hr) IV Continuous <Continuous>  tamsulosin 0.4 milliGRAM(s) Oral at bedtime    MEDICATIONS  (PRN):  acetaminophen   Tablet .. 650 milliGRAM(s) Oral every 6 hours PRN Moderate Pain (4 - 6)  bisacodyl 5 milliGRAM(s) Oral at bedtime PRN Constipation  dextrose 40% Gel 15 Gram(s) Oral once PRN Blood Glucose LESS THAN 70 milliGRAM(s)/deciliter  glucagon  Injectable 1 milliGRAM(s) IntraMuscular once PRN Glucose LESS THAN 70 milligrams/deciliter  morphine  - Injectable 2 milliGRAM(s) IV Push every 4 hours PRN Severe Pain (7 - 10)        VITAL SIGNS:  ICU Vital Signs Last 24 Hrs  T(C): 37.4 (06 Sep 2020 12:00), Max: 37.4 (06 Sep 2020 00:00)  T(F): 99.4 (06 Sep 2020 12:00), Max: 99.4 (06 Sep 2020 00:00)  HR: 82 (06 Sep 2020 15:00) (70 - 106)  BP: 196/82 (06 Sep 2020 15:00) (138/68 - 200/123)  BP(mean): 127 (06 Sep 2020 15:00) (96 - 164)  ABP: --  ABP(mean): --  RR: 23 (06 Sep 2020 15:00) (18 - 30)  SpO2: 97% (06 Sep 2020 15:00) (96% - 99%)        09-05 @ 07:01 - 09-06 @ 07:00  --------------------------------------------------------  IN: 2500 mL / OUT: 1762 mL / NET: 738 mL    09-06 @ 07:01  -  09-06 @ 15:26  --------------------------------------------------------  IN: 800 mL / OUT: 950 mL / NET: -150 mL      100 99 196/82       PHYSICAL EXAM:    General: NAD  HEENT: NC/AT; PERRL, clear conjunctiva  Neck: supple  Respiratory: CTA b/l  Cardiovascular: +S1/S2; RRR  Abdomen: soft, NT/ND; +BS x4  Extremities: WWP, 2+ peripheral pulses b/l; no LE edema  Skin: normal color and turgor; no rash  Neurological:    MEDICATIONS:  MEDICATIONS  (STANDING):  acyclovir IVPB      acyclovir IVPB 1000 milliGRAM(s) IV Intermittent every 8 hours  aspirin  chewable 81 milliGRAM(s) Oral daily  atorvastatin 80 milliGRAM(s) Oral at bedtime  baclofen 10 milliGRAM(s) Oral two times a day  carvedilol 3.125 milliGRAM(s) Oral every 12 hours  chlorhexidine 4% Liquid 1 Application(s) Topical <User Schedule>  dextrose 50% Injectable 12.5 Gram(s) IV Push once  dextrose 50% Injectable 25 Gram(s) IV Push once  dextrose 50% Injectable 25 Gram(s) IV Push once  DULoxetine 60 milliGRAM(s) Oral two times a day  insulin glargine Injectable (LANTUS) 30 Unit(s) SubCutaneous at bedtime  insulin lispro (HumaLOG) corrective regimen sliding scale   SubCutaneous three times a day before meals  insulin lispro Injectable (HumaLOG) 9 Unit(s) SubCutaneous three times a day before meals  levETIRAcetam  IVPB 500 milliGRAM(s) IV Intermittent every 12 hours  morphine  - Injectable 5 milliGRAM(s) IV Push every 6 hours  pantoprazole   Suspension 40 milliGRAM(s) Oral daily  pramipexole 0.5 milliGRAM(s) Oral every 12 hours  senna 2 Tablet(s) Oral at bedtime  sodium chloride 0.9%. 1000 milliLiter(s) (100 mL/Hr) IV Continuous <Continuous>  tamsulosin 0.4 milliGRAM(s) Oral at bedtime    MEDICATIONS  (PRN):  acetaminophen   Tablet .. 650 milliGRAM(s) Oral every 6 hours PRN Moderate Pain (4 - 6)  bisacodyl 5 milliGRAM(s) Oral at bedtime PRN Constipation  dextrose 40% Gel 15 Gram(s) Oral once PRN Blood Glucose LESS THAN 70 milliGRAM(s)/deciliter  glucagon  Injectable 1 milliGRAM(s) IntraMuscular once PRN Glucose LESS THAN 70 milligrams/deciliter  morphine  - Injectable 2 milliGRAM(s) IV Push every 4 hours PRN Severe Pain (7 - 10)              LABS:                        13.6   13.30 )-----------( 238      ( 06 Sep 2020 04:50 )             42.7     09-06    139  |  104  |  14  ----------------------------<  129<H>  2.9<L>   |  23  |  0.8    Ca    7.9<L>      06 Sep 2020 04:50  Phos  2.8     09-06  Mg     1.5     09-06    TPro  6.0  /  Alb  3.6  /  TBili  0.8  /  DBili  x   /  AST  25  /  ALT  13  /  AlkPhos  87  09-06    A/P  1.Stroke  2.Seizures  3.R/o Meningitis  4.AMS with decreased speech low tone found with acute/subacute infarct in the left basal ganglia/ periventricular white matter,    5.Evolving acute/subacute infarct in the left basal ganglia, with mild mass effect. Stable mild midline shift to the right.  6.#Hx of CAD s/p cabg    Plan  1.increase tegretol 2 grams a day  2.video EEG  3,CT head  4.Ceftriaxone and Acyclovir  5.keep systolic 160-180  6.chronic pain  7.diabetes

## 2020-09-07 NOTE — PROGRESS NOTE ADULT - SUBJECTIVE AND OBJECTIVE BOX
Neurocritical Care Progress Note:    1. Brief Presentation: AMS, lethargy    2. Today's Acute Problems: lethargic, does not follow commands    3. Relevant brief History: This is a 50 years old left handed man with past medical history of MI, CABG x 5, DM, arteriosclerotic heart disease, chronic inflammatory demyelinating polyneuropathy (follows Neurologist Dr. Mitchell), prior chronic right caudate nucleus infarction, who presents from nursing home for AMS x 3 days, vomiting, decreased PO intake, and right sided facial droop. In ED, T 99.6F, /90, HR 74, RR 18, SpO2 96% RA. CTH reports of acute/subacute left basal ganglia/periventricular white matter.   Overnight, pt with temp spike 38.4C and hypertensive, responded to tylenol. This morning at 9:12am, during Neuro exam patient found to have b/l LE clonus then developed generalized rhythmic tremors of b/l UE. Temp 98.7F per primary RN. Notified EEG tech for VEEG hook up. Patient was given STAT 2mg of Ativan and responded. Patient also to start Keppra 500 mg BID for now. , /84 this morning. WBC continues to trend up 18.69. Pending IR-guided LP as well; unsuccessful attempt at bedside yesterday.     4-Yesterday's Plan:    5. Last 24 hour updates: patient was upgraded to ICU, still spiking fevers and remains lethargic    6. Medications:   acyclovir IVPB      acyclovir IVPB 1000 milliGRAM(s) IV Intermittent every 8 hours  aspirin  chewable 81 milliGRAM(s) Oral daily  atorvastatin 80 milliGRAM(s) Oral at bedtime  baclofen 10 milliGRAM(s) Oral two times a day  carvedilol 3.125 milliGRAM(s) Oral every 12 hours  cefTRIAXone   IVPB 2000 milliGRAM(s) IV Intermittent every 12 hours  chlorhexidine 4% Liquid 1 Application(s) Topical <User Schedule>  dextrose 50% Injectable 12.5 Gram(s) IV Push once  dextrose 50% Injectable 25 Gram(s) IV Push once  dextrose 50% Injectable 25 Gram(s) IV Push once  DULoxetine 60 milliGRAM(s) Oral two times a day  insulin glargine Injectable (LANTUS) 30 Unit(s) SubCutaneous at bedtime  insulin lispro (HumaLOG) corrective regimen sliding scale   SubCutaneous three times a day before meals  insulin lispro Injectable (HumaLOG) 9 Unit(s) SubCutaneous three times a day before meals  levETIRAcetam  IVPB 500 milliGRAM(s) IV Intermittent every 12 hours  pantoprazole   Suspension 40 milliGRAM(s) Oral daily  pramipexole 0.5 milliGRAM(s) Oral every 12 hours  senna 2 Tablet(s) Oral at bedtime  sodium chloride 0.9%. 1000 milliLiter(s) IV Continuous <Continuous>  tamsulosin 0.4 milliGRAM(s) Oral at bedtime      7. Ancillary Management:   Chest PT[ ]   Head of bed >35 [ ]   Out of bed to chair [ ]   PT/OT/SP Eval [ ]   Spirometry[ ]   DVT prophalaxis[ ]    8.Neuro:   Awake: Spontaneously[ ] Occasionally[x ] To Voice [ ] To painful stimuli [ ]   AIert [ ]. Following commands: 3 steps[ ], 2 steps[ ], 1 step [ x], None [ ]   Orientation: 0[ ], 1[ ], 2[ ], 3[ ]. Tracking objects with eyes: [x ]   Language: remains mute  Time off sedation for exam: N/A  Pupils: Right   >2.5   Left    > 2.5     Corneal:+      Gag reflex: +    EOMI: +_    NIH STROKE SCALE  Item	                                                        Score  1 a.	Level of Consciousness	               	2  1 b. LOC Questions	                                    2  1 c.	LOC Commands	                               	2  2.	Best Gaze	                                    1  3.	Visual	                                                1  4.	Facial Palsy	                                    1  5 a.	Motor Arm - Left	                        1  5 b.	Motor Arm - Right	                        1  6 a.	Motor Leg - Left	                        4   6 b.	Motor Leg - Right	                        4   7.	Limb Ataxia	                                    0  8.	Sensory	                                                2  9.	Language	                                    3  10.	Dysarthria	                                    2  11.	Extinction and Inattention  	            1  _______________________________________________________________________  TOTAL	                                                           12      mRS:  0 No symptoms at all  1 No significant disability despite symptoms; able to carry out all usual duties and activities without assistance  2 Slight disability; unable to carry out all previous activities, but able to look after own affairs  3 Moderate disability; requiring some help, but able to walk without assistance  4 Moderately severe disability; unable to walk without assistance and unable to attend to own bodily needs without assistance  5 Severe disability; bedridden, incontinent and requiring constant nursing care and attention  6 Dead        VEEG in the last 24 hours:    Background--------------------------7-8 hz    Focal and generalized slowing--------------  borderline to mild  left FT focal slowing                                                                  mild generalized slowing    Interictal activity--------------------------none    Events------------------------------ none    Seizures----------------------- none    Impression:-------------------- abnormal as above    Plan - discussed with the neuro-critical team      Last CTH: < from: CT Head No Cont (20 @ 05:41) >  EXAM:  CT BRAIN            PROCEDURE DATE:  2020            INTERPRETATION:  CLINICAL HISTORY / REASON FOR EXAM: Altered mental status    COMPARISON: CT head without contrast from 2020    TECHNIQUE: Multiple axial CT images of the head were obtained with sagittal and coronal reformats from the base of the skull to the vertex without the administration of IV contrast.    FINDINGS:    Left basal ganglia focal hypodensity with associated mild mass effect with stable mild left-to-right midline shift measuring 6 mm. Chronic right caudate lacunar infarction.    The ventricles and cerebral sulci are age-appropriate.    There is no evidence of acute intracranial hemorrhage, extra-axial fluid collection, or space-occupying lesion.    There is no fracture to the calvarium. Mastoid air cells are well aerated bilaterally. Right maxillary sinus polyps versus mucus retention cyst.      IMPRESSION:    Evolving acute/subacute infarct in the left basal ganglia, with mild mass effect. Stable mild midline shift to the right.    No evidence of acute intracranial hemorrhage, extra-axial fluid collection, or hydrocephalus.      < end of copied text >      Last CTA/MRA:     Last CTP:    Last MRI:< from: MR Head No Cont (20 @ 19:33) >  EXAM:  MR ANGIO NECK IC        EXAM:  MR ANGIO BRAIN        EXAM:  MR BRAIN            PROCEDURE DATE:  2020            INTERPRETATION:  Clinical History / Reason for exam: Right-sided facial droop and altered mental status.    Technique: Contrast brain MRI, non-contrast brain MRA and contrast neck MRA was performed.    Through the brain, sagittal and axial T1, axial T2, FLAIR, diffusion weighted images and an ADC map were obtained. Axial T1 post-contrast images were obtained and reconstructed in sagittal and coronal planes. 9  cc of intravenous gadolinium was administered and 1 discarded,  for contrast MRI brain and contrast enhanced MR angiography of the extracranial circulation.    MR angiography of intracranial and extracranial circulation was performed with time of flight imaging technique. Maximal intensity projection images were reviewed in multiple planes.    Correlation is made with accompanying brain MRI and noncontrast CT head 2020.    Findings:    BRAIN MRI:    There is a region of restricted diffusion involving the left basal ganglia and corona radiata associated with mild mass effect consistent with an acute infarct. Stable mild left to right midline shift measuring 6 mm.    Chronic right caudate lacunar infarction.    The ventricles and cortical sulci are enlarged compatible mild diffuse parenchymal volume loss.    There is no evidence of acute intracranial hemorrhage or extra-axial fluid collection.    The orbits and mastoid complexes are unremarkable. There is mucosal thickening of the right maxillary sinus.    The visualized osseous structures and soft tissues are unremarkable.      MRA BRAIN:    The distal segments of the internal carotid arteries are patent bilaterally.    There is mild short segmental stenosis involving the distal M1 of the left middle cerebral artery, as well as the left anterior temporal artery. There is mild narrowing of the left A1 segments of the anterior cerebral artery.    The right anterior and middle cerebral arteries are patent cerebral arteries are patent.    The right vertebral artery terminates as right posterior inferior cerebellar artery. The left vertebral artery is patent and forms the basilar artery. The posterior cerebral arteries are patent.      MRA NECK:    The visualized portions of the great vessels are patent. The common, internal and external carotid arteries are patent bilaterally. The vertebral arteries are patent throughout their cervical course.      IMPRESSION:    MRI BRAIN:  Redemonstration of acute infarct involving the left basal ganglia/corona radiata.  Stable mild midline shift to the right.    MRA BRAIN:  Mild short segmental stenosis involving the distal left M1, and the proximal left anterior temporal artery.  Mildly narrowed left A1.  No evidence of large vessel occlusion, vascular malformation, or aneurysm.    MRA NECK:  No evidence of carotid or vertebral artery stenosis.          < end of copied text >        CVP   MAP/CPP/SBP target:   CO:      CI:       Enzymes/Trop:    10. Respiratory:   ABG:ABG - ( 03 Sep 2020 10:10 )  pH, Arterial: 7.50  pH, Blood: x     /  pCO2: 37    /  pO2: 73    / HCO3: 29    / Base Excess: 5.6   /  SaO2: 96          VBG:    Chest Xray:        Peak Pressure/[place] Pressure:    11.GI:    Prophalaxis:     Bowel mvt:     Abd distension:   LIVER FUNCTIONS - ( 04 Sep 2020 04:32 )  Alb: 3.8 g/dL / Pro: 7.1 g/dL / ALK PHOS: 88 U/L / ALT: 11 U/L / AST: 22 U/L / GGT: x             12.Renal/Fluids/Electrolytes:        140  |  101  |  20  ----------------------------<  170<H>  3.2<L>   |  22  |  1.0    Ca    8.3<L>      04 Sep 2020 04:32  Mg     1.6         TPro  7.1  /  Alb  3.8  /  TBili  1.2  /  DBili  x   /  AST  22  /  ALT  11  /  AlkPhos  88  -04      I&O's Detail    03 Sep 2020 07:  -  04 Sep 2020 07:00  --------------------------------------------------------  IN:    IV PiggyBack: 400 mL    sodium chloride 0.9%.: 1400 mL  Total IN: 1800 mL    OUT:    Indwelling Catheter - Urethral: 1250 mL  Total OUT: 1250 mL    Total NET: 550 mL      04 Sep 2020 07:01  -  04 Sep 2020 09:13  --------------------------------------------------------  IN:  Total IN: 0 mL    OUT:    Indwelling Catheter - Urethral: 125 mL  Total OUT: 125 mL    Total NET: -125 mL          13.ID:   TMax:   Vital Signs Last 24 Hrs  T(C): 37.6 (04 Sep 2020 08:00), Max: 38.7 (03 Sep 2020 18:00)  T(F): 99.6 (04 Sep 2020 08:00), Max: 101.7 (03 Sep 2020 18:00)  HR: 100 (04 Sep 2020 09:00) (100 - 132)  BP: 194/83 (04 Sep 2020 09:00) (136/77 - 194/83)  BP(mean): 121 (04 Sep 2020 09:00) (77 - 130)  RR: 11 (04 Sep 2020 09:00) (7 - 35)  SpO2: 96% (04 Sep 2020 09:00) (95% - 98%)   Lactate, Blood: 2.1 mmol/L ( @ 05:10)  Lactate, Blood: 1.4 mmol/L ( @ 21:04)  Lactate, Blood: 1.3 mmol/L ( @ 14:15)     Urinalysis Basic - ( 02 Sep 2020 10:21 )    Color: Yellow / Appearance: Turbid / S.038 / pH: x  Gluc: x / Ketone: Large  / Bili: Small / Urobili: 6 mg/dL   Blood: x / Protein: 300 mg/dL / Nitrite: Negative   Leuk Esterase: Moderate / RBC: 0 /HPF / WBC 33 /HPF   Sq Epi: x / Non Sq Epi: 7 /HPF / Bacteria: Few         Lines: Central[] Date inserted: Peripheral[]    14. Hematology:                         14.5   13.34 )-----------( 269      ( 04 Sep 2020 04:32 )             46.0      09-04    140  |  101  |  20  ----------------------------<  170<H>  3.2<L>   |  22  |  1.0    Ca    8.3<L>      04 Sep 2020 04:32  Mg     1.6     09-04    TPro  7.1  /  Alb  3.8  /  TBili  1.2  /  DBili  x   /  AST  22  /  ALT  11  /  AlkPhos  88  09-04     PT/INR - ( 04 Sep 2020 04:32 )   PT: 15.30 sec;   INR: 1.33 ratio         PTT - ( 04 Sep 2020 04:32 )  PTT:59.1 sec    DVT Prophylaxis Lovenox[ ] Heparin[ ] Venodynes[ ] SCD's[ ]

## 2020-09-07 NOTE — PROGRESS NOTE ADULT - ASSESSMENT
#Stroke  #R/o Meningitis  #AMS with decreased speech low tone found with acute/subacute infarct in the left basal ganglia/ periventricular white matter, NIHSS 10 on admission  CT: Evolving acute/subacute infarct in the left basal ganglia, with mild mass effect. Stable mild midline shift to the right.  - decrease Level of consciousness  - Infectious work up including UA/blood cultures, CXR, CSF  - Off antibiotics   - D/c Acyclovir, as HSV negative  - LP successful 9/4/20  - f/u CSF results - unremarkable to date  - keep systolic 160-180  - IVF 75 cc/hr  -echo with bubble  -lipid profile WNL  -a1c 7.3  -keep aspirin and high dose statin  - ID consult  - Neuro checks q1  - f/u vEEG  - LE Duplex negative    # CIDP since 2011 per wife had deterioration [id number] and is bedbound since then, follows DR. Mitchell currently getting IVIG every three weeks with RN administering at home. Was also started on gabapentin a few months ago per wife Pt. gets very sleepy after gabapentin will hold for now.   -c/w baclofen and pramipexole    #diabetes  - c/w home insulin regimen 30 lantus bedtime 9 lispro with meals     #Hx of CAD s/p cabg  - c/w asa, carvedilol and statin  - hold plavix    #Hx of anxiety and depression Pt. is on many meds confirmed with wife- c/w home meds- duloxetine, quetiapine, hydroxyzine, alprazolam prn    #chronic pain   - hold morphine 60mg q12 continue home regimen    #Hx of BPH   -c/w tamsulosin    #Hx of chronic constipation   -c/w senna dulcolox as needed    #Hx of gerd   -on pantoprazole    #Diet: NPO  #DVT ppx- lovenox  #GI ppx on pantoprzole  #Code status: Full  #Dispo: MICU #Stroke  #R/o Meningitis  #AMS with decreased speech low tone found with acute/subacute infarct in the left basal ganglia/ periventricular white matter, NIHSS 10 on admission  CT: Evolving acute/subacute infarct in the left basal ganglia, with mild mass effect. Stable mild midline shift to the right.  - decrease Level of consciousness  - Infectious work up including UA/blood cultures, CXR, CSF  - Off antibiotics   - D/c Acyclovir, as HSV negative  - Keppra 1g q12  - LP successful 9/4/20  - f/u CSF results - unremarkable to date  - keep systolic 160-180  - IVF 75 cc/hr  -echo with bubble  -lipid profile WNL  -a1c 7.3  -keep aspirin and high dose statin  - ID consult  - Neuro checks q1  - f/u vEEG  - CT Head - No hemorrhagic transformation, artifact from EEG  - LE Duplex negative    # CIDP since 2011 per wife had deterioration [phone] and is bedbound since then, follows DR. Mitchell currently getting IVIG every three weeks with RN administering at home. Was also started on gabapentin a few months ago per wife Pt. gets very sleepy after gabapentin will hold for now.   -c/w baclofen and pramipexole    #diabetes  - c/w home insulin regimen 30 lantus bedtime 9 lispro with meals     #Hx of CAD s/p cabg  - c/w asa, carvedilol and statin  - hold plavix    #Hx of anxiety and depression Pt. is on many meds confirmed with wife- c/w home meds- duloxetine, quetiapine, hydroxyzine, alprazolam prn    #chronic pain   - hold morphine 60mg q12 continue home regimen    #Hx of BPH   -c/w tamsulosin    #Hx of chronic constipation   -c/w senna dulcolox as needed    #Hx of gerd   -on pantoprazole    #Diet: NPO  #DVT ppx- lovenox  #GI ppx on pantoprzole  #Code status: Full  #Dispo: MICU

## 2020-09-07 NOTE — PROGRESS NOTE ADULT - SUBJECTIVE AND OBJECTIVE BOX
This is a 50 years old left handed man with past medical history of MI, CABG x 5, DM, arteriosclerotic heart disease, chronic inflammatory demyelinating polyneuropathy (follows Neurologist Dr. Mitchell) diagnosed in 2011, prior chronic right caudate nucleus infarction, who presents from nursing home for AMS x 3 days, vomiting, decreased PO intake, and right sided facial droop. In ED, T 99.6F, /90, HR 74, RR 18, SpO2 96% RA.   He presented to the ED for facial droop accompanied by wife. He was A0x1. Per wife, he had been acting different past five days, with low speech, barely talking and recently a right facial droop x 1 day.  CTH showed evidence of an acute/subacute left basal ganglia/periventricular white matter. Patient was outside window for tpa. He was admitted to stroke unite    INTERVAL HISTORY:  9/1: In ED Main. He had RRT for an episode of lethargy and vomiting. BP at the time 230/90, and no new focal neurological deficit per chart review. He received hydralazine 5 mg x 1 and CT head reports of evolving left BG/CR with mild mass effect, and rightward midline shift measuring 9 mm (previously 7 mm).   9/3: Pt seen in 3E Stroke Unit. Overnight, pt with temp spike 38.4C and hypertensive, responded to tylenol. This morning at 9:12am, during Neuro exam patient found to have b/l LE clonus then developed generalized rhythmic tremors of b/l UE. Temp 98.7F per primary RN. Notified EEG tech for VEEG hook up. Patient was given STAT 2mg of Ativan and responded. Patient also to start Keppra 500 mg BID for now. , /84 this morning. WBC continues to trend up 18.69. Pending IR-guided LP as well; unsuccessful attempt at bedside yesterday.   Patient is lethargic and somnolent, unarousable and responding to pain.  He has an NG inserted, febrile.    While undergoing a video EEQ  the patient had a grand mal seizure yesterday    aspirin  chewable 81 milliGRAM(s) Oral daily  atorvastatin 80 milliGRAM(s) Oral at bedtime  baclofen 10 milliGRAM(s) Oral two times a day  carvedilol 3.125 milliGRAM(s) Oral every 12 hours  chlorhexidine 4% Liquid 1 Application(s) Topical <User Schedule>  dextrose 50% Injectable 12.5 Gram(s) IV Push once  dextrose 50% Injectable 25 Gram(s) IV Push once  dextrose 50% Injectable 25 Gram(s) IV Push once  DULoxetine 60 milliGRAM(s) Oral two times a day  insulin glargine Injectable (LANTUS) 30 Unit(s) SubCutaneous at bedtime  insulin lispro (HumaLOG) corrective regimen sliding scale   SubCutaneous three times a day before meals  insulin lispro Injectable (HumaLOG) 9 Unit(s) SubCutaneous three times a day before meals  levETIRAcetam  IVPB 1000 milliGRAM(s) IV Intermittent every 12 hours  morphine  - Injectable 5 milliGRAM(s) IV Push every 6 hours  pantoprazole   Suspension 40 milliGRAM(s) Oral daily  pramipexole 0.5 milliGRAM(s) Oral every 12 hours  senna 2 Tablet(s) Oral at bedtime  sodium chloride 0.9%. 1000 milliLiter(s) (125 mL/Hr) IV Continuous <Continuous>  tamsulosin 0.4 milliGRAM(s) Oral at bedtime    MEDICATIONS  (PRN):  acetaminophen   Tablet .. 650 milliGRAM(s) Oral every 6 hours PRN Moderate Pain (4 - 6)  bisacodyl 5 milliGRAM(s) Oral at bedtime PRN Constipation  dextrose 40% Gel 15 Gram(s) Oral once PRN Blood Glucose LESS THAN 70 milliGRAM(s)/deciliter  glucagon  Injectable 1 milliGRAM(s) IntraMuscular once PRN Glucose LESS THAN 70 milligrams/deciliter  morphine  - Injectable 2 milliGRAM(s) IV Push every 4 hours PRN Severe Pain (7 - 10)          VITAL SIGNS:  ICU Vital Signs Last 24 Hrs  T(C): 37.1 (07 Sep 2020 12:00), Max: 37.3 (06 Sep 2020 20:00)  T(F): 98.8 (07 Sep 2020 12:00), Max: 99.1 (06 Sep 2020 20:00)  HR: 94 (07 Sep 2020 14:00) (70 - 108)  BP: 179/89 (07 Sep 2020 14:00) (162/78 - 207/95)  BP(mean): 132 (07 Sep 2020 14:00) (97 - 146)  ABP: --  ABP(mean): --  RR: 21 (07 Sep 2020 14:00) (11 - 25)  SpO2: 98% (07 Sep 2020 14:00) (97% - 99%)        09-06 @ 07:01  -  09-07 @ 07:00  --------------------------------------------------------  IN: 2875 mL / OUT: 3500 mL / NET: -625 mL    09-07 @ 07:01  -  09-07 @ 18:01  --------------------------------------------------------  IN: 1075 mL / OUT: 1550 mL / NET: -475 mL      CAPILLARY BLOOD GLUCOSE      HEENT: NC/AT  Mouth: no erythema  Chest: Clear  Heart: S1 S2  Abdomen soft +BS  extremities no edema                         13.6   13.30 )-----------( 238      ( 06 Sep 2020 04:50 )             42.7     09-06    137  |  99  |  10  ----------------------------<  158<H>  3.3<L>   |  21  |  0.8    Ca    8.4<L>      06 Sep 2020 16:00  Phos  2.8     09-06  Mg     1.5     09-06    TPro  6.0  /  Alb  3.6  /  TBili  0.8  /  DBili  x   /  AST  25  /  ALT  13  /  AlkPhos  87  09-06                      A/P  1.Stroke  2.Seizures  3.R/o Meningitis  4.AMS with decreased speech low tone found with acute/subacute infarct in the left basal ganglia/ periventricular white matter,    5.Evolving acute/subacute infarct in the left basal ganglia, with mild mass effect. Stable mild midline shift to the right.  6.#Hx of CAD s/p cabg    Plan  1.increase tegretol 2 grams a day  2.video EEG  3,CT head  4.Ceftriaxone and Acyclovir  5.keep systolic 160-180  6.chronic pain  7.diabetes This is a 50 years old left handed man with past medical history of MI, CABG x 5, DM, arteriosclerotic heart disease, chronic inflammatory demyelinating polyneuropathy (follows Neurologist Dr. Mitchell) diagnosed in 2011, prior chronic right caudate nucleus infarction, who presents from nursing home for AMS x 3 days, vomiting, decreased PO intake, and right sided facial droop. In ED, T 99.6F, /90, HR 74, RR 18, SpO2 96% RA.   He presented to the ED for facial droop accompanied by wife. He was A0x1. Per wife, he had been acting different past five days, with low speech, barely talking and recently a right facial droop x 1 day.  CTH showed evidence of an acute/subacute left basal ganglia/periventricular white matter. Patient was outside window for tpa. He was admitted to stroke [facility]    INTERVAL HISTORY:  9/1: In ED Main. He had RRT for an episode of lethargy and vomiting. BP at the time 230/90, and no new focal neurological deficit per chart review. He received hydralazine 5 mg x 1 and CT head reports of evolving left BG/CR with mild mass effect, and rightward midline shift measuring 9 mm (previously 7 mm).   9/3: Pt seen in 3E Stroke Unit. Overnight, pt with temp spike 38.4C and hypertensive, responded to tylenol. This morning at 9:12am, during Neuro exam patient found to have b/l LE clonus then developed generalized rhythmic tremors of b/l UE. Temp 98.7F per primary RN. Notified EEG tech for VEEG hook up. Patient was given STAT 2mg of Ativan and responded. Patient also to start Keppra 500 mg BID for now. , /84 this morning. WBC continues to trend up 18.69. Pending IR-guided LP as well; unsuccessful attempt at bedside yesterday.   Patient is lethargic and somnolent, unarousable and responding to pain.  He has an NG inserted, febrile.    While undergoing a video EEQ  the patient had a grand mal seizure yesterday    aspirin  chewable 81 milliGRAM(s) Oral daily  atorvastatin 80 milliGRAM(s) Oral at bedtime  baclofen 10 milliGRAM(s) Oral two times a day  carvedilol 3.125 milliGRAM(s) Oral every 12 hours  chlorhexidine 4% Liquid 1 Application(s) Topical <User Schedule>  dextrose 50% Injectable 12.5 Gram(s) IV Push once  dextrose 50% Injectable 25 Gram(s) IV Push once  dextrose 50% Injectable 25 Gram(s) IV Push once  DULoxetine 60 milliGRAM(s) Oral two times a day  insulin glargine Injectable (LANTUS) 30 Unit(s) SubCutaneous at bedtime  insulin lispro (HumaLOG) corrective regimen sliding scale   SubCutaneous three times a day before meals  insulin lispro Injectable (HumaLOG) 9 Unit(s) SubCutaneous three times a day before meals  levETIRAcetam  IVPB 1000 milliGRAM(s) IV Intermittent every 12 hours  morphine  - Injectable 5 milliGRAM(s) IV Push every 6 hours  pantoprazole   Suspension 40 milliGRAM(s) Oral daily  pramipexole 0.5 milliGRAM(s) Oral every 12 hours  senna 2 Tablet(s) Oral at bedtime  sodium chloride 0.9%. 1000 milliLiter(s) (125 mL/Hr) IV Continuous <Continuous>  tamsulosin 0.4 milliGRAM(s) Oral at bedtime    MEDICATIONS  (PRN):  acetaminophen   Tablet .. 650 milliGRAM(s) Oral every 6 hours PRN Moderate Pain (4 - 6)  bisacodyl 5 milliGRAM(s) Oral at bedtime PRN Constipation  dextrose 40% Gel 15 Gram(s) Oral once PRN Blood Glucose LESS THAN 70 milliGRAM(s)/deciliter  glucagon  Injectable 1 milliGRAM(s) IntraMuscular once PRN Glucose LESS THAN 70 milligrams/deciliter  morphine  - Injectable 2 milliGRAM(s) IV Push every 4 hours PRN Severe Pain (7 - 10)          VITAL SIGNS:  ICU Vital Signs Last 24 Hrs  T(C): 37.1 (07 Sep 2020 12:00), Max: 37.3 (06 Sep 2020 20:00)  T(F): 98.8 (07 Sep 2020 12:00), Max: 99.1 (06 Sep 2020 20:00)  HR: 94 (07 Sep 2020 14:00) (70 - 108)  BP: 179/89 (07 Sep 2020 14:00) (162/78 - 207/95)  BP(mean): 132 (07 Sep 2020 14:00) (97 - 146)  ABP: --  ABP(mean): --  RR: 21 (07 Sep 2020 14:00) (11 - 25)  SpO2: 98% (07 Sep 2020 14:00) (97% - 99%)        09-06 @ 07:01  -  09-07 @ 07:00  --------------------------------------------------------  IN: 2875 mL / OUT: 3500 mL / NET: -625 mL    09-07 @ 07:01  -  09-07 @ 18:01  --------------------------------------------------------  IN: 1075 mL / OUT: 1550 mL / NET: -475 mL      CAPILLARY BLOOD GLUCOSE      HEENT: NC/AT  Mouth: no erythema  Chest: Clear  Heart: S1 S2  Abdomen soft +BS  extremities no edema                         13.6   13.30 )-----------( 238      ( 06 Sep 2020 04:50 )             42.7     09-06    137  |  99  |  10  ----------------------------<  158<H>  3.3<L>   |  21  |  0.8    Ca    8.4<L>      06 Sep 2020 16:00  Phos  2.8     09-06  Mg     1.5     09-06    TPro  6.0  /  Alb  3.6  /  TBili  0.8  /  DBili  x   /  AST  25  /  ALT  13  /  AlkPhos  87  09-06    FINDINGS: There is artifact from EEG leads.    Ventricles and sulci are normal in size and configuration for the patient's age, noting asymmetry of the lateral ventricles, presumably anatomic variation. No extra axial collection.  No acute intracranial hemorrhage.  No mass effect or edema.  There is question of hypoattenuation in the right cerebellar hemisphere. Unclear if this is artifact.    Redemonstration of left frontal periventricular corona radiata hypoattenuation extending to the left basal ganglia, compatible with evolving infarct, with mass effect and similar appearing rightward midline/subfalcine shift. Redemonstration of focal hypoattenuation in the right periventricular frontal white matter, presumably lacunar infarct.      Mucosal thickening in the left sphenoid sinus. Incompletely evaluated mucosal thickening in the right maxillary sinus. Calvarium is unremarkable. Partially imaged right nasoenteric tube.      IMPRESSION:    1. There is artifact from EEG electrodes.    2. Questionable hypoattenuation in the right cerebellar hemisphere. Unclear if this is a true finding or artifact. If there is clinical concern noncontrast head CT follow-up could be obtained following removal of EEG leads.    3. Redemonstration of evolving left basal ganglia/corona radiata infarct with persistent rightward midline shift of approximately 5 to 6 mm. No hemorrhagic transformation.    4. Otherwise no significant change since the prior study.      A/P  1.Stroke  2.Seizures  3.R/o Meningitis  4.AMS with decreased speech low tone found with acute/subacute infarct in the left basal ganglia/ periventricular white matter,    5.Evolving acute/subacute infarct in the left basal ganglia, with mild mass effect. Stable mild midline shift to the right.  6.#Hx of CAD s/p cabg    Plan  1.increase tegretol 2 grams a day  2.video EEG  3,CT head  4.Ceftriaxone and Acyclovir  5.keep systolic 160-180  6.chronic pain  7.diabetes

## 2020-09-07 NOTE — PROGRESS NOTE ADULT - ASSESSMENT
A 50 years old left handed man with past medical history of MI, CABG x 5, DM, arteriosclerotic heart disease, chronic inflammatory demyelinating polyneuropathy (follows Neurologist Dr. Mitchell), prior chronic right caudate nucleus infarction, who presents from nursing home for AMS x 3 days, vomiting, decreased PO intake, and right sided facial droop. In ED, T 99.6F, /90, HR 74, RR 18, SpO2 96% RA. CTH reports of acute/subacute left basal ganglia/periventricular white matter. S/p RRT on 9/1 for episode of lethargy and vomiting. BP at time 230/90, and STAT CT head reports of evolving left BG/CR with mild mass effect, and rightward midline shift measuring 9 mm (previously 7 mm), no mention of compression. MRI brain with acute left CR in correlation with CTH. MRA brain with mild  intracranial stenosis in left M1 region. Currently, etiology of ischemic infarction unclear, considering his infectious and encephalopathic state underlying meningitis/encephalitis cannot be excluded. Patient was upgraded to ICU,   SUGGESTIONS:    - Followed basic commands on exam, EEG reconnected by me this evening  - f/u vEEG  - D/C Acyclovir, as HSV negative  - Off abx, agree  - f/u remainder of CSF studies pending  - Continue Keppra  - CT noted  - Cont. high dose statin therapy   - Maintain glycemic control   - Maintain HOB elevated at least 40 degrees  - Maintain Na level in the upper limit of normal 140-145 and replete intravascular volume

## 2020-09-07 NOTE — PROGRESS NOTE ADULT - ASSESSMENT
ASSESSMENT  50yMale with a PMH of CABG, CIDP, DM presenting with 4 days of decreased mentation. Not verbal, does not follow commands. CT showing basal ganglia infarct. Discussed prognosis with wife.    IMPRESSION  #Doubt infectious meningitis or encephalitis. CSF WBC 0  - CSF cx NG   - s/p LP 9/4 with 0 WBC, 1 RBC; Protein, CSF: 75 mg/dL (09.04.20 @ 15:20), Glucose, CSF (09.04.20 @ 15:20)  - CSF PCR negative, HSV 1/2 PCR NEGATIVE     # Ischemic basal ganglia lesion and mass effect likely causing central fever, non infectious.  #No endocarditis  CXR no infiltrates  BCx 8/31, 9/2 NGTD  UCx 9/2 NG    RECOMMENDATIONS  D/C Acyclovir   WNV PCR (ADD on IGM if possible), ADD on CSF Cryptococcal ag, Lyme, and VDRL are pending   Repeat Blood cultures if febrile    Aspiration precautions  This is a preliminary incomplete pended note, all final recommendations to follow after interview and examination of the patient. ASSESSMENT  50yMale with a PMH of CABG, CIDP, DM presenting with 4 days of decreased mentation. Not verbal, does not follow commands. CT showing basal ganglia infarct. Discussed prognosis with wife.    IMPRESSION  #Doubt infectious meningitis or encephalitis. CSF WBC 0  - CSF cx NG   - s/p LP 9/4 with 0 WBC, 1 RBC; Protein, CSF: 75 mg/dL (09.04.20 @ 15:20), Glucose, CSF (09.04.20 @ 15:20)  - CSF PCR negative, HSV 1/2 PCR NEGATIVE     # Ischemic basal ganglia lesion and mass effect likely causing central fever, non infectious.  #No endocarditis  CXR no infiltrates  BCx 8/31, 9/2 NGTD  UCx 9/2 NG    RECOMMENDATIONS  D/C Acyclovir   WNV PCR (ADD on IGM if possible), ADD on CSF Cryptococcal ag, Lyme, and VDRL are pending   Repeat Blood cultures if febrile    Aspiration precautions

## 2020-09-07 NOTE — PROGRESS NOTE ADULT - SUBJECTIVE AND OBJECTIVE BOX
NAPOLEON CAST 50y Male  MRN#: [id number]       SUBJECTIVE  Patient is a 50y old Male who presents with a chief complaint of ams/ facial droop (07 Sep 2020 09:23)    This is a 50 years old left handed man with past medical history of MI, CABG x 5, DM, arteriosclerotic heart disease, chronic inflammatory demyelinating polyneuropathy (follows Neurologist Dr. Mitchell) diagnosed in 2011, prior chronic right caudate nucleus infarction, who presents from nursing home for AMS x 3 days, vomiting, decreased PO intake, and right sided facial droop. In ED, T 99.6F, /90, HR 74, RR 18, SpO2 96% RA.   He presented to the ED for facial droop accompanied by wife. He was A0x1. Per wife, he had been acting different past five days, with low speech, barely talking and recently a right facial droop x 1 day.  CTH showed evidence of an acute/subacute left basal ganglia/periventricular white matter. Patient was outside window for tpa. He was admitted to stroke unite    INTERVAL HISTORY:  9/1: In ED Main. He had RRT for an episode of lethargy and vomiting. BP at the time 230/90, and no new focal neurological deficit per chart review. He received hydralazine 5 mg x 1 and CT head reports of evolving left BG/CR with mild mass effect, and rightward midline shift measuring 9 mm (previously 7 mm).   9/3: Pt seen in 3E Stroke Unit. Overnight, pt with temp spike 38.4C and hypertensive, responded to tylenol. This morning at 9:12am, during Neuro exam patient found to have b/l LE clonus then developed generalized rhythmic tremors of b/l UE. Temp 98.7F per primary RN. Notified EEG tech for VEEG hook up. Patient was given STAT 2mg of Ativan and responded. Patient also to start Keppra 500 mg BID for now. , /84 this morning. WBC continues to trend up 18.69. Pending IR-guided LP as well; unsuccessful attempt at bedside yesterday.   Patient is lethargic and somnolent, unarousable and responding to pain.  He has an NG inserted, febrile.      Today is hospital day 7d, and this morning he is stable. S/p grand mal seizure yesterday.  Afebrile overnight.  No other acute overnight events.     OBJECTIVE  PAST MEDICAL & SURGICAL HISTORY  GERD (gastroesophageal reflux disease)  BPH (benign prostatic hyperplasia)  Myocardial infarct, old  ASHD (arteriosclerotic heart disease)  Depression  Anxiety  Diabetes  CIDP (chronic inflammatory demyelinating polyneuropathy)  History of cholecystectomy  History of total left hip replacement  History of total right hip replacement: bilateral  S/P CABG x 5    ALLERGIES:  penicillins (Unknown)  strawberry (Hives)    MEDICATIONS:  STANDING MEDICATIONS  acyclovir IVPB      acyclovir IVPB 1000 milliGRAM(s) IV Intermittent every 8 hours  aspirin  chewable 81 milliGRAM(s) Oral daily  atorvastatin 80 milliGRAM(s) Oral at bedtime  baclofen 10 milliGRAM(s) Oral two times a day  carvedilol 3.125 milliGRAM(s) Oral every 12 hours  chlorhexidine 4% Liquid 1 Application(s) Topical <User Schedule>  dextrose 50% Injectable 12.5 Gram(s) IV Push once  dextrose 50% Injectable 25 Gram(s) IV Push once  dextrose 50% Injectable 25 Gram(s) IV Push once  DULoxetine 60 milliGRAM(s) Oral two times a day  insulin glargine Injectable (LANTUS) 30 Unit(s) SubCutaneous at bedtime  insulin lispro (HumaLOG) corrective regimen sliding scale   SubCutaneous three times a day before meals  insulin lispro Injectable (HumaLOG) 9 Unit(s) SubCutaneous three times a day before meals  levETIRAcetam  IVPB 1000 milliGRAM(s) IV Intermittent every 12 hours  morphine  - Injectable 5 milliGRAM(s) IV Push every 6 hours  pantoprazole   Suspension 40 milliGRAM(s) Oral daily  pramipexole 0.5 milliGRAM(s) Oral every 12 hours  senna 2 Tablet(s) Oral at bedtime  sodium chloride 0.9%. 1000 milliLiter(s) IV Continuous <Continuous>  tamsulosin 0.4 milliGRAM(s) Oral at bedtime    PRN MEDICATIONS  acetaminophen   Tablet .. 650 milliGRAM(s) Oral every 6 hours PRN  bisacodyl 5 milliGRAM(s) Oral at bedtime PRN  dextrose 40% Gel 15 Gram(s) Oral once PRN  glucagon  Injectable 1 milliGRAM(s) IntraMuscular once PRN  morphine  - Injectable 2 milliGRAM(s) IV Push every 4 hours PRN      VITAL SIGNS: Last 24 Hours  T(C): 37.1 (07 Sep 2020 09:30), Max: 37.3 (06 Sep 2020 16:00)  T(F): 98.8 (07 Sep 2020 09:30), Max: 99.1 (06 Sep 2020 16:00)  HR: 86 (07 Sep 2020 10:30) (70 - 108)  BP: 195/94 (07 Sep 2020 10:00) (162/78 - 206/90)  BP(mean): 141 (07 Sep 2020 10:00) (97 - 141)  RR: 20 (07 Sep 2020 10:30) (11 - 25)  SpO2: 97% (07 Sep 2020 10:30) (95% - 99%)    LABS:                        13.6   13.30 )-----------( 238      ( 06 Sep 2020 04:50 )             42.7     09-06    137  |  99  |  10  ----------------------------<  158<H>  3.3<L>   |  21  |  0.8    Ca    8.4<L>      06 Sep 2020 16:00  Phos  2.8     09-06  Mg     1.5     09-06    TPro  6.0  /  Alb  3.6  /  TBili  0.8  /  DBili  x   /  AST  25  /  ALT  13  /  AlkPhos  87  09-06        ABG - ( 06 Sep 2020 16:33 )  pH, Arterial: 7.48  pH, Blood: x     /  pCO2: 34    /  pO2: 70    / HCO3: 25    / Base Excess: 2.0   /  SaO2: 95                    Culture - Fungal, CSF (collected 04 Sep 2020 15:20)  Source: .CSF CSF  Preliminary Report (05 Sep 2020 15:20):    Testing in progress    Culture - Acid Fast - CSF (collected 04 Sep 2020 15:20)  Source: .CSF CSF    Culture - CSF with Gram Stain (collected 04 Sep 2020 15:20)  Source: .CSF CSF  Gram Stain (05 Sep 2020 01:29):    No polymorphonuclear cells seen    No organisms seen    by cytocentrifuge  Preliminary Report (05 Sep 2020 19:25):    No growth          RADIOLOGY:      PHYSICAL EXAM:    GENERAL: NAD, well-developed, AAOx2  HEENT:  Atraumatic, Normocephalic. EOMI, PERRLA, conjunctiva and sclera clear, No JVD  PULMONARY: Clear to auscultation bilaterally; No wheeze  CARDIOVASCULAR: Tachycardic rate and regular rhythm; No murmurs, rubs, or gallops  GASTROINTESTINAL: Soft, Nontender, mildly distended; Bowel sounds present  MUSCULOSKELETAL:  2+ Peripheral Pulses, No clubbing, cyanosis, or edema  NEUROLOGY: Alert but only oriented to name;  3/5 strength in UE, R > L; paralysis of LE b/r  SKIN: No rashes or lesions      ADMISSION SUMMARY  Patient is a 50y old Male who presents with a chief complaint of ams/ facial droop (07 Sep 2020 09:23)

## 2020-09-07 NOTE — PROGRESS NOTE ADULT - SUBJECTIVE AND OBJECTIVE BOX
CAST, NAPOLEON  50y, Male  Allergy: penicillins (Unknown)  strawberry (Hives)      LOS  7d    CHIEF COMPLAINT: ams/ facial droop (07 Sep 2020 05:28)      INTERVAL EVENTS/HPI  - No acute events overnight  - T(F): , Max: 99.4 (09-06-20 @ 12:00)  - WBC Count: 13.30 (09-06-20 @ 04:50)  WBC Count: 14.42 (09-05-20 @ 04:30)  - Creatinine, Serum: 0.8 (09-06-20 @ 16:00)  Creatinine, Serum: 0.8 (09-06-20 @ 04:50)       ROS  ***    VITALS:  T(F): 99.1, Max: 99.4 (09-06-20 @ 12:00)  HR: 82  BP: 184/83  RR: 20Vital Signs Last 24 Hrs  T(C): 37.3 (07 Sep 2020 04:00), Max: 37.4 (06 Sep 2020 12:00)  T(F): 99.1 (07 Sep 2020 04:00), Max: 99.4 (06 Sep 2020 12:00)  HR: 82 (07 Sep 2020 07:00) (70 - 108)  BP: 184/83 (07 Sep 2020 07:00) (162/78 - 206/90)  BP(mean): 124 (07 Sep 2020 07:00) (97 - 164)  RR: 20 (07 Sep 2020 07:00) (11 - 25)  SpO2: 99% (07 Sep 2020 07:00) (95% - 99%)    PHYSICAL EXAM:  ***    FH: Non-contributory  Social Hx: Non-contributory    TESTS & MEASUREMENTS:                        13.6   13.30 )-----------( 238      ( 06 Sep 2020 04:50 )             42.7     09-06    137  |  99  |  10  ----------------------------<  158<H>  3.3<L>   |  21  |  0.8    Ca    8.4<L>      06 Sep 2020 16:00  Phos  2.8     09-06  Mg     1.5     09-06    TPro  6.0  /  Alb  3.6  /  TBili  0.8  /  DBili  x   /  AST  25  /  ALT  13  /  AlkPhos  87  09-06    eGFR if Non African American: 103 mL/min/1.73M2 (09-06-20 @ 16:00)  eGFR if : 119 mL/min/1.73M2 (09-06-20 @ 16:00)    LIVER FUNCTIONS - ( 06 Sep 2020 04:50 )  Alb: 3.6 g/dL / Pro: 6.0 g/dL / ALK PHOS: 87 U/L / ALT: 13 U/L / AST: 25 U/L / GGT: x               Culture - Fungal, CSF (collected 09-04-20 @ 15:20)  Source: .CSF CSF  Preliminary Report (09-05-20 @ 15:20):    Testing in progress    Culture - Acid Fast - CSF (collected 09-04-20 @ 15:20)  Source: .CSF CSF    Culture - CSF with Gram Stain (collected 09-04-20 @ 15:20)  Source: .CSF CSF  Gram Stain (09-05-20 @ 01:29):    No polymorphonuclear cells seen    No organisms seen    by cytocentrifuge  Preliminary Report (09-05-20 @ 19:25):    No growth    Culture - Blood (collected 09-02-20 @ 11:47)  Source: .Blood None  Preliminary Report (09-04-20 @ 01:01):    No growth to date.    Culture - Urine (collected 09-02-20 @ 10:21)  Source: .Urine Catheterized  Final Report (09-03-20 @ 23:09):    No growth    Culture - Urine (collected 08-31-20 @ 14:47)  Source: .Urine Clean Catch (Midstream)  Final Report (09-01-20 @ 18:01):    No growth    Culture - Blood (collected 08-31-20 @ 14:15)  Source: .Blood Blood  Final Report (09-05-20 @ 23:00):    No Growth Final    Culture - Blood (collected 08-31-20 @ 14:15)  Source: .Blood Blood  Final Report (09-05-20 @ 23:00):    No Growth Final        Lactate, Blood: 2.1 mmol/L (09-03-20 @ 05:10)  Lactate, Blood: 1.4 mmol/L (09-02-20 @ 21:04)      INFECTIOUS DISEASES TESTING  COVID-19 PCR: NotDetec (08-31-20 @ 16:38)  Vancomycin Level, Trough: 18.9 (11-14-19 @ 16:12)  Vancomycin Level, Trough: 11.8 (09-15-19 @ 05:00)  MRSA PCR Result.: Positive (09-14-19 @ 11:30)      INFLAMMATORY MARKERS      RADIOLOGY & ADDITIONAL TESTS:  I have personally reviewed the last available Chest xray  CXR      CT      CARDIOLOGY TESTING  Transthoracic Echocardiogram:    EXAM:  2-D ECHO (TTE) COMPLETE        PROCEDURE DATE:  09/04/2020      INTERPRETATION:  REPORT:  TRANSTHORACIC ECHOCARDIOGRAM REPORT        Patient Name:   NAPOLEON CAST Accession #: [id number]  Medical Rec #:  [id number]    Height:      71.0 in 180.3 cm  YOB: 1970   Weight:      218.0 lb 98.88 kg  Patient Age:    50 years    BSA:         2.19 m²  Patient Gender: M           BP:          194/83 mmHg      Date of Exam:        9/4/2020 9:34:10 AM  Referring Physician: [id number] THAO GARCIA  Sonographer:         Wilfred Richards  Fellow:              Niru Ly    Reading Physician:   Jimena Kim M.D.    Procedure:   2D Echo/Doppler/Color Doppler Complete.  Indications: R55 - Syncope and Collapse  Diagnosis:   Syncope and collapse - R55        Summary:   1. Normal global left ventricular systolic function.   2. Mildly increased LV wall thickness.   3. Spectral Doppler shows impaired relaxation pattern of left ventricular myocardial filling (Grade I diastolic dysfunction).  4. Mildly enlarged left atrium.   5. Normal right atrial size.   6. Trace mitral valve regurgitation.    PHYSICIAN INTERPRETATION:  Left Ventricle: The left ventricular internal cavity size is normal. Left ventricular wall thickness is mildly increased. Global LV systolic function was normal. Normal segmental left ventricular systolic function. Abnormal (paradoxical) septal motion consistent with post-operative status. Spectral Doppler shows impaired relaxation pattern of left ventricular myocardial filling (Grade I diastolic dysfunction).  Right Ventricle: Normal right ventricular size and function.  Left Atrium: Mildly enlarged left atrium.  Right Atrium: Normal right atrial size.  Pericardium: There is no evidence of pericardial effusion.  Mitral Valve: The mitral valve is normal in structure. Trace mitral valve regurgitation is seen.  Tricuspid Valve: The tricuspid valve is normal in structure. Trivial tricuspid regurgitation is visualized.  Aortic Valve: The aortic valve is trileaflet. No evidence of aortic stenosis. Aortic valve thickening with normal leaflet opening. No evidence of aortic valve regurgitation is seen.  Aorta: The aortic root is normal in size and structure.      2D AND M-MODE MEASUREMENTS (normal ranges within parentheses):  Left Ventricle:                  Normal   Aorta/Left Atrium:             Normal  IVSd (2D):              1.27 cm (0.7-1.1) AoV Cusp Separation: 2.17 cm (1.5-2.6)  LVPWd (2D):             1.23 cm (0.7-1.1) Left Atrium (Mmode): 4.28 cm (1.9-4.0)  LVIDd (2D):             4.69 cm (3.4-5.7)  LVIDs (2D):             3.16 cm  LV FS (2D):             32.6 %   (>25%)  Relative Wall Thickness  0.52    (<0.42)    SPECTRAL DOPPLER ANALYSIS:  LV DIASTOLIC FUNCTION:  MV Peak E: 0.64 m/s Decel Time: 146 msec  MV Peak A: 1.08 m/s  E/A Ratio: 0.59    Aortic Valve:  AoV VMax:    1.27 m/s AoV Area, Vmax: 2.92 cm² Vmax Indx: 1.34 cm²/m²  AoV Pk Grad: 6.5 mmHg    LVOT Vmax: 0.95 m/s  LVOT VTI:  0.17 m  LVOT Diam: 2.23 cm    Mitral Valve:  MV P1/2 Time: 42.38 msec  MV Area, PHT: 5.19 cm²      [id number] Jimena Kim M.D., Electronically signed on 9/4/2020 at 12:59:18 PM            *** Final ***                  JIMENA KIM MD  This document has been electronically signed. Sep  4 2020  9:34AM             (09-04-20 @ 09:34)  12 Lead ECG:   Ventricular Rate 121 BPM    Atrial Rate 121 BPM    P-R Interval 136 ms    QRS Duration 82 ms    Q-T Interval 322 ms    QTC Calculation(Bezet) 457 ms    P Axis 39 degrees    R Axis 22 degrees    T Axis 127 degrees    Diagnosis Line Sinus tachycardia  Nonspecific ST and T wave abnormality  Abnormal ECG    Confirmed by Bong Cid ([id number]) on 9/2/2020 6:35:03 AM (09-02-20 @ 02:22)      MEDICATIONS  acyclovir IVPB     acyclovir IVPB 1000 IV Intermittent every 8 hours  aspirin  chewable 81 Oral daily  atorvastatin 80 Oral at bedtime  baclofen 10 Oral two times a day  carvedilol 3.125 Oral every 12 hours  chlorhexidine 4% Liquid 1 Topical <User Schedule>  dextrose 50% Injectable 12.5 IV Push once  dextrose 50% Injectable 25 IV Push once  dextrose 50% Injectable 25 IV Push once  DULoxetine 60 Oral two times a day  insulin glargine Injectable (LANTUS) 30 SubCutaneous at bedtime  insulin lispro (HumaLOG) corrective regimen sliding scale  SubCutaneous three times a day before meals  insulin lispro Injectable (HumaLOG) 9 SubCutaneous three times a day before meals  levETIRAcetam  IVPB 1000 IV Intermittent every 12 hours  morphine  - Injectable 5 IV Push every 6 hours  pantoprazole   Suspension 40 Oral daily  pramipexole 0.5 Oral every 12 hours  senna 2 Oral at bedtime  sodium chloride 0.9%. 1000 IV Continuous <Continuous>  tamsulosin 0.4 Oral at bedtime      WEIGHT  Weight (kg): 99 (09-03-20 @ 18:00)  Creatinine, Serum: 0.8 mg/dL (09-06-20 @ 16:00)      ANTIBIOTICS:  acyclovir IVPB      acyclovir IVPB 1000 milliGRAM(s) IV Intermittent every 8 hours      All available historical records have been reviewed SERENE, NAPOLEON  50y, Male  Allergy: penicillins (Unknown)  strawberry (Hives)      LOS  7d    CHIEF COMPLAINT: ams/ facial droop (07 Sep 2020 05:28)      INTERVAL EVENTS/HPI  - No acute events overnight  - T(F): , Max: 99.4 (09-06-20 @ 12:00)  - WBC Count: 13.30 (09-06-20 @ 04:50)  WBC Count: 14.42 (09-05-20 @ 04:30)  - Creatinine, Serum: 0.8 (09-06-20 @ 16:00)  Creatinine, Serum: 0.8 (09-06-20 @ 04:50)       ROS  unable to obtain history secondary to patient's mental status and/or sedation     VITALS:  T(F): 99.1, Max: 99.4 (09-06-20 @ 12:00)  HR: 82  BP: 184/83  RR: 20Vital Signs Last 24 Hrs  T(C): 37.3 (07 Sep 2020 04:00), Max: 37.4 (06 Sep 2020 12:00)  T(F): 99.1 (07 Sep 2020 04:00), Max: 99.4 (06 Sep 2020 12:00)  HR: 82 (07 Sep 2020 07:00) (70 - 108)  BP: 184/83 (07 Sep 2020 07:00) (162/78 - 206/90)  BP(mean): 124 (07 Sep 2020 07:00) (97 - 164)  RR: 20 (07 Sep 2020 07:00) (11 - 25)  SpO2: 99% (07 Sep 2020 07:00) (95% - 99%)    PHYSICAL EXAM:  Gen: NAD, resting in bed  HEENT: Normocephalic, atraumatic  Neck: supple, no lymphadenopathy  CV: Regular rate & regular rhythm  Lungs: decreased BS at bases, no fremitus  Abdomen: Soft, BS present  Ext: Warm, well perfused  Neuro: non focal, awake  Skin: no rash, no erythema  Lines: no phlebitis     FH: Non-contributory  Social Hx: Non-contributory    TESTS & MEASUREMENTS:                        13.6   13.30 )-----------( 238      ( 06 Sep 2020 04:50 )             42.7     09-06    137  |  99  |  10  ----------------------------<  158<H>  3.3<L>   |  21  |  0.8    Ca    8.4<L>      06 Sep 2020 16:00  Phos  2.8     09-06  Mg     1.5     09-06    TPro  6.0  /  Alb  3.6  /  TBili  0.8  /  DBili  x   /  AST  25  /  ALT  13  /  AlkPhos  87  09-06    eGFR if Non African American: 103 mL/min/1.73M2 (09-06-20 @ 16:00)  eGFR if : 119 mL/min/1.73M2 (09-06-20 @ 16:00)    LIVER FUNCTIONS - ( 06 Sep 2020 04:50 )  Alb: 3.6 g/dL / Pro: 6.0 g/dL / ALK PHOS: 87 U/L / ALT: 13 U/L / AST: 25 U/L / GGT: x               Culture - Fungal, CSF (collected 09-04-20 @ 15:20)  Source: .CSF CSF  Preliminary Report (09-05-20 @ 15:20):    Testing in progress    Culture - Acid Fast - CSF (collected 09-04-20 @ 15:20)  Source: .CSF CSF    Culture - CSF with Gram Stain (collected 09-04-20 @ 15:20)  Source: .CSF CSF  Gram Stain (09-05-20 @ 01:29):    No polymorphonuclear cells seen    No organisms seen    by cytocentrifuge  Preliminary Report (09-05-20 @ 19:25):    No growth    Culture - Blood (collected 09-02-20 @ 11:47)  Source: .Blood None  Preliminary Report (09-04-20 @ 01:01):    No growth to date.    Culture - Urine (collected 09-02-20 @ 10:21)  Source: .Urine Catheterized  Final Report (09-03-20 @ 23:09):    No growth    Culture - Urine (collected 08-31-20 @ 14:47)  Source: .Urine Clean Catch (Midstream)  Final Report (09-01-20 @ 18:01):    No growth    Culture - Blood (collected 08-31-20 @ 14:15)  Source: .Blood Blood  Final Report (09-05-20 @ 23:00):    No Growth Final    Culture - Blood (collected 08-31-20 @ 14:15)  Source: .Blood Blood  Final Report (09-05-20 @ 23:00):    No Growth Final        Lactate, Blood: 2.1 mmol/L (09-03-20 @ 05:10)  Lactate, Blood: 1.4 mmol/L (09-02-20 @ 21:04)      INFECTIOUS DISEASES TESTING  COVID-19 PCR: NotDetec (08-31-20 @ 16:38)  Vancomycin Level, Trough: 18.9 (11-14-19 @ 16:12)  Vancomycin Level, Trough: 11.8 (09-15-19 @ 05:00)  MRSA PCR Result.: Positive (09-14-19 @ 11:30)      INFLAMMATORY MARKERS      RADIOLOGY & ADDITIONAL TESTS:  I have personally reviewed the last available Chest xray  CXR      CT      CARDIOLOGY TESTING  Transthoracic Echocardiogram:    EXAM:  2-D ECHO (TTE) COMPLETE        PROCEDURE DATE:  09/04/2020      INTERPRETATION:  REPORT:  TRANSTHORACIC ECHOCARDIOGRAM REPORT        Patient Name:   NAPOLEON CAST Accession #: [id number]  Medical Rec #:  [id number]    Height:      71.0 in 180.3 cm  YOB: 1970   Weight:      218.0 lb 98.88 kg  Patient Age:    50 years    BSA:         2.19 m²  Patient Gender: M           BP:          194/83 mmHg      Date of Exam:        9/4/2020 9:34:10 AM  Referring Physician: [id number] THAO GARCIA  Sonographer:         Wilfred Richards  Fellow:              Niru Ly    Reading Physician:   Jimena Kim M.D.    Procedure:   2D Echo/Doppler/Color Doppler Complete.  Indications: R55 - Syncope and Collapse  Diagnosis:   Syncope and collapse - R55        Summary:   1. Normal global left ventricular systolic function.   2. Mildly increased LV wall thickness.   3. Spectral Doppler shows impaired relaxation pattern of left ventricular myocardial filling (Grade I diastolic dysfunction).  4. Mildly enlarged left atrium.   5. Normal right atrial size.   6. Trace mitral valve regurgitation.    PHYSICIAN INTERPRETATION:  Left Ventricle: The left ventricular internal cavity size is normal. Left ventricular wall thickness is mildly increased. Global LV systolic function was normal. Normal segmental left ventricular systolic function. Abnormal (paradoxical) septal motion consistent with post-operative status. Spectral Doppler shows impaired relaxation pattern of left ventricular myocardial filling (Grade I diastolic dysfunction).  Right Ventricle: Normal right ventricular size and function.  Left Atrium: Mildly enlarged left atrium.  Right Atrium: Normal right atrial size.  Pericardium: There is no evidence of pericardial effusion.  Mitral Valve: The mitral valve is normal in structure. Trace mitral valve regurgitation is seen.  Tricuspid Valve: The tricuspid valve is normal in structure. Trivial tricuspid regurgitation is visualized.  Aortic Valve: The aortic valve is trileaflet. No evidence of aortic stenosis. Aortic valve thickening with normal leaflet opening. No evidence of aortic valve regurgitation is seen.  Aorta: The aortic root is normal in size and structure.      2D AND M-MODE MEASUREMENTS (normal ranges within parentheses):  Left Ventricle:                  Normal   Aorta/Left Atrium:             Normal  IVSd (2D):              1.27 cm (0.7-1.1) AoV Cusp Separation: 2.17 cm (1.5-2.6)  LVPWd (2D):             1.23 cm (0.7-1.1) Left Atrium (Mmode): 4.28 cm (1.9-4.0)  LVIDd (2D):             4.69 cm (3.4-5.7)  LVIDs (2D):             3.16 cm  LV FS (2D):             32.6 %   (>25%)  Relative Wall Thickness  0.52    (<0.42)    SPECTRAL DOPPLER ANALYSIS:  LV DIASTOLIC FUNCTION:  MV Peak E: 0.64 m/s Decel Time: 146 msec  MV Peak A: 1.08 m/s  E/A Ratio: 0.59    Aortic Valve:  AoV VMax:    1.27 m/s AoV Area, Vmax: 2.92 cm² Vmax Indx: 1.34 cm²/m²  AoV Pk Grad: 6.5 mmHg    LVOT Vmax: 0.95 m/s  LVOT VTI:  0.17 m  LVOT Diam: 2.23 cm    Mitral Valve:  MV P1/2 Time: 42.38 msec  MV Area, PHT: 5.19 cm²      [id number] Jimena Kim M.D., Electronically signed on 9/4/2020 at 12:59:18 PM            *** Final ***                  JIMENA KIM MD  This document has been electronically signed. Sep  4 2020  9:34AM             (09-04-20 @ 09:34)  12 Lead ECG:   Ventricular Rate 121 BPM    Atrial Rate 121 BPM    P-R Interval 136 ms    QRS Duration 82 ms    Q-T Interval 322 ms    QTC Calculation(Bezet) 457 ms    P Axis 39 degrees    R Axis 22 degrees    T Axis 127 degrees    Diagnosis Line Sinus tachycardia  Nonspecific ST and T wave abnormality  Abnormal ECG    Confirmed by Bong Cid ([id number]) on 9/2/2020 6:35:03 AM (09-02-20 @ 02:22)      MEDICATIONS  acyclovir IVPB     acyclovir IVPB 1000 IV Intermittent every 8 hours  aspirin  chewable 81 Oral daily  atorvastatin 80 Oral at bedtime  baclofen 10 Oral two times a day  carvedilol 3.125 Oral every 12 hours  chlorhexidine 4% Liquid 1 Topical <User Schedule>  dextrose 50% Injectable 12.5 IV Push once  dextrose 50% Injectable 25 IV Push once  dextrose 50% Injectable 25 IV Push once  DULoxetine 60 Oral two times a day  insulin glargine Injectable (LANTUS) 30 SubCutaneous at bedtime  insulin lispro (HumaLOG) corrective regimen sliding scale  SubCutaneous three times a day before meals  insulin lispro Injectable (HumaLOG) 9 SubCutaneous three times a day before meals  levETIRAcetam  IVPB 1000 IV Intermittent every 12 hours  morphine  - Injectable 5 IV Push every 6 hours  pantoprazole   Suspension 40 Oral daily  pramipexole 0.5 Oral every 12 hours  senna 2 Oral at bedtime  sodium chloride 0.9%. 1000 IV Continuous <Continuous>  tamsulosin 0.4 Oral at bedtime      WEIGHT  Weight (kg): 99 (09-03-20 @ 18:00)  Creatinine, Serum: 0.8 mg/dL (09-06-20 @ 16:00)      ANTIBIOTICS:  acyclovir IVPB      acyclovir IVPB 1000 milliGRAM(s) IV Intermittent every 8 hours      All available historical records have been reviewed

## 2020-09-08 NOTE — CHART NOTE - NSCHARTNOTEFT_GEN_A_CORE
MICU Transfer Note    Transfer from: MICU  Transfer to:  (X) Medicine    (  ) Telemetry    (  ) RCU    (  ) Palliative    (  ) Stroke Unit    (  ) _______________  Floor:      MICU COURSE:    This is a 50 years old left handed man with past medical history of MI, CABG x 5, DM, arteriosclerotic heart disease, chronic inflammatory demyelinating polyneuropathy (follows Neurologist Dr. Mitchell) diagnosed in 2011, prior chronic right caudate nucleus infarction, who presents from nursing home for AMS x 3 days, vomiting, decreased PO intake, and right sided facial droop. In ED, T 99.6F, /90, HR 74, RR 18, SpO2 96% RA.   He presented to the ED for facial droop accompanied by wife. He was A0x1. Per wife, he had been acting different past five days, with low speech, barely talking and recently a right facial droop x 1 day.  CTH showed evidence of an acute/subacute left basal ganglia/periventricular white matter. Patient was outside window for tpa. He was admitted to stroke unite    INTERVAL HISTORY:  9/1: In ED Main. He had RRT for an episode of lethargy and vomiting. BP at the time 230/90, and no new focal neurological deficit per chart review. He received hydralazine 5 mg x 1 and CT head reports of evolving left BG/CR with mild mass effect, and rightward midline shift measuring 9 mm (previously 7 mm).   9/3: Pt seen in 3E Stroke Unit. Overnight, pt with temp spike 38.4C and hypertensive, responded to tylenol. This morning at 9:12am, during Neuro exam patient found to have b/l LE clonus then developed generalized rhythmic tremors of b/l UE. Temp 98.7F per primary RN. Notified EEG tech for VEEG hook up. Patient was given STAT 2mg of Ativan and responded. Patient also to start Keppra 500 mg BID for now. , /84 this morning. WBC continues to trend up 18.69. Pending IR-guided LP as well; unsuccessful attempt at bedside yesterday.   Patient is lethargic and somnolent, unarousable and responding to pain.  He has an NG inserted, febrile.    In ICU, he as been on vEEG monitoring, no seizures noted on EEG.  But over weekend, pt had grand mal seizure clinically.  Increased dose of keppra.  Neuro following.  LP done 9/4.  CSF results unremarkable.    Today is hospital day 8d, and this morning he is stable. No seizure activity noted.  Afebrile overnight.  s/p restarting vEEG.  Off sedation.  No other acute overnight events.       ASSESSMENT & PLAN:     #Stroke  #R/o Meningitis  #AMS with decreased speech low tone found with acute/subacute infarct in the left basal ganglia/ periventricular white matter, NIHSS 10 on admission  CT: Evolving acute/subacute infarct in the left basal ganglia, with mild mass effect. Stable mild midline shift to the right.  - decrease Level of consciousness  - Infectious work up including UA/blood cultures, CXR, CSF  - Off antibiotics   - D/c Acyclovir, as HSV negative  - Keppra 1g q12  - LP successful 9/4/20  - f/u CSF results - unremarkable to date  - keep systolic 160-180  - IVF 75 cc/hr  -echo with bubble  -lipid profile WNL  -a1c 7.3  -keep aspirin and high dose statin  - ID consult  - Neuro checks q4  - f/u vEEG - no clinical or subclinical seziures noted so far  - CT Head - No hemorrhagic transformation, artifact from EEG  - f/u repeat CT Head  - Restart Lovenox?  - LE Duplex negative  - Palliative care meeting tomorrow at 1 PM with Neuro, Wife, Palliative Care    #hypocalcemia and hypomagnesemia  - Replete  - f/u K and Mg    # CIDP since 2011 per wife had deterioration [id number] and is bedbound since then, follows DR. Mitchell currently getting IVIG every three weeks with RN administering at home. Was also started on gabapentin a few months ago per wife Pt. gets very sleepy after gabapentin will hold for now.   -c/w baclofen and pramipexole    #diabetes  - c/w home insulin regimen 30 lantus bedtime 9 lispro with meals     #HTN  - Start Losartan 50  - increase Coreg 6.25    #Hx of CAD s/p cabg  - c/w asa, carvedilol and statin  - hold plavix    #Hx of anxiety and depression Pt. is on many meds confirmed with wife- c/w home meds- duloxetine, quetiapine, hydroxyzine, alprazolam prn    #chronic pain   - hold morphine 60mg q12 continue home regimen  - Morphine 2mg q4    #Hx of BPH   - Doxazosin 2mg qd    #Hx of chronic constipation   -c/w senna dulcolox as needed    #Hx of gerd   -on pantoprazole    #Diet: NPO with tube feeds  #DVT ppx- lovenox  #GI ppx on pantoprzole  #Code status: Full  #Dispo: MICU      For Follow-Up:      - f/u Palliative care meeting to discuss goals of care with wife and Neurology at 1 PM 9/9/20  - f/u vEEG  - f/u K and Mag  - f/u BP      Vital Signs Last 24 Hrs  T(C): 37.2 (08 Sep 2020 12:00), Max: 37.7 (08 Sep 2020 08:00)  T(F): 98.9 (08 Sep 2020 12:00), Max: 99.8 (08 Sep 2020 08:00)  HR: 94 (08 Sep 2020 16:00) (72 - 120)  BP: 190/84 (08 Sep 2020 16:00) (151/69 - 205/92)  BP(mean): 128 (08 Sep 2020 16:00) (100 - 168)  RR: 24 (08 Sep 2020 16:00) (11 - 28)  SpO2: 98% (08 Sep 2020 16:00) (91% - 99%)  I&O's Summary    07 Sep 2020 07:01  -  08 Sep 2020 07:00  --------------------------------------------------------  IN: 2335 mL / OUT: 4000 mL / NET: -1665 mL    08 Sep 2020 07:01  -  08 Sep 2020 16:47  --------------------------------------------------------  IN: 1402 mL / OUT: 1400 mL / NET: 2 mL          MEDICATIONS  (STANDING):  aspirin  chewable 81 milliGRAM(s) Oral daily  atorvastatin 80 milliGRAM(s) Oral at bedtime  baclofen 10 milliGRAM(s) Oral two times a day  carvedilol 6.25 milliGRAM(s) Oral every 12 hours  chlorhexidine 4% Liquid 1 Application(s) Topical <User Schedule>  dextrose 50% Injectable 12.5 Gram(s) IV Push once  dextrose 50% Injectable 25 Gram(s) IV Push once  dextrose 50% Injectable 25 Gram(s) IV Push once  doxazosin 2 milliGRAM(s) Oral at bedtime  DULoxetine 60 milliGRAM(s) Oral two times a day  insulin glargine Injectable (LANTUS) 30 Unit(s) SubCutaneous at bedtime  insulin lispro (HumaLOG) corrective regimen sliding scale   SubCutaneous three times a day before meals  insulin lispro Injectable (HumaLOG) 9 Unit(s) SubCutaneous three times a day before meals  levETIRAcetam  IVPB 1000 milliGRAM(s) IV Intermittent every 12 hours  losartan 50 milliGRAM(s) Oral daily  morphine  - Injectable 2 milliGRAM(s) IV Push every 4 hours  pantoprazole   Suspension 40 milliGRAM(s) Oral daily  pramipexole 0.5 milliGRAM(s) Oral every 12 hours  senna 2 Tablet(s) Oral at bedtime  sodium chloride 0.9%. 1000 milliLiter(s) (100 mL/Hr) IV Continuous <Continuous>    MEDICATIONS  (PRN):  acetaminophen   Tablet .. 650 milliGRAM(s) Oral every 6 hours PRN Moderate Pain (4 - 6)  bisacodyl 5 milliGRAM(s) Oral at bedtime PRN Constipation  dextrose 40% Gel 15 Gram(s) Oral once PRN Blood Glucose LESS THAN 70 milliGRAM(s)/deciliter  glucagon  Injectable 1 milliGRAM(s) IntraMuscular once PRN Glucose LESS THAN 70 milligrams/deciliter  morphine  - Injectable 2 milliGRAM(s) IV Push every 4 hours PRN Severe Pain (7 - 10)        LABS                                            14.4                  Neurophils% (auto):   76.5   (09-08 @ 04:17):    13.22)-----------(238          Lymphocytes% (auto):  12.7                                          45.4                   Eosinphils% (auto):   3.6      Manual%: Neutrophils x    ; Lymphocytes x    ; Eosinophils x    ; Bands%: x    ; Blasts x                                    141    |  97     |  8                   Calcium: 8.2   / iCa: x      (09-08 @ 04:17)    ----------------------------<  87        Magnesium: 1.5                              3.1     |  26     |  0.7              Phosphorous: x        TPro  6.4    /  Alb  3.7    /  TBili  1.1    /  DBili  x      /  AST  23     /  ALT  14     /  AlkPhos  101    08 Sep 2020 04:17

## 2020-09-08 NOTE — PROGRESS NOTE ADULT - SUBJECTIVE AND OBJECTIVE BOX
Neurocritical Care Progress Note:    1. Brief Presentation: Today pt p/w refractory hTN with SBP in the 200's and c/o headache    2. Today's Acute Problems: HTN and pain management    3. Relevant brief History:  This is a 50 years old left handed man with past medical history of MI, CABG x 5, DM, arteriosclerotic heart disease, chronic inflammatory demyelinating polyneuropathy (follows Neurologist Dr. Mitchell), prior chronic right caudate nucleus infarction, who presents from nursing home for AMS x 3 days, vomiting, decreased PO intake, and right sided facial droop. In ED, T 99.6F, /90, HR 74, RR 18, SpO2 96% RA. CTH reports of acute/subacute left basal ganglia/periventricular white matter.   Overnight, pt with temp spike 38.4C and hypertensive, responded to tylenol. This morning at 9:12am, during Neuro exam patient found to have b/l LE clonus then developed generalized rhythmic tremors of b/l UE. Temp 98.7F per primary RN. Notified EEG tech for VEEG hook up. Patient was given STAT 2mg of Ativan and responded. Patient also to start Keppra 500 mg BID for now. , /84 this morning. WBC continues to trend up 18.69. Pending IR-guided LP as well; unsuccessful attempt at bedside yesterday.       4-Yesterday's Plan:    5. Last 24 hour updates:    6. Medications:   aspirin  chewable 81 milliGRAM(s) Oral daily  atorvastatin 80 milliGRAM(s) Oral at bedtime  baclofen 10 milliGRAM(s) Oral two times a day  carvedilol 3.125 milliGRAM(s) Oral every 12 hours  chlorhexidine 4% Liquid 1 Application(s) Topical <User Schedule>  clevidipine Infusion 2 mG/Hr IV Continuous <Continuous>  dextrose 50% Injectable 12.5 Gram(s) IV Push once  dextrose 50% Injectable 25 Gram(s) IV Push once  dextrose 50% Injectable 25 Gram(s) IV Push once  DULoxetine 60 milliGRAM(s) Oral two times a day  insulin glargine Injectable (LANTUS) 30 Unit(s) SubCutaneous at bedtime  insulin lispro (HumaLOG) corrective regimen sliding scale   SubCutaneous three times a day before meals  insulin lispro Injectable (HumaLOG) 9 Unit(s) SubCutaneous three times a day before meals  levETIRAcetam  IVPB 1000 milliGRAM(s) IV Intermittent every 12 hours  pantoprazole   Suspension 40 milliGRAM(s) Oral daily  pramipexole 0.5 milliGRAM(s) Oral every 12 hours  senna 2 Tablet(s) Oral at bedtime  sodium chloride 0.9%. 1000 milliLiter(s) IV Continuous <Continuous>  tamsulosin 0.4 milliGRAM(s) Oral at bedtime      7. Ancillary Management:   Chest PT[ ]   Head of bed >35 [ ]   Out of bed to chair [ ]   PT/OT/SP Eval [ ]   Spirometry[ ]   DVT prophalaxis[ ]    8.Neuro:   Awake = gaze preference  Pupils 4mm reactive   Follows commands Stivck out tongue  Shows thumbs up  b/l Left>right    IH STROKE SCALE  Item	                                                        Score  1 a.	Level of Consciousness	               	2  1 b. LOC Questions	                                    2  1 c.	LOC Commands	                               	2  2.	Best Gaze	                                    1  3.	Visual	                                                1  4.	Facial Palsy	                                    1  5 a.	Motor Arm - Left	                        1  5 b.	Motor Arm - Right	                        1  6 a.	Motor Leg - Left	                        4   6 b.	Motor Leg - Right	                        4   7.	Limb Ataxia	                                    0  8.	Sensory	                                                2  9.	Language	                                    3  10.	Dysarthria	                                    2  11.	Extinction and Inattention  	            1  _______________________________________________________________________  TOTAL	                                                           12      mRS:  0 No symptoms at all  1 No significant disability despite symptoms; able to carry out all usual duties and activities without assistance  2 Slight disability; unable to carry out all previous activities, but able to look after own affairs  3 Moderate disability; requiring some help, but able to walk without assistance  4 Moderately severe disability; unable to walk without assistance and unable to attend to own bodily needs without assistance  5 Severe disability; bedridden, incontinent and requiring constant nursing care and attention  6 Dead    ICHs:  Age >=80  GCS Score: 3-4 (2 pts)   GCS Score: 5-12 (1 pt)   ICH Volume: >30  (+) IVH  (+) Infratentorial    Akins&Cuellar:  Grade I = Asymptomatic, mild headache, slight nuchal rigidity  Grade II = Moderate to severe headache, nuchal rigidity, no neurological deficit other than cranial nerve palsy  Grade III = Drowiness, confusion, mild focal neurological deficit  Grade IV = Stupor, moderate to severe hemiparesis  Grade V = Coma, decerebrate posturing    m-Flor:  Grade 0   (No Subarachnoid Hemorrhage (SAH)), No intraventricular hemorrhage (IVH), Incidence of symptomatic vasopasm 0%  Grade 1   (Focal or diffuse, thin SAH), No IVH, incidence of symptomatic vasospasm 24%  Grade 2   (Thin focal or diffuse SAH), IVH present, incidence of symptomatic vasospasm 33%  Grade 3   (Thick focal or diffuse SAH), No IVH, incidence of symptomatic vasospasm 33%  Grade 4   (Thick focal or diffuse SAH), IVH present, incidence of symptomatic vasosasm 40%    Last CTH:    Last CTA/MRA:    Last CTP:    Last MRI:    Last TCD:    Last EEG:        I    9. Cardiovascular:   Vital Signs Last 24 Hrs  T(C): 36.7 (07 Sep 2020 20:00), Max: 37.3 (07 Sep 2020 04:00)  T(F): 98.1 (07 Sep 2020 20:00), Max: 99.1 (07 Sep 2020 04:00)  HR: 106 (08 Sep 2020 00:30) (72 - 106)  BP: 185/84 (08 Sep 2020 00:30) (162/78 - 207/95)  BP(mean): 121 (08 Sep 2020 00:30) (97 - 168)  RR: 22 (08 Sep 2020 00:30) (11 - 25)  SpO2: 98% (08 Sep 2020 00:30) (95% - 99%)     Last Echo:    Last EKG:    CVP   MAP/CPP/SBP target:   CO:      CI:       Enzymes/Trop:    10. Respiratory:   ABG:ABG - ( 06 Sep 2020 16:33 )  pH, Arterial: 7.48  pH, Blood: x     /  pCO2: 34    /  pO2: 70    / HCO3: 25    / Base Excess: 2.0   /  SaO2: 95                  VBG:    Chest Xray:        Peak Pressure/[place] Pressure:    11.GI:    Prophalaxis:     Bowel mvt:     Abd distension:   LIVER FUNCTIONS - ( 06 Sep 2020 04:50 )  Alb: 3.6 g/dL / Pro: 6.0 g/dL / ALK PHOS: 87 U/L / ALT: 13 U/L / AST: 25 U/L / GGT: x             12.Renal/Fluids/Electrolytes:    09-06    137  |  99  |  10  ----------------------------<  158<H>  3.3<L>   |  21  |  0.8    Ca    8.4<L>      06 Sep 2020 16:00  Phos  2.8     09-06  Mg     1.5     09-06    TPro  6.0  /  Alb  3.6  /  TBili  0.8  /  DBili  x   /  AST  25  /  ALT  13  /  AlkPhos  87  09-06      I&O's Detail    06 Sep 2020 07:01  -  07 Sep 2020 07:00  --------------------------------------------------------  IN:    IV PiggyBack: 100 mL    sodium chloride 0.9%: 900 mL    sodium chloride 0.9%.: 1875 mL  Total IN: 2875 mL    OUT:    Indwelling Catheter - Urethral: 3500 mL  Total OUT: 3500 mL    Total NET: -625 mL      07 Sep 2020 07:01  -  08 Sep 2020 02:27  --------------------------------------------------------  IN:    Enteral Tube Flush: 100 mL    IV PiggyBack: 100 mL    sodium chloride 0.9%.: 1125 mL  Total IN: 1325 mL    OUT:    Indwelling Catheter - Urethral: 1900 mL  Total OUT: 1900 mL    Total NET: -575 mL          13.ID:   TMax:   Vital Signs Last 24 Hrs  T(C): 36.7 (07 Sep 2020 20:00), Max: 37.3 (07 Sep 2020 04:00)  T(F): 98.1 (07 Sep 2020 20:00), Max: 99.1 (07 Sep 2020 04:00)  HR: 106 (08 Sep 2020 00:30) (72 - 106)  BP: 185/84 (08 Sep 2020 00:30) (162/78 - 207/95)  BP(mean): 121 (08 Sep 2020 00:30) (97 - 168)  RR: 22 (08 Sep 2020 00:30) (11 - 25)  SpO2: 98% (08 Sep 2020 00:30) (95% - 99%)           Lines: Central[] Date inserted: Peripheral[]    14. Hematology:                         13.6   13.30 )-----------( 238      ( 06 Sep 2020 04:50 )             42.7      09-06    137  |  99  |  10  ----------------------------<  158<H>  3.3<L>   |  21  |  0.8    Ca    8.4<L>      06 Sep 2020 16:00  Phos  2.8     09-06  Mg     1.5     09-06    TPro  6.0  /  Alb  3.6  /  TBili  0.8  /  DBili  x   /  AST  25  /  ALT  13  /  AlkPhos  87  09-06         DVT Prophylaxis Lovenox[ ] Heparin[ ] Venodynes[ ] SCD's[ ]    15. Impression:        16. Suggestions:                       Keep Na levels 140-1445                       Adequate pain management fentanyl prn                       Keep -180                       Monitor neuro exam                      17. Disposition:

## 2020-09-08 NOTE — PROGRESS NOTE ADULT - SUBJECTIVE AND OBJECTIVE BOX
Patient is a 50y old  Male who presents with a chief complaint of ams/ facial droop (08 Sep 2020 02:26)        Over Night Events: No acute events, Afebrile . Off sedation   IV drips: NS @ 125, Cleveprex 0.5         ROS:     All ROS are negative except HPI         PHYSICAL EXAM    ICU Vital Signs Last 24 Hrs  T(C): 37.7 (08 Sep 2020 08:00), Max: 37.7 (08 Sep 2020 08:00)  T(F): 99.8 (08 Sep 2020 08:00), Max: 99.8 (08 Sep 2020 08:00)  HR: 92 (08 Sep 2020 08:00) (72 - 106)  BP: 175/80 (08 Sep 2020 07:30) (151/69 - 207/95)  BP(mean): 115 (08 Sep 2020 07:30) (100 - 168)  ABP: --  ABP(mean): --  RR: 19 (08 Sep 2020 08:00) (11 - 25)  SpO2: 98% on 2 L NC       CONSTITUTIONAL:  Well nourished.  NAD    ENT:   Airway patent,   Mouth with normal mucosa.   No thrush    EYES:   Pupils equal,   Round and reactive to light.    CARDIAC:   Normal rate,   Regular rhythm.    No edema      Vascular:  Normal systolic impulse  No Carotid bruits    RESPIRATORY:   No wheezing  Bilateral BS  Normal chest expansion  Not tachypneic,  No use of accessory muscles    GASTROINTESTINAL:  Abdomen soft,   Non-tender,   No guarding,   + BS    MUSCULOSKELETAL:   Range of motion is not limited,  No clubbing, cyanosis    NEUROLOGICAL:   awake and alert. does not follow commands . b/l LE weakness     SKIN:   Skin normal color for race,   Warm and dry and intact.   No evidence of rash.    PSYCHIATRIC:   Normal mood and affect.   No apparent risk to self or others.    HEMATOLOGICAL:  No cervical  lymphadenopathy.  no inguinal lymphadenopathy      09-07-20 @ 07:01  -  09-08-20 @ 07:00  --------------------------------------------------------  IN:    clevidipine Infusion: 10 mL    Enteral Tube Flush: 100 mL    IV PiggyBack: 100 mL    sodium chloride 0.9%.: 2125 mL  Total IN: 2335 mL    OUT:    Indwelling Catheter - Urethral: 4000 mL  Total OUT: 4000 mL    Total NET: -1665 mL      09-08-20 @ 07:01  -  09-08-20 @ 09:50  --------------------------------------------------------  IN:    clevidipine Infusion: 2 mL    sodium chloride 0.9%.: 250 mL  Total IN: 252 mL    OUT:    Indwelling Catheter - Urethral: 300 mL  Total OUT: 300 mL    Total NET: -48 mL          LABS:                            14.4   13.22 )-----------( 238      ( 08 Sep 2020 04:17 )             45.4                  14.4   13.22 )-----------( 238      ( 09-08 @ 04:17 )             45.4                13.6   13.30 )-----------( 238      ( 09-06 @ 04:50 )             42.7                                             09-08    141  |  97<L>  |  8<L>  ----------------------------<  87  3.1<L>   |  26  |  0.7    08 Sep 2020 04:17    141    |  97<L>  |  8<L>   ----------------------------<  87     3.1<L>   |  26     |  0.7    06 Sep 2020 16:00    137    |  99     |  10     ----------------------------<  158<H>  3.3<L>   |  21     |  0.8      Ca    8.2<L>      08 Sep 2020 04:17  Ca    8.4<L>      06 Sep 2020 16:00  Mg     1.5<L>     08 Sep 2020 04:17    TPro  6.4    /  Alb  3.7    /  TBili  1.1    /  DBili  x      /  AST  23     /  ALT  14     /  AlkPhos  101    08 Sep 2020 04:17    Ca    8.2<L>      08 Sep 2020 04:17  Mg     1.5     09-08    TPro  6.4  /  Alb  3.7  /  TBili  1.1  /  DBili  x   /  AST  23  /  ALT  14  /  AlkPhos  101  09-08                                                                                           LIVER FUNCTIONS - ( 08 Sep 2020 04:17 )  Alb: 3.7 g/dL / Pro: 6.4 g/dL / ALK PHOS: 101 U/L / ALT: 14 U/L / AST: 23 U/L / GGT: x                                                                                                                                   ABG - ( 06 Sep 2020 16:33 )  pH, Arterial: 7.48  pH, Blood: x     /  pCO2: 34    /  pO2: 70    / HCO3: 25    / Base Excess: 2.0   /  SaO2: 95                  MEDICATIONS  (STANDING):  aspirin  chewable 81 milliGRAM(s) Oral daily  atorvastatin 80 milliGRAM(s) Oral at bedtime  baclofen 10 milliGRAM(s) Oral two times a day  carvedilol 3.125 milliGRAM(s) Oral every 12 hours  chlorhexidine 4% Liquid 1 Application(s) Topical <User Schedule>  clevidipine Infusion 2 mG/Hr (4 mL/Hr) IV Continuous <Continuous>  dextrose 50% Injectable 12.5 Gram(s) IV Push once  dextrose 50% Injectable 25 Gram(s) IV Push once  dextrose 50% Injectable 25 Gram(s) IV Push once  DULoxetine 60 milliGRAM(s) Oral two times a day  insulin glargine Injectable (LANTUS) 30 Unit(s) SubCutaneous at bedtime  insulin lispro (HumaLOG) corrective regimen sliding scale   SubCutaneous three times a day before meals  insulin lispro Injectable (HumaLOG) 9 Unit(s) SubCutaneous three times a day before meals  levETIRAcetam  IVPB 1000 milliGRAM(s) IV Intermittent every 12 hours  pantoprazole   Suspension 40 milliGRAM(s) Oral daily  pramipexole 0.5 milliGRAM(s) Oral every 12 hours  senna 2 Tablet(s) Oral at bedtime  sodium chloride 0.9%. 1000 milliLiter(s) (125 mL/Hr) IV Continuous <Continuous>  tamsulosin 0.4 milliGRAM(s) Oral at bedtime    MEDICATIONS  (PRN):  acetaminophen   Tablet .. 650 milliGRAM(s) Oral every 6 hours PRN Moderate Pain (4 - 6)  bisacodyl 5 milliGRAM(s) Oral at bedtime PRN Constipation  dextrose 40% Gel 15 Gram(s) Oral once PRN Blood Glucose LESS THAN 70 milliGRAM(s)/deciliter  glucagon  Injectable 1 milliGRAM(s) IntraMuscular once PRN Glucose LESS THAN 70 milligrams/deciliter  morphine  - Injectable 2 milliGRAM(s) IV Push every 4 hours PRN Severe Pain (7 - 10)      New X-rays reviewed:         KAM brenner                                                                              ECHO < from: Transthoracic Echocardiogram (09.04.20 @ 09:34) >   1. Normal global left ventricular systolic function.   2. Mildly increased LV wall thickness.   3. Spectral Doppler shows impaired relaxation pattern of left ventricular myocardial filling (Grade I diastolic dysfunction).  4. Mildly enlarged left atrium.   5. Normal right atrial size.   6. Trace mitral valve regurgitation.    < end of copied text >

## 2020-09-08 NOTE — PROGRESS NOTE ADULT - SUBJECTIVE AND OBJECTIVE BOX
SERENE, NAPOLEON  50y, Male    All available historical data reviewed    OVERNIGHT EVENTS:  no fevers  no pressors  no diarrhea  minimal response  does not follow commands    ROS:  unable to obtain history secondary to patient's mental status and/or sedation     VITALS:  T(F): 99.8, Max: 99.8 (09-08-20 @ 08:00)  HR: 106  BP: 166/77  RR: 23Vital Signs Last 24 Hrs  T(C): 37.7 (08 Sep 2020 08:00), Max: 37.7 (08 Sep 2020 08:00)  T(F): 99.8 (08 Sep 2020 08:00), Max: 99.8 (08 Sep 2020 08:00)  HR: 106 (08 Sep 2020 10:30) (72 - 106)  BP: 166/77 (08 Sep 2020 10:00) (151/69 - 207/95)  BP(mean): 113 (08 Sep 2020 10:00) (100 - 168)  RR: 23 (08 Sep 2020 10:30) (11 - 25)  SpO2: 96% (08 Sep 2020 10:30) (95% - 98%)    TESTS & MEASUREMENTS:                        14.4   13.22 )-----------( 238      ( 08 Sep 2020 04:17 )             45.4     09-08    141  |  97<L>  |  8<L>  ----------------------------<  87  3.1<L>   |  26  |  0.7    Ca    8.2<L>      08 Sep 2020 04:17  Mg     1.5     09-08    TPro  6.4  /  Alb  3.7  /  TBili  1.1  /  DBili  x   /  AST  23  /  ALT  14  /  AlkPhos  101  09-08    LIVER FUNCTIONS - ( 08 Sep 2020 04:17 )  Alb: 3.7 g/dL / Pro: 6.4 g/dL / ALK PHOS: 101 U/L / ALT: 14 U/L / AST: 23 U/L / GGT: x             Culture - Fungal, CSF (collected 09-04-20 @ 15:20)  Source: .CSF CSF  Preliminary Report (09-05-20 @ 15:20):    Testing in progress    Culture - Acid Fast - CSF (collected 09-04-20 @ 15:20)  Source: .CSF CSF    Culture - CSF with Gram Stain (collected 09-04-20 @ 15:20)  Source: .CSF CSF  Gram Stain (09-05-20 @ 01:29):    No polymorphonuclear cells seen    No organisms seen    by cytocentrifuge  Final Report (09-07-20 @ 15:25):    No growth    Culture - Blood (collected 09-02-20 @ 11:47)  Source: .Blood None  Final Report (09-08-20 @ 01:00):    No Growth Final    Culture - Urine (collected 09-02-20 @ 10:21)  Source: .Urine Catheterized  Final Report (09-03-20 @ 23:09):    No growth            RADIOLOGY & ADDITIONAL TESTS:  Personal review of radiological diagnostics performed  Echo and EKG results noted when applicable.     MEDICATIONS:  acetaminophen   Tablet .. 650 milliGRAM(s) Oral every 6 hours PRN  aspirin  chewable 81 milliGRAM(s) Oral daily  atorvastatin 80 milliGRAM(s) Oral at bedtime  baclofen 10 milliGRAM(s) Oral two times a day  bisacodyl 5 milliGRAM(s) Oral at bedtime PRN  carvedilol 6.25 milliGRAM(s) Oral every 12 hours  chlorhexidine 4% Liquid 1 Application(s) Topical <User Schedule>  dextrose 40% Gel 15 Gram(s) Oral once PRN  dextrose 50% Injectable 12.5 Gram(s) IV Push once  dextrose 50% Injectable 25 Gram(s) IV Push once  dextrose 50% Injectable 25 Gram(s) IV Push once  DULoxetine 60 milliGRAM(s) Oral two times a day  glucagon  Injectable 1 milliGRAM(s) IntraMuscular once PRN  insulin glargine Injectable (LANTUS) 30 Unit(s) SubCutaneous at bedtime  insulin lispro (HumaLOG) corrective regimen sliding scale   SubCutaneous three times a day before meals  insulin lispro Injectable (HumaLOG) 9 Unit(s) SubCutaneous three times a day before meals  levETIRAcetam  IVPB 1000 milliGRAM(s) IV Intermittent every 12 hours  losartan 50 milliGRAM(s) Oral daily  magnesium sulfate  IVPB 2 Gram(s) IV Intermittent every 2 hours  morphine  - Injectable 2 milliGRAM(s) IV Push every 4 hours PRN  pantoprazole   Suspension 40 milliGRAM(s) Oral daily  pramipexole 0.5 milliGRAM(s) Oral every 12 hours  senna 2 Tablet(s) Oral at bedtime  sodium chloride 0.9%. 1000 milliLiter(s) IV Continuous <Continuous>  tamsulosin 0.4 milliGRAM(s) Oral at bedtime      ANTIBIOTICS:

## 2020-09-08 NOTE — CHART NOTE - NSCHARTNOTEFT_GEN_A_CORE
Registered Dietitian Follow-Up    ***Scroll to the bottom for RD recommendation***    Patient Profile Reviewed                           Yes [x]   No []  Nutrition History Previously Obtained        Yes [x]  No []          PERTINENT SUBJECTIVE INFORMATION:  - pt has been NPO since admission on 8/31. NPO for 9th day now, due to ongoing seizures.  - currently has VEEG. AOx1.  on IV BP meds  - per RN per rounds, will start tube feed today, via NGT.           PERTINENT MEDICAL INFORMATIONS:  (1) Present for NH for AMS x3 days, decreased PO intake, R sided facial droops.  (2) s/p EEG due to seizure. showed infarct w/ persistent RIGHT midline shift of 5-6mm.   (3) monitoring stroke factors. hx of CAD s/p CABG.   (4) VEEG. on abx.   (5) monitor DM.  (6) s/p SLP 9/4 failed.           DIET ORDER:    NPO since 8/31        ANTHROPOMETRICS:  - Ht.  177.8cm  - Wt.    (9/3): 99kg   (9/7): 100.7kg - no new weight, likely stable, pt has 1+ b/l foot edema.  - BMI. 31.3  - IBW. 75.5kg         PERTINENT LAB DATA:   9/8: WBC 13.22, K 3.1, BUN 8, Ca 8.2, Mag 1.5, HgbA1c 7.3  PERTINENT MEDS:  aspirin, abx, insulin, morphine, acetaminophen, protonix, atorvastatin, bisacodyl, carvedilol, senna,         PHYSICAL FINDINGS  - APPEARANCE:         AOx1, 1+ b/l foot edema  - GI FUNCTION:        LBM 9/7 per EMR  - TUBES:                     NGT  - ORAL/MOUTH:      NPO  - SKIN:                        Skin intact        NUTRITION REQUIREMENTS  WEIGHT USED:                          dosing 99 kg/218 lbs; IBW 75.5 kg/166 lbs  ESTIMATED ENERGY NEEDS:       CONTINUE [  ]      ADJUST [ x ]  - slightly adjusted as of 9/8    ESTIMATED ENERGY NEEDS:         2684-3657 (MSJ x 1-1.1 AF) Obese BMI considered  ESTIMATED PROTEIN NEEDS:        75-90 g (1-1.2 g/kg IBW) same as above, but will aim LOW range as pt's renal status fair.  ESTIMATED FLUID NEEDS:             Fluid per ICU team    CURRENT NUTRIENT NEEDS:      NPO           [ x ] PREVIOUS NUTRITION DIAGNOSIS:   (1)  Inadequate Energy Intake            [ x ] ONGOING        [  ] RESOLVED             PATIENT INTERVENTION:    [  ] ORAL        [x ] EN/TF     GOAL/EXPECTED OUTCOME:     pt to meet and tolerate % of estimated kcal/protein via TF upon f/u in 3 days.   INDICATOR/MONITORING:       RD to monitor diet order, energy intake, body composition, nutrition focused physical findings (ELECTROLYTES, renal profile, glucose profile, EN tolerance)  NUTRITION INTERVENTION:        Enteral nutrition      RECS: (1) If pt to start feeds, due to pt being NPO for 9th day now, please replete/monitor electrolytes (K/Mag/Phos) as tube feed initiates, to avoid refeeding. (2) Because of this nature, will not start with Glucerna immediately, will start with OSMOLITE 1.5 at 20cc/hr, increase by 10cc q4hr to GOAL of 50cc/hr. This at GOAL gives a total of 1775 kcal/ 75g protein/ 912mL free water, additional flushes per ICU. (3) Due to possible spike in glucose, consider an insulin regimen. (4) once pt is tolerating this, will switch to GLUCERNA 1.2. Registered Dietitian Follow-Up    ***Scroll to the bottom for RD recommendation***    Patient Profile Reviewed                           Yes [x]   No []  Nutrition History Previously Obtained        Yes [x]  No []          PERTINENT SUBJECTIVE INFORMATION:  - pt has been NPO since admission on 8/31. NPO for 9th day now, due to ongoing seizures.  - currently has VEEG. AOx1.  on IV BP meds  - per RN per rounds, will start tube feed today, via NGT.           PERTINENT MEDICAL INFORMATIONS:  (1) Present for NH for AMS x3 days, decreased PO intake, R sided facial droops.  (2) s/p EEG due to seizure. showed infarct w/ persistent RIGHT midline shift of 5-6mm.   (3) monitoring stroke factors. hx of CAD s/p CABG.   (4) VEEG. on abx.   (5) monitor DM.  (6) s/p SLP 9/4 failed.           DIET ORDER:    NPO since 8/31        ANTHROPOMETRICS:  - Ht.  177.8cm  - Wt.    (9/3): 99kg   (9/7): 100.7kg - no new weight, likely stable, pt has 1+ b/l foot edema.  - BMI. 31.3  - IBW. 75.5kg         PERTINENT LAB DATA:   9/8: WBC 13.22, K 3.1, BUN 8, Ca 8.2, Mag 1.5, HgbA1c 7.3  PERTINENT MEDS:  aspirin, abx, insulin, morphine, acetaminophen, protonix, atorvastatin, bisacodyl, carvedilol, senna,         PHYSICAL FINDINGS  - APPEARANCE:         AOx1, 1+ b/l foot edema  - GI FUNCTION:        LBM 9/7 per EMR  - TUBES:                     NGT  - ORAL/MOUTH:      NPO  - SKIN:                        Skin intact        NUTRITION REQUIREMENTS  WEIGHT USED:                          dosing 99 kg/218 lbs; IBW 75.5 kg/166 lbs  ESTIMATED ENERGY NEEDS:       CONTINUE [  ]      ADJUST [ x ]  - slightly adjusted as of 9/8    ESTIMATED ENERGY NEEDS:         9935-9054 (MSJ x 1-1.1 AF) Obese BMI considered  ESTIMATED PROTEIN NEEDS:        75-90 g (1-1.2 g/kg IBW) same as above, but will aim LOW range as pt's renal status fair.  ESTIMATED FLUID NEEDS:             Fluid per ICU team    CURRENT NUTRIENT NEEDS:      NPO           [ x ] PREVIOUS NUTRITION DIAGNOSIS:   (1)  Inadequate Energy Intake            [ x ] ONGOING        [  ] RESOLVED             PATIENT INTERVENTION:    [  ] ORAL        [x ] EN/TF     GOAL/EXPECTED OUTCOME:     pt to meet and tolerate % of estimated kcal/protein via TF upon f/u in 3 days.   INDICATOR/MONITORING:       RD to monitor diet order, energy intake, body composition, nutrition focused physical findings (ELECTROLYTES, renal profile, glucose profile, EN tolerance)  NUTRITION INTERVENTION:        Enteral nutrition. Coordination of care.       RECS: (1) If pt to start feeds, due to pt being NPO for 9th day now, please replete/monitor electrolytes (K/Mag/Phos) as tube feed initiates, to avoid refeeding. (2) Because of this nature, will not start with Glucerna immediately, will start with OSMOLITE 1.5 at 20cc/hr, increase by 10cc q4hr to GOAL of 50cc/hr. This at GOAL gives a total of 1775 kcal/ 75g protein/ 912mL free water, additional flushes per ICU. (3) Due to possible spike in glucose, consider an insulin regimen. (4) once pt is tolerating this, will switch to GLUCERNA 1.2. (4) if pt continues to be NPO due to seizure, consult nutrition support team.

## 2020-09-08 NOTE — EEG REPORT - NS EEG TEXT BOX
Epilepsy Attending Note:     NAPOLEON CAST    50y Male  MRN [id number]    Vital Signs Last 24 Hrs  T(C): 37.7 (08 Sep 2020 08:00), Max: 37.7 (08 Sep 2020 08:00)  T(F): 99.8 (08 Sep 2020 08:00), Max: 99.8 (08 Sep 2020 08:00)  HR: 92 (08 Sep 2020 08:00) (72 - 106)  BP: 175/80 (08 Sep 2020 07:30) (151/69 - 207/95)  BP(mean): 115 (08 Sep 2020 07:30) (100 - 168)  RR: 19 (08 Sep 2020 08:00) (11 - 25)  SpO2: 98% (08 Sep 2020 08:00) (95% - 98%)                          14.4   13.22 )-----------( 238      ( 08 Sep 2020 04:17 )             45.4       09-08    141  |  97<L>  |  8<L>  ----------------------------<  87  3.1<L>   |  26  |  0.7    Ca    8.2<L>      08 Sep 2020 04:17  Mg     1.5     09-08    TPro  6.4  /  Alb  3.7  /  TBili  1.1  /  DBili  x   /  AST  23  /  ALT  14  /  AlkPhos  101  09-08      MEDICATIONS  (STANDING):  aspirin  chewable 81 milliGRAM(s) Oral daily  atorvastatin 80 milliGRAM(s) Oral at bedtime  baclofen 10 milliGRAM(s) Oral two times a day  carvedilol 3.125 milliGRAM(s) Oral every 12 hours  chlorhexidine 4% Liquid 1 Application(s) Topical <User Schedule>  clevidipine Infusion 2 mG/Hr (4 mL/Hr) IV Continuous <Continuous>  dextrose 50% Injectable 12.5 Gram(s) IV Push once  dextrose 50% Injectable 25 Gram(s) IV Push once  dextrose 50% Injectable 25 Gram(s) IV Push once  DULoxetine 60 milliGRAM(s) Oral two times a day  insulin glargine Injectable (LANTUS) 30 Unit(s) SubCutaneous at bedtime  insulin lispro (HumaLOG) corrective regimen sliding scale   SubCutaneous three times a day before meals  insulin lispro Injectable (HumaLOG) 9 Unit(s) SubCutaneous three times a day before meals  levETIRAcetam  IVPB 1000 milliGRAM(s) IV Intermittent every 12 hours  pantoprazole   Suspension 40 milliGRAM(s) Oral daily  pramipexole 0.5 milliGRAM(s) Oral every 12 hours  senna 2 Tablet(s) Oral at bedtime  sodium chloride 0.9%. 1000 milliLiter(s) (125 mL/Hr) IV Continuous <Continuous>  tamsulosin 0.4 milliGRAM(s) Oral at bedtime    MEDICATIONS  (PRN):  acetaminophen   Tablet .. 650 milliGRAM(s) Oral every 6 hours PRN Moderate Pain (4 - 6)  bisacodyl 5 milliGRAM(s) Oral at bedtime PRN Constipation  dextrose 40% Gel 15 Gram(s) Oral once PRN Blood Glucose LESS THAN 70 milliGRAM(s)/deciliter  glucagon  Injectable 1 milliGRAM(s) IntraMuscular once PRN Glucose LESS THAN 70 milligrams/deciliter  morphine  - Injectable 2 milliGRAM(s) IV Push every 4 hours PRN Severe Pain (7 - 10)            VEEG in the last 24 hours:    Background------------------ low amplitude ,symmetrical less than optimally organized reaching frequencies in the range of 7-8 hz. superimposed by small amount of low amplitude theta    Focal and generalized slowing----------------  mild to moderate generalized slowing                                                                   mild bilateral right > left focal slowing    Interictal activity-------------------- right FT at times presented in psuedo-periodic pattern.    Events--- none    Seizures------------- no clinical or subclinical seizures    Impression:  abnormal as above    Plan - discussed with the neuro-critical team

## 2020-09-08 NOTE — CHART NOTE - NSCHARTNOTEFT_GEN_A_CORE
Suggestions:  Neuro:  -No clinical seizures on vEEG. May d/c  -C/w Keppra 1 gr BID  -Neuro checks Q1  -Adequate pain management PRN  -Cont. high dose statin therapy     CVS:  -Keep -180    Resp:  -Maintain HOB elevated at least 40 degrees    Fluids/Electrolytes:  -Keep Na+ levels 140-145  -Maintain glycemic control     ID:  -CSF analysis negative PCR herpes; may d/c acyclovir  -May resume antibiotics as needed    Call w/ questions  Zaida Bass NP  [phone]

## 2020-09-08 NOTE — PROGRESS NOTE ADULT - ASSESSMENT
ASSESSMENT  50yMale with a PMH of CABG, CIDP, DM presenting with 4 days of decreased mentation. Not verbal, does not follow commands. CT showing basal ganglia infarct. Discussed prognosis with wife.    IMPRESSION  #No infectious meningitis or encephalitis. CSF WBC 0  - CSF cx / PCR NG   - s/p LP 9/4 with 0 WBC, 1 RBC; Protein, CSF: 75 mg/dL (09.04.20 @ 15:20), Glucose, CSF (09.04.20 @ 15:20)  - CSF PCR negative, HSV 1/2 PCR NEGATIVE     # Ischemic basal ganglia lesion and mass effect likely causing central fever, non infectious.  #No endocarditis  CXR no infiltrates  BCx 8/31, 9/2 NGTD  UCx 9/2 NG    RECOMMENDATIONS  Off all ABx  Aspiration/pressure ulcer precautions

## 2020-09-08 NOTE — PROGRESS NOTE ADULT - ASSESSMENT
IMPRESSION:    Altered MS s/p LP and VEEG  Acute CVA basal ganglia/ evolving infarct mild mass effect s/p CTH   Seizures s/p VEEG   HO CIDP s/p IVIG q3 weeks  Paraplegia  HO CAD s/p CABG    PLAN:    CNS: q4h neuro checks. Neuro following. Continue Keppra. Seizure precautions. f/u VEEG . Repeat CTH as per Neurology    HEENT: oral care. aspiration precautions    PULMONARY: HOB >30. O2 if needed to maintain pulse ox >92%    CARDIOVASCULAR: keep NS @ 100 . Keep i=o. bp control. increase coreg to 6.25 q12. add losartan . keep systolic bp around 160-180.     GI: GI prophylaxis. Start NGT Feeding as tolerated     RENAL: fu lytes. replete potassium.  monitor urine output . Na+ goal 140-145 as per neuro     INFECTIOUS DISEASE: No ABX as per ID. f/u LP WNV     HEMATOLOGICAL:  DVT prophylaxis. continue ASA and plavix    ENDOCRINE:  Follow up FS.  Insulin protocol if needed.    MUSCULOSKELETAL: bedrest    MICU monitoring  Full code  Very poor prognosis IMPRESSION:    Altered MS s/p LP and VEEG  Acute CVA basal ganglia/ evolving infarct mild mass effect s/p CTH   Seizures s/p VEEG   HO CIDP s/p IVIG q3 weeks  Paraplegia  HO CAD s/p CABG    PLAN:    CNS: q4h neuro checks. Neuro following. Continue Keppra. Seizure precautions. f/u VEEG    HEENT: oral care. aspiration precautions    PULMONARY: HOB >30. O2 if needed to maintain pulse ox >92%    CARDIOVASCULAR: keep NS @ 100 . Keep i=o. bp control. increase coreg to 6.25 q12. add losartan. keep systolic bp around 160-180.     GI: GI prophylaxis. Start NGT Feeding as tolerated     RENAL: fu lytes. replete potassium.  monitor urine output . Na+ goal 140-145 as per neuro     INFECTIOUS DISEASE: No ABX as per ID. f/u LP WNV     HEMATOLOGICAL:  DVT prophylaxis. continue ASA and plavix    ENDOCRINE:  Follow up FS.  Insulin protocol if needed.    MUSCULOSKELETAL: bedrest    MICU monitoring  Full code  Very poor prognosis

## 2020-09-08 NOTE — PROGRESS NOTE ADULT - SUBJECTIVE AND OBJECTIVE BOX
NAPOLEON CAST 50y Male  MRN#: [id number]       SUBJECTIVE  Patient is a 50y old Male who presents with a chief complaint of ams/ facial droop (08 Sep 2020 10:51)    This is a 50 years old left handed man with past medical history of MI, CABG x 5, DM, arteriosclerotic heart disease, chronic inflammatory demyelinating polyneuropathy (follows Neurologist Dr. Mitchell) diagnosed in 2011, prior chronic right caudate nucleus infarction, who presents from nursing home for AMS x 3 days, vomiting, decreased PO intake, and right sided facial droop. In ED, T 99.6F, /90, HR 74, RR 18, SpO2 96% RA.   He presented to the ED for facial droop accompanied by wife. He was A0x1. Per wife, he had been acting different past five days, with low speech, barely talking and recently a right facial droop x 1 day.  CTH showed evidence of an acute/subacute left basal ganglia/periventricular white matter. Patient was outside window for tpa. He was admitted to [facility]    INTERVAL HISTORY:  9/1: In ED Main. He had RRT for an episode of lethargy and vomiting. BP at the time 230/90, and no new focal neurological deficit per chart review. He received hydralazine 5 mg x 1 and CT head reports of evolving left BG/CR with mild mass effect, and rightward midline shift measuring 9 mm (previously 7 mm).   9/3: Pt seen in 3E Stroke Unit. Overnight, pt with temp spike 38.4C and hypertensive, responded to tylenol. This morning at 9:12am, during Neuro exam patient found to have b/l LE clonus then developed generalized rhythmic tremors of b/l UE. Temp 98.7F per primary RN. Notified EEG tech for VEEG hook up. Patient was given STAT 2mg of Ativan and responded. Patient also to start Keppra 500 mg BID for now. , /84 this morning. WBC continues to trend up 18.69. Pending IR-guided LP as well; unsuccessful attempt at bedside yesterday.   Patient is lethargic and somnolent, unarousable and responding to pain.  He has an NG inserted, febrile.      Today is hospital day 8d, and this morning he is stable. No seizure activity noted.  Afebrile overnight.  s/p restarting vEEG.  Off sedation.  No other acute overnight events.     OBJECTIVE  PAST MEDICAL & SURGICAL HISTORY  GERD (gastroesophageal reflux disease)  BPH (benign prostatic hyperplasia)  Myocardial infarct, old  ASHD (arteriosclerotic heart disease)  Depression  Anxiety  Diabetes  CIDP (chronic inflammatory demyelinating polyneuropathy)  History of cholecystectomy  History of total left hip replacement  History of total right hip replacement: bilateral  S/P CABG x 5    ALLERGIES:  penicillins (Unknown)  strawberry (Hives)    MEDICATIONS:  STANDING MEDICATIONS  aspirin  chewable 81 milliGRAM(s) Oral daily  atorvastatin 80 milliGRAM(s) Oral at bedtime  baclofen 10 milliGRAM(s) Oral two times a day  carvedilol 6.25 milliGRAM(s) Oral every 12 hours  chlorhexidine 4% Liquid 1 Application(s) Topical <User Schedule>  dextrose 50% Injectable 12.5 Gram(s) IV Push once  dextrose 50% Injectable 25 Gram(s) IV Push once  dextrose 50% Injectable 25 Gram(s) IV Push once  DULoxetine 60 milliGRAM(s) Oral two times a day  insulin glargine Injectable (LANTUS) 30 Unit(s) SubCutaneous at bedtime  insulin lispro (HumaLOG) corrective regimen sliding scale   SubCutaneous three times a day before meals  insulin lispro Injectable (HumaLOG) 9 Unit(s) SubCutaneous three times a day before meals  levETIRAcetam  IVPB 1000 milliGRAM(s) IV Intermittent every 12 hours  losartan 50 milliGRAM(s) Oral daily  morphine  - Injectable 2 milliGRAM(s) IV Push every 4 hours  pantoprazole   Suspension 40 milliGRAM(s) Oral daily  pramipexole 0.5 milliGRAM(s) Oral every 12 hours  senna 2 Tablet(s) Oral at bedtime  sodium chloride 0.9%. 1000 milliLiter(s) IV Continuous <Continuous>  tamsulosin 0.4 milliGRAM(s) Oral at bedtime    PRN MEDICATIONS  acetaminophen   Tablet .. 650 milliGRAM(s) Oral every 6 hours PRN  bisacodyl 5 milliGRAM(s) Oral at bedtime PRN  dextrose 40% Gel 15 Gram(s) Oral once PRN  glucagon  Injectable 1 milliGRAM(s) IntraMuscular once PRN  morphine  - Injectable 2 milliGRAM(s) IV Push every 4 hours PRN      VITAL SIGNS: Last 24 Hours  T(C): 37.2 (08 Sep 2020 12:00), Max: 37.7 (08 Sep 2020 08:00)  T(F): 98.9 (08 Sep 2020 12:00), Max: 99.8 (08 Sep 2020 08:00)  HR: 94 (08 Sep 2020 14:00) (72 - 120)  BP: 180/87 (08 Sep 2020 14:00) (151/69 - 205/92)  BP(mean): 133 (08 Sep 2020 14:00) (100 - 168)  RR: 23 (08 Sep 2020 14:00) (11 - 28)  SpO2: 98% (08 Sep 2020 14:00) (91% - 99%)    LABS:                        14.4   13.22 )-----------( 238      ( 08 Sep 2020 04:17 )             45.4     09-08    141  |  97<L>  |  8<L>  ----------------------------<  87  3.1<L>   |  26  |  0.7    Ca    8.2<L>      08 Sep 2020 04:17  Mg     1.5     09-08    TPro  6.4  /  Alb  3.7  /  TBili  1.1  /  DBili  x   /  AST  23  /  ALT  14  /  AlkPhos  101  09-08        ABG - ( 06 Sep 2020 16:33 )  pH, Arterial: 7.48  pH, Blood: x     /  pCO2: 34    /  pO2: 70    / HCO3: 25    / Base Excess: 2.0   /  SaO2: 95                        RADIOLOGY:      PHYSICAL EXAM:    GENERAL: NAD, well-developed, AAOx2  HEENT:  Atraumatic, Normocephalic. EOMI, PERRLA, conjunctiva and sclera clear, No JVD  PULMONARY: Clear to auscultation bilaterally; No wheeze  CARDIOVASCULAR: Tachycardic rate and regular rhythm; No murmurs, rubs, or gallops  GASTROINTESTINAL: Soft, Nontender, mildly distended; Bowel sounds present  MUSCULOSKELETAL:  2+ Peripheral Pulses, No clubbing, cyanosis, or edema  NEUROLOGY: Alert but only oriented to name;  3/5 strength in UE, R > L; paralysis of LE b/r  SKIN: No rashes or lesions      ADMISSION SUMMARY  Patient is a 50y old Male who presents with a chief complaint of ams/ facial droop (08 Sep 2020 10:51)

## 2020-09-08 NOTE — CONSULT NOTE ADULT - ATTENDING COMMENTS
I have personally examined the patient and reviewed the documentation above.  Corrections and edits were made wherever needed.
patient seen and examined, agree with above, altered MS in chronically ill patient with CIDP, CVA, Seizure, ? infection, LP, V EEG, Neuro f/up, LE doppler, all over very poor prognosis, advance directives
50 y.o male with PMH of MI, CABG, DM , HD and CIDP on IVIG , paraplegia presented from NH with AMS , decreased po intake and vomiting. He was found to have Acute CVA basal ganglia/ evolving infarct mild mass effect,  Seizures s/p VEEG, and fever with increasing WBC with negative LP. NG tube enteral feeding to start today. Patient seen and examined as above. Also noted was h/o chronic opioid and anxiolytic use raises concern about possible withdrawal     Full code  Continue current management  add Morphine 2mg IVP every 4H ATC and continue morphine 2mg IVP every 4 hours for mod (4) to severe pain (10)  continue bowel regimen  xanax 1mg via enteral tube every 6H PRN for restlessness/ agitation/ anxiety - check with wife to learn history of frequency may need to schedule ATC  primary team to provide medical update and arrange GOC meeting. Palliative Care will remain available to participate

## 2020-09-08 NOTE — SWALLOW BEDSIDE ASSESSMENT ADULT - NS SPL SWALLOW CLINIC TRIAL FT
During PO, pt w/ episodes of shaking, ?seizure-like activity w/ elevated HR to 136. RN/MD notified and trials discontinued.
+overt s/s of penetration/aspiration w/ nectar, +toleration of puree w/o overt s/s of penetration/aspiration, +mod oral dysphagia

## 2020-09-08 NOTE — CONSULT NOTE ADULT - ASSESSMENT
50 year old male with h/o  HO CAD s/p CABG, CIDP with IVIG q3 weeks, Paraplegia - has not walked since 2013 and has HHA 12/day; admitted from nursing home  Altered MS s/p LP and VEEG - this admission found to have xxxxxxxxxxxxx Acute CVA basal ganglia/ evolving infarct mild mass effect s/p CTH; Seizures s/p VEEG grand mal over the weekend; HCT unchanged, LP negative; spiking fevers/ increase WBC no clear etiology; NGT enteral feeding to start today Consult Summary  50 year old male with h/o  HO CAD s/p CABG, CIDP with IVIG q3 weeks, Paraplegia - has not walked since 2013 and has HHA 12/day; admitted from nursing home  Altered MS s/p LP and VEEG - this admission found to have xxxxxxxxxxxxx Acute CVA basal ganglia/ evolving infarct mild mass effect s/p CTH; Seizures s/p VEEG grand mal over the weekend; HCT unchanged, LP negative; spiking fevers/ increase WBC no clear etiology; NGT enteral feeding to start today.  H/o chronic opioid and anxiolytic use raises concern about possible withdrawl    Symptoms to address: none at this time    Morphine Equivalent Daily Dose (MEDD): n/a    Recommendations:  continue current medical management  add Morphine 2mg IVP every 4H ATC and continue morphine 2mg IVP every 4 hours for mod (4) to severe pain (10)  continue bowel regimen  xanax 1mg via enteral tube every 6H PRN for restlessness/ agitation/ anxiety - check with wife to learn history of frequency may need to schedule ATC  primary team to provide medical update and arrange GOC meeting. Palliative Care will remain available to participate    Full code.    discussed with housestaff    Please Call [phone] PRN Consult Summary  50 year old male with h/o  HO CAD s/p CABG, CIDP with IVIG q3 weeks, Paraplegia - has not walked since 2013 and has HHA 12/day; admitted from nursing home  Altered MS s/p LP and VEEG - this admission found to have Acute CVA basal ganglia/ evolving infarct mild mass effect s/p CTH; Seizures s/p VEEG grand mal over the weekend; HCT unchanged, LP negative; spiking fevers/ increase WBC no clear etiology; NGT enteral feeding to start today.  H/o chronic opioid and anxiolytic use raises concern about possible withdrawl    Symptoms to address: none at this time    Morphine Equivalent Daily Dose (MEDD): n/a    Recommendations:  continue current medical management  add Morphine 2mg IVP every 4H ATC and continue morphine 2mg IVP every 4 hours for mod (4) to severe pain (10)  continue bowel regimen  xanax 1mg via enteral tube every 6H PRN for restlessness/ agitation/ anxiety - check with wife to learn history of frequency may need to schedule ATC  primary team to provide medical update and arrange GOC meeting. Palliative Care will remain available to participate    Full code.    discussed with housestaff    Please Call [phone] PRN

## 2020-09-08 NOTE — PHARMACOTHERAPY INTERVENTION NOTE - COMMENTS
-pt on clevidipine drip, on carvedilol 3.125mg q12h, /78, , recommended increase carvedilol 6.25mg q12h, & 3.125mg po x1  -recommended changing tamsulosin to doxazosin 2mg hs  -notified RN to administer duloxetine with apple juice via ngt, without crushing enteric pellets

## 2020-09-08 NOTE — SWALLOW BEDSIDE ASSESSMENT ADULT - SLP PERTINENT HISTORY OF CURRENT PROBLEM
50 year old male with h/o  HO CAD s/p CABG, CIDP with IVIG q3 weeks, Paraplegia - has not walked since 2013 and has HHA 12/day; admitted from nursing home  Altered MS s/p LP and VEEG - this admission found to have xxxxxxxxxxxxx Acute CVA basal ganglia/ evolving infarct mild mass effect s/p CTH; Seizures s/p VEEG grand mal over the weekend; HCT unchanged, LP negative; pt spiking fevers w/ increased WBC no clear etiology. pt undergoing VEEG monitoring 49 y/o M w/ PMHx: CAD s/p CABG, CIDP with IVIG q3 weeks, Paraplegia - has not walked since 2013 and has HHA 12hrs per day admitted w/ AMS; pt found to have acute CVA in basal ganglia/evolving infarct w/ mild mass effect; course c/b seizures s/p VEEG; pt spiking fevers w/ increased WBC no clear etiology. pt undergoing VEEG monitoring.

## 2020-09-08 NOTE — PROGRESS NOTE ADULT - ASSESSMENT
#Stroke  #R/o Meningitis  #AMS with decreased speech low tone found with acute/subacute infarct in the left basal ganglia/ periventricular white matter, NIHSS 10 on admission  CT: Evolving acute/subacute infarct in the left basal ganglia, with mild mass effect. Stable mild midline shift to the right.  - decrease Level of consciousness  - Infectious work up including UA/blood cultures, CXR, CSF  - Off antibiotics   - D/c Acyclovir, as HSV negative  - Keppra 1g q12  - LP successful 9/4/20  - f/u CSF results - unremarkable to date  - keep systolic 160-180  - IVF 75 cc/hr  -echo with bubble  -lipid profile WNL  -a1c 7.3  -keep aspirin and high dose statin  - ID consult  - Neuro checks q4  - f/u vEEG - no clinical or subclinical seziures noted so far  - CT Head - No hemorrhagic transformation, artifact from EEG  - LE Duplex negative    #hypocalcemia and hypomagnesemia  - Replete  - f/u K and Mg    # CIDP since 2011 per wife had deterioration [id number] and is bedbound since then, follows DR. Mitchell currently getting IVIG every three weeks with RN administering at home. Was also started on gabapentin a few months ago per wife Pt. gets very sleepy after gabapentin will hold for now.   -c/w baclofen and pramipexole    #diabetes  - c/w home insulin regimen 30 lantus bedtime 9 lispro with meals     #Hx of CAD s/p cabg  - c/w asa, carvedilol and statin  - hold plavix    #Hx of anxiety and depression Pt. is on many meds confirmed with wife- c/w home meds- duloxetine, quetiapine, hydroxyzine, alprazolam prn    #chronic pain   - hold morphine 60mg q12 continue home regimen    #Hx of BPH   -c/w tamsulosin    #Hx of chronic constipation   -c/w senna dulcolox as needed    #Hx of gerd   -on pantoprazole    #Diet: NPO with tube feeds  #DVT ppx- lovenox  #GI ppx on pantoprzole  #Code status: Full  #Dispo: MICU #Stroke  #R/o Meningitis  #AMS with decreased speech low tone found with acute/subacute infarct in the left basal ganglia/ periventricular white matter, NIHSS 10 on admission  CT: Evolving acute/subacute infarct in the left basal ganglia, with mild mass effect. Stable mild midline shift to the right.  - decrease Level of consciousness  - Infectious work up including UA/blood cultures, CXR, CSF  - Off antibiotics   - D/c Acyclovir, as HSV negative  - Keppra 1g q12  - LP successful 9/4/20  - f/u CSF results - unremarkable to date  - keep systolic 160-180  - IVF 75 cc/hr  -echo with bubble  -lipid profile WNL  -a1c 7.3  -keep aspirin and high dose statin  - ID consult  - Neuro checks q4  - f/u vEEG - no clinical or subclinical seziures noted so far  - CT Head - No hemorrhagic transformation, artifact from EEG  - f/u repeat CT Head  - Restart Lovenox?  - LE Duplex negative    #hypocalcemia and hypomagnesemia  - Replete  - f/u K and Mg    # CIDP since 2011 per wife had deterioration [id number] and is bedbound since then, follows DR. Mitchell currently getting IVIG every three weeks with RN administering at home. Was also started on gabapentin a few months ago per wife Pt. gets very sleepy after gabapentin will hold for now.   -c/w baclofen and pramipexole    #diabetes  - c/w home insulin regimen 30 lantus bedtime 9 lispro with meals     #HTN  - Start Losartan 50  - increase Coreg 6.25    #Hx of CAD s/p cabg  - c/w asa, carvedilol and statin  - hold plavix    #Hx of anxiety and depression Pt. is on many meds confirmed with wife- c/w home meds- duloxetine, quetiapine, hydroxyzine, alprazolam prn    #chronic pain   - hold morphine 60mg q12 continue home regimen  - Morphine 2mg q4    #Hx of BPH   - Doxazosin 2mg qd    #Hx of chronic constipation   -c/w senna dulcolox as needed    #Hx of gerd   -on pantoprazole    #Diet: NPO with tube feeds  #DVT ppx- lovenox  #GI ppx on pantoprzole  #Code status: Full  #Dispo: MICU #Stroke  #R/o Meningitis  #AMS with decreased speech low tone found with acute/subacute infarct in the left basal ganglia/ periventricular white matter, NIHSS 10 on admission  CT: Evolving acute/subacute infarct in the left basal ganglia, with mild mass effect. Stable mild midline shift to the right.  - decrease Level of consciousness  - Infectious work up including UA/blood cultures, CXR, CSF  - Off antibiotics   - D/c Acyclovir, as HSV negative  - Keppra 1g q12  - LP successful 9/4/20  - f/u CSF results - unremarkable to date  - keep systolic 160-180  - IVF 75 cc/hr  -echo with bubble  -lipid profile WNL  -a1c 7.3  -keep aspirin and high dose statin  - ID consult  - Neuro checks q4  - f/u vEEG - no clinical or subclinical seziures noted so far  - CT Head - No hemorrhagic transformation, artifact from EEG  - f/u repeat CT Head  - Restart Lovenox?  - LE Duplex negative  - Palliative care meeting tomorrow at 1 PM with Neuro, Wife, Palliative Care    #hypocalcemia and hypomagnesemia  - Replete  - f/u K and Mg    # CIDP since 2011 per wife had deterioration [id number] and is bedbound since then, follows DR. Mitchell currently getting IVIG every three weeks with RN administering at home. Was also started on gabapentin a few months ago per wife Pt. gets very sleepy after gabapentin will hold for now.   -c/w baclofen and pramipexole    #diabetes  - c/w home insulin regimen 30 lantus bedtime 9 lispro with meals     #HTN  - Start Losartan 50  - increase Coreg 6.25    #Hx of CAD s/p cabg  - c/w asa, carvedilol and statin  - hold plavix    #Hx of anxiety and depression Pt. is on many meds confirmed with wife- c/w home meds- duloxetine, quetiapine, hydroxyzine, alprazolam prn    #chronic pain   - hold morphine 60mg q12 continue home regimen  - Morphine 2mg q4    #Hx of BPH   - Doxazosin 2mg qd    #Hx of chronic constipation   -c/w senna dulcolox as needed    #Hx of gerd   -on pantoprazole    #Diet: NPO with tube feeds  #DVT ppx- lovenox  #GI ppx on pantoprzole  #Code status: Full  #Dispo: MICU

## 2020-09-09 NOTE — PROGRESS NOTE ADULT - ASSESSMENT
IMPRESSION:    Altered MS improving   Acute CVA basal ganglia/ evolving infarct mild mass effect s/p CTH   Seizures s/p VEEG   HO CIDP On  IVIG q3 weeks  Paraplegia  HO CAD s/p CABG    PLAN:    CNS:  Neuro following. Continue Keppra. Seizure precautions.     HEENT: oral care. aspiration precautions    PULMONARY: HOB >30. O2 if needed to maintain pulse ox >92%    CARDIOVASCULAR: Rate And BP control.     GI: GI prophylaxis. Start NGT Feeding as tolerated     RENAL: fu lytes.     INFECTIOUS DISEASE: No ABX     HEMATOLOGICAL:  DVT prophylaxis. Continue ASA and Plavix    ENDOCRINE:  Follow up FS.  Insulin protocol if needed.    MUSCULOSKELETAL:  Bed rest.  PT OT     Downgrade to floor     Very poor prognosis

## 2020-09-09 NOTE — CONSULT NOTE ADULT - SUBJECTIVE AND OBJECTIVE BOX
50 year old left handed man with past medical history of MI, CABG x 5, DM, arteriosclerotic heart disease, chronic inflammatory demyelinating polyneuropathy (follows Neurologist Dr. Mitchell) diagnosed in 2011 tx with IVIG, prior chronic right caudate nucleus infarction, reportedly non ambulatory since 2013. Pt presents from nursing home on 8/31 for AMS x 3 days, vomiting, decreased PO intake, and right sided facial droop. In ED, T 99.6F, /90, HR 74, RR 18, SpO2 96% RA.   CTH showed evidence of an acute/subacute left basal ganglia/periventricular white matter. Patient was outside window for tpa. He was admitted to stroke unit.    9/1: In ED Main. He had RRT for an episode of lethargy and vomiting. BP at the time 230/90, and no new focal neurological deficit per chart review. He received hydralazine 5 mg x 1 and CT head reports of evolving left BG/CR with mild mass effect, and rightward midline shift measuring 9 mm (previously 7 mm).   9/3: Pt seen in 3E Stroke Unit. Overnight, pt with temp spike 38.4C and hypertensive, responded to tylenol. Patient found to have b/l LE clonus then developed generalized rhythmic tremors of b/l UE. Temp 98.7F per primary RN. Notified EEG tech for VEEG hook up. Patient was given STAT 2mg of Ativan and responded. Patient also to start Keppra 500 mg BID for now. , /84 this morning. WBC continues to trend up 18.69. Pending IR-guided LP as well; unsuccessful attempt at bedside yesterday.   In ICU, he has been on vEEG monitoring, no seizures noted on EEG.  But over weekend, pt had grand mal seizure clinically.  Increased dose of keppra.  Neuro following.  LP done 9/4.  CSF results unremarkable. pt had been NPO 9 days.    Vital Signs Last 24 Hrs  T(C): 37.7 (09 Sep 2020 08:00), Max: 37.7 (08 Sep 2020 16:00)  T(F): 99.8 (09 Sep 2020 08:00), Max: 99.8 (08 Sep 2020 16:00)  HR: 94 (09 Sep 2020 14:00) (82 - 108)  BP: 153/68 (09 Sep 2020 14:00) (105/47 - 192/75)  BP(mean): 102 (09 Sep 2020 14:00) (72 - 128)  RR: 25 (09 Sep 2020 14:00) (20 - 27)  SpO2: 96% (09 Sep 2020 14:00) (93% - 98%)  Drug Dosing Weight  Height (cm): 177.8 (04 Sep 2020 14:02)  Weight (kg): 99 (03 Sep 2020 18:00)  BMI (kg/m2): 31.3 (04 Sep 2020 14:02)  BSA (m2): 2.17 (04 Sep 2020 14:02)  awake, sluggish, non verbal, does not respond to voice nor follow commands  skin turgor good  proper NG feeding tube in place, formula infusing by pump drip  bed elevated > 30 degrees, pt somewhat slumped  abd soft, ND, NT  no pressors    MEDICATIONS  (STANDING):  aspirin  chewable 81 milliGRAM(s) Oral daily  atorvastatin 80 milliGRAM(s) Oral at bedtime  baclofen 10 milliGRAM(s) Oral two times a day  carvedilol 12.5 milliGRAM(s) Oral every 12 hours  chlorhexidine 4% Liquid 1 Application(s) Topical <User Schedule>  doxazosin 2 milliGRAM(s) Oral at bedtime  DULoxetine 60 milliGRAM(s) Oral two times a day  insulin glargine Injectable (LANTUS) 30 Unit(s) SubCutaneous at bedtime  insulin lispro (HumaLOG) corrective regimen sliding scale   SubCutaneous three times a day before meals  insulin lispro Injectable (HumaLOG) 9 Unit(s) SubCutaneous three times a day before meals  levETIRAcetam  IVPB 1000 milliGRAM(s) IV Intermittent every 12 hours  losartan 50 milliGRAM(s) Oral daily  morphine  - Injectable 2 milliGRAM(s) IV Push every 4 hours  pantoprazole   Suspension 40 milliGRAM(s) Oral daily  pramipexole 0.5 milliGRAM(s) Oral every 12 hours  senna 2 Tablet(s) Oral at bedtime                        13.6   10.67 )-----------( 231      ( 09 Sep 2020 04:59 )             43.0   09-09    136  |  100  |  12  ----------------------------<  226<H>  3.4<L>   |  24  |  0.7    Ca    8.0<L>      09 Sep 2020 04:59  Mg     1.9     09-09    TPro  6.0  /  Alb  3.4<L>  /  TBili  0.8  /  DBili  x   /  AST  19  /  ALT  12  /  AlkPhos  100  09-09  Phosphorus Level, Serum in AM (09.06.20 @ 04:50)    Phosphorus Level, Serum: 2.8 mg/dL    POCT Blood Glucose.: 208 mg/dL (09 Sep 2020 10:28)  POCT Blood Glucose.: 215 mg/dL (09 Sep 2020 06:09)  POCT Blood Glucose.: 198 mg/dL (08 Sep 2020 21:30)    A1C with Estimated Average Glucose in AM (09.01.20 @ 05:26)    A1C  Result: 7.3: Method: Immunoassay

## 2020-09-09 NOTE — PROGRESS NOTE ADULT - SUBJECTIVE AND OBJECTIVE BOX
Patient is a 50y old  Male who presents with a chief complaint of ams/ facial droop (09 Sep 2020 09:38)        Over Night Events: Some improvement in MS.  Off pressors.  No events overnight.          ROS:     All ROS are negative except HPI         PHYSICAL EXAM    ICU Vital Signs Last 24 Hrs  T(C): 37.7 (09 Sep 2020 08:00), Max: 37.7 (08 Sep 2020 16:00)  T(F): 99.8 (09 Sep 2020 08:00), Max: 99.8 (08 Sep 2020 16:00)  HR: 106 (09 Sep 2020 08:00) (82 - 120)  BP: 105/47 (09 Sep 2020 08:00) (105/47 - 192/79)  BP(mean): 72 (09 Sep 2020 08:00) (72 - 133)  ABP: --  ABP(mean): --  RR: 27 (09 Sep 2020 08:00) (20 - 28)  SpO2: 96% (09 Sep 2020 08:00) (91% - 99%)      CONSTITUTIONAL:  Well nourished.  NAD    ENT:   Airway patent,   Mouth with normal mucosa.   No thrush    EYES:   Pupils equal,   Round and reactive to light.    CARDIAC:   Normal rate,   Regular rhythm.    No edema      Vascular:  Normal systolic impulse  No Carotid bruits    RESPIRATORY:   No wheezing  Bilateral BS  Normal chest expansion  Not tachypneic,  No use of accessory muscles    GASTROINTESTINAL:  Abdomen soft,   Non-tender,   No guarding,   + BS    MUSCULOSKELETAL:   Range of motion is not limited,  No clubbing, cyanosis    NEUROLOGICAL:   Alert   Follows simple commands  Moves Upper extremities > LE     SKIN:   Skin normal color for race,   Warm and dry  No evidence of rash.    PSYCHIATRIC:   No apparent risk to self or others.    HEMATOLOGICAL:  No cervical  lymphadenopathy.  no inguinal lymphadenopathy      09-08-20 @ 07:01  -  09-09-20 @ 07:00  --------------------------------------------------------  IN:    clevidipine Infusion: 2 mL    Enteral Tube Flush: 210 mL    IV PiggyBack: 400 mL    Osmolite: 240 mL    sodium chloride 0.9%: 1500 mL    sodium chloride 0.9%: 250 mL  Total IN: 2602 mL    OUT:    Indwelling Catheter - Urethral: 1600 mL    Voided: 425 mL  Total OUT: 2025 mL    Total NET: 577 mL          LABS:                            13.6   10.67 )-----------( 231      ( 09 Sep 2020 04:59 )             43.0                                               09-09    136  |  100  |  12  ----------------------------<  226<H>  3.4<L>   |  24  |  0.7    Ca    8.0<L>      09 Sep 2020 04:59  Mg     1.9     09-09    TPro  6.0  /  Alb  3.4<L>  /  TBili  0.8  /  DBili  x   /  AST  19  /  ALT  12  /  AlkPhos  100  09-09                                                                                           LIVER FUNCTIONS - ( 09 Sep 2020 04:59 )  Alb: 3.4 g/dL / Pro: 6.0 g/dL / ALK PHOS: 100 U/L / ALT: 12 U/L / AST: 19 U/L / GGT: x                                                                                                                                       MEDICATIONS  (STANDING):  aspirin  chewable 81 milliGRAM(s) Oral daily  atorvastatin 80 milliGRAM(s) Oral at bedtime  baclofen 10 milliGRAM(s) Oral two times a day  carvedilol 6.25 milliGRAM(s) Oral every 12 hours  chlorhexidine 4% Liquid 1 Application(s) Topical <User Schedule>  dextrose 50% Injectable 12.5 Gram(s) IV Push once  dextrose 50% Injectable 25 Gram(s) IV Push once  dextrose 50% Injectable 25 Gram(s) IV Push once  doxazosin 2 milliGRAM(s) Oral at bedtime  DULoxetine 60 milliGRAM(s) Oral two times a day  insulin glargine Injectable (LANTUS) 30 Unit(s) SubCutaneous at bedtime  insulin lispro (HumaLOG) corrective regimen sliding scale   SubCutaneous three times a day before meals  insulin lispro Injectable (HumaLOG) 9 Unit(s) SubCutaneous three times a day before meals  levETIRAcetam  IVPB 1000 milliGRAM(s) IV Intermittent every 12 hours  losartan 50 milliGRAM(s) Oral daily  morphine  - Injectable 2 milliGRAM(s) IV Push every 4 hours  pantoprazole   Suspension 40 milliGRAM(s) Oral daily  pramipexole 0.5 milliGRAM(s) Oral every 12 hours  senna 2 Tablet(s) Oral at bedtime    MEDICATIONS  (PRN):  acetaminophen   Tablet .. 650 milliGRAM(s) Oral every 6 hours PRN Moderate Pain (4 - 6)  bisacodyl 5 milliGRAM(s) Oral at bedtime PRN Constipation  dextrose 40% Gel 15 Gram(s) Oral once PRN Blood Glucose LESS THAN 70 milliGRAM(s)/deciliter  glucagon  Injectable 1 milliGRAM(s) IntraMuscular once PRN Glucose LESS THAN 70 milligrams/deciliter  morphine  - Injectable 2 milliGRAM(s) IV Push every 4 hours PRN Severe Pain (7 - 10)      New X-rays reviewed:                                                                                  ECHO    CXR interpreted by me:

## 2020-09-09 NOTE — PROGRESS NOTE ADULT - SUBJECTIVE AND OBJECTIVE BOX
SERENE, NAPOLEON  50y, Male    All available historical data reviewed    OVERNIGHT EVENTS:  no fevers  weak, does not follow commands  no pressors  no cough  no diarrhea    ROS:  unable to obtain history secondary to patient's mental status and/or sedation     VITALS:  T(F): 99.8, Max: 99.8 (09-08-20 @ 16:00)  HR: 106  BP: 105/47  RR: 27Vital Signs Last 24 Hrs  T(C): 37.7 (09 Sep 2020 08:00), Max: 37.7 (08 Sep 2020 16:00)  T(F): 99.8 (09 Sep 2020 08:00), Max: 99.8 (08 Sep 2020 16:00)  HR: 106 (09 Sep 2020 08:00) (82 - 120)  BP: 105/47 (09 Sep 2020 08:00) (105/47 - 192/79)  BP(mean): 72 (09 Sep 2020 08:00) (72 - 133)  RR: 27 (09 Sep 2020 08:00) (20 - 28)  SpO2: 96% (09 Sep 2020 08:00) (91% - 99%)    TESTS & MEASUREMENTS:                        13.6   10.67 )-----------( 231      ( 09 Sep 2020 04:59 )             43.0     09-09    136  |  100  |  12  ----------------------------<  226<H>  3.4<L>   |  24  |  0.7    Ca    8.0<L>      09 Sep 2020 04:59  Mg     1.9     09-09    TPro  6.0  /  Alb  3.4<L>  /  TBili  0.8  /  DBili  x   /  AST  19  /  ALT  12  /  AlkPhos  100  09-09    LIVER FUNCTIONS - ( 09 Sep 2020 04:59 )  Alb: 3.4 g/dL / Pro: 6.0 g/dL / ALK PHOS: 100 U/L / ALT: 12 U/L / AST: 19 U/L / GGT: x             Culture - Fungal, CSF (collected 09-04-20 @ 15:20)  Source: .CSF CSF  Preliminary Report (09-05-20 @ 15:20):    Testing in progress    Culture - Acid Fast - CSF (collected 09-04-20 @ 15:20)  Source: .CSF CSF    Culture - CSF with Gram Stain (collected 09-04-20 @ 15:20)  Source: .CSF CSF  Gram Stain (09-05-20 @ 01:29):    No polymorphonuclear cells seen    No organisms seen    by cytocentrifuge  Final Report (09-07-20 @ 15:25):    No growth    Culture - Blood (collected 09-02-20 @ 11:47)  Source: .Blood None  Final Report (09-08-20 @ 01:00):    No Growth Final    Culture - Urine (collected 09-02-20 @ 10:21)  Source: .Urine Catheterized  Final Report (09-03-20 @ 23:09):    No growth            RADIOLOGY & ADDITIONAL TESTS:  Personal review of radiological diagnostics performed  Echo and EKG results noted when applicable.     MEDICATIONS:  acetaminophen   Tablet .. 650 milliGRAM(s) Oral every 6 hours PRN  aspirin  chewable 81 milliGRAM(s) Oral daily  atorvastatin 80 milliGRAM(s) Oral at bedtime  baclofen 10 milliGRAM(s) Oral two times a day  bisacodyl 5 milliGRAM(s) Oral at bedtime PRN  carvedilol 6.25 milliGRAM(s) Oral every 12 hours  chlorhexidine 4% Liquid 1 Application(s) Topical <User Schedule>  dextrose 40% Gel 15 Gram(s) Oral once PRN  dextrose 50% Injectable 12.5 Gram(s) IV Push once  dextrose 50% Injectable 25 Gram(s) IV Push once  dextrose 50% Injectable 25 Gram(s) IV Push once  doxazosin 2 milliGRAM(s) Oral at bedtime  DULoxetine 60 milliGRAM(s) Oral two times a day  glucagon  Injectable 1 milliGRAM(s) IntraMuscular once PRN  insulin glargine Injectable (LANTUS) 30 Unit(s) SubCutaneous at bedtime  insulin lispro (HumaLOG) corrective regimen sliding scale   SubCutaneous three times a day before meals  insulin lispro Injectable (HumaLOG) 9 Unit(s) SubCutaneous three times a day before meals  levETIRAcetam  IVPB 1000 milliGRAM(s) IV Intermittent every 12 hours  losartan 50 milliGRAM(s) Oral daily  morphine  - Injectable 2 milliGRAM(s) IV Push every 4 hours PRN  morphine  - Injectable 2 milliGRAM(s) IV Push every 4 hours  pantoprazole   Suspension 40 milliGRAM(s) Oral daily  pramipexole 0.5 milliGRAM(s) Oral every 12 hours  senna 2 Tablet(s) Oral at bedtime      ANTIBIOTICS:

## 2020-09-09 NOTE — SWALLOW BEDSIDE ASSESSMENT ADULT - SLP PERTINENT HISTORY OF CURRENT PROBLEM
49 y/o M w/ PMHx: CAD s/p CABG, CIDP with IVIG q3 weeks, Paraplegia - has not walked since 2013 and has HHA 12hrs per day admitted w/ AMS; pt found to have acute CVA in basal ganglia/evolving infarct w/ mild mass effect; per ID-> No infectious meningitis or encephalitis. Ischemic basal ganglia lesion and mass effect likely causing central fever, non infectious. pt off pressors and planned for downgrade to medical floor. 49 y/o M w/ PMHx: CAD s/p CABG, CIDP with IVIG q3 weeks, Paraplegia - has not walked since 2013 admitted w/ AMS; pt found to have acute CVA in basal ganglia/evolving infarct w/ mild mass effect; per ID-> No infectious meningitis or encephalitis. Ischemic basal ganglia lesion and mass effect likely causing central fever, non infectious. Seizures s/p VEEG. pt off pressors and planned for downgrade to medical floor. Palliative care consulted.

## 2020-09-09 NOTE — SWALLOW BEDSIDE ASSESSMENT ADULT - PHARYNGEAL PHASE
Delayed cough post oral intake/wet upper airway sounds
Cough post oral intake
w/ nectar/Delayed pharyngeal swallow/Cough post oral intake/Wet vocal quality post oral intake

## 2020-09-09 NOTE — SWALLOW BEDSIDE ASSESSMENT ADULT - ORAL PREPARATORY PHASE
Reduced oral grading/Bolus falls into right lateral sulci
Reduced oral grading
Reduced oral grading/Anterior loss of bolus

## 2020-09-09 NOTE — PROGRESS NOTE ADULT - ASSESSMENT
ASSESSMENT  50yMale with a PMH of CABG, CIDP, DM presenting with 4 days of decreased mentation. Not verbal, does not follow commands. CT showing basal ganglia infarct. Discussed prognosis with wife.    IMPRESSION  #No infectious meningitis or encephalitis. CSF WBC 0  - CSF cx / PCR NG   - s/p LP 9/4 with 0 WBC, 1 RBC; Protein, CSF: 75 mg/dL (09.04.20 @ 15:20), Glucose, CSF (09.04.20 @ 15:20)  - CSF PCR negative, HSV 1/2 PCR NEGATIVE     # Ischemic basal ganglia lesion and mass effect likely causing central fever, non infectious.  #No endocarditis  CXR no infiltrates  BCx 8/31, 9/2 NGTD  UCx 9/2 NG    RECOMMENDATIONS  Off all ABx  Aspiration/pressure ulcer precautions  recall prn please

## 2020-09-09 NOTE — PROGRESS NOTE ADULT - SUBJECTIVE AND OBJECTIVE BOX
NAPOLEON CAST 50y Male  MRN#: [id number]       SUBJECTIVE  Patient is a 50y old Male who presents with a chief complaint of ams/ facial droop (09 Sep 2020 14:59)    This is a 50 years old left handed man with past medical history of MI, CABG x 5, DM, arteriosclerotic heart disease, chronic inflammatory demyelinating polyneuropathy (follows Neurologist Dr. Mitchell) diagnosed in 2011, prior chronic right caudate nucleus infarction, who presents from nursing home for AMS x 3 days, vomiting, decreased PO intake, and right sided facial droop. In ED, T 99.6F, /90, HR 74, RR 18, SpO2 96% RA.   He presented to the ED for facial droop accompanied by wife. He was A0x1. Per wife, he had been acting different past five days, with low speech, barely talking and recently a right facial droop x 1 day.  CTH showed evidence of an acute/subacute left basal ganglia/periventricular white matter. Patient was outside window for tpa. He was admitted to [facility]    INTERVAL HISTORY:  9/1: In ED Main. He had RRT for an episode of lethargy and vomiting. BP at the time 230/90, and no new focal neurological deficit per chart review. He received hydralazine 5 mg x 1 and CT head reports of evolving left BG/CR with mild mass effect, and rightward midline shift measuring 9 mm (previously 7 mm).   9/3: Pt seen in 3E Stroke Unit. Overnight, pt with temp spike 38.4C and hypertensive, responded to tylenol. This morning at 9:12am, during Neuro exam patient found to have b/l LE clonus then developed generalized rhythmic tremors of b/l UE. Temp 98.7F per primary RN. Notified EEG tech for VEEG hook up. Patient was given STAT 2mg of Ativan and responded. Patient also to start Keppra 500 mg BID for now. , /84 this morning. WBC continues to trend up 18.69. Pending IR-guided LP as well; unsuccessful attempt at bedside yesterday.   Patient is lethargic and somnolent, unarousable and responding to pain.  He has an NG inserted, febrile.      Today is hospital day 9d, and this morning he is stable. No seizure activity noted.  Afebrile overnight.  Off sedation. Mild improvement in MS.  Family meeting with palliative at 1 PM. No other acute overnight events.     OBJECTIVE  PAST MEDICAL & SURGICAL HISTORY  GERD (gastroesophageal reflux disease)  BPH (benign prostatic hyperplasia)  Myocardial infarct, old  ASHD (arteriosclerotic heart disease)  Depression  Anxiety  Diabetes  CIDP (chronic inflammatory demyelinating polyneuropathy)  History of cholecystectomy  History of total left hip replacement  History of total right hip replacement: bilateral  S/P CABG x 5    ALLERGIES:  penicillins (Unknown)  strawberry (Hives)    MEDICATIONS:  STANDING MEDICATIONS  aspirin  chewable 81 milliGRAM(s) Oral daily  atorvastatin 80 milliGRAM(s) Oral at bedtime  baclofen 10 milliGRAM(s) Oral two times a day  carvedilol 12.5 milliGRAM(s) Oral every 12 hours  chlorhexidine 4% Liquid 1 Application(s) Topical <User Schedule>  dextrose 50% Injectable 12.5 Gram(s) IV Push once  dextrose 50% Injectable 25 Gram(s) IV Push once  dextrose 50% Injectable 25 Gram(s) IV Push once  doxazosin 2 milliGRAM(s) Oral at bedtime  DULoxetine 60 milliGRAM(s) Oral two times a day  insulin glargine Injectable (LANTUS) 30 Unit(s) SubCutaneous at bedtime  insulin lispro (HumaLOG) corrective regimen sliding scale   SubCutaneous three times a day before meals  insulin lispro Injectable (HumaLOG) 9 Unit(s) SubCutaneous three times a day before meals  levETIRAcetam  IVPB 1000 milliGRAM(s) IV Intermittent every 12 hours  losartan 50 milliGRAM(s) Oral daily  morphine  - Injectable 2 milliGRAM(s) IV Push every 4 hours  pantoprazole   Suspension 40 milliGRAM(s) Oral daily  pramipexole 0.5 milliGRAM(s) Oral every 12 hours  senna 2 Tablet(s) Oral at bedtime    PRN MEDICATIONS  acetaminophen   Tablet .. 650 milliGRAM(s) Oral every 6 hours PRN  bisacodyl 5 milliGRAM(s) Oral at bedtime PRN  dextrose 40% Gel 15 Gram(s) Oral once PRN  glucagon  Injectable 1 milliGRAM(s) IntraMuscular once PRN  morphine  - Injectable 2 milliGRAM(s) IV Push every 4 hours PRN      VITAL SIGNS: Last 24 Hours  T(C): 37.7 (09 Sep 2020 08:00), Max: 37.7 (09 Sep 2020 08:00)  T(F): 99.8 (09 Sep 2020 08:00), Max: 99.8 (09 Sep 2020 08:00)  HR: 90 (09 Sep 2020 16:00) (82 - 108)  BP: 152/59 (09 Sep 2020 16:00) (105/47 - 180/72)  BP(mean): 87 (09 Sep 2020 16:00) (72 - 122)  RR: 24 (09 Sep 2020 16:00) (20 - 27)  SpO2: 96% (09 Sep 2020 16:00) (93% - 98%)    LABS:                        13.6   10.67 )-----------( 231      ( 09 Sep 2020 04:59 )             43.0     09-09    136  |  100  |  12  ----------------------------<  226<H>  3.4<L>   |  24  |  0.7    Ca    8.0<L>      09 Sep 2020 04:59  Mg     1.9     09-09    TPro  6.0  /  Alb  3.4<L>  /  TBili  0.8  /  DBili  x   /  AST  19  /  ALT  12  /  AlkPhos  100  09-09          Lactate, Blood: 1.5 mmol/L (09-08-20 @ 18:55)          RADIOLOGY:      PHYSICAL EXAM:    GENERAL: NAD, well-developed, AAOx2  HEENT:  Atraumatic, Normocephalic. EOMI, PERRLA, conjunctiva and sclera clear, No JVD  PULMONARY: Clear to auscultation bilaterally; No wheeze  CARDIOVASCULAR: Tachycardic rate and regular rhythm; No murmurs, rubs, or gallops  GASTROINTESTINAL: Soft, Nontender, mildly distended; Bowel sounds present  MUSCULOSKELETAL:  2+ Peripheral Pulses, No clubbing, cyanosis, or edema  NEUROLOGY: Alert but only oriented to name;  3/5 strength in UE, R > L; paralysis of LE b/r  SKIN: No rashes or lesions      ADMISSION SUMMARY  Patient is a 50y old Male who presents with a chief complaint of ams/ facial droop (09 Sep 2020 14:59)

## 2020-09-09 NOTE — PROGRESS NOTE ADULT - ASSESSMENT
Impression:  50 year old male with H/o chronic opioid and anxiolytic use, CAD s/p CABG, CIDP with IVIG q3 weeks, Paraplegia - has not walked since 2013 and has HHA 12/day; admitted from nursing home for AMS x 3 days, vomiting, decreased PO intake, and right sided facial droop.  Found to have Acute CVA in the left basal ganglia. Hospital course complicated with Seizures s/p VEEG negative for seizures. LP negative so far. Patient had a witnessed episode of left sided shaking last evening resolved with ativan 1mg x 1        Neurocritical care attending note will follow

## 2020-09-09 NOTE — PROGRESS NOTE ADULT - SUBJECTIVE AND OBJECTIVE BOX
50yMale with diagnosis: FACIAL DROOP      Patient seen and examined at bedside Appears comfortable. No acute events overnight, possible downgrade later today.  Met with wife at bedside and introduced palliative care. Wife stated she is familiar with the service. She would like a medical and neurologic update. Primary team is aware.    Code status addressed, wife would like to continue full code and current medical management.   Patient had been on pain medications and xanax, he had been using Ms contin 60mg bid and MS ir 15 mg 4 times a day along with xanax 2-3 times a day. Disucssed that IV doses had been started to prevent withdrawal. Patient does not appear in distress or grimacing.       PHYSICAL EXAM    constitutional: obese; appears older than age    HEENT: normocephalic, atraumatic     Eyes: momentary  tracking and not consistent    Respiratory: Easy and unlabored, no accessory muscle use; diminished at bases    Cardiovascular: HRR, S1,S2 SR    Gastrointestinal: Obese; soft, tympanic, not distended     Genitourinary: graham - clear yellow    Extremities: bilateral foot drop; + edema    Neurological: Not engaging in exam; no tremors     Skin: moist      T(C): , Max: 37.7 (16:00)  T(F): 99.8  HR: 94 (82 - 108)  BP: 153/68 (105/47 - 192/79)  RR: 25 (20 - 27)  SpO2: 96% (93% - 98%)      LABS:                          13.6   10.67 )-----------( 231      ( 09 Sep 2020 04:59 )             43.0                                                                                      09-09    136  |  100  |  12  ----------------------------<  226<H>  3.4<L>   |  24  |  0.7    Ca    8.0<L>      09 Sep 2020 04:59  Mg     1.9     09-09    TPro  6.0  /  Alb  3.4<L>  /  TBili  0.8  /  DBili  x   /  AST  19  /  ALT  12  /  AlkPhos  100  09-09      MEDICATIONS  (STANDING):  aspirin  chewable 81 milliGRAM(s) Oral daily  atorvastatin 80 milliGRAM(s) Oral at bedtime  baclofen 10 milliGRAM(s) Oral two times a day  carvedilol 12.5 milliGRAM(s) Oral every 12 hours  chlorhexidine 4% Liquid 1 Application(s) Topical <User Schedule>  dextrose 50% Injectable 12.5 Gram(s) IV Push once  dextrose 50% Injectable 25 Gram(s) IV Push once  dextrose 50% Injectable 25 Gram(s) IV Push once  doxazosin 2 milliGRAM(s) Oral at bedtime  DULoxetine 60 milliGRAM(s) Oral two times a day  insulin glargine Injectable (LANTUS) 30 Unit(s) SubCutaneous at bedtime  insulin lispro (HumaLOG) corrective regimen sliding scale   SubCutaneous three times a day before meals  insulin lispro Injectable (HumaLOG) 9 Unit(s) SubCutaneous three times a day before meals  levETIRAcetam  IVPB 1000 milliGRAM(s) IV Intermittent every 12 hours  losartan 50 milliGRAM(s) Oral daily  morphine  - Injectable 2 milliGRAM(s) IV Push every 4 hours  pantoprazole   Suspension 40 milliGRAM(s) Oral daily  pramipexole 0.5 milliGRAM(s) Oral every 12 hours  senna 2 Tablet(s) Oral at bedtime    MEDICATIONS  (PRN):  acetaminophen   Tablet .. 650 milliGRAM(s) Oral every 6 hours PRN Moderate Pain (4 - 6)  bisacodyl 5 milliGRAM(s) Oral at bedtime PRN Constipation  dextrose 40% Gel 15 Gram(s) Oral once PRN Blood Glucose LESS THAN 70 milliGRAM(s)/deciliter  glucagon  Injectable 1 milliGRAM(s) IntraMuscular once PRN Glucose LESS THAN 70 milligrams/deciliter  morphine  - Injectable 2 milliGRAM(s) IV Push every 4 hours PRN Severe Pain (7 - 10) T(C): 37.7 (09-09-20 @ 08:00)  HR: 94 (09-09-20 @ 14:00)  BP: 153/68 (09-09-20 @ 14:00)  RR: 25 (09-09-20 @ 14:00)  SpO2: 96% (09-09-20 @ 14:00)      IN: 2602 mL / OUT: 2025 mL / NET: 577 mL    IN: 430 mL / OUT: 150 mL / NET: 280 mL                              13.6   10.67 )-----------( 231      ( 09 Sep 2020 04:59 )             43.0       09-09    136  |  100  |  12  ----------------------------<  226<H>  3.4<L>   |  24  |  0.7    Ca    8.0<L>      09 Sep 2020 04:59  Mg     1.9     09-09    TPro  6.0  /  Alb  3.4<L>  /  TBili  0.8  /  DBili  x   /  AST  19  /  ALT  12  /  AlkPhos  100  09-09      MEDICATIONS  (STANDING):  aspirin  chewable 81 milliGRAM(s) Oral daily  atorvastatin 80 milliGRAM(s) Oral at bedtime  baclofen 10 milliGRAM(s) Oral two times a day  carvedilol 12.5 milliGRAM(s) Oral every 12 hours  chlorhexidine 4% Liquid 1 Application(s) Topical <User Schedule>  dextrose 50% Injectable 12.5 Gram(s) IV Push once  dextrose 50% Injectable 25 Gram(s) IV Push once  dextrose 50% Injectable 25 Gram(s) IV Push once  doxazosin 2 milliGRAM(s) Oral at bedtime  DULoxetine 60 milliGRAM(s) Oral two times a day  insulin glargine Injectable (LANTUS) 30 Unit(s) SubCutaneous at bedtime  insulin lispro (HumaLOG) corrective regimen sliding scale   SubCutaneous three times a day before meals  insulin lispro Injectable (HumaLOG) 9 Unit(s) SubCutaneous three times a day before meals  levETIRAcetam  IVPB 1000 milliGRAM(s) IV Intermittent every 12 hours  losartan 50 milliGRAM(s) Oral daily  morphine  - Injectable 2 milliGRAM(s) IV Push every 4 hours  pantoprazole   Suspension 40 milliGRAM(s) Oral daily  pramipexole 0.5 milliGRAM(s) Oral every 12 hours  senna 2 Tablet(s) Oral at bedtime    MEDICATIONS  (PRN):  acetaminophen   Tablet .. 650 milliGRAM(s) Oral every 6 hours PRN Moderate Pain (4 - 6)  bisacodyl 5 milliGRAM(s) Oral at bedtime PRN Constipation  dextrose 40% Gel 15 Gram(s) Oral once PRN Blood Glucose LESS THAN 70 milliGRAM(s)/deciliter  glucagon  Injectable 1 milliGRAM(s) IntraMuscular once PRN Glucose LESS THAN 70 milligrams/deciliter  morphine  - Injectable 2 milliGRAM(s) IV Push every 4 hours PRN Severe Pain (7 - 10)  50yMale with diagnosis: FACIAL DROOP    Patient seen and examined at bedside Appears comfortable. No acute events overnight, possible downgrade later today.  Met with wife at bedside and introduced palliative care. Wife stated she is familiar with the service. She would like a medical and neurologic update. Primary team is aware.    Code status addressed, wife would like to continue full code and current medical management.   Patient had been on pain medications and xanax, he had been using Ms contin 60mg bid and MS ir 15 mg 4 times a day along with xanax 2-3 times a day. Disucssed that IV doses had been started to prevent withdrawal. Patient does not appear in distress or grimacing.       PHYSICAL EXAM    constitutional: obese; appears older than age    HEENT: normocephalic, atraumatic     Eyes: momentary  tracking and not consistent    Respiratory: Easy and unlabored, no accessory muscle use; diminished at bases    Cardiovascular: HRR, S1,S2 SR    Gastrointestinal: Obese; soft, tympanic, not distended     Genitourinary: graham - clear yellow    Extremities: bilateral foot drop; + edema    Neurological: Not engaging in exam; no tremors     Skin: moist

## 2020-09-09 NOTE — CONSULT NOTE ADULT - ASSESSMENT
IMP:  - basal ganglia infarct     no meningitis, per ID  - fevers  - DM poorly controlled    pt receives 30 Lantus and 9 u Lispro ac at home  - CIDP with paraplegia  - dysphagia    SUGGEST:  - would not feed by NG by continuous drip in pt without protected airway, luc if considering transferring out of crit care area  - estimated REE by MSJ eqn ~ 1840 k, protein needs could be up to 115 gm/d luc if pt has been seizing  - considering DM with insulin dependence, would feed fiber-containing formula, and as glc currently more difficult to control:  - change feeds to intermittent gravity method, with pre-feed poc glucose testing and pre-feed insulin  - Lispro treatments and timing of feedings must match!!  - change feeds to Glucerna 1.2 360 ml x 4 feeds/d and add Prosource TF 2 per day = 107 gm protein and 1790 k/d  - check K , Mg and especially phos with am labs and replete to wnl

## 2020-09-09 NOTE — PROGRESS NOTE ADULT - ASSESSMENT
50 y.o male with PMH of MI, CABG, DM , HD and CIDP on IVIG , paraplegia presented from NH with AMS , decreased po intake and vomiting. He was found to have Acute CVA basal ganglia/ evolving infarct mild mass effect,  Seizures s/p VEEG, and fever with increasing WBC with negative LP. Met with wife and introduced palliative care as above.     Recommendations   Full code  Continue current management  Continue Morphine 2mg IVP every 4H ATC and continue morphine 2mg IVP every 4 hours for mod (4) to severe pain (10)  Start Ativan 0.25 mg IV Q12h   continue xanax 1mg via enteral tube every 6H PRN for restlessness/ agitation/ anxiety   Continue bowel regimen   primary team and neurology to provide medical update    Call [phone] PRN   Time spent discussing ACP 20 mins

## 2020-09-09 NOTE — PROGRESS NOTE ADULT - GASTROINTESTINAL
Pt and family to yellow 43. Care assumed on pt. Stellate laceration to medial side of right 5th digit extending from DIP to PIP joint. Flexion and extension intact. Bleeding controlled.    detailed exam

## 2020-09-10 NOTE — PROGRESS NOTE ADULT - SUBJECTIVE AND OBJECTIVE BOX
24H events:    Patient is a 50y old Male who presents with a chief complaint of ams/ facial droop (10 Sep 2020 14:13)    Primary diagnosis of Facial droop     Today is hospital day 10d. This morning patient was seen and examined at bedside, resting comfortably in bed.      PAST MEDICAL & SURGICAL HISTORY  GERD (gastroesophageal reflux disease)  BPH (benign prostatic hyperplasia)  Myocardial infarct, old  ASHD (arteriosclerotic heart disease)  Depression  Anxiety  Diabetes  CIDP (chronic inflammatory demyelinating polyneuropathy) diagnosed 2011  History of cholecystectomy  History of total left hip replacement  History of total right hip replacement: bilateral  S/P CABG x 5    SOCIAL HISTORY:  Social History:  no etoh/ smoking/ drugs (31 Aug 2020 17:40)      ALLERGIES:  penicillins (Unknown)  strawberry (Hives)    MEDICATIONS:  STANDING MEDICATIONS  aspirin  chewable 81 milliGRAM(s) Oral daily  atorvastatin 80 milliGRAM(s) Oral at bedtime  baclofen 10 milliGRAM(s) Oral two times a day  carvedilol 12.5 milliGRAM(s) Oral every 12 hours  chlorhexidine 4% Liquid 1 Application(s) Topical <User Schedule>  dextrose 50% Injectable 12.5 Gram(s) IV Push once  dextrose 50% Injectable 25 Gram(s) IV Push once  dextrose 50% Injectable 25 Gram(s) IV Push once  doxazosin 2 milliGRAM(s) Oral at bedtime  DULoxetine 60 milliGRAM(s) Oral two times a day  enoxaparin Injectable 40 milliGRAM(s) SubCutaneous daily  insulin glargine Injectable (LANTUS) 30 Unit(s) SubCutaneous at bedtime  insulin lispro (HumaLOG) corrective regimen sliding scale   SubCutaneous three times a day before meals  insulin lispro Injectable (HumaLOG) 9 Unit(s) SubCutaneous three times a day before meals  levETIRAcetam 1000 milliGRAM(s) Oral two times a day  losartan 50 milliGRAM(s) Oral daily  morphine  - Injectable 2 milliGRAM(s) IV Push every 4 hours  pantoprazole   Suspension 40 milliGRAM(s) Oral daily  pramipexole 0.5 milliGRAM(s) Oral every 12 hours  senna 2 Tablet(s) Oral at bedtime    PRN MEDICATIONS  acetaminophen   Tablet .. 650 milliGRAM(s) Oral every 6 hours PRN  ALPRAZolam 1 milliGRAM(s) Oral every 6 hours PRN  bisacodyl 5 milliGRAM(s) Oral at bedtime PRN  dextrose 40% Gel 15 Gram(s) Oral once PRN  glucagon  Injectable 1 milliGRAM(s) IntraMuscular once PRN  morphine  - Injectable 2 milliGRAM(s) IV Push every 4 hours PRN    VITALS:   T(F): 100.2  HR: 95  BP: 193/97  RR: 20  SpO2: 98%    PHYSICAL EXAM:  GENERAL: Obtunded and nonverbal  HEAD: Atraumatic, Normocephalic  EYES: Eyes closed.  NECK: Supple  NERVOUS SYSTEM:  Alert & Oriented x0. Is nonverbal and only responds with nods.  CHEST/LUNG: Clear to auscultation bilaterally; No rales, rhonchi, wheezing, or rubs  HEART: Regular rate and rhythm; No murmurs, rubs, or gallops  ABDOMEN: Soft, Nontender, Nondistended; Bowel sounds present  EXTREMITIES:  2+ Peripheral Pulses. Edema of lower extremity present.  LYMPH: No lymphadenopathy noted  SKIN: No rashes or lesions    LABS:                        13.5   9.63  )-----------( 251      ( 10 Sep 2020 05:49 )             43.1     09-10    139  |  102  |  14  ----------------------------<  246<H>  3.2<L>   |  26  |  0.7    Ca    8.4<L>      10 Sep 2020 05:49  Phos  3.0     09-10  Mg     1.6     09-10    TPro  6.0  /  Alb  3.5  /  TBili  1.2  /  DBili  x   /  AST  17  /  ALT  14  /  AlkPhos  103  09-10                  RADIOLOGY: 24H events:    Patient is a 50y old Male who presents with a chief complaint of ams/ facial droop (10 Sep 2020 14:13)    Primary diagnosis of Facial droop     Today is hospital day 10d. This morning patient was seen and examined at bedside, resting comfortably in bed.      PAST MEDICAL & SURGICAL HISTORY  GERD (gastroesophageal reflux disease)  BPH (benign prostatic hyperplasia)  Myocardial infarct, old  ASHD (arteriosclerotic heart disease)  Depression  Anxiety  Diabetes  CIDP (chronic inflammatory demyelinating polyneuropathy) diagnosed 2011  History of cholecystectomy  History of total left hip replacement  History of total right hip replacement: bilateral  S/P CABG x 5    SOCIAL HISTORY:  Social History:  no etoh/ smoking/ drugs (31 Aug 2020 17:40)      ALLERGIES:  penicillins (Unknown)  strawberry (Hives)    MEDICATIONS:  STANDING MEDICATIONS  aspirin  chewable 81 milliGRAM(s) Oral daily  atorvastatin 80 milliGRAM(s) Oral at bedtime  baclofen 10 milliGRAM(s) Oral two times a day  carvedilol 12.5 milliGRAM(s) Oral every 12 hours  chlorhexidine 4% Liquid 1 Application(s) Topical <User Schedule>  dextrose 50% Injectable 12.5 Gram(s) IV Push once  dextrose 50% Injectable 25 Gram(s) IV Push once  dextrose 50% Injectable 25 Gram(s) IV Push once  doxazosin 2 milliGRAM(s) Oral at bedtime  DULoxetine 60 milliGRAM(s) Oral two times a day  enoxaparin Injectable 40 milliGRAM(s) SubCutaneous daily  insulin glargine Injectable (LANTUS) 30 Unit(s) SubCutaneous at bedtime  insulin lispro (HumaLOG) corrective regimen sliding scale   SubCutaneous three times a day before meals  insulin lispro Injectable (HumaLOG) 9 Unit(s) SubCutaneous three times a day before meals  levETIRAcetam 1000 milliGRAM(s) Oral two times a day  losartan 50 milliGRAM(s) Oral daily  morphine  - Injectable 2 milliGRAM(s) IV Push every 4 hours  pantoprazole   Suspension 40 milliGRAM(s) Oral daily  pramipexole 0.5 milliGRAM(s) Oral every 12 hours  senna 2 Tablet(s) Oral at bedtime    PRN MEDICATIONS  acetaminophen   Tablet .. 650 milliGRAM(s) Oral every 6 hours PRN  ALPRAZolam 1 milliGRAM(s) Oral every 6 hours PRN  bisacodyl 5 milliGRAM(s) Oral at bedtime PRN  dextrose 40% Gel 15 Gram(s) Oral once PRN  glucagon  Injectable 1 milliGRAM(s) IntraMuscular once PRN  morphine  - Injectable 2 milliGRAM(s) IV Push every 4 hours PRN    VITALS:   T(F): 100.2  HR: 95  BP: 193/97  RR: 20  SpO2: 98%    PHYSICAL EXAM:  GENERAL: Obtunded and nonverbal  HEAD: Atraumatic, Normocephalic  EYES: Eyes closed.  NECK: Supple  NERVOUS SYSTEM:  Alert & Oriented x0. Responds with nods but is otherwise nonverbal.  CHEST/LUNG: Clear to auscultation bilaterally  HEART: Regular rate and rhythm; No murmurs, rubs, or gallops  ABDOMEN: Soft, Nontender, Nondistended; Bowel sounds present  EXTREMITIES:  2+ Peripheral Pulses. Edema of lower extremity present.  LYMPH: No lymphadenopathy noted  SKIN: No rashes or lesions    LABS:                        13.5   9.63  )-----------( 251      ( 10 Sep 2020 05:49 )             43.1     09-10    139  |  102  |  14  ----------------------------<  246<H>  3.2<L>   |  26  |  0.7    Ca    8.4<L>      10 Sep 2020 05:49  Phos  3.0     09-10  Mg     1.6     09-10    TPro  6.0  /  Alb  3.5  /  TBili  1.2  /  DBili  x   /  AST  17  /  ALT  14  /  AlkPhos  103  09-10                  RADIOLOGY:

## 2020-09-10 NOTE — PROGRESS NOTE ADULT - SUBJECTIVE AND OBJECTIVE BOX
S: No acute overnight events  PAtient non-verbal  FEeding through NGT      All other pertinent ROS negative.      09-09-20 @ 07:01  -  09-10-20 @ 07:00  --------------------------------------------------------  IN: 1300 mL / OUT: 150 mL / NET: 1150 mL    09-10-20 @ 07:01  -  09-10-20 @ 18:03  --------------------------------------------------------  IN: 820 mL / OUT: 0 mL / NET: 820 mL      Vital Signs Last 24 Hrs  T(C): 37.9 (10 Sep 2020 14:27), Max: 37.9 (10 Sep 2020 14:27)  T(F): 100.2 (10 Sep 2020 14:27), Max: 100.2 (10 Sep 2020 14:27)  HR: 95 (10 Sep 2020 14:27) (82 - 107)  BP: 193/97 (10 Sep 2020 14:27) (126/51 - 193/97)  BP(mean): 106 (09 Sep 2020 22:00) (85 - 106)  RR: 20 (10 Sep 2020 14:27) (20 - 24)  SpO2: 98% (09 Sep 2020 22:00) (95% - 98%)  PHYSICAL EXAM:    Constitutional: NAD, awake and alert, well-developed  HEENT: PERR, EOMI, Normal Hearing, MMM  Neck: Soft and supple, No LAD, No JVD  Respiratory: Breath sounds are clear bilaterally, No wheezing, rales or rhonchi  Cardiovascular: S1 and S2, regular rate and rhythm, no Murmurs, gallops or rubs  Gastrointestinal: Bowel Sounds present, soft, nontender, nondistended, no guarding, no rebound  Extremities: No peripheral edema      MEDICATIONS:  MEDICATIONS  (STANDING):  aspirin  chewable 81 milliGRAM(s) Oral daily  atorvastatin 80 milliGRAM(s) Oral at bedtime  baclofen 10 milliGRAM(s) Oral two times a day  carvedilol 12.5 milliGRAM(s) Oral every 12 hours  chlorhexidine 4% Liquid 1 Application(s) Topical <User Schedule>  dextrose 50% Injectable 12.5 Gram(s) IV Push once  dextrose 50% Injectable 25 Gram(s) IV Push once  dextrose 50% Injectable 25 Gram(s) IV Push once  doxazosin 2 milliGRAM(s) Oral at bedtime  DULoxetine 60 milliGRAM(s) Oral two times a day  enoxaparin Injectable 40 milliGRAM(s) SubCutaneous daily  insulin glargine Injectable (LANTUS) 30 Unit(s) SubCutaneous at bedtime  insulin lispro (HumaLOG) corrective regimen sliding scale   SubCutaneous three times a day before meals  insulin lispro Injectable (HumaLOG) 9 Unit(s) SubCutaneous three times a day before meals  levETIRAcetam 1000 milliGRAM(s) Oral two times a day  losartan 50 milliGRAM(s) Oral daily  morphine  - Injectable 2 milliGRAM(s) IV Push every 4 hours  pantoprazole   Suspension 40 milliGRAM(s) Oral daily  pramipexole 0.5 milliGRAM(s) Oral every 12 hours  senna 2 Tablet(s) Oral at bedtime      LABS: All Labs Reviewed:                        13.5   9.63  )-----------( 251      ( 10 Sep 2020 05:49 )             43.1     09-10    139  |  102  |  14  ----------------------------<  246<H>  3.2<L>   |  26  |  0.7    Ca    8.4<L>      10 Sep 2020 05:49  Phos  3.0     09-10  Mg     1.6     09-10    TPro  6.0  /  Alb  3.5  /  TBili  1.2  /  DBili  x   /  AST  17  /  ALT  14  /  AlkPhos  103  09-10          Blood Culture:     Radiology: reviewed S: No acute overnight events  PAtient non-verbal  FEeding through NGT      All other pertinent ROS negative.      09-09-20 @ 07:01  -  09-10-20 @ 07:00  --------------------------------------------------------  IN: 1300 mL / OUT: 150 mL / NET: 1150 mL    09-10-20 @ 07:01  -  09-10-20 @ 18:03  --------------------------------------------------------  IN: 820 mL / OUT: 0 mL / NET: 820 mL      Vital Signs Last 24 Hrs  T(C): 37.9 (10 Sep 2020 14:27), Max: 37.9 (10 Sep 2020 14:27)  T(F): 100.2 (10 Sep 2020 14:27), Max: 100.2 (10 Sep 2020 14:27)  HR: 95 (10 Sep 2020 14:27) (82 - 107)  BP: 193/97 (10 Sep 2020 14:27) (126/51 - 193/97)  BP(mean): 106 (09 Sep 2020 22:00) (85 - 106)  RR: 20 (10 Sep 2020 14:27) (20 - 24)  SpO2: 98% (09 Sep 2020 22:00) (95% - 98%)  PHYSICAL EXAM:    Constitutional: NAD, non verbal  HEENT: PERR, EOMI, Normal Hearing, MMM  Neck: Soft and supple, No LAD, No JVD  Respiratory: Breath sounds are clear bilaterally, No wheezing, rales or rhonchi  Cardiovascular: S1 and S2, regular rate and rhythm, no Murmurs, gallops or rubs  Gastrointestinal: Bowel Sounds present, soft, nontender, nondistended, no guarding, no rebound  Extremities: No peripheral edema  Neuro:  Non-verbal, mild right facial droop  RUE 3/5 LUE 3/5  RLE 0/5 LLE 2/5  Mental status waxes and wanes.       MEDICATIONS:  MEDICATIONS  (STANDING):  aspirin  chewable 81 milliGRAM(s) Oral daily  atorvastatin 80 milliGRAM(s) Oral at bedtime  baclofen 10 milliGRAM(s) Oral two times a day  carvedilol 12.5 milliGRAM(s) Oral every 12 hours  chlorhexidine 4% Liquid 1 Application(s) Topical <User Schedule>  dextrose 50% Injectable 12.5 Gram(s) IV Push once  dextrose 50% Injectable 25 Gram(s) IV Push once  dextrose 50% Injectable 25 Gram(s) IV Push once  doxazosin 2 milliGRAM(s) Oral at bedtime  DULoxetine 60 milliGRAM(s) Oral two times a day  enoxaparin Injectable 40 milliGRAM(s) SubCutaneous daily  insulin glargine Injectable (LANTUS) 30 Unit(s) SubCutaneous at bedtime  insulin lispro (HumaLOG) corrective regimen sliding scale   SubCutaneous three times a day before meals  insulin lispro Injectable (HumaLOG) 9 Unit(s) SubCutaneous three times a day before meals  levETIRAcetam 1000 milliGRAM(s) Oral two times a day  losartan 50 milliGRAM(s) Oral daily  morphine  - Injectable 2 milliGRAM(s) IV Push every 4 hours  pantoprazole   Suspension 40 milliGRAM(s) Oral daily  pramipexole 0.5 milliGRAM(s) Oral every 12 hours  senna 2 Tablet(s) Oral at bedtime      LABS: All Labs Reviewed:                        13.5   9.63  )-----------( 251      ( 10 Sep 2020 05:49 )             43.1     09-10    139  |  102  |  14  ----------------------------<  246<H>  3.2<L>   |  26  |  0.7    Ca    8.4<L>      10 Sep 2020 05:49  Phos  3.0     09-10  Mg     1.6     09-10    TPro  6.0  /  Alb  3.5  /  TBili  1.2  /  DBili  x   /  AST  17  /  ALT  14  /  AlkPhos  103  09-10          Blood Culture:     Radiology: reviewed

## 2020-09-10 NOTE — PROGRESS NOTE ADULT - SUBJECTIVE AND OBJECTIVE BOX
50yMale with diagnosis: FACIAL DROOP    interim events - downgraded  S&S - NPO w/ NGT; will likely benefit from long-term alternate means of nutrition/hydration    Patient seen, lying in bed      PHYSICAL EXAM  Obese; bedbound  Not engaging in exam  HRR, S1, S2  Breathing easy and unlabored; diminished breath sounds  Abdomin non tender  extremities warm     T(C): , Max: 37.2 (16:00)  T(F): 98.4  HR: 107 (82 - 107)  BP: 141/80 (126/51 - 168/67)  RR: 20 (20 - 24)  SpO2: 98% (95% - 98%)              LABS:                          13.5   9.63  )-----------( 251      ( 10 Sep 2020 05:49 )             43.1                                                                                      09-10    139  |  102  |  14  ----------------------------<  246<H>  3.2<L>   |  26  |  0.7    Ca    8.4<L>      10 Sep 2020 05:49  Phos  3.0     09-10  Mg     1.6     09-10    TPro  6.0  /  Alb  3.5  /  TBili  1.2  /  DBili  x   /  AST  17  /  ALT  14  /  AlkPhos  103  09-10                                                      MEDICATIONS  (STANDING):  aspirin  chewable 81 milliGRAM(s) Oral daily  atorvastatin 80 milliGRAM(s) Oral at bedtime  baclofen 10 milliGRAM(s) Oral two times a day  carvedilol 12.5 milliGRAM(s) Oral every 12 hours  chlorhexidine 4% Liquid 1 Application(s) Topical <User Schedule>  dextrose 50% Injectable 12.5 Gram(s) IV Push once  dextrose 50% Injectable 25 Gram(s) IV Push once  dextrose 50% Injectable 25 Gram(s) IV Push once  doxazosin 2 milliGRAM(s) Oral at bedtime  DULoxetine 60 milliGRAM(s) Oral two times a day  enoxaparin Injectable 40 milliGRAM(s) SubCutaneous daily  insulin glargine Injectable (LANTUS) 30 Unit(s) SubCutaneous at bedtime  insulin lispro (HumaLOG) corrective regimen sliding scale   SubCutaneous three times a day before meals  insulin lispro Injectable (HumaLOG) 9 Unit(s) SubCutaneous three times a day before meals  levETIRAcetam 1000 milliGRAM(s) Oral two times a day  losartan 50 milliGRAM(s) Oral daily  morphine  - Injectable 2 milliGRAM(s) IV Push every 4 hours  pantoprazole   Suspension 40 milliGRAM(s) Oral daily  pramipexole 0.5 milliGRAM(s) Oral every 12 hours  senna 2 Tablet(s) Oral at bedtime    MEDICATIONS  (PRN):  acetaminophen   Tablet .. 650 milliGRAM(s) Oral every 6 hours PRN Moderate Pain (4 - 6)  ALPRAZolam 1 milliGRAM(s) Oral every 6 hours PRN anxiety, agitation, restlessness  bisacodyl 5 milliGRAM(s) Oral at bedtime PRN Constipation  dextrose 40% Gel 15 Gram(s) Oral once PRN Blood Glucose LESS THAN 70 milliGRAM(s)/deciliter  glucagon  Injectable 1 milliGRAM(s) IntraMuscular once PRN Glucose LESS THAN 70 milligrams/deciliter  morphine  - Injectable 2 milliGRAM(s) IV Push every 4 hours PRN Severe Pain (7 - 10)

## 2020-09-10 NOTE — PROGRESS NOTE ADULT - ASSESSMENT
50 year old male with H/o chronic opioid and anxiolytic use, CAD s/p CABG, CIDP with IVIG q3 weeks, Paraplegia - has not walked since 2013 and has HHA 12/day; admitted from nursing home for AMS x 3 days, vomiting, decreased PO intake, and right sided facial droop.  Found to have Acute CVA in the left basal ganglia. Hospital course complicated with Seizures (resolved with Ativan) s/p VEEG negative for seizures. LP negative so far. On Keppra now.     #Acute LEft basilar ganglia/periventricular white matter stroke:  Still evolving on last CT head  a/w some midline shift (L to R 6mm).   f/u Neuro and may be repeat CT Head tomorrow to monitor ICP.   c/w ASA, Statin  Patient non-verbal, still mild Right facial droop.  Mental status waxes and wanes.  F/u Neuro/palliative.   Meningitis ruled out by LP - fevers may be d/t the basal gangliar stroke? Now only low grade temps.   Keppra 1g q12 for seizure PPx  Neuro checks q4  Echo, LE Duplex negative    # CIDP : since 2011   per wife had deterioration [phone] and is bedbound since then,   follows DR. Mitchell currently getting IVIG every three weeks with RN administering at home.   Was also started on gabapentin a few months ago per wife Pt. gets very sleepy after gabapentin will hold for now.   -c/w baclofen and pramipexole  Per Palliative:   Continue Morphine 2mg IVP every 4H ATC with morphine 2mg IVP every 4 hours for mod (4) to severe pain (10)     Will revaluate for transition to oral form   Continue Ativan 0.25 mg IV Q12h        continue xanax 1mg via enteral tube every 6H PRN for restlessness/ agitation/ anxiety     #HTN:   Most SBP readings 150-170. RAre sporadic 190. Monitor on current coreg, Losartan.   Permissive HTN - goal SBP < 170    #hypocalcemia and hypomagnesemia  - Replete PRN    #diabetes  - c/w home insulin regimen 30 lantus bedtime 9 lispro with meals     #HTN  - Losartan 50  - increased Coreg 12.5 q12    #Hx of CAD s/p cabg  - c/w asa, carvedilol and statin  - hold plavix    #Hx of anxiety and depression Pt. is on many meds confirmed with wife- c/w home meds- duloxetine, quetiapine, hydroxyzine, alprazolam prn  - Xanax 1 mg q6 prn  - Ativan 0.25 IV q12    #chronic pain  palliative recs as above instead of old chronic home doses.    #Hx of BPH   - Doxazosin 2mg qd    #Hx of chronic constipation   -c/w senna dulcolox as needed    #Hx of gerd   -on pantoprazole    #Diet: NPO with tube feeds  #DVT ppx- lovenox  #GI ppx on pantoprzole  #Code status: Full  #Dispo: Active. f/u neuro/palliative.

## 2020-09-10 NOTE — SWALLOW BEDSIDE ASSESSMENT ADULT - SLP PERTINENT HISTORY OF CURRENT PROBLEM
51 y/o M w/ PMHx: CAD s/p CABG, CIDP with IVIG q3 weeks, Paraplegia - has not walked since 2013 admitted w/ AMS; pt found to have acute CVA in basal ganglia/evolving infarct w/ mild mass effect; per ID-> No infectious meningitis or encephalitis. Ischemic basal ganglia lesion and mass effect likely causing central fever, non infectious. Seizures s/p VEEG. Palliative care services introduced to pts family who 49 y/o M w/ PMHx: CAD s/p CABG, CIDP with IVIG q3 weeks, Paraplegia - has not walked since 2013 admitted w/ AMS; pt found to have acute CVA in basal ganglia/evolving infarct w/ mild mass effect; per ID-> No infectious meningitis or encephalitis. Ischemic basal ganglia lesion and mass effect likely causing central fever, non infectious. Seizures s/p VEEG. Palliative care services introduced to pts family who are requesting continuing medical management- full code

## 2020-09-10 NOTE — PROGRESS NOTE ADULT - ASSESSMENT
50 y.o male with PMH of MI, CABG, DM , HD and CIDP on IVIG , paraplegia presented from NH with AMS , decreased po intake and vomiting. He was found to have Acute CVA basal ganglia/ evolving infarct mild mass effect,  Seizures s/p VEEG, and fever with increasing WBC with negative LP. Reevaluated for follow-up     MEDD 42mg    Recommendations   Full code  Continue current management  Continue Morphine 2mg IVP every 4H ATC with morphine 2mg IVP every 4 hours for mod (4) to severe pain (10)     Will revaluate for transition to oral form   Continue Ativan 0.25 mg IV Q12h        continue xanax 1mg via enteral tube every 6H PRN for restlessness/ agitation/ anxiety   Continue bowel regimen   primary team and neurology to provide medical update about prognosis    Call [phone] PRN

## 2020-09-10 NOTE — PROGRESS NOTE ADULT - ASSESSMENT
#R/o Meningitis  #AMS with decreased speech low tone found with acute/subacute infarct in the left basal ganglia/ periventricular white matter, NIHSS 10 on admission  CT: Evolving acute/subacute infarct in the left basal ganglia, with mild mass effect. Stable mild midline shift to the right.  - Decreased level of consciousness  - Infectious work up including UA/blood cultures, CXR, CSF completed- no infectious agents found. Antibiotics and acyclovir discontinued  - Keppra 1g q12  - LP successful 9/4/20  - f/u CSF results - unremarkable to date  - keep systolic 160-180  - keep Na 140-145  - f/u Na  -echo with bubble  -lipid profile WNL  -a1c 7.3  -keep aspirin and high dose statin  - ID consult  - Neuro checks q4  - f/u vEEG - no clinical or subclinical seziures noted so far  - CT Head - No hemorrhagic transformation, artifact from EEG. Will f/u repeat CT Head  - 40mg subQ Lovenox restarted  - LE Duplex negative  - Palliative care:   --> Continue Morphine 2mg IVP every 4H ATC and continue morphine 2mg IVP every 4 hours for mod (4) to severe pain (10)  --> Start Ativan 0.25 mg IV Q12h   --> continue xanax 1mg via enteral tube every 6H PRN for restlessness/ agitation/ anxiety       #hypocalcemia and hypomagnesemia  - Magnesium, Serum: 1.6 mg/dL (09.10.20 @ 05:49)  - Potassium, Serum: 3.2 mmol/L (09.10.20 @ 05:49)  - Will continue to replete        # CIDP since 2011 per wife had deterioration [phone] and is bedbound since then, follows DR. Mitchell currently getting IVIG every three weeks with RN administering at home. Was also started on gabapentin a few months ago per wife Pt. gets very sleepy after gabapentin will hold for now.   -c/w baclofen and pramipexole    #diabetes  - c/w home insulin regimen 30 lantus bedtime 9 lispro with meals     #HTN  - Losartan 50  - increase Coreg 12.5 q12    #Hx of CAD s/p cabg  - c/w asa, carvedilol and statin  - hold plavix    #Hx of anxiety and depression Pt. is on many meds confirmed with wife- c/w home meds- duloxetine, quetiapine, hydroxyzine, alprazolam prn  - Xanax 1 mg q6 prn  - Ativan 0.25 IV q12    #chronic pain   - hold morphine 60mg q12 continue home regimen  - Morphine 2mg q4    #Hx of BPH   - Doxazosin 2mg qd    #Hx of chronic constipation   -c/w senna dulcolox as needed    #Hx of gerd   -on pantoprazole    #Diet: NPO with tube feeds  #DVT ppx- lovenox  #GI ppx on pantoprazole  #Code status: Full  #Dispo: acute #R/o Meningitis  #AMS with decreased speech low tone found with acute/subacute infarct in the left basal ganglia/ periventricular white matter, NIHSS 10 on admission  CT: Evolving acute/subacute infarct in the left basal ganglia, with mild mass effect. Stable mild midline shift to the right.  - Decreased level of consciousness  - Infectious work up including UA/blood cultures, CXR, CSF completed- no infectious agents found. Antibiotics and acyclovir discontinued  - Keppra 1g q12  - LP successful 9/4/20  - f/u CSF results - unremarkable to date  - keep systolic 160-180  - keep Na 140-145  - f/u Na  -echo with bubble  -lipid profile WNL  -a1c 7.3  -keep aspirin and high dose statin  - ID consult  - Neuro checks q4  - f/u vEEG - no clinical or subclinical seziures noted so far  - CT Head - No hemorrhagic transformation, artifact from EEG. Will f/u repeat CT Head  - 40mg subQ Lovenox restarted  - LE Duplex negative      #hypocalcemia and hypomagnesemia  - Magnesium, Serum: 1.6 mg/dL (09.10.20 @ 05:49)  - Potassium, Serum: 3.2 mmol/L (09.10.20 @ 05:49)  - Continue to replete and monitor        # CIDP since 2011 per wife had deterioration [phone] and is bedbound since then, follows DR. Mitchell currently getting IVIG every three weeks with RN administering at home. Was also started on gabapentin a few months ago per wife Pt. gets very sleepy after gabapentin will hold for now.   -c/w baclofen and pramipexole    #diabetes  - c/w home insulin regimen 30 lantus bedtime 9 lispro with meals     #HTN  - Losartan 50  - increased Coreg 12.5 q12    #Hx of CAD s/p cabg  - c/w asa, carvedilol and statin  - hold plavix    #Hx of anxiety and depression Pt. is on many meds confirmed with wife- c/w home meds- duloxetine, quetiapine, hydroxyzine, alprazolam prn  - Xanax 1 mg q6 prn  - Ativan 0.25 IV q12    #chronic pain  - hold morphine 60mg q12 continue home regimen  - Palliative care consulted- Continue Morphine 2mg IVP every 4H ATC and continue morphine 2mg IVP every 4 hours for mod (4) to severe pain (10)    #Hx of BPH   - Doxazosin 2mg qd    #Hx of chronic constipation   -c/w senna dulcolox as needed    #Hx of gerd   -on pantoprazole    #Diet: NPO with tube feeds  #DVT ppx- lovenox  #GI ppx on pantoprazole  #Code status: Full  #Dispo: acute #R/o Meningitis  #AMS with decreased speech low tone found with acute/subacute infarct in the left basal ganglia/ periventricular white matter, NIHSS 10 on admission  CT: Evolving acute/subacute infarct in the left basal ganglia, with mild mass effect. Stable mild midline shift to the right.  - Decreased level of consciousness  - Infectious work up including UA/blood cultures, CXR, CSF completed- no infectious agents found. Antibiotics and acyclovir discontinued  - Keppra 1g q12  - keep systolic 160-180  - keep Na 140-145  - f/u Na  -echo with bubble   -lipid profile WNL  -a1c 7.3  - keep aspirin and high dose statin  - ID consult  - Neuro checks q4  - f/u vEEG - no clinical or subclinical seziures noted so far  - CT Head - No hemorrhagic transformation, artifact from EEG. Will f/u repeat CT Head  - 40mg subQ Lovenox restarted  - LE Duplex negative  - echo with bubble    #hypocalcemia and hypomagnesemia  - Magnesium, Serum: 1.6 mg/dL (09.10.20 @ 05:49)  - Potassium, Serum: 3.2 mmol/L (09.10.20 @ 05:49)  - Continue to replete and monitor      # CIDP since 2011   -per wife had deterioration [phone] and is bedbound since then, follows DR. Mitchell currently getting IVIG every three weeks with RN administering at home. Was also started on gabapentin a few months ago per wife Pt. gets very sleepy after gabapentin will hold for now.   -c/w baclofen and pramipexole    #diabetes  - c/w home insulin regimen 30 lantus bedtime 9 lispro with meals     #HTN  - Losartan 50  - increased Coreg 12.5 q12    #Hx of CAD s/p cabg  - c/w asa, carvedilol and statin  - hold plavix    #Hx of anxiety and depression Pt. is on many meds confirmed with wife- c/w home meds- duloxetine, quetiapine, hydroxyzine, alprazolam prn  - Xanax 1 mg q6 prn  - Ativan 0.25 IV q12    #chronic pain  - hold morphine 60mg q12 continue home regimen  - Palliative care consulted- Continue Morphine 2mg IVP every 4H ATC and continue morphine 2mg IVP every 4 hours for mod (4) to severe pain (10)    #Hx of BPH   - Doxazosin 2mg qd    #Hx of chronic constipation   -c/w senna dulcolox as needed    #Hx of gerd   -on pantoprazole    #Diet: NPO with tube feeds  #DVT ppx- lovenox  #GI ppx on pantoprazole  #Code status: Full  #Dispo: acute #R/o Meningitis  #AMS with decreased speech low tone found with acute/subacute infarct in the left basal ganglia/ periventricular white matter, NIHSS 10 on admission  CT: Evolving acute/subacute infarct in the left basal ganglia, with mild mass effect. Stable mild midline shift to the right.  - Decreased level of consciousness  - Infectious work up including UA/blood cultures, CXR, CSF completed- no infectious agents found. Antibiotics and acyclovir discontinued  - Keppra 1g q12  - keep systolic 160-180  - keep Na 140-145  - f/u Na  -echo with bubble   -lipid profile WNL  -a1c 7.3  - keep aspirin and high dose statin  - ID consult  - Neuro checks q4  - f/u vEEG - no clinical or subclinical seziures noted so far  - CT Head - No hemorrhagic transformation, artifact from EEG. Will f/u repeat CT Head  - 40mg subQ Lovenox restarted  - LE Duplex negative  - echo with bubble    #hypocalcemia and hypomagnesemia  - Magnesium, Serum: 1.6 mg/dL (09.10.20 @ 05:49)  - Potassium, Serum: 3.2 mmol/L (09.10.20 @ 05:49)  - Continue to replete and monitor      # CIDP since 2011   -per wife had deterioration [phone] and is bedbound since then, follows DR. Mitchell currently getting IVIG every three weeks with RN administering at home. Was also started on gabapentin a few months ago per wife Pt. gets very sleepy after gabapentin will hold for now.   -c/w baclofen and pramipexole    #diabetes  - c/w home insulin regimen 30 lantus bedtime 9 lispro with meals     #HTN  - Losartan 50  - increased Coreg 12.5 q12    #Hx of CAD s/p cabg  - c/w asa, carvedilol and statin.  - hold plavix    #Hx of anxiety and depression Pt. is on many meds confirmed with wife- c/w home meds- duloxetine, quetiapine, hydroxyzine, alprazolam prn  - Xanax 1 mg q6 prn  - Ativan 0.25 IV q12    #chronic pain  - hold morphine 60mg q12 continue home regimen  - Palliative care consulted- Continue Morphine 2mg IVP every 4H ATC and continue morphine 2mg IVP every 4 hours for mod (4) to severe pain (10)    #Hx of BPH   - Doxazosin 2mg qd    #Hx of chronic constipation   -c/w senna dulcolox as needed    #Hx of gerd   -on pantoprazole    #Diet: NPO with tube feeds  #DVT ppx- lovenox  #GI ppx on pantoprazole  #Code status: Full  #Dispo: acute

## 2020-09-11 NOTE — PROGRESS NOTE ADULT - SUBJECTIVE AND OBJECTIVE BOX
50yMale with diagnosis: FACIAL DROOP    interim events - no acute events overnight  S&S - NPO w/ NGT; will likely benefit from long-term alternate means of nutrition/hydration    Patient seen, lying in bed. Not responsive, no distress or dyspnea observed. No family at bedside.       PHYSICAL EXAM    constitutional: obese; appears older than age    HEENT: normocephalic, atraumatic     Eyes: momentary  tracking and not consistent    Respiratory: Easy and unlabored, no accessory muscle use; diminished at bases    Cardiovascular: HRR, S1,S2 SR    Gastrointestinal: Obese; soft, tympanic, not distended     Genitourinary: graham - clear yellow    Extremities: bilateral foot drop; + edema    Neurological: Not engaging in exam; no tremors     Skin: moist    T(C): 36.3 (09-11-20 @ 13:54)  HR: 91 (09-11-20 @ 13:54)  BP: 180/88 (09-11-20 @ 13:54)  RR: 19 (09-11-20 @ 13:54)  SpO2: 95% (09-10-20 @ 22:05)    IN: 1280 mL / OUT: 0 mL / NET: 1280 mL                       13.9   9.37  )-----------( 269      ( 11 Sep 2020 05:20 )             44.3       09-11    137  |  98  |  16  ----------------------------<  252<H>  4.2   |  29  |  0.8    Ca    9.0      11 Sep 2020 05:20  Phos  3.0     09-10  Mg     2.0     09-11    TPro  6.5  /  Alb  3.5  /  TBili  0.8  /  DBili  x   /  AST  18  /  ALT  14  /  AlkPhos  125<H>  09-11    MEDICATIONS  (STANDING):  aspirin  chewable 81 milliGRAM(s) Oral daily  atorvastatin 80 milliGRAM(s) Oral at bedtime  baclofen 10 milliGRAM(s) Oral two times a day  carvedilol 25 milliGRAM(s) Oral every 12 hours  chlorhexidine 4% Liquid 1 Application(s) Topical <User Schedule>  dextrose 50% Injectable 12.5 Gram(s) IV Push once  dextrose 50% Injectable 25 Gram(s) IV Push once  dextrose 50% Injectable 25 Gram(s) IV Push once  doxazosin 2 milliGRAM(s) Oral at bedtime  DULoxetine 60 milliGRAM(s) Oral two times a day  enoxaparin Injectable 40 milliGRAM(s) SubCutaneous daily  insulin glargine Injectable (LANTUS) 30 Unit(s) SubCutaneous at bedtime  insulin lispro (HumaLOG) corrective regimen sliding scale   SubCutaneous three times a day before meals  insulin lispro Injectable (HumaLOG) 9 Unit(s) SubCutaneous three times a day before meals  levETIRAcetam 1000 milliGRAM(s) Oral two times a day  losartan 50 milliGRAM(s) Oral daily  morphine  - Injectable 2 milliGRAM(s) IV Push every 4 hours  pantoprazole   Suspension 40 milliGRAM(s) Oral daily  pramipexole 0.5 milliGRAM(s) Oral every 12 hours  senna 2 Tablet(s) Oral at bedtime    MEDICATIONS  (PRN):  acetaminophen   Tablet .. 650 milliGRAM(s) Oral every 6 hours PRN Moderate Pain (4 - 6)  ALPRAZolam 1 milliGRAM(s) Oral every 6 hours PRN anxiety, agitation, restlessness  bisacodyl 5 milliGRAM(s) Oral at bedtime PRN Constipation  dextrose 40% Gel 15 Gram(s) Oral once PRN Blood Glucose LESS THAN 70 milliGRAM(s)/deciliter  glucagon  Injectable 1 milliGRAM(s) IntraMuscular once PRN Glucose LESS THAN 70 milligrams/deciliter  morphine  - Injectable 2 milliGRAM(s) IV Push every 6 hours PRN Severe Pain (7 - 10)

## 2020-09-11 NOTE — PROGRESS NOTE ADULT - ASSESSMENT
ASSESSMENT/PLAN  - basal ganglia infarct   - fevers  - DM    - CIDP with paraplegia  - dysphagia    SUGGEST:  - estimated REE by MSJ eqn ~ 1840 k, protein needs could be up to 115 gm/d luc if pt has been seizing  - considering DM with insulin dependence, would feed fiber-containing formula, and as glc currently more difficult to control:  - change feeds to intermittent gravity method, with pre-feed poc glucose testing and pre-feed insulin  - Lispro treatments and timing of feedings must match!!  - change feeds to Glucerna 1.2 360 ml x 4 feeds/d and add Prosource TF 2 per day = 107 gm protein and 1790 k/d  - check bmp/phos/mg and correct lytes ASSESSMENT/PLAN  - basal ganglia infarct   - fevers  - DM    - CIDP with paraplegia  - dysphagia    SUGGEST:  - estimated REE by MSJ eqn ~ 1840 k, protein needs could be up to 115 gm/d luc if pt has been seizing  - considering DM with insulin dependence, would feed fiber-containing formula, and as glc currently more difficult to control:  - change feeds to intermittent gravity method, with pre-feed poc glucose testing and pre-feed insulin  - Lispro treatments and timing of feedings must match!!  - cont Glucerna 1.2 360 ml x 4 feeds/d and add Prosource TF 2 per day = 107 gm protein and 1790 k/d  - document all feedings (& Prosource TF)  - check bmp/phos/mg and correct lytes - acceptable currently  - glycemic control!!

## 2020-09-11 NOTE — PROGRESS NOTE ADULT - ASSESSMENT
50 y.o male with PMH of MI, CABG, DM , HD and CIDP on IVIG , paraplegia presented from NH with AMS , decreased po intake and vomiting. He was found to have Acute CVA basal ganglia/ evolving infarct mild mass effect,  Seizures s/p VEEG, and fever with increasing WBC with negative LP.     MEDD : 24 mg    Recommendations   Full code  Continue current management  Continue Morphine 2mg IVP every 4H ATC with morphine 2mg IVP every 4 hours for mod (4) to severe pain (10)  Will revaluate for transition to oral form   Continue Ativan 1 mg Q6h PRN from NG tube for restlessness/ agitation/ anxiety   Continue bowel regimen   primary team and neurology to provide medical update about prognosis    Call [phone] PRN

## 2020-09-11 NOTE — PROGRESS NOTE ADULT - SUBJECTIVE AND OBJECTIVE BOX
24H events:    Patient is a 50y old Male who presents with a chief complaint of ams/ facial droop (10 Sep 2020 18:00)    Primary diagnosis of L Basal Ganglia and periventricular WM stroke.     Today is hospital day 11d. This morning patient was seen and examined at bedside, resting comfortably in bed.  Overnight patient had a blood pressure of 201/84 measured at 5am. This morning at 6am it is 169/84.  Patient is more alert today than yesterday morning, is responding to questions with nods and a few single words.    PAST MEDICAL & SURGICAL HISTORY  CIDP (chronic inflammatory demyelinating polyneuropathy)  GERD (gastroesophageal reflux disease)  BPH (benign prostatic hyperplasia)  Myocardial infarct, old  ASHD (arteriosclerotic heart disease)  Depression  Anxiety  Diabetes    History of cholecystectomy  History of total left hip replacement  History of total right hip replacement: bilateral  S/P CABG x 5    SOCIAL HISTORY:  Social History:  no etoh/ smoking/ drugs (31 Aug 2020 17:40)      ALLERGIES:  penicillins (Unknown)  strawberry (Hives)    MEDICATIONS:  STANDING MEDICATIONS  aspirin  chewable 81 milliGRAM(s) Oral daily  atorvastatin 80 milliGRAM(s) Oral at bedtime  baclofen 10 milliGRAM(s) Oral two times a day  carvedilol 12.5 milliGRAM(s) Oral every 12 hours  chlorhexidine 4% Liquid 1 Application(s) Topical <User Schedule>  dextrose 50% Injectable 12.5 Gram(s) IV Push once  dextrose 50% Injectable 25 Gram(s) IV Push once  dextrose 50% Injectable 25 Gram(s) IV Push once  doxazosin 2 milliGRAM(s) Oral at bedtime  DULoxetine 60 milliGRAM(s) Oral two times a day  enoxaparin Injectable 40 milliGRAM(s) SubCutaneous daily  insulin glargine Injectable (LANTUS) 30 Unit(s) SubCutaneous at bedtime  insulin lispro (HumaLOG) corrective regimen sliding scale   SubCutaneous three times a day before meals  insulin lispro Injectable (HumaLOG) 9 Unit(s) SubCutaneous three times a day before meals  levETIRAcetam 1000 milliGRAM(s) Oral two times a day  losartan 50 milliGRAM(s) Oral daily  morphine  - Injectable 2 milliGRAM(s) IV Push every 4 hours  pantoprazole   Suspension 40 milliGRAM(s) Oral daily  pramipexole 0.5 milliGRAM(s) Oral every 12 hours  senna 2 Tablet(s) Oral at bedtime    PRN MEDICATIONS  acetaminophen   Tablet .. 650 milliGRAM(s) Oral every 6 hours PRN  ALPRAZolam 1 milliGRAM(s) Oral every 6 hours PRN  bisacodyl 5 milliGRAM(s) Oral at bedtime PRN  dextrose 40% Gel 15 Gram(s) Oral once PRN  glucagon  Injectable 1 milliGRAM(s) IntraMuscular once PRN  morphine  - Injectable 2 milliGRAM(s) IV Push every 4 hours PRN    VITALS:   T(F): 98.8  HR: 85  BP: 169/84  RR: 20  SpO2: 95%    PHYSICAL EXAM:  GENERAL: NAD, tired, grossly immobile.  HEAD:  Atraumatic, Normocephalic  EYES: EOMI  NECK: Supple, carotid pulses palpable.  NERVOUS SYSTEM:  Alert & Oriented x1 unclear, mostly nonverbal. Can follow basic commands and answer some yes/no questions nonverbally.   Functionally quadriplegic.  Upper extremity tone b/l increased, fists clenched. L arm had some flapping tremor spontaneously.  Ptosis present, can barely open mouth on command.  CHEST/LUNG: Clear to auscultation bilaterally; No rales, rhonchi, wheezing, or rubs  HEART: Regular rate and rhythm; No murmurs, rubs, or gallops  ABDOMEN: Abdomen is distended with RLQ tenderness.   EXTREMITIES:  2+ Peripheral Pulses, No clubbing, cyanosis.   LYMPH: No lymphadenopathy noted        LABS:                        13.9   9.37  )-----------( 269      ( 11 Sep 2020 05:20 )             44.3     09-11    137  |  98  |  16  ----------------------------<  252<H>  4.2   |  29  |  0.8    Ca    9.0      11 Sep 2020 05:20  Phos  3.0     09-10  Mg     2.0     09-11    TPro  6.5  /  Alb  3.5  /  TBili  0.8  /  DBili  x   /  AST  18  /  ALT  14  /  AlkPhos  125<H>  09-11                RADIOLOGY: 24H events:    Patient is a 50y old Male who presents with a chief complaint of ams/ facial droop (10 Sep 2020 18:00)    Primary diagnosis of L Basal Ganglia and periventricular WM stroke.     Today is hospital day 11d. This morning patient was seen and examined at bedside, resting comfortably in bed.  Overnight patient had a blood pressure of 201/84 measured at 5am. This morning at 6am it is 169/84.  Patient is more alert today than yesterday morning, is responding to questions with nods and a few single words.  Has been sent to repeat head CT.    PAST MEDICAL & SURGICAL HISTORY  CIDP (chronic inflammatory demyelinating polyneuropathy)  GERD (gastroesophageal reflux disease)  BPH (benign prostatic hyperplasia)  Myocardial infarct, old  ASHD (arteriosclerotic heart disease)  Depression  Anxiety  Diabetes    History of cholecystectomy  History of total left hip replacement  History of total right hip replacement: bilateral  S/P CABG x 5    SOCIAL HISTORY:  Social History:  no etoh/ smoking/ drugs (31 Aug 2020 17:40)      ALLERGIES:  penicillins (Unknown)  strawberry (Hives)    MEDICATIONS:  STANDING MEDICATIONS  aspirin  chewable 81 milliGRAM(s) Oral daily  atorvastatin 80 milliGRAM(s) Oral at bedtime  baclofen 10 milliGRAM(s) Oral two times a day  carvedilol 12.5 milliGRAM(s) Oral every 12 hours  chlorhexidine 4% Liquid 1 Application(s) Topical <User Schedule>  dextrose 50% Injectable 12.5 Gram(s) IV Push once  dextrose 50% Injectable 25 Gram(s) IV Push once  dextrose 50% Injectable 25 Gram(s) IV Push once  doxazosin 2 milliGRAM(s) Oral at bedtime  DULoxetine 60 milliGRAM(s) Oral two times a day  enoxaparin Injectable 40 milliGRAM(s) SubCutaneous daily  insulin glargine Injectable (LANTUS) 30 Unit(s) SubCutaneous at bedtime  insulin lispro (HumaLOG) corrective regimen sliding scale   SubCutaneous three times a day before meals  insulin lispro Injectable (HumaLOG) 9 Unit(s) SubCutaneous three times a day before meals  levETIRAcetam 1000 milliGRAM(s) Oral two times a day  losartan 50 milliGRAM(s) Oral daily  morphine  - Injectable 2 milliGRAM(s) IV Push every 4 hours  pantoprazole   Suspension 40 milliGRAM(s) Oral daily  pramipexole 0.5 milliGRAM(s) Oral every 12 hours  senna 2 Tablet(s) Oral at bedtime    PRN MEDICATIONS  acetaminophen   Tablet .. 650 milliGRAM(s) Oral every 6 hours PRN  ALPRAZolam 1 milliGRAM(s) Oral every 6 hours PRN  bisacodyl 5 milliGRAM(s) Oral at bedtime PRN  dextrose 40% Gel 15 Gram(s) Oral once PRN  glucagon  Injectable 1 milliGRAM(s) IntraMuscular once PRN  morphine  - Injectable 2 milliGRAM(s) IV Push every 4 hours PRN    VITALS:   T(F): 98.8  HR: 85  BP: 169/84  RR: 20  SpO2: 95%    PHYSICAL EXAM:  GENERAL: NAD, tired, grossly immobile.  HEAD:  Atraumatic, Normocephalic  EYES: EOMI  NECK: Supple, carotid pulses palpable.  NERVOUS SYSTEM:  Alert & Oriented x1 unclear, mostly nonverbal. Can follow basic commands and answer some yes/no questions nonverbally.   Functionally quadriplegic.  Upper extremity tone b/l increased, fists clenched. L arm had some flapping tremor spontaneously.  Ptosis present, can barely open mouth on command.  CHEST/LUNG: Clear to auscultation bilaterally; No rales, rhonchi, wheezing, or rubs  HEART: Regular rate and rhythm; No murmurs, rubs, or gallops  ABDOMEN: Abdomen is distended with RLQ tenderness.   EXTREMITIES:  2+ Peripheral Pulses, No clubbing, cyanosis.   LYMPH: No lymphadenopathy noted        LABS:                        13.9   9.37  )-----------( 269      ( 11 Sep 2020 05:20 )             44.3     09-11    137  |  98  |  16  ----------------------------<  252<H>  4.2   |  29  |  0.8    Ca    9.0      11 Sep 2020 05:20  Phos  3.0     09-10  Mg     2.0     09-11    TPro  6.5  /  Alb  3.5  /  TBili  0.8  /  DBili  x   /  AST  18  /  ALT  14  /  AlkPhos  125<H>  09-11                RADIOLOGY:

## 2020-09-11 NOTE — CHART NOTE - NSCHARTNOTEFT_GEN_A_CORE
Palliative team visited with patient, pt awake, non-verbal.  Palliative team left message with spouse Gillian to set up family meeting.  Palliative will follow.

## 2020-09-11 NOTE — PHYSICAL THERAPY INITIAL EVALUATION ADULT - SPECIFY REASON(S)
As per chart, pt is bedbound for the past 7 years, is nonverbal. Pt is not a candidate for skilled PT at this time. Pt D/C from services.
Pt undergoing b/s VEEG. As per NSG notes, pt still febrile in am. Will hold b/s PT at this time.
Hold PT initial evaluation pending OOB order. PT will f/u when appropriate.
Pt currently on bedrest, PT eval on hold pending OOB orders. PT will follow up as appropriately.
Pt is on bedrest. PT will f/u to complete evaluation when appropriate.
pt remains on BR. per AM med rounds, pt is running a fever, with decr response to painful stimuli, undergoing med work-up. will follow up for PT eval when appropriate. at baseline pt is non-ambulatory

## 2020-09-11 NOTE — PROGRESS NOTE ADULT - ASSESSMENT
Ovi Nicole is a 50 year old male with PMH of CIDP, MI s/p CABG, depression, anxiety, and diabetes, who presented to the hospital with AMS and R facial droop, who was given     #R/o Meningitis- - Infectious work up including UA/blood cultures, CXR, CSF completed- no infectious agents found on LP and PCR. Antibiotics and acyclovir discontinued.    #AMS with decreased speech low tone found with acute/subacute infarct in the left basal ganglia/ periventricular white matter, NIHSS 10 on admission  CT: Evolving acute/subacute infarct in the left basal ganglia, with mild mass effect. Midline shift to the right is still evolving.  - Decreased level of consciousness, waxing and waning mental status  - functionally quadriplegic with increased UE tone  - Keppra 1g q12  - keep systolic 160-180 permissive htn  - CT Head - No hemorrhagic transformation, artifact from EEG.   - Need to f/u with Neurology regarding repeat CT to monitor midline shift.  - keep Na 140-145?, today: Sodium, Serum: 137 mmol/L (09.11.20 @ 05:20)  -echo with bubble?  - keep aspirin and high dose statin  - Neuro checks q4?  - vEEG negative, no seizures detected, but RN reported grand mal seizure on 9/6.  - 40mg subQ Lovenox restarted  - LE Duplex negative      #hypocalcemia and hypomagnesemia  - Magnesium, Serum: 2.0 mg/dL (09.11.20 @ 05:20)  - Potassium, Serum: 4.2 mmol/L (09.11.20 @ 05:20)  - Continue to monitor      # CIDP since 2011   -per wife had deterioration [id number] and is bedbound since then, follows DR. Mitchell currently getting IVIG every three weeks with RN administering at home. Was also started on gabapentin a few months ago per wife Pt. gets very sleepy after gabapentin will hold for now.   -c/w baclofen and pramipexole    #diabetes  - c/w home insulin regimen 30 lantus bedtime 9 lispro with meals     #HTN  - Losartan 50  - increased Coreg 12.5 q12    #Hx of CAD s/p cabg  - c/w asa, carvedilol and statin  - hold plavix    #Hx of anxiety and depression Pt. is on many meds confirmed with wife- c/w home meds- duloxetine, quetiapine, hydroxyzine, alprazolam prn  - Xanax 1 mg q6 prn  - Ativan 0.25 IV q12    #chronic pain  - Palliative care consulted- Continue Morphine 2mg IVP every 4H ATC and continue morphine 2mg IVP every 4 hours for mod (4) to severe pain (10)    #Hx of BPH   - Doxazosin 2mg qd    #Hx of chronic constipation   -c/w senna dulcolox as needed    #Hx of gerd   -on pantoprazole    #Diet: NPO with tube feeds. Need to discuss with wife replacing NG tube with PEG tube long term.  #DVT ppx- lovenox  #GI ppx on pantoprazole  #Code status: Full  #Dispo: acute Ovi Nicole is a 50 year old male with PMH of CIDP, MI s/p CABG, depression, anxiety, and diabetes, who presented to the hospital with AMS and R facial droop, who was given     #R/o Meningitis- - Infectious work up including UA/blood cultures, CXR, CSF completed- no infectious agents found on LP and PCR. Antibiotics and acyclovir discontinued.    #AMS with decreased speech low tone found with acute/subacute infarct in the left basal ganglia/ periventricular white matter, NIHSS 10 on admission  CT: Evolving acute/subacute infarct in the left basal ganglia, with mild mass effect. Midline shift to the right is still evolving.  - Decreased level of consciousness, waxing and waning mental status  - functionally quadriplegic with increased UE tone  - Keppra 1g q12  - keep systolic 160-180 permissive htn  - CT Head - No hemorrhagic transformation, artifact from EEG.   - Need to f/u with Neurology regarding repeat CT to monitor midline shift. Repeat CT performed, awaiting reading.  - keep Na 140-145?, today: Sodium, Serum: 137 mmol/L (09.11.20 @ 05:20)  -echo with bubble?  - keep aspirin and high dose statin  - Neuro checks q4?  - vEEG negative, no seizures detected, but RN reported grand mal seizure on 9/6.  - 40mg subQ Lovenox restarted  - LE Duplex negative      #hypocalcemia and hypomagnesemia  - Magnesium, Serum: 2.0 mg/dL (09.11.20 @ 05:20)  - Potassium, Serum: 4.2 mmol/L (09.11.20 @ 05:20)  - Continue to monitor      # CIDP since 2011   -per wife had deterioration [id number] and is bedbound since then, follows DR. Mitchell currently getting IVIG every three weeks with RN administering at home. Was also started on gabapentin a few months ago per wife Pt. gets very sleepy after gabapentin will hold for now.   -c/w baclofen and pramipexole    #diabetes  - c/w home insulin regimen 30 lantus bedtime 9 lispro with meals     #HTN  - Losartan 50  - increased Coreg 12.5 q12    #Hx of CAD s/p cabg  - c/w asa, carvedilol and statin  - hold plavix    #Hx of anxiety and depression Pt. is on many meds confirmed with wife- c/w home meds- duloxetine, quetiapine, hydroxyzine, alprazolam prn  - Xanax 1 mg q6 prn  - Ativan 0.25 IV q12    #chronic pain  - Palliative care consulted- Continue Morphine 2mg IVP every 4H ATC and continue morphine 2mg IVP every 4 hours for mod (4) to severe pain (10)    #Hx of BPH   - Doxazosin 2mg qd    #Hx of chronic constipation   -c/w senna dulcolox as needed    #Hx of gerd   -on pantoprazole    #Diet: NPO with tube feeds. Need to discuss with wife replacing NG tube with PEG tube long term.  #DVT ppx- lovenox  #GI ppx on pantoprazole  #Code status: Full  #Dispo: acute Ovi Nicole is a 50 year old male with PMH of CIDP, MI s/p CABG, depression, anxiety, and diabetes, who presented to the hospital with AMS and R facial droop, who was given     #R/o Meningitis- - Infectious work up including UA/blood cultures, CXR, CSF completed- no infectious agents found on LP and PCR. Antibiotics and acyclovir discontinued.    #AMS with decreased speech low tone found with acute/subacute infarct in the left basal ganglia/ periventricular white matter, NIHSS 10 on admission  CT: Evolving acute/subacute infarct in the left basal ganglia, with mild mass effect. Midline shift to the right is still evolving.  - Decreased level of consciousness, waxing and waning mental status  - functionally quadriplegic with increased UE tone  - Keppra 1g q12  - keep systolic 160-180 permissive htn  - CT Head - No hemorrhagic transformation, artifact from EEG.   - Need to f/u with Neurology regarding repeat CT to monitor midline shift. Repeat CT performed, awaiting reading.  - keep Na 140-145? today: Sodium, Serum: 137 mmol/L (09.11.20 @ 05:20)  - keep aspirin and high dose statin  - vEEG negative, no seizures detected, but RN reported grand mal seizure on 9/6.  - 40mg subQ Lovenox restarted  - LE Duplex negative  -CT head 9/11: Evolving subacute left corona radiata/basal ganglia infarct without evidence of hemorrhagic transformation. Stable midline shift to the right approximately 5 mm.  - neurology: no need for PEG now, re-evaluate in few days when mental status is stable      #hypocalcemia and hypomagnesemia  - Magnesium, Serum: 2.0 mg/dL (09.11.20 @ 05:20)  - Potassium, Serum: 4.2 mmol/L (09.11.20 @ 05:20)  - Continue to monitor      # CIDP since 2011   -per wife had deterioration [phone] and is bedbound since then, follows DR. Mitchell currently getting IVIG every three weeks with RN administering at home. Was also started on gabapentin a few months ago per wife Pt. gets very sleepy after gabapentin will hold for now.   -c/w baclofen and pramipexole    #diabetes  - c/w home insulin regimen 30 lantus bedtime 9 lispro with meals     #HTN  - Losartan 50  - increased Coreg 12.5 q12    #Hx of CAD s/p cabg  - c/w asa, carvedilol and statin  - hold plavix    #Hx of anxiety and depression Pt. is on many meds confirmed with wife- c/w home meds- duloxetine, quetiapine, hydroxyzine, alprazolam prn  - Xanax 1 mg q6 prn  - Ativan 0.25 IV q12    #chronic pain  - Palliative care consulted- Continue Morphine 2mg IVP every 4H ATC and continue morphine 2mg IVP every 4 hours for mod (4) to severe pain (10)    #Hx of BPH   - Doxazosin 2mg qd    #Hx of chronic constipation   -c/w senna dulcolox as needed    #Hx of gerd   -on pantoprazole    #Diet: NPO with tube feeds. Need to discuss with wife replacing NG tube with PEG tube long term.  #DVT ppx- lovenox  #GI ppx on pantoprazole  #Code status: Full  #Dispo: acute

## 2020-09-11 NOTE — CHART NOTE - NSCHARTNOTEFT_GEN_A_CORE
Pt was finally consulted by NST for prolonged NPO during prolonged ICU stay without a plan for feeding despite multiple RD's recommendations for NST.   Now NST is on board, RD to sign off.

## 2020-09-11 NOTE — PROGRESS NOTE ADULT - ASSESSMENT
50 year old male with H/o chronic opioid and anxiolytic use, CAD s/p CABG, CIDP with IVIG q3 weeks, Paraplegia - has not walked since 2013 and has HHA 12/day; admitted from nursing home for AMS x 3 days, vomiting, decreased PO intake, and right sided facial droop.  Found to have Acute CVA in the left basal ganglia. Hospital course complicated with Seizures (resolved with Ativan) s/p VEEG negative for seizures. LP negative so far. On Keppra now.     #Acute LEft basilar ganglia/periventricular white matter stroke:  Still evolving on last CT head  a/w some midline shift (L to R 6mm)  f/u Neuro and may be repeat CT Head to monitor ICP.   c/w ASA, Statin  Patient non-verbal, still mild Right facial droop.  Mental status waxes and wanes.  F/u Neuro/palliative.   Meningitis ruled out by LP - fevers may be d/t the basal gangliar stroke? Now only low grade temps.   Keppra 1g q12 for seizure PPx  Neuro checks q4  Echo, LE Duplex negative    # CIDP : since 2011   per wife had deterioration [id number] and is bedbound since then,   follows DR. Mitchell currently getting IVIG every three weeks with RN administering at home.   Was also started on gabapentin a few months ago per wife Pt. gets very sleepy after gabapentin will hold for now.   -c/w baclofen and pramipexole  Per Palliative:   Continue Morphine 2mg IVP every 4H ATC with morphine 2mg IVP every 4 hours for mod (4) to severe pain (10)     Will revaluate for transition to oral form   Continue Ativan 0.25 mg IV Q12h        continue xanax 1mg via enteral tube every 6H PRN for restlessness/ agitation/ anxiety     #HTN:   Most SBP readings 150-170. RAre sporadic 190. Monitor on current coreg, Losartan.   Permissive HTN - goal SBP < 170    #hypocalcemia and hypomagnesemia  - Replete PRN    #diabetes  - c/w home insulin regimen 30 lantus bedtime 9 lispro with meals     #HTN  - Losartan 50  - increased Coreg to 25 q12 as BP still not controlled    #Hx of CAD s/p cabg  - c/w asa, carvedilol and statin  - hold plavix    #Hx of anxiety and depression Pt. is on many meds confirmed with wife- c/w home meds- duloxetine, quetiapine, hydroxyzine, alprazolam prn  - Xanax 1 mg q6 prn  - Ativan 0.25 IV q12    #chronic pain  palliative recs as above instead of old chronic home doses.    #Hx of BPH   - Doxazosin 2mg qd    #Hx of chronic constipation   -c/w senna dulcolox as needed    #Hx of gerd   -on pantoprazole    #Diet: NPO with tube feeds via NGT  #DVT ppx- lovenox  #GI ppx on pantoprzole  #Code status: Full  #Dispo: Active. f/u neuro 50 year old male with H/o chronic opioid and anxiolytic use, CAD s/p CABG, CIDP with IVIG q3 weeks, Paraplegia - has not walked since 2013 and has HHA 12/day; admitted from nursing home for AMS x 3 days, vomiting, decreased PO intake, and right sided facial droop.  Found to have Acute CVA in the left basal ganglia. Hospital course complicated with Seizures (resolved with Ativan) s/p VEEG negative for seizures. LP negative so far. On Keppra now.     #Acute LEft basilar ganglia/periventricular white matter stroke:  Still evolving on last CT head  a/w some midline shift (L to R 6mm)  f/u Neuro and may repeat CT Head to monitor ICP.   c/w ASA, Statin  Patient non-verbal, still mild Right facial droop.  Mental status waxes and wanes.  F/u Neuro/palliative.   Meningitis ruled out by LP - fevers may be d/t the basal gangliar stroke? Now only low grade temps.   Keppra 1g q12 for seizure PPx  Neuro checks q4  Echo, LE Duplex negative    # CIDP : since 2011   per wife had deterioration [id number] and is bedbound since then,   follows DR. Mitchell currently getting IVIG every three weeks with RN administering at home.   Was also started on gabapentin a few months ago per wife Pt. gets very sleepy after gabapentin will hold for now.   -c/w baclofen and pramipexole  Per Palliative:   Continue Morphine 2mg IVP every 4H ATC with morphine 2mg IVP every 4 hours for mod (4) to severe pain (10)     Will revaluate for transition to oral form   Continue Ativan 0.25 mg IV Q12h        continue xanax 1mg via enteral tube every 6H PRN for restlessness/ agitation/ anxiety     #HTN:   Most SBP readings 150-170. RAre sporadic 190. Monitor on current coreg, Losartan.   Permissive HTN - goal SBP < 170    #hypocalcemia and hypomagnesemia  - Replete PRN    #diabetes  - c/w home insulin regimen 30 lantus bedtime 9 lispro with meals     #HTN  - Losartan 50  - increased Coreg to 25 q12 as BP still not controlled    #Hx of CAD s/p cabg  - c/w asa, carvedilol and statin  - hold plavix    #Hx of anxiety and depression Pt. is on many meds confirmed with wife- c/w home meds- duloxetine, quetiapine, hydroxyzine, alprazolam prn  - Xanax 1 mg q6 prn  - Ativan 0.25 IV q12    #chronic pain  palliative recs as above instead of old chronic home doses.    #Hx of BPH   - Doxazosin 2mg qd    #Hx of chronic constipation   -c/w senna dulcolox as needed    #Hx of gerd   -on pantoprazole    #Diet: NPO with tube feeds via NGT--> will not consider PEG tube as of yet given pt's recent prolonged ICU admission; re-test swallow eval if more alert as per neuro.  #DVT ppx- lovenox  #GI ppx on pantoprzole  #Code status: Full  #Dispo: Active. f/u neuro

## 2020-09-11 NOTE — OCCUPATIONAL THERAPY INITIAL EVALUATION ADULT - SPECIFY REASON(S)
OT attempted to see pt in AM for RE following reconsult, however pt continues to remain on bedrest at this time. OT will f/u with pt when appropriate.

## 2020-09-11 NOTE — PROGRESS NOTE ADULT - SUBJECTIVE AND OBJECTIVE BOX
Patient is a 50y old  Male who presents with a chief complaint of ams/ facial droop (11 Sep 2020 11:40)  pt seen and evaluated   non verbal  on NGT feed    ICU Vital Signs Last 24 Hrs  T(C): 36.3 (11 Sep 2020 13:54), Max: 37.3 (10 Sep 2020 22:05)  T(F): 97.3 (11 Sep 2020 13:54), Max: 99.1 (10 Sep 2020 22:05)  HR: 91 (11 Sep 2020 13:54) (85 - 92)  BP: 180/88 (11 Sep 2020 13:54) (169/84 - 201/84)  RR: 19 (11 Sep 2020 13:54) (19 - 20)  SpO2: 95% (10 Sep 2020 22:05) (95% - 95%)      Drug Dosing Weight  Height (cm): 167.6 (10 Sep 2020 00:42)  Weight (kg): 96.2 (10 Sep 2020 00:42)  BMI (kg/m2): 34.2 (10 Sep 2020 00:42)  BSA (m2): 2.05 (10 Sep 2020 00:42)    I&O's Detail    10 Sep 2020 07:01  -  11 Sep 2020 07:00  --------------------------------------------------------  IN:    Enteral Tube Flush: 200 mL    Glucerna: 1080 mL  Total IN: 1280 mL    OUT:  Total OUT: 0 mL    Total NET: 1280 mL    PHYSICAL EXAM:  Constitutional:  resting comfortably  NGT in place  Gastrointestinal:  soft obese    MEDICATIONS  (STANDING):  aspirin  chewable 81 milliGRAM(s) Oral daily  atorvastatin 80 milliGRAM(s) Oral at bedtime  baclofen 10 milliGRAM(s) Oral two times a day  carvedilol 25 milliGRAM(s) Oral every 12 hours  chlorhexidine 4% Liquid 1 Application(s) Topical <User Schedule>  dextrose 50% Injectable 12.5 Gram(s) IV Push once  dextrose 50% Injectable 25 Gram(s) IV Push once  dextrose 50% Injectable 25 Gram(s) IV Push once  doxazosin 2 milliGRAM(s) Oral at bedtime  DULoxetine 60 milliGRAM(s) Oral two times a day  enoxaparin Injectable 40 milliGRAM(s) SubCutaneous daily  insulin glargine Injectable (LANTUS) 30 Unit(s) SubCutaneous at bedtime  insulin lispro (HumaLOG) corrective regimen sliding scale   SubCutaneous three times a day before meals  insulin lispro Injectable (HumaLOG) 9 Unit(s) SubCutaneous three times a day before meals  levETIRAcetam 1000 milliGRAM(s) Oral two times a day  losartan 50 milliGRAM(s) Oral daily  morphine  - Injectable 2 milliGRAM(s) IV Push every 4 hours  pantoprazole   Suspension 40 milliGRAM(s) Oral daily  pramipexole 0.5 milliGRAM(s) Oral every 12 hours  senna 2 Tablet(s) Oral at bedtime      Diet, NPO with Tube Feed:   Tube Feeding Modality: Nasogastric  Glucerna 1.2 Nithin  Total Volume for 24 Hours (mL): 1440  Bolus  Total Volume of Bolus (mL):  360  Tube Feed Frequency: Every 6 hours   Tube Feed Start Time: 15:00  Bolus Feed Rate (mL per Hour): 500   Bolus Feed Duration (in Hours): 0.75  Bolus Feed Instructions:  check glucose before each feeding, infuse each feed over 45 min with HOB elevated >45 degrees, water 50 ml before and after by syringe puch  No Carb Prosource TF     Qty per Day:  2 (09-09-20 @ 14:58)      LABS  09-11    137  |  98  |  16  ----------------------------<  252<H>  4.2   |  29  |  0.8    Ca    9.0      11 Sep 2020 05:20  Phos  3.0     09-10  Mg     2.0     09-11    TPro  6.5  /  Alb  3.5  /  TBili  0.8  /  DBili  x   /  AST  18  /  ALT  14  /  AlkPhos  125<H>  09-11                          13.9   9.37  )-----------( 269      ( 11 Sep 2020 05:20 )             44.3     CAPILLARY BLOOD GLUCOSE  POCT Blood Glucose.: 201 mg/dL (11 Sep 2020 12:13)  POCT Blood Glucose.: 205 mg/dL (11 Sep 2020 10:54)  POCT Blood Glucose.: 214 mg/dL (11 Sep 2020 05:55)   RADIOLOGY STUDIES  < from: CT Head No Cont (09.11.20 @ 13:37) >  IMPRESSION:  No significant change since most recent CT of 9/6/2020.  Evolving subacute left corona radiata/basal ganglia infarct without evidence of hemorrhagic transformation. Stable midline shift to the right approximately 5 mm.  Chronic right corona radiata lacunar infarct. Patient is a 50y old  Male who presents with a chief complaint of ams/ facial droop (11 Sep 2020 11:40)  pt seen and evaluated   non verbal  on NGT feedings    ICU Vital Signs Last 24 Hrs  T(C): 36.3 (11 Sep 2020 13:54), Max: 37.3 (10 Sep 2020 22:05)  T(F): 97.3 (11 Sep 2020 13:54), Max: 99.1 (10 Sep 2020 22:05)  HR: 91 (11 Sep 2020 13:54) (85 - 92)  BP: 180/88 (11 Sep 2020 13:54) (169/84 - 201/84)  RR: 19 (11 Sep 2020 13:54) (19 - 20)  SpO2: 95% (10 Sep 2020 22:05) (95% - 95%)    Drug Dosing Weight  Height (cm): 167.6 (10 Sep 2020 00:42)  Weight (kg): 96.2 (10 Sep 2020 00:42)  BMI (kg/m2): 34.2 (10 Sep 2020 00:42)  BSA (m2): 2.05 (10 Sep 2020 00:42)    I&O's Detail    10 Sep 2020 07:01  -  11 Sep 2020 07:00  --------------------------------------------------------  IN:    Enteral Tube Flush: 200 mL    Glucerna: 1080 mL  Total IN: 1280 mL    OUT:  Total OUT: 0 mL   --- ?????    Total NET: 1280 mL    PHYSICAL EXAM:  Constitutional:  resting comfortably  NGT in place  Gastrointestinal:  soft obese    MEDICATIONS  (STANDING):  aspirin  chewable 81 milliGRAM(s) Oral daily  atorvastatin 80 milliGRAM(s) Oral at bedtime  baclofen 10 milliGRAM(s) Oral two times a day  carvedilol 25 milliGRAM(s) Oral every 12 hours  chlorhexidine 4% Liquid 1 Application(s) Topical <User Schedule>  doxazosin 2 milliGRAM(s) Oral at bedtime  DULoxetine 60 milliGRAM(s) Oral two times a day  enoxaparin Injectable 40 milliGRAM(s) SubCutaneous daily  insulin glargine Injectable (LANTUS) 30 Unit(s) SubCutaneous at bedtime  insulin lispro (HumaLOG) corrective regimen sliding scale   SubCutaneous three times a day before meals  insulin lispro Injectable (HumaLOG) 9 Unit(s) SubCutaneous three times a day before meals  levETIRAcetam 1000 milliGRAM(s) Oral two times a day  losartan 50 milliGRAM(s) Oral daily  morphine  - Injectable 2 milliGRAM(s) IV Push every 4 hours  pantoprazole   Suspension 40 milliGRAM(s) Oral daily  pramipexole 0.5 milliGRAM(s) Oral every 12 hours  senna 2 Tablet(s) Oral at bedtime    Diet, NPO with Tube Feed:   Tube Feeding Modality: Nasogastric  Glucerna 1.2 Nithin  Total Volume for 24 Hours (mL): 1440  Bolus  Total Volume of Bolus (mL):  360  Tube Feed Frequency: Every 6 hours   Tube Feed Start Time: 15:00  Bolus Feed Rate (mL per Hour): 500   Bolus Feed Duration (in Hours): 0.75  Bolus Feed Instructions:  check glucose before each feeding, infuse each feed over 45 min with HOB elevated >45 degrees, water 50 ml before and after by syringe push  No Carb Prosource TF     Qty per Day:  2 (09-09-20 @ 14:58)      LABS  09-11    137  |  98  |  16  ----------------------------<  252<H>  4.2   |  29  |  0.8    Ca    9.0      11 Sep 2020 05:20  Phos  3.0     09-10  Mg     2.0     09-11    TPro  6.5  /  Alb  3.5  /  TBili  0.8  /  DBili  x   /  AST  18  /  ALT  14  /  AlkPhos  125<H>  09-11                          13.9   9.37  )-----------( 269      ( 11 Sep 2020 05:20 )             44.3     CAPILLARY BLOOD GLUCOSE  POCT Blood Glucose.: 201 mg/dL (11 Sep 2020 12:13)  POCT Blood Glucose.: 205 mg/dL (11 Sep 2020 10:54)  POCT Blood Glucose.: 214 mg/dL (11 Sep 2020 05:55)   RADIOLOGY STUDIES  < from: CT Head No Cont (09.11.20 @ 13:37) >  IMPRESSION:  No significant change since most recent CT of 9/6/2020.  Evolving subacute left corona radiata/basal ganglia infarct without evidence of hemorrhagic transformation. Stable midline shift to the right approximately 5 mm.  Chronic right corona radiata lacunar infarct.

## 2020-09-11 NOTE — PROGRESS NOTE ADULT - SUBJECTIVE AND OBJECTIVE BOX
50 year old male with H/o chronic opioid and anxiolytic use, CAD s/p CABG, CIDP with IVIG q3 weeks, Paraplegia - has not walked since 2013 and has HHA 12/day; admitted from nursing home for AMS x 3 days, vomiting, decreased PO intake, and right sided facial droop.  Found to have Acute CVA in the left basal ganglia. Hospital course complicated with Seizures (resolved with Ativan) s/p VEEG negative for seizures. LP negative so far. On Keppra now.     9/11: Patient seen at bedside, non-verbal, NGT in place.  No overnight events reported.      PAST MEDICAL & SURGICAL HISTORY:  GERD (gastroesophageal reflux disease)  BPH (benign prostatic hyperplasia)  Myocardial infarct, old  ASHD (arteriosclerotic heart disease)  Depression  Anxiety  Diabetes  CIDP (chronic inflammatory demyelinating polyneuropathy)  History of cholecystectomy  History of total left hip replacement  History of total right hip replacement: bilateral  S/P CABG x 5      REVIEW OF SYSTEMS:  Not a reliable historian.      MEDICATIONS  (STANDING):  aspirin  chewable 81 milliGRAM(s) Oral daily  atorvastatin 80 milliGRAM(s) Oral at bedtime  baclofen 10 milliGRAM(s) Oral two times a day  carvedilol 12.5 milliGRAM(s) Oral every 12 hours  chlorhexidine 4% Liquid 1 Application(s) Topical <User Schedule>  dextrose 50% Injectable 12.5 Gram(s) IV Push once  dextrose 50% Injectable 25 Gram(s) IV Push once  dextrose 50% Injectable 25 Gram(s) IV Push once  doxazosin 2 milliGRAM(s) Oral at bedtime  DULoxetine 60 milliGRAM(s) Oral two times a day  enoxaparin Injectable 40 milliGRAM(s) SubCutaneous daily  insulin glargine Injectable (LANTUS) 30 Unit(s) SubCutaneous at bedtime  insulin lispro (HumaLOG) corrective regimen sliding scale   SubCutaneous three times a day before meals  insulin lispro Injectable (HumaLOG) 9 Unit(s) SubCutaneous three times a day before meals  levETIRAcetam 1000 milliGRAM(s) Oral two times a day  losartan 50 milliGRAM(s) Oral daily  morphine  - Injectable 2 milliGRAM(s) IV Push every 4 hours  pantoprazole   Suspension 40 milliGRAM(s) Oral daily  pramipexole 0.5 milliGRAM(s) Oral every 12 hours  senna 2 Tablet(s) Oral at bedtime    MEDICATIONS  (PRN):  acetaminophen   Tablet .. 650 milliGRAM(s) Oral every 6 hours PRN Moderate Pain (4 - 6)  ALPRAZolam 1 milliGRAM(s) Oral every 6 hours PRN anxiety, agitation, restlessness  bisacodyl 5 milliGRAM(s) Oral at bedtime PRN Constipation  dextrose 40% Gel 15 Gram(s) Oral once PRN Blood Glucose LESS THAN 70 milliGRAM(s)/deciliter  glucagon  Injectable 1 milliGRAM(s) IntraMuscular once PRN Glucose LESS THAN 70 milligrams/deciliter  morphine  - Injectable 2 milliGRAM(s) IV Push every 4 hours PRN Severe Pain (7 - 10)      Allergies  penicillins (Unknown)  strawberry (Hives)          Vital Signs Last 24 Hrs  T(C): 37.1 (11 Sep 2020 05:00), Max: 37.9 (10 Sep 2020 14:27)  T(F): 98.8 (11 Sep 2020 05:00), Max: 100.2 (10 Sep 2020 14:27)  HR: 85 (11 Sep 2020 06:21) (85 - 95)  BP: 169/84 (11 Sep 2020 06:21) (169/84 - 201/84)  RR: 20 (10 Sep 2020 22:05) (20 - 20)  SpO2: 95% (10 Sep 2020 22:05) (95% - 95%)    PHYSICAL EXAM:    GENERAL: NAD, non-verbal  HEAD:  Atraumatic, Normocephalic  EYES: EOMI, PERRLA, conjunctiva and sclera clear  NECK: Supple, No JVD, Normal thyroid  NERVOUS SYSTEM:  Alert, non-verbal, facial droop  CHEST/LUNG: Clear to percussion bilaterally; No rales, rhonchi, wheezing, or rubs  HEART: Regular rate and rhythm; No murmurs, rubs, or gallops  ABDOMEN: Soft, Nontender, Nondistended; Bowel sounds present      LABS:                        13.9   9.37  )-----------( 269      ( 11 Sep 2020 05:20 )             44.3     09-11    137  |  98  |  16  ----------------------------<  252<H>  4.2   |  29  |  0.8    Ca    9.0      11 Sep 2020 05:20  Phos  3.0     09-10  Mg     2.0     09-11    TPro  6.5  /  Alb  3.5  /  TBili  0.8  /  DBili  x   /  AST  18  /  ALT  14  /  AlkPhos  125<H>  09-11

## 2020-09-12 NOTE — PROGRESS NOTE ADULT - SUBJECTIVE AND OBJECTIVE BOX
[facility]  [facility] [id number] Neuro [id number] ([facility] [id number] Neuro)            Patient was evaluated and examined  by bedside, non-verbal, unc. b/p, tolerating NGT feedings, no fever                REVIEW OF SYSTEMS:  unable to obtain due to patient's aphasia status      T(C): , Max: 37.4 (09-11-20 @ 21:18)  HR: 113 (09-12-20 @ 13:29)  BP: 162/96 (09-12-20 @ 13:29)  RR: 19 (09-12-20 @ 13:29)  SpO2: 95% (09-12-20 @ 11:20)  CAPILLARY BLOOD GLUCOSE      POCT Blood Glucose.: 261 mg/dL (12 Sep 2020 11:37)  POCT Blood Glucose.: 238 mg/dL (12 Sep 2020 07:51)  POCT Blood Glucose.: 200 mg/dL (11 Sep 2020 21:26)  POCT Blood Glucose.: 199 mg/dL (11 Sep 2020 21:11)  POCT Blood Glucose.: 189 mg/dL (11 Sep 2020 16:43)  POCT Blood Glucose.: 168 mg/dL (11 Sep 2020 16:16)      PHYSICAL EXAM:  General: NAD, Awake, non-verbal, patient is laying comfortably in bed  HEENT: AT, NC, Supple, NO JVD, NO CB, NGT present  Lungs: CTA B/L, no wheezing, no rhonchi  CVS: normal S1, S2, RRR, NO M/G/R  Abdomen: soft, bowel sounds present, non-tender, non-distended  Extremities: no edema, no clubbing, no cyanosis, positive peripheral pulses b/l  Neuro: patient is not following verbal commands, b/l lower ext. paralysis, b/l upper ext. motor weakness, unable to assess strength due to patients inability to follow verbal commands. global aphasia.   Skin: no rash, no ecchymosis      LAB  CBC  Date: 09-12-20 @ 06:06  Mean cell Asohwlykxn87.5  Mean cell Hemoglobin Conc31.2  Mean cell Volum 78.7  Platelet count-Automate 284  RBC Count 5.95  Red Cell Distrib Width19.0  WBC Count9.72  % Albumin, Urine--  Hematocrit 46.8  Hemoglobin 14.6  CBC  Date: 09-11-20 @ 05:20  Mean cell Sekjhlrxrx49.5  Mean cell Hemoglobin Conc31.4  Mean cell Volum 78.1  Platelet count-Automate 269  RBC Count 5.67  Red Cell Distrib Width18.4  WBC Count9.37  % Albumin, Urine--  Hematocrit 44.3  Hemoglobin 13.9  CBC  Date: 09-10-20 @ 05:49  Mean cell Fmlfgqvehu23.4  Mean cell Hemoglobin Conc31.3  Mean cell Volum 77.9  Platelet count-Automate 251  RBC Count 5.53  Red Cell Distrib Width17.7  WBC Count9.63  % Albumin, Urine--  Hematocrit 43.1  Hemoglobin 13.5  CBC  Date: 09-09-20 @ 04:59  Mean cell Ebqlrvesqu18.3  Mean cell Hemoglobin Conc31.6  Mean cell Volum 76.9  Platelet count-Automate 231  RBC Count 5.59  Red Cell Distrib Width17.4  WBC Count10.67  % Albumin, Urine--  Hematocrit 43.0  Hemoglobin 13.6  CBC  Date: 09-08-20 @ 04:17  Mean cell Wmddmpnzms19.3  Mean cell Hemoglobin Conc31.7  Mean cell Volum 76.6  Platelet count-Automate 238  RBC Count 5.93  Red Cell Distrib Width17.4  WBC Count13.22  % Albumin, Urine--  Hematocrit 45.4  Hemoglobin 14.4  CBC  Date: 09-06-20 @ 04:50  Mean cell Hvhjlioisd83.2  Mean cell Hemoglobin Conc31.9  Mean cell Volum 76.0  Platelet count-Automate 238  RBC Count 5.62  Red Cell Distrib Width17.0  WBC Count13.30  % Albumin, Urine--  Hematocrit 42.7  Hemoglobin 13.6    [facility]  09-12-20 @ 06:06  Blood Gas Arterial-Calcium,Ionized--  Blood Urea Nitrogen, Serum 15 mg/dL [10 - 20]  Carbon Dioxide, Serum28 mmol/L [17 - 32]  Chloride, Serum98 mmol/L [98 - 110]  Creatinie, Serum0.8 mg/dL [0.7 - 1.5]  Glucose, Hqyof332 mg/dL<H> [70 - 99]  Potassium, Serum4.6 mmol/L [3.5 - 5.0]  Sodium, Serum 137 mmol/L [135 - 146]  [facility]  09-11-20 @ 05:20  Blood Gas Arterial-Calcium,Ionized--  Blood Urea Nitrogen, Serum 16 mg/dL [10 - 20]  Carbon Dioxide, Serum29 mmol/L [17 - 32]  Chloride, Serum98 mmol/L [98 - 110]  Creatinie, Serum0.8 mg/dL [0.7 - 1.5]  Glucose, Qgwns060 mg/dL<H> [70 - 99]  Potassium, Serum4.2 mmol/L [3.5 - 5.0]  Sodium, Serum 137 mmol/L [135 - 146]  [facility]  09-10-20 @ 17:39  Blood Gas Arterial-Calcium,Ionized--  Blood Urea Nitrogen, Serum 13 mg/dL [10 - 20]  Carbon Dioxide, Serum24 mmol/L [17 - 32]  Chloride, Yhrzr634 mmol/L [98 - 110]  Creatinie, Serum0.7 mg/dL [0.7 - 1.5]  Glucose, Fjkhg088 mg/dL<H> [70 - 99]  Potassium, Serum4.3 mmol/L [3.5 - 5.0]  Sodium, Serum 139 mmol/L [135 - 146]  [facility]  09-10-20 @ 05:49  Blood Gas Arterial-Calcium,Ionized--  Blood Urea Nitrogen, Serum 14 mg/dL [10 - 20]  Carbon Dioxide, Serum26 mmol/L [17 - 32]  Chloride, Mqovz817 mmol/L [98 - 110]  Creatinie, Serum0.7 mg/dL [0.7 - 1.5]  Glucose, Iuepy570 mg/dL<H> [70 - 99]  Potassium, Serum3.2 mmol/L<L> [3.5 - 5.0]  Sodium, Serum 139 mmol/L [135 - 146]  [facility]  09-09-20 @ 04:59  Blood Gas Arterial-Calcium,Ionized--  Blood Urea Nitrogen, Serum 12 mg/dL [10 - 20]  Carbon Dioxide, Serum24 mmol/L [17 - 32]  Chloride, Ybodh590 mmol/L [98 - 110]  Creatinie, Serum0.7 mg/dL [0.7 - 1.5]  Glucose, Mwock628 mg/dL<H> [70 - 99]  Potassium, Serum3.4 mmol/L<L> [3.5 - 5.0]  Sodium, Serum 136 mmol/L [135 - 146]  [facility]  09-08-20 @ 18:55  Blood Gas Arterial-Calcium,Ionized--  Blood Urea Nitrogen, Serum 11 mg/dL [10 - 20]  Carbon Dioxide, Serum19 mmol/L [17 - 32]  Chloride, Serum97 mmol/L<L> [98 - 110]  Creatinie, Serum0.8 mg/dL [0.7 - 1.5]  Glucose, Zurwh166 mg/dL<H> [70 - 99]  Potassium, Serum4.0 mmol/L [3.5 - 5.0]  Sodium, Serum 136 mmol/L [135 - 146]  [facility]  09-08-20 @ 04:17  Blood Gas Arterial-Calcium,Ionized--  Blood Urea Nitrogen, Serum 8 mg/dL<L> [10 - 20]  Carbon Dioxide, Serum26 mmol/L [17 - 32]  Chloride, Serum97 mmol/L<L> [98 - 110]  Creatinie, Serum0.7 mg/dL [0.7 - 1.5]  Glucose, Serum87 mg/dL [70 - 99]  Potassium, Serum3.1 mmol/L<L> [3.5 - 5.0]  Sodium, Serum 141 mmol/L [135 - 146]              Microbiology:    Culture - Fungal, CSF (collected 09-04-20 @ 15:20)  Source: .CSF CSF  Preliminary Report (09-05-20 @ 15:20):    Testing in progress    Culture - Acid Fast - CSF (collected 09-04-20 @ 15:20)  Source: .CSF CSF  Preliminary Report (09-09-20 @ 15:04):    Culture is being performed.    Culture - CSF with Gram Stain (collected 09-04-20 @ 15:20)  Source: .CSF CSF  Gram Stain (09-05-20 @ 01:29):    No polymorphonuclear cells seen    No organisms seen    by cytocentrifuge  Final Report (09-07-20 @ 15:25):    No growth    Culture - Blood (collected 09-02-20 @ 11:47)  Source: .Blood None  Final Report (09-08-20 @ 01:00):    No Growth Final    Culture - Urine (collected 09-02-20 @ 10:21)  Source: .Urine Catheterized  Final Report (09-03-20 @ 23:09):    No growth    Culture - Urine (collected 08-31-20 @ 14:47)  Source: .Urine Clean Catch (Midstream)  Final Report (09-01-20 @ 18:01):    No growth    Culture - Blood (collected 08-31-20 @ 14:15)  Source: .Blood Blood  Final Report (09-05-20 @ 23:00):    No Growth Final    Culture - Blood (collected 08-31-20 @ 14:15)  Source: .Blood Blood  Final Report (09-05-20 @ 23:00):    No Growth Final      Medications:  acetaminophen   Tablet .. 650 milliGRAM(s) Oral every 6 hours PRN  ALPRAZolam 1 milliGRAM(s) Oral every 6 hours PRN  aspirin  chewable 81 milliGRAM(s) Oral daily  atorvastatin 80 milliGRAM(s) Oral at bedtime  baclofen 10 milliGRAM(s) Oral two times a day  bisacodyl 5 milliGRAM(s) Oral at bedtime PRN  carvedilol 25 milliGRAM(s) Oral every 12 hours  chlorhexidine 4% Liquid 1 Application(s) Topical <User Schedule>  dextrose 40% Gel 15 Gram(s) Oral once PRN  dextrose 50% Injectable 12.5 Gram(s) IV Push once  dextrose 50% Injectable 25 Gram(s) IV Push once  dextrose 50% Injectable 25 Gram(s) IV Push once  doxazosin 2 milliGRAM(s) Oral at bedtime  DULoxetine 60 milliGRAM(s) Oral two times a day  enoxaparin Injectable 40 milliGRAM(s) SubCutaneous daily  glucagon  Injectable 1 milliGRAM(s) IntraMuscular once PRN  insulin glargine Injectable (LANTUS) 30 Unit(s) SubCutaneous at bedtime  insulin lispro (HumaLOG) corrective regimen sliding scale   SubCutaneous three times a day before meals  insulin lispro Injectable (HumaLOG) 9 Unit(s) SubCutaneous three times a day before meals  levETIRAcetam 1000 milliGRAM(s) Oral two times a day  losartan 50 milliGRAM(s) Enteral Tube once  morphine  - Injectable 2 milliGRAM(s) IV Push every 4 hours  morphine  - Injectable 2 milliGRAM(s) IV Push every 6 hours PRN  pantoprazole   Suspension 40 milliGRAM(s) Oral daily  pramipexole 0.5 milliGRAM(s) Oral every 12 hours  senna 2 Tablet(s) Oral at bedtime        Assessment and Plan:  Patient is a 50 year old male with H/o chronic opioid and anxiolytic use, CAD s/p CABG, CIDP with IVIG q3 weeks, Paraplegia - has not walked since 2013 and has HHA 12/day; admitted from nursing home for AMS x 3 days, vomiting, decreased PO intake, and right sided facial droop.  Found to have Acute CVA in the left basal ganglia. Hospital course complicated with Seizures (resolved with Ativan) s/p VEEG negative for seizures. LP negative so far. On Keppra now.     #Acute LEft basilar ganglia/periventricular white matter stroke:  Still evolving on last CT head  a/w some midline shift (L to R 6mm)  c/w ASA, Statin  Patient non-verbal, not following verbal commands  F/u Neuro/palliative for further recommendations  Keppra 1gram q12 for seizure PPx  Echo, LE Duplex negative  -continue neuroassessments     # CIDP : since 2011   per wife had deterioration [id number] and is bedbound since then,   follows DR. Mitchell currently getting IVIG every three weeks with RN administering at home.   Was also started on gabapentin a few months ago per wife Pt. gets very sleepy after gabapentin will hold for now.   -c/w baclofen and pramipexole  Per Palliative:   Continue Morphine 2mg IVP every 4H ATC with morphine 2mg IVP every 4 hours for mod (4) to severe pain (10)     Will revaluate for transition to oral form   Continue Ativan 0.25 mg IV Q12h        continue xanax 1mg via enteral tube every 6H PRN for restlessness/ agitation/ anxiety     #HTN:   Most SBP readings 150-170. RAre sporadic 190.   -increase Losartan to 100 mg po once daily  -continue Coreg 25 mg po once daily    #hypocalcemia and hypomagnesemia  - Replete PRN    #diabetes  - c/w home insulin regimen 30 lantus bedtime 9 lispro with meals     #Hx of CAD s/p cabg  - c/w asa, carvedilol and statin  - hold plavix    #Hx of anxiety and depression Pt. is on many meds confirmed with wife- c/w home meds- duloxetine, quetiapine, hydroxyzine, alprazolam prn  - Xanax 1 mg q6 prn  - Ativan 0.25 IV q12    #chronic pain  palliative recs as above instead of old chronic home doses.    #Hx of BPH   - Doxazosin 2mg qd    #Hx of chronic constipation   -c/w senna dulcolox as needed    #Hx of gerd   -on pantoprazole    #Diet:NGT feedings, f/up family if agree for peg placement,   daily DVT ppx- lovenox  #GI ppx on pantoprzole  #Code status: Full    overall pt's prognosis is poor.    #Progress Note Handoff:f/up family for possible Peg placement, palliative team f/up  Family discussion: yes Disposition: to be determined

## 2020-09-12 NOTE — PROGRESS NOTE ADULT - SUBJECTIVE AND OBJECTIVE BOX
Neurology Progress Note    Interval History:  patient is examined at the bedside, he appears more awake and alert today, trying to say a few words with right sided plegia.     HPI:  50 year old male PMH of cabg 2011, CIDP follows Dr. Mitchell has been getting IVIG q3 weeks at home, depression, anxiety, Diabetes, BPH, presents to ED for facial droop accompanied by wife.  Pt. is A0x1 baseline A0x3 has HHA 12 hours daily, coherent and can feed himself, and with home PT can sit up in bed has been unable to walk since 2013 from CIDP. Per wife at bedside Pt. has been acting different past five days, with low speech, barely talking and recently a right facial droop x 1 day. No focal muscle or sensory changes from baseline. Pt. able to follow commands and responds softly to simple questions but unaware of where he is or his name.  Wife reports gabapentin started a few months ago which causes  to be more sleepy, no other new meds started or stopped.   Per wife no fever/ chills/ headache, has chronic constipation but no acute changes. Denies chest pain, sob.  Pt. had CT head in ED which showed acute/subacute infarct in the left basal ganglia/ periventricular white matter with NIHSS 10 on admission. Pt to be admitted to stroke unit fir futher work up. (31 Aug 2020 17:40)      PAST MEDICAL & SURGICAL HISTORY:  GERD (gastroesophageal reflux disease)    BPH (benign prostatic hyperplasia)    Myocardial infarct, old    ASHD (arteriosclerotic heart disease)    Depression    Anxiety    Diabetes    CIDP (chronic inflammatory demyelinating polyneuropathy)    History of cholecystectomy    History of total left hip replacement    History of total right hip replacement  bilateral    S/P CABG x 5            Medications:  acetaminophen   Tablet .. 650 milliGRAM(s) Oral every 6 hours PRN  ALPRAZolam 1 milliGRAM(s) Oral every 6 hours PRN  aspirin  chewable 81 milliGRAM(s) Oral daily  atorvastatin 80 milliGRAM(s) Oral at bedtime  baclofen 10 milliGRAM(s) Oral two times a day  bisacodyl 5 milliGRAM(s) Oral at bedtime PRN  carvedilol 25 milliGRAM(s) Oral every 12 hours  chlorhexidine 4% Liquid 1 Application(s) Topical <User Schedule>  dextrose 40% Gel 15 Gram(s) Oral once PRN  dextrose 50% Injectable 12.5 Gram(s) IV Push once  dextrose 50% Injectable 25 Gram(s) IV Push once  dextrose 50% Injectable 25 Gram(s) IV Push once  doxazosin 2 milliGRAM(s) Oral at bedtime  DULoxetine 60 milliGRAM(s) Oral two times a day  enoxaparin Injectable 40 milliGRAM(s) SubCutaneous daily  glucagon  Injectable 1 milliGRAM(s) IntraMuscular once PRN  insulin glargine Injectable (LANTUS) 30 Unit(s) SubCutaneous at bedtime  insulin lispro (HumaLOG) corrective regimen sliding scale   SubCutaneous three times a day before meals  insulin lispro Injectable (HumaLOG) 9 Unit(s) SubCutaneous three times a day before meals  levETIRAcetam 1000 milliGRAM(s) Oral two times a day  morphine  - Injectable 2 milliGRAM(s) IV Push every 4 hours  morphine  - Injectable 2 milliGRAM(s) IV Push every 6 hours PRN  pantoprazole   Suspension 40 milliGRAM(s) Oral daily  pramipexole 0.5 milliGRAM(s) Oral every 12 hours  senna 2 Tablet(s) Oral at bedtime      Vital Signs Last 24 Hrs  T(C): 35.9 (12 Sep 2020 13:29), Max: 37.4 (11 Sep 2020 21:18)  T(F): 96.7 (12 Sep 2020 13:29), Max: 99.3 (11 Sep 2020 21:18)  HR: 113 (12 Sep 2020 13:29) (89 - 113)  BP: 162/96 (12 Sep 2020 13:29) (162/96 - 189/93)  BP(mean): --  RR: 19 (12 Sep 2020 13:29) (19 - 20)  SpO2: 95% (12 Sep 2020 11:20) (95% - 95%)    Neurological Exam:   Mental status: Awake, alert with expressive aphasia  Cranial nerves: Pupils equally round and reactive to light, visual fields full, no nystagmus, extraocular muscles intact, V1 through V3 intact bilaterally and symmetric, face symmetric, hearing intact to finger rub, palate elevation symmetric, tongue was midline.  Motor:  MRC grading 5/5 b/l UE/LE.   strength 2/5 BLE 0/5 RUE 1/5  Sensation: Intact to light touch, proprioception, and pinprick.   Coordination: No dysmetria on finger-to-nose and heel-to-shin.  No dysdiadokinesia.  Reflexes: 0+ in bilateral UE/LE, downgoing toes bilaterally. (-) Roberts    Labs:  CBC Full  -  ( 12 Sep 2020 06:06 )  WBC Count : 9.72 K/uL  RBC Count : 5.95 M/uL  Hemoglobin : 14.6 g/dL  Hematocrit : 46.8 %  Platelet Count - Automated : 284 K/uL  Mean Cell Volume : 78.7 fL  Mean Cell Hemoglobin : 24.5 pg  Mean Cell Hemoglobin Concentration : 31.2 g/dL  Auto Neutrophil # : x  Auto Lymphocyte # : x  Auto Monocyte # : x  Auto Eosinophil # : x  Auto Basophil # : x  Auto Neutrophil % : x  Auto Lymphocyte % : x  Auto Monocyte % : x  Auto Eosinophil % : x  Auto Basophil % : x    09-12    137  |  98  |  15  ----------------------------<  227<H>  4.6   |  28  |  0.8    Ca    8.9      12 Sep 2020 06:06  Mg     2.0     09-11    TPro  6.5  /  Alb  3.5  /  TBili  0.8  /  DBili  x   /  AST  18  /  ALT  14  /  AlkPhos  125<H>  09-11    LIVER FUNCTIONS - ( 11 Sep 2020 05:20 )  Alb: 3.5 g/dL / Pro: 6.5 g/dL / ALK PHOS: 125 U/L / ALT: 14 U/L / AST: 18 U/L / GGT: x                 Assessment:  This is a 50y Male w/ h/o     Plan:

## 2020-09-12 NOTE — PROGRESS NOTE ADULT - ASSESSMENT
50 year old male with H/o chronic opioid and anxiolytic use, CAD s/p CABG, CIDP with IVIG q3 weeks, Paraplegia - has not walked since 2013 and has HHA 12/day; admitted from nursing home for AMS x 3 days, vomiting, decreased PO intake, and right sided facial droop.  Found to have Acute CVA in the left basal ganglia. Hospital course complicated with Seizures s/p VEEG negative for seizures. CSF is unremarkable. he was downgraded to 3E and is more awake and alert today during exam.      Plan:  Repeat speech and swallow test  NO IVIG  Continue his outpatient pain medications  PT/OT/Rehab        Discussed with neuroattending and medical team

## 2020-09-12 NOTE — OCCUPATIONAL THERAPY INITIAL EVALUATION ADULT - SPECIFY REASON(S)
Pt currently with bedrest orders for several days. OT eval unable to be completed without activity orders. Will follow up when appropriate.

## 2020-09-12 NOTE — PROGRESS NOTE ADULT - SUBJECTIVE AND OBJECTIVE BOX
Neurology Progress Note    Interval History:  Pt currently more awake and alert but still only minimally following commands.  Recognizes neurology team and attempts to say his name.  Has baseline LE Weakness from CIDP bedbound at baseline.  Now with b/l UE weakness RUE > LUE.  NGT in place.  appears diaphoretic.      HPI:  50 year old male PMH of cabg 2011, CIDP follows Dr. Mitchell has been getting IVIG q3 weeks at home, depression, anxiety, Diabetes, BPH, presents to ED for facial droop accompanied by wife.  Pt.  baseline A0x3 has HHA 12 hours daily, coherent and can feed himself, and with home PT can sit up in bed has been unable to walk since 2013 from CIDP. Per wife at bedside Pt. has been acting different past five days, with low speech, barely talking and recently a right facial droop x 1 day. No focal muscle or sensory changes from baseline. Pt. able to follow commands and responds softly to simple questions but unaware of where he is or his name.  Wife reports gabapentin started a few months ago which causes  to be more sleepy, no other new meds started or stopped.   Per wife no fever/ chills/ headache, has chronic constipation but no acute changes. Denies chest pain, sob.  Pt. had CT head in ED which showed acute/subacute infarct in the left basal ganglia/ periventricular white matter with NIHSS 10 on admission. Pt to be admitted to stroke unit fir futher work up. (31 Aug 2020 17:40)    PAST MEDICAL & SURGICAL HISTORY:  GERD (gastroesophageal reflux disease)    BPH (benign prostatic hyperplasia)    Myocardial infarct, old    ASHD (arteriosclerotic heart disease)    Depression    Anxiety    Diabetes    CIDP (chronic inflammatory demyelinating polyneuropathy)    History of cholecystectomy    History of total left hip replacement    History of total right hip replacement  bilateral    S/P CABG x 5    Medications:  acetaminophen   Tablet .. 650 milliGRAM(s) Oral every 6 hours PRN  ALPRAZolam 1 milliGRAM(s) Oral every 6 hours PRN  aspirin  chewable 81 milliGRAM(s) Oral daily  atorvastatin 80 milliGRAM(s) Oral at bedtime  baclofen 10 milliGRAM(s) Oral two times a day  bisacodyl 5 milliGRAM(s) Oral at bedtime PRN  carvedilol 25 milliGRAM(s) Oral every 12 hours  chlorhexidine 4% Liquid 1 Application(s) Topical <User Schedule>  dextrose 40% Gel 15 Gram(s) Oral once PRN  dextrose 50% Injectable 12.5 Gram(s) IV Push once  dextrose 50% Injectable 25 Gram(s) IV Push once  dextrose 50% Injectable 25 Gram(s) IV Push once  doxazosin 2 milliGRAM(s) Oral at bedtime  DULoxetine 60 milliGRAM(s) Oral two times a day  enoxaparin Injectable 40 milliGRAM(s) SubCutaneous daily  glucagon  Injectable 1 milliGRAM(s) IntraMuscular once PRN  insulin glargine Injectable (LANTUS) 30 Unit(s) SubCutaneous at bedtime  insulin lispro (HumaLOG) corrective regimen sliding scale   SubCutaneous three times a day before meals  insulin lispro Injectable (HumaLOG) 9 Unit(s) SubCutaneous three times a day before meals  levETIRAcetam 1000 milliGRAM(s) Oral two times a day  morphine  - Injectable 2 milliGRAM(s) IV Push every 4 hours  morphine  - Injectable 2 milliGRAM(s) IV Push every 6 hours PRN  pantoprazole   Suspension 40 milliGRAM(s) Oral daily  pramipexole 0.5 milliGRAM(s) Oral every 12 hours  senna 2 Tablet(s) Oral at bedtime    Vital Signs Last 24 Hrs  T(C): 35.9 (12 Sep 2020 13:29), Max: 37.4 (11 Sep 2020 21:18)  T(F): 96.7 (12 Sep 2020 13:29), Max: 99.3 (11 Sep 2020 21:18)  HR: 113 (12 Sep 2020 13:29) (89 - 113)  BP: 162/96 (12 Sep 2020 13:29) (162/96 - 189/93)  BP(mean): --  RR: 19 (12 Sep 2020 13:29) (19 - 20)  SpO2: 95% (12 Sep 2020 11:20) (95% - 95%)    Neurological Exam:   mild distress, diaphoretic.  AOx1 person.  awake and alert but minimally following commands.    CN grossly intact  MS spastic b/l LE, RUE 0/5, LUE 2/5  sensory grossly intact  no clonus.    areflexic throughout      Labs:  CBC Full  -  ( 12 Sep 2020 06:06 )  WBC Count : 9.72 K/uL  RBC Count : 5.95 M/uL  Hemoglobin : 14.6 g/dL  Hematocrit : 46.8 %  Platelet Count - Automated : 284 K/uL  Mean Cell Volume : 78.7 fL  Mean Cell Hemoglobin : 24.5 pg  Mean Cell Hemoglobin Concentration : 31.2 g/dL    09-12    137  |  98  |  15  ----------------------------<  227<H>  4.6   |  28  |  0.8    Ca    8.9      12 Sep 2020 06:06  Mg     2.0     09-11    TPro  6.5  /  Alb  3.5  /  TBili  0.8  /  DBili  x   /  AST  18  /  ALT  14  /  AlkPhos  125<H>  09-11    LIVER FUNCTIONS - ( 11 Sep 2020 05:20 )  Alb: 3.5 g/dL / Pro: 6.5 g/dL / ALK PHOS: 125 U/L / ALT: 14 U/L / AST: 18 U/L / GGT: x           < from: MR Head No Cont (09.01.20 @ 19:33) >  IMPRESSION:    MRI BRAIN:  Redemonstration of acute infarct involving the left basal ganglia/corona radiata.  Stable mild midline shift to the right.    MRA BRAIN:  Mild short segmental stenosis involving the distal left M1, and the proximal left anterior temporal artery.  Mildly narrowed left A1.  No evidence of large vessel occlusion, vascular malformation, or aneurysm.    MRA NECK:  No evidence of carotid or vertebral artery stenosis.    LEANN YANCEY M.D., RESIDENT RADIOLOGIST  This document has been electronically signed.  TRISTIN NIX M.D., ATTENDING RADIOLOGIST  This document has been electronically signed. Sep  2 2020 10:50AM    < end of copied text >    < from: Transthoracic Echocardiogram (09.04.20 @ 09:34) >  Summary:   1. Normal global left ventricular systolic function.   2. Mildly increased LV wall thickness.   3. Spectral Doppler shows impaired relaxation pattern of left ventricular myocardial filling (Grade I diastolic dysfunction).  4. Mildly enlarged left atrium.   5. Normal right atrial size.   6. Trace mitral valve regurgitation.    < end of copied text >    A1C with Estimated Average Glucose Result: 7.3: Method: Immunoassay       Reference Range                4.0-5.6%       High risk (prediabetic)        5.7-6.4%       Diabetic, diagnostic             >=6.5%       ADA diabetic treatment goal       <7.0%  The Hemoglobin A1c testing is NGSP-certified.Reference ranges are based  upon the 2010 recommendations of  the American Diabetes Association.  Interpretation may vary for children  and adolescents. % (09.01.20 @ 05:26)    Lipid Profile in AM (09.01.20 @ 05:26)    Total Cholesterol/HDL Ratio Measurement: 4.0 Ratio    Cholesterol, Serum: 113 mg/dL    Triglycerides, Serum: 105 mg/dL    HDL Cholesterol, Serum: 28: HDL Levels >/= 60 mg/dL are considered beneficial and a "negative" risk  factor.  Effective 08/15/2018: New reference range and interpretive comment. mg/dL    Direct LDL: 64: LDL Cholesterol (mg/dL) --- Interpretive Comment (for adults 18 and over)  Optimal LDL Level may vary based on clinical situation  Below 70                   Ideal for people at very high risk of heart  disease  Below 100                  Ideal for people at risk of heart disease  100 - 129                    [place]  130 - 159                    Borderline high  160 - 189                    High  190 and Above           Very high mg/dL  < from: VA Duplex Lower Ext Vein Scan, Bilat (09.04.20 @ 14:31) >  Impression:    No evidence of deep venous thrombosis or superficial thrombophlebitis in the bilateral lower extremities.    ICD-10:M79.89            SHANA MUNOZ M.D., RESIDENT RADIOLOGIST  This document has been electronically signed.  KEILY DONG M.D., ATTENDINGVASCULAR  This document has been electronically signed. Sep  6 2020  3:59PM        < end of copied text >

## 2020-09-12 NOTE — PROGRESS NOTE ADULT - ASSESSMENT
49 yo M w/ h/o depression, anxiety, Diabetes, BPH, CAD s/p CABG, longstanding CIDP progressive refractory to DMTs at baseline bedbound with avascular necrosis hip after prednisone use most recently improving on IVIG maintenance now complicated by acute L BG stroke currently remains encephalopathic with some improvement in alertness.      Recommendations:  - continue analgesics for now  - if remains with decreased alertness will consider decreasing baclofen  - continue secondary stroke prevention with ASA and lipitor  - hold DMTs for CIDP at this time  - recommend repeat SLP evaluation once pt more awake/alert - if still fails SLP, may need PEG  - PT/OT eval and treat

## 2020-09-13 NOTE — PROGRESS NOTE ADULT - SUBJECTIVE AND OBJECTIVE BOX
NAPOLEON CAST  [facility] T8-3E Neuro 006 A ([facility] T8-3E Neuro)            Patient was evaluated and examined  by bedside, remains non-verbal, started spiking fevers, tolerating NGT feedings, no dyspnea, no diarrhea          REVIEW OF SYSTEMS:  unable to obtain due to patient's non-verbal status      T(C): , Max: 39.3 (09-13-20 @ 14:05)  HR: 125 (09-13-20 @ 14:05)  BP: 157/91 (09-13-20 @ 14:05)  RR: 19 (09-13-20 @ 14:05)  SpO2: --  CAPILLARY BLOOD GLUCOSE      POCT Blood Glucose.: 296 mg/dL (13 Sep 2020 11:08)  POCT Blood Glucose.: 271 mg/dL (13 Sep 2020 05:40)  POCT Blood Glucose.: 285 mg/dL (12 Sep 2020 21:08)  POCT Blood Glucose.: 215 mg/dL (12 Sep 2020 16:36)      PHYSICAL EXAM:  General: NAD, Awake, non-verbal, patient is laying comfortably in bed  HEENT: AT, NC, Supple, NO JVD, NO CB, NGT present  Lungs: CTA B/L, no wheezing, no rhonchi  CVS: normal S1, S2, RRR, NO M/G/R  Abdomen: soft, bowel sounds present, non-tender, non-distended  Extremities: no edema, no clubbing, no cyanosis, positive peripheral pulses b/l  Neuro: patient is not following verbal commands, b/l lower ext. paralysis, b/l upper ext. motor weakness, unable to assess strength due to patients inability to follow verbal commands. global aphasia.   Skin: no rash, no ecchymosis        LAB  CBC  Date: 09-13-20 @ 06:12  Mean cell Hqkzxqhiok19.0  Mean cell Hemoglobin Conc30.4  Mean cell Volum 78.9  Platelet count-Automate 346  RBC Count 6.34  Red Cell Distrib Width19.3  WBC Count12.24  % Albumin, Urine--  Hematocrit 50.0  Hemoglobin 15.2  CBC  Date: 09-12-20 @ 06:06  Mean cell Tstnepbctd33.5  Mean cell Hemoglobin Conc31.2  Mean cell Volum 78.7  Platelet count-Automate 284  RBC Count 5.95  Red Cell Distrib Width19.0  WBC Count9.72  % Albumin, Urine--  Hematocrit 46.8  Hemoglobin 14.6  CBC  Date: 09-11-20 @ 05:20  Mean cell Adirghfgse69.5  Mean cell Hemoglobin Conc31.4  Mean cell Volum 78.1  Platelet count-Automate 269  RBC Count 5.67  Red Cell Distrib Width18.4  WBC Count9.37  % Albumin, Urine--  Hematocrit 44.3  Hemoglobin 13.9  CBC  Date: 09-10-20 @ 05:49  Mean cell Wuyihvbdre29.4  Mean cell Hemoglobin Conc31.3  Mean cell Volum 77.9  Platelet count-Automate 251  RBC Count 5.53  Red Cell Distrib Width17.7  WBC Count9.63  % Albumin, Urine--  Hematocrit 43.1  Hemoglobin 13.5  CBC  Date: 09-09-20 @ 04:59  Mean cell Xqxpidbpig66.3  Mean cell Hemoglobin Conc31.6  Mean cell Volum 76.9  Platelet count-Automate 231  RBC Count 5.59  Red Cell Distrib Width17.4  WBC Count10.67  % Albumin, Urine--  Hematocrit 43.0  Hemoglobin 13.6  CBC  Date: 09-08-20 @ 04:17  Mean cell Cripjthgta28.3  Mean cell Hemoglobin Conc31.7  Mean cell Volum 76.6  Platelet count-Automate 238  RBC Count 5.93  Red Cell Distrib Width17.4  WBC Count13.22  % Albumin, Urine--  Hematocrit 45.4  Hemoglobin 14.4    BMP  09-13-20 @ 06:12  Blood Gas Arterial-Calcium,Ionized--  Blood Urea Nitrogen, Serum 24 mg/dL<H> [10 - 20]  Carbon Dioxide, Serum30 mmol/L [17 - 32]  Chloride, Serum96 mmol/L<L> [98 - 110]  Creatinie, Serum1.0 mg/dL [0.7 - 1.5]  Glucose, Ifnky192 mg/dL<H> [70 - 99]  Potassium, Serum5.0 mmol/L [3.5 - 5.0]  Sodium, Serum 139 mmol/L [135 - 146]  BMP  09-12-20 @ 06:06  Blood Gas Arterial-Calcium,Ionized--  Blood Urea Nitrogen, Serum 15 mg/dL [10 - 20]  Carbon Dioxide, Serum28 mmol/L [17 - 32]  Chloride, Serum98 mmol/L [98 - 110]  Creatinie, Serum0.8 mg/dL [0.7 - 1.5]  Glucose, Irhmn921 mg/dL<H> [70 - 99]  Potassium, Serum4.6 mmol/L [3.5 - 5.0]  Sodium, Serum 137 mmol/L [135 - 146]  [facility]  09-11-20 @ 05:20  Blood Gas Arterial-Calcium,Ionized--  Blood Urea Nitrogen, Serum 16 mg/dL [10 - 20]  Carbon Dioxide, Serum29 mmol/L [17 - 32]  Chloride, Serum98 mmol/L [98 - 110]  Creatinie, Serum0.8 mg/dL [0.7 - 1.5]  Glucose, Vgdle717 mg/dL<H> [70 - 99]  Potassium, Serum4.2 mmol/L [3.5 - 5.0]  Sodium, Serum 137 mmol/L [135 - 146]  [facility]  09-10-20 @ 17:39  Blood Gas Arterial-Calcium,Ionized--  Blood Urea Nitrogen, Serum 13 mg/dL [10 - 20]  Carbon Dioxide, Serum24 mmol/L [17 - 32]  Chloride, Kykab964 mmol/L [98 - 110]  Creatinie, Serum0.7 mg/dL [0.7 - 1.5]  Glucose, Eypdz565 mg/dL<H> [70 - 99]  Potassium, Serum4.3 mmol/L [3.5 - 5.0]  Sodium, Serum 139 mmol/L [135 - 146]  [facility]  09-10-20 @ 05:49  Blood Gas Arterial-Calcium,Ionized--  Blood Urea Nitrogen, Serum 14 mg/dL [10 - 20]  Carbon Dioxide, Serum26 mmol/L [17 - 32]  Chloride, Ruxsg613 mmol/L [98 - 110]  Creatinie, Serum0.7 mg/dL [0.7 - 1.5]  Glucose, Niqpf131 mg/dL<H> [70 - 99]  Potassium, Serum3.2 mmol/L<L> [3.5 - 5.0]  Sodium, Serum 139 mmol/L [135 - 146]  [facility]  09-09-20 @ 04:59  Blood Gas Arterial-Calcium,Ionized--  Blood Urea Nitrogen, Serum 12 mg/dL [10 - 20]  Carbon Dioxide, Serum24 mmol/L [17 - 32]  Chloride, Dmtog748 mmol/L [98 - 110]  Creatinie, Serum0.7 mg/dL [0.7 - 1.5]  Glucose, Yiaqk527 mg/dL<H> [70 - 99]  Potassium, Serum3.4 mmol/L<L> [3.5 - 5.0]  Sodium, Serum 136 mmol/L [135 - 146]  BMP  09-08-20 @ 18:55  Blood Gas Arterial-Calcium,Ionized--  Blood Urea Nitrogen, Serum 11 mg/dL [10 - 20]  Carbon Dioxide, Serum19 mmol/L [17 - 32]  Chloride, Serum97 mmol/L<L> [98 - 110]  Creatinie, Serum0.8 mg/dL [0.7 - 1.5]  Glucose, Fhpsj019 mg/dL<H> [70 - 99]  Potassium, Serum4.0 mmol/L [3.5 - 5.0]  Sodium, Serum 136 mmol/L [135 - 146]              Microbiology:    Culture - Fungal, CSF (collected 09-04-20 @ 15:20)  Source: .CSF CSF  Preliminary Report (09-12-20 @ 15:03):    No growth    Culture - Acid Fast - CSF (collected 09-04-20 @ 15:20)  Source: .CSF CSF  Preliminary Report (09-12-20 @ 15:04):    No growth at 1 week.    Culture - CSF with Gram Stain (collected 09-04-20 @ 15:20)  Source: .CSF CSF  Gram Stain (09-05-20 @ 01:29):    No polymorphonuclear cells seen    No organisms seen    by cytocentrifuge  Final Report (09-07-20 @ 15:25):    No growth    Culture - Blood (collected 09-02-20 @ 11:47)  Source: .Blood None  Final Report (09-08-20 @ 01:00):    No Growth Final    Culture - Urine (collected 09-02-20 @ 10:21)  Source: .Urine Catheterized  Final Report (09-03-20 @ 23:09):    No growth    Culture - Urine (collected 08-31-20 @ 14:47)  Source: .Urine Clean Catch (Midstream)  Final Report (09-01-20 @ 18:01):    No growth    Culture - Blood (collected 08-31-20 @ 14:15)  Source: .Blood Blood  Final Report (09-05-20 @ 23:00):    No Growth Final    Culture - Blood (collected 08-31-20 @ 14:15)  Source: .Blood Blood  Final Report (09-05-20 @ 23:00):    No Growth Final        Medications:  acetaminophen   Tablet .. 650 milliGRAM(s) Oral every 6 hours PRN  ALPRAZolam 1 milliGRAM(s) Oral every 6 hours PRN  aspirin  chewable 81 milliGRAM(s) Oral daily  atorvastatin 80 milliGRAM(s) Oral at bedtime  baclofen 10 milliGRAM(s) Oral two times a day  bisacodyl 5 milliGRAM(s) Oral at bedtime PRN  carvedilol 25 milliGRAM(s) Oral every 12 hours  chlorhexidine 4% Liquid 1 Application(s) Topical <User Schedule>  dextrose 40% Gel 15 Gram(s) Oral once PRN  dextrose 50% Injectable 12.5 Gram(s) IV Push once  dextrose 50% Injectable 25 Gram(s) IV Push once  dextrose 50% Injectable 25 Gram(s) IV Push once  doxazosin 2 milliGRAM(s) Oral at bedtime  DULoxetine 60 milliGRAM(s) Oral two times a day  enoxaparin Injectable 40 milliGRAM(s) SubCutaneous daily  glucagon  Injectable 1 milliGRAM(s) IntraMuscular once PRN  insulin glargine Injectable (LANTUS) 38 Unit(s) SubCutaneous at bedtime  insulin lispro (HumaLOG) corrective regimen sliding scale   SubCutaneous three times a day before meals  insulin lispro Injectable (HumaLOG) 9 Unit(s) SubCutaneous three times a day before meals  levETIRAcetam  Solution 1000 milliGRAM(s) Oral every 12 hours  morphine  - Injectable 2 milliGRAM(s) IV Push every 4 hours  morphine  - Injectable 2 milliGRAM(s) IV Push every 6 hours PRN  pantoprazole   Suspension 40 milliGRAM(s) Oral daily  pramipexole 0.5 milliGRAM(s) Oral every 12 hours  senna 2 Tablet(s) Oral at bedtime        Assessment and Plan:  Patient is a 50 year old male with H/o chronic opioid and anxiolytic use, CAD s/p CABG, CIDP with IVIG q3 weeks, Paraplegia - has not walked since 2013 and has HHA 12/day; admitted from nursing home for AMS x 3 days, vomiting, decreased PO intake, and right sided facial droop.  Found to have Acute CVA in the left basal ganglia. Hospital course complicated with Seizures (resolved with Ativan) s/p VEEG negative for seizures. LP negative so far. On Keppra now.     #Acute Left basilar ganglia/periventricular white matter stroke:  Still evolving on last CT head  c/w some midline shift (L to R 6mm)  c/w ASA, Statin  Patient non-verbal, not following verbal commands  F/u Neuro/palliative for further recommendations  Keppra 1gram q12 for seizure PPx  Echo, LE Duplex negative  -continue neuroassessments     # CIDP : since 2011   per wife had deterioration [id number] and is bedbound since then,   follows DR. Mitchell currently getting IVIG every three weeks with RN administering at home.   Was also started on gabapentin a few months ago per wife Pt. gets very sleepy after gabapentin will hold for now.   -c/w baclofen and pramipexole  Per Palliative:   Continue Morphine 2mg IVP every 4H ATC with morphine 2mg IVP every 4 hours for mod (4) to severe pain (10)     Will revaluate for transition to oral form   Continue Ativan 0.25 mg IV Q12h        continue xanax 1mg via enteral tube every 6H PRN for restlessness/ agitation/ anxiety       # New onset fevers- U/A - normal, CxR- no opacities, f/up blood and urine cxs  - continue close observation  - tylenol prn. fevers    #HTN:   Most SBP readings 150-170. RAre sporadic 190.   -increase Losartan to 100 mg po once daily  -continue Coreg 25 mg po twice  daily    #hypocalcemia and hypomagnesemia  - Replete PRN    #diabetes- uncontrolled  -  insulin increased  lantus from 30 to 38 units subcut. once daily at  bedtime, continue  9 units of  lispro with meals     #Hx of CAD s/p cabg  - c/w asa, carvedilol and statin  - hold plavix    #Hx of anxiety and depression Pt. is on many meds confirmed with wife- c/w home meds- duloxetine, quetiapine, hydroxyzine, alprazolam prn  - Xanax 1 mg q6 prn  - Ativan 0.25 IV q12    #chronic pain  palliative recs as above instead of old chronic home doses.    #Hx of BPH   - Doxazosin 2mg qd    #Hx of chronic constipation   -c/w senna dulcolox as needed    #Hx of gerd   -on pantoprazole    #Diet:NGT feedings, f/up family if agree for peg placement,   daily DVT ppx- lovenox  #GI ppx on pantoprzole  #Code status: Full    overall pt's prognosis is poor.    #Progress Note Handoff: new onset fever, f/up septic work up, so far no infectious etiology ,? central fever,   Family discussion: yes Disposition: to be determined

## 2020-09-13 NOTE — CHART NOTE - NSCHARTNOTEFT_GEN_A_CORE
I was called by the RN and notified that the patient is febrile. His Temp was 103F. As signed off by the day team, no need for sepsis work out at this moment ( Work up has been done several teams and has been negative all the time). The patient's fever is more likely to be central. Will start using cooling blankets.

## 2020-09-13 NOTE — CHART NOTE - NSCHARTNOTEFT_GEN_A_CORE
Spoke w/ pt wife- discussed at length patient's current condition and prognosis.  Pt at baseline bedbound with paraplegia due to refractory CIDP with complications from prior disease modifying agents now w/ acute L BG stroke w/ ? seizure-like activity with persistent encephalopathy.  Pt currently with fever likely contributing to encephalopathy.  Will need to assess mental status over the next few days and if still persistently encephalopathic if toxic/metabolic causes are corrected, then will need to address long-term prognosis since pt unable to receive any further DMTs for CIDP and has poor functional baseline now with RHP and possible aphasia in addition to prior paraplegia.      Recommendations:  - d/c mirtazapine  - continue keppra 1g BID  - hold baclofen  - check for sources of fever  - frequent reorientation  - continue morphine for now

## 2020-09-13 NOTE — PROGRESS NOTE ADULT - ASSESSMENT
Ovi Nicole is a 50 year old male with PMH of CIDP, MI s/p CABG, depression, anxiety, and diabetes, who presented to the hospital with AMS and R facial droop, who was given     #R/o Meningitis- - Infectious work up including UA/blood cultures, CXR, CSF completed- no infectious agents found on LP and PCR. Antibiotics and acyclovir discontinued.    #AMS with decreased speech low tone found with acute/subacute infarct in the left basal ganglia/ periventricular white matter, NIHSS 10 on admission  CT: Evolving acute/subacute infarct in the left basal ganglia, with mild mass effect. Midline shift to the right is still evolving.  - Decreased level of consciousness, waxing and waning mental status  - functionally quadriplegic with increased UE tone  - Keppra 1g q12  - keep systolic 160-180 permissive htn  - CT Head - No hemorrhagic transformation, artifact from EEG.   - Need to f/u with Neurology regarding repeat CT to monitor midline shift. Repeat CT performed, awaiting reading.  - keep Na 140-145? today: Sodium, Serum: 137 mmol/L (09.11.20 @ 05:20)  - keep aspirin and high dose statin  - vEEG negative, no seizures detected, but RN reported grand mal seizure on 9/6.  - 40mg subQ Lovenox restarted  - LE Duplex negative  -CT head 9/11: Evolving subacute left corona radiata/basal ganglia infarct without evidence of hemorrhagic transformation. Stable midline shift to the right approximately 5 mm.  - neurology: no need for PEG now, re-evaluate in few days when mental status is stable    #febrile overnight  -central vs infectious  -prior extensive work-up including LP was negative  - fever likely central  - increasing WBC --> take UA/UCx, do CXR, and blood Cx  -will hold ATB for now, start ATB if work-up positive    #hypocalcemia and hypomagnesemia  - Magnesium, Serum: 2.0 mg/dL (09.11.20 @ 05:20)  - Potassium, Serum: 4.2 mmol/L (09.11.20 @ 05:20)  - Continue to monitor      # CIDP since 2011   -per wife had deterioration [id number] and is bedbound since then, follows DR. Mitchell currently getting IVIG every three weeks with RN administering at home. Was also started on gabapentin a few months ago per wife Pt. gets very sleepy after gabapentin will hold for now.   -c/w baclofen and pramipexole    #diabetes  - c/w home insulin regimen 30 lantus bedtime 9 lispro with meals     #HTN  - Losartan 50  - increased Coreg 12.5 q12    #Hx of CAD s/p cabg  - c/w asa, carvedilol and statin  - hold plavix    #Hx of anxiety and depression Pt. is on many meds confirmed with wife- c/w home meds- duloxetine, quetiapine, hydroxyzine, alprazolam prn  - Xanax 1 mg q6 prn  - Ativan 0.25 IV q12    #chronic pain  - Palliative care consulted- Continue Morphine 2mg IVP every 4H ATC and continue morphine 2mg IVP every 4 hours for mod (4) to severe pain (10)    #Hx of BPH   - Doxazosin 2mg qd    #Hx of chronic constipation   -c/w senna dulcolox as needed    #Hx of gerd   -on pantoprazole    #Diet: NPO with tube feeds. Need to discuss with wife replacing NG tube with PEG tube long term.  #DVT ppx- lovenox  #GI ppx on pantoprazole  #Code status: Full  #Dispo: acute

## 2020-09-13 NOTE — PROGRESS NOTE ADULT - SUBJECTIVE AND OBJECTIVE BOX
24H events:    Patient is a 50y old Male who presents with a chief complaint of ams/ facial droop (12 Sep 2020 18:48)    Primary diagnosis of Facial droop       Today is hospital day 13d. This morning patient was seen and examined at bedside, resting comfortably in bed.    Patient was febrile overnight.    PAST MEDICAL & SURGICAL HISTORY  GERD (gastroesophageal reflux disease)    BPH (benign prostatic hyperplasia)    Myocardial infarct, old    ASHD (arteriosclerotic heart disease)    Depression    Anxiety    Diabetes    CIDP (chronic inflammatory demyelinating polyneuropathy)    History of cholecystectomy    History of total left hip replacement    History of total right hip replacement  bilateral    S/P CABG x 5      SOCIAL HISTORY:  Social History:  no etoh/ smoking/ drugs (31 Aug 2020 17:40)      ALLERGIES:  penicillins (Unknown)  strawberry (Hives)    MEDICATIONS:  STANDING MEDICATIONS  aspirin  chewable 81 milliGRAM(s) Oral daily  atorvastatin 80 milliGRAM(s) Oral at bedtime  baclofen 10 milliGRAM(s) Oral two times a day  carvedilol 25 milliGRAM(s) Oral every 12 hours  chlorhexidine 4% Liquid 1 Application(s) Topical <User Schedule>  dextrose 50% Injectable 12.5 Gram(s) IV Push once  dextrose 50% Injectable 25 Gram(s) IV Push once  dextrose 50% Injectable 25 Gram(s) IV Push once  doxazosin 2 milliGRAM(s) Oral at bedtime  DULoxetine 60 milliGRAM(s) Oral two times a day  enoxaparin Injectable 40 milliGRAM(s) SubCutaneous daily  insulin glargine Injectable (LANTUS) 30 Unit(s) SubCutaneous at bedtime  insulin lispro (HumaLOG) corrective regimen sliding scale   SubCutaneous three times a day before meals  insulin lispro Injectable (HumaLOG) 9 Unit(s) SubCutaneous three times a day before meals  levETIRAcetam  Solution 1000 milliGRAM(s) Oral every 12 hours  morphine  - Injectable 2 milliGRAM(s) IV Push every 4 hours  pantoprazole   Suspension 40 milliGRAM(s) Oral daily  pramipexole 0.5 milliGRAM(s) Oral every 12 hours  senna 2 Tablet(s) Oral at bedtime    PRN MEDICATIONS  acetaminophen   Tablet .. 650 milliGRAM(s) Oral every 6 hours PRN  ALPRAZolam 1 milliGRAM(s) Oral every 6 hours PRN  bisacodyl 5 milliGRAM(s) Oral at bedtime PRN  dextrose 40% Gel 15 Gram(s) Oral once PRN  glucagon  Injectable 1 milliGRAM(s) IntraMuscular once PRN  morphine  - Injectable 2 milliGRAM(s) IV Push every 6 hours PRN    VITALS:   T(F): 99.8  HR: 107  BP: 143/77  RR: 18  SpO2: 95%    PHYSICAL EXAM:  GENERAL: NAD, well-groomed, well-developed  HEAD:  Atraumatic, Normocephalic  EYES: EOMI  NECK: Supple  NERVOUS SYSTEM:  Alert & Oriented X3, non focal   CHEST/LUNG: Clear to auscultation bilaterally; No rales, rhonchi, wheezing, or rubs  HEART: Regular rate and rhythm; No murmurs, rubs, or gallops  ABDOMEN: Soft, Nontender, Nondistended; Bowel sounds present  EXTREMITIES:  2+ Peripheral Pulses, No clubbing, cyanosis, or edema  LYMPH: No lymphadenopathy noted  SKIN: No rashes or lesions  LABS:                        15.2   12.24 )-----------( 346      ( 13 Sep 2020 06:12 )             50.0     09-13    139  |  96<L>  |  24<H>  ----------------------------<  327<H>  5.0   |  30  |  1.0    Ca    8.9      13 Sep 2020 06:12  Mg     2.0     09-13    TPro  6.9  /  Alb  4.1  /  TBili  0.8  /  DBili  x   /  AST  15  /  ALT  14  /  AlkPhos  159<H>  09-13                  RADIOLOGY:

## 2020-09-14 NOTE — CHART NOTE - NSCHARTNOTEFT_GEN_A_CORE
SW spoke with spouse via telephone.  Spouse got an update from neurology.  As per spouse, neurology wants to wait a few days to see if patient's mental status changes.  Provided support to spouse and Palliative will remain involved to provide support and further discuss goals of care. x [phone]

## 2020-09-14 NOTE — PROGRESS NOTE ADULT - ASSESSMENT
Patient is a 50 year old male with H/o chronic opioid and anxiolytic use, CAD s/p CABG, CIDP with IVIG q3 weeks, Paraplegia - has not walked since 2013 and has HHA 12/day; admitted from nursing home for AMS x 3 days, vomiting, decreased PO intake, and right sided facial droop.  Found to have Acute CVA in the left basal ganglia. Hospital course complicated with Seizures (resolved with Ativan) s/p VEEG negative for seizures. LP negative so far. On Keppra now.     # Acute Left basilar ganglia/corona radiate matter stroke:  - Repeat CT head on 9/11 shows no significant change since 9/6, no hemorrhagic changes.  --> 5mm midline shift to R.  --> Chronic R corona radiata lacunar infarct  c/w ASA, Statin  Patient non-verbal, not following verbal commands  F/u Neuro/palliative for further recommendations  Keppra 1gram q12 for seizure PPx  Echo, LE Duplex negative  -continue neuroassessments       # CIDP : since 2011   per wife had deterioration [id number] and is bedbound since then,   follows DR. Mitchell currently getting IVIG every three weeks with RN administering at home.   Was also started on gabapentin a few months ago per wife Pt. gets very sleepy after gabapentin will hold for now.   -c/w pramipexole  Per Palliative:   Continue Morphine 2mg IVP every 4H ATC with morphine 2mg IVP every 4 hours for mod (4) to severe pain (10)     Will revaluate for transition to oral form   Continue Ativan 0.25 mg IV Q12h        continue xanax 1mg via enteral tube every 6H PRN for restlessness/ agitation/ anxiety       # New onset fevers- U/A - normal, CxR- no opacities, f/up blood and urine cxs  - Recent infectious workup and LP were inconclusive, could be central fever.  - continue close observation  - tylenol prn. fevers  - D/c baclofen due to possible toxicity.   - WBC Count: 14.93 K/uL (09.14.20 @ 05:35) . Continue monitoring closely.  - Repeat CXR ordered.    # HTN:   Most SBP readings 150-170. Rare sporadic 190.   -increase Losartan to 100 mg po once daily  -continue Coreg 25 mg po twice  daily    # hypocalcemia and hypomagnesemia  - Replete PRN    #diabetes- uncontrolled  -  insulin increased  lantus from 30 to 38 units subcut. once daily at  bedtime, continue  9 units of  lispro with meals     #Hx of CAD s/p cabg  - c/w asa, carvedilol and statin  - hold plavix    #Hx of anxiety and depression Pt. is on many meds confirmed with wife- c/w home meds- duloxetine, quetiapine, hydroxyzine, alprazolam prn  - Xanax 1 mg q6 prn  - Ativan 0.25 IV q12    #chronic pain  palliative recs as above instead of old chronic home doses.    #Hx of BPH   - Doxazosin 2mg qd    #Hx of chronic constipation   -c/w senna dulcolox as needed    #Hx of gerd   -on pantoprazole    #Diet:NGT feedings, f/up family if agree for peg placement. Dr. Mitchell wishes to wait until it is clear if this is new baseline before peg placement.  daily DVT ppx- lovenox  #GI ppx on pantoprzole  #Code status: Full    overall pt's prognosis is poor.    #Progress Note Handoff: new onset fever, f/up septic work up, so far no infectious etiology ,? central fever,   Family discussion: yes Disposition: to be determined   Patient is a 50 year old male with H/o chronic opioid and anxiolytic use, CAD s/p CABG, CIDP with IVIG q3 weeks, Paraplegia - has not walked since 2013 and has HHA 12/day; admitted from nursing home for AMS x 3 days, vomiting, decreased PO intake, and right sided facial droop.  Found to have Acute CVA in the left basal ganglia. Hospital course complicated with Seizures (resolved with Ativan) s/p VEEG negative for seizures. LP negative so far. On Keppra now.     # Acute Left basilar ganglia/corona radiate matter stroke:  - Repeat CT head on 9/11 shows no significant change since 9/6, no hemorrhagic changes.  --> 5mm midline shift to R.  --> Chronic R corona radiata lacunar infarct  c/w ASA, Statin  Patient non-verbal, not following verbal commands  F/u Neuro/palliative for further recommendations  Keppra 1gram q12 for seizure PPx  Echo, LE Duplex negative  -continue neuroassessments       # CIDP : since 2011   per wife had deterioration [phone] and is bedbound since then,   follows DR. Mitchell currently getting IVIG every three weeks with RN administering at home.   Was also started on gabapentin a few months ago per wife Pt. gets very sleepy after gabapentin will hold for now.   - c/w pramipexole  - Hold baclofen due to fever  -Per Palliative:   --->Continue Morphine 2mg IVP every 4H ATC with morphine 2mg IVP every 4 hours for mod (4) to severe pain (10)     Will revaluate for transition to oral form   --->Continue Ativan 0.25 mg IV Q12h        continue xanax 1mg via enteral tube every 6H PRN for restlessness/ agitation/ anxiety       # New onset fevers- U/A - normal, CxR- no opacities, f/up blood and urine cxs  - Recent infectious workup and LP were inconclusive, could be central fever.  - continue close observation  - tylenol prn. fevers  - Hold baclofen- toxicity can cause fever.  - WBC Count: 14.93 K/uL (09.14.20 @ 05:35) . Continue monitoring closely.  - Repeat CXR performed, pending reading  - ID is on board.   - repeat blood culture ordered.    # HTN:   Most SBP readings 150-170. Rare sporadic 190.   -increase Losartan to 100 mg po once daily  -continue Coreg 25 mg po twice  daily    # hypocalcemia and hypomagnesemia  - Replete PRN    #diabetes- uncontrolled  -  insulin increased  lantus from 30 to 38 units subcut. once daily at  bedtime, continue  9 units of  lispro with meals     #Hx of CAD s/p cabg  - c/w asa, carvedilol and statin  - hold plavix    #Hx of anxiety and depression Pt. is on many meds confirmed with wife- c/w home meds- duloxetine, quetiapine, hydroxyzine, alprazolam prn  - Xanax 1 mg q6 prn  - Ativan 0.25 IV q12    #chronic pain  palliative recs as above instead of old chronic home doses.    #Hx of BPH   - Doxazosin 2mg qd    #Hx of chronic constipation   -c/w senna dulcolox as needed    #Hx of gerd   -on pantoprazole    #Diet:NGT feedings, f/up family if agree for peg placement. Dr. Mitchell wishes to wait until it is clear if this is new baseline before peg placement.  daily DVT ppx- lovenox  #GI ppx on pantoprzole  #Code status: Full    overall pt's prognosis is poor.    #Progress Note Handoff: new onset fever, f/up septic work up, so far no infectious etiology ,? central fever,   Family discussion: yes Disposition: to be determined

## 2020-09-14 NOTE — PROGRESS NOTE ADULT - SUBJECTIVE AND OBJECTIVE BOX
24H events:    Patient is a 50y old Male who presents with a chief complaint of ams/ facial droop (10 Sep 2020 18:00)    Primary diagnosis of L Basal Ganglia and periventricular WM stroke.     Today is hospital day 11d. This morning patient was seen and examined at bedside, resting comfortably in bed.  Patient has been febrile since . Patient is less responsive to stimuli today, and eyes are not following stimuli.   He is not verbalizing and is not responding to questions.    PAST MEDICAL & SURGICAL HISTORY  GERD (gastroesophageal reflux disease)    BPH (benign prostatic hyperplasia)    Myocardial infarct, old    ASHD (arteriosclerotic heart disease)    Depression    Anxiety    Diabetes    CIDP (chronic inflammatory demyelinating polyneuropathy)    History of cholecystectomy    History of total left hip replacement    History of total right hip replacement  bilateral    S/P CABG x 5      SOCIAL HISTORY:  Social History:  no etoh/ smoking/ drugs (31 Aug 2020 17:40)      ALLERGIES:  penicillins (Unknown)  strawberry (Hives)    MEDICATIONS:  STANDING MEDICATIONS  aspirin  chewable 81 milliGRAM(s) Oral daily  atorvastatin 80 milliGRAM(s) Oral at bedtime  carvedilol 25 milliGRAM(s) Oral every 12 hours  chlorhexidine 4% Liquid 1 Application(s) Topical <User Schedule>  dextrose 50% Injectable 12.5 Gram(s) IV Push once  dextrose 50% Injectable 25 Gram(s) IV Push once  dextrose 50% Injectable 25 Gram(s) IV Push once  doxazosin 2 milliGRAM(s) Oral at bedtime  DULoxetine 60 milliGRAM(s) Oral two times a day  enoxaparin Injectable 40 milliGRAM(s) SubCutaneous daily  insulin glargine Injectable (LANTUS) 38 Unit(s) SubCutaneous at bedtime  insulin lispro (HumaLOG) corrective regimen sliding scale   SubCutaneous three times a day before meals  insulin lispro Injectable (HumaLOG) 9 Unit(s) SubCutaneous three times a day before meals  levETIRAcetam  Solution 1000 milliGRAM(s) Oral every 12 hours  morphine  - Injectable 2 milliGRAM(s) IV Push every 4 hours  pantoprazole   Suspension 40 milliGRAM(s) Oral daily  pramipexole 0.5 milliGRAM(s) Oral every 12 hours  senna 2 Tablet(s) Oral at bedtime    PRN MEDICATIONS  acetaminophen   Tablet .. 650 milliGRAM(s) Oral every 4 hours PRN  ALPRAZolam 1 milliGRAM(s) Oral every 6 hours PRN  bisacodyl 5 milliGRAM(s) Oral at bedtime PRN  dextrose 40% Gel 15 Gram(s) Oral once PRN  glucagon  Injectable 1 milliGRAM(s) IntraMuscular once PRN  morphine  - Injectable 2 milliGRAM(s) IV Push every 6 hours PRN    VITALS:   T(F): 100.4  HR: 117  BP: 145/87  RR: 18  SpO2: --    PHYSICAL EXAM:  GENERAL: NAD, nonverbal, grossly immobile.  HEAD:  Atraumatic, Normocephalic  EYES: EOMI  NECK: Supple  NERVOUS SYSTEM:  Alert & Oriented x0, unclear, nonverbal. Cannot follow commands.   Functionally quadriplegic.  Upper extremity tone b/l increased, R fist clenched, L fingers partially extended.  Ptosis present, more predominant in L eye.  CHEST/LUNG: Clear to auscultation bilaterally; No rales, rhonchi, wheezing, or rubs  HEART: Regular rate and rhythm; No murmurs, rubs, or gallops  ABDOMEN: Abdomen is distended with possible tenderness. Patient seems to grimace on palpation.   EXTREMITIES:  2+ Peripheral Pulses, No clubbing, cyanosis. Lower extremity shiny with possible edema.  SKIN: Forehead and scalp are sweaty.  LYMPH: No lymphadenopathy noted    LABS:                        15.5   14.93 )-----------( 371      ( 14 Sep 2020 05:35 )             50.0         137  |  96<L>  |  32<H>  ----------------------------<  343<H>  5.1<H>   |  28  |  1.2    Ca    8.9      14 Sep 2020 05:35  Mg     2.2         TPro  6.8  /  Alb  4.0  /  TBili  0.7  /  DBili  x   /  AST  20  /  ALT  19  /  AlkPhos  167<H>        Urinalysis Basic - ( 13 Sep 2020 13:55 )    Color: Yellow / Appearance: Clear / S.030 / pH: x  Gluc: x / Ketone: Trace  / Bili: Negative / Urobili: 6 mg/dL   Blood: x / Protein: 100 mg/dL / Nitrite: Negative   Leuk Esterase: Negative / RBC: 1 /HPF / WBC 1 /HPF   Sq Epi: x / Non Sq Epi: 1 /HPF / Bacteria: Negative                RADIOLOGY:

## 2020-09-14 NOTE — OCCUPATIONAL THERAPY INITIAL EVALUATION ADULT - SPECIFY REASON(S)
Unable to complete OT IE at this time. Pt continues on active bedrest. Will follow up once orders have been received.

## 2020-09-14 NOTE — PROGRESS NOTE ADULT - SUBJECTIVE AND OBJECTIVE BOX
50yMale with diagnosis: FACIAL DROOP    interim events - no acute events overnight  S&S - NPO w/ NGT; will likely benefit from long-term alternate means of nutrition/hydration    Patient seen, lying in bed. Not responsive, no distress or dyspnea observed. No family at bedside.       PHYSICAL EXAM    constitutional: obese; appears older than age    HEENT: normocephalic, atraumatic     Eyes: momentary  tracking and not consistent    Respiratory: Easy and unlabored, no accessory muscle use; diminished at bases    Cardiovascular: HRR, S1,S2 SR    Gastrointestinal: Obese; soft, tympanic, not distended     Genitourinary: graham - clear yellow    Extremities: bilateral foot drop; + edema    Neurological: Not engaging in exam; no tremors     Skin: moist    T(C): 36.3 (09-11-20 @ 13:54)  HR: 91 (09-11-20 @ 13:54)  BP: 180/88 (09-11-20 @ 13:54)  RR: 19 (09-11-20 @ 13:54)  SpO2: 95% (09-10-20 @ 22:05)    IN: 1280 mL / OUT: 0 mL / NET: 1280 mL                       13.9   9.37  )-----------( 269      ( 11 Sep 2020 05:20 )             44.3       09-11    137  |  98  |  16  ----------------------------<  252<H>  4.2   |  29  |  0.8    Ca    9.0      11 Sep 2020 05:20  Phos  3.0     09-10  Mg     2.0     09-11    TPro  6.5  /  Alb  3.5  /  TBili  0.8  /  DBili  x   /  AST  18  /  ALT  14  /  AlkPhos  125<H>  09-11    MEDICATIONS  (STANDING):  aspirin  chewable 81 milliGRAM(s) Oral daily  atorvastatin 80 milliGRAM(s) Oral at bedtime  baclofen 10 milliGRAM(s) Oral two times a day  carvedilol 25 milliGRAM(s) Oral every 12 hours  chlorhexidine 4% Liquid 1 Application(s) Topical <User Schedule>  dextrose 50% Injectable 12.5 Gram(s) IV Push once  dextrose 50% Injectable 25 Gram(s) IV Push once  dextrose 50% Injectable 25 Gram(s) IV Push once  doxazosin 2 milliGRAM(s) Oral at bedtime  DULoxetine 60 milliGRAM(s) Oral two times a day  enoxaparin Injectable 40 milliGRAM(s) SubCutaneous daily  insulin glargine Injectable (LANTUS) 30 Unit(s) SubCutaneous at bedtime  insulin lispro (HumaLOG) corrective regimen sliding scale   SubCutaneous three times a day before meals  insulin lispro Injectable (HumaLOG) 9 Unit(s) SubCutaneous three times a day before meals  levETIRAcetam 1000 milliGRAM(s) Oral two times a day  losartan 50 milliGRAM(s) Oral daily  morphine  - Injectable 2 milliGRAM(s) IV Push every 4 hours  pantoprazole   Suspension 40 milliGRAM(s) Oral daily  pramipexole 0.5 milliGRAM(s) Oral every 12 hours  senna 2 Tablet(s) Oral at bedtime    MEDICATIONS  (PRN):  acetaminophen   Tablet .. 650 milliGRAM(s) Oral every 6 hours PRN Moderate Pain (4 - 6)  ALPRAZolam 1 milliGRAM(s) Oral every 6 hours PRN anxiety, agitation, restlessness  bisacodyl 5 milliGRAM(s) Oral at bedtime PRN Constipation  dextrose 40% Gel 15 Gram(s) Oral once PRN Blood Glucose LESS THAN 70 milliGRAM(s)/deciliter  glucagon  Injectable 1 milliGRAM(s) IntraMuscular once PRN Glucose LESS THAN 70 milligrams/deciliter  morphine  - Injectable 2 milliGRAM(s) IV Push every 6 hours PRN Severe Pain (7 - 10) 50yMale with diagnosis: FACIAL DROOP    Patient seen, lying in bed.  He had fever overnight, thought to be central. Patient was comfortable when seen at bedside. No family present.   Neurology team would like to assess patient over the next few days to see if there is any improvement. Wife is aware and agreeable with plan.      PHYSICAL EXAM    constitutional: obese; appears older than age    HEENT: normocephalic, atraumatic     Eyes: momentary  tracking and not consistent    Respiratory: Easy and unlabored, no accessory muscle use; diminished at bases    Cardiovascular: HRR, S1,S2 SR    Gastrointestinal: Obese; soft, tympanic, not distended     Genitourinary: graham - clear yellow    Extremities: bilateral foot drop; + edema    Neurological: Not engaging in exam; no tremors     Skin: moist    T(C): 37.7 (20 @ 19:27)  HR: 97 (20 @ 19:27)  BP: 115/79 (20 @ 19:27)  RR: 18 (20 @ 19:27)  SpO2: --      IN: 870 mL / OUT: 0 mL / NET: 870 mL                              15.5   14.93 )-----------( 371      ( 14 Sep 2020 05:35 )             50.0           137  |  96<L>  |  32<H>  ----------------------------<  343<H>  5.1<H>   |  28  |  1.2    Ca    8.9      14 Sep 2020 05:35  Mg     2.2         TPro  6.8  /  Alb  4.0  /  TBili  0.7  /  DBili  x   /  AST  20  /  ALT  19  /  AlkPhos  167<H>            Urinalysis Basic - ( 14 Sep 2020 18:17 )    Color: Yellow / Appearance: Clear / S.038 / pH: x  Gluc: x / Ketone: Trace  / Bili: Negative / Urobili: 3 mg/dL   Blood: x / Protein: 30 mg/dL / Nitrite: Negative   Leuk Esterase: Negative / RBC: x / WBC x   Sq Epi: x / Non Sq Epi: x / Bacteria: x          Culture - Blood (collected 13 Sep 2020 12:23)  Source: .Blood None  Preliminary Report (14 Sep 2020 18:01):    No growth to date.            MEDICATIONS  (STANDING):  artificial  tears Solution 1 Drop(s) Both EYES two times a day  aspirin  chewable 81 milliGRAM(s) Oral daily  atorvastatin 80 milliGRAM(s) Oral at bedtime  carvedilol 25 milliGRAM(s) Oral every 12 hours  chlorhexidine 4% Liquid 1 Application(s) Topical <User Schedule>  dextrose 50% Injectable 12.5 Gram(s) IV Push once  dextrose 50% Injectable 25 Gram(s) IV Push once  dextrose 50% Injectable 25 Gram(s) IV Push once  doxazosin 2 milliGRAM(s) Oral at bedtime  DULoxetine 60 milliGRAM(s) Oral two times a day  enoxaparin Injectable 40 milliGRAM(s) SubCutaneous daily  insulin glargine Injectable (LANTUS) 38 Unit(s) SubCutaneous at bedtime  insulin lispro (HumaLOG) corrective regimen sliding scale   SubCutaneous three times a day before meals  insulin lispro Injectable (HumaLOG) 9 Unit(s) SubCutaneous three times a day before meals  levETIRAcetam  Solution 1000 milliGRAM(s) Oral every 12 hours  losartan 100 milliGRAM(s) Oral daily  morphine  - Injectable 2 milliGRAM(s) IV Push every 6 hours  pantoprazole   Suspension 40 milliGRAM(s) Oral daily  pramipexole 0.5 milliGRAM(s) Oral every 12 hours  senna 2 Tablet(s) Oral at bedtime    MEDICATIONS  (PRN):  acetaminophen   Tablet .. 650 milliGRAM(s) Oral every 4 hours PRN Temp greater or equal to 38C (100.4F), Moderate Pain (4 - 6)  ALPRAZolam 1 milliGRAM(s) Oral every 6 hours PRN anxiety, agitation, restlessness  bisacodyl 5 milliGRAM(s) Oral at bedtime PRN Constipation  dextrose 40% Gel 15 Gram(s) Oral once PRN Blood Glucose LESS THAN 70 milliGRAM(s)/deciliter  glucagon  Injectable 1 milliGRAM(s) IntraMuscular once PRN Glucose LESS THAN 70 milligrams/deciliter  morphine  - Injectable 2 milliGRAM(s) IV Push every 6 hours PRN Severe Pain (7 - 10)

## 2020-09-14 NOTE — PROGRESS NOTE ADULT - ASSESSMENT
50 y.o male with PMH of MI, CABG, DM , HD and CIDP on IVIG , paraplegia presented from NH with AMS , decreased po intake and vomiting. He was found to have Acute CVA basal ganglia/ evolving infarct mild mass effect,  Seizures s/p VEEG, and fever with increasing WBC with negative LP.     MEDD : 24 mg    Recommendations   Full code  Continue current management  Continue Morphine 2mg IVP every 4H ATC with morphine 2mg IVP every 4 hours for mod (4) to severe pain (10)  Will revaluate for transition to oral form   Continue Ativan 1 mg Q6h PRN from NG tube for restlessness/ agitation/ anxiety   Continue bowel regimen   primary team and neurology to provide medical update about prognosis    Call [phone] PRN 50 y.o male with PMH of MI, CABG, DM , HD and CIDP on IVIG , paraplegia presented from NH with AMS , decreased po intake and vomiting. He was found to have Acute CVA basal ganglia/ evolving infarct mild mass effect,  Seizures s/p VEEG, and fever with negative LP. Palliative medicine was consulted for assistance with GOC. Neurology following patient, will assess and see if this is patient's new baseline.     MEDD : 24 mg PO     Recommendations   Full code  Continue current management  Recommend Morphine 2mg IVP every 4H ATC with morphine 2mg IVP every 4 hours for mod (4) to severe pain (10)  Will revaluate for transition to oral form when decision made for possible PEG placement based on prognostication   Continue Ativan 1 mg Q6h PRN from NG tube for restlessness/ agitation/ anxiety   Continue bowel regimen   primary team and neurology to provide medical update about prognosis    Call [phone] PRN   Time spent discussing ACP: 15 mins

## 2020-09-14 NOTE — PROGRESS NOTE ADULT - NSHPATTENDINGPLANDISCUSS_GEN_ALL_CORE
ICU team
TEAM
hospitalist
house staff
house staff
RN, primary team
RN, primary team
RN, family and primary team
House staff
ICu team

## 2020-09-15 NOTE — SWALLOW BEDSIDE ASSESSMENT ADULT - SWALLOW EVAL: DIAGNOSIS
+suspected pharyngeal dysphagia for puree consistency and nectar-thick liquids
+mod oral dysphagia w/ overt s/s of penetration/aspiration w/ nectar thick liquids, +mod oral dysphagia w/o overt s/s of penetration/aspiration w/ puree
pt w/ persistent poor mental status and not following commands. pt remains inappropriate for PO diet at this time and will likely benefit from long-term alternate means of nutrition/hydration.
suspected pharyngeal dysphagia for puree consistency
pt not appropriate for PO trials on this date 2' poor mental status and decreased secretion management.
pt not appropriate for PO trials on this date 2' pt not arousable, poor mental status.
pt not appropriate for PO trials on this date 2' pt unable to maintain arousal, poor mental status.
pt w/ continued poor mental status, not following commands, and persistent overts at bedside w/ PO. pt remains inappropriate for PO diet at this time and will likely benefit from long-term alternate means of nutrition/hydration.

## 2020-09-15 NOTE — SWALLOW BEDSIDE ASSESSMENT ADULT - SLP GENERAL OBSERVATIONS
pt received in bed awake alert in no apparent pain. +2L NC +no verbal output
pt received in bed awake w/ decreased ability maintaining awakeness/arousal; pt in no apparent pain; +room air +NGT in place
Pt received in bed, asleep, spouse at b/s, Pt on O 2NC, Head preference R
pt received in bed awake in no apparent pain. +leaning to R-side. +room air. +NGT in place. +pt continues to be non-verbal, not following commands
Pt received asleep, unable to be full awoken despite max verbal/tactile stim. +room air.
Pt received asleep, unable to be full awoken despite max verbal/tactile stim. +room air. +VEEG in progress
Pt received awake, low vocal intensity, garbled speech, oriented to first name only. retaining oral secretions, requiring oral suctioning. +VEEG in progress
pt received in bed awake in no apparent pain. +nonsensical speech w/ decreased intelligibility +NGT in place +room air

## 2020-09-15 NOTE — SWALLOW BEDSIDE ASSESSMENT ADULT - DIET PRIOR TO ADMI
regular w/ thins per earlier ST note
regular w/ thins per earlier ST note
regular solids w/ thin liquids
regular w/ thins per earlier ST note

## 2020-09-15 NOTE — PROGRESS NOTE ADULT - PROVIDER SPECIALTY LIST ADULT
CCU
Critical Care
Hospitalist
Infectious Disease
Internal Medicine
Neurology
Nutrition Support
Nutrition Support
Palliative Care
Neurology
Infectious Disease

## 2020-09-15 NOTE — PROGRESS NOTE ADULT - REASON FOR ADMISSION
ams/ facial droop

## 2020-09-15 NOTE — PROGRESS NOTE ADULT - ASSESSMENT
Patient is a 50 year old male with H/o chronic opioid and anxiolytic use, CAD s/p CABG, CIDP with IVIG q3 weeks, Paraplegia - has not walked since 2013 and has HHA 12/day; admitted from nursing home for AMS x 3 days, vomiting, decreased PO intake, and right sided facial droop.  Found to have Acute CVA in the left basal ganglia. Hospital course complicated with Seizures (resolved with Ativan) s/p VEEG negative for seizures. LP negative so far. On Keppra now.     # Acute Left basilar ganglia/corona radiate matter stroke:  - Repeat CT head on 9/11 shows no significant change since 9/6,   --> L basal ganglia and corona radiata stroke without hemorrhagic change.  --> 5mm midline shift to R.  --> Chronic R corona radiata lacunar infarct  c/w ASA, Statin  Patient non-verbal, not following verbal commands  F/u Neuro/palliative for further recommendations  Keppra 1gram q12 for seizure PPx  Echo, LE Duplex negative  -continue neuroassessments       # CIDP : since 2011   per wife had deterioration [id number] and is bedbound since then,   follows DR. Mitchell currently getting IVIG every three weeks with RN administering at home.   Was also started on gabapentin a few months ago per wife Pt. gets very sleepy after gabapentin will hold for now.   - c/w pramipexole  - Hold baclofen due to fever  -Per Palliative:   --->Continue Morphine 2mg IVP every 4H ATC with morphine 2mg IVP every 4 hours for mod (4) to severe pain (10)     Will revaluate for transition to oral form   --->Continue Ativan 0.25 mg IV Q12h        continue xanax 1mg via enteral tube every 6H PRN for restlessness/ agitation/ anxiety       # New onset fevers- U/A - normal, CxR- no opacities, f/up blood and urine cxs  - Recent infectious workup and LP were inconclusive, could be central fever.  - continue close observation  - tylenol prn. fevers  - Hold baclofen- toxicity can cause fever.  - WBC Count: 14.93 K/uL (09.14.20 @ 05:35) . Continue monitoring closely.  - Repeat CXR performed, pending reading  - ID is on board.   - repeat blood culture ordered.    # HTN:   Most SBP readings 150-170. Rare sporadic 190.   -increase Losartan to 100 mg po once daily  -continue Coreg 25 mg po twice  daily    # hypocalcemia and hypomagnesemia  - Replete PRN    #diabetes- uncontrolled  -  insulin increased  lantus from 30 to 38 units subcut. once daily at  bedtime, continue  9 units of  lispro with meals     #Hx of CAD s/p cabg  - c/w asa, carvedilol and statin  - hold plavix    #Hx of anxiety and depression Pt. is on many meds confirmed with wife- c/w home meds- duloxetine, quetiapine, hydroxyzine, alprazolam prn  - Xanax 1 mg q6 prn  - Ativan 0.25 IV q12    #chronic pain  palliative recs as above instead of old chronic home doses.    #Hx of BPH   - Doxazosin 2mg qd    #Hx of chronic constipation   -c/w senna dulcolox as needed    #Hx of gerd   -on pantoprazole    #Diet:NGT feedings, f/up family if agree for peg placement. Dr. Mitchell wishes to wait until it is clear if this is new baseline before peg placement.  daily DVT ppx- lovenox  #GI ppx on pantoprzole  #Code status: Full    overall pt's prognosis is poor.    #Progress Note Handoff: new onset fever, f/up septic work up, so far no infectious etiology ,? central fever,   Family discussion: yes Disposition: to be determined   Patient is a 50 year old male with H/o chronic opioid and anxiolytic use, CAD s/p CABG, CIDP with IVIG q3 weeks, Paraplegia - has not walked since 2013 and has HHA 12/day; admitted from nursing home for AMS x 3 days, vomiting, decreased PO intake, and right sided facial droop.  Found to have Acute CVA in the left basal ganglia. Hospital course complicated with Seizures (resolved with Ativan) s/p VEEG negative for seizures. LP negative so far. On Keppra now.     # Acute Left basilar ganglia/corona radiate matter stroke:  - Repeat CT head on 9/11 shows no significant change since 9/6,   --> L basal ganglia and corona radiata stroke without hemorrhagic change.  --> 5mm midline shift to R.  --> Chronic R corona radiata lacunar infarct  c/w ASA, Statin  Patient non-verbal, not following verbal commands  F/u Neuro/palliative for further recommendations  Keppra 1gram q12 for seizure PPx  Echo, LE Duplex negative  -continue neuroassessments       # CIDP : since 2011   per wife had deterioration [phone] and is bedbound since then,   follows DR. Mitchell currently getting IVIG every three weeks with RN administering at home.   Was also started on gabapentin a few months ago per wife Pt. gets very sleepy after gabapentin will hold for now.   - c/w pramipexole  - Hold baclofen due to fever  -Per Palliative:   --->Continue Morphine 2mg IVP every 4H ATC with morphine 2mg IVP every 4 hours for mod (4) to severe pain (10)     Will revaluate for transition to oral form   --->Continue Ativan 0.25 mg IV Q12h        continue xanax 1mg via enteral tube every 6H PRN for restlessness/ agitation/ anxiety       # New onset fevers- U/A - normal, CxR- no opacities, f/up blood and urine cxs  - Recent infectious workup and LP were inconclusive, could be central fever.  - continue close observation  - tylenol prn. fevers  - Hold baclofen- toxicity can cause fever.  - WBC Count: 15.24 K/uL (09.15.20 @ 07:00)  . Continue monitoring closely.  - Repeat CXR shows stable appearance, no acute cardiopulmonary disease.  - ID is on board.   - repeat blood culture ordered. Preliminary result: no growth  - Ordered LE Duplex to screen for DVT, could be causing fevers.    # HTN:   Most SBP readings 150-170. Rare sporadic 190.   -increase Losartan to 100 mg po once daily  -continue Coreg 25 mg po twice  daily    # hypocalcemia and hypomagnesemia  - Replete PRN    #diabetes- uncontrolled  -  insulin increased  lantus from 30 to 38 units subcut. once daily at  bedtime, continue  9 units of  lispro with meals     #Hx of CAD s/p cabg  - c/w asa, carvedilol and statin  - hold plavix    #Hx of anxiety and depression Pt. is on many meds confirmed with wife- c/w home meds- duloxetine, quetiapine, hydroxyzine, alprazolam prn  - Xanax 1 mg q6 prn  - Ativan 0.25 IV q12    #chronic pain  palliative recs as above instead of old chronic home doses.    #Hx of BPH   - Doxazosin 2mg qd    #Hx of chronic constipation   -c/w senna dulcolox as needed    #Hx of gerd   -on pantoprazole    #Diet:NGT feedings,   - cont Glucerna 1.2 360 ml x 4 feeds/d and add Prosource TF 2 per day = 107 gm protein and 1790 k/d  - f/up family if agree for peg placement. Dr. Mitchell wishes to wait until it is clear if this is new baseline before peg placement.  daily DVT ppx- lovenox  #GI ppx on pantoprzole  #Code status: Full    overall pt's prognosis is poor.    #Progress Note Handoff: new onset fever, f/up septic work up, so far no infectious etiology ,? central fever,   Family discussion: yes Disposition: to be determined   Patient is a 50 year old male with H/o chronic opioid and anxiolytic use, CAD s/p CABG, CIDP with IVIG q3 weeks, Paraplegia - has not walked since 2013 and has HHA 12/day; admitted from nursing home for AMS x 3 days, vomiting, decreased PO intake, and right sided facial droop.  Found to have Acute CVA in the left basal ganglia. Hospital course complicated with Seizures (resolved with Ativan) s/p VEEG negative for seizures. LP negative so far. On Keppra now.     # Acute Left basilar ganglia/corona radiate matter stroke:  - Repeat CT head on 9/11 shows no significant change since 9/6,   --> L basal ganglia and corona radiata stroke without hemorrhagic change.  --> 5mm midline shift to R.  --> Chronic R corona radiata lacunar infarct  c/w ASA, Statin  Patient non-verbal, not following verbal commands  F/u Neuro/palliative for further recommendations  Keppra 1gram q12 for seizure PPx  Echo, LE Duplex negative  -continue neuroassessments     # New onset fevers- U/A - normal, CxR- no opacities, f/up blood and urine cxs  - Recent infectious workup and LP were inconclusive, could be central fever.  - continue close observation  - tylenol prn. fevers  - Hold baclofen- toxicity can cause fever.  - WBC Count: 15.24 K/uL (09.15.20 @ 07:00)  . Continue monitoring closely.  - Repeat CXR shows stable appearance, no acute cardiopulmonary disease.  - ID is on board.   - repeat blood culture ordered. Preliminary result: no growth.  - Ordered LE Duplex to screen for DVT, could be causing fevers.    # CIDP : since 2011   per wife had deterioration [phone] and is bedbound since then,   follows DR. Mitchell currently getting IVIG every three weeks with RN administering at home.   Was also started on gabapentin a few months ago per wife Pt. gets very sleepy after gabapentin will hold for now.   - c/w pramipexole  - Hold baclofen due to fever  -Per Palliative:   --->Continue Morphine 2mg IVP every 4H ATC with morphine 2mg IVP every 4 hours for mod (4) to severe pain (10)     Will revaluate for transition to oral form   --->Continue Ativan 0.25 mg IV Q12h        continue xanax 1mg via enteral tube every 6H PRN for restlessness/ agitation/ anxiety     # HTN:   Most SBP readings 150-170. Rare sporadic 190.   -increase Losartan to 100 mg po once daily  -continue Coreg 25 mg po twice  daily    # hypocalcemia and hypomagnesemia  - Replete PRN    #Contracted R hand  - Pt's wife wanted OT to look at his R hand, which is contracted, and provide a device for him to hold in that hand. Will consult OT.    #diabetes- uncontrolled  -  insulin increased  lantus from 30 to 38 units subcut. once daily at  bedtime, continue  9 units of  lispro with meals     #Hx of CAD s/p cabg  - c/w asa, carvedilol and statin  - hold plavix    #Hx of anxiety and depression Pt. is on many meds confirmed with wife- c/w home meds- duloxetine, quetiapine, hydroxyzine, alprazolam prn  - Xanax 1 mg q6 prn  - Ativan 0.25 IV q12    #chronic pain  palliative recs as above instead of old chronic home doses.    #Hx of BPH   - Doxazosin 2mg qd    #Hx of chronic constipation   -c/w senna dulcolox as needed    #Hx of gerd   -on pantoprazole    #Diet:NGT feedings,   - cont Glucerna 1.2 360 ml x 4 feeds/d and add Prosource TF 2 per day = 107 gm protein and 1790 k/d  - f/up family if agree for peg placement. Dr. Mitchell wishes to wait until it is clear if this is new baseline before peg placement.  daily DVT ppx- lovenox  #GI ppx on pantoprzole  #Code status: Full    overall pt's prognosis is poor.    Family discussion: yes Disposition: to be determined   Patient is a 50 year old male with H/o chronic opioid and anxiolytic use, CAD s/p CABG, CIDP with IVIG q3 weeks, Paraplegia - has not walked since 2013 and has HHA 12/day; admitted from nursing home for AMS x 3 days, vomiting, decreased PO intake, and right sided facial droop.  Found to have Acute CVA in the left basal ganglia. Hospital course complicated with Seizures (resolved with Ativan) s/p VEEG negative for seizures. LP negative so far. On Keppra now.     # Acute Left basilar ganglia/corona radiata stroke:  - Repeat CT head on 9/11 shows no significant change since 9/6,   --> L basal ganglia and corona radiata stroke without hemorrhagic change.  --> 5mm midline shift to R.  --> Chronic R corona radiata lacunar infarct  c/w ASA, Statin  Patient non-verbal, not following verbal commands  F/u Neuro/palliative for further recommendations  Keppra 1gram q12 for seizure PPx  Echo, LE Duplex negative  -continue neuroassessments     # fever - likely central - U/A - normal, CxR- no opacities, f/up blood and urine cxs  - Recent infectious workup and LP were inconclusive, could be central fever.  - continue close observation  - tylenol prn.  - Hold baclofen- toxicity can cause fever.  - WBC Count: 15.24 K/uL (09.15.20 @ 07:00)  . Continue monitoring closely.  - Repeat CXR shows stable appearance, no acute cardiopulmonary disease.  - ID is on board --> no need for antibiotics if -ve cultures as per Dr Diamond  - repeat blood culture ordered. Preliminary result: no growth.  - Ordered LE Duplex to screen for DVT, could be causing fevers.    # CIDP : since 2011   per wife had deterioration [phone] and is bedbound since then,   follows DR. Mitchell currently getting IVIG every three weeks with RN administering at home.   Was also started on gabapentin a few months ago per wife Pt. gets very sleepy after gabapentin will hold for now.   - c/w pramipexole  - Hold baclofen due to fever  -Per Palliative:   --->Continue Morphine 2mg IVP every 4H ATC with morphine 2mg IVP every 4 hours for mod (4) to severe pain (10)     Will revaluate for transition to oral form   --->Continue Ativan 0.25 mg IV Q12h        continue xanax 1mg via enteral tube every 6H PRN for restlessness/ agitation/ anxiety     # HTN:   Most SBP readings 150-170. Rare sporadic 190.   -Losartan to 100 mg po once daily  -Coreg 25 mg po twice  daily    # hypocalcemia  - 8.2 today  - 2g IV MgSO4    # hypomagnesemia - resolved  - trend     #Contracted R hand  - Pt's wife wanted OT to look at his R hand, which is contracted, and provide a device for him to hold in that hand. Will consult OT.    #diabetes- uncontrolled  -  insulin increased  lantus from 30 to 38 units subcut. once daily at  bedtime, continue  9 units of  lispro with meals     #Hx of CAD s/p cabg  - c/w asa, carvedilol and statin  - hold plavix    #Hx of anxiety and depression Pt. is on many meds confirmed with wife- c/w home meds- duloxetine, quetiapine, hydroxyzine, alprazolam prn  - Xanax 1 mg q6 prn  - Ativan 0.25 IV q12    #chronic pain  palliative recs as above instead of old chronic home doses.    #Hx of BPH   - Doxazosin 2mg qd    #Hx of chronic constipation   -c/w senna dulcolox as needed    #Hx of gerd   -on pantoprazole    #Diet:NGT feedings,   - cont Glucerna 1.2 360 ml x 4 feeds/d and add Prosource TF 2 per day = 107 gm protein and 1790 k/d  - f/up family if agree for peg placement. Dr. Mitchell wishes to wait until it is clear if this is new baseline before peg placement.  daily DVT ppx- lovenox  #GI ppx on pantoprzole  #Code status: Full    overall pt's prognosis is poor.    Family discussion: yes Disposition: to be determined   Patient is a 50 year old male with H/o chronic opioid and anxiolytic use, CAD s/p CABG, CIDP with IVIG q3 weeks, Paraplegia - has not walked since 2013 and has HHA 12/day; admitted from nursing home for AMS x 3 days, vomiting, decreased PO intake, and right sided facial droop.  Found to have Acute CVA in the left basal ganglia. Hospital course complicated with Seizures (resolved with Ativan) s/p VEEG negative for seizures. LP negative so far. On Keppra now.     # Acute Left basilar ganglia/corona radiata stroke:  - Repeat CT head on 9/11 shows no significant change since 9/6,   --> L basal ganglia and corona radiata stroke without hemorrhagic change.  --> 5mm midline shift to R.  --> Chronic R corona radiata lacunar infarct  c/w ASA, Statin  Patient non-verbal, not following verbal commands  F/u Neuro/palliative for further recommendations  Keppra 1gram q12 for seizure PPx  Echo, LE Duplex negative  -continue neuroassessments     # fever - likely central - U/A - normal, CxR- no opacities, f/up blood and urine cxs  - Recent infectious workup and LP were inconclusive, could be central fever.  - continue close observation  - tylenol prn.  - Hold baclofen- toxicity can cause fever.  - WBC Count: 15.24 K/uL (09.15.20 @ 07:00)  . Continue monitoring closely.  - Repeat CXR shows stable appearance, no acute cardiopulmonary disease.  - ID is on board --> no need for antibiotics if -ve cultures as per Dr Diamond  - repeat blood culture ordered. Preliminary result: no growth.  - Ordered LE Duplex to screen for DVT, could be causing fevers.    # CIDP : since 2011   per wife had deterioration [phone] and is bedbound since then,   follows DR. Mitchell currently getting IVIG every three weeks with RN administering at home.   Was also started on gabapentin a few months ago per wife Pt. gets very sleepy after gabapentin will hold for now.   - c/w pramipexole  - Hold baclofen due to fever  -Per Palliative:   --->Continue Morphine 2mg IVP every 4H ATC with morphine 2mg IVP every 4 hours for mod (4) to severe pain (10)     Will revaluate for transition to oral form   --->Continue Ativan 0.25 mg IV Q12h        continue xanax 1mg via enteral tube every 6H PRN for restlessness/ agitation/ anxiety     # HTN:   Most SBP readings 150-170. Rare sporadic 190.   -Losartan to 100 mg po once daily  -Coreg 25 mg po twice  daily    # hypocalcemia  - 8.2 today  - 2g calcium carbonate IV repletion  - trend    # hypomagnesemia - resolved  - trend     #Contracted R hand  - Pt's wife wanted OT to look at his R hand, which is contracted, and provide a device for him to hold in that hand. Will consult OT.    #diabetes- uncontrolled  -  insulin increased  lantus from 30 to 38 units subcut. once daily at  bedtime, continue  9 units of  lispro with meals     #Hx of CAD s/p cabg  - c/w asa, carvedilol and statin  - hold plavix    #Hx of anxiety and depression Pt. is on many meds confirmed with wife- c/w home meds- duloxetine, quetiapine, hydroxyzine, alprazolam prn  - Xanax 1 mg q6 prn  - Ativan 0.25 IV q12    #chronic pain  palliative recs as above instead of old chronic home doses.    #Hx of BPH   - Doxazosin 2mg qd    #Hx of chronic constipation   -c/w senna dulcolox as needed    #Hx of gerd   -on pantoprazole    #Diet:NGT feedings,   - cont Glucerna 1.2 360 ml x 4 feeds/d and add Prosource TF 2 per day = 107 gm protein and 1790 k/d  - f/up family if agree for peg placement. Dr. Mitchell wishes to wait until it is clear if this is new baseline before peg placement.  daily DVT ppx- lovenox  #GI ppx on pantoprzole  #Code status: Full    overall pt's prognosis is poor.    Family discussion: yes Disposition: to be determined   Patient is a 50 year old male with H/o chronic opioid and anxiolytic use, CAD s/p CABG, CIDP with IVIG q3 weeks, Paraplegia - has not walked since 2013 and has HHA 12/day; admitted from nursing home for AMS x 3 days, vomiting, decreased PO intake, and right sided facial droop.  Found to have Acute CVA in the left basal ganglia. Hospital course complicated with Seizures (resolved with Ativan) s/p VEEG negative for seizures. LP negative so far. On Keppra now.     # Acute Left basilar ganglia/corona radiata stroke:  - Repeat CT head on 9/11 shows no significant change since 9/6,   --> L basal ganglia and corona radiata stroke without hemorrhagic change.  --> 5mm midline shift to R.  --> Chronic R corona radiata lacunar infarct  c/w ASA, Statin  Patient non-verbal, not following verbal commands  F/u Neuro/palliative for further recommendations  Keppra 1gram q12 for seizure PPx  Echo, LE Duplex negative  -continue neuroassessments     # fever - likely central - U/A - normal, CxR- no opacities, f/up blood and urine cxs  - Recent infectious workup and LP were inconclusive, could be central fever.  - continue close observation  - tylenol prn.  - Hold baclofen- toxicity can cause fever.  - WBC Count: 15.24 K/uL (09.15.20 @ 07:00)  . Continue monitoring closely.  - Repeat CXR shows stable appearance, no acute cardiopulmonary disease.  - ID is on board --> no need for antibiotics if -ve cultures as per Dr Diamond  - repeat blood culture ordered. Preliminary result: no growth.  - Ordered LE Duplex to screen for DVT, could be causing fevers.    # CIDP : since 2011   per wife had deterioration [phone] and is bedbound since then,   follows DR. Mitchell currently getting IVIG every three weeks with RN administering at home.   Was also started on gabapentin a few months ago per wife Pt. gets very sleepy after gabapentin will hold for now.   - c/w pramipexole  - Hold baclofen due to fever  -Per Palliative:   --->Continue Morphine 2mg IVP every 4H ATC with morphine 2mg IVP every 4 hours for mod (4) to severe pain (10)     Will revaluate for transition to oral form   --->Continue Ativan 0.25 mg IV Q12h        continue xanax 1mg via enteral tube every 6H PRN for restlessness/ agitation/ anxiety     # HTN:   Most SBP readings 150-170. Rare sporadic 190.   -Losartan to 100 mg po once daily  -Coreg 25 mg po twice  daily    # hypocalcemia  - 8.2 today  - 2g calcium gluconate IV repletion  - trend    # hypomagnesemia - resolved  - trend     #Contracted R hand  - Pt's wife wanted OT to look at his R hand, which is contracted, and provide a device for him to hold in that hand. Will consult OT.    #diabetes- uncontrolled  -  insulin increased  lantus from 30 to 38 units subcut. once daily at  bedtime, continue  9 units of  lispro with meals     #Hx of CAD s/p cabg  - c/w asa, carvedilol and statin  - hold plavix    #Hx of anxiety and depression Pt. is on many meds confirmed with wife- c/w home meds- duloxetine, quetiapine, hydroxyzine, alprazolam prn  - Xanax 1 mg q6 prn  - Ativan 0.25 IV q12    #chronic pain  palliative recs as above instead of old chronic home doses.    #Hx of BPH   - Doxazosin 2mg qd    #Hx of chronic constipation   -c/w senna dulcolox as needed    #Hx of gerd   -on pantoprazole    #Diet:NGT feedings,   - cont Glucerna 1.2 360 ml x 4 feeds/d and add Prosource TF 2 per day = 107 gm protein and 1790 k/d  - f/up family if agree for peg placement. Dr. Mitchell wishes to wait until it is clear if this is new baseline before peg placement.  daily DVT ppx- lovenox  #GI ppx on pantoprzole  #Code status: Full    overall pt's prognosis is poor.    Family discussion: yes Disposition: to be determined

## 2020-09-15 NOTE — SWALLOW BEDSIDE ASSESSMENT ADULT - SWALLOW EVAL: RECOMMENDED DIET
NPO w/ alt means of nutrition/hydration
NPO w/ NGT; will likely benefit from long-term alternate means of nutrition/hydration
NPO X meds crushed in applesauce
NPO w/ alt means of nutrition/hydration
NPO w/ NGT; will likely benefit from long-term alternate means of nutrition/hydration
NPO w/ alternate means of nutrition/hydration

## 2020-09-15 NOTE — PROGRESS NOTE ADULT - SUBJECTIVE AND OBJECTIVE BOX
24H events:    Today is hospital day 11. This morning patient was seen and examined at bedside, resting comfortably in bed.  Patient has been febrile since . Patient is less responsive to stimuli today, and eyes are not following stimuli.   He is not verbalizing and is not responding to questions.    PAST MEDICAL & SURGICAL HISTORY  GERD (gastroesophageal reflux disease)    BPH (benign prostatic hyperplasia)    Myocardial infarct, old    ASHD (arteriosclerotic heart disease)    Depression    Anxiety    Diabetes    CIDP (chronic inflammatory demyelinating polyneuropathy)    History of cholecystectomy    History of total left hip replacement    History of total right hip replacement  bilateral    S/P CABG x 5      SOCIAL HISTORY:  Social History:  no etoh/ smoking/ drugs (31 Aug 2020 17:40)      ALLERGIES:  penicillins (Unknown)  strawberry (Hives)    MEDICATIONS:  STANDING MEDICATIONS  artificial  tears Solution 1 Drop(s) Both EYES two times a day  aspirin  chewable 81 milliGRAM(s) Oral daily  atorvastatin 80 milliGRAM(s) Oral at bedtime  carvedilol 25 milliGRAM(s) Oral every 12 hours  chlorhexidine 4% Liquid 1 Application(s) Topical <User Schedule>  dextrose 50% Injectable 12.5 Gram(s) IV Push once  dextrose 50% Injectable 25 Gram(s) IV Push once  dextrose 50% Injectable 25 Gram(s) IV Push once  doxazosin 2 milliGRAM(s) Oral at bedtime  DULoxetine 60 milliGRAM(s) Oral two times a day  enoxaparin Injectable 40 milliGRAM(s) SubCutaneous daily  insulin glargine Injectable (LANTUS) 38 Unit(s) SubCutaneous at bedtime  insulin lispro (HumaLOG) corrective regimen sliding scale   SubCutaneous three times a day before meals  insulin lispro Injectable (HumaLOG) 9 Unit(s) SubCutaneous three times a day before meals  levETIRAcetam  Solution 1000 milliGRAM(s) Oral every 12 hours  losartan 100 milliGRAM(s) Oral daily  morphine  - Injectable 2 milliGRAM(s) IV Push every 6 hours  pantoprazole   Suspension 40 milliGRAM(s) Oral daily  pramipexole 0.5 milliGRAM(s) Oral every 12 hours  senna 2 Tablet(s) Oral at bedtime    PRN MEDICATIONS  acetaminophen   Tablet .. 650 milliGRAM(s) Oral every 4 hours PRN  ALPRAZolam 1 milliGRAM(s) Oral every 6 hours PRN  bisacodyl 5 milliGRAM(s) Oral at bedtime PRN  dextrose 40% Gel 15 Gram(s) Oral once PRN  glucagon  Injectable 1 milliGRAM(s) IntraMuscular once PRN  morphine  - Injectable 2 milliGRAM(s) IV Push every 6 hours PRN    VITALS:   T(F): 101.9  HR: 104  BP: 136/85  RR: 20  SpO2: 94%    PHYSICAL EXAM:  GENERAL: NAD, nonverbal, grossly immobile.  HEAD:  Atraumatic, Normocephalic  EYES: EOMI  NECK: Supple  NERVOUS SYSTEM:  Alert & Oriented x0, unclear, nonverbal. Cannot follow commands.   Functionally quadriplegic.  Upper extremity tone b/l increased, R fist clenched, L fingers partially extended.  Some ptosis present bilaterally  CHEST/LUNG: Clear to auscultation bilaterally; No rales, rhonchi, wheezing, or rubs  HEART: Regular rate and rhythm; No murmurs, rubs, or gallops  ABDOMEN: Possible tenderness on palpation, elicits a possible grimace from patient.  EXTREMITIES:  2+ Peripheral Pulses, No clubbing, cyanosis.  LYMPH: No lymphadenopathy noted    LABS:                        15.2   15.24 )-----------( 301      ( 15 Sep 2020 07:00 )             49.4     09-15    136  |  98  |  33<H>  ----------------------------<  386<H>  5.0   |  28  |  1.2    Ca    8.2<L>      15 Sep 2020 07:00  Mg     2.2     09-15    TPro  6.0  /  Alb  3.4<L>  /  TBili  0.7  /  DBili  x   /  AST  81<H>  /  ALT  134<H>  /  AlkPhos  172<H>  09-15      Urinalysis Basic - ( 14 Sep 2020 18:17 )    Color: Yellow / Appearance: Clear / S.038 / pH: x  Gluc: x / Ketone: Trace  / Bili: Negative / Urobili: 3 mg/dL   Blood: x / Protein: 30 mg/dL / Nitrite: Negative   Leuk Esterase: Negative / RBC: 3 /HPF / WBC 2 /HPF   Sq Epi: x / Non Sq Epi: 2 /HPF / Bacteria: 0.0            Culture - Urine (collected 13 Sep 2020 13:55)  Source: .Urine Clean Catch (Midstream)  Final Report (15 Sep 2020 09:09):    >=3 organisms. Probable collection contamination.    Culture - Blood (collected 13 Sep 2020 12:23)  Source: .Blood None  Preliminary Report (14 Sep 2020 18:01):    No growth to date.          RADIOLOGY:           24H events:    Today is hospital day 11. This morning patient was seen and examined at bedside, resting in bed.  Patient has been febrile since . Patient is not responsive to verbal or visual stimuli.  He is not verbalizing and is not responding to questions.    PAST MEDICAL & SURGICAL HISTORY  GERD (gastroesophageal reflux disease)    BPH (benign prostatic hyperplasia)    Myocardial infarct, old    ASHD (arteriosclerotic heart disease)    Depression    Anxiety    Diabetes    CIDP (chronic inflammatory demyelinating polyneuropathy)    History of cholecystectomy    History of total left hip replacement    History of total right hip replacement  bilateral    S/P CABG x 5      SOCIAL HISTORY:  Social History:  no etoh/ smoking/ drugs (31 Aug 2020 17:40)      ALLERGIES:  penicillins (Unknown)  strawberry (Hives)    MEDICATIONS:  STANDING MEDICATIONS  artificial  tears Solution 1 Drop(s) Both EYES two times a day  aspirin  chewable 81 milliGRAM(s) Oral daily  atorvastatin 80 milliGRAM(s) Oral at bedtime  carvedilol 25 milliGRAM(s) Oral every 12 hours  chlorhexidine 4% Liquid 1 Application(s) Topical <User Schedule>  dextrose 50% Injectable 12.5 Gram(s) IV Push once  dextrose 50% Injectable 25 Gram(s) IV Push once  dextrose 50% Injectable 25 Gram(s) IV Push once  doxazosin 2 milliGRAM(s) Oral at bedtime  DULoxetine 60 milliGRAM(s) Oral two times a day  enoxaparin Injectable 40 milliGRAM(s) SubCutaneous daily  insulin glargine Injectable (LANTUS) 38 Unit(s) SubCutaneous at bedtime  insulin lispro (HumaLOG) corrective regimen sliding scale   SubCutaneous three times a day before meals  insulin lispro Injectable (HumaLOG) 9 Unit(s) SubCutaneous three times a day before meals  levETIRAcetam  Solution 1000 milliGRAM(s) Oral every 12 hours  losartan 100 milliGRAM(s) Oral daily  morphine  - Injectable 2 milliGRAM(s) IV Push every 6 hours  pantoprazole   Suspension 40 milliGRAM(s) Oral daily  pramipexole 0.5 milliGRAM(s) Oral every 12 hours  senna 2 Tablet(s) Oral at bedtime    PRN MEDICATIONS  acetaminophen   Tablet .. 650 milliGRAM(s) Oral every 4 hours PRN  ALPRAZolam 1 milliGRAM(s) Oral every 6 hours PRN  bisacodyl 5 milliGRAM(s) Oral at bedtime PRN  dextrose 40% Gel 15 Gram(s) Oral once PRN  glucagon  Injectable 1 milliGRAM(s) IntraMuscular once PRN  morphine  - Injectable 2 milliGRAM(s) IV Push every 6 hours PRN    VITALS:   T(F): 101.9  HR: 104  BP: 136/85  RR: 20  SpO2: 94%    PHYSICAL EXAM:  GENERAL: NAD, nonverbal, grossly immobile.  HEAD:  Atraumatic, Normocephalic  EYES: EOMI  NECK: Supple  NERVOUS SYSTEM:  Alert & Oriented x0, unclear, nonverbal. Cannot follow commands.   Functionally quadriplegic.  Upper extremity tone b/l increased, R fist clenched, L fingers partially extended.  Some ptosis present bilaterally  CHEST/LUNG: Clear to auscultation bilaterally; No rales, rhonchi, wheezing, or rubs  HEART: Regular rate and rhythm; No murmurs, rubs, or gallops  ABDOMEN: Possible tenderness on palpation, elicits a possible grimace from patient.  EXTREMITIES:  2+ Peripheral Pulses, No clubbing, cyanosis.  LYMPH: No lymphadenopathy noted    LABS:                        15.2   15.24 )-----------( 301      ( 15 Sep 2020 07:00 )             49.4     09-15    136  |  98  |  33<H>  ----------------------------<  386<H>  5.0   |  28  |  1.2    Ca    8.2<L>      15 Sep 2020 07:00  Mg     2.2     09-15    TPro  6.0  /  Alb  3.4<L>  /  TBili  0.7  /  DBili  x   /  AST  81<H>  /  ALT  134<H>  /  AlkPhos  172<H>  09-15      Urinalysis Basic - ( 14 Sep 2020 18:17 )    Color: Yellow / Appearance: Clear / S.038 / pH: x  Gluc: x / Ketone: Trace  / Bili: Negative / Urobili: 3 mg/dL   Blood: x / Protein: 30 mg/dL / Nitrite: Negative   Leuk Esterase: Negative / RBC: 3 /HPF / WBC 2 /HPF   Sq Epi: x / Non Sq Epi: 2 /HPF / Bacteria: 0.0            Culture - Urine (collected 13 Sep 2020 13:55)  Source: .Urine Clean Catch (Midstream)  Final Report (15 Sep 2020 09:09):    >=3 organisms. Probable collection contamination.    Culture - Blood (collected 13 Sep 2020 12:23)  Source: .Blood None  Preliminary Report (14 Sep 2020 18:01):    No growth to date.          RADIOLOGY:           24H events:    Today is hospital day 11. This morning patient was seen and examined at bedside, resting in bed.  Patient has been febrile since . Patient is not responsive to verbal or visual stimuli.  He is not verbalizing and is not responding to questions.    PAST MEDICAL & SURGICAL HISTORY  GERD (gastroesophageal reflux disease)    BPH (benign prostatic hyperplasia)    Myocardial infarct, old    ASHD (arteriosclerotic heart disease)    Depression    Anxiety    Diabetes    CIDP (chronic inflammatory demyelinating polyneuropathy)    History of cholecystectomy    History of total left hip replacement    History of total right hip replacement  bilateral    S/P CABG x 5      SOCIAL HISTORY:  Social History:  no etoh/ smoking/ drugs (31 Aug 2020 17:40)      ALLERGIES:  penicillins (Unknown)  strawberry (Hives)    MEDICATIONS:  STANDING MEDICATIONS  artificial  tears Solution 1 Drop(s) Both EYES two times a day  aspirin  chewable 81 milliGRAM(s) Oral daily  atorvastatin 80 milliGRAM(s) Oral at bedtime  carvedilol 25 milliGRAM(s) Oral every 12 hours  chlorhexidine 4% Liquid 1 Application(s) Topical <User Schedule>  dextrose 50% Injectable 12.5 Gram(s) IV Push once  dextrose 50% Injectable 25 Gram(s) IV Push once  dextrose 50% Injectable 25 Gram(s) IV Push once  doxazosin 2 milliGRAM(s) Oral at bedtime  DULoxetine 60 milliGRAM(s) Oral two times a day  enoxaparin Injectable 40 milliGRAM(s) SubCutaneous daily  insulin glargine Injectable (LANTUS) 38 Unit(s) SubCutaneous at bedtime  insulin lispro (HumaLOG) corrective regimen sliding scale   SubCutaneous three times a day before meals  insulin lispro Injectable (HumaLOG) 9 Unit(s) SubCutaneous three times a day before meals  levETIRAcetam  Solution 1000 milliGRAM(s) Oral every 12 hours  losartan 100 milliGRAM(s) Oral daily  morphine  - Injectable 2 milliGRAM(s) IV Push every 6 hours  pantoprazole   Suspension 40 milliGRAM(s) Oral daily  pramipexole 0.5 milliGRAM(s) Oral every 12 hours  senna 2 Tablet(s) Oral at bedtime    PRN MEDICATIONS  acetaminophen   Tablet .. 650 milliGRAM(s) Oral every 4 hours PRN  ALPRAZolam 1 milliGRAM(s) Oral every 6 hours PRN  bisacodyl 5 milliGRAM(s) Oral at bedtime PRN  dextrose 40% Gel 15 Gram(s) Oral once PRN  glucagon  Injectable 1 milliGRAM(s) IntraMuscular once PRN  morphine  - Injectable 2 milliGRAM(s) IV Push every 6 hours PRN    VITALS:   T(F): 101.9  HR: 104  BP: 136/85  RR: 20  SpO2: 94%    PHYSICAL EXAM:  GENERAL: NAD, nonverbal, grossly immobile.  HEAD:  Atraumatic, Normocephalic  EYES: EOMI  NECK: Supple  NERVOUS SYSTEM:  Alert & Oriented x0, unclear, nonverbal. Cannot follow commands.   Functionally quadriplegic.  Upper extremity tone b/l increased, R fist clenched, L fingers partially extended.  CHEST/LUNG: Clear to auscultation bilaterally; No rales, rhonchi, wheezing, or rubs  HEART: Regular rate and rhythm; No murmurs, rubs, or gallops  ABDOMEN: Possible tenderness on palpation, elicits a possible grimace from patient.  EXTREMITIES:  2+ Peripheral Pulses, No clubbing, cyanosis.  LYMPH: No lymphadenopathy noted    LABS:                        15.2   15.24 )-----------( 301      ( 15 Sep 2020 07:00 )             49.4     -15    136  |  98  |  33<H>  ----------------------------<  386<H>  5.0   |  28  |  1.2    Ca    8.2<L>      15 Sep 2020 07:00  Mg     2.2     09-15    TPro  6.0  /  Alb  3.4<L>  /  TBili  0.7  /  DBili  x   /  AST  81<H>  /  ALT  134<H>  /  AlkPhos  172<H>  09-15      Urinalysis Basic - ( 14 Sep 2020 18:17 )    Color: Yellow / Appearance: Clear / S.038 / pH: x  Gluc: x / Ketone: Trace  / Bili: Negative / Urobili: 3 mg/dL   Blood: x / Protein: 30 mg/dL / Nitrite: Negative   Leuk Esterase: Negative / RBC: 3 /HPF / WBC 2 /HPF   Sq Epi: x / Non Sq Epi: 2 /HPF / Bacteria: 0.0            Culture - Urine (collected 13 Sep 2020 13:55)  Source: .Urine Clean Catch (Midstream)  Final Report (15 Sep 2020 09:09):    >=3 organisms. Probable collection contamination.    Culture - Blood (collected 13 Sep 2020 12:23)  Source: .Blood None  Preliminary Report (14 Sep 2020 18:01):    No growth to date.          RADIOLOGY:

## 2020-09-15 NOTE — SWALLOW BEDSIDE ASSESSMENT ADULT - SLP PERTINENT HISTORY OF CURRENT PROBLEM
51 y/o M w/ PMHx: CAD s/p CABG, CIDP with IVIG q3 weeks, Paraplegia - has not walked since 2013 admitted w/ AMS; pt found to have acute CVA in basal ganglia/evolving infarct w/ mild mass effect; per ID-> No infectious meningitis or encephalitis. Ischemic basal ganglia lesion and mass effect likely causing central fever, non infectious. LP and CXR (-); Neurology team would like to assess patient over the next few days to see if there is any improvement before deciding on PEG tube. Wife is aware and agreeable with plan. pt full code at this time.

## 2020-09-15 NOTE — PROGRESS NOTE ADULT - ATTENDING COMMENTS
I have personally seen and examined this patient.  I have fully participated in the care of this patient.  I have reviewed all pertinent clinical information, including history, physical exam, plan and note.  Patient is lethargic. Not following commands. Left arm shaking. Suspected for seizure. Start VEEG. Continue Keppra and acyclovir.  I have reviewed all pertinent clinical information and reviewed all relevant imaging and diagnostic studies personally.  Recommendations as above.  Agree with above assessment except as noted.
Patient seen and examined independently earlier today. Case discussed with housestaff, nursing, case mgmt, neuro Dr. Anthony. I agree with most of the resident's note, physical exam, and plan except as below. Events noted, febrile overnight, currently extremely lethargic. Pancultured. Started empiric viral and bacterial meningitis coverage.     Gen: obtunded  CV: nl S1 S2  Resp: decreased BS b/l  GI: NT/ND/S +BS  MS: no c/c/e, LLE ext rotated  Neuro: unable to access due to AMS    tele - no events    #Acute CVA/Toxic Metabolic Encephalopathy r/o seizures, meningitis/CIDP/CAD/DM  -empiric viral and bacterial meningitis coverage. LP in am (will be off Plavix >3-4d)  -ID eval  -cont ASA, Lipitor  -monitor tele  -REEG  -IVFs while on Acyclovir  -likely will need NGT for feeds and meds  -permissive HTN  -High risk pt, Px guarded  -full code for now    #Progress Note Handoff  Pending (specify):  Consults_ID___Clinical improvement and stability__x__Tests_LP, echo____, PT___x_____  Disposition: Home______SNF_______To be determined___x_____
Patient with CIDP and chronic medical condition admitted for seizure and acute stroke.   At this point needing NGT tube. to possible PEG tube ...   Patient is functionally quadriplegic need assistance for ADL
Seen and examined with the pulmonary fellow at the bed side.  Impression and plan as outlined above.
Patient seen and examined independently. Agree with resident note/ history / physical exam and plan of care with no exceptions/additions/updates. Case discussed with patient/pt decision maker, house-staff and nursing.   fu neurology   hold sedatives    full code

## 2020-09-16 NOTE — PROVIDER CONTACT NOTE (MEDICATION) - SITUATION
Patient noted with an elevated FS of 497 after receiving 12 units of humalog for a FS reading of 449 at 7:30am

## 2020-09-16 NOTE — CHART NOTE - NSCHARTNOTEFT_GEN_A_CORE
Called by RN around 11 am, as patient was found with respiratory distress and without pulse. Rapid Response called initially, followed by immediate activation of CODE BLUE. Patient was found by code team to be in PEA, as ACLS was initiated with immediate chest compressions. Patient received 7 rounds of epinephrine during the CODE, with 2 bicarb pushed, shocked x 2, without achievement of ROSC per ACLS protocol. Family was notified at initiation of the code and after 30 mins of ACLS, patient was pronounced at 11:55 am by MD. Called by RN around 11 am, as patient was found with respiratory distress and without pulse. Rapid Response called initially, followed by immediate activation of CODE BLUE. Patient was found by code team to be in PEA, as ACLS was initiated with immediate chest compressions. Patient received 7 rounds of epinephrine during the CODE, with 2 bicarb pushed. He was found in pulseless V Tach in which he was shocked once with 150 J, followed by 300 mg of amiodarone IV and later went back into PEA. ROSC  achieved with pulse momentarily, in which CODE BLUE was reinitiated, in which another 3 rounds of epi per ACLS protocol without achievement of ROSC.  Family was notified at initiation of the code and after 30 mins of ACLS, patient was pronounced at 11:55 am by MD with wife Gillian at bedside.

## 2020-09-17 LAB
GLUCOSE BLDC GLUCOMTR-MCNC: 436 MG/DL — HIGH (ref 70–99)
GLUCOSE BLDC GLUCOMTR-MCNC: 522 MG/DL — CRITICAL HIGH (ref 70–99)

## 2020-09-18 LAB
CULTURE RESULTS: SIGNIFICANT CHANGE UP
SPECIMEN SOURCE: SIGNIFICANT CHANGE UP

## 2020-09-20 LAB
CULTURE RESULTS: SIGNIFICANT CHANGE UP
SPECIMEN SOURCE: SIGNIFICANT CHANGE UP

## 2020-09-21 LAB
CULTURE RESULTS: SIGNIFICANT CHANGE UP
SPECIMEN SOURCE: SIGNIFICANT CHANGE UP

## 2020-09-24 DIAGNOSIS — R41.82 ALTERED MENTAL STATUS, UNSPECIFIED: ICD-10-CM

## 2020-09-24 DIAGNOSIS — E83.51 HYPOCALCEMIA: ICD-10-CM

## 2020-09-24 DIAGNOSIS — F41.8 OTHER SPECIFIED ANXIETY DISORDERS: ICD-10-CM

## 2020-09-24 DIAGNOSIS — E83.42 HYPOMAGNESEMIA: ICD-10-CM

## 2020-09-24 DIAGNOSIS — I63.89 OTHER CEREBRAL INFARCTION: ICD-10-CM

## 2020-09-24 DIAGNOSIS — G61.81 CHRONIC INFLAMMATORY DEMYELINATING POLYNEURITIS: ICD-10-CM

## 2020-09-24 DIAGNOSIS — E11.65 TYPE 2 DIABETES MELLITUS WITH HYPERGLYCEMIA: ICD-10-CM

## 2020-09-24 DIAGNOSIS — G82.20 PARAPLEGIA, UNSPECIFIED: ICD-10-CM

## 2020-09-24 DIAGNOSIS — Z51.5 ENCOUNTER FOR PALLIATIVE CARE: ICD-10-CM

## 2020-09-24 DIAGNOSIS — G40.409 OTHER GENERALIZED EPILEPSY AND EPILEPTIC SYNDROMES, NOT INTRACTABLE, WITHOUT STATUS EPILEPTICUS: ICD-10-CM

## 2020-09-24 DIAGNOSIS — K21.9 GASTRO-ESOPHAGEAL REFLUX DISEASE WITHOUT ESOPHAGITIS: ICD-10-CM

## 2020-09-24 DIAGNOSIS — Z88.0 ALLERGY STATUS TO PENICILLIN: ICD-10-CM

## 2020-09-24 DIAGNOSIS — Z79.4 LONG TERM (CURRENT) USE OF INSULIN: ICD-10-CM

## 2020-09-24 DIAGNOSIS — I10 ESSENTIAL (PRIMARY) HYPERTENSION: ICD-10-CM

## 2020-09-24 DIAGNOSIS — F32.9 MAJOR DEPRESSIVE DISORDER, SINGLE EPISODE, UNSPECIFIED: ICD-10-CM

## 2020-09-24 DIAGNOSIS — Z96.643 PRESENCE OF ARTIFICIAL HIP JOINT, BILATERAL: ICD-10-CM

## 2020-09-24 DIAGNOSIS — R29.710 NIHSS SCORE 10: ICD-10-CM

## 2020-09-24 DIAGNOSIS — Z79.82 LONG TERM (CURRENT) USE OF ASPIRIN: ICD-10-CM

## 2020-09-24 DIAGNOSIS — R13.10 DYSPHAGIA, UNSPECIFIED: ICD-10-CM

## 2020-09-24 DIAGNOSIS — G93.41 METABOLIC ENCEPHALOPATHY: ICD-10-CM

## 2020-09-24 DIAGNOSIS — Z91.018 ALLERGY TO OTHER FOODS: ICD-10-CM

## 2020-09-24 DIAGNOSIS — R29.810 FACIAL WEAKNESS: ICD-10-CM

## 2020-09-24 DIAGNOSIS — G89.29 OTHER CHRONIC PAIN: ICD-10-CM

## 2020-09-24 DIAGNOSIS — Z74.01 BED CONFINEMENT STATUS: ICD-10-CM

## 2020-09-24 DIAGNOSIS — Z95.1 PRESENCE OF AORTOCORONARY BYPASS GRAFT: ICD-10-CM

## 2020-09-24 DIAGNOSIS — I47.2 VENTRICULAR TACHYCARDIA: ICD-10-CM

## 2020-09-24 DIAGNOSIS — I25.2 OLD MYOCARDIAL INFARCTION: ICD-10-CM

## 2020-09-24 DIAGNOSIS — Z90.49 ACQUIRED ABSENCE OF OTHER SPECIFIED PARTS OF DIGESTIVE TRACT: ICD-10-CM

## 2020-09-24 DIAGNOSIS — I25.10 ATHEROSCLEROTIC HEART DISEASE OF NATIVE CORONARY ARTERY WITHOUT ANGINA PECTORIS: ICD-10-CM

## 2020-09-24 DIAGNOSIS — K59.09 OTHER CONSTIPATION: ICD-10-CM

## 2020-09-24 DIAGNOSIS — N40.0 BENIGN PROSTATIC HYPERPLASIA WITHOUT LOWER URINARY TRACT SYMPTOMS: ICD-10-CM

## 2020-10-03 LAB
CULTURE RESULTS: SIGNIFICANT CHANGE UP
SPECIMEN SOURCE: SIGNIFICANT CHANGE UP

## 2020-10-24 LAB
CULTURE RESULTS: SIGNIFICANT CHANGE UP
SPECIMEN SOURCE: SIGNIFICANT CHANGE UP

## 2020-11-11 NOTE — PRE-ANESTHESIA EVALUATION ADULT - NSATTENDATTESTRD_GEN_ALL_CORE
The patient has been re-examined and I agree with the above assessment or I updated with my findings.
Scc Moderately Differentiated Histology Text: There were aggregates of invasive moderately-differentiated atypical keratinocytes seen.   The area of positive cancer is as marked on the Mohs map.

## 2021-01-12 NOTE — PATIENT PROFILE ADULT - NSTRANSFERBELONGINGSRESP_GEN_A_NUR
Have Your Skin Lesions Been Treated?: not been treated Is This A New Presentation, Or A Follow-Up?: Growths How Severe Is Your Skin Lesion?: moderate yes

## 2021-01-15 NOTE — PROGRESS NOTE ADULT - ASSESSMENT
ASSESSMENT  50 y/o M with PMHx of CAD s/p CABG, BPH, CIDP with significant motor and sensory deficits and extreme pain, b/l total hip replacement, anxiety and depression presents to the ED for extreme left hip pain, s/p failed total hip arthoplasty w/ dislocation s/p explanation of hardware, hypotension required pressors in PACU    IMPRESSION  #R prosthetic hip infection with hardware, s/p OR 9/13 Explantation of total hip arthroplasty , abx beads placed    OR cx Pseudomonas, coNS (S oxacillin)    MRSA PCR pos    Sedimentation Rate, Erythrocyte: 107 mm/Hr (09.14.19 @ 11:21)    C-Reactive Protein, Serum: 8.16 mg/dL (09.14.19 @ 11:21)  #9/11 Bcx CoNS likely contaminant 9/12 BCX NG  #Hyponatremia  #Elevated trop/CKMB, suspect demand ischemia  #Abx allergy: PCN childhood - was told by mother not to take    RECOMMENDATIONS  - Cefepime 2g q8h IV as pseudo is S (aware of PCN allergy)  - Pt will need PICC x 6 weeks IV ABX end 10/25/19  - MRSA PCR + --> mupirocin to nares BID and CHG daily washes x 7 days   - Weekly CBC, BMP, Vanc trough, ESR/CRP  - ID follow-up with Christina Lerma or Thurs Dr. Mohsena Amin      [address] [facility]       [phone] (call to make an appointment, walk-ins Tuesdays 10:30 AM)      Fax [phone]     Spectra [phone]  I will be away until 10/7/19, please contact Dr. Bravo (spectra [phone]) regarding all patient matters home

## 2021-01-19 NOTE — PROGRESS NOTE BEHAVIORAL HEALTH - ABNORMAL MOVEMENTS
Medicare Wellness Visit  Plan for Preventive Care    A good way for you to stay healthy is to use preventive care.  Medicare covers many services that can help you stay healthy.* The goal of these services is to find any health problems as quickly as possible. Finding problems early can help make them easier to treat.  Your personal plan below lists the services you may need and when they are due.     Health Maintenance Summary     Lung Cancer Screening (Yearly)  Overdue since 1/28/2008    Osteoporosis Screening (Once)  Overdue since 1/28/2018    Medicare Wellness Visit (Yearly)  Due since 1/16/2021    Breast Cancer Screening (Every 2 Years)  Scheduled for 1/19/2021    Depression Screening (Yearly)  Next due on 1/19/2022    Colonoscopy Risk (Every 5 Years)  Next due on 7/29/2024    DTaP/Tdap/Td Vaccine (2 - Td)  Next due on 6/22/2026    Hepatitis C Screening   Completed    Pneumococcal Vaccine 65+   Completed    Shingles Vaccine   Completed    Influenza Vaccine   Completed    Meningococcal Vaccine   Aged Out    HPV Vaccine   Aged Out           Preventive Care for Women and Men    Heart Screenings (Cardiovascular):  · Blood tests are used to check your cholesterol, lipid and triglyceride levels. High levels can increase your risk for heart disease and stroke. High levels can be treated with medications, diet and exercise. Lowering your levels can help keep your heart and blood vessels healthy.  Your provider will order these tests if they are needed.    · An ultrasound is done to see if you have an abdominal aortic aneurysm (AAA).  This is an enlargement of one of the main blood vessels that delivers blood to the body.   In the [place], 9,000 deaths are caused by AAA.  You may not even know you have this problem and as many as 1 in 3 people will have a serious problem if it is not treated.  Early diagnosis allows for more effective treatment and cure.  If you have a family history of AAA or are a male age 65-75  who has smoked, you are at higher risk of an AAA.  Your provider can order this test, if needed.    Colorectal Screening:  · There are many tests that are used to check for cancer of your colon and rectum. You and your provider should discuss what test is best for you and when to have it done.  Options include:  · Screening Colonoscopy: exam of the entire colon, seen through a flexible lighted tube.  · Flexible Sigmoidoscopy: exam of the last third (sigmoid portion) of the colon and rectum, seen through a flexible lighted tube.  · Cologuard DNA stool test: a sample of your stool is used to screen for cancer and unseen blood in your stool.  · Fecal Occult Blood Test: a sample of your stool is studied to find any unseen blood    Flu Shot:  · An immunization that helps to prevent influenza (the flu). You should get this every year. The best time to get the shot is in the fall.    Pneumococcal Shot:  • Vaccines are available that can help prevent pneumococcal disease, which is any type of infection caused by Streptococcus pneumoniae bacteria.   Their use can prevent some cases of pneumonia, meningitis, and sepsis. There are two types of pneumococcal vaccines:   o Conjugate vaccines (PCV-13 or Prevnar 13®) - helps protect against the 13 types of pneumococcal bacteria that are the most common causes of serious infections in children and adults.    o Polysaccharide vaccine (PPSV23 or Iokhgaaie69®) - helps protect against 23 types of pneumococcal bacteria for patients who are recommended to get it.  These vaccines should be given at least 12 months apart.  A booster is usually not needed.     Hepatitis B Shot:  · An immunization that helps to protect people from getting Hepatitis B. Hepatitis B is a virus that spreads through contact with infected blood or body fluids. Many people with the virus do not have symptoms.  The virus can lead to serious problems, such as liver disease. Some people are at higher risk than others.  Your doctor will tell you if you need this shot.     Diabetes Screening:  · A test to measure sugar (glucose) in your blood is called a fasting blood sugar. Fasting means you cannot have food or drink for at least 8 hours before the test. This test can detect diabetes long before you may notice symptoms.    Glaucoma Screening:  · Glaucoma screening is performed by your eye doctor. The test measures the fluid pressure inside your eyes to determine if you have glaucoma.     Hepatitis C Screening:  · A blood test to see if you have the hepatitis C virus.  Hepatitis C attacks the liver and is a major cause of chronic liver disease.  Medicare will cover a single screening for all adults born between 1945 & 1965, or high risk patients (people who have injected illegal drugs or people who have had blood transfusions).  High risk patients who continue to inject illegal drugs can be screened for Hepatitis C every year.    Smoking and Tobacco-Use Cessation Counseling:  · Tobacco is the single greatest cause of disease and early death in our country today. Medication and counseling together can increase a person’s chance of quitting for good.   · Medicare covers two quitting attempts per year, with four counseling sessions per attempt (eight sessions in a 12 month period)    Preventive Screening tests for Women    Screening Mammograms and Breast Exams:  · An x-ray of your breasts to check for breast cancer before you or your doctor may be able to feel it.  If breast cancer is found early it can usually be treated with success.    Pelvic Exams and Pap Tests:  · An exam to check for cervical and vaginal cancer. A Pap test is a lab test in which cells are taken from your cervix and sent to the lab to look for signs of cervical cancer. If cancer of the cervix is found early, chances for a cure are good. Testing can generally end at age 65, or if a woman has a hysterectomy for a benign condition. Your provider may recommend more  frequent testing if certain abnormal results are found.    Bone Mass Measurements:  · A painless x-ray of your bone density to see if you are at risk for a broken bone. Bone density refers to the thickness of bones or how tightly the bone tissue is packed.    Preventive Screening tests for Men    Prostate Screening:  · Should you have a prostate cancer test (PSA)?  It is up to you to decide if you want a prostate cancer test. Talk to your clinician to find out if the test is right for you.  Things for you to consider and talk about should include:  · Benefits and harms of the test  · Your family history  · How your race/ethnicity may influence the test  · If the test may impact other medical conditions you have  · Your values on screenings and treatments    *Medicare pays for many preventive services to keep you healthy. For some of these services, you might have to pay a deductible, coinsurance, and / or copayment.  The amounts vary depending on the type of services you need and the kind of Medicare health plan you have.               Other/No abnormal movements

## 2021-07-06 NOTE — OCCUPATIONAL THERAPY INITIAL EVALUATION ADULT - ORIENTATION, REHAB EVAL
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

## 2022-01-14 NOTE — OCCUPATIONAL THERAPY INITIAL EVALUATION ADULT - PERTINENT HX OF CURRENT PROBLEM, REHAB EVAL
agitation Pt with involved H/O CIDP known to this hospital and rehab. Pt was been bed bound for approx. the last five years.

## 2022-03-05 NOTE — PROGRESS NOTE ADULT - ASSESSMENT
ASSESSMENT/PLAN  - basal ganglia infarct   - fevers  - DM    - CIDP with paraplegia  - dysphagia  -hyperkalemia    SUGGEST:  - estimated REE by MSJ eqn ~ 1840 k, protein needs could be up to 115 gm/d luc if pt has been seizing  - considering DM with insulin dependence, would feed fiber-containing formula, and as glc currently more difficult to control:  - cont Glucerna 1.2 360 ml x 4 feeds/d and add Prosource TF 2 per day = 107 gm protein and 1790 k/d  - document all feedings (& Prosource TF)  - check bmp/phos/mg and correct lytes  - glycemic control!! Self

## 2022-05-02 NOTE — PROVIDER CONTACT NOTE (OTHER) - REASON
Abnormal Vitals SW/CM Discharge Plan  Informed patient is ready for discharge. Patientâs discharge destination is home. Patient to be picked up by wife. Patient/interested person has been counseled for post hospitalization care. Patient agrees and understands goals and plan. Initial implementation of the patientâs discharge plan has been arranged, including any devices/equipment needed for discharge. Patient declining need for home care. Patient states wife has already 4 pleurx kits and can order more as needed. Received info on where to order from when Patient was in IR. Wife already knows how to empty drain. No needs identified.

## 2022-06-15 NOTE — PATIENT PROFILE ADULT - LIVES WITH
Suturegard Intro: Intraoperative tissue expansion was performed, utilizing the SUTUREGARD device, in order to reduce wound tension. spouse

## 2022-06-15 NOTE — PRE-OP CHECKLIST - LOOSE TEETH
MEDICARE WELLNESS VISIT + NOTE    CHIEF COMPLAINT:  Suzan Gupta presents for her First Annual Medicare Wellness Visit.   Her additional complaints or concerns are addressed below.      Patient Care Team:  Mario Alberto Morin MD as PCP - General (Family Practice)        Patient Active Problem List   Diagnosis   • Chronic obstructive pulmonary disease (CMS/[facility])   • Type 2 diabetes mellitus with diabetic neuropathy, without long-term current use of insulin (CMS/[facility])   • Essential hypertension   • Other chronic pain   • Obstructive sleep apnea syndrome   • Current smoker   • History of DVT (deep vein thrombosis)   • Pain in joint, multiple sites         Past Medical History:   Diagnosis Date   • Anxiety     PTSD per patient   • Chronic pain    • Depression    • Diabetes mellitus (CMS/[facility])    • Diabetic neuropathy (CMS/[facility])    • Essential (primary) hypertension    • Fracture 2017    right elbow   • Fractures     right wrist   • Missing teeth, acquired     Teeth pulled for dentures, never picked up dentures.   • Neuropathy    • PTSD (post-traumatic stress disorder)    • Sinusitis, chronic     recurrent sinus infections   • Sleep apnea     No C-pap currently   • Wears glasses          Past Surgical History:   Procedure Laterality Date   • Breast surgery Right     Biopsy   •  section, low transverse  1999 and    • Removal gallbladder  2018    Lap. Yolis   • Thrombolysis Right     right leg DVT with stent placement   • Tubal ligation  2001         Social History     Tobacco Use   • Smoking status: Current Every Day Smoker     Packs/day: 1.00     Years: 45.00     Pack years: 45.00     Types: Cigarettes   • Smokeless tobacco: Never Used   Substance Use Topics   • Alcohol use: Yes     Comment: once a year    • Drug use: No     Family History   Problem Relation Age of Onset   • Diabetes Mother    • Hypertension Mother    • Depression Mother    • Cancer, Breast Mother 20   • Cancer, Lung  Father    • Diabetes Father    • Myocardial Infarction Father         heart attack at 49 years   • Patient is unaware of any medical problems Brother         two brothers alive   • Patient is unaware of any medical problems Brother    • Systemic Lupus Erythematosus Maternal Grandmother    • Diabetes Maternal Grandmother    • Cancer, Lung Maternal Grandfather         smoker   • Diabetes Maternal Grandfather    • Blood Disorder Maternal Grandfather         DVTs   • Cancer, Breast Paternal Grandmother         at 42 years   • Alcohol Abuse Paternal Grandfather    • Patient is unaware of any medical problems Daughter    • Patient is unaware of any medical problems Daughter    • Cancer, Colon Neg Hx    • Cancer, Ovarian Neg Hx        Family history reviewed and updated with patient, no changes.    Current Outpatient Medications   Medication Sig Dispense Refill   • DULoxetine (CYMBALTA) 30 MG capsule Take 2 capsules by mouth daily. 60 capsule 2   • acetaminophen (TYLENOL) 650 MG CR tablet Take 650 mg by mouth every 8 hours as needed for Pain.     • naproxen sodium (ALEVE) 220 MG tablet Take 220 mg by mouth 2 times daily (with meals).     • Diclofenac Sodium (VOLTAREN EX)      • CAMPHOR-MENTHOL EX      • Menthol, Topical Analgesic, (BIOFREEZE EX)      • Capsaicin (CAPSAGEL EX)      • Menthol, Topical Analgesic, (ICY HOT EX)      • gabapentin (NEURONTIN) 300 MG capsule Take 2 capsules by mouth 3 times daily. 180 capsule 2   • losartan (COZAAR) 50 MG tablet Take 1 tablet by mouth daily. 90 tablet 3   • metFORMIN (GLUCOPHAGE-XR) 500 MG 24 hr tablet Take 1 tablet by mouth daily (with breakfast). 90 tablet 3   • Tradjenta 5 MG tablet Take 1 tablet by mouth daily. 90 tablet 3   • Cyanocobalamin (VITAMIN B-12 PO)      • Dextromethorphan Polistirex (ROBITUSSIN 12 HOUR COUGH PO)      • turmeric 500 MG capsule      • aspirin 81 MG chewable tablet Chew 1 tablet by mouth daily. Do not start before October 26, 2020.     • traMADol  (ULTRAM) 50 MG tablet Take 50 mg by mouth every 12 hours as needed. Indications: Pain      • tiZANidine (ZANAFLEX) 2 MG tablet Take 2-4 mg by mouth at bedtime as needed for Muscle spasms.      • VITAMIN D, CHOLECALCIFEROL, PO Take 2,000 mg by mouth daily.       No current facility-administered medications for this visit.        The following items on the Medicare Health Risk Assessment were found to be positive  1.) Do you have an Advance directive, living will, or power of  for health care document that contains your wishes for end of life care?: No     3.) During the past 4 weeks, how would you rate your health?: Fair     4.) During the past 4 weeks, what was the hardest physical activity you could do for at least 2 minutes?: Light     6 a.) How many servings of Fruits and Vegetables do you have each day ( 1 serving = 1 piece of fruit, 1/2 cup fruits or vegetables): None     6 c.) How many servings of Fried or High Fat Foods do you have each day (1 serving = 1 Bess, French Fries, chips, doughnut, fried chicken/fish): 3 per day     6 d.) How many servings of Sugar Sweetened Beverages do you have each day ( 1 serving = 1 can or 12 oz cup of sode or juice): 3 per day     7a.) Have you had a fall in the past year?: Yes     7b.) Do you feel unsteady when standing or walking?: Yes     7c.) Do you worry about falling?: Yes     8.) During the past 4 weeks, has your physical and emotional health limited your social activities with family, friends, neighbors, or other groups?: Moderately     11a.) Bladder Control problems (urine leaking): Always     11b.) Bowel control problems: Sometimes     11d.) Bodily pain: Always     11e.) Tiredness or Fatigue: Always     11f.) Feeling stressed or overwhelmed: Always     11g.) Anger or frustration: Always     11h.) Problems with your hearing: Always     11j.) Driven/Ridden in a car without wearing your seatbelt: Sometimes     11l.) Sexual Problems: Always     12.) Do you need  help with any of the following activities?   (check all that apply): Bathing     13.) Do you need help with any of the following activities?: Do your housework or laundry, Prepare a meal     15.) How confident are you that you can control and manage most of your health problems?: Somewhat confident         Vision and Hearing screens:    Hearing Screening    125Hz 250Hz 500Hz 1000Hz 2000Hz 3000Hz 4000Hz 6000Hz 8000Hz   Right ear:   Pass Pass Pass  Fail     Left ear:   Pass Pass Pass  Pass        Visual Acuity Screening    Right eye Left eye Both eyes   Without correction: 20/15 20/25 20/13   With correction:          Advance Directive:   The patient has the following documents:  No Advance Directives on file. Patient offered documents.    Cognitive/Functional Status: no evidence of cognitive dysfunction by direct observation    Opioid Review: Suzan is not taking opioid medications.    Recent PHQ 2/9 Score:    PHQ 2:  Date Adult PHQ 2 Score Adult PHQ 2 Interpretation   6/8/2022 5 Further screening needed       PHQ 9:  Date Adult PHQ 9 Score Adult PHQ 9 Interpretation   6/8/2022 18 Moderately Severe Depression     Discussed depression symptoms; patient denies SI/HI.    DEPRESSION ASSESSMENT/PLAN:  Start and/or continue medication.  See orders for details.  and Interventions offered, but patient declined.      Body mass index is 43.93 kg/m².    BMI ASSESSMENT/PLAN:  Patient is obese.    Low carbohydrate diet        See Patient Instructions section.   Return in about 1 year (around 6/15/2023) for Medicare Wellness Visit.      OUTPATIENT PROGRESS NOTE    Subjective   Chief Complaint Office Visit and Medicare Wellness Visit (welcome)    HPI   Diabetes: Reports tolerating XR metformin well. Amenable to trial increased dose.    Chronic pain: Reports some improvement with duloxetine, but still has times often in the afternoon when her pain is quite bothersome. Saw orthopedics for knee and hand pain, and had injections which  helped but are wearing off. Has MRI scheduled for back.    Stress incontinence: Patient reports only having issues when she has to get up off the floor where she sleeps. She used to have an air mattress but her cat clawed it.     Medications  Medications were reviewed and updated today.    Histories  I have personally reviewed and updated the patient's past medical, past surgical, family and social histories during today's visit.    Review of Systems   Constitutional: Negative for activity change, chills, diaphoresis and fever.   HENT: Negative for sore throat and trouble swallowing.    Eyes: Negative for pain, discharge, redness and visual disturbance.   Respiratory: Negative for cough and shortness of breath.    Cardiovascular: Negative for chest pain, palpitations and leg swelling.   Gastrointestinal: Negative for abdominal pain, diarrhea, nausea and vomiting.   Endocrine: Negative for polydipsia.   Genitourinary: Negative for dysuria, frequency and urgency.   Musculoskeletal: Positive for arthralgias and back pain. Negative for joint swelling and myalgias.   Skin: Negative for rash.   Allergic/Immunologic: Negative for immunocompromised state.   Neurological: Negative for weakness, numbness and headaches.   Psychiatric/Behavioral: Negative for behavioral problems.     Objective   Visit Vitals  /80 (BP Location: RUE - Right upper extremity, Patient Position: Sitting, Cuff Size: Large Adult)   Pulse 86   Temp 98.2 °F (36.8 °C) (Oral)   Resp 18   Ht 5' 2\" (1.575 m)   Wt 109 kg (240 lb 3.2 oz)   LMP 07/01/2020 (Approximate) Comment: tubal ligation   SpO2 97%   BMI 43.93 kg/m²     Physical Exam  Constitutional:       General: She is not in acute distress.  HENT:      Head: Normocephalic and atraumatic.      Right Ear: Tympanic membrane, ear canal and external ear normal.      Left Ear: Tympanic membrane, ear canal and external ear normal.      Nose: Nose normal.      Mouth/Throat:      Mouth: Mucous membranes  are moist.      Pharynx: Oropharynx is clear.   Eyes:      Extraocular Movements: Extraocular movements intact.      Conjunctiva/sclera: Conjunctivae normal.      Pupils: Pupils are equal, round, and reactive to light.   Cardiovascular:      Rate and Rhythm: Normal rate and regular rhythm.      Pulses: Normal pulses.      Heart sounds: Normal heart sounds.   Pulmonary:      Effort: Pulmonary effort is normal.      Breath sounds: Normal breath sounds.   Abdominal:      General: There is no distension.      Palpations: Abdomen is soft. There is no mass.      Tenderness: There is no abdominal tenderness.   Musculoskeletal:      Cervical back: Normal range of motion and neck supple.      Right lower leg: No edema.      Left lower leg: No edema.      Comments: R wrist with splint in place   Skin:     General: Skin is warm and dry.      Capillary Refill: Capillary refill takes less than 2 seconds.      Findings: No lesion or rash.   Neurological:      General: No focal deficit present.      Mental Status: She is alert. Mental status is at baseline.   Psychiatric:         Mood and Affect: Mood normal.         Behavior: Behavior normal.       Laboratory  I have reviewed the pertinent laboratory tests. These are the pertinent findings:  Lab Results   Component Value Date    WBC 10.5 10/25/2020    HCT 34.1 (L) 10/25/2020    HGB 11.5 (L) 10/25/2020     10/25/2020     Lab Results   Component Value Date    GLUCOSE 205 (H) 05/05/2022    SODIUM 137 05/05/2022    POTASSIUM 4.5 05/05/2022    CHLORIDE 105 05/05/2022    BUN 16 05/05/2022    CREATININE 0.74 05/05/2022    CALCIUM 10.1 05/05/2022    ALBUMIN 3.8 05/05/2022    AST 18 05/05/2022    ALKPT 78 05/05/2022    GPT 21 05/05/2022    ANIONGAP 13 05/05/2022    BCRAT 22 05/05/2022    GLOB 3.9 05/05/2022    AGR 1.0 05/05/2022     Hemoglobin A1C (%)   Date Value   11/14/2018 7.6 (H)     Lab Results   Component Value Date    CHOLESTEROL 201 (H) 07/28/2021    HDL 34 (L) 07/28/2021     CALCLDL  07/28/2021      Comment:      Unable to Calculate LDL    TRIGLYCERIDE 408 (H) 07/28/2021       Imaging  I have reviewed the pertinent imaging study reports. These are the pertinent findings:  6/3/22:  XR R Knee:   There is a small effusion. There is enthesopathic change of the patella.  There is bony hypertrophy at the tibial tubercle. There is mild medial  compartment degenerative change.  XR R wrist:    IMPRESSION:  Normal right wrist.      Assessment & Plan   Diagnoses and associated orders for this visit:  1. Medicare annual wellness visit, initial  Discussed other vaccines and screenings, patient declines.   2. Type 2 diabetes mellitus with diabetic neuropathy, without long-term current use of insulin (CMS/[facility])  Tolerating metformin. Will increase dose and monitor. Discussed rationale for statin therapy given diabetes. Patient amenable. Will start Lipitor.  -     metFORMIN HCl ER  -     Atorvastatin Calcium  3. Chronic bilateral low back pain without sciatica  Exacerbated recently. Will try tizanidine. Advised regarding side effects, safety profile, avoidance of driving while using.  -     tiZANidine HCl  4. Screening for malignant neoplasm of cervix  -     SERVICE TO OB GYN  5. Visit for screening mammogram  -     Mammo Screening Bilateral  6. Housing or economic problem  -     SERVICE TO      no
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
## 2022-08-26 NOTE — ASU PREOP CHECKLIST - PATIENT'S PERSONAL PROPERTY GIVEN TO
Quality 226: Preventive Care And Screening: Tobacco Use: Screening And Cessation Intervention: Patient screened for tobacco use and is an ex/non-smoker Detail Level: Detailed with patient

## 2023-03-28 NOTE — ED PROVIDER NOTE - OBTAINED AND REVIEWED OLD RECORDS MULTI-SELECT OPTIONS
Prior Hospital/ED Visits Keystone Flap Text: The defect edges were debeveled with a #15 scalpel blade.  Given the location of the defect, shape of the defect a keystone flap was deemed most appropriate.  Using a sterile surgical marker, an appropriate keystone flap was drawn incorporating the defect, outlining the appropriate donor tissue and placing the expected incisions within the relaxed skin tension lines where possible. The area thus outlined was incised deep to adipose tissue with a #15 scalpel blade.  The skin margins were undermined to an appropriate distance in all directions around the primary defect and laterally outward around the flap utilizing iris scissors.

## 2023-04-18 NOTE — OCCUPATIONAL THERAPY INITIAL EVALUATION ADULT - FINE MOTOR COORDINATION, LEFT HAND THUMB/FINGER OPPOSITION SKILLS, OT EVAL
[Patient/caregiver able to verbalize understanding of medications, including indications and side effects] : Patient/caregiver able to verbalize understanding of medications, including indications and side effects
moderate impairment

## 2023-09-08 NOTE — PROGRESS NOTE ADULT - WEIGHT IN KG
100
100
patient presents with diarrhea x24 hours, and with blood noted in the stool. Patient complains of abdominal pain x1 day. No fevers, no vomiting. Abdomen is soft and nontender to palpation. Patient diagnosed with Lyme disease a few weeks ago, and is on amoxicillin currently. patient awake, alert, coloring appropriate and answering all questions with no distress noted. NKA, no PMH.

## 2023-12-07 NOTE — PATIENT PROFILE ADULT - NSASFUNCLEVELADLTRANSFER_GEN_A_NUR
Reported stat EKG reading to MD Team 2. MD stated to continue to monitor for any signs and symptoms and report back if signs are shown. No further orders at this time.    3 = assistive equipment and person

## 2024-02-10 NOTE — PROGRESS NOTE ADULT - ASSESSMENT
Attempted to discharge pt, pt states    #Stroke  #R/o Meningitis  #AMS with decreased speech low tone found with acute/subacute infarct in the left basal ganglia/ periventricular white matter, NIHSS 10 on admission  CT: Evolving acute/subacute infarct in the left basal ganglia, with mild mass effect. Stable mild midline shift to the right.  - decrease Level of consciousness  - Infectious work up including UA/blood cultures, CXR, CSF  - Off antibiotics   - D/c Acyclovir, as HSV negative  - Keppra 1g q12  - LP successful 9/4/20  - f/u CSF results - unremarkable to date  - keep systolic 160-180  - IVF 75 cc/hr  -echo with bubble  -lipid profile WNL  -a1c 7.3  -keep aspirin and high dose statin  - ID consult  - Neuro checks q4  - f/u vEEG - no clinical or subclinical seziures noted so far  - CT Head - No hemorrhagic transformation, artifact from EEG  - f/u repeat CT Head  - Restart Lovenox?  - LE Duplex negative  - Palliative care meeting today 1 PM with Wife, Palliative Care  - f/u Neuro if they updated wife    #hypocalcemia and hypomagnesemia  - Replete  - f/u K and Mg    # CIDP since 2011 per wife had deterioration [id number] and is bedbound since then, follows DR. Mitchell currently getting IVIG every three weeks with RN administering at home. Was also started on gabapentin a few months ago per wife Pt. gets very sleepy after gabapentin will hold for now.   -c/w baclofen and pramipexole    #diabetes  - c/w home insulin regimen 30 lantus bedtime 9 lispro with meals     #HTN  - Losartan 50  - increase Coreg 12.5 q12    #Hx of CAD s/p cabg  - c/w asa, carvedilol and statin  - hold plavix    #Hx of anxiety and depression Pt. is on many meds confirmed with wife- c/w home meds- duloxetine, quetiapine, hydroxyzine, alprazolam prn  - Xanax 1 mg q6 prn  - Ativan 0.25 IV q12    #chronic pain   - hold morphine 60mg q12 continue home regimen  - Morphine 2mg q4    #Hx of BPH   - Doxazosin 2mg qd    #Hx of chronic constipation   -c/w senna dulcolox as needed    #Hx of gerd   -on pantoprazole    #Diet: NPO with tube feeds  #DVT ppx- lovenox  #GI ppx on pantoprzole  #Code status: Full  #Dispo: Downgrade to floors #Stroke  #R/o Meningitis  #AMS with decreased speech low tone found with acute/subacute infarct in the left basal ganglia/ periventricular white matter, NIHSS 10 on admission  CT: Evolving acute/subacute infarct in the left basal ganglia, with mild mass effect. Stable mild midline shift to the right.  - decrease Level of consciousness  - Infectious work up including UA/blood cultures, CXR, CSF  - Off antibiotics   - D/c Acyclovir, as HSV negative  - Keppra 1g q12  - LP successful 9/4/20  - f/u CSF results - unremarkable to date  - keep systolic 160-180  - keep Na 140-145  - f/u Na  -echo with bubble  -lipid profile WNL  -a1c 7.3  -keep aspirin and high dose statin  - ID consult  - Neuro checks q4  - f/u vEEG - no clinical or subclinical seziures noted so far  - CT Head - No hemorrhagic transformation, artifact from EEG  - f/u repeat CT Head  - Restart Lovenox?  - LE Duplex negative  - Palliative care meeting today 1 PM with Wife, Palliative Care  - f/u Neuro if they updated wife    #hypocalcemia and hypomagnesemia  - Replete  - f/u K and Mg    # CIDP since 2011 per wife had deterioration [id number] and is bedbound since then, follows DR. Mitchell currently getting IVIG every three weeks with RN administering at home. Was also started on gabapentin a few months ago per wife Pt. gets very sleepy after gabapentin will hold for now.   -c/w baclofen and pramipexole    #diabetes  - c/w home insulin regimen 30 lantus bedtime 9 lispro with meals     #HTN  - Losartan 50  - increase Coreg 12.5 q12    #Hx of CAD s/p cabg  - c/w asa, carvedilol and statin  - hold plavix    #Hx of anxiety and depression Pt. is on many meds confirmed with wife- c/w home meds- duloxetine, quetiapine, hydroxyzine, alprazolam prn  - Xanax 1 mg q6 prn  - Ativan 0.25 IV q12    #chronic pain   - hold morphine 60mg q12 continue home regimen  - Morphine 2mg q4    #Hx of BPH   - Doxazosin 2mg qd    #Hx of chronic constipation   -c/w senna dulcolox as needed    #Hx of gerd   -on pantoprazole    #Diet: NPO with tube feeds  #DVT ppx- lovenox  #GI ppx on pantoprzole  #Code status: Full  #Dispo: Downgrade to floors

## 2024-06-06 NOTE — PROCEDURE NOTE - NSTIMEOUT_GEN_A_CORE
Patient's first and last name, , procedure, and correct site confirmed prior to the start of procedure. Pt would like to improve functional mobility

## 2025-02-19 NOTE — PROGRESS NOTE ADULT - SUBJECTIVE AND OBJECTIVE BOX
Home regimen: lansoprazole 30mg  Continue Pantoprazole 40mg PO while inpatient   1.Presentation: HTN with SBP in the 200's and c/o headache      2. Today's Acute Problems:   Seizure        3. Relevant brief History:  50 years old left handed man with past medical history of MI, CABG x 5, DM, arteriosclerotic heart disease, chronic inflammatory demyelinating polyneuropathy (follows Neurologist Dr. Mitchell), prior chronic right caudate nucleus infarction, who presents from nursing home for AMS x 3 days, vomiting, decreased PO intake, and right sided facial droop. In ED, T 99.6F, /90, HR 74, RR 18, SpO2 96% RA. CTH reports of acute/subacute left basal ganglia/periventricular white matter.       4-Yesterday's Plan:  Neuro:  -No clinical seizures on vEEG. May d/c  -C/w Keppra 1 gr BID  -Neuro checks Q1  -Adequate pain management PRN  -Cont. high dose statin therapy     CVS:  -Keep -180    Resp:  -Maintain HOB elevated at least 40 degrees    Fluids/Electrolytes:  -Keep Na+ levels 140-145  -Maintain glycemic control     ID:  -CSF analysis negative PCR herpes; may d/c acyclovir  -May resume antibiotics as needed      5. Last 24 hour updates:  -Patient had a witnessed left sided shaking lasting few minutes around 5pm last evening, resolved with ativan 1 mg ivp  -No acute overnight events.  -Remains lethargic  -tmax 99.8  -HSV negative, off acyclovir      5.Medications:  aspirin  chewable 81 milligrams Oral daily  atorvastatin 80 milligrams Oral at bedtime  baclofen 10 milligrams Oral two times a day  carvedilol 6.25 milligram Oral every 12 hours  chlorhexidine 4% Liquid 1 Application(s) Topical <User Schedule>  dextrose 50% Injectable 12.5 Gram(s) IV Push once  dextrose 50% Injectable 25 Gram(s) IV Push once  dextrose 50% Injectable 25 Gram(s) IV Push once  doxazosin 2 milligram Oral at bedtime  Duloxetine 60 milligrams Oral two times a day  insulin glargine Injectable (LANTUS) 30 Unit(s) Subcutaneous at bedtime  insulin lispro Humalog corrective regimen sliding scale   Subcutaneous three times a day before meals  insulin lispro Injectable (HumaLOG) 9 Unit(s) Subcutaneous three times a day before meals  Levetiracetam  IVPB 1000 milligrams IV Intermittent every 12 hours  losartan 50 milligram Oral daily  morphine  - Injectable 2 milligrams IV Push every 4 hours  pantoprazole   Suspension 40 milligram Oral daily  pramipexole 0.5 milligrams Oral every 12 hours  senna 2 Tablet(s) Oral at bedtime      6.Ancillary Mangement:  Chest PT[]  Head of bed >35 [x]  Out of bed to chair []  PT/OT/ST []  Spirometry[]  DVT prophalaxis[x]      7.Neuro Exam:  Awake []no[x]spontaneously[]occasionally[]to stimuli  AI []y[x]n   Following commands:[x]y[]n  Oriented:[x]0[]1[]2[]3    Tracking:[x]y[]n  Language: not verbal  Time off sedation for exam: n/a  Pupils:  Right 3 >2       Left  3  >  2            Corneal: + blink      Gag reflex:       EOMI: gaze preferential to the left does cross midline  Power: Right side is weak (leg >arm)             RUE 3/5   RLE 0/5             LUE 3/5    LLE 2-3/5  Sensory: decreased on the right >left.  Plantar response downgoing        NIH STROKE SCALE  Item	                                                        Score  1 a.	Level of Consciousness	               	1  1 b. LOC Questions	                                    2  1 c.	LOC Commands	                               	1  2.	Best Gaze	                                    1  3.	Visual	                                                1  4.	Facial Palsy	                                    1  5 a.	Motor Arm - Left	                        1  5 b.	Motor Arm - Right	                        1  6 a.	Motor Leg - Left	                        4   6 b.	Motor Leg - Right	                        4   7.	Limb Ataxia	                                    0  8.	Sensory	                                                2  9.	Language	                                    3  10.	Dysarthria	                                    2  11.	Extinction and Inattention  	            1  _______________________________________________________________________  TOTAL	                                                           25      mRS:  0 No symptoms at all  1 No significant disability despite symptoms; able to carry out all usual duties and activities without assistance  2 Slight disability; unable to carry out all previous activities, but able to look after own affairs  3 Moderate disability; requiring some help, but able to walk without assistance  4 Moderately severe disability; unable to walk without assistance and unable to attend to own bodily needs without assistance  5 Severe disability; bedridden, incontinent and requiring constant nursing care and attention  6 Dead      Last CTH:  < from: CT Head No Cont (09.06.20 @ 17:32) >  IMPRESSION:    1. There is artifact from EEG electrodes.    2. Questionable hypoattenuation in the right cerebellar hemisphere. Unclear if this is a true finding or artifact. If there is clinical concern noncontrast headCT follow-up could be obtained following removal of EEG leads.    3. Redemonstration of evolving left basal ganglia/corona radiata infarct with persistent rightward midline shift of approximately 5 to 6 mm. No hemorrhagic transformation.    4. Otherwise no significant change since the prior study.      FRANCISCO JAVIER ENAMORADO M.D., ATTENDING RADIOLOGIST    < end of copied text >        Last MRA  < from: MR Angio Neck w/ IV Cont (09.01.20 @ 19:33) >  IMPRESSION:    MRA BRAIN:  Mild short segmental stenosis involving the distal left M1, and the proximal left anterior temporal artery.  Mildly narrowed left A1.  No evidence of large vessel occlusion, vascular malformation, or aneurysm.    MRA NECK:  No evidence of carotid or vertebral artery stenosis.      < end of copied text >        Last MRI:  < from: MR Head No Cont (09.01.20 @ 19:33) >  IMPRESSION:    MRI BRAIN:  Redemonstration of acute infarct involving the left basal ganglia/corona radiata.  Stable mild midline shift to the right.    < end of copied text >          Last EEG:  VEEG in the last 24 hours:    Background------------------ low amplitude ,symmetrical less than optimally organized reaching frequencies in the range of 7-8 hz. superimposed by small amount of low amplitude theta    Focal and generalized slowing----------------  mild to moderate generalized slowing                                                                   mild bilateral right > left focal slowing    Interictal activity-------------------- right FT at times presented in psuedo-periodic pattern.    Events--- none    Seizures------------- no clinical or subclinical seizures    Impression:  abnormal as above        8.CVS:  Vital Signs Last 24 Hrs  T(C): 37.7 (09 Sep 2020 08:00), Max: 37.7 (08 Sep 2020 16:00)  T(F): 99.8 (09 Sep 2020 08:00), Max: 99.8 (08 Sep 2020 16:00)  HR: 106 (09 Sep 2020 08:00) (82 - 120)  BP: 105/47 (09 Sep 2020 08:00) (105/47 - 192/79)  BP(mean): 72 (09 Sep 2020 08:00) (72 - 133)  RR: 27 (09 Sep 2020 08:00) (20 - 28)  SpO2: 96% (09 Sep 2020 08:00) (91% - 99%)        Last Echo:  < from: Transthoracic Echocardiogram (09.04.20 @ 09:34) >  Summary:   1. Normal global left ventricular systolic function.   2. Mildly increased LV wall thickness.   3. Spectral Doppler shows impaired relaxation pattern of left ventricular myocardial filling (Grade I diastolic dysfunction).  4. Mildly enlarged left atrium.   5. Normal right atrial size.   6. Trace mitral valve regurgitation.      < end of copied text >      Last EKG:  < from: 12 Lead ECG (09.02.20 @ 02:22) >  Ventricular Rate 121 BPM    Atrial Rate 121 BPM    P-R Interval 136 ms    QRS Duration 82 ms    Q-T Interval 322 ms    QTC Calculation(Bezet) 457 ms    P Axis 39 degrees    R Axis 22 degrees    T Axis 127 degrees    Diagnosis Line Sinus tachycardia  Nonspecific ST and T wave abnormality  Abnormal ECG    < end of copied text >      9.Respiratory:    Chest Xray:  < from: Xray Chest 1 View- PORTABLE-Routine (09.09.20 @ 05:47) >  Findings:    Support devices: Weighted enteric tube coursing below left hemidiaphragm. Overlying telemetry leads.    Cardiac/mediastinum/hilum: Unchanged.    Lung parenchyma/Pleura: Low lung volumes. No focal consolidation, pneumothorax or pleural effusion.    Skeleton/soft tissues: Unchanged.    Impression:    No significant interval change.    < end of copied text >        10.GI:  Prophylaxis    GI: Protonix 40 mg q 24   LIVER FUNCTIONS - ( 09 Sep 2020 04:59 )  Alb: 3.4 g/dL / Pro: 6.0 g/dL / ALK PHOS: 100 U/L / ALT: 12 U/L / AST: 19 U/L / GGT: x             11.Renal/Fluids/Electrolytes:    09-09    136  |  100  |  12  ----------------------------<  226<H>  3.4<L>   |  24  |  0.7    Ca    8.0<L>      09 Sep 2020 04:59  Mg     1.9     09-09    TPro  6.0  /  Alb  3.4<L>  /  TBili  0.8  /  DBili  x   /  AST  19  /  ALT  12  /  AlkPhos  100  09-09        I&O's Detail    08 Sep 2020 07:01  -  09 Sep 2020 07:00  --------------------------------------------------------  IN:    clevidipine Infusion: 2 mL    Enteral Tube Flush: 210 mL    IV PiggyBack: 400 mL    Osmolite: 240 mL    sodium chloride 0.9%: 1500 mL    sodium chloride 0.9%: 250 mL  Total IN: 2602 mL    OUT:    Indwelling Catheter - Urethral: 1600 mL    Voided: 425 mL  Total OUT: 2025 mL    Total NET: 577 mL          12.ID:  TMax:  Vital Signs Last 24 Hrs  T(C): 37.7 (09 Sep 2020 08:00), Max: 37.7 (08 Sep 2020 16:00)  T(F): 99.8 (09 Sep 2020 08:00), Max: 99.8 (08 Sep 2020 16:00)  HR: 106 (09 Sep 2020 08:00) (82 - 120)  BP: 105/47 (09 Sep 2020 08:00) (105/47 - 192/79)  BP(mean): 72 (09 Sep 2020 08:00) (72 - 133)  RR: 27 (09 Sep 2020 08:00) (20 - 28)  SpO2: 96% (09 Sep 2020 08:00) (91% - 99%)  Lactate, Blood: 1.5 mmol/L (09-08 @ 18:55)          13.Hematology:                        13.6   10.67 )-----------( 231      ( 09 Sep 2020 04:59 )             43.0       TPro  6.0  /  Alb  3.4<L>  /  TBili  0.8  /  DBili  x   /  AST  19  /  ALT  12  /  AlkPhos  100  09-09        DVT Prophylaxis  Lovenox[]   Heparin[]   Venodynes[]   SCD's[x]